# Patient Record
Sex: FEMALE | Race: BLACK OR AFRICAN AMERICAN | NOT HISPANIC OR LATINO | Employment: OTHER | URBAN - METROPOLITAN AREA
[De-identification: names, ages, dates, MRNs, and addresses within clinical notes are randomized per-mention and may not be internally consistent; named-entity substitution may affect disease eponyms.]

---

## 2017-02-03 ENCOUNTER — LAB VISIT (OUTPATIENT)
Dept: LAB | Facility: HOSPITAL | Age: 72
End: 2017-02-03
Attending: FAMILY MEDICINE
Payer: MEDICARE

## 2017-02-03 ENCOUNTER — OFFICE VISIT (OUTPATIENT)
Dept: FAMILY MEDICINE | Facility: CLINIC | Age: 72
End: 2017-02-03
Payer: MEDICARE

## 2017-02-03 VITALS
DIASTOLIC BLOOD PRESSURE: 80 MMHG | TEMPERATURE: 98 F | HEIGHT: 60 IN | BODY MASS INDEX: 28.31 KG/M2 | OXYGEN SATURATION: 99 % | SYSTOLIC BLOOD PRESSURE: 120 MMHG | WEIGHT: 144.19 LBS | HEART RATE: 62 BPM

## 2017-02-03 DIAGNOSIS — R07.89 ATYPICAL CHEST PAIN: ICD-10-CM

## 2017-02-03 DIAGNOSIS — I10 ESSENTIAL HYPERTENSION: Chronic | ICD-10-CM

## 2017-02-03 DIAGNOSIS — F41.9 ANXIETY: ICD-10-CM

## 2017-02-03 DIAGNOSIS — R10.9 ABDOMINAL CRAMPING: ICD-10-CM

## 2017-02-03 DIAGNOSIS — K21.9 GASTROESOPHAGEAL REFLUX DISEASE WITHOUT ESOPHAGITIS: Chronic | ICD-10-CM

## 2017-02-03 DIAGNOSIS — G25.81 RLS (RESTLESS LEGS SYNDROME): Chronic | ICD-10-CM

## 2017-02-03 DIAGNOSIS — R10.9 ABDOMINAL CRAMPING: Primary | ICD-10-CM

## 2017-02-03 DIAGNOSIS — F33.0 MILD RECURRENT MAJOR DEPRESSION: Chronic | ICD-10-CM

## 2017-02-03 LAB
BILIRUB SERPL-MCNC: NORMAL MG/DL
BLOOD URINE, POC: 50
COLOR, POC UA: YELLOW
GLUCOSE UR QL STRIP: NORMAL
KETONES UR QL STRIP: NORMAL
LEUKOCYTE ESTERASE URINE, POC: NORMAL
NITRITE, POC UA: NORMAL
PH, POC UA: 5
PROTEIN, POC: NORMAL
SPECIFIC GRAVITY, POC UA: 1.01
UROBILINOGEN, POC UA: NORMAL

## 2017-02-03 PROCEDURE — 1160F RVW MEDS BY RX/DR IN RCRD: CPT | Mod: S$GLB,,, | Performed by: FAMILY MEDICINE

## 2017-02-03 PROCEDURE — 83036 HEMOGLOBIN GLYCOSYLATED A1C: CPT

## 2017-02-03 PROCEDURE — 3079F DIAST BP 80-89 MM HG: CPT | Mod: S$GLB,,, | Performed by: FAMILY MEDICINE

## 2017-02-03 PROCEDURE — 99499 UNLISTED E&M SERVICE: CPT | Mod: S$GLB,,, | Performed by: FAMILY MEDICINE

## 2017-02-03 PROCEDURE — 84443 ASSAY THYROID STIM HORMONE: CPT

## 2017-02-03 PROCEDURE — 81002 URINALYSIS NONAUTO W/O SCOPE: CPT | Mod: S$GLB,,, | Performed by: FAMILY MEDICINE

## 2017-02-03 PROCEDURE — 1157F ADVNC CARE PLAN IN RCRD: CPT | Mod: S$GLB,,, | Performed by: FAMILY MEDICINE

## 2017-02-03 PROCEDURE — 36415 COLL VENOUS BLD VENIPUNCTURE: CPT | Mod: PO

## 2017-02-03 PROCEDURE — 99999 PR PBB SHADOW E&M-EST. PATIENT-LVL III: CPT | Mod: PBBFAC,,, | Performed by: FAMILY MEDICINE

## 2017-02-03 PROCEDURE — 1159F MED LIST DOCD IN RCRD: CPT | Mod: S$GLB,,, | Performed by: FAMILY MEDICINE

## 2017-02-03 PROCEDURE — 99214 OFFICE O/P EST MOD 30 MIN: CPT | Mod: 25,S$GLB,, | Performed by: FAMILY MEDICINE

## 2017-02-03 PROCEDURE — 3074F SYST BP LT 130 MM HG: CPT | Mod: S$GLB,,, | Performed by: FAMILY MEDICINE

## 2017-02-03 RX ORDER — PAROXETINE HYDROCHLORIDE 20 MG/1
20 TABLET, FILM COATED ORAL EVERY MORNING
Qty: 90 TABLET | Refills: 1 | Status: SHIPPED | OUTPATIENT
Start: 2017-02-03 | End: 2017-02-15 | Stop reason: SDUPTHER

## 2017-02-03 RX ORDER — AMLODIPINE BESYLATE 10 MG/1
10 TABLET ORAL DAILY
Qty: 90 TABLET | Refills: 1 | Status: SHIPPED | OUTPATIENT
Start: 2017-02-03 | End: 2017-02-15 | Stop reason: SDUPTHER

## 2017-02-03 RX ORDER — ROPINIROLE 0.5 MG/1
0.5 TABLET, FILM COATED ORAL NIGHTLY
Qty: 90 TABLET | Refills: 1 | Status: SHIPPED | OUTPATIENT
Start: 2017-02-03 | End: 2017-02-15 | Stop reason: SDUPTHER

## 2017-02-03 RX ORDER — METOPROLOL SUCCINATE 100 MG/1
100 TABLET, EXTENDED RELEASE ORAL DAILY
Qty: 90 TABLET | Refills: 1 | Status: SHIPPED | OUTPATIENT
Start: 2017-02-03 | End: 2017-02-15 | Stop reason: SDUPTHER

## 2017-02-03 RX ORDER — OMEPRAZOLE 20 MG/1
20 CAPSULE, DELAYED RELEASE ORAL DAILY
Qty: 90 CAPSULE | Refills: 1 | Status: SHIPPED | OUTPATIENT
Start: 2017-02-03 | End: 2017-02-15 | Stop reason: SDUPTHER

## 2017-02-03 NOTE — MR AVS SNAPSHOT
[facility]  [address] [facility] [place]  [place] [address]  Phone: [phone]  Fax: [phone]                  Diana Herring   2/3/2017 8:30 AM   Office Visit    Description:  Female : 1945   Provider:  Elvia Moran MD   Department:  [facility] - [facility]           Reason for Visit     Abdominal Pain           Diagnoses this Visit        Comments    Abdominal cramping    -  Primary     Essential hypertension         Gastroesophageal reflux disease without esophagitis         RLS (restless legs syndrome)         Anxiety         Mild recurrent major depression                To Do List           Goals (5 Years of Data)              Today    10/3/16    3/31/16    Blood Pressure < 140/90   120/80  120/66  128/72       These Medications        Disp Refills Start End    amlodipine (NORVASC) 10 MG tablet 90 tablet 1 2/3/2017     Take 1 tablet (10 mg total) by mouth once daily. - Oral    Pharmacy: [facility]/pharmacy #[id number] - [place] - [address] Ph #: [phone]       metoprolol succinate (TOPROL-XL) 100 MG 24 hr tablet 90 tablet 1 2/3/2017     Take 1 tablet (100 mg total) by mouth once daily. - Oral    Pharmacy: [facility]/pharmacy #[id number] - [place] - [address] Ph #: [phone]       omeprazole (PRILOSEC) 20 MG capsule 90 capsule 1 2/3/2017 2017    Take 1 capsule (20 mg total) by mouth once daily. - Oral    Pharmacy: [facility]/pharmacy #[id number] - [place] - [address] Ph #: [phone]       ropinirole (REQUIP) 0.5 MG tablet 90 tablet 1 2/3/2017 2017    Take 1 tablet (0.5 mg total) by mouth every evening. - Oral    Pharmacy: [facility]/pharmacy #[id number] - [place] - [address] Ph #: [phone]       paroxetine (PAXIL) 20 MG tablet 90 tablet 1 2/3/2017 2017    Take 1 tablet (20 mg total) by mouth every morning. - Oral    Pharmacy: [facility]/pharmacy #[id number] - [place] - [address] Ph #: [phone]         [facility]     [facility] -   Assistance  Registered nurses in the [facility] provide clinical advisement, health education, appointment booking, and other advisory services.  Call for this free service at [phone].             Medications           Message regarding Medications     Verify the changes and/or additions to your medication regime listed below are the same as discussed with your clinician today.  If any of these changes or additions are incorrect, please notify your healthcare provider.        CHANGE how you are taking these medications     Start Taking Instead of    amlodipine (NORVASC) 10 MG tablet amlodipine (NORVASC) 10 MG tablet    Dosage:  Take 1 tablet (10 mg total) by mouth once daily. Dosage:  TAKE 1 TABLET (10 MG TOTAL) BY MOUTH ONCE DAILY.    Reason for Change:  Reorder     metoprolol succinate (TOPROL-XL) 100 MG 24 hr tablet metoprolol succinate (TOPROL-XL) 100 MG 24 hr tablet    Dosage:  Take 1 tablet (100 mg total) by mouth once daily. Dosage:  TAKE 1 TABLET (100 MG TOTAL) BY MOUTH ONCE DAILY.    Reason for Change:  Reorder            Verify that the below list of medications is an accurate representation of the medications you are currently taking.  If none reported, the list may be blank. If incorrect, please contact your healthcare provider. Carry this list with you in case of emergency.           Current Medications     alendronate (FOSAMAX) 70 MG tablet Take 1 tablet (70 mg total) by mouth every 7 days.    amlodipine (NORVASC) 10 MG tablet Take 1 tablet (10 mg total) by mouth once daily.    calcium-vitamin D 500-125 mg-unit tablet Take 1 tablet by mouth once daily.      metoprolol succinate (TOPROL-XL) 100 MG 24 hr tablet Take 1 tablet (100 mg total) by mouth once daily.    multivitamin (MULTIVITAMIN) per tablet Take 1 tablet by mouth once daily.      simethicone (MYLICON) 80 MG chewable tablet Take 1 tablet (80 mg total) by mouth every 6 (six) hours as needed for Flatulence.    levocetirizine  (XYZAL) 5 MG tablet Take 1 tablet (5 mg total) by mouth every evening.    lorazepam (ATIVAN) 1 MG tablet TAKE 1 TABLET BY MOUTH DAILY AS NEEDED FOR ANXIETY    omeprazole (PRILOSEC) 20 MG capsule Take 1 capsule (20 mg total) by mouth once daily.    paroxetine (PAXIL) 20 MG tablet Take 1 tablet (20 mg total) by mouth every morning.    ropinirole (REQUIP) 0.5 MG tablet Take 1 tablet (0.5 mg total) by mouth every evening.           Clinical Reference Information           Your Vitals Were     BP Pulse Temp Height Weight SpO2    120/80 (BP Location: Left arm, Patient Position: Sitting, BP Method: Manual) 62 98.1 °F (36.7 °C) (Oral) 5' (1.524 m) 65.4 kg (144 lb 2.9 oz) 99%    BMI                28.16 kg/m2          Blood Pressure          Most Recent Value    BP  120/80      Allergies as of 2/3/2017     No Known Allergies      Immunizations Administered on Date of Encounter - 2/3/2017     None      Orders Placed During Today's Visit      Normal Orders This Visit    POCT URINE DIPSTICK WITHOUT MICROSCOPE     Future Labs/Procedures Expected by Expires    Hemoglobin A1c  2/3/2017 2/3/2018    TSH  2/3/2017 2/3/2018      [facility] Sign-Up     Activating your [facility] account is as easy as 1-2-3!     1) Visit [email or web address], select Sign Up Now, enter this activation code and your date of birth, then select Next.  [email or web address]  Expires: 3/20/2017  8:58 AM      2) Create a username and password to use when you visit [facility] in the future and select a security question in case you lose your password and select Next.    3) Enter your e-mail address and click Sign Up!    Additional Information  If you have questions, please e-mail [email or web address] or call [phone] to talk to our [facility] staff. Remember, [facility] is NOT to be used for urgent needs. For medical emergencies, dial 911.         Instructions      High-Fiber Diet  Fiber is in fruits, vegetables, cereals, and grains. Fiber passes through your body  undigested. A high-fiber diet helps food move through your intestinal tract. The added bulk is helpful in preventing constipation. In people with diverticulosis, fiber helps clean out the pouches along the colon wall. It also prevents new pouches from forming. A high-fiber diet reduces the risk of colon cancer. It also lowers blood cholesterol and prevents high blood sugar in people with diabetes.    The fiber-rich foods listed below should be part of your diet. If you are not used to high-fiber foods, start with 1 or 2 foods from this list. Every 3 to 4 days add a new one to your diet. Do this until you are eating 4 high-fiber foods per day. This should give you 20 to 35 grams of fiber a day. It is also important to drink a lot of water when you are on this diet. You should have 6 to 8 glasses of water a day. Water makes the fiber swell and increases the benefit.  Foods high in dietary fiber  The following foods are high in dietary fiber:  · Breads. Breads made with 100% whole-wheat flour; tony, wheat, or rye crackers; whole-grain tortillas, bran muffins.  · Cereals. Whole-grain and bran cereals with bran (shredded wheat, wheat flakes, raisin bran, corn bran); oatmeal, rolled oats, granola, and brown rice.  · Fruits. Fresh fruits and their edible skins (pears, prunes, raisins, berries, apples, and apricots); bananas, citrus fruit, mangoes, pineapple; and prune juice.  · Nuts. Any nuts and seeds.  · Vegetables. Best served raw or lightly cooked. All types, especially: green peas, celery, eggplant, potatoes, spinach, broccoli, [place] sprouts, winter squash, carrots, cauliflower, soybeans, lentils, and fresh and dried beans of all kinds.  · Other. Popcorn, any spices.  Date Last Reviewed: 8/1/2016  © 7761-1000 [employer]. [address] [place] [address], [place] [address]. All rights reserved. This information is not intended as a substitute for professional medical care. Always follow your healthcare  professional's instructions.             Language Assistance Services     ATTENTION: Language assistance services are available, free of charge. Please call [phone].      ATENCIÓN: Si habla catracho, tiene a kurtz disposición servicios gratuitos de asistencia lingüística. Llame al [phone].     CHÚ Ý: N?u b?n nói Ti?ng Vi?t, có các d?ch v? h? tr? ngôn ng? mi?n phí dành cho b?n. G?i s? [phone].         [facility] complies with applicable Federal civil rights laws and does not discriminate on the basis of race, color, national origin, age, disability, or sex.

## 2017-02-03 NOTE — PROGRESS NOTES
Routine Office Visit    Patient Name: Diana Herring    : 1945  MRN: [id number]    Subjective:  Diana is a 72 y.o. female who presents today for     1. Abdominal pain - chronic condition for patient. Pt c/o left lower quadrant abdominal pain. Pain is described as cramping / spasm pain and is associated with weakness. Pain is sometimes triggered by constipation or due to lack of food. She has to take a laxative in order to have a bowel movement. Pain is alleviated after bowel movement. She also has these episodes whenever she does not eat for a long period of time. When she does not eat, she feels faint. She admits to skipping meals. She does like to eat vegetables but does not drink enough water. No fever/ no blood in stool / colonoscopy  - showed polyp / no weight loss   2. Chest pain - intermittent episodes. Pt has had similar chest pain in the past and was evaluated by cardiology. She would like referral.     Review of Systems   Constitutional: Negative for chills and fever.   HENT: Negative for congestion.    Eyes: Negative for blurred vision.   Respiratory: Negative for cough.    Cardiovascular: Negative for chest pain.   Gastrointestinal: Positive for abdominal pain and constipation. Negative for diarrhea, heartburn, nausea and vomiting.   Genitourinary: Negative for dysuria.   Musculoskeletal: Negative for myalgias.   Skin: Negative for itching and rash.   Neurological: Negative for dizziness and headaches.   Psychiatric/Behavioral: Negative for depression.       Active Problem List  Patient Active Problem List   Diagnosis    GERD (gastroesophageal reflux disease)    Essential hypertension    Insomnia    Mild recurrent major depression    RLS (restless legs syndrome)    Osteoporosis    Muscle weakness    Bilateral leg pain    Left shoulder pain    Chest pain    Anxiety    Sedative hypnotic or anxiolytic dependence       Past Surgical History  Past Surgical History   Procedure  Laterality Date    Partial hysterectomy      Tubal ligation Bilateral        Family History  Family History   Problem Relation Age of Onset    Stroke Mother     Cancer Father      Lung    Stroke Brother     Heart disease Son        Social History  Social History     Social History    Marital status:      Spouse name: N/A    Number of children: N/A    Years of education: N/A     Occupational History    Not on file.     Social History Main Topics    Smoking status: Never Smoker    Smokeless tobacco: Not on file    Alcohol use No    Drug use: No    Sexual activity: Not on file     Other Topics Concern    Not on file     Social History Narrative    Patient is  w/ 5 children, one  from heart disease.The patient is retired.       Medications and Allergies  Reviewed and updated.       Physical Exam  Visit Vitals    /80 (BP Location: Left arm, Patient Position: Sitting, BP Method: Manual)    Pulse 62    Temp 98.1 °F (36.7 °C) (Oral)    Ht 5' (1.524 m)    Wt 65.4 kg (144 lb 2.9 oz)    SpO2 99%    BMI 28.16 kg/m2     Physical Exam   Constitutional: She is oriented to person, place, and time. She appears well-developed and well-nourished.   HENT:   Head: Normocephalic and atraumatic.   Eyes: Conjunctivae and EOM are normal. Pupils are equal, round, and reactive to light.   Neck: Normal range of motion. Neck supple.   Cardiovascular: Normal rate, regular rhythm and normal heart sounds.  Exam reveals no gallop and no friction rub.    No murmur heard.  Pulmonary/Chest: Breath sounds normal. No respiratory distress.   Abdominal: Soft. Bowel sounds are normal. She exhibits no distension. There is no tenderness.   Musculoskeletal: Normal range of motion.   Lymphadenopathy:     She has no cervical adenopathy.   Neurological: She is alert and oriented to person, place, and time.   Skin: Skin is warm.   Psychiatric: She has a normal mood and affect.     Results for orders placed or  performed in visit on 02/03/17   POCT URINE DIPSTICK WITHOUT MICROSCOPE   Result Value Ref Range    Color, UA yellow     Spec Grav, UA 1.015     pH, UA 5     WBC, UA +     Nitrite, UA neg     Protein, UA neg     Glucose, UA neg     Ketones, UA neg     Urobilinogen, UA neg     Bilirubin, UA +     Blood, UA 50          Assessment/Plan:  Diana Herring is a 72 y.o. female who presents today for :    Abdominal cramping  -     POCT URINE DIPSTICK WITHOUT MICROSCOPE  -     TSH; Future; Expected date: 2/3/17  -     Hemoglobin A1c; Future; Expected date: 2/3/17  Suspect abdominal cramping is due to constipation   Recommend increase fiber and water intake  Recommend otc miralax to help with regulating bowel movements     Essential hypertension / Atypical chest pain  -     amlodipine (NORVASC) 10 MG tablet; Take 1 tablet (10 mg total) by mouth once daily.  Dispense: 90 tablet; Refill: 1  -     metoprolol succinate (TOPROL-XL) 100 MG 24 hr tablet; Take 1 tablet (100 mg total) by mouth once daily.  Dispense: 90 tablet; Refill: 1  -     Ambulatory referral to Cardiology  Pt c/o intermittent episodes of chest pain - she was evaluated by cardiology in the past. Would like to see Dr. Muro again   Continue with current medication     Gastroesophageal reflux disease without esophagitis  -     omeprazole (PRILOSEC) 20 MG capsule; Take 1 capsule (20 mg total) by mouth once daily.  Dispense: 90 capsule; Refill: 1  The current medical regimen is effective;  continue present plan and medications.    RLS (restless legs syndrome)  -     ropinirole (REQUIP) 0.5 MG tablet; Take 1 tablet (0.5 mg total) by mouth every evening.  Dispense: 90 tablet; Refill: 1  The current medical regimen is effective;  continue present plan and medications.    Anxiety / Mild recurrent major depression  -     paroxetine (PAXIL) 20 MG tablet; Take 1 tablet (20 mg total) by mouth every morning.  Dispense: 90 tablet; Refill: 1  The current medical regimen is  effective;  continue present plan and medications.            Return if symptoms worsen or fail to improve.

## 2017-02-03 NOTE — PATIENT INSTRUCTIONS
High-Fiber Diet  Fiber is in fruits, vegetables, cereals, and grains. Fiber passes through your body undigested. A high-fiber diet helps food move through your intestinal tract. The added bulk is helpful in preventing constipation. In people with diverticulosis, fiber helps clean out the pouches along the colon wall. It also prevents new pouches from forming. A high-fiber diet reduces the risk of colon cancer. It also lowers blood cholesterol and prevents high blood sugar in people with diabetes.    The fiber-rich foods listed below should be part of your diet. If you are not used to high-fiber foods, start with 1 or 2 foods from this list. Every 3 to 4 days add a new one to your diet. Do this until you are eating 4 high-fiber foods per day. This should give you 20 to 35 grams of fiber a day. It is also important to drink a lot of water when you are on this diet. You should have 6 to 8 glasses of water a day. Water makes the fiber swell and increases the benefit.  Foods high in dietary fiber  The following foods are high in dietary fiber:  · Breads. Breads made with 100% whole-wheat flour; tony, wheat, or rye crackers; whole-grain tortillas, bran muffins.  · Cereals. Whole-grain and bran cereals with bran (shredded wheat, wheat flakes, raisin bran, corn bran); oatmeal, rolled oats, granola, and brown rice.  · Fruits. Fresh fruits and their edible skins (pears, prunes, raisins, berries, apples, and apricots); bananas, citrus fruit, mangoes, pineapple; and prune juice.  · Nuts. Any nuts and seeds.  · Vegetables. Best served raw or lightly cooked. All types, especially: green peas, celery, eggplant, potatoes, spinach, broccoli, [place] sprouts, winter squash, carrots, cauliflower, soybeans, lentils, and fresh and dried beans of all kinds.  · Other. Popcorn, any spices.  Date Last Reviewed: 8/1/2016  © 2742-8666 [facility]. [address] [place] [address], [place] [address]. All rights reserved. This  information is not intended as a substitute for professional medical care. Always follow your healthcare professional's instructions.

## 2017-02-04 LAB — TSH SERPL DL<=0.005 MIU/L-ACNC: 2.3 UIU/ML

## 2017-02-05 LAB
ESTIMATED AVG GLUCOSE: 97 MG/DL
HBA1C MFR BLD HPLC: 5 %

## 2017-02-07 ENCOUNTER — TELEPHONE (OUTPATIENT)
Dept: FAMILY MEDICINE | Facility: CLINIC | Age: 72
End: 2017-02-07

## 2017-02-07 NOTE — TELEPHONE ENCOUNTER
Patient has an appointment 2/13/17 3pm with Dr. Muro at [facility], patient was notified.

## 2017-02-10 ENCOUNTER — LAB VISIT (OUTPATIENT)
Dept: LAB | Facility: HOSPITAL | Age: 72
End: 2017-02-10
Attending: NURSE PRACTITIONER
Payer: MEDICARE

## 2017-02-10 ENCOUNTER — OFFICE VISIT (OUTPATIENT)
Dept: FAMILY MEDICINE | Facility: CLINIC | Age: 72
End: 2017-02-10
Payer: MEDICARE

## 2017-02-10 VITALS
OXYGEN SATURATION: 98 % | BODY MASS INDEX: 27.96 KG/M2 | TEMPERATURE: 98 F | HEIGHT: 60 IN | HEART RATE: 60 BPM | SYSTOLIC BLOOD PRESSURE: 118 MMHG | WEIGHT: 142.44 LBS | DIASTOLIC BLOOD PRESSURE: 68 MMHG | RESPIRATION RATE: 16 BRPM

## 2017-02-10 DIAGNOSIS — R51.9 UNILATERAL HEADACHE: ICD-10-CM

## 2017-02-10 DIAGNOSIS — G44.209 ACUTE NON INTRACTABLE TENSION-TYPE HEADACHE: Primary | ICD-10-CM

## 2017-02-10 DIAGNOSIS — G44.209 ACUTE NON INTRACTABLE TENSION-TYPE HEADACHE: ICD-10-CM

## 2017-02-10 LAB
ALBUMIN SERPL BCP-MCNC: 3.9 G/DL
ALP SERPL-CCNC: 107 U/L
ALT SERPL W/O P-5'-P-CCNC: 12 U/L
ANION GAP SERPL CALC-SCNC: 7 MMOL/L
AST SERPL-CCNC: 17 U/L
BASOPHILS # BLD AUTO: 0.1 K/UL
BASOPHILS NFR BLD: 1.5 %
BILIRUB SERPL-MCNC: 0.8 MG/DL
BUN SERPL-MCNC: 13 MG/DL
CALCIUM SERPL-MCNC: 9.6 MG/DL
CHLORIDE SERPL-SCNC: 106 MMOL/L
CO2 SERPL-SCNC: 28 MMOL/L
CREAT SERPL-MCNC: 0.9 MG/DL
DIFFERENTIAL METHOD: ABNORMAL
EOSINOPHIL # BLD AUTO: 0.2 K/UL
EOSINOPHIL NFR BLD: 3.5 %
ERYTHROCYTE [DISTWIDTH] IN BLOOD BY AUTOMATED COUNT: 12.8 %
EST. GFR  (AFRICAN AMERICAN): >60 ML/MIN/1.73 M^2
EST. GFR  (NON AFRICAN AMERICAN): >60 ML/MIN/1.73 M^2
GLUCOSE SERPL-MCNC: 94 MG/DL
HCT VFR BLD AUTO: 41.8 %
HGB BLD-MCNC: 13.4 G/DL
LYMPHOCYTES # BLD AUTO: 4 K/UL
LYMPHOCYTES NFR BLD: 58.9 %
MCH RBC QN AUTO: 29.8 PG
MCHC RBC AUTO-ENTMCNC: 32.1 %
MCV RBC AUTO: 93 FL
MONOCYTES # BLD AUTO: 0.5 K/UL
MONOCYTES NFR BLD: 7.1 %
NEUTROPHILS # BLD AUTO: 2 K/UL
NEUTROPHILS NFR BLD: 29 %
PLATELET # BLD AUTO: 231 K/UL
PMV BLD AUTO: 11.2 FL
POTASSIUM SERPL-SCNC: 4.4 MMOL/L
PROT SERPL-MCNC: 8.1 G/DL
RBC # BLD AUTO: 4.49 M/UL
SODIUM SERPL-SCNC: 141 MMOL/L
WBC # BLD AUTO: 6.86 K/UL

## 2017-02-10 PROCEDURE — 3074F SYST BP LT 130 MM HG: CPT | Mod: S$GLB,,, | Performed by: NURSE PRACTITIONER

## 2017-02-10 PROCEDURE — 1157F ADVNC CARE PLAN IN RCRD: CPT | Mod: S$GLB,,, | Performed by: NURSE PRACTITIONER

## 2017-02-10 PROCEDURE — 36415 COLL VENOUS BLD VENIPUNCTURE: CPT | Mod: PO

## 2017-02-10 PROCEDURE — 99214 OFFICE O/P EST MOD 30 MIN: CPT | Mod: 25,S$GLB,, | Performed by: NURSE PRACTITIONER

## 2017-02-10 PROCEDURE — 85025 COMPLETE CBC W/AUTO DIFF WBC: CPT

## 2017-02-10 PROCEDURE — 99999 PR PBB SHADOW E&M-EST. PATIENT-LVL IV: CPT | Mod: PBBFAC,,, | Performed by: NURSE PRACTITIONER

## 2017-02-10 PROCEDURE — 96372 THER/PROPH/DIAG INJ SC/IM: CPT | Mod: S$GLB,,, | Performed by: NURSE PRACTITIONER

## 2017-02-10 PROCEDURE — 1160F RVW MEDS BY RX/DR IN RCRD: CPT | Mod: S$GLB,,, | Performed by: NURSE PRACTITIONER

## 2017-02-10 PROCEDURE — 80053 COMPREHEN METABOLIC PANEL: CPT

## 2017-02-10 PROCEDURE — 3078F DIAST BP <80 MM HG: CPT | Mod: S$GLB,,, | Performed by: NURSE PRACTITIONER

## 2017-02-10 PROCEDURE — 1125F AMNT PAIN NOTED PAIN PRSNT: CPT | Mod: S$GLB,,, | Performed by: NURSE PRACTITIONER

## 2017-02-10 PROCEDURE — 1159F MED LIST DOCD IN RCRD: CPT | Mod: S$GLB,,, | Performed by: NURSE PRACTITIONER

## 2017-02-10 RX ORDER — KETOROLAC TROMETHAMINE 30 MG/ML
30 INJECTION, SOLUTION INTRAMUSCULAR; INTRAVENOUS
Status: COMPLETED | OUTPATIENT
Start: 2017-02-10 | End: 2017-02-10

## 2017-02-10 RX ADMIN — KETOROLAC TROMETHAMINE 30 MG: 30 INJECTION, SOLUTION INTRAMUSCULAR; INTRAVENOUS at 10:02

## 2017-02-10 NOTE — MR AVS SNAPSHOT
[facility]  [address]  [place] [address]  Phone: [phone]  Fax: [phone]                  Diana Herring   2/10/2017 9:45 AM   Office Visit    Description:  Female : 1945   Provider:  Shanita Salas NP   Department:  [facility]           Reason for Visit     Headache           Diagnoses this Visit        Comments    Acute non intractable tension-type headache    -  Primary     Unilateral headache                To Do List           Future Appointments        Provider Department Dept Phone    2017 3:00 PM Quoc Muro MD [facility] [phone]      Goals (5 Years of Data)              Today    2/3/17    10/3/16    Blood Pressure < 140/90   118/68  120/80  120/66      Follow-Up and Disposition     Return if symptoms worsen or fail to improve.      [facility]     [facility] On Call Nurse Care Line - 24/7 Assistance  Registered nurses in the [facility] On Call Center provide clinical advisement, health education, appointment booking, and other advisory services.  Call for this free service at [phone].             Medications           Message regarding Medications     Verify the changes and/or additions to your medication regime listed below are the same as discussed with your clinician today.  If any of these changes or additions are incorrect, please notify your healthcare provider.        These medications were administered today        Dose Freq    ketorolac injection 30 mg 30 mg Clinic/HOD 1 time    Sig: Inject 30 mg into the muscle one time.    Class: Normal    Route: Intramuscular           Verify that the below list of medications is an accurate representation of the medications you are currently taking.  If none reported, the list may be blank. If incorrect, please contact your healthcare provider. Carry this list with you in case of emergency.           Current Medications     alendronate (FOSAMAX) 70 MG tablet  Take 1 tablet (70 mg total) by mouth every 7 days.    amlodipine (NORVASC) 10 MG tablet Take 1 tablet (10 mg total) by mouth once daily.    calcium-vitamin D 500-125 mg-unit tablet Take 1 tablet by mouth once daily.      lorazepam (ATIVAN) 1 MG tablet TAKE 1 TABLET BY MOUTH DAILY AS NEEDED FOR ANXIETY    metoprolol succinate (TOPROL-XL) 100 MG 24 hr tablet Take 1 tablet (100 mg total) by mouth once daily.    multivitamin (MULTIVITAMIN) per tablet Take 1 tablet by mouth once daily.      omeprazole (PRILOSEC) 20 MG capsule Take 1 capsule (20 mg total) by mouth once daily.    paroxetine (PAXIL) 20 MG tablet Take 1 tablet (20 mg total) by mouth every morning.    ropinirole (REQUIP) 0.5 MG tablet Take 1 tablet (0.5 mg total) by mouth every evening.    simethicone (MYLICON) 80 MG chewable tablet Take 1 tablet (80 mg total) by mouth every 6 (six) hours as needed for Flatulence.    levocetirizine (XYZAL) 5 MG tablet Take 1 tablet (5 mg total) by mouth every evening.           Clinical Reference Information           Your Vitals Were     BP Pulse Temp Resp Height Weight    118/68 (BP Location: Right arm, Patient Position: Sitting, BP Method: Manual) 60 98 °F (36.7 °C) (Oral) 16 5' (1.524 m) 64.6 kg (142 lb 6.7 oz)    SpO2 BMI             98% 27.81 kg/m2         Blood Pressure          Most Recent Value    BP  118/68      Allergies as of 2/10/2017     No Known Allergies      Immunizations Administered on Date of Encounter - 2/10/2017     None      Orders Placed During Today's Visit     Future Labs/Procedures Expected by Expires    CBC auto differential  2/10/2017 4/11/2018    Comprehensive metabolic panel  2/10/2017 4/11/2018      Administrations This Visit     ketorolac injection 30 mg     Admin Date Action Dose Route Administered By             02/10/2017 Given 30 mg Intramuscular Tabitha Adams, LPN [facility] Sign-Up     Activating your [facility] account is as easy as 1-2-3!     1) Visit [email or web address],  select Sign Up Now, enter this activation code and your date of birth, then select Next.  [email or web address]  Expires: 3/20/2017  8:58 AM      2) Create a username and password to use when you visit [facility] in the future and select a security question in case you lose your password and select Next.    3) Enter your e-mail address and click Sign Up!    Additional Information  If you have questions, please e-mail [email or web address] or call [phone] to talk to our [facility] staff. Remember, [facility] is NOT to be used for urgent needs. For medical emergencies, dial 911.         Instructions      Tension Headache    A muscle tension headache is a very common cause of head pain. Its also called a stress headache. When some people are under stress, they tense the muscles of their shoulder, neck, and scalp without knowing it. If this tension lasts long enough, a headache can occur. A tension headache can be quite painful. It can last for hours or even days.  Home care  Follow these tips when caring for yourself at home:  · Dont drive yourself home if you were given pain medicine for your headache. Instead, have someone else drive you home. Try to sleep when you get home. You should feel much better when you wake up.  · Put heat on the back of your neck to help ease neck spasm.  · Drink only clear liquids or eat a light diet until your symptoms get better. This will help you avoid nausea or vomiting.  How to prevent headaches  · Figure out what is causing stress in your life. Learn new ways to handle your stress. Ideas include regular exercise, biofeedback, self-hypnosis, yoga, and meditation. Talk with your healthcare provider to find out more information about managing stress. Many books and digital media are also available on this subject.  · Take time out at the first sign of a tension headache, if possible. Take yourself out of the stressful situation. Find a quiet, comfortable place to sit or lie down and  let yourself relax. Heat and deep massage of the tight areas in the neck and shoulders may help ease muscle spasm. You may also get relief from a medicine like ibuprofen or a prescribed muscle relaxant.  Follow-up care  Follow up with your healthcare provider, or as advised. Talk with your provider if you have frequent headaches. He or she can figure out a treatment plan. Ask if you can have medicine to take at home the next time you get a bad headache. This may keep you from having to visit the emergency department in the future. You may need to see a headache specialist (neurologist) if you continue to have headaches.  When to seek medical advice  Call your healthcare provider right away if any of these occur:  · Your head pain gets worse during sexual intercourse or strenuous activity  · Your head pain doesnt get better within 24 hours  · You arent able to keep liquids down (repeated vomiting)  · Fever of 100.4ºF (38ºC) or higher, or as directed by your healthcare provider  · Stiff neck  · Extreme drowsiness, confusion, or fainting  · Dizziness or dizziness with spinning sensation (vertigo)  · Weakness in an arm or leg or one side of your face  · You have difficulty speaking  · Your vision changes  Date Last Reviewed: 8/1/2016  © 5478-6090 [employer]. [address] [place] [address], [place] [address]. All rights reserved. This information is not intended as a substitute for professional medical care. Always follow your healthcare professional's instructions.             Language Assistance Services     ATTENTION: Language assistance services are available, free of charge. Please call [phone].      ATENCIÓN: Si habla español, tiene a kurtz disposición servicios gratuitos de asistencia lingüística. Llame al [phone].     [facility] Ý: N?u b?n nói Ti?ng Vi?t, có các d?ch v? h? tr? ngôn ng? mi?n phí dành cho b?n. G?i s? [phone].         [facility] complies with applicable Federal  civil rights laws and does not discriminate on the basis of race, color, national origin, age, disability, or sex.

## 2017-02-10 NOTE — PROGRESS NOTES
Subjective:       Patient ID: Diana Herring is a 72 y.o. female.    Chief Complaint: Headache (x's 2 days)    Headache    This is a new problem. The current episode started in the past 7 days (3 days ago). The problem occurs constantly. The problem has been gradually worsening. The pain is located in the left unilateral and frontal region. The pain radiates to the face. The pain quality is not similar to prior headaches. The quality of the pain is described as throbbing and aching. The pain is at a severity of 6/10. The pain is moderate. Associated symptoms include blurred vision (intermittent ) and eye pain (left eye). Pertinent negatives include no abdominal pain, back pain, coughing, dizziness, drainage, ear pain, eye redness, eye watering, fever, hearing loss, insomnia, loss of balance, muscle aches, nausea, neck pain, numbness, phonophobia, photophobia, rhinorrhea, seizures, sinus pressure, swollen glands, tinnitus, visual change, vomiting, weakness or weight loss. Nothing aggravates the symptoms. She has tried oral narcotics (toradol) for the symptoms. The treatment provided mild relief. Her past medical history is significant for hypertension.     Review of Systems   Constitutional: Negative for chills, diaphoresis, fatigue, fever and weight loss.   HENT: Negative for congestion, ear pain, hearing loss, nosebleeds, postnasal drip, rhinorrhea, sinus pressure, sneezing and tinnitus.    Eyes: Positive for blurred vision (intermittent ) and pain (left eye). Negative for photophobia, discharge, redness, itching and visual disturbance.   Respiratory: Negative for cough, chest tightness, shortness of breath and wheezing.    Cardiovascular: Negative for chest pain, palpitations and leg swelling.   Gastrointestinal: Negative for abdominal pain, constipation, diarrhea, nausea and vomiting.   Genitourinary: Negative for dysuria, vaginal discharge and vaginal pain.   Musculoskeletal: Negative for arthralgias, back  pain, myalgias and neck pain.   Skin: Negative for color change and rash.   Neurological: Positive for headaches. Negative for dizziness, seizures, weakness, light-headedness, numbness and loss of balance.   Hematological: Negative for adenopathy. Does not bruise/bleed easily.   Psychiatric/Behavioral: The patient does not have insomnia.        Objective:      Physical Exam   Constitutional: She is oriented to person, place, and time. Vital signs are normal. She appears well-developed and well-nourished.   HENT:   Head: Normocephalic and atraumatic.   Right Ear: External ear normal.   Left Ear: External ear normal.   Nose: Nose normal.   Mouth/Throat: Oropharynx is clear and moist. No oropharyngeal exudate.   Eyes: Conjunctivae and EOM are normal. Pupils are equal, round, and reactive to light.   Cardiovascular: Normal rate, regular rhythm and normal heart sounds.    Pulmonary/Chest: Effort normal and breath sounds normal.   Neurological: She is alert and oriented to person, place, and time.   Skin: Skin is warm, dry and intact.   Psychiatric: She has a normal mood and affect.       Assessment:       1. Acute non intractable tension-type headache    2. Unilateral headache        Plan:       Diana was seen today for headache.    Diagnoses and all orders for this visit:    Acute non intractable tension-type headache  -     ketorolac injection 30 mg; Inject 30 mg into the muscle one time.  -     CBC auto differential; Future  -     Comprehensive metabolic panel; Future    Unilateral headache  -     ketorolac injection 30 mg; Inject 30 mg into the muscle one time.  -     CBC auto differential; Future  -     Comprehensive metabolic panel; Future  Home care  Follow these tips when caring for yourself at home:  · Dont drive yourself home if you were given pain medicine for your headache. Instead, have someone else drive you home. Try to sleep when you get home. You should feel much better when you wake up.  · Put heat on  the back of your neck to help ease neck spasm.  · Drink only clear liquids or eat a light diet until your symptoms get better. This will help you avoid nausea or vomiting.  How to prevent headaches  · Figure out what is causing stress in your life. Learn new ways to handle your stress. Ideas include regular exercise, biofeedback, self-hypnosis, yoga, and meditation. Talk with your healthcare provider to find out more information about managing stress. Many books and digital media are also available on this subject.  · Take time out at the first sign of a tension headache, if possible. Take yourself out of the stressful situation. Find a quiet, comfortable place to sit or lie down and let yourself relax. Heat and deep massage of the tight areas in the neck and shoulders may help ease muscle spasm. You may also get relief from a medicine like ibuprofen or a prescribed muscle relaxant.  Follow-up care  Follow up with your healthcare provider, or as advised. Talk with your provider if you have frequent headaches. He or she can figure out a treatment plan. Ask if you can have medicine to take at home the next time you get a bad headache. This may keep you from having to visit the emergency department in the future. You may need to see a headache specialist (neurologist) if you continue to have headaches.  When to seek medical advice  Call your healthcare provider right away if any of these occur:  · Your head pain gets worse during sexual intercourse or strenuous activity  · Your head pain doesnt get better within 24 hours  · You arent able to keep liquids down (repeated vomiting)  · Fever of 100.4ºF (38ºC) or higher, or as directed by your healthcare provider  · Stiff neck  · Extreme drowsiness, confusion, or fainting  · Dizziness or dizziness with spinning sensation (vertigo)  · Weakness in an arm or leg or one side of your face  · You have difficulty speaking  · Your vision changes  Date Last Reviewed: 8/1/2016  ©  7285-2748 [employer]. [address] [place] [address], [place] [address]. All rights reserved. This information is not intended as a substitute for professional medical care. Always follow your healthcare professional's instructions.

## 2017-02-10 NOTE — PATIENT INSTRUCTIONS
Tension Headache    A muscle tension headache is a very common cause of head pain. Its also called a stress headache. When some people are under stress, they tense the muscles of their shoulder, neck, and scalp without knowing it. If this tension lasts long enough, a headache can occur. A tension headache can be quite painful. It can last for hours or even days.  Home care  Follow these tips when caring for yourself at home:  · Dont drive yourself home if you were given pain medicine for your headache. Instead, have someone else drive you home. Try to sleep when you get home. You should feel much better when you wake up.  · Put heat on the back of your neck to help ease neck spasm.  · Drink only clear liquids or eat a light diet until your symptoms get better. This will help you avoid nausea or vomiting.  How to prevent headaches  · Figure out what is causing stress in your life. Learn new ways to handle your stress. Ideas include regular exercise, biofeedback, self-hypnosis, yoga, and meditation. Talk with your healthcare provider to find out more information about managing stress. Many books and digital media are also available on this subject.  · Take time out at the first sign of a tension headache, if possible. Take yourself out of the stressful situation. Find a quiet, comfortable place to sit or lie down and let yourself relax. Heat and deep massage of the tight areas in the neck and shoulders may help ease muscle spasm. You may also get relief from a medicine like ibuprofen or a prescribed muscle relaxant.  Follow-up care  Follow up with your healthcare provider, or as advised. Talk with your provider if you have frequent headaches. He or she can figure out a treatment plan. Ask if you can have medicine to take at home the next time you get a bad headache. This may keep you from having to visit the emergency department in the future. You may need to see a headache specialist (neurologist) if you continue  to have headaches.  When to seek medical advice  Call your healthcare provider right away if any of these occur:  · Your head pain gets worse during sexual intercourse or strenuous activity  · Your head pain doesnt get better within 24 hours  · You arent able to keep liquids down (repeated vomiting)  · Fever of 100.4ºF (38ºC) or higher, or as directed by your healthcare provider  · Stiff neck  · Extreme drowsiness, confusion, or fainting  · Dizziness or dizziness with spinning sensation (vertigo)  · Weakness in an arm or leg or one side of your face  · You have difficulty speaking  · Your vision changes  Date Last Reviewed: 8/1/2016  © 3233-0536 [employer]. [address] [place] [address], [place] [address]. All rights reserved. This information is not intended as a substitute for professional medical care. Always follow your healthcare professional's instructions.

## 2017-02-10 NOTE — PROGRESS NOTES
Patient tolerated injection well.  Instructed to wait 15 minutes after injection for monitoring. Verbalized understanding.

## 2017-02-13 ENCOUNTER — OFFICE VISIT (OUTPATIENT)
Dept: CARDIOLOGY | Facility: CLINIC | Age: 72
End: 2017-02-13
Payer: MEDICARE

## 2017-02-13 VITALS
HEART RATE: 64 BPM | HEIGHT: 60 IN | WEIGHT: 143 LBS | OXYGEN SATURATION: 97 % | SYSTOLIC BLOOD PRESSURE: 141 MMHG | DIASTOLIC BLOOD PRESSURE: 63 MMHG | BODY MASS INDEX: 28.07 KG/M2

## 2017-02-13 DIAGNOSIS — E78.5 DYSLIPIDEMIA: ICD-10-CM

## 2017-02-13 DIAGNOSIS — R07.9 CHEST PAIN, UNSPECIFIED TYPE: ICD-10-CM

## 2017-02-13 DIAGNOSIS — I25.10 CORONARY ARTERY DISEASE INVOLVING NATIVE CORONARY ARTERY OF NATIVE HEART WITHOUT ANGINA PECTORIS: Primary | ICD-10-CM

## 2017-02-13 DIAGNOSIS — F41.8 DEPRESSION WITH ANXIETY: ICD-10-CM

## 2017-02-13 DIAGNOSIS — I10 ESSENTIAL HYPERTENSION: ICD-10-CM

## 2017-02-13 PROCEDURE — 3078F DIAST BP <80 MM HG: CPT | Mod: S$GLB,,, | Performed by: INTERNAL MEDICINE

## 2017-02-13 PROCEDURE — 1125F AMNT PAIN NOTED PAIN PRSNT: CPT | Mod: S$GLB,,, | Performed by: INTERNAL MEDICINE

## 2017-02-13 PROCEDURE — 99499 UNLISTED E&M SERVICE: CPT | Mod: S$GLB,,, | Performed by: INTERNAL MEDICINE

## 2017-02-13 PROCEDURE — 3077F SYST BP >= 140 MM HG: CPT | Mod: S$GLB,,, | Performed by: INTERNAL MEDICINE

## 2017-02-13 PROCEDURE — 1160F RVW MEDS BY RX/DR IN RCRD: CPT | Mod: S$GLB,,, | Performed by: INTERNAL MEDICINE

## 2017-02-13 PROCEDURE — 1157F ADVNC CARE PLAN IN RCRD: CPT | Mod: S$GLB,,, | Performed by: INTERNAL MEDICINE

## 2017-02-13 PROCEDURE — 1159F MED LIST DOCD IN RCRD: CPT | Mod: S$GLB,,, | Performed by: INTERNAL MEDICINE

## 2017-02-13 PROCEDURE — 99999 PR PBB SHADOW E&M-EST. PATIENT-LVL III: CPT | Mod: PBBFAC,,, | Performed by: INTERNAL MEDICINE

## 2017-02-13 PROCEDURE — 99213 OFFICE O/P EST LOW 20 MIN: CPT | Mod: S$GLB,,, | Performed by: INTERNAL MEDICINE

## 2017-02-13 NOTE — PROGRESS NOTES
Subjective:    Patient ID:  Diana Herring is a 72 y.o. female who presents for follow-up of [facility]    Here for f/u after recent LHC for NST done in [place] showing lateral ischemia.  No significant cad on LHC.  Denied any worsening cardiopulmonary sx since the procedure.     Review of Systems   Constitution: Negative.   HENT: Negative.    Eyes: Negative.    Cardiovascular: Negative for chest pain, dyspnea on exertion, irregular heartbeat, leg swelling, near-syncope, orthopnea, palpitations, paroxysmal nocturnal dyspnea and syncope.   Respiratory: Negative for shortness of breath.    Skin: Negative.    Musculoskeletal: Negative.    Gastrointestinal: Negative for abdominal pain, constipation and diarrhea.   Genitourinary: Negative for dysuria.   Neurological: Negative.    Psychiatric/Behavioral: Negative.         Objective:    Physical Exam   Constitutional: She is oriented to person, place, and time. She appears well-developed and well-nourished.   HENT:   Head: Normocephalic and atraumatic.   Eyes: Conjunctivae and EOM are normal. Pupils are equal, round, and reactive to light.   Neck: Normal range of motion. Neck supple. No thyromegaly present.   Cardiovascular: Normal rate and regular rhythm.    No murmur heard.  Pulmonary/Chest: Effort normal and breath sounds normal. No respiratory distress.   Abdominal: Soft. Bowel sounds are normal.   Musculoskeletal: She exhibits no edema.   Neurological: She is alert and oriented to person, place, and time.   Skin: Skin is warm and dry.   Psychiatric: She has a normal mood and affect. Her behavior is normal.       LHC:D. SUMMARY/POST-OPERATIVE DIAGNOSIS:    1. Non-obstructive CAD.  2. Normal LVEF.    E. RECOMMENDATIONS:    1. Routine post-cath care.  2. Reassurance.  3. Risk factor reductions.  4. Weight loss.  5. Follow up with Dr. Quoc Muro.    Assessment:       1. Coronary artery disease involving native coronary artery of native heart without  angina pectoris    2. Chest pain, unspecified type    3. Essential hypertension    4. Dyslipidemia    5. Depression with anxiety         Plan:       -mainly provided reassurance  -cont Rf modification    RTC 3 mos

## 2017-02-13 NOTE — MR AVS SNAPSHOT
[facility]  [address] [place] [address]  Phone: [phone]                  Diana Herring   2017 3:00 PM   Office Visit    Description:  Female : 1945   Provider:  Quoc Muro MD   Department:  [facility] Cardiology           Reason for Visit     Establish Care                To Do List           Future Appointments        Provider Department Dept Phone    2/15/2017 2:15 PM Elvia Moran MD [facility] Medicine [phone]      Goals (5 Years of Data)              Today    2/10/17    2/3/17    Blood Pressure < 140/90   141/63  118/68  120/80      [facility]     [facility] On Call Nurse [facility] -  Assistance  Registered nurses in the [facility] On Call Center provide clinical advisement, health education, appointment booking, and other advisory services.  Call for this free service at [phone].             Medications           Message regarding Medications     Verify the changes and/or additions to your medication regime listed below are the same as discussed with your clinician today.  If any of these changes or additions are incorrect, please notify your healthcare provider.             Verify that the below list of medications is an accurate representation of the medications you are currently taking.  If none reported, the list may be blank. If incorrect, please contact your healthcare provider. Carry this list with you in case of emergency.           Current Medications     alendronate (FOSAMAX) 70 MG tablet Take 1 tablet (70 mg total) by mouth every 7 days.    amlodipine (NORVASC) 10 MG tablet Take 1 tablet (10 mg total) by mouth once daily.    calcium-vitamin D 500-125 mg-unit tablet Take 1 tablet by mouth once daily.      levocetirizine (XYZAL) 5 MG tablet Take 1 tablet (5 mg total) by mouth every evening.    lorazepam (ATIVAN) 1 MG tablet TAKE 1 TABLET BY MOUTH DAILY AS NEEDED FOR ANXIETY    metoprolol succinate (TOPROL-XL) 100 MG 24 hr  tablet Take 1 tablet (100 mg total) by mouth once daily.    multivitamin (MULTIVITAMIN) per tablet Take 1 tablet by mouth once daily.      omeprazole (PRILOSEC) 20 MG capsule Take 1 capsule (20 mg total) by mouth once daily.    paroxetine (PAXIL) 20 MG tablet Take 1 tablet (20 mg total) by mouth every morning.    ropinirole (REQUIP) 0.5 MG tablet Take 1 tablet (0.5 mg total) by mouth every evening.    simethicone (MYLICON) 80 MG chewable tablet Take 1 tablet (80 mg total) by mouth every 6 (six) hours as needed for Flatulence.           Clinical Reference Information           Your Vitals Were     BP Pulse Height Weight SpO2 BMI    141/63 (BP Location: Left arm, Patient Position: Sitting, BP Method: Automatic) 64 5' (1.524 m) 64.9 kg (143 lb) 97% 27.93 kg/m2      Blood Pressure          Most Recent Value    BP  (!)  141/63      Allergies as of 2/13/2017     No Known Allergies      Immunizations Administered on Date of Encounter - 2/13/2017     None      [facility] Sign-Up     Activating your [facility] account is as easy as 1-2-3!     1) Visit [email or web address], select Sign Up Now, enter this activation code and your date of birth, then select Next.  [email or web address]  Expires: 3/20/2017  8:58 AM      2) Create a username and password to use when you visit [facility] in the future and select a security question in case you lose your password and select Next.    3) Enter your e-mail address and click Sign Up!    Additional Information  If you have questions, please e-mail [email or web address] or call [phone] to talk to our [facility] staff. Remember, [facility] is NOT to be used for urgent needs. For medical emergencies, dial 911.         Language Assistance Services     ATTENTION: Language assistance services are available, free of charge. Please call [phone].      ATENCIÓN: Si habla español, tiene a kurtz disposición servicios gratuitos de asistencia lingüística. Llame al [phone].     [facility] Ý: N?u b?n nói  Ti?ng Vi?t, có các d?ch v? h? tr? ngôn ng? mi?n phí dành cho b?n. G?i s? [phone].         [facility] complies with applicable Federal civil rights laws and does not discriminate on the basis of race, color, national origin, age, disability, or sex.

## 2017-02-13 NOTE — LETTER
February 24, 2017      Elvia Moran MD  [address]  [place] [address]           [facility] - Cardiology  [address] [facility] [address] [place] [address]  Phone: [phone]          Patient: Diana Herring   MR Number: [id number]   YOB: 1945   Date of Visit: 2/13/2017       Dear Dr. Elvia Moran:    Thank you for referring Diana Herring to me for evaluation. Attached you will find relevant portions of my assessment and plan of care.    If you have questions, please do not hesitate to call me. I look forward to following Diana Herring along with you.    Sincerely,    Quoc Muro MD    Enclosure  CC:  No Recipients    If you would like to receive this communication electronically, please contact [email or web address] or [phone] to request more information on [facility] access.    For providers and/or their staff who would like to refer a patient to [facility], please contact us through our one-stop-shop provider referral line, [facility] , at [phone].    If you feel you have received this communication in error or would no longer like to receive these types of communications, please e-mail [email or web address]

## 2017-02-15 ENCOUNTER — OFFICE VISIT (OUTPATIENT)
Dept: FAMILY MEDICINE | Facility: CLINIC | Age: 72
End: 2017-02-15
Payer: MEDICARE

## 2017-02-15 VITALS
WEIGHT: 142.88 LBS | DIASTOLIC BLOOD PRESSURE: 80 MMHG | TEMPERATURE: 98 F | HEART RATE: 55 BPM | HEIGHT: 60 IN | BODY MASS INDEX: 28.05 KG/M2 | SYSTOLIC BLOOD PRESSURE: 120 MMHG | OXYGEN SATURATION: 97 %

## 2017-02-15 DIAGNOSIS — F41.9 ANXIETY: ICD-10-CM

## 2017-02-15 DIAGNOSIS — F33.0 MILD RECURRENT MAJOR DEPRESSION: Chronic | ICD-10-CM

## 2017-02-15 DIAGNOSIS — F13.20 SEDATIVE HYPNOTIC OR ANXIOLYTIC DEPENDENCE: Chronic | ICD-10-CM

## 2017-02-15 DIAGNOSIS — I10 ESSENTIAL HYPERTENSION: Chronic | ICD-10-CM

## 2017-02-15 DIAGNOSIS — G25.81 RLS (RESTLESS LEGS SYNDROME): Chronic | ICD-10-CM

## 2017-02-15 DIAGNOSIS — G43.109 MIGRAINE WITH AURA AND WITHOUT STATUS MIGRAINOSUS, NOT INTRACTABLE: Primary | ICD-10-CM

## 2017-02-15 DIAGNOSIS — K21.9 GASTROESOPHAGEAL REFLUX DISEASE WITHOUT ESOPHAGITIS: Chronic | ICD-10-CM

## 2017-02-15 PROCEDURE — 99999 PR PBB SHADOW E&M-EST. PATIENT-LVL III: CPT | Mod: PBBFAC,,, | Performed by: FAMILY MEDICINE

## 2017-02-15 PROCEDURE — 99214 OFFICE O/P EST MOD 30 MIN: CPT | Mod: 25,S$GLB,, | Performed by: FAMILY MEDICINE

## 2017-02-15 PROCEDURE — 3079F DIAST BP 80-89 MM HG: CPT | Mod: S$GLB,,, | Performed by: FAMILY MEDICINE

## 2017-02-15 PROCEDURE — 3074F SYST BP LT 130 MM HG: CPT | Mod: S$GLB,,, | Performed by: FAMILY MEDICINE

## 2017-02-15 PROCEDURE — 1157F ADVNC CARE PLAN IN RCRD: CPT | Mod: S$GLB,,, | Performed by: FAMILY MEDICINE

## 2017-02-15 PROCEDURE — 99499 UNLISTED E&M SERVICE: CPT | Mod: S$GLB,,, | Performed by: FAMILY MEDICINE

## 2017-02-15 PROCEDURE — 1160F RVW MEDS BY RX/DR IN RCRD: CPT | Mod: S$GLB,,, | Performed by: FAMILY MEDICINE

## 2017-02-15 PROCEDURE — 1125F AMNT PAIN NOTED PAIN PRSNT: CPT | Mod: S$GLB,,, | Performed by: FAMILY MEDICINE

## 2017-02-15 PROCEDURE — 1159F MED LIST DOCD IN RCRD: CPT | Mod: S$GLB,,, | Performed by: FAMILY MEDICINE

## 2017-02-15 PROCEDURE — 96372 THER/PROPH/DIAG INJ SC/IM: CPT | Mod: S$GLB,,, | Performed by: FAMILY MEDICINE

## 2017-02-15 RX ORDER — PAROXETINE HYDROCHLORIDE 20 MG/1
20 TABLET, FILM COATED ORAL EVERY MORNING
Qty: 90 TABLET | Refills: 1 | Status: SHIPPED | OUTPATIENT
Start: 2017-02-15 | End: 2018-03-16 | Stop reason: SDUPTHER

## 2017-02-15 RX ORDER — AMLODIPINE BESYLATE 10 MG/1
10 TABLET ORAL DAILY
Qty: 90 TABLET | Refills: 1 | Status: SHIPPED | OUTPATIENT
Start: 2017-02-15 | End: 2018-03-05 | Stop reason: SDUPTHER

## 2017-02-15 RX ORDER — TRIAMCINOLONE ACETONIDE 40 MG/ML
40 INJECTION, SUSPENSION INTRA-ARTICULAR; INTRAMUSCULAR
Status: COMPLETED | OUTPATIENT
Start: 2017-02-15 | End: 2017-02-15

## 2017-02-15 RX ORDER — ROPINIROLE 0.5 MG/1
0.5 TABLET, FILM COATED ORAL NIGHTLY
Qty: 90 TABLET | Refills: 1 | Status: SHIPPED | OUTPATIENT
Start: 2017-02-15 | End: 2017-06-12 | Stop reason: SDUPTHER

## 2017-02-15 RX ORDER — OMEPRAZOLE 20 MG/1
20 CAPSULE, DELAYED RELEASE ORAL DAILY
Qty: 90 CAPSULE | Refills: 1 | Status: SHIPPED | OUTPATIENT
Start: 2017-02-15 | End: 2018-03-16

## 2017-02-15 RX ORDER — METOPROLOL SUCCINATE 100 MG/1
100 TABLET, EXTENDED RELEASE ORAL DAILY
Qty: 90 TABLET | Refills: 1 | Status: SHIPPED | OUTPATIENT
Start: 2017-02-15 | End: 2018-04-09

## 2017-02-15 RX ORDER — LORAZEPAM 1 MG/1
TABLET ORAL
Qty: 30 TABLET | Refills: 1 | Status: SHIPPED | OUTPATIENT
Start: 2017-02-15 | End: 2017-07-12

## 2017-02-15 RX ORDER — BUTALBITAL, ACETAMINOPHEN AND CAFFEINE 50; 325; 40 MG/1; MG/1; MG/1
1 TABLET ORAL EVERY 4 HOURS PRN
Qty: 40 TABLET | Refills: 0 | Status: SHIPPED | OUTPATIENT
Start: 2017-02-15 | End: 2017-03-17

## 2017-02-15 RX ADMIN — TRIAMCINOLONE ACETONIDE 40 MG: 40 INJECTION, SUSPENSION INTRA-ARTICULAR; INTRAMUSCULAR at 05:02

## 2017-02-15 NOTE — PROGRESS NOTES
Routine Office Visit    Patient Name: Diana Herring    : 1945  MRN: [id number]    Subjective:  Diana is a 72 y.o. female who presents today for     1. Headache - started 1 week ago - pt states that headaches are left sided. Headaches are on left forehead, radiates to the back of the head. Headaches are intermittent, described as throbbing and sometimes causes a shooting pain. No vision changes. No eye pain. No eye drainage. No temporal pain.  Patient has been taking tramadol and tylenol with some relief of symptoms. Pt states it is sometimes associated visual auras. No associated symptoms. Pt states she has had migraines in the past and symptoms are similar to migraine headache. She was recently seen and evaluated by walk in clinic and given a toradol injection. She states this helped temporarily.   2. Medication refill - pt needs her prescription refilled. She is on ativan for anxiety; she does not take this daily.     Review of Systems   Constitutional: Negative for chills and fever.   HENT: Negative for congestion.    Eyes: Negative for blurred vision, double vision and photophobia.   Respiratory: Negative for cough.    Cardiovascular: Negative for chest pain.   Gastrointestinal: Negative for abdominal pain, constipation, diarrhea, heartburn, nausea and vomiting.   Genitourinary: Negative for dysuria.   Musculoskeletal: Negative for myalgias.   Skin: Negative for itching and rash.   Neurological: Positive for headaches. Negative for dizziness.   Psychiatric/Behavioral: Negative for depression.       Active Problem List  Patient Active Problem List   Diagnosis    GERD (gastroesophageal reflux disease)    Essential hypertension    Insomnia    Mild recurrent major depression    RLS (restless legs syndrome)    Osteoporosis    Muscle weakness    Bilateral leg pain    Left shoulder pain    Chest pain    Anxiety    Sedative hypnotic or anxiolytic dependence       Past Surgical History  Past Surgical  History   Procedure Laterality Date    Partial hysterectomy      Tubal ligation Bilateral        Family History  Family History   Problem Relation Age of Onset    Stroke Mother     Cancer Father      Lung    Stroke Brother     Heart disease Son        Social History  Social History     Social History    Marital status:      Spouse name: N/A    Number of children: N/A    Years of education: N/A     Occupational History    Not on file.     Social History Main Topics    Smoking status: Never Smoker    Smokeless tobacco: Not on file    Alcohol use No    Drug use: No    Sexual activity: Not on file     Other Topics Concern    Not on file     Social History Narrative    Patient is  w/ 5 children, one  from heart disease.The patient is retired.       Medications and Allergies  Reviewed and updated.       Physical Exam  Visit Vitals    /80 (BP Location: Right arm, Patient Position: Sitting, BP Method: Manual)    Pulse (!) 55    Temp 97.7 °F (36.5 °C) (Oral)    Ht 5' (1.524 m)    Wt 64.8 kg (142 lb 13.7 oz)    SpO2 97%    BMI 27.9 kg/m2     Physical Exam   Constitutional: She is oriented to person, place, and time. She appears well-developed and well-nourished.   HENT:   Head: Normocephalic and atraumatic.   Eyes: Conjunctivae and EOM are normal. Pupils are equal, round, and reactive to light.   Neck: Normal range of motion. Neck supple.   Cardiovascular: Normal rate, regular rhythm and normal heart sounds.  Exam reveals no gallop and no friction rub.    No murmur heard.  Pulmonary/Chest: Breath sounds normal. No respiratory distress.   Abdominal: Soft. Bowel sounds are normal. She exhibits no distension. There is no tenderness.   Musculoskeletal: Normal range of motion.   Lymphadenopathy:     She has no cervical adenopathy.   Neurological: She is alert and oriented to person, place, and time.   Skin: Skin is warm.   Psychiatric: She has a normal mood and affect.          Assessment/Plan:  Diana Herring is a 72 y.o. female who presents today for :    Migraine with aura and without status migrainosus, not intractable  -     CT Head Without Contrast; Future; Expected date: 2/15/17  -     butalbital-acetaminophen-caffeine -40 mg (FIORICET, ESGIC) -40 mg per tablet; Take 1 tablet by mouth every 4 (four) hours as needed for Pain.  Dispense: 40 tablet; Refill: 0  -     triamcinolone acetonide injection 40 mg; Inject 1 mL (40 mg total) into the muscle one time.    RLS (restless legs syndrome)  -     ropinirole (REQUIP) 0.5 MG tablet; Take 1 tablet (0.5 mg total) by mouth every evening.  Dispense: 90 tablet; Refill: 1  The current medical regimen is effective;  continue present plan and medications.    Anxiety / Mild recurrent major depression / Sedative hypnotic or anxiolytic dependence  -     paroxetine (PAXIL) 20 MG tablet; Take 1 tablet (20 mg total) by mouth every morning.  Dispense: 90 tablet; Refill: 1  -     lorazepam (ATIVAN) 1 MG tablet; TAKE 1 TABLET BY MOUTH DAILY AS NEEDED FOR ANXIETY  Dispense: 30 tablet; Refill: 1  The current medical regimen is effective;  continue present plan and medications.    Gastroesophageal reflux disease without esophagitis  -     omeprazole (PRILOSEC) 20 MG capsule; Take 1 capsule (20 mg total) by mouth once daily.  Dispense: 90 capsule; Refill: 1  The current medical regimen is effective;  continue present plan and medications.    Essential hypertension  -     metoprolol succinate (TOPROL-XL) 100 MG 24 hr tablet; Take 1 tablet (100 mg total) by mouth once daily.  Dispense: 90 tablet; Refill: 1  -     amlodipine (NORVASC) 10 MG tablet; Take 1 tablet (10 mg total) by mouth once daily.  Dispense: 90 tablet; Refill: 1  The current medical regimen is effective;  continue present plan and medications.          Return if symptoms worsen or fail to improve.

## 2017-02-15 NOTE — MR AVS SNAPSHOT
[facility]  [address]  [place] [address]  Phone: [phone]  Fax: [phone]                  Diana Herring   2/15/2017 2:15 PM   Office Visit    Description:  Female : 1945   Provider:  Elvia Moran MD   Department:  [place] - Family Medicine           Reason for Visit     Headache           Diagnoses this Visit        Comments    Migraine with aura and without status migrainosus, not intractable    -  Primary     RLS (restless legs syndrome)         Anxiety         Mild recurrent major depression         Gastroesophageal reflux disease without esophagitis         Essential hypertension                To Do List           Future Appointments        Provider Department Dept Phone    2017 7:30 AM [facility], [facility] [phone]    2017 9:00 AM [facility] [phone]    2017 10:30 AM [facility] [phone]    2017 1:30 PM NUCLEAR [facility], [facility] [phone]    3/13/2017 2:00 PM Quoc Muro MD [facility] - Cardiology [phone]      Goals (5 Years of Data)              Today    2/13/17    2/10/17    Blood Pressure < 140/90   120/80  141/63  118/68       These Medications        Disp Refills Start End    ropinirole (REQUIP) 0.5 MG tablet 90 tablet 1 2/15/2017 2017    Take 1 tablet (0.5 mg total) by mouth every evening. - Oral    Pharmacy: [facility]/pharmacy #[id number] - [place] - [address] Ph #: [phone]       paroxetine (PAXIL) 20 MG tablet 90 tablet 1 2/15/2017 2017    Take 1 tablet (20 mg total) by mouth every morning. - Oral    Pharmacy: [facility]/pharmacy #[id number] [place] - [address] Ph #: [phone]       omeprazole (PRILOSEC) 20 MG capsule 90 capsule 1 2/15/2017 2017    Take 1 capsule (20 mg total) by mouth once daily. - Oral    Pharmacy: [facility]/pharmacy #[id number] - [place] - [address] Ph #:  [phone]       metoprolol succinate (TOPROL-XL) 100 MG 24 hr tablet 90 tablet 1 2/15/2017     Take 1 tablet (100 mg total) by mouth once daily. - Oral    Pharmacy: [facility]/pharmacy #[id number] - [place] - [address] Ph #: [phone]       lorazepam (ATIVAN) 1 MG tablet 30 tablet 1 2/15/2017     TAKE 1 TABLET BY MOUTH DAILY AS NEEDED FOR ANXIETY    Pharmacy: [facility]/pharmacy #[id number] - [place] - [address] Ph #: [phone]       amlodipine (NORVASC) 10 MG tablet 90 tablet 1 2/15/2017     Take 1 tablet (10 mg total) by mouth once daily. - Oral    Pharmacy: [facility]/pharmacy #[id number] - [place] - [address] Ph #: [phone]       butalbital-acetaminophen-caffeine -40 mg (FIORICET, ESGIC) -40 mg per tablet 40 tablet 0 2/15/2017 3/17/2017    Take 1 tablet by mouth every 4 (four) hours as needed for Pain. - Oral    Pharmacy: [facility]/pharmacy #[id number] - [place] - [address] Ph #: [phone]         [facility] On Call     [facility] On Call Nurse Care Line - 24/7 Assistance  Registered nurses in the [facility] On Call Center provide clinical advisement, health education, appointment booking, and other advisory services.  Call for this free service at [phone].             Medications           Message regarding Medications     Verify the changes and/or additions to your medication regime listed below are the same as discussed with your clinician today.  If any of these changes or additions are incorrect, please notify your healthcare provider.        START taking these NEW medications        Refills    butalbital-acetaminophen-caffeine -40 mg (FIORICET, ESGIC) -40 mg per tablet 0    Sig: Take 1 tablet by mouth every 4 (four) hours as needed for Pain.    Class: Print    Route: Oral      These medications were administered today        Dose Freq    triamcinolone acetonide injection 40 mg 40 mg [facility] 1 time    Sig: Inject 1 mL (40 mg total) into the muscle one time.    Class: Normal    Route:  Intramuscular           Verify that the below list of medications is an accurate representation of the medications you are currently taking.  If none reported, the list may be blank. If incorrect, please contact your healthcare provider. Carry this list with you in case of emergency.           Current Medications     alendronate (FOSAMAX) 70 MG tablet Take 1 tablet (70 mg total) by mouth every 7 days.    amlodipine (NORVASC) 10 MG tablet Take 1 tablet (10 mg total) by mouth once daily.    calcium-vitamin D 500-125 mg-unit tablet Take 1 tablet by mouth once daily.      lorazepam (ATIVAN) 1 MG tablet TAKE 1 TABLET BY MOUTH DAILY AS NEEDED FOR ANXIETY    metoprolol succinate (TOPROL-XL) 100 MG 24 hr tablet Take 1 tablet (100 mg total) by mouth once daily.    multivitamin (MULTIVITAMIN) per tablet Take 1 tablet by mouth once daily.      omeprazole (PRILOSEC) 20 MG capsule Take 1 capsule (20 mg total) by mouth once daily.    paroxetine (PAXIL) 20 MG tablet Take 1 tablet (20 mg total) by mouth every morning.    ropinirole (REQUIP) 0.5 MG tablet Take 1 tablet (0.5 mg total) by mouth every evening.    butalbital-acetaminophen-caffeine -40 mg (FIORICET, ESGIC) -40 mg per tablet Take 1 tablet by mouth every 4 (four) hours as needed for Pain.    levocetirizine (XYZAL) 5 MG tablet Take 1 tablet (5 mg total) by mouth every evening.    simethicone (MYLICON) 80 MG chewable tablet Take 1 tablet (80 mg total) by mouth every 6 (six) hours as needed for Flatulence.           Clinical Reference Information           Your Vitals Were     BP Pulse Temp Height Weight SpO2    120/80 (BP Location: Right arm, Patient Position: Sitting, BP Method: Manual) 55 97.7 °F (36.5 °C) (Oral) 5' (1.524 m) 64.8 kg (142 lb 13.7 oz) 97%    BMI                27.9 kg/m2          Blood Pressure          Most Recent Value    BP  120/80      Allergies as of 2/15/2017     No Known Allergies      Immunizations Administered on Date of Encounter -  2/15/2017     None      Orders Placed During Today's Visit     Future Labs/Procedures Expected by Expires    CT Head Without Contrast  2/15/2017 2/15/2018      [facility] Sign-Up     Activating your [facility] account is as easy as 1-2-3!     1) Visit [email or web address], select Sign Up Now, enter this activation code and your date of birth, then select Next.  [email or web address]  Expires: 3/20/2017  8:58 AM      2) Create a username and password to use when you visit [facility] in the future and select a security question in case you lose your password and select Next.    3) Enter your e-mail address and click Sign Up!    Additional Information  If you have questions, please e-mail [email or web address] or call [phone] to talk to our [facility] staff. Remember, [facility] is NOT to be used for urgent needs. For medical emergencies, dial 911.         Language Assistance Services     ATTENTION: Language assistance services are available, free of charge. Please call [phone].      ATENCIÓN: Si habla español, tiene a kurtz disposición servicios gratuitos de asistencia lingüística. Llame al [phone].     CHÚ Ý: N?u b?n nói Ti?ng Vi?t, có các d?ch v? h? tr? ngôn ng? mi?n phí dành cho b?n. G?i s? [phone].         [facility] complies with applicable Federal civil rights laws and does not discriminate on the basis of race, color, national origin, age, disability, or sex.

## 2017-02-21 ENCOUNTER — TELEPHONE (OUTPATIENT)
Dept: FAMILY MEDICINE | Facility: CLINIC | Age: 72
End: 2017-02-21

## 2017-02-21 ENCOUNTER — HOSPITAL ENCOUNTER (OUTPATIENT)
Dept: RADIOLOGY | Facility: HOSPITAL | Age: 72
Discharge: HOME | End: 2017-02-21
Attending: FAMILY MEDICINE
Payer: MEDICARE

## 2017-02-21 DIAGNOSIS — G43.109 MIGRAINE WITH AURA AND WITHOUT STATUS MIGRAINOSUS, NOT INTRACTABLE: ICD-10-CM

## 2017-02-21 PROCEDURE — 70450 CT HEAD/BRAIN W/O DYE: CPT | Mod: 26,,, | Performed by: RADIOLOGY

## 2017-02-21 PROCEDURE — 70450 CT HEAD/BRAIN W/O DYE: CPT | Mod: TC

## 2017-03-02 ENCOUNTER — TELEPHONE (OUTPATIENT)
Dept: FAMILY MEDICINE | Facility: CLINIC | Age: 72
End: 2017-03-02

## 2017-03-02 NOTE — TELEPHONE ENCOUNTER
----- Message from Dayana Stephenson sent at 3/2/2017  2:08 PM CST -----  Contact: Juvencio Hinton calling to speak to nurse regarding the fax that was sent to office. She would like to speak to a nurse. Please call [phone] ext [phone]

## 2017-03-07 ENCOUNTER — TELEPHONE (OUTPATIENT)
Dept: FAMILY MEDICINE | Facility: CLINIC | Age: 72
End: 2017-03-07

## 2017-03-07 NOTE — TELEPHONE ENCOUNTER
----- Message from Destiny Aguilar sent at 3/6/2017  9:57 AM CST -----  Contact: Taylor [phone]  [employer] calling for supporting information for request they received. Please fax supporting docs to [phone]. Thank you!

## 2017-03-08 ENCOUNTER — TELEPHONE (OUTPATIENT)
Dept: FAMILY MEDICINE | Facility: CLINIC | Age: 72
End: 2017-03-08

## 2017-03-08 NOTE — TELEPHONE ENCOUNTER
Notified patient the lorazepam was denied . She can  Come by the clinic to get a Good RX card which my reduce the price on this medication.

## 2017-03-14 ENCOUNTER — TELEPHONE (OUTPATIENT)
Dept: FAMILY MEDICINE | Facility: CLINIC | Age: 72
End: 2017-03-14

## 2017-03-14 NOTE — TELEPHONE ENCOUNTER
Spoke to Basilia from [facility] in reference to Fioricet not on formulary tier.She stated this was a high risk medication for patients over 65 and was the provider aware of this. So, she informed was is covered is Buspirone, Buspar. Please advise or change.

## 2017-03-14 NOTE — TELEPHONE ENCOUNTER
----- Message from Tejinder Shaw sent at 3/14/2017  2:45 PM CDT -----  Contact: Basilia Macedo is calling to request additional information in regards to appeal for butalbital-acetaminophen-caffeine -40 mg (FIORICET, ESGIC) -40 mg per tablet.  Please call Basilia at .    Thanks

## 2017-03-15 RX ORDER — DICLOFENAC SODIUM 25 MG/1
25 TABLET, DELAYED RELEASE ORAL 3 TIMES DAILY PRN
Qty: 30 TABLET | Refills: 2 | Status: SHIPPED | OUTPATIENT
Start: 2017-03-15 | End: 2017-07-12

## 2017-03-15 NOTE — TELEPHONE ENCOUNTER
Buspirone is not for headaches.   I have sent in a prescription for diclofenac. It should be taken with food as it can sometimes cause upset stomach.

## 2017-05-16 ENCOUNTER — HOSPITAL ENCOUNTER (EMERGENCY)
Facility: HOSPITAL | Age: 72
Discharge: HOME | End: 2017-05-16
Attending: EMERGENCY MEDICINE
Payer: MEDICARE

## 2017-05-16 VITALS
RESPIRATION RATE: 18 BRPM | BODY MASS INDEX: 28.47 KG/M2 | WEIGHT: 145 LBS | HEIGHT: 60 IN | TEMPERATURE: 99 F | DIASTOLIC BLOOD PRESSURE: 67 MMHG | OXYGEN SATURATION: 98 % | SYSTOLIC BLOOD PRESSURE: 144 MMHG | HEART RATE: 78 BPM

## 2017-05-16 DIAGNOSIS — M54.2 NECK PAIN: ICD-10-CM
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
## 2017-05-16 DIAGNOSIS — M54.50 ACUTE BILATERAL LOW BACK PAIN WITHOUT SCIATICA: ICD-10-CM

## 2017-05-16 DIAGNOSIS — S09.90XA HEAD TRAUMA, INITIAL ENCOUNTER: ICD-10-CM

## 2017-05-16 PROCEDURE — 99284 EMERGENCY DEPT VISIT MOD MDM: CPT

## 2017-05-16 NOTE — ED PROVIDER NOTES
Encounter Date: 5/16/2017    SCRIBE #1 NOTE: I, Abdi [place], am scribing for, and in the presence of,  Ailin Garcia PA-C. I have scribed the following portions of the note - Other sections scribed: ROS, HPI.       History     Chief Complaint   Patient presents with    Fall     States she slipped 20 min ago and hurt her head and upper back     Review of patient's allergies indicates:  No Known Allergies  HPI Comments: CC: Fall    HPI; This 72 y.o. F, who has a past medical history of Anxiety; Arthritis; Depression; Full dentures; GERD (gastroesophageal reflux disease); Hyperlipidemia; Hypertension; Osteoporosis; and Restless leg syndrome, presents to the ED for evaluation secondary to a mechanical fall while mopping her kitchen floor 20 minutes pta. She slipped and fell backward, landing on her back, buttocks and hitting her occipital head. Per  she did not lose consciousness. She was not going to come in but the pain gradually became unbearable. On exam she is c/o mild blurry vision, lightheadedness, occipital head pain and diffuse back pain. She was feeling fine prior to fall. She denies nausea, vomiting, syncope, chest pain, shortness of breath, abdominal pain, neck pain and fever.    The history is provided by the patient, the spouse and a relative.     Past Medical History:   Diagnosis Date    Anxiety     Arthritis     Depression     Full dentures     GERD (gastroesophageal reflux disease)     Hyperlipidemia     Hypertension     Osteoporosis     Restless leg syndrome      Past Surgical History:   Procedure Laterality Date    PARTIAL HYSTERECTOMY      TUBAL LIGATION Bilateral      Family History   Problem Relation Age of Onset    Stroke Mother     Cancer Father      Lung    Stroke Brother     Heart disease Son      Social History   Substance Use Topics    Smoking status: Never Smoker    Smokeless tobacco: None    Alcohol use No     Review of Systems   Constitutional: Negative for  fever.   HENT: Negative for sore throat.    Eyes: Positive for visual disturbance (mild difficulty focusing).   Respiratory: Negative for shortness of breath.    Cardiovascular: Negative for chest pain.   Gastrointestinal: Negative for abdominal pain, nausea and vomiting.   Genitourinary: Negative for dysuria.   Musculoskeletal: Positive for back pain (diffuse). Negative for neck pain.   Skin: Negative for rash.   Neurological: Positive for light-headedness and headaches (occipital). Negative for syncope.       Physical Exam   Initial Vitals   BP Pulse Resp Temp SpO2   05/16/17 1358 05/16/17 1358 05/16/17 1358 05/16/17 1358 05/16/17 1358   160/81 80 18 98.3 °F (36.8 °C) 98 %     Physical Exam    Nursing note and vitals reviewed.  Constitutional: She appears well-developed and well-nourished. She is not diaphoretic. No distress.   HENT:   Head: Normocephalic and atraumatic.       Right Ear: External ear normal.   Left Ear: External ear normal.   Nose: Nose normal.   Mouth/Throat: Oropharynx is clear and moist. No oropharyngeal exudate.   Eyes: EOM are normal. Pupils are equal, round, and reactive to light.   Neck: Normal range of motion. Neck supple.   Cardiovascular: Normal rate and regular rhythm.   Pulmonary/Chest: Breath sounds normal. No respiratory distress. She has no wheezes. She has no rhonchi. She has no rales. She exhibits no tenderness.   No bruising to the chest wall.  There are equal breath sounds in all lung fields.   Abdominal: Soft. Bowel sounds are normal. She exhibits no distension. There is no tenderness. There is no rebound and no guarding.   Musculoskeletal: Normal range of motion. She exhibits no edema or tenderness.        Back:    There is FROM, strength and sensation in all extremities.   Neurological: She is alert and oriented to person, place, and time. She has normal strength. No cranial nerve deficit or sensory deficit.   Skin: Skin is warm. No rash noted. No erythema.         ED Course    Procedures  Labs Reviewed - No data to display          Medical Decision Making:   Differential Diagnosis:   This is an urgent evaluation of a 72-year-old female who presents to the emergency after a mechanical fall that occurred just prior to arrival.  She states that she hit her head and lower back after slipping on a wet floor.  She denies any chest pain or syncopal episodes.    Previous medical records were obtained and reviewed.  This patient has a history of depression, GERD, hyperlipidemia, arthritis, hypertension, osteoporosis and anxiety.    The patient is currently afebrile and nontoxic in appearance.  Her blood pressure is slightly elevated at 160/81.  Her remaining vital signs are stable.  On physical exam, there is tenderness to palpation the posterior head.  There are no lacerations or bony step-off noted to palpation of the head.  There is no bruising or trauma noted to the chest wall.  There are equal breath sounds noted in all lung fields.  There is no abdominal tenderness or ecchymosis noted.  There is some tenderness and muscle spasms noted to the lumbar region.  There is also some mild tenderness to palpation of the paraspinal muscles of the cervical spine.  The patient is a normal steady gait.  Her cranial nerve exam is without any focal neuro deficits.  The remaining physical exam is unremarkable.  A CT of the head and neck were performed which revealed no acute injury.  X-ray of the lumbar spine reveals no acute fracture, dislocation or bony destructive lesion.  I carefully considered but doubt acute intracranial injury, acute disc herniation with deficit, cauda equina.  l will treat this patient symptomatically with Tylenol.  She was instructed to follow-up with her primary care physician.  This patient was discussed with Dr. Anderson and he is in agreement with the assessment and treatment plan.            Scribe Attestation:   Scribe #1: I performed the above scribed service and the  documentation accurately describes the services I performed. I attest to the accuracy of the note.    Attending Attestation:     Physician Attestation Statement for NP/PA:   I discussed this assessment and plan of this patient with the NP/PA, but I did not personally examine the patient. The face to face encounter was performed by the NP/PA.    Other NP/PA Attestation Additions:      Medical Decision Makin-year-old female status post fall complaining of head And upper back pain.  Imaging unremarkable.  I agree with plan.       Physician Attestation for Scribe:  Physician Attestation Statement for Scribe #1: I, Ailin Garcia PA-C, reviewed documentation, as scribed by Abdi James in my presence, and it is both accurate and complete.                 ED Course     Clinical Impression:   The primary encounter diagnosis was Fall. Diagnoses of Head trauma, initial encounter, Neck pain, and Acute bilateral low back pain without sciatica were also pertinent to this visit.    Disposition:   Disposition: Discharged  Condition: Stable       Ailin Garcia PA-C  17 1723       Bradley Anderson MD  17 1738

## 2017-05-16 NOTE — DISCHARGE INSTRUCTIONS
Rest.  Apply ice and heat to the areas for pain relief.  Please follow up with your primary care doctor this week.  Return to the ED for any changing or concerning symptoms.

## 2017-05-16 NOTE — ED TRIAGE NOTES
Pt reports she was cleaning the floor, slipped, and fell onto her back. Pt reports hitting her head no the floor, denies vomiting, denies LOC, reports dizziness. Pt with c/o pain to back of head and back. Pt denies chest pain, dizziness prior to fall. Reports visual changes that began after the fall.

## 2017-05-16 NOTE — ED AVS SNAPSHOT
[facility]  [address] [address]               Diana Herring   2017  2:08 PM   ED    Description:  Female : 1945   Department:  [facility]           Your Care was Coordinated By:     Provider Role From To    Bradley Anderson MD Attending Provider 17 1408 --    Ailin Garcia PA-C Physician Assistant 17 [id number] --      Reason for Visit     Fall           Diagnoses this Visit        Comments    Fall    -  Primary     Head trauma, initial encounter         Neck pain         Acute bilateral low back pain without sciatica           ED Disposition     None           To Do List           Follow-up Information     Follow up with Elvia Moran MD.    Specialty:  Family Medicine    Contact information:    [address] [address] [phone]        [facility]     [facility] Care Line -  Assistance  Unless otherwise directed by your provider, please contact [facility], our nurse care line that is available for  assistance.     Registered nurses in the [facility] provide: appointment scheduling, clinical advisement, health education, and other advisory services.  Call: [phone] (toll free)               Medications           Message regarding Medications     Verify the changes and/or additions to your medication regime listed below are the same as discussed with your clinician today.  If any of these changes or additions are incorrect, please notify your healthcare provider.             Verify that the below list of medications is an accurate representation of the medications you are currently taking.  If none reported, the list may be blank. If incorrect, please contact your healthcare provider. Carry this list with you in case of emergency.           Current Medications     amlodipine (NORVASC) 10 MG tablet Take 1 tablet (10 mg total) by mouth once daily.     calcium-vitamin D 500-125 mg-unit tablet Take 1 tablet by mouth once daily.      diclofenac (VOLTAREN) 25 MG TbEC Take 1 tablet (25 mg total) by mouth 3 (three) times daily as needed.    lorazepam (ATIVAN) 1 MG tablet TAKE 1 TABLET BY MOUTH DAILY AS NEEDED FOR ANXIETY    metoprolol succinate (TOPROL-XL) 100 MG 24 hr tablet Take 1 tablet (100 mg total) by mouth once daily.    multivitamin (MULTIVITAMIN) per tablet Take 1 tablet by mouth once daily.      omeprazole (PRILOSEC) 20 MG capsule Take 1 capsule (20 mg total) by mouth once daily.    paroxetine (PAXIL) 20 MG tablet Take 1 tablet (20 mg total) by mouth every morning.    ropinirole (REQUIP) 0.5 MG tablet Take 1 tablet (0.5 mg total) by mouth every evening.    simethicone (MYLICON) 80 MG chewable tablet Take 1 tablet (80 mg total) by mouth every 6 (six) hours as needed for Flatulence.    alendronate (FOSAMAX) 70 MG tablet Take 1 tablet (70 mg total) by mouth every 7 days.    levocetirizine (XYZAL) 5 MG tablet Take 1 tablet (5 mg total) by mouth every evening.           Clinical Reference Information           Your Vitals Were     BP Pulse Temp Resp Height Weight    144/67 (BP Location: Left arm, Patient Position: Sitting, BP Method: Automatic) 78 99 °F (37.2 °C) (Oral) 18 5' (1.524 m) 65.8 kg (145 lb)    SpO2 BMI             98% 28.32 kg/m2         Allergies as of 5/16/2017     No Known Allergies      Immunizations Administered on Date of Encounter - 5/16/2017     None      ED Micro, Lab, POCT     None      ED Imaging Orders     Start Ordered       Status Ordering Provider    05/16/17 1451 05/16/17 1433  CT Cervical Spine Without Contrast  1 time imaging      Final result     05/16/17 1434 05/16/17 1433    1 time imaging,   Status:  Canceled      Canceled     05/16/17 1434 05/16/17 1433  X-Ray Lumbar Spine Ap And Lateral  1 time imaging      Final result     05/16/17 1433 05/16/17 1433  CT Head Without Contrast  1 time imaging      Final result         Discharge  Instructions       Rest.  Apply ice and heat to the areas for pain relief.  Please follow up with your primary care doctor this week.  Return to the ED for any changing or concerning symptoms.        Discharge References/Attachments     FALL, UNCERTAIN CAUSE (ENGLISH)    FALLS, PREVENTING, ARE YOU AT RISK OF FALLING? (ENGLISH)      [facility] Sign-Up     Activating your [facility] account is as easy as 1-2-3!     1) Visit [email or web address], select Sign Up Now, enter this activation code and your date of birth, then select Next.  [email or web address]  Expires: 6/30/2017  3:26 PM      2) Create a username and password to use when you visit [facility] in the future and select a security question in case you lose your password and select Next.    3) Enter your e-mail address and click Sign Up!    Additional Information  If you have questions, please e-mail [email or web address] or call [phone] to talk to our [facility] staff. Remember, [facility] is NOT to be used for urgent needs. For medical emergencies, dial 911.          [facility] complies with applicable Federal civil rights laws and does not discriminate on the basis of race, color, national origin, age, disability, or sex.        Language Assistance Services     ATTENTION: Language assistance services are available, free of charge. Please call [phone].      ATENCIÓN: Si habla español, tiene a kurtz disposición servicios gratuitos de asistencia lingüística. Llame al [phone].     CHÚ Ý: N?u b?n nói Ti?ng Vi?t, có các d?ch v? h? tr? ngôn ng? mi?n phí dành cho b?n. G?i s? [phone].

## 2017-05-17 ENCOUNTER — TELEPHONE (OUTPATIENT)
Dept: FAMILY MEDICINE | Facility: CLINIC | Age: 72
End: 2017-05-17

## 2017-05-17 NOTE — TELEPHONE ENCOUNTER
----- Message from Chata La sent at 5/17/2017 12:08 PM CDT -----  Contact: ED Outreach  Good afternoon,    Patient called into [facility] for appointment follow-up.  Said she could not talk but requested a callback.    Thanks.

## 2017-05-18 ENCOUNTER — OFFICE VISIT (OUTPATIENT)
Dept: FAMILY MEDICINE | Facility: CLINIC | Age: 72
End: 2017-05-18
Payer: MEDICARE

## 2017-05-18 VITALS
HEART RATE: 65 BPM | OXYGEN SATURATION: 97 % | WEIGHT: 144.38 LBS | HEIGHT: 60 IN | TEMPERATURE: 98 F | BODY MASS INDEX: 28.35 KG/M2 | DIASTOLIC BLOOD PRESSURE: 78 MMHG | SYSTOLIC BLOOD PRESSURE: 132 MMHG

## 2017-05-18 DIAGNOSIS — S39.92XA TAILBONE INJURY, INITIAL ENCOUNTER: ICD-10-CM

## 2017-05-18 DIAGNOSIS — S09.90XA HEAD TRAUMA, INITIAL ENCOUNTER: Primary | ICD-10-CM

## 2017-05-18 DIAGNOSIS — Z01.00 ENCOUNTER FOR EYE EXAM: ICD-10-CM

## 2017-05-18 PROCEDURE — 1159F MED LIST DOCD IN RCRD: CPT | Mod: S$GLB,,, | Performed by: FAMILY MEDICINE

## 2017-05-18 PROCEDURE — 3075F SYST BP GE 130 - 139MM HG: CPT | Mod: S$GLB,,, | Performed by: FAMILY MEDICINE

## 2017-05-18 PROCEDURE — 1125F AMNT PAIN NOTED PAIN PRSNT: CPT | Mod: S$GLB,,, | Performed by: FAMILY MEDICINE

## 2017-05-18 PROCEDURE — 99214 OFFICE O/P EST MOD 30 MIN: CPT | Mod: S$GLB,,, | Performed by: FAMILY MEDICINE

## 2017-05-18 PROCEDURE — 99999 PR PBB SHADOW E&M-EST. PATIENT-LVL IV: CPT | Mod: PBBFAC,,, | Performed by: FAMILY MEDICINE

## 2017-05-18 PROCEDURE — 3078F DIAST BP <80 MM HG: CPT | Mod: S$GLB,,, | Performed by: FAMILY MEDICINE

## 2017-05-18 PROCEDURE — 1160F RVW MEDS BY RX/DR IN RCRD: CPT | Mod: S$GLB,,, | Performed by: FAMILY MEDICINE

## 2017-05-18 RX ORDER — TRAMADOL HYDROCHLORIDE 50 MG/1
50 TABLET ORAL EVERY 6 HOURS PRN
Refills: 2 | COMMUNITY
Start: 2017-03-13 | End: 2017-05-18 | Stop reason: SDUPTHER

## 2017-05-18 RX ORDER — TRAMADOL HYDROCHLORIDE 50 MG/1
50 TABLET ORAL
Qty: 20 TABLET | Refills: 0 | Status: SHIPPED | OUTPATIENT
Start: 2017-05-18 | End: 2017-06-29 | Stop reason: SDUPTHER

## 2017-05-18 NOTE — PROGRESS NOTES
[facility]  Progress Note    SUBJECTIVE:     Chief Complaint   Patient presents with    Hospital Follow Up    Fall       HPI   iDana Herring  is a 72 y.o. female here for ER follow-up. She sustained a fall while cleaning the house. She slipped on the wet floor and hit the back of her head real hard. Denies blacking out or LOC but her head did hurt a lot. CT of head and neck are unremarkable. Today she says her headaches are better  And only mild. No weakness anywhere. Patient does admit having some tailbone pain, which is moderate, and non-radiating.    Review of patient's allergies indicates:  No Known Allergies    Past Medical History:   Diagnosis Date    Anxiety     Arthritis     Depression     Full dentures     GERD (gastroesophageal reflux disease)     Hyperlipidemia     Hypertension     Osteoporosis     Restless leg syndrome      Past Surgical History:   Procedure Laterality Date    PARTIAL HYSTERECTOMY      TUBAL LIGATION Bilateral      Family History   Problem Relation Age of Onset    Stroke Mother     Cancer Father      Lung    Stroke Brother     Heart disease Son      Social History   Substance Use Topics    Smoking status: Never Smoker    Smokeless tobacco: None    Alcohol use No        Review of Systems   Constitutional: Negative for chills, fever and malaise/fatigue.   Eyes: Negative for double vision and photophobia.   Respiratory: Negative.  Negative for cough and shortness of breath.    Cardiovascular: Negative.  Negative for chest pain.   Gastrointestinal: Negative.  Negative for abdominal pain, nausea and vomiting.   Genitourinary: Negative.    Musculoskeletal: Positive for falls.   Neurological: Positive for headaches (mild). Negative for sensory change, speech change, focal weakness, seizures, loss of consciousness and weakness.   All other systems reviewed and are negative.    OBJECTIVE:     Vitals:    05/18/17 1412   BP: 132/78   Pulse: 65   Temp: 97.6 °F (36.4  °C)     Body mass index is 28.2 kg/(m^2).    Physical Exam   Constitutional: She is oriented to person, place, and time and well-developed, well-nourished, and in no distress. No distress.   HENT:   Head: Normocephalic and atraumatic.   + cloudy lens   Eyes: Conjunctivae and EOM are normal.   Cardiovascular: Normal rate, regular rhythm and normal heart sounds.  Exam reveals no gallop and no friction rub.    No murmur heard.  Pulmonary/Chest: Effort normal and breath sounds normal. No respiratory distress. She has no wheezes. She has no rales.   Abdominal: Soft. Bowel sounds are normal. She exhibits no distension. There is no tenderness.   Musculoskeletal: She exhibits tenderness (to palpation of tailbone).   Neurological: She is alert and oriented to person, place, and time.   CN 2-12 grossly intact   Skin: Skin is warm. She is not diaphoretic.       Old records were reviewed. Labs and/or images were independently reviewed.    ASSESSMENT     1. Head trauma, initial encounter    2. Encounter for eye exam    3. Tailbone injury, initial encounter        PLAN:     Head trauma, initial encounter   - no neurological deficits nor any foggy symptoms. Advised no stimulating events, no bright lights/loud noises x 1 week.     Encounter for eye exam  -     Ambulatory referral to Optometry for eye exam, suspect cataracts.     Tailbone injury, initial encounter  -     tramadol (ULTRAM) 50 mg tablet; Take 1 tablet (50 mg total) by mouth every 24 hours as needed.  Dispense: 20 tablet; Refill: 0  -     Advised to use ICE on affected reno.       RTC PRGLEN Tineo MD  05/18/2017 2:35 PM

## 2017-06-05 ENCOUNTER — OFFICE VISIT (OUTPATIENT)
Dept: OPTOMETRY | Facility: CLINIC | Age: 72
End: 2017-06-05
Payer: MEDICARE

## 2017-06-05 DIAGNOSIS — H52.4 HYPEROPIA WITH PRESBYOPIA, BILATERAL: ICD-10-CM

## 2017-06-05 DIAGNOSIS — I10 ESSENTIAL HYPERTENSION: Chronic | ICD-10-CM

## 2017-06-05 DIAGNOSIS — H25.13 NUCLEAR SCLEROSIS, BILATERAL: Primary | ICD-10-CM

## 2017-06-05 DIAGNOSIS — H52.03 HYPEROPIA WITH PRESBYOPIA, BILATERAL: ICD-10-CM

## 2017-06-05 PROCEDURE — 92004 COMPRE OPH EXAM NEW PT 1/>: CPT | Mod: S$GLB,,, | Performed by: OPTOMETRIST

## 2017-06-05 PROCEDURE — 92015 DETERMINE REFRACTIVE STATE: CPT | Mod: S$GLB,,, | Performed by: OPTOMETRIST

## 2017-06-05 PROCEDURE — 99499 UNLISTED E&M SERVICE: CPT | Mod: S$GLB,,, | Performed by: OPTOMETRIST

## 2017-06-05 PROCEDURE — 99999 PR PBB SHADOW E&M-EST. PATIENT-LVL II: CPT | Mod: PBBFAC,,, | Performed by: OPTOMETRIST

## 2017-06-05 NOTE — LETTER
June 5, 2017      Jesus Tineo MD  [address]  [place] [address]           [facility]  [address]  [place] [address]  Phone: [phone]  Fax: [phone]          Patient: Diana Herring   MR Number: [id number]   YOB: 1945   Date of Visit: 6/5/2017       Dear Dr. Jesus Tineo:    Thank you for referring Diana Herring to me for evaluation. Attached you will find relevant portions of my assessment and plan of care.    If you have questions, please do not hesitate to call me. I look forward to following Diana Herring along with you.    Sincerely,    Chloé Pal, OD    Enclosure  CC:  No Recipients    If you would like to receive this communication electronically, please contact [email or web address] or [phone] to request more information on [facility] access.    For providers and/or their staff who would like to refer a patient to [facility], please contact us through our one-stop-shop provider referral line, [facility], at [phone].    If you feel you have received this communication in error or would no longer like to receive these types of communications, please e-mail [email or web address]

## 2017-06-05 NOTE — PROGRESS NOTES
"Subjective:       Patient ID: Diana Herring is a 72 y.o. female      Chief Complaint   Patient presents with    Concerns About Ocular Health     History of Present Illness  Dls: ? Yrs     Pt c/o blurry vision at distance and near ou. Pt wears pal's. Pt states no   tearing no itching no burning no pain no floaters some ha's.     Eye meds:   None       Assessment/Plan:     1. Nuclear sclerosis, bilateral  Educated patient on the presence of cataracts and effects on vision, including clouded, blurred or dim vision, increasing difficulty with vision at night, sensitivity to light and glare, need for brighter light for reading and other activities, and seeing "halos" around lights. No surgery at this time. Recheck in one year.    2. Essential hypertension  No hypertensive retinopathy. Continue BP control. RTC 1 year for DFE.    3. Hyperopia with presbyopia, bilateral  Educated patient on refractive error and discussed lens options. Dispensed updated spectacle Rx. Pt currently wearing small frames causing difficulty with near vision with PAL, recommend pt choose a larger frame to give more reading vision. Educated about adaptation period to new specs.    Eyeglass Final Rx     Eyeglass Final Rx       Sphere Cylinder Add    Right +1.25 Sphere +2.75    Left +1.50 Sphere +2.75    Type:  PAL    Expiration Date:  6/6/2018                  Return in about 1 year (around 6/5/2018) for Annual eye exam.       "

## 2017-06-12 DIAGNOSIS — G25.81 RLS (RESTLESS LEGS SYNDROME): Chronic | ICD-10-CM

## 2017-06-12 RX ORDER — ROPINIROLE 0.5 MG/1
0.5 TABLET, FILM COATED ORAL NIGHTLY
Qty: 90 TABLET | Refills: 1 | Status: SHIPPED | OUTPATIENT
Start: 2017-06-12 | End: 2018-03-16 | Stop reason: SDUPTHER

## 2017-06-29 DIAGNOSIS — S39.92XA TAILBONE INJURY, INITIAL ENCOUNTER: ICD-10-CM

## 2017-06-29 RX ORDER — TRAMADOL HYDROCHLORIDE 50 MG/1
50 TABLET ORAL EVERY 6 HOURS PRN
Qty: 30 TABLET | Refills: 0 | Status: SHIPPED | OUTPATIENT
Start: 2017-06-29 | End: 2017-07-12 | Stop reason: SDUPTHER

## 2017-06-30 ENCOUNTER — TELEPHONE (OUTPATIENT)
Dept: FAMILY MEDICINE | Facility: CLINIC | Age: 72
End: 2017-06-30

## 2017-07-12 ENCOUNTER — OFFICE VISIT (OUTPATIENT)
Dept: FAMILY MEDICINE | Facility: CLINIC | Age: 72
End: 2017-07-12
Payer: MEDICARE

## 2017-07-12 VITALS
SYSTOLIC BLOOD PRESSURE: 120 MMHG | HEART RATE: 80 BPM | TEMPERATURE: 98 F | BODY MASS INDEX: 28.05 KG/M2 | DIASTOLIC BLOOD PRESSURE: 72 MMHG | RESPIRATION RATE: 18 BRPM | WEIGHT: 142.88 LBS | OXYGEN SATURATION: 97 % | HEIGHT: 60 IN

## 2017-07-12 DIAGNOSIS — G47.00 INSOMNIA, UNSPECIFIED TYPE: ICD-10-CM

## 2017-07-12 DIAGNOSIS — Z12.31 SCREENING MAMMOGRAM, ENCOUNTER FOR: ICD-10-CM

## 2017-07-12 DIAGNOSIS — F32.0 MILD SINGLE CURRENT EPISODE OF MAJOR DEPRESSIVE DISORDER: ICD-10-CM

## 2017-07-12 DIAGNOSIS — F07.81 POST CONCUSSIVE SYNDROME: Primary | ICD-10-CM

## 2017-07-12 DIAGNOSIS — S39.92XA TAILBONE INJURY, INITIAL ENCOUNTER: ICD-10-CM

## 2017-07-12 PROCEDURE — 1125F AMNT PAIN NOTED PAIN PRSNT: CPT | Mod: S$GLB,,, | Performed by: FAMILY MEDICINE

## 2017-07-12 PROCEDURE — 1159F MED LIST DOCD IN RCRD: CPT | Mod: S$GLB,,, | Performed by: FAMILY MEDICINE

## 2017-07-12 PROCEDURE — 99999 PR PBB SHADOW E&M-EST. PATIENT-LVL IV: CPT | Mod: PBBFAC,,, | Performed by: FAMILY MEDICINE

## 2017-07-12 PROCEDURE — 99499 UNLISTED E&M SERVICE: CPT | Mod: S$GLB,,, | Performed by: FAMILY MEDICINE

## 2017-07-12 PROCEDURE — 99214 OFFICE O/P EST MOD 30 MIN: CPT | Mod: S$GLB,,, | Performed by: FAMILY MEDICINE

## 2017-07-12 RX ORDER — TRAMADOL HYDROCHLORIDE 50 MG/1
50 TABLET ORAL EVERY 6 HOURS PRN
Qty: 30 TABLET | Refills: 0 | Status: SHIPPED | OUTPATIENT
Start: 2017-07-12 | End: 2018-08-23

## 2017-07-12 RX ORDER — TRAZODONE HYDROCHLORIDE 50 MG/1
50 TABLET ORAL NIGHTLY
Qty: 90 TABLET | Refills: 0 | Status: SHIPPED | OUTPATIENT
Start: 2017-07-12 | End: 2017-08-16 | Stop reason: SDUPTHER

## 2017-07-12 NOTE — PROGRESS NOTES
Chief Complaint   Patient presents with    Headache    Insomnia    Tailbone Pain       HPI  Diana Herring is a 72 y.o. female with multiple medical diagnoses as listed in the medical history and problem list that presents for follow up of headache that has been present since she slipped on her kitchen floor and fell backwards and hit her head. She was seen in the ER and had a negative CT scan and c-spine scan. She has been feeling pain in the top of her head, no vision changes, no numbness or tingling. She still has some pain in her tailbone from the fall also. She has been taking tramadol as needed for the pain in her head and tailbone area.    She has also had trouble sleeping from the headache which is worsening her normal insomnia. She has depression and she worries about her son. She has tried paxil but took it for one day as it made her feel slowed down.     PAST MEDICAL HISTORY:  Past Medical History:   Diagnosis Date    Anxiety     Arthritis     Corneal abrasion s    ? eye     Depression     Full dentures     GERD (gastroesophageal reflux disease)     Hyperlipidemia     Hypertension     Nuclear sclerosis of both eyes 2017    Osteoporosis     Restless leg syndrome        PAST SURGICAL HISTORY:  Past Surgical History:   Procedure Laterality Date    PARTIAL HYSTERECTOMY      TUBAL LIGATION Bilateral        SOCIAL HISTORY:  Social History     Social History    Marital status:      Spouse name: N/A    Number of children: N/A    Years of education: N/A     Occupational History    Not on file.     Social History Main Topics    Smoking status: Never Smoker    Smokeless tobacco: Not on file    Alcohol use No    Drug use: No    Sexual activity: No     Other Topics Concern    Not on file     Social History Narrative    Patient is  w/ 5 children, one  from heart disease.The patient is retired.       FAMILY HISTORY:  Family History   Problem Relation Age of Onset     Stroke Mother     Glaucoma Mother     Cataracts Mother     Cancer Father      Lung    No Known Problems Brother     No Known Problems Son     Stroke Brother     Heart disease Son     No Known Problems Sister     No Known Problems Maternal Aunt     No Known Problems Maternal Uncle     No Known Problems Paternal Aunt     No Known Problems Paternal Uncle     No Known Problems Maternal Grandmother     No Known Problems Maternal Grandfather     No Known Problems Paternal Grandmother     No Known Problems Paternal Grandfather     Amblyopia Neg Hx     Blindness Neg Hx     Diabetes Neg Hx     Hypertension Neg Hx     Macular degeneration Neg Hx     Retinal detachment Neg Hx     Strabismus Neg Hx     Thyroid disease Neg Hx        ALLERGIES AND MEDICATIONS: updated and reviewed.  Review of patient's allergies indicates:  No Known Allergies  Current Outpatient Prescriptions   Medication Sig Dispense Refill    alendronate (FOSAMAX) 70 MG tablet Take 1 tablet (70 mg total) by mouth every 7 days. 12 tablet 3    amlodipine (NORVASC) 10 MG tablet Take 1 tablet (10 mg total) by mouth once daily. 90 tablet 1    calcium-vitamin D 500-125 mg-unit tablet Take 1 tablet by mouth once daily.        levocetirizine (XYZAL) 5 MG tablet Take 1 tablet (5 mg total) by mouth every evening. 30 tablet 11    metoprolol succinate (TOPROL-XL) 100 MG 24 hr tablet Take 1 tablet (100 mg total) by mouth once daily. 90 tablet 1    multivitamin (MULTIVITAMIN) per tablet Take 1 tablet by mouth once daily.        omeprazole (PRILOSEC) 20 MG capsule Take 1 capsule (20 mg total) by mouth once daily. 90 capsule 1    simethicone (MYLICON) 80 MG chewable tablet Take 1 tablet (80 mg total) by mouth every 6 (six) hours as needed for Flatulence. 60 tablet 2    paroxetine (PAXIL) 20 MG tablet Take 1 tablet (20 mg total) by mouth every morning. 90 tablet 1    ropinirole (REQUIP) 0.5 MG tablet Take 1 tablet (0.5 mg total) by mouth  every evening. 90 tablet 1    tramadol (ULTRAM) 50 mg tablet Take 1 tablet (50 mg total) by mouth every 6 (six) hours as needed. Needs appointment for future refills 30 tablet 0    trazodone (DESYREL) 50 MG tablet Take 1 tablet (50 mg total) by mouth every evening. 90 tablet 0     No current facility-administered medications for this visit.        ROS  Review of Systems   Constitutional: Negative for chills, diaphoresis, fatigue, fever and unexpected weight change.   HENT: Negative for rhinorrhea, sinus pressure, sore throat and tinnitus.    Eyes: Negative for photophobia and visual disturbance.   Respiratory: Negative for cough, shortness of breath and wheezing.    Cardiovascular: Negative for chest pain and palpitations.   Gastrointestinal: Negative for abdominal pain, blood in stool, constipation, diarrhea, nausea and vomiting.   Genitourinary: Negative for dysuria, flank pain, frequency and vaginal discharge.   Musculoskeletal: Positive for back pain and myalgias. Negative for arthralgias and joint swelling.   Skin: Negative for rash.   Neurological: Positive for headaches. Negative for speech difficulty, weakness and light-headedness.   Psychiatric/Behavioral: Negative for behavioral problems and dysphoric mood.       Physical Exam  Vitals:    07/12/17 1325   BP: 120/72   Pulse: 80   Resp: 18   Temp: 98.1 °F (36.7 °C)   TempSrc: Oral   SpO2: 97%   Weight: 64.8 kg (142 lb 13.7 oz)   Height: 5' (1.524 m)    Body mass index is 27.9 kg/m².  Weight: 64.8 kg (142 lb 13.7 oz)   Height: 5' (152.4 cm)     Physical Exam   Constitutional: She is oriented to person, place, and time. She appears well-developed and well-nourished. No distress.   HENT:   Head: Normocephalic and atraumatic.   Eyes: EOM are normal.   Neck: Neck supple.   Cardiovascular: Normal rate and regular rhythm.  Exam reveals no gallop and no friction rub.    No murmur heard.  Pulmonary/Chest: Effort normal and breath sounds normal. No respiratory  distress. She has no wheezes. She has no rales.   Lymphadenopathy:     She has no cervical adenopathy.   Neurological: She is alert and oriented to person, place, and time.   C-spine exam, no bony TTP, no limit ROM, spurling negative     Skin: Skin is warm and dry. No rash noted.   Psychiatric: She has a normal mood and affect. Her behavior is normal.   Nursing note and vitals reviewed.      Health Maintenance       Date Due Completion Date    TETANUS VACCINE 01/04/1964 ---    Mammogram 10/29/2016 10/29/2015    Influenza Vaccine 08/01/2017 10/3/2016    DEXA SCAN 10/09/2017 10/9/2014    Lipid Panel 09/30/2018 9/30/2013    Colonoscopy 12/03/2018 12/3/2013            ASSESSMENT     1. Post concussive syndrome    2. Tailbone injury, initial encounter    3. Insomnia, unspecified type    4. Screening mammogram, encounter for    5. Mild single current episode of major depressive disorder        PLAN:     Post concussive syndrome  -reviewed imaging studies, recommend neuro consult  Will refill tramadol at this time, but this should not be used for long term treatment of headache, I explained this to the patient  -     Ambulatory consult to Neurology    Tailbone injury, initial encounter  -     tramadol (ULTRAM) 50 mg tablet; Take 1 tablet (50 mg total) by mouth every 6 (six) hours as needed. Needs appointment for future refills  Dispense: 30 tablet; Refill: 0    Insomnia, unspecified type  -she is to take a half a tab for three days then increase to a whole. Take 30 min before going to bed, discussed sleep hygeine and medication side effects  -     trazodone (DESYREL) 50 MG tablet; Take 1 tablet (50 mg total) by mouth every evening.  Dispense: 90 tablet; Refill: 0    Screening mammogram, encounter for  -     Mammo Digital Screening Bilat with CAD; Future; Expected date: 07/12/2017    Mild single current episode of major depressive disorder  -begin trazodone, followup in one month, she will call if symptoms worsen or if  medication is ineffective          Alessandra Brown MD  07/12/2017 2:11 PM        Return in about 1 month (around 8/12/2017) for Follow up.

## 2017-07-17 ENCOUNTER — HOSPITAL ENCOUNTER (OUTPATIENT)
Dept: RADIOLOGY | Facility: HOSPITAL | Age: 72
Discharge: HOME | End: 2017-07-17
Attending: FAMILY MEDICINE
Payer: MEDICARE

## 2017-07-17 DIAGNOSIS — Z12.31 SCREENING MAMMOGRAM, ENCOUNTER FOR: ICD-10-CM

## 2017-07-17 PROCEDURE — 77067 SCR MAMMO BI INCL CAD: CPT | Mod: TC

## 2017-07-17 PROCEDURE — 77067 SCR MAMMO BI INCL CAD: CPT | Mod: 26,,, | Performed by: RADIOLOGY

## 2017-07-17 PROCEDURE — 77063 BREAST TOMOSYNTHESIS BI: CPT | Mod: 26,,, | Performed by: RADIOLOGY

## 2017-08-11 ENCOUNTER — OFFICE VISIT (OUTPATIENT)
Dept: FAMILY MEDICINE | Facility: CLINIC | Age: 72
End: 2017-08-11
Payer: MEDICARE

## 2017-08-11 VITALS
WEIGHT: 140.88 LBS | HEART RATE: 86 BPM | OXYGEN SATURATION: 97 % | BODY MASS INDEX: 27.66 KG/M2 | TEMPERATURE: 98 F | RESPIRATION RATE: 18 BRPM | DIASTOLIC BLOOD PRESSURE: 72 MMHG | SYSTOLIC BLOOD PRESSURE: 130 MMHG | HEIGHT: 60 IN

## 2017-08-11 DIAGNOSIS — M72.2 PLANTAR FASCIITIS: ICD-10-CM

## 2017-08-11 DIAGNOSIS — F07.81 POST CONCUSSIVE SYNDROME: Primary | ICD-10-CM

## 2017-08-11 DIAGNOSIS — F33.0 MILD RECURRENT MAJOR DEPRESSION: Chronic | ICD-10-CM

## 2017-08-11 DIAGNOSIS — G47.00 INSOMNIA, UNSPECIFIED TYPE: ICD-10-CM

## 2017-08-11 PROCEDURE — 1125F AMNT PAIN NOTED PAIN PRSNT: CPT | Mod: S$GLB,,, | Performed by: FAMILY MEDICINE

## 2017-08-11 PROCEDURE — 99999 PR PBB SHADOW E&M-EST. PATIENT-LVL III: CPT | Mod: PBBFAC,,, | Performed by: FAMILY MEDICINE

## 2017-08-11 PROCEDURE — 1159F MED LIST DOCD IN RCRD: CPT | Mod: S$GLB,,, | Performed by: FAMILY MEDICINE

## 2017-08-11 PROCEDURE — 3075F SYST BP GE 130 - 139MM HG: CPT | Mod: S$GLB,,, | Performed by: FAMILY MEDICINE

## 2017-08-11 PROCEDURE — 99214 OFFICE O/P EST MOD 30 MIN: CPT | Mod: S$GLB,,, | Performed by: FAMILY MEDICINE

## 2017-08-11 PROCEDURE — 3008F BODY MASS INDEX DOCD: CPT | Mod: S$GLB,,, | Performed by: FAMILY MEDICINE

## 2017-08-11 PROCEDURE — 99499 UNLISTED E&M SERVICE: CPT | Mod: S$GLB,,, | Performed by: FAMILY MEDICINE

## 2017-08-11 PROCEDURE — 3078F DIAST BP <80 MM HG: CPT | Mod: S$GLB,,, | Performed by: FAMILY MEDICINE

## 2017-08-13 NOTE — PROGRESS NOTES
Chief Complaint   Patient presents with    Headache    Insomnia    Follow-up    Medication Management       HPI  Diana Herring is a 72 y.o. female with multiple medical diagnoses as listed in the medical history and problem list that presents for follow-up for post concussive headaches and depression. She is still having the headaches almost daily. She says they have not improved, but she was taking the tramadol for headaches and has been taking it for pain in her foot. It would seem this has gotten better. She is getting sleep when she takes the trazodone. She does not feel her mood improve but she is still very worried about her son's activities.   She has been having pain in her foot above her heel for several weeks. No known injury    PAST MEDICAL HISTORY:  Past Medical History:   Diagnosis Date    Anxiety     Arthritis     Corneal abrasion s    ? eye     Depression     Full dentures     GERD (gastroesophageal reflux disease)     Hyperlipidemia     Hypertension     Nuclear sclerosis of both eyes 2017    Osteoporosis     Restless leg syndrome        PAST SURGICAL HISTORY:  Past Surgical History:   Procedure Laterality Date    HYSTERECTOMY      PARTIAL HYSTERECTOMY      TUBAL LIGATION Bilateral        SOCIAL HISTORY:  Social History     Social History    Marital status:      Spouse name: N/A    Number of children: N/A    Years of education: N/A     Occupational History    Not on file.     Social History Main Topics    Smoking status: Never Smoker    Smokeless tobacco: Not on file    Alcohol use No    Drug use: No    Sexual activity: No     Other Topics Concern    Not on file     Social History Narrative    Patient is  w/ 5 children, one  from heart disease.The patient is retired.       FAMILY HISTORY:  Family History   Problem Relation Age of Onset    Stroke Mother     Glaucoma Mother     Cataracts Mother     Cancer Father      Lung    No Known  Problems Brother     No Known Problems Son     Stroke Brother     Heart disease Son     No Known Problems Sister     No Known Problems Maternal Aunt     No Known Problems Maternal Uncle     No Known Problems Paternal Aunt     No Known Problems Paternal Uncle     No Known Problems Maternal Grandmother     No Known Problems Maternal Grandfather     No Known Problems Paternal Grandmother     No Known Problems Paternal Grandfather     Amblyopia Neg Hx     Blindness Neg Hx     Diabetes Neg Hx     Hypertension Neg Hx     Macular degeneration Neg Hx     Retinal detachment Neg Hx     Strabismus Neg Hx     Thyroid disease Neg Hx        ALLERGIES AND MEDICATIONS: updated and reviewed.  Review of patient's allergies indicates:  No Known Allergies  Current Outpatient Prescriptions   Medication Sig Dispense Refill    alendronate (FOSAMAX) 70 MG tablet Take 1 tablet (70 mg total) by mouth every 7 days. 12 tablet 3    amlodipine (NORVASC) 10 MG tablet Take 1 tablet (10 mg total) by mouth once daily. 90 tablet 1    calcium-vitamin D 500-125 mg-unit tablet Take 1 tablet by mouth once daily.        levocetirizine (XYZAL) 5 MG tablet Take 1 tablet (5 mg total) by mouth every evening. 30 tablet 11    metoprolol succinate (TOPROL-XL) 100 MG 24 hr tablet Take 1 tablet (100 mg total) by mouth once daily. 90 tablet 1    multivitamin (MULTIVITAMIN) per tablet Take 1 tablet by mouth once daily.        omeprazole (PRILOSEC) 20 MG capsule Take 1 capsule (20 mg total) by mouth once daily. 90 capsule 1    paroxetine (PAXIL) 20 MG tablet Take 1 tablet (20 mg total) by mouth every morning. 90 tablet 1    ropinirole (REQUIP) 0.5 MG tablet Take 1 tablet (0.5 mg total) by mouth every evening. 90 tablet 1    simethicone (MYLICON) 80 MG chewable tablet Take 1 tablet (80 mg total) by mouth every 6 (six) hours as needed for Flatulence. 60 tablet 2    tramadol (ULTRAM) 50 mg tablet Take 1 tablet (50 mg total) by mouth every  6 (six) hours as needed. Needs appointment for future refills 30 tablet 0    trazodone (DESYREL) 50 MG tablet Take 1 tablet (50 mg total) by mouth every evening. 90 tablet 0     No current facility-administered medications for this visit.        ROS  Review of Systems   Constitutional: Negative for chills, diaphoresis, fatigue, fever and unexpected weight change.   HENT: Negative for rhinorrhea, sinus pressure, sore throat and tinnitus.    Eyes: Negative for photophobia and visual disturbance.   Respiratory: Negative for cough, shortness of breath and wheezing.    Cardiovascular: Negative for chest pain and palpitations.   Gastrointestinal: Negative for abdominal pain, blood in stool, constipation, diarrhea, nausea and vomiting.   Genitourinary: Negative for dysuria, flank pain, frequency and vaginal discharge.   Musculoskeletal: Negative for arthralgias and joint swelling.   Skin: Negative for rash.   Neurological: Positive for headaches. Negative for speech difficulty, weakness and light-headedness.   Psychiatric/Behavioral: Positive for dysphoric mood. Negative for behavioral problems.       Physical Exam  Vitals:    08/11/17 1057   BP: 130/72   Pulse: 86   Resp: 18   Temp: 98.2 °F (36.8 °C)   TempSrc: Oral   SpO2: 97%   Weight: 63.9 kg (140 lb 14 oz)   Height: 5' (1.524 m)    Body mass index is 27.51 kg/m².  Weight: 63.9 kg (140 lb 14 oz)   Height: 5' (152.4 cm)     Physical Exam   Constitutional: She is oriented to person, place, and time. She appears well-developed and well-nourished.   HENT:   Head: Normocephalic and atraumatic.   Eyes: EOM are normal.   Musculoskeletal:   Left heel with pain over the achilles tendon and under the heel   Neurological: She is alert and oriented to person, place, and time.   Skin: Skin is warm and dry. No rash noted. No erythema.   Psychiatric: Her behavior is normal.   No SI/HI, she is tearful throughout the visit   Nursing note and vitals reviewed.      Health Maintenance        Date Due Completion Date    Influenza Vaccine 08/01/2017 10/3/2016    TETANUS VACCINE 08/31/2034 (Originally 1/4/1964) ---    DEXA SCAN 10/09/2017 10/9/2014    Mammogram 07/17/2018 7/17/2017    Lipid Panel 09/30/2018 9/30/2013    Colonoscopy 12/03/2018 12/3/2013            ASSESSMENT     1. Post concussive syndrome    2. Plantar fasciitis    3. Mild recurrent major depression    4. Insomnia, unspecified type        PLAN:     Post concussive syndrome  -follow up with Neurology, it seems her headaches have improved  -d/c tramadol    Plantar fasciitis  -recommend she apply ice to her ankle TID  -     Ambulatory consult to Podiatry    Mild recurrent major depression  -continue current medication, I recommend counseling for her and information has been given    Insomnia, unspecified type  -continue trazodone for sleep and depression, may need to adjust dose    Alessandra Brown MD  08/13/2017 4:32 PM        Return in about 6 weeks (around 9/22/2017) for Follow up.

## 2017-08-16 ENCOUNTER — OFFICE VISIT (OUTPATIENT)
Dept: NEUROLOGY | Facility: CLINIC | Age: 72
End: 2017-08-16
Payer: MEDICARE

## 2017-08-16 ENCOUNTER — TELEPHONE (OUTPATIENT)
Dept: FAMILY MEDICINE | Facility: CLINIC | Age: 72
End: 2017-08-16

## 2017-08-16 VITALS
WEIGHT: 143.31 LBS | HEART RATE: 67 BPM | BODY MASS INDEX: 28.13 KG/M2 | DIASTOLIC BLOOD PRESSURE: 73 MMHG | HEIGHT: 60 IN | SYSTOLIC BLOOD PRESSURE: 130 MMHG

## 2017-08-16 DIAGNOSIS — G47.00 INSOMNIA, UNSPECIFIED TYPE: ICD-10-CM

## 2017-08-16 DIAGNOSIS — G44.221 CHRONIC TENSION-TYPE HEADACHE, INTRACTABLE: Primary | ICD-10-CM

## 2017-08-16 PROCEDURE — 1126F AMNT PAIN NOTED NONE PRSNT: CPT | Mod: S$GLB,,, | Performed by: NEUROLOGICAL SURGERY

## 2017-08-16 PROCEDURE — 1159F MED LIST DOCD IN RCRD: CPT | Mod: S$GLB,,, | Performed by: NEUROLOGICAL SURGERY

## 2017-08-16 PROCEDURE — 3008F BODY MASS INDEX DOCD: CPT | Mod: S$GLB,,, | Performed by: NEUROLOGICAL SURGERY

## 2017-08-16 PROCEDURE — 99999 PR PBB SHADOW E&M-EST. PATIENT-LVL II: CPT | Mod: PBBFAC,,, | Performed by: NEUROLOGICAL SURGERY

## 2017-08-16 PROCEDURE — 3078F DIAST BP <80 MM HG: CPT | Mod: S$GLB,,, | Performed by: NEUROLOGICAL SURGERY

## 2017-08-16 PROCEDURE — 3075F SYST BP GE 130 - 139MM HG: CPT | Mod: S$GLB,,, | Performed by: NEUROLOGICAL SURGERY

## 2017-08-16 PROCEDURE — 99204 OFFICE O/P NEW MOD 45 MIN: CPT | Mod: S$GLB,,, | Performed by: NEUROLOGICAL SURGERY

## 2017-08-16 RX ORDER — TRAZODONE HYDROCHLORIDE 100 MG/1
100 TABLET ORAL NIGHTLY
Qty: 30 TABLET | Refills: 3 | Status: SHIPPED | OUTPATIENT
Start: 2017-08-16 | End: 2018-02-07 | Stop reason: SDUPTHER

## 2017-08-16 RX ORDER — TIZANIDINE 4 MG/1
4 TABLET ORAL EVERY 8 HOURS PRN
Qty: 60 TABLET | Refills: 3 | Status: SHIPPED | OUTPATIENT
Start: 2017-08-16 | End: 2017-09-05

## 2017-08-16 NOTE — LETTER
August 16, 2017      Alessandra Brown MD  [address] [facility]  [place] [address]           [facility]  [address] [facility]  [place] [address]  Phone: [phone]  Fax: [phone]          Patient: Diana Herring   MR Number: [id number]   YOB: 1945   Date of Visit: 8/16/2017       Dear Dr. Alessandra Brown:    Thank you for referring Diana Herring to me for evaluation. Attached you will find relevant portions of my assessment and plan of care.    If you have questions, please do not hesitate to call me. I look forward to following Diana Herring along with you.    Sincerely,    Juan Alberto Mason MD    Enclosure  CC:  No Recipients    If you would like to receive this communication electronically, please contact [email or web address] or [phone] to request more information on [facility] Link access.    For providers and/or their staff who would like to refer a patient to [facility], please contact us through our one-stop-shop provider referral line, [facility], at [phone].    If you feel you have received this communication in error or would no longer like to receive these types of communications, please e-mail [email or web address]

## 2017-08-16 NOTE — PROGRESS NOTES
"Chief Complaint   Patient presents with    Other     Post concussive syndrome         Diana Herring is a 72 y.o. female with a history of multiple medical diagnoses as listed below that presents for evaluation of headaches. She says that she has been having headaches for several months that were located in the front of her head. She has a hard time describing the pain only able to relate it as the head feeling "big" across the forehead. She says that the pains began after she began to worry about her son more frequently. She has also noticed that she would have the pain when she was not eating enough. She was home one day and fell backwards onto her ceramic floor, striking the back of her head. She has been having since that time pain in the back of her head that she can only describe as a "knot" in the rear of her head. She has not had pains like these in the past and feels that the pain in the back of the head are different than the pain in the front of her head. She has not found anything that has made the pains better and has been getting both pains almost daily. The pains have not had any associated symptoms; she denies nausea, photophobia, or phonophobia. Since the fall about two months ago the headaches are getting better. She has had difficulty sleeping and feels that her lack of sleep has also been triggering her headaches as well. She has been having tension in her neck and in the shoulders as well that she feels is making the pain worse.    PAST MEDICAL HISTORY:  Past Medical History:   Diagnosis Date    Anxiety     Arthritis     Corneal abrasion s    ? eye     Depression     Full dentures     GERD (gastroesophageal reflux disease)     Hyperlipidemia     Hypertension     Nuclear sclerosis of both eyes 6/5/2017    Osteoporosis     Restless leg syndrome        PAST SURGICAL HISTORY:  Past Surgical History:   Procedure Laterality Date    HYSTERECTOMY      PARTIAL HYSTERECTOMY      TUBAL " LIGATION Bilateral        SOCIAL HISTORY:  Social History     Social History    Marital status:      Spouse name: N/A    Number of children: N/A    Years of education: N/A     Occupational History    Not on file.     Social History Main Topics    Smoking status: Never Smoker    Smokeless tobacco: Not on file    Alcohol use No    Drug use: No    Sexual activity: No     Other Topics Concern    Not on file     Social History Narrative    Patient is  w/ 5 children, one  from heart disease.The patient is retired.       FAMILY HISTORY:  Family History   Problem Relation Age of Onset    Stroke Mother     Glaucoma Mother     Cataracts Mother     Cancer Father      Lung    No Known Problems Brother     No Known Problems Son     Stroke Brother     Heart disease Son     No Known Problems Sister     No Known Problems Maternal Aunt     No Known Problems Maternal Uncle     No Known Problems Paternal Aunt     No Known Problems Paternal Uncle     No Known Problems Maternal Grandmother     No Known Problems Maternal Grandfather     No Known Problems Paternal Grandmother     No Known Problems Paternal Grandfather     Amblyopia Neg Hx     Blindness Neg Hx     Diabetes Neg Hx     Hypertension Neg Hx     Macular degeneration Neg Hx     Retinal detachment Neg Hx     Strabismus Neg Hx     Thyroid disease Neg Hx        ALLERGIES AND MEDICATIONS: updated and reviewed.  Review of patient's allergies indicates:  No Known Allergies  Current Outpatient Prescriptions   Medication Sig Dispense Refill    alendronate (FOSAMAX) 70 MG tablet Take 1 tablet (70 mg total) by mouth every 7 days. 12 tablet 3    amlodipine (NORVASC) 10 MG tablet Take 1 tablet (10 mg total) by mouth once daily. 90 tablet 1    calcium-vitamin D 500-125 mg-unit tablet Take 1 tablet by mouth once daily.        levocetirizine (XYZAL) 5 MG tablet Take 1 tablet (5 mg total) by mouth every evening. 30 tablet 11     metoprolol succinate (TOPROL-XL) 100 MG 24 hr tablet Take 1 tablet (100 mg total) by mouth once daily. 90 tablet 1    multivitamin (MULTIVITAMIN) per tablet Take 1 tablet by mouth once daily.        omeprazole (PRILOSEC) 20 MG capsule Take 1 capsule (20 mg total) by mouth once daily. 90 capsule 1    paroxetine (PAXIL) 20 MG tablet Take 1 tablet (20 mg total) by mouth every morning. 90 tablet 1    ropinirole (REQUIP) 0.5 MG tablet Take 1 tablet (0.5 mg total) by mouth every evening. 90 tablet 1    simethicone (MYLICON) 80 MG chewable tablet Take 1 tablet (80 mg total) by mouth every 6 (six) hours as needed for Flatulence. 60 tablet 2    tizanidine (ZANAFLEX) 4 MG tablet Take 1 tablet (4 mg total) by mouth every 8 (eight) hours as needed. 60 tablet 3    tramadol (ULTRAM) 50 mg tablet Take 1 tablet (50 mg total) by mouth every 6 (six) hours as needed. Needs appointment for future refills 30 tablet 0    trazodone (DESYREL) 100 MG tablet Take 1 tablet (100 mg total) by mouth every evening. 30 tablet 3     No current facility-administered medications for this visit.        Review of Systems   Constitutional: Negative for activity change, appetite change, fever and unexpected weight change.   HENT: Negative for trouble swallowing and voice change.    Eyes: Negative for photophobia and visual disturbance.   Respiratory: Negative for apnea and shortness of breath.    Cardiovascular: Negative for chest pain and leg swelling.   Gastrointestinal: Negative for constipation and nausea.   Genitourinary: Negative for difficulty urinating.   Musculoskeletal: Positive for neck pain. Negative for back pain and gait problem.   Skin: Negative for color change and pallor.   Neurological: Positive for headaches. Negative for dizziness, seizures, syncope, weakness and numbness.   Hematological: Negative for adenopathy.   Psychiatric/Behavioral: Positive for decreased concentration. Negative for agitation and confusion. The patient  is nervous/anxious.        Neurologic Exam     Mental Status   Oriented to person, place, and time.   Registration: recalls 3 of 3 objects.   Attention: normal. Concentration: normal.   Speech: speech is normal   Level of consciousness: alert  Knowledge: good.     Cranial Nerves     CN II   Visual fields full to confrontation.   Right visual field deficit: none  Left visual field deficit: none     CN III, IV, VI   Pupils are equal, round, and reactive to light.  Extraocular motions are normal.   Right pupil: Size: 3 mm. Shape: regular. Accommodation: intact.   Left pupil: Size: 3 mm. Shape: regular. Accommodation: intact.   CN III: no CN III palsy  CN VI: no CN VI palsy  Nystagmus: none   Diplopia: none  Ophthalmoparesis: none  Upgaze: normal  Downgaze: normal  Conjugate gaze: present    CN V   Facial sensation intact.   Right facial sensation deficit: none  Left facial sensation deficit: none    CN VII   Facial expression full, symmetric.   Right facial weakness: none  Left facial weakness: none    CN VIII   CN VIII normal.     CN IX, X   CN IX normal.   CN X normal.   Palate: symmetric    CN XI   CN XI normal.   Right sternocleidomastoid strength: normal  Left sternocleidomastoid strength: normal  Right trapezius strength: normal  Left trapezius strength: normal    CN XII   CN XII normal.   Tongue deviation: none    Motor Exam   Muscle bulk: normal  Overall muscle tone: normal  Right arm tone: normal  Left arm tone: normal  Right leg tone: normal  Left leg tone: normal    Strength   Strength 5/5 throughout.     Sensory Exam   Right arm light touch: normal  Left arm light touch: normal  Right leg light touch: normal  Left leg light touch: normal  Right arm vibration: normal  Left arm vibration: normal  Right leg vibration: normal  Left leg vibration: normal  Right arm proprioception: normal  Left arm proprioception: normal  Right leg proprioception: normal  Left leg proprioception: normal  Right arm pinprick:  normal  Left arm pinprick: normal  Right leg pinprick: normal  Left leg pinprick: normal    Gait, Coordination, and Reflexes     Gait  Gait: normal    Coordination   Romberg: negative  Finger to nose coordination: normal  Heel to shin coordination: normal  Tandem walking coordination: normal    Tremor   Resting tremor: absent    Reflexes   Right brachioradialis: 2+  Left brachioradialis: 2+  Right biceps: 2+  Left biceps: 2+  Right triceps: 2+  Left triceps: 2+  Right patellar: 2+  Left patellar: 2+  Right achilles: 2+  Left achilles: 2+  Right plantar: normal  Left plantar: normal      Physical Exam   Constitutional: She is oriented to person, place, and time. She appears well-developed and well-nourished.   HENT:   Head: Normocephalic and atraumatic.   Eyes: EOM are normal. Pupils are equal, round, and reactive to light.   Neck: Normal range of motion.   Cardiovascular: Normal rate and intact distal pulses.    Pulmonary/Chest: Effort normal. No apnea. No respiratory distress.   Musculoskeletal: Normal range of motion.   Neurological: She is alert and oriented to person, place, and time. She has normal strength. She has a normal Finger-Nose-Finger Test, a normal Heel to Shin Test, a normal Romberg Test and a normal Tandem Gait Test. Gait normal.   Reflex Scores:       Tricep reflexes are 2+ on the right side and 2+ on the left side.       Bicep reflexes are 2+ on the right side and 2+ on the left side.       Brachioradialis reflexes are 2+ on the right side and 2+ on the left side.       Patellar reflexes are 2+ on the right side and 2+ on the left side.       Achilles reflexes are 2+ on the right side and 2+ on the left side.  Skin: Skin is warm and dry.   Psychiatric: She has a normal mood and affect. Her speech is normal and behavior is normal. Thought content normal.   Vitals reviewed.      Vitals:    08/16/17 0943   BP: 130/73   BP Location: Right arm   Patient Position: Sitting   BP Method: Large (Automatic)    Pulse: 67   Weight: 65 kg (143 lb 4.8 oz)   Height: 5' (1.524 m)       Assessment & Plan:    Problem List Items Addressed This Visit     Insomnia    Relevant Medications    trazodone (DESYREL) 100 MG tablet      Other Visit Diagnoses     Chronic tension-type headache, intractable    -  Primary    Relevant Medications    tizanidine (ZANAFLEX) 4 MG tablet          Follow-up: Return in about 2 months (around 10/16/2017).  More than 50% of this 45 minute encounter was spent in counseling and coordinating care.

## 2017-09-05 DIAGNOSIS — I10 ESSENTIAL HYPERTENSION: Chronic | ICD-10-CM

## 2017-09-05 RX ORDER — METOPROLOL SUCCINATE 100 MG/1
100 TABLET, EXTENDED RELEASE ORAL DAILY
Qty: 90 TABLET | Refills: 1 | Status: SHIPPED | OUTPATIENT
Start: 2017-09-05 | End: 2018-04-09 | Stop reason: SDUPTHER

## 2017-10-08 DIAGNOSIS — G47.00 INSOMNIA, UNSPECIFIED TYPE: ICD-10-CM

## 2017-10-09 RX ORDER — TRAZODONE HYDROCHLORIDE 50 MG/1
50 TABLET ORAL NIGHTLY
Qty: 90 TABLET | Refills: 0 | OUTPATIENT
Start: 2017-10-09 | End: 2018-10-09

## 2017-10-09 NOTE — TELEPHONE ENCOUNTER
Pt advised that she didn't request refill on Trazadone, must've been [facility].    Pt advised to call Dr Mason for refill.

## 2017-10-31 ENCOUNTER — OFFICE VISIT (OUTPATIENT)
Dept: FAMILY MEDICINE | Facility: CLINIC | Age: 72
End: 2017-10-31
Payer: MEDICARE

## 2017-10-31 VITALS
DIASTOLIC BLOOD PRESSURE: 72 MMHG | SYSTOLIC BLOOD PRESSURE: 130 MMHG | RESPIRATION RATE: 16 BRPM | TEMPERATURE: 98 F | OXYGEN SATURATION: 99 % | WEIGHT: 148.06 LBS | HEART RATE: 60 BPM | HEIGHT: 60 IN | BODY MASS INDEX: 29.07 KG/M2

## 2017-10-31 DIAGNOSIS — G47.00 INSOMNIA, UNSPECIFIED TYPE: ICD-10-CM

## 2017-10-31 DIAGNOSIS — J01.90 ACUTE BACTERIAL SINUSITIS: Primary | ICD-10-CM

## 2017-10-31 DIAGNOSIS — M81.0 OSTEOPOROSIS, UNSPECIFIED OSTEOPOROSIS TYPE, UNSPECIFIED PATHOLOGICAL FRACTURE PRESENCE: Chronic | ICD-10-CM

## 2017-10-31 DIAGNOSIS — B96.89 ACUTE BACTERIAL SINUSITIS: Primary | ICD-10-CM

## 2017-10-31 PROCEDURE — 99999 PR PBB SHADOW E&M-EST. PATIENT-LVL III: CPT | Mod: PBBFAC,,, | Performed by: FAMILY MEDICINE

## 2017-10-31 PROCEDURE — 99214 OFFICE O/P EST MOD 30 MIN: CPT | Mod: S$GLB,,, | Performed by: FAMILY MEDICINE

## 2017-10-31 PROCEDURE — 99499 UNLISTED E&M SERVICE: CPT | Mod: S$GLB,,, | Performed by: FAMILY MEDICINE

## 2017-10-31 RX ORDER — LEVOCETIRIZINE DIHYDROCHLORIDE 5 MG/1
5 TABLET, FILM COATED ORAL NIGHTLY
Qty: 30 TABLET | Refills: 5 | Status: SHIPPED | OUTPATIENT
Start: 2017-10-31 | End: 2020-12-01

## 2017-10-31 RX ORDER — CODEINE PHOSPHATE AND GUAIFENESIN 10; 100 MG/5ML; MG/5ML
5 SOLUTION ORAL 3 TIMES DAILY PRN
Qty: 180 ML | Refills: 0 | Status: SHIPPED | OUTPATIENT
Start: 2017-10-31 | End: 2017-11-10

## 2017-10-31 RX ORDER — AMOXICILLIN AND CLAVULANATE POTASSIUM 875; 125 MG/1; MG/1
1 TABLET, FILM COATED ORAL EVERY 12 HOURS
Qty: 20 TABLET | Refills: 0 | Status: SHIPPED | OUTPATIENT
Start: 2017-10-31 | End: 2017-11-10

## 2017-10-31 NOTE — PROGRESS NOTES
Chief Complaint   Patient presents with    URI       HPI  Diana ROSEMARIE Herring is a 72 y.o. female with multiple medical diagnoses as listed in the medical history and problem list that presents for evaluation for almost two weeks. She has been having congestion and sinus pressure. She has not been having fever or chills. She has been having sinus headache. No trouble with breathing. She has been having trouble sleeping and taking trazodone at 51 but this was increased to 100mg however she had some palpitations and stopped the medication as this worried her.     PAST MEDICAL HISTORY:  Past Medical History:   Diagnosis Date    Anxiety     Arthritis     Corneal abrasion s    ? eye     Depression     Full dentures     GERD (gastroesophageal reflux disease)     Hyperlipidemia     Hypertension     Nuclear sclerosis of both eyes 2017    Osteoporosis     Restless leg syndrome        PAST SURGICAL HISTORY:  Past Surgical History:   Procedure Laterality Date    HYSTERECTOMY      PARTIAL HYSTERECTOMY      TUBAL LIGATION Bilateral        SOCIAL HISTORY:  Social History     Social History    Marital status:      Spouse name: N/A    Number of children: N/A    Years of education: N/A     Occupational History    Not on file.     Social History Main Topics    Smoking status: Never Smoker    Smokeless tobacco: Never Used    Alcohol use No    Drug use: No    Sexual activity: No     Other Topics Concern    Not on file     Social History Narrative    Patient is  w/ 5 children, one  from heart disease.The patient is retired.       FAMILY HISTORY:  Family History   Problem Relation Age of Onset    Stroke Mother     Glaucoma Mother     Cataracts Mother     Cancer Father      Lung    No Known Problems Brother     No Known Problems Son     Stroke Brother     Heart disease Son     No Known Problems Sister     No Known Problems Maternal Aunt     No Known Problems Maternal Uncle      No Known Problems Paternal Aunt     No Known Problems Paternal Uncle     No Known Problems Maternal Grandmother     No Known Problems Maternal Grandfather     No Known Problems Paternal Grandmother     No Known Problems Paternal Grandfather     Amblyopia Neg Hx     Blindness Neg Hx     Diabetes Neg Hx     Hypertension Neg Hx     Macular degeneration Neg Hx     Retinal detachment Neg Hx     Strabismus Neg Hx     Thyroid disease Neg Hx        ALLERGIES AND MEDICATIONS: updated and reviewed.  Review of patient's allergies indicates:  No Known Allergies  Current Outpatient Prescriptions   Medication Sig Dispense Refill    alendronate (FOSAMAX) 70 MG tablet Take 1 tablet (70 mg total) by mouth every 7 days. 12 tablet 3    amlodipine (NORVASC) 10 MG tablet Take 1 tablet (10 mg total) by mouth once daily. 90 tablet 1    calcium-vitamin D 500-125 mg-unit tablet Take 1 tablet by mouth once daily.        levocetirizine (XYZAL) 5 MG tablet Take 1 tablet (5 mg total) by mouth every evening. 30 tablet 5    metoprolol succinate (TOPROL-XL) 100 MG 24 hr tablet Take 1 tablet (100 mg total) by mouth once daily. 90 tablet 1    metoprolol succinate (TOPROL-XL) 100 MG 24 hr tablet TAKE 1 TABLET (100 MG TOTAL) BY MOUTH ONCE DAILY. 90 tablet 1    multivitamin (MULTIVITAMIN) per tablet Take 1 tablet by mouth once daily.        omeprazole (PRILOSEC) 20 MG capsule Take 1 capsule (20 mg total) by mouth once daily. 90 capsule 1    paroxetine (PAXIL) 20 MG tablet Take 1 tablet (20 mg total) by mouth every morning. 90 tablet 1    ropinirole (REQUIP) 0.5 MG tablet Take 1 tablet (0.5 mg total) by mouth every evening. 90 tablet 1    simethicone (MYLICON) 80 MG chewable tablet Take 1 tablet (80 mg total) by mouth every 6 (six) hours as needed for Flatulence. 60 tablet 2    tramadol (ULTRAM) 50 mg tablet Take 1 tablet (50 mg total) by mouth every 6 (six) hours as needed. Needs appointment for future refills 30 tablet 0     trazodone (DESYREL) 100 MG tablet Take 1 tablet (100 mg total) by mouth every evening. 30 tablet 3    amoxicillin-clavulanate 875-125mg (AUGMENTIN) 875-125 mg per tablet Take 1 tablet by mouth every 12 (twelve) hours. 20 tablet 0    guaifenesin-codeine 100-10 mg/5 ml (TUSSI-ORGANIDIN NR)  mg/5 mL syrup Take 5 mLs by mouth 3 (three) times daily as needed. 180 mL 0     No current facility-administered medications for this visit.        ROS  Review of Systems   Constitutional: Negative for chills, diaphoresis, fatigue, fever and unexpected weight change.   HENT: Positive for congestion, sinus pain, sinus pressure and sore throat. Negative for rhinorrhea and tinnitus.    Eyes: Negative for photophobia and visual disturbance.   Respiratory: Positive for cough. Negative for shortness of breath and wheezing.    Cardiovascular: Negative for chest pain and palpitations.   Gastrointestinal: Negative for abdominal pain, blood in stool, constipation, diarrhea, nausea and vomiting.   Genitourinary: Negative for dysuria, flank pain, frequency and vaginal discharge.   Musculoskeletal: Negative for arthralgias and joint swelling.   Skin: Negative for rash.   Neurological: Negative for speech difficulty, weakness, light-headedness and headaches.   Psychiatric/Behavioral: Positive for sleep disturbance. Negative for behavioral problems and dysphoric mood.       Physical Exam  Vitals:    10/31/17 0906   BP: 130/72   Pulse: 60   Resp: 16   Temp: 97.6 °F (36.4 °C)   TempSrc: Oral   SpO2: 99%   Weight: 67.1 kg (148 lb 0.6 oz)   Height: 5' (1.524 m)    Body mass index is 28.91 kg/m².  Weight: 67.1 kg (148 lb 0.6 oz)   Height: 5' (152.4 cm)     Physical Exam   Constitutional: She is oriented to person, place, and time. She appears well-developed and well-nourished. No distress.   HENT:   Head: Normocephalic and atraumatic.   Nose: Mucosal edema present. Right sinus exhibits frontal sinus tenderness. Right sinus exhibits no  maxillary sinus tenderness. Left sinus exhibits frontal sinus tenderness. Left sinus exhibits no maxillary sinus tenderness.   Eyes: EOM are normal.   Neck: Neck supple.   Cardiovascular: Normal rate and regular rhythm.  Exam reveals no gallop and no friction rub.    No murmur heard.  Pulmonary/Chest: Effort normal and breath sounds normal. No respiratory distress. She has no wheezes. She has no rales.   Lymphadenopathy:     She has no cervical adenopathy.   Neurological: She is alert and oriented to person, place, and time.   Skin: Skin is warm and dry. No rash noted.   Psychiatric: She has a normal mood and affect. Her behavior is normal.   Nursing note and vitals reviewed.      Health Maintenance       Date Due Completion Date    Influenza Vaccine 08/01/2017 10/3/2016    DEXA SCAN 10/09/2017 10/9/2014    TETANUS VACCINE 08/31/2034 (Originally [birth year]4/1964) ---    Mammogram 07/17/2018 7/17/2017    Lipid Panel 09/30/2018 9/30/2013    Colonoscopy 12/03/2018 12/3/2013            ASSESSMENT     1. Acute bacterial sinusitis    2. Osteoporosis, unspecified osteoporosis type, unspecified pathological fracture presence    3. Insomnia, unspecified type        PLAN:     Problem List Items Addressed This Visit        Orthopedic    Osteoporosis (Chronic)  -she is on fosamax, but due for bone scan to reassess    Relevant Orders    DXA Bone Density Spine And Hip       Other    Insomnia  -increase trazodone to 100mg, if palpitations return, d/c medication, I am encouraging her to resume this as the pt feels she became overly anxious about the medication      Other Visit Diagnoses     Acute bacterial sinusitis    -  Primary  Notify me if symptoms do not improve    Relevant Medications    amoxicillin-clavulanate 875-125mg (AUGMENTIN) 875-125 mg per tablet    guaifenesin-codeine 100-10 mg/5 ml (TUSSI-ORGANIDIN NR)  mg/5 mL syrup    levocetirizine (XYZAL) 5 MG tablet            Alessandra Brown MD  10/31/2017 9:48 AM        Return  if symptoms worsen or fail to improve.

## 2017-11-02 ENCOUNTER — HOSPITAL ENCOUNTER (OUTPATIENT)
Dept: RADIOLOGY | Facility: CLINIC | Age: 72
Discharge: HOME | End: 2017-11-02
Attending: FAMILY MEDICINE
Payer: MEDICARE

## 2017-11-02 DIAGNOSIS — M81.0 OSTEOPOROSIS, UNSPECIFIED OSTEOPOROSIS TYPE, UNSPECIFIED PATHOLOGICAL FRACTURE PRESENCE: Chronic | ICD-10-CM

## 2017-11-02 PROCEDURE — 77080 DXA BONE DENSITY AXIAL: CPT | Mod: 26,,, | Performed by: INTERNAL MEDICINE

## 2017-11-02 PROCEDURE — 77080 DXA BONE DENSITY AXIAL: CPT | Mod: TC,PO

## 2017-11-17 ENCOUNTER — TELEPHONE (OUTPATIENT)
Dept: FAMILY MEDICINE | Facility: CLINIC | Age: 72
End: 2017-11-17

## 2017-11-17 NOTE — TELEPHONE ENCOUNTER
Please confirm with her that she is taking her medicine for osteoporosis, because her bone scan does not show improvement    Alessandra Brown MD

## 2018-02-01 ENCOUNTER — OFFICE VISIT (OUTPATIENT)
Dept: FAMILY MEDICINE | Facility: CLINIC | Age: 73
End: 2018-02-01
Payer: MEDICARE

## 2018-02-01 VITALS
BODY MASS INDEX: 28.99 KG/M2 | HEIGHT: 61 IN | OXYGEN SATURATION: 98 % | TEMPERATURE: 98 F | WEIGHT: 153.56 LBS | SYSTOLIC BLOOD PRESSURE: 132 MMHG | DIASTOLIC BLOOD PRESSURE: 74 MMHG | HEART RATE: 60 BPM

## 2018-02-01 DIAGNOSIS — M79.601 RIGHT ARM PAIN: Primary | ICD-10-CM

## 2018-02-01 DIAGNOSIS — F33.0 MILD RECURRENT MAJOR DEPRESSION: ICD-10-CM

## 2018-02-01 DIAGNOSIS — M81.0 OSTEOPOROSIS, UNSPECIFIED OSTEOPOROSIS TYPE, UNSPECIFIED PATHOLOGICAL FRACTURE PRESENCE: ICD-10-CM

## 2018-02-01 PROCEDURE — 3008F BODY MASS INDEX DOCD: CPT | Mod: S$GLB,,, | Performed by: FAMILY MEDICINE

## 2018-02-01 PROCEDURE — 1125F AMNT PAIN NOTED PAIN PRSNT: CPT | Mod: S$GLB,,, | Performed by: FAMILY MEDICINE

## 2018-02-01 PROCEDURE — 1159F MED LIST DOCD IN RCRD: CPT | Mod: S$GLB,,, | Performed by: FAMILY MEDICINE

## 2018-02-01 PROCEDURE — 99214 OFFICE O/P EST MOD 30 MIN: CPT | Mod: S$GLB,,, | Performed by: FAMILY MEDICINE

## 2018-02-01 PROCEDURE — 99999 PR PBB SHADOW E&M-EST. PATIENT-LVL III: CPT | Mod: PBBFAC,,, | Performed by: FAMILY MEDICINE

## 2018-02-01 RX ORDER — ALENDRONATE SODIUM 70 MG/1
70 TABLET ORAL
Qty: 12 TABLET | Refills: 3 | Status: SHIPPED | OUTPATIENT
Start: 2018-02-01 | End: 2019-04-04 | Stop reason: SDUPTHER

## 2018-02-01 NOTE — PROGRESS NOTES
Office Visit    Patient Name: Diana Herring    : 1945  MRN: [id number]      Assessment/Plan:  Diana Herring is a 73 y.o. female who presents today for :    Right arm pain    -activity modification d/w patient: avoid heavy lifting and provocative movements, advised icing and elevation  -arm ROM stretching and strengthening exercises demonstrated in clinic and handouts given to patient. Pt agrees that she needs more stretching and being more active. May consider PT if not improved.  -RTC in 4-6 weeks if not better, or sooner if pain worsens.    -patient advised to use OTC Tylenol (325mg tablets) once every 4-6 hours as needed for pain - avoid regular use of NSAIDs due to potential GI/BP side effects       Osteoporosis, unspecified osteoporosis type, unspecified pathological fracture presence  -     alendronate (FOSAMAX) 70 MG tablet; Take 1 tablet (70 mg total) by mouth every 7 days.  Dispense: 12 tablet; Refill: 3  -advised adequate Vit D and calcium intake     Mild recurrent major depression  The current medical regimen is effective;  continue present plan and medications.      Follow-up for worsening Sx. Urgent care/ED precautions provided, any evaluation as needed.     This note was created by combination of typed  and Dragon dictation.  Transcription errors may be present.  If there are any questions, please contact me.        ----------------------------------------------------------------------------------------------------------------------      HPI:  Diana is a 73 y.o. female who presents today for:    Shoulder Pain and Medication Refill        This patient has multiple medical diagnoses as noted below.      Patient is here today for  Shoulder Pain and Medication Refill    that started about 3-4 weeks ago, has a mild achy sensation that comes and goes, deep massage makes it more painful, but otherwise no particular movements makes it hurt more. Denies recent injury/trauma to area. No  slips/falls. At worse, the pain is 6/10, she's hasn't been taking OTC NSAIDs at home with some relief, also rarely takes Tylenol on empty stomach which she states helps with arm pain but bothers her stomach.   Pt does not want to be on any heavy pain medications.  Pt is still able to comfortably perform daily routine  No wt loss/fatigue/malaise/BMs changes or urinary changes/pain radiation/tingling/numbness/weakness.    Of note, her DEXA done on 10/31/17 showed that her BMD has decreased from prior study, and she has not been on Fosamax for over a year - and requests to refill medication. Denies any recent falls.   She is also doing well on her depression med Paxil. No recent changes in marital status, no new stressors    Additional ROS    No dysphagia/sore throat/rhinorrhea  No CP/SOB/palpitations/swelling  No cough/wheezing/SOB  No nausea/vomiting  No rash, no history of allergies to any specific substances    Patient Active Problem List   Diagnosis    GERD (gastroesophageal reflux disease)    Essential hypertension    Insomnia    Mild recurrent major depression    RLS (restless legs syndrome)    Osteoporosis    Muscle weakness    Bilateral leg pain    Left shoulder pain    Chest pain    Anxiety    Sedative hypnotic or anxiolytic dependence    Nuclear sclerosis of both eyes    Hyperopia with presbyopia of both eyes    Mild single current episode of major depressive disorder       Current Medications  Medications reviewed/updated.     Current Outpatient Prescriptions on File Prior to Visit   Medication Sig Dispense Refill    amlodipine (NORVASC) 10 MG tablet Take 1 tablet (10 mg total) by mouth once daily. 90 tablet 1    calcium-vitamin D 500-125 mg-unit tablet Take 1 tablet by mouth once daily.        levocetirizine (XYZAL) 5 MG tablet Take 1 tablet (5 mg total) by mouth every evening. 30 tablet 5    metoprolol succinate (TOPROL-XL) 100 MG 24 hr tablet Take 1 tablet (100 mg total) by mouth once  daily. 90 tablet 1    metoprolol succinate (TOPROL-XL) 100 MG 24 hr tablet TAKE 1 TABLET (100 MG TOTAL) BY MOUTH ONCE DAILY. 90 tablet 1    multivitamin (MULTIVITAMIN) per tablet Take 1 tablet by mouth once daily.        simethicone (MYLICON) 80 MG chewable tablet Take 1 tablet (80 mg total) by mouth every 6 (six) hours as needed for Flatulence. 60 tablet 2    tramadol (ULTRAM) 50 mg tablet Take 1 tablet (50 mg total) by mouth every 6 (six) hours as needed. Needs appointment for future refills 30 tablet 0    trazodone (DESYREL) 100 MG tablet Take 1 tablet (100 mg total) by mouth every evening. 30 tablet 3    omeprazole (PRILOSEC) 20 MG capsule Take 1 capsule (20 mg total) by mouth once daily. 90 capsule 1    paroxetine (PAXIL) 20 MG tablet Take 1 tablet (20 mg total) by mouth every morning. 90 tablet 1    ropinirole (REQUIP) 0.5 MG tablet Take 1 tablet (0.5 mg total) by mouth every evening. 90 tablet 1    [DISCONTINUED] alendronate (FOSAMAX) 70 MG tablet Take 1 tablet (70 mg total) by mouth every 7 days. 12 tablet 3     No current facility-administered medications on file prior to visit.        Past Surgical History:   Procedure Laterality Date    HYSTERECTOMY      PARTIAL HYSTERECTOMY      TUBAL LIGATION Bilateral        Family History   Problem Relation Age of Onset    Stroke Mother     Glaucoma Mother     Cataracts Mother     Cancer Father      Lung    No Known Problems Brother     No Known Problems Son     Stroke Brother     Heart disease Son     No Known Problems Sister     No Known Problems Maternal Aunt     No Known Problems Maternal Uncle     No Known Problems Paternal Aunt     No Known Problems Paternal Uncle     No Known Problems Maternal Grandmother     No Known Problems Maternal Grandfather     No Known Problems Paternal Grandmother     No Known Problems Paternal Grandfather     Amblyopia Neg Hx     Blindness Neg Hx     Diabetes Neg Hx     Hypertension Neg Hx     Macular  "degeneration Neg Hx     Retinal detachment Neg Hx     Strabismus Neg Hx     Thyroid disease Neg Hx        Social History     Social History    Marital status:      Spouse name: N/A    Number of children: N/A    Years of education: N/A     Occupational History    Not on file.     Social History Main Topics    Smoking status: Never Smoker    Smokeless tobacco: Never Used    Alcohol use No    Drug use: No    Sexual activity: No     Other Topics Concern    Not on file     Social History Narrative    Patient is  w/ 5 children, one  from heart disease.The patient is retired.           Allergies   Review of patient's allergies indicates:  No Known Allergies          Review of Systems  See HPI      Physical Exam  /74   Pulse 60   Temp 98.3 °F (36.8 °C) (Oral)   Ht 5' 1" (1.549 m)   Wt 69.7 kg (153 lb 8.8 oz)   SpO2 98%   BMI 29.01 kg/m²     GEN: NAD, well developed, pleasant, well nourished  HEENT: NCAT, PERRLA, EOMI, sclera clear, anicteric, O/P clear, MMM with no lesions  NECK: normal, supple with midline trachea, no LAD, no thyromegaly  LUNGS: CTAB, no w/r/r, no increased work of breathing   HEART: RRR, normal S1 and S2, no m/r/g, no edema  ABD: s/nt/nd, NABS  SKIN: normal turgor, no rashes  PSYCH: AOx3, appropriate mood and affect  MSK: warm/well perfused, normal ROM in all 4 extremities, no c/c/e.  Right arm with nor swelling/redness, but mild TTP over the distal triceps area.            "

## 2018-02-07 DIAGNOSIS — G47.00 INSOMNIA, UNSPECIFIED TYPE: ICD-10-CM

## 2018-02-08 RX ORDER — TRAZODONE HYDROCHLORIDE 100 MG/1
100 TABLET ORAL NIGHTLY
Qty: 30 TABLET | Refills: 3 | Status: SHIPPED | OUTPATIENT
Start: 2018-02-08 | End: 2018-03-16 | Stop reason: SDUPTHER

## 2018-03-02 ENCOUNTER — OFFICE VISIT (OUTPATIENT)
Dept: FAMILY MEDICINE | Facility: CLINIC | Age: 73
End: 2018-03-02
Payer: MEDICARE

## 2018-03-02 VITALS
BODY MASS INDEX: 28.89 KG/M2 | OXYGEN SATURATION: 97 % | WEIGHT: 153 LBS | DIASTOLIC BLOOD PRESSURE: 78 MMHG | HEIGHT: 61 IN | SYSTOLIC BLOOD PRESSURE: 144 MMHG | HEART RATE: 72 BPM | TEMPERATURE: 99 F

## 2018-03-02 DIAGNOSIS — J20.8 ACUTE BACTERIAL BRONCHITIS: Primary | ICD-10-CM

## 2018-03-02 DIAGNOSIS — B96.89 ACUTE BACTERIAL BRONCHITIS: Primary | ICD-10-CM

## 2018-03-02 PROCEDURE — 99214 OFFICE O/P EST MOD 30 MIN: CPT | Mod: 25,S$GLB,, | Performed by: NURSE PRACTITIONER

## 2018-03-02 PROCEDURE — 99999 PR PBB SHADOW E&M-EST. PATIENT-LVL IV: CPT | Mod: PBBFAC,,, | Performed by: NURSE PRACTITIONER

## 2018-03-02 PROCEDURE — 3078F DIAST BP <80 MM HG: CPT | Mod: S$GLB,,, | Performed by: NURSE PRACTITIONER

## 2018-03-02 PROCEDURE — 3077F SYST BP >= 140 MM HG: CPT | Mod: S$GLB,,, | Performed by: NURSE PRACTITIONER

## 2018-03-02 PROCEDURE — 96372 THER/PROPH/DIAG INJ SC/IM: CPT | Mod: S$GLB,,, | Performed by: FAMILY MEDICINE

## 2018-03-02 RX ORDER — PROMETHAZINE HYDROCHLORIDE AND DEXTROMETHORPHAN HYDROBROMIDE 6.25; 15 MG/5ML; MG/5ML
5 SYRUP ORAL
Qty: 180 ML | Refills: 0 | Status: SHIPPED | OUTPATIENT
Start: 2018-03-02 | End: 2018-03-12

## 2018-03-02 RX ORDER — FLUTICASONE PROPIONATE 50 MCG
2 SPRAY, SUSPENSION (ML) NASAL DAILY
Qty: 16 G | Refills: 0 | Status: SHIPPED | OUTPATIENT
Start: 2018-03-02 | End: 2019-10-31 | Stop reason: SDUPTHER

## 2018-03-02 RX ORDER — AZITHROMYCIN 250 MG/1
TABLET, FILM COATED ORAL
Qty: 6 TABLET | Refills: 0 | Status: SHIPPED | OUTPATIENT
Start: 2018-03-02 | End: 2018-03-07

## 2018-03-02 RX ORDER — DEXAMETHASONE SODIUM PHOSPHATE 4 MG/ML
8 INJECTION, SOLUTION INTRA-ARTICULAR; INTRALESIONAL; INTRAMUSCULAR; INTRAVENOUS; SOFT TISSUE
Status: COMPLETED | OUTPATIENT
Start: 2018-03-02 | End: 2018-03-02

## 2018-03-02 RX ADMIN — DEXAMETHASONE SODIUM PHOSPHATE 8 MG: 4 INJECTION, SOLUTION INTRA-ARTICULAR; INTRALESIONAL; INTRAMUSCULAR; INTRAVENOUS; SOFT TISSUE at 08:03

## 2018-03-02 NOTE — PATIENT INSTRUCTIONS
Viral Upper Respiratory Illness (Adult)  You have a viral upper respiratory illness (URI), which is another term for the common cold. This illness is contagious during the first few days. It is spread through the air by coughing and sneezing. It may also be spread by direct contact (touching the sick person and then touching your own eyes, nose, or mouth). Frequent handwashing will decrease risk of spread. Most viral illnesses go away within 7 to 10 days with rest and simple home remedies. Sometimes the illness may last for several weeks. Antibiotics will not kill a virus, and they are generally not prescribed for this condition.    Home care  · If symptoms are severe, rest at home for the first 2 to 3 days. When you resume activity, don't let yourself get too tired.  · Avoid being exposed to cigarette smoke (yours or others).  · You may use acetaminophen or ibuprofen to control pain and fever, unless another medicine was prescribed. (Note: If you have chronic liver or kidney disease, have ever had a stomach ulcer or gastrointestinal bleeding, or are taking blood-thinning medicines, talk with your healthcare provider before using these medicines.) Aspirin should never be given to anyone under 18 years of age who is ill with a viral infection or fever. It may cause severe liver or brain damage.  · Your appetite may be poor, so a light diet is fine. Avoid dehydration by drinking 6 to 8 glasses of fluids per day (water, soft drinks, juices, tea, or soup). Extra fluids will help loosen secretions in the nose and lungs.  · Over-the-counter cold medicines will not shorten the length of time youre sick, but they may be helpful for the following symptoms: cough, sore throat, and nasal and sinus congestion. (Note: Do not use decongestants if you have high blood pressure.)  Follow-up care  Follow up with your healthcare provider, or as advised.  When to seek medical advice  Call your healthcare provider right away if any  of these occur:  · Cough with lots of colored sputum (mucus)  · Severe headache; face, neck, or ear pain  · Difficulty swallowing due to throat pain  · Fever of 100.4°F (38°C)  Call 911, or get immediate medical care  Call emergency services right away if any of these occur:  · Chest pain, shortness of breath, wheezing, or difficulty breathing  · Coughing up blood  · Inability to swallow due to throat pain  Date Last Reviewed: 9/13/2015  © 1458-8245 [employer]. [address] [place] [id number]. All rights reserved. This information is not intended as a substitute for professional medical care. Always follow your healthcare professional's instructions.

## 2018-03-02 NOTE — PROGRESS NOTES
Subjective:       Patient ID: Diana Herring is a 73 y.o. female.    Chief Complaint: URI (X 2 weeks,congestion,cough,wheezing )    URI    This is a new problem. The current episode started 1 to 4 weeks ago (about 2 weeks). The problem has been gradually worsening. There has been no fever. Associated symptoms include congestion, ear pain and a sore throat. Pertinent negatives include no headaches, rhinorrhea, sinus pain or sneezing. Treatments tried: nyquil and cough syrup. The treatment provided mild relief.       Past Medical History:   Diagnosis Date    Anxiety     Arthritis     Corneal abrasion s    ? eye     Depression     Full dentures     GERD (gastroesophageal reflux disease)     Hyperlipidemia     Hypertension     Nuclear sclerosis of both eyes 2017    Osteoporosis     Restless leg syndrome        Social History     Social History    Marital status:      Spouse name: N/A    Number of children: N/A    Years of education: N/A     Occupational History    Not on file.     Social History Main Topics    Smoking status: Never Smoker    Smokeless tobacco: Never Used    Alcohol use No    Drug use: No    Sexual activity: No     Other Topics Concern    Not on file     Social History Narrative    Patient is  w/ 5 children, one  from heart disease.The patient is retired.       Past Surgical History:   Procedure Laterality Date    HYSTERECTOMY      PARTIAL HYSTERECTOMY      TUBAL LIGATION Bilateral        Review of Systems   Constitutional: Negative for chills and fever.   HENT: Positive for congestion, ear pain, postnasal drip and sore throat. Negative for rhinorrhea, sinus pain, sinus pressure, sneezing and trouble swallowing.    Neurological: Negative for headaches.   All other systems reviewed and are negative.      Objective:   BP (!) 144/78 (BP Location: Left arm, Patient Position: Sitting, BP Method: Large (Manual))   Pulse 72   Temp 98.5 °F (36.9 °C) (Oral)   " Ht 5' 1" (1.549 m)   Wt 69.4 kg (153 lb)   SpO2 97%   BMI 28.91 kg/m²      Physical Exam   Constitutional: She is oriented to person, place, and time. She appears well-developed and well-nourished. She is cooperative.   HENT:   Head: Normocephalic and atraumatic.   Right Ear: Hearing, external ear and ear canal normal. A middle ear effusion is present.   Left Ear: Hearing, external ear and ear canal normal. A middle ear effusion is present.   Nose: No mucosal edema or rhinorrhea. Right sinus exhibits no maxillary sinus tenderness. Left sinus exhibits no maxillary sinus tenderness.   Mouth/Throat: Posterior oropharyngeal erythema present. No oropharyngeal exudate or posterior oropharyngeal edema.   +PND   Cardiovascular: Normal rate and regular rhythm.    Pulmonary/Chest: Effort normal. No respiratory distress. She has decreased breath sounds in the right lower field and the left lower field. She has wheezes in the right lower field and the left lower field. She has no rhonchi. She has no rales.   Lymphadenopathy:     She has no cervical adenopathy.   Neurological: She is alert and oriented to person, place, and time.   Skin: Skin is warm, dry and intact. She is not diaphoretic. No pallor.   Psychiatric: She has a normal mood and affect. Her speech is normal and behavior is normal.   Vitals reviewed.      Assessment:       1. Acute bacterial bronchitis        Plan:       Diana was seen today for uri.    Diagnoses and all orders for this visit:    Acute bacterial bronchitis  -     fluticasone (FLONASE) 50 mcg/actuation nasal spray; 2 sprays (100 mcg total) by Each Nare route once daily.  -     promethazine-dextromethorphan (PROMETHAZINE-DM) 6.25-15 mg/5 mL Syrp; Take 5 mLs by mouth every 4 to 6 hours as needed.  -     dexamethasone injection 8 mg; Inject 2 mLs (8 mg total) into the muscle one time.  -     azithromycin (Z-DAMARI) 250 MG tablet; Take 2 tablets by mouth on day 1; Take 1 tablet by mouth on days 2-5  -  "    Increase your water intake to 64-80 oz daily to help thin mucus  -     Nasal Saline spray (Over the counter [place] spray or [place])  2 sprays each nostril 2-3 times a day for nasal congestion  -     Tylenol 500 mg 2 tablets or Ibuprofen 200 mg 2 tablets every 4-6 hours as needed for fever, headaches, sore throat, ear pain, bodyaches, and/or nasal/sinus inflammation  -     Warm salt water gargles with 1/2 cup water and 1 tablespoon salt every 4 hours  -     Warm tea with honey and lemon      Follow-up if symptoms worsen or fail to improve.

## 2018-03-05 DIAGNOSIS — I10 ESSENTIAL HYPERTENSION: Chronic | ICD-10-CM

## 2018-03-05 RX ORDER — AMLODIPINE BESYLATE 10 MG/1
10 TABLET ORAL DAILY
Qty: 90 TABLET | Refills: 1 | Status: SHIPPED | OUTPATIENT
Start: 2018-03-05 | End: 2018-09-08 | Stop reason: SDUPTHER

## 2018-03-16 ENCOUNTER — OFFICE VISIT (OUTPATIENT)
Dept: FAMILY MEDICINE | Facility: CLINIC | Age: 73
End: 2018-03-16
Payer: MEDICARE

## 2018-03-16 ENCOUNTER — LAB VISIT (OUTPATIENT)
Dept: LAB | Facility: HOSPITAL | Age: 73
End: 2018-03-16
Attending: FAMILY MEDICINE
Payer: MEDICARE

## 2018-03-16 VITALS
HEIGHT: 60 IN | DIASTOLIC BLOOD PRESSURE: 72 MMHG | SYSTOLIC BLOOD PRESSURE: 120 MMHG | WEIGHT: 153 LBS | HEART RATE: 65 BPM | RESPIRATION RATE: 16 BRPM | BODY MASS INDEX: 30.04 KG/M2 | TEMPERATURE: 97 F | OXYGEN SATURATION: 97 %

## 2018-03-16 DIAGNOSIS — M81.0 OSTEOPOROSIS, UNSPECIFIED OSTEOPOROSIS TYPE, UNSPECIFIED PATHOLOGICAL FRACTURE PRESENCE: Chronic | ICD-10-CM

## 2018-03-16 DIAGNOSIS — I10 ESSENTIAL HYPERTENSION: Primary | Chronic | ICD-10-CM

## 2018-03-16 DIAGNOSIS — G47.00 INSOMNIA, UNSPECIFIED TYPE: ICD-10-CM

## 2018-03-16 DIAGNOSIS — Z23 NEED FOR IMMUNIZATION AGAINST INFLUENZA: ICD-10-CM

## 2018-03-16 DIAGNOSIS — F41.9 ANXIETY: ICD-10-CM

## 2018-03-16 DIAGNOSIS — G25.81 RLS (RESTLESS LEGS SYNDROME): Chronic | ICD-10-CM

## 2018-03-16 DIAGNOSIS — F33.0 MILD RECURRENT MAJOR DEPRESSION: Chronic | ICD-10-CM

## 2018-03-16 DIAGNOSIS — I10 ESSENTIAL HYPERTENSION: Chronic | ICD-10-CM

## 2018-03-16 LAB
ALBUMIN SERPL BCP-MCNC: 3.8 G/DL
ALP SERPL-CCNC: 82 U/L
ALT SERPL W/O P-5'-P-CCNC: 18 U/L
ANION GAP SERPL CALC-SCNC: 7 MMOL/L
AST SERPL-CCNC: 18 U/L
BASOPHILS # BLD AUTO: 0.1 K/UL
BASOPHILS NFR BLD: 1.4 %
BILIRUB SERPL-MCNC: 0.8 MG/DL
BUN SERPL-MCNC: 11 MG/DL
CALCIUM SERPL-MCNC: 9.4 MG/DL
CHLORIDE SERPL-SCNC: 107 MMOL/L
CHOLEST SERPL-MCNC: 232 MG/DL
CHOLEST/HDLC SERPL: 5.3 {RATIO}
CO2 SERPL-SCNC: 27 MMOL/L
CREAT SERPL-MCNC: 0.9 MG/DL
DIFFERENTIAL METHOD: ABNORMAL
EOSINOPHIL # BLD AUTO: 0.1 K/UL
EOSINOPHIL NFR BLD: 1.7 %
ERYTHROCYTE [DISTWIDTH] IN BLOOD BY AUTOMATED COUNT: 12.5 %
EST. GFR  (AFRICAN AMERICAN): >60 ML/MIN/1.73 M^2
EST. GFR  (NON AFRICAN AMERICAN): >60 ML/MIN/1.73 M^2
GLUCOSE SERPL-MCNC: 109 MG/DL
HCT VFR BLD AUTO: 40.6 %
HDLC SERPL-MCNC: 44 MG/DL
HDLC SERPL: 19 %
HGB BLD-MCNC: 12.8 G/DL
IMM GRANULOCYTES # BLD AUTO: 0.01 K/UL
IMM GRANULOCYTES NFR BLD AUTO: 0.1 %
LDLC SERPL CALC-MCNC: 165.6 MG/DL
LYMPHOCYTES # BLD AUTO: 3.4 K/UL
LYMPHOCYTES NFR BLD: 49.2 %
MCH RBC QN AUTO: 29.9 PG
MCHC RBC AUTO-ENTMCNC: 31.5 G/DL
MCV RBC AUTO: 95 FL
MONOCYTES # BLD AUTO: 0.5 K/UL
MONOCYTES NFR BLD: 6.5 %
NEUTROPHILS # BLD AUTO: 2.8 K/UL
NEUTROPHILS NFR BLD: 41.1 %
NONHDLC SERPL-MCNC: 188 MG/DL
NRBC BLD-RTO: 0 /100 WBC
PLATELET # BLD AUTO: 242 K/UL
PMV BLD AUTO: 11.2 FL
POTASSIUM SERPL-SCNC: 4.5 MMOL/L
PROT SERPL-MCNC: 7.8 G/DL
RBC # BLD AUTO: 4.28 M/UL
SODIUM SERPL-SCNC: 141 MMOL/L
TRIGL SERPL-MCNC: 112 MG/DL
WBC # BLD AUTO: 6.91 K/UL

## 2018-03-16 PROCEDURE — 80053 COMPREHEN METABOLIC PANEL: CPT

## 2018-03-16 PROCEDURE — 99999 PR PBB SHADOW E&M-EST. PATIENT-LVL III: CPT | Mod: PBBFAC,,, | Performed by: FAMILY MEDICINE

## 2018-03-16 PROCEDURE — 99214 OFFICE O/P EST MOD 30 MIN: CPT | Mod: S$GLB,,, | Performed by: FAMILY MEDICINE

## 2018-03-16 PROCEDURE — 3074F SYST BP LT 130 MM HG: CPT | Mod: CPTII,S$GLB,, | Performed by: FAMILY MEDICINE

## 2018-03-16 PROCEDURE — 36415 COLL VENOUS BLD VENIPUNCTURE: CPT | Mod: PO

## 2018-03-16 PROCEDURE — 80061 LIPID PANEL: CPT

## 2018-03-16 PROCEDURE — 85025 COMPLETE CBC W/AUTO DIFF WBC: CPT

## 2018-03-16 PROCEDURE — 3078F DIAST BP <80 MM HG: CPT | Mod: CPTII,S$GLB,, | Performed by: FAMILY MEDICINE

## 2018-03-16 PROCEDURE — 99499 UNLISTED E&M SERVICE: CPT | Mod: S$GLB,,, | Performed by: FAMILY MEDICINE

## 2018-03-16 RX ORDER — ROPINIROLE 0.5 MG/1
0.5 TABLET, FILM COATED ORAL NIGHTLY
Qty: 90 TABLET | Refills: 1 | Status: SHIPPED | OUTPATIENT
Start: 2018-03-16 | End: 2019-12-02

## 2018-03-16 RX ORDER — PAROXETINE HYDROCHLORIDE 20 MG/1
20 TABLET, FILM COATED ORAL EVERY MORNING
Qty: 90 TABLET | Refills: 1 | Status: SHIPPED | OUTPATIENT
Start: 2018-03-16 | End: 2019-10-31 | Stop reason: SDUPTHER

## 2018-03-16 RX ORDER — TRAZODONE HYDROCHLORIDE 100 MG/1
100 TABLET ORAL NIGHTLY
Qty: 90 TABLET | Refills: 1 | Status: SHIPPED | OUTPATIENT
Start: 2018-03-16 | End: 2019-02-22 | Stop reason: SDUPTHER

## 2018-03-16 NOTE — PROGRESS NOTES
Chief Complaint   Patient presents with    Hypertension    Follow-up    Medication Refill       HPI  Diana Herring is a 73 y.o. female with multiple medical diagnoses as listed in the medical history and problem list that presents for follow-up for hypertension and for refills for her restless leg and insomnia medications. She was recently treated for bronchitis by another provider at this clinic and has been feeling better, still having a cough. Otherwise doing well.     PAST MEDICAL HISTORY:  Past Medical History:   Diagnosis Date    Anxiety     Arthritis     Corneal abrasion s    ? eye     Depression     Full dentures     GERD (gastroesophageal reflux disease)     Hyperlipidemia     Hypertension     Nuclear sclerosis of both eyes 2017    Osteoporosis     Restless leg syndrome        PAST SURGICAL HISTORY:  Past Surgical History:   Procedure Laterality Date    HYSTERECTOMY      PARTIAL HYSTERECTOMY      TUBAL LIGATION Bilateral        SOCIAL HISTORY:  Social History     Social History    Marital status:      Spouse name: N/A    Number of children: N/A    Years of education: N/A     Occupational History    Not on file.     Social History Main Topics    Smoking status: Never Smoker    Smokeless tobacco: Never Used    Alcohol use No    Drug use: No    Sexual activity: No     Other Topics Concern    Not on file     Social History Narrative    Patient is  w/ 5 children, one  from heart disease.The patient is retired.       FAMILY HISTORY:  Family History   Problem Relation Age of Onset    Stroke Mother     Glaucoma Mother     Cataracts Mother     Cancer Father      Lung    No Known Problems Brother     No Known Problems Son     Stroke Brother     Heart disease Son     No Known Problems Sister     No Known Problems Maternal Aunt     No Known Problems Maternal Uncle     No Known Problems Paternal Aunt     No Known Problems Paternal Uncle     No Known  Problems Maternal Grandmother     No Known Problems Maternal Grandfather     No Known Problems Paternal Grandmother     No Known Problems Paternal Grandfather     Amblyopia Neg Hx     Blindness Neg Hx     Diabetes Neg Hx     Hypertension Neg Hx     Macular degeneration Neg Hx     Retinal detachment Neg Hx     Strabismus Neg Hx     Thyroid disease Neg Hx        ALLERGIES AND MEDICATIONS: updated and reviewed.  Review of patient's allergies indicates:  No Known Allergies  Current Outpatient Prescriptions   Medication Sig Dispense Refill    alendronate (FOSAMAX) 70 MG tablet Take 1 tablet (70 mg total) by mouth every 7 days. 12 tablet 3    amLODIPine (NORVASC) 10 MG tablet TAKE 1 TABLET (10 MG TOTAL) BY MOUTH ONCE DAILY. 90 tablet 1    calcium-vitamin D 500-125 mg-unit tablet Take 1 tablet by mouth once daily.        fluticasone (FLONASE) 50 mcg/actuation nasal spray 2 sprays (100 mcg total) by Each Nare route once daily. 16 g 0    levocetirizine (XYZAL) 5 MG tablet Take 1 tablet (5 mg total) by mouth every evening. 30 tablet 5    metoprolol succinate (TOPROL-XL) 100 MG 24 hr tablet Take 1 tablet (100 mg total) by mouth once daily. 90 tablet 1    metoprolol succinate (TOPROL-XL) 100 MG 24 hr tablet TAKE 1 TABLET (100 MG TOTAL) BY MOUTH ONCE DAILY. 90 tablet 1    multivitamin (MULTIVITAMIN) per tablet Take 1 tablet by mouth once daily.        omeprazole (PRILOSEC) 20 MG capsule Take 1 capsule (20 mg total) by mouth once daily. 90 capsule 1    paroxetine (PAXIL) 20 MG tablet Take 1 tablet (20 mg total) by mouth every morning. 90 tablet 1    rOPINIRole (REQUIP) 0.5 MG tablet Take 1 tablet (0.5 mg total) by mouth every evening. 90 tablet 1    simethicone (MYLICON) 80 MG chewable tablet Take 1 tablet (80 mg total) by mouth every 6 (six) hours as needed for Flatulence. 60 tablet 2    tramadol (ULTRAM) 50 mg tablet Take 1 tablet (50 mg total) by mouth every 6 (six) hours as needed. Needs appointment  for future refills 30 tablet 0    traZODone (DESYREL) 100 MG tablet Take 1 tablet (100 mg total) by mouth every evening. 90 tablet 1     No current facility-administered medications for this visit.        ROS  Review of Systems   Constitutional: Negative for chills, diaphoresis, fatigue, fever and unexpected weight change.   HENT: Negative for rhinorrhea, sinus pressure, sore throat and tinnitus.    Eyes: Negative for photophobia and visual disturbance.   Respiratory: Negative for cough, shortness of breath and wheezing.    Cardiovascular: Negative for chest pain and palpitations.   Gastrointestinal: Negative for abdominal pain, blood in stool, constipation, diarrhea, nausea and vomiting.   Genitourinary: Negative for dysuria, flank pain, frequency and vaginal discharge.   Musculoskeletal: Negative for arthralgias and joint swelling.   Skin: Negative for rash.   Neurological: Negative for speech difficulty, weakness, light-headedness and headaches.   Psychiatric/Behavioral: Negative for behavioral problems and dysphoric mood.       Physical Exam  Vitals:    03/16/18 0829   BP: 120/72   Pulse: 65   Resp: 16   Temp: 97.4 °F (36.3 °C)   TempSrc: Oral   SpO2: 97%   Weight: 69.4 kg (153 lb)   Height: 5' (1.524 m)    Body mass index is 29.88 kg/m².  Weight: 69.4 kg (153 lb)   Height: 5' (152.4 cm)     Physical Exam   Constitutional: She is oriented to person, place, and time. She appears well-developed and well-nourished. No distress.   Eyes: EOM are normal.   Neck: Neck supple.   Cardiovascular: Normal rate and regular rhythm.  Exam reveals no gallop and no friction rub.    No murmur heard.  Pulmonary/Chest: Effort normal and breath sounds normal. No respiratory distress. She has no wheezes. She has no rales.   Lymphadenopathy:     She has no cervical adenopathy.   Neurological: She is alert and oriented to person, place, and time.   Skin: Skin is warm and dry. No rash noted.   Psychiatric: She has a normal mood and  affect. Her behavior is normal.   Nursing note and vitals reviewed.      Health Maintenance       Date Due Completion Date    High Dose Statin 01/04/1967 ---    Influenza Vaccine 08/01/2017 10/3/2016    TETANUS VACCINE 08/31/2034 (Originally 1/4/1964) ---    Mammogram 07/17/2018 7/17/2017    Lipid Panel 09/30/2018 9/30/2013    Colonoscopy 12/03/2018 12/3/2013    DEXA SCAN 11/02/2020 11/2/2017            ASSESSMENT     1. Essential hypertension    2. Anxiety    3. Insomnia, unspecified type    4. Mild recurrent major depression    5. RLS (restless legs syndrome)    6. Need for immunization against influenza    7. Osteoporosis, unspecified osteoporosis type, unspecified pathological fracture presence        PLAN:     Problem List Items Addressed This Visit        Neuro    RLS (restless legs syndrome) (Chronic)    Overview     Stable on requip         Relevant Medications    rOPINIRole (REQUIP) 0.5 MG tablet       Psychiatric    Mild recurrent major depression (Chronic)    Overview     Stable on paxil         Relevant Medications    paroxetine (PAXIL) 20 MG tablet    Anxiety    Overview     Stable on paxil         Relevant Medications    paroxetine (PAXIL) 20 MG tablet       Cardiac/Vascular    Essential hypertension - Primary (Chronic)    Overview     On metoprolol and amlodipine         Relevant Orders    CBC auto differential    Comprehensive metabolic panel    Lipid panel       Orthopedic    Osteoporosis (Chronic)    Overview     Taking weekly fosamax            Other    Insomnia    Overview     Stable on trazodone         Relevant Medications    traZODone (DESYREL) 100 MG tablet      Other Visit Diagnoses     Need for immunization against influenza      -as ordered               Alessandra Brown MD  03/16/2018 8:54 AM        Follow-up in about 6 months (around 9/16/2018) for Follow up.

## 2018-03-20 PROCEDURE — G0008 ADMIN INFLUENZA VIRUS VAC: HCPCS | Mod: S$GLB,,, | Performed by: FAMILY MEDICINE

## 2018-03-20 PROCEDURE — 90662 IIV NO PRSV INCREASED AG IM: CPT | Mod: S$GLB,,, | Performed by: FAMILY MEDICINE

## 2018-03-29 ENCOUNTER — TELEPHONE (OUTPATIENT)
Dept: FAMILY MEDICINE | Facility: CLINIC | Age: 73
End: 2018-03-29

## 2018-03-29 DIAGNOSIS — E78.5 HYPERLIPIDEMIA, UNSPECIFIED HYPERLIPIDEMIA TYPE: Primary | ICD-10-CM

## 2018-03-29 RX ORDER — ATORVASTATIN CALCIUM 40 MG/1
40 TABLET, FILM COATED ORAL DAILY
Qty: 90 TABLET | Refills: 1 | Status: SHIPPED | OUTPATIENT
Start: 2018-03-29 | End: 2018-09-20 | Stop reason: SDUPTHER

## 2018-03-29 NOTE — TELEPHONE ENCOUNTER
Her lab work is showing that she needs a cholesterol medication. I see where she used to take lipitor, I am sending this to her pharmacy.

## 2018-04-09 DIAGNOSIS — I10 ESSENTIAL HYPERTENSION: Chronic | ICD-10-CM

## 2018-04-09 RX ORDER — METOPROLOL SUCCINATE 100 MG/1
100 TABLET, EXTENDED RELEASE ORAL DAILY
Qty: 90 TABLET | Refills: 1 | Status: SHIPPED | OUTPATIENT
Start: 2018-04-09 | End: 2018-08-23 | Stop reason: SDUPTHER

## 2018-05-26 DIAGNOSIS — K21.9 GASTROESOPHAGEAL REFLUX DISEASE WITHOUT ESOPHAGITIS: Chronic | ICD-10-CM

## 2018-05-28 RX ORDER — OMEPRAZOLE 20 MG/1
20 CAPSULE, DELAYED RELEASE ORAL DAILY
Qty: 90 CAPSULE | Refills: 1 | Status: SHIPPED | OUTPATIENT
Start: 2018-05-28 | End: 2018-12-18 | Stop reason: SDUPTHER

## 2018-06-26 ENCOUNTER — LAB VISIT (OUTPATIENT)
Dept: LAB | Facility: HOSPITAL | Age: 73
End: 2018-06-26
Attending: FAMILY MEDICINE
Payer: MEDICARE

## 2018-06-26 ENCOUNTER — OFFICE VISIT (OUTPATIENT)
Dept: FAMILY MEDICINE | Facility: CLINIC | Age: 73
End: 2018-06-26
Payer: MEDICARE

## 2018-06-26 VITALS
OXYGEN SATURATION: 96 % | HEART RATE: 76 BPM | SYSTOLIC BLOOD PRESSURE: 140 MMHG | DIASTOLIC BLOOD PRESSURE: 62 MMHG | BODY MASS INDEX: 29.45 KG/M2 | HEIGHT: 60 IN | WEIGHT: 150 LBS | TEMPERATURE: 100 F

## 2018-06-26 DIAGNOSIS — N30.01 ACUTE CYSTITIS WITH HEMATURIA: ICD-10-CM

## 2018-06-26 DIAGNOSIS — M79.2 PAROXYSMAL NERVE PAIN: ICD-10-CM

## 2018-06-26 DIAGNOSIS — N30.01 ACUTE CYSTITIS WITH HEMATURIA: Primary | ICD-10-CM

## 2018-06-26 DIAGNOSIS — I10 ESSENTIAL HYPERTENSION: ICD-10-CM

## 2018-06-26 LAB
BILIRUB SERPL-MCNC: NORMAL MG/DL
BLOOD URINE, POC: 250
COLOR, POC UA: YELLOW
GLUCOSE UR QL STRIP: NORMAL
KETONES UR QL STRIP: NEGATIVE
LEUKOCYTE ESTERASE URINE, POC: NORMAL
MICROSCOPIC COMMENT: ABNORMAL
NITRITE, POC UA: POSITIVE
PH, POC UA: 5
PROTEIN, POC: NORMAL
RBC #/AREA URNS AUTO: >100 /HPF (ref 0–4)
SPECIFIC GRAVITY, POC UA: 1.02
UROBILINOGEN, POC UA: NORMAL
WBC #/AREA URNS AUTO: >100 /HPF (ref 0–5)
WBC CLUMPS UR QL AUTO: ABNORMAL
YEAST UR QL AUTO: ABNORMAL

## 2018-06-26 PROCEDURE — 99214 OFFICE O/P EST MOD 30 MIN: CPT | Mod: 25,S$GLB,, | Performed by: FAMILY MEDICINE

## 2018-06-26 PROCEDURE — 87186 SC STD MICRODIL/AGAR DIL: CPT

## 2018-06-26 PROCEDURE — 81001 URINALYSIS AUTO W/SCOPE: CPT | Mod: S$GLB,,, | Performed by: FAMILY MEDICINE

## 2018-06-26 PROCEDURE — 87077 CULTURE AEROBIC IDENTIFY: CPT

## 2018-06-26 PROCEDURE — 87086 URINE CULTURE/COLONY COUNT: CPT

## 2018-06-26 PROCEDURE — 87088 URINE BACTERIA CULTURE: CPT

## 2018-06-26 PROCEDURE — 99999 PR PBB SHADOW E&M-EST. PATIENT-LVL III: CPT | Mod: PBBFAC,,, | Performed by: FAMILY MEDICINE

## 2018-06-26 PROCEDURE — 99499 UNLISTED E&M SERVICE: CPT | Mod: S$GLB,,, | Performed by: FAMILY MEDICINE

## 2018-06-26 PROCEDURE — 3078F DIAST BP <80 MM HG: CPT | Mod: CPTII,S$GLB,, | Performed by: FAMILY MEDICINE

## 2018-06-26 PROCEDURE — 3077F SYST BP >= 140 MM HG: CPT | Mod: CPTII,S$GLB,, | Performed by: FAMILY MEDICINE

## 2018-06-26 PROCEDURE — 81001 URINALYSIS AUTO W/SCOPE: CPT

## 2018-06-26 RX ORDER — DOXYCYCLINE HYCLATE 100 MG
100 TABLET ORAL EVERY 12 HOURS
Qty: 14 TABLET | Refills: 0 | Status: SHIPPED | OUTPATIENT
Start: 2018-06-26 | End: 2018-07-02

## 2018-06-26 RX ORDER — GABAPENTIN 100 MG/1
100 CAPSULE ORAL 3 TIMES DAILY
Qty: 90 CAPSULE | Refills: 2 | Status: SHIPPED | OUTPATIENT
Start: 2018-06-26 | End: 2018-10-19 | Stop reason: SDUPTHER

## 2018-06-26 NOTE — PROGRESS NOTES
Office Visit    Patient Name: Diana Herring    : 1945  MRN: [id number]      Assessment/Plan:  Diana Herring is a 73 y.o. female who presents today for :    Acute cystitis with hematuria  -     Urinalysis Microscopic; Future; Expected date: 2018  -     Urine culture; Future; Expected date: 2018  -     POCT urinalysis, dipstick or tablet reag  -     doxycycline (VIBRA-TABS) 100 MG tablet; Take 1 tablet (100 mg total) by mouth every 12 (twelve) hours.  Dispense: 14 tablet; Refill: 0  -AF VSS  Dipstick with +LE/nitrites with blood, f/u UA/UCx  -advised adequate hydration and proper hygiene  -RTC if Sx worsens or call clinic back if pt has any concerns.    Paroxysmal nerve pain  -     gabapentin (NEURONTIN) 100 MG capsule; Take 1 capsule (100 mg total) by mouth 3 (three) times daily.  Dispense: 90 capsule; Refill: 2    -trial of Neurontin, advised pt that she may take Tylenol as needed every 4-6 hours for pain  -advise leg stretches and avoid triggers  -avoid NSAIDs due to potential side effects.    Essential hypertension  - continue current medications   - ?advised DASH diet, portion control, regular cardiovascular exercises      Follow-up for worsening Sx or as needed.     This note was created by combination of typed  and Dragon dictation.  Transcription errors may be present.  If there are any questions, please contact me.                  ----------------------------------------------------------------------------------------------------------------------      HPI:  Diana is a 73 y.o. female who presents today for:    Leg nerve Pain and Cystitis        This patient has multiple medical diagnoses as noted below.      This patient is known to me and to this clinic. The patient's last visit with me was on 2018.    Pt complains of burning/pain with frequent urination that started 4 days ago, this morning, noticed blood in her urine when she wiped. No F/C.   NO flank pain/bladder  pain/hematuria  NO recent Abx use.  She also c/o of bilateral leg pain that she has had on/off for the past month, which she usually experiences at night whenever she twists a certain way while laying in bed. Pain is described as nerve pain with sensation of tingling, sharp shooting from the hips to her ankles. She states the sensation is different from her restless leg syndrome, which is well controlled with Requip.  She denies any calf pain, no SOB. Sometimes get ankle swelling with standing for two long that goes away with elevation. Pt is compliant with daily BP medication at home - no side effects, BP log mostly in 120s/80s range. No CP/STEVEN      Additional ROS    No F/C/wt changes/fatigue  No CP/SOB/palpitations  No cough/wheezing  No nausea/vomiting/abd pain  No rashes  No weakness/HA/tingling/numbness      Patient Active Problem List   Diagnosis    GERD (gastroesophageal reflux disease)    Essential hypertension    Insomnia    Mild recurrent major depression    RLS (restless legs syndrome)    Osteoporosis    Muscle weakness    Bilateral leg pain    Left shoulder pain    Chest pain    Anxiety    Nuclear sclerosis of both eyes    Hyperopia with presbyopia of both eyes    Mild single current episode of major depressive disorder       Current Medications  Medications reviewed and updated.       Current Outpatient Prescriptions:     alendronate (FOSAMAX) 70 MG tablet, Take 1 tablet (70 mg total) by mouth every 7 days., Disp: 12 tablet, Rfl: 3    amLODIPine (NORVASC) 10 MG tablet, TAKE 1 TABLET (10 MG TOTAL) BY MOUTH ONCE DAILY., Disp: 90 tablet, Rfl: 1    atorvastatin (LIPITOR) 40 MG tablet, Take 1 tablet (40 mg total) by mouth once daily., Disp: 90 tablet, Rfl: 1    calcium-vitamin D 500-125 mg-unit tablet, Take 1 tablet by mouth once daily.  , Disp: , Rfl:     fluticasone (FLONASE) 50 mcg/actuation nasal spray, 2 sprays (100 mcg total) by Each Nare route once daily., Disp: 16 g, Rfl: 0     levocetirizine (XYZAL) 5 MG tablet, Take 1 tablet (5 mg total) by mouth every evening., Disp: 30 tablet, Rfl: 5    metoprolol succinate (TOPROL-XL) 100 MG 24 hr tablet, TAKE 1 TABLET (100 MG TOTAL) BY MOUTH ONCE DAILY., Disp: 90 tablet, Rfl: 1    multivitamin (MULTIVITAMIN) per tablet, Take 1 tablet by mouth once daily.  , Disp: , Rfl:     omeprazole (PRILOSEC) 20 MG capsule, TAKE 1 CAPSULE (20 MG TOTAL) BY MOUTH ONCE DAILY., Disp: 90 capsule, Rfl: 1    paroxetine (PAXIL) 20 MG tablet, Take 1 tablet (20 mg total) by mouth every morning., Disp: 90 tablet, Rfl: 1    rOPINIRole (REQUIP) 0.5 MG tablet, Take 1 tablet (0.5 mg total) by mouth every evening., Disp: 90 tablet, Rfl: 1    simethicone (MYLICON) 80 MG chewable tablet, Take 1 tablet (80 mg total) by mouth every 6 (six) hours as needed for Flatulence., Disp: 60 tablet, Rfl: 2    tramadol (ULTRAM) 50 mg tablet, Take 1 tablet (50 mg total) by mouth every 6 (six) hours as needed. Needs appointment for future refills, Disp: 30 tablet, Rfl: 0    traZODone (DESYREL) 100 MG tablet, Take 1 tablet (100 mg total) by mouth every evening., Disp: 90 tablet, Rfl: 1    doxycycline (VIBRA-TABS) 100 MG tablet, Take 1 tablet (100 mg total) by mouth every 12 (twelve) hours., Disp: 14 tablet, Rfl: 0    gabapentin (NEURONTIN) 100 MG capsule, Take 1 capsule (100 mg total) by mouth 3 (three) times daily., Disp: 90 capsule, Rfl: 2    Past Surgical History:   Procedure Laterality Date    HYSTERECTOMY      PARTIAL HYSTERECTOMY      TUBAL LIGATION Bilateral        Family History   Problem Relation Age of Onset    Stroke Mother     Glaucoma Mother     Cataracts Mother     Cancer Father         Lung    No Known Problems Brother     No Known Problems Son     Stroke Brother     Heart disease Son     No Known Problems Sister     No Known Problems Maternal Aunt     No Known Problems Maternal Uncle     No Known Problems Paternal Aunt     No Known Problems Paternal Uncle      No Known Problems Maternal Grandmother     No Known Problems Maternal Grandfather     No Known Problems Paternal Grandmother     No Known Problems Paternal Grandfather     Amblyopia Neg Hx     Blindness Neg Hx     Diabetes Neg Hx     Hypertension Neg Hx     Macular degeneration Neg Hx     Retinal detachment Neg Hx     Strabismus Neg Hx     Thyroid disease Neg Hx        Social History     Social History    Marital status:      Spouse name: N/A    Number of children: N/A    Years of education: N/A     Occupational History    Not on file.     Social History Main Topics    Smoking status: Never Smoker    Smokeless tobacco: Never Used    Alcohol use No    Drug use: No    Sexual activity: No     Other Topics Concern    Not on file     Social History Narrative    Patient is  w/ 5 children, one  from heart disease.The patient is retired.           Allergies   Review of patient's allergies indicates:  No Known Allergies          Review of Systems  See HPI      Physical Exam  BP (!) 140/62   Pulse 76   Temp 99.9 °F (37.7 °C) (Oral)   Ht 5' (1.524 m)   Wt 68.1 kg (150 lb 0.4 oz)   SpO2 96%   BMI 29.30 kg/m²     GEN: NAD, well developed, pleasant, well nourished  HEENT: NCAT, PERRLA, EOMI, sclera clear, anicteric  NECK: normal, supple with midline trachea, no LAD, no thyromegaly  LUNGS: CTAB, no w/r/r, normal effort  HEART: RRR, normal S1 and S2, no m/r/g, no palpitations, normal pulses  ABD: s/nt/nd, NABS, +NO suprapubic tenderness. NO CVA/flank TTP  : patient deferred  SKIN: warm and dry with normal turgor, no rashes, no other lesions  MSK: normal ROM of hip and knees, no TTP nor swelling noted.  Normal gait.  No Calf TTP, no palpable cords, no ankle swelling

## 2018-06-28 LAB — BACTERIA UR CULT: NORMAL

## 2018-07-02 ENCOUNTER — TELEPHONE (OUTPATIENT)
Dept: FAMILY MEDICINE | Facility: CLINIC | Age: 73
End: 2018-07-02

## 2018-07-02 DIAGNOSIS — N39.0 E. COLI UTI: Primary | ICD-10-CM

## 2018-07-02 DIAGNOSIS — B96.20 E. COLI UTI: Primary | ICD-10-CM

## 2018-07-02 RX ORDER — NITROFURANTOIN 25; 75 MG/1; MG/1
100 CAPSULE ORAL 2 TIMES DAILY
Qty: 10 CAPSULE | Refills: 0 | Status: SHIPPED | OUTPATIENT
Start: 2018-07-02 | End: 2018-07-07

## 2018-07-02 NOTE — TELEPHONE ENCOUNTER
Called to discuss with patient about need to change Abx given UCx and sensitivity. She was previously placed on Doxy, still having mild burning with urination - will switch to Macrobid. Pt also encouraged that she may take Gabapentin 100mg up to 3 tablets TID as needed for her leg nerve pain, and to f/u in 2-3 weeks if not improved.    E. coli UTI  -     nitrofurantoin, macrocrystal-monohydrate, (MACROBID) 100 MG capsule; Take 1 capsule (100 mg total) by mouth 2 (two) times daily. for 5 days  Dispense: 10 capsule; Refill: 0

## 2018-08-23 ENCOUNTER — OFFICE VISIT (OUTPATIENT)
Dept: FAMILY MEDICINE | Facility: CLINIC | Age: 73
End: 2018-08-23
Payer: MEDICARE

## 2018-08-23 VITALS
SYSTOLIC BLOOD PRESSURE: 156 MMHG | OXYGEN SATURATION: 96 % | TEMPERATURE: 98 F | HEART RATE: 88 BPM | BODY MASS INDEX: 29.44 KG/M2 | WEIGHT: 149.94 LBS | HEIGHT: 60 IN | DIASTOLIC BLOOD PRESSURE: 86 MMHG

## 2018-08-23 DIAGNOSIS — M54.42 ACUTE BILATERAL LOW BACK PAIN WITH BILATERAL SCIATICA: ICD-10-CM

## 2018-08-23 DIAGNOSIS — I10 ESSENTIAL HYPERTENSION: Chronic | ICD-10-CM

## 2018-08-23 DIAGNOSIS — M54.41 ACUTE BILATERAL LOW BACK PAIN WITH BILATERAL SCIATICA: ICD-10-CM

## 2018-08-23 DIAGNOSIS — M79.2 PAROXYSMAL NERVE PAIN: Primary | ICD-10-CM

## 2018-08-23 PROCEDURE — 99999 PR PBB SHADOW E&M-EST. PATIENT-LVL V: CPT | Mod: PBBFAC,,, | Performed by: NURSE PRACTITIONER

## 2018-08-23 PROCEDURE — 3079F DIAST BP 80-89 MM HG: CPT | Mod: CPTII,S$GLB,, | Performed by: NURSE PRACTITIONER

## 2018-08-23 PROCEDURE — 99214 OFFICE O/P EST MOD 30 MIN: CPT | Mod: S$GLB,,, | Performed by: NURSE PRACTITIONER

## 2018-08-23 PROCEDURE — 3077F SYST BP >= 140 MM HG: CPT | Mod: CPTII,S$GLB,, | Performed by: NURSE PRACTITIONER

## 2018-08-23 RX ORDER — METHYLPREDNISOLONE 4 MG/1
TABLET ORAL
Qty: 1 PACKAGE | Refills: 0 | Status: SHIPPED | OUTPATIENT
Start: 2018-08-23 | End: 2018-09-13

## 2018-08-23 RX ORDER — TRAMADOL HYDROCHLORIDE 50 MG/1
50 TABLET ORAL EVERY 6 HOURS PRN
Qty: 30 TABLET | Refills: 0 | Status: SHIPPED | OUTPATIENT
Start: 2018-08-23 | End: 2018-09-02

## 2018-08-23 RX ORDER — METOPROLOL SUCCINATE 100 MG/1
100 TABLET, EXTENDED RELEASE ORAL DAILY
Qty: 90 TABLET | Refills: 1 | Status: SHIPPED | OUTPATIENT
Start: 2018-08-23 | End: 2019-06-13 | Stop reason: SDUPTHER

## 2018-08-24 NOTE — PROGRESS NOTES
Subjective:       Patient ID: Diana Herring is a 73 y.o. female.    Chief Complaint: Leg Pain and Hip Pain    Patient is a 73  year old female [facility] who presented to clinic with complaints of bilateral leg pain that she has had on/off for the past month, which she usually experiences at night whenever she twists a certain way while laying in bed. Pain is described as nerve pain with sensation of tingling, sharp shooting from the hips to her ankles. She states the sensation is different from her restless leg syndrome, which is well controlled with Requip.  She denies any calf pain, no SOB. Sometimes get ankle swelling with standing for two long that goes away with elevation.       Leg Pain    The incident occurred more than 1 week ago. The incident occurred at home. There was no injury mechanism. The pain is present in the left hip, right hip, right ankle, left leg, left ankle and right leg. The quality of the pain is described as aching. The pain is at a severity of 10/10. The pain is moderate. The pain has been constant since onset. Associated symptoms include numbness and tingling. Pertinent negatives include no inability to bear weight, loss of motion, loss of sensation or muscle weakness. She reports no foreign bodies present. The symptoms are aggravated by movement, palpation and weight bearing. She has tried nothing for the symptoms.   Hip Pain    The incident occurred more than 1 week ago. The incident occurred at home. There was no injury mechanism. The pain is present in the left leg, left ankle, right leg, right ankle, left hip and right hip. The quality of the pain is described as aching. The pain is at a severity of 10/10. The pain is moderate. The pain has been worsening since onset. Associated symptoms include numbness and tingling. Pertinent negatives include no inability to bear weight, loss of motion, loss of sensation or muscle weakness. The symptoms are aggravated by movement, palpation and  weight bearing. She has tried nothing for the symptoms.     Review of Systems   Constitutional: Negative for chills, diaphoresis, fatigue and fever.   Respiratory: Negative for chest tightness and shortness of breath.    Cardiovascular: Negative for chest pain, palpitations and leg swelling.   Musculoskeletal: Positive for arthralgias, gait problem, joint swelling and myalgias. Negative for back pain, neck pain and neck stiffness.   Skin: Negative for color change and wound.   Neurological: Positive for tingling and numbness. Negative for weakness and headaches.   Hematological: Negative for adenopathy. Does not bruise/bleed easily.       Objective:      Physical Exam   Constitutional: She is oriented to person, place, and time. Vital signs are normal. She appears well-developed and well-nourished.   Cardiovascular: Normal rate, regular rhythm and normal heart sounds.   Pulmonary/Chest: Effort normal and breath sounds normal.   Musculoskeletal:        Right hip: She exhibits decreased range of motion, tenderness and bony tenderness. She exhibits normal strength, no swelling, no crepitus, no deformity and no laceration.        Left hip: She exhibits decreased range of motion and tenderness. She exhibits normal strength, no bony tenderness, no swelling, no crepitus, no deformity and no laceration.        Legs:  Neurological: She is alert and oriented to person, place, and time.   Skin: Skin is warm, dry and intact.   Psychiatric: She has a normal mood and affect.       Assessment:       1. Paroxysmal nerve pain    2. Acute bilateral low back pain with bilateral sciatica    3. Essential hypertension        Plan:       Diana was seen today for leg pain and hip pain.    Diagnoses and all orders for this visit:    Paroxysmal nerve pain  -     traMADol (ULTRAM) 50 mg tablet; Take 1 tablet (50 mg total) by mouth every 6 (six) hours as needed.  -     Ambulatory consult to Orthopedics  -     methylPREDNISolone (MEDROL  DOSEPACK) 4 mg tablet; use as directed    Acute bilateral low back pain with bilateral sciatica  -     traMADol (ULTRAM) 50 mg tablet; Take 1 tablet (50 mg total) by mouth every 6 (six) hours as needed.  -     Ambulatory consult to Orthopedics  -     methylPREDNISolone (MEDROL DOSEPACK) 4 mg tablet; use as directed  Home care  Follow these tips when caring for yourself at home:  · You may need to stay in bed the first few days. But as soon as possible, begin sitting up or walking. This will help you avoid problems that come from staying in bed for long periods.  · When in bed, try to find a position that is comfortable. A firm mattress is best. Try lying flat on your back with pillows under your knees. You can also try lying on your side with your knees bent up toward your chest and a pillow between your knees.  · Avoid sitting for long periods. This puts more stress on your lower back than standing or walking.  · Use heat from a hot shower, hot bath, or heating pad to help ease pain. Massage can also help. You can also try using an ice pack. You can make your own ice pack by putting ice cubes in a plastic bag. Wrap the bag in a thin towel. Try both heat and cold to see which works best. Use the method that feels best for 20 minutes several times a day.  · You may use acetaminophen or ibuprofen to ease pain, unless another pain medicine was prescribed. Note: If you have chronic liver or kidney disease, talk with your healthcare provider before taking these medicines. Also talk with your provider if youve had a stomach ulcer or gastrointestinal bleeding.  · Use safe lifting methods. Dont lift anything heavier than 15 pounds until all of the pain is gone.  Follow-up care  Follow up with your healthcare provider, or as advised. You may need physical therapy or additional tests.  If X-rays were taken, a radiologist will look at them. You will be told of any new findings that may affect your care.  When to seek medical  advice  Call your healthcare provider right away if any of these occur:  · Pain gets worse even after taking prescribed medicine  · Weakness or numbness in 1 or both legs or hips  · Numbness in your groin or genital area  · You cant control your bowel or bladder  · Fever  · Redness or swelling over your back or spine   Date Last Reviewed: 8/1/2016 © 2000-2017 [employer]. [address] [place] [address], [place] [address]. All rights reserved. This information is not intended as a substitute for professional medical care. Always follow your healthcare professional's instructions.        Essential hypertension  -     metoprolol succinate (TOPROL-XL) 100 MG 24 hr tablet; Take 1 tablet (100 mg total) by mouth once daily.

## 2018-09-08 DIAGNOSIS — I10 ESSENTIAL HYPERTENSION: Chronic | ICD-10-CM

## 2018-09-10 RX ORDER — AMLODIPINE BESYLATE 10 MG/1
10 TABLET ORAL DAILY
Qty: 90 TABLET | Refills: 0 | Status: SHIPPED | OUTPATIENT
Start: 2018-09-10 | End: 2018-12-19 | Stop reason: SDUPTHER

## 2018-09-20 DIAGNOSIS — E78.5 HYPERLIPIDEMIA, UNSPECIFIED HYPERLIPIDEMIA TYPE: ICD-10-CM

## 2018-09-20 RX ORDER — ATORVASTATIN CALCIUM 40 MG/1
40 TABLET, FILM COATED ORAL DAILY
Qty: 90 TABLET | Refills: 0 | Status: SHIPPED | OUTPATIENT
Start: 2018-09-20 | End: 2019-02-22 | Stop reason: SDUPTHER

## 2018-10-19 DIAGNOSIS — M79.2 PAROXYSMAL NERVE PAIN: ICD-10-CM

## 2018-10-19 RX ORDER — GABAPENTIN 100 MG/1
CAPSULE ORAL
Qty: 90 CAPSULE | Refills: 2 | Status: SHIPPED | OUTPATIENT
Start: 2018-10-19 | End: 2019-04-24 | Stop reason: SDUPTHER

## 2018-12-18 DIAGNOSIS — K21.9 GASTROESOPHAGEAL REFLUX DISEASE WITHOUT ESOPHAGITIS: Chronic | ICD-10-CM

## 2018-12-18 DIAGNOSIS — E78.5 HYPERLIPIDEMIA, UNSPECIFIED HYPERLIPIDEMIA TYPE: ICD-10-CM

## 2018-12-18 RX ORDER — OMEPRAZOLE 20 MG/1
CAPSULE, DELAYED RELEASE ORAL
Qty: 90 CAPSULE | Refills: 1 | Status: SHIPPED | OUTPATIENT
Start: 2018-12-18 | End: 2019-07-11 | Stop reason: SDUPTHER

## 2018-12-18 RX ORDER — ATORVASTATIN CALCIUM 40 MG/1
TABLET, FILM COATED ORAL
Qty: 90 TABLET | Refills: 0 | OUTPATIENT
Start: 2018-12-18

## 2018-12-19 DIAGNOSIS — I10 ESSENTIAL HYPERTENSION: Chronic | ICD-10-CM

## 2018-12-19 RX ORDER — AMLODIPINE BESYLATE 10 MG/1
10 TABLET ORAL DAILY
Qty: 90 TABLET | Refills: 1 | Status: SHIPPED | OUTPATIENT
Start: 2018-12-19 | End: 2019-06-13 | Stop reason: SDUPTHER

## 2019-02-08 ENCOUNTER — PATIENT OUTREACH (OUTPATIENT)
Dept: ADMINISTRATIVE | Facility: HOSPITAL | Age: 74
End: 2019-02-08

## 2019-02-22 ENCOUNTER — LAB VISIT (OUTPATIENT)
Dept: LAB | Facility: HOSPITAL | Age: 74
End: 2019-02-22
Attending: FAMILY MEDICINE
Payer: MEDICARE

## 2019-02-22 ENCOUNTER — OFFICE VISIT (OUTPATIENT)
Dept: FAMILY MEDICINE | Facility: CLINIC | Age: 74
End: 2019-02-22
Payer: MEDICARE

## 2019-02-22 VITALS
RESPIRATION RATE: 18 BRPM | BODY MASS INDEX: 28.59 KG/M2 | TEMPERATURE: 98 F | SYSTOLIC BLOOD PRESSURE: 134 MMHG | DIASTOLIC BLOOD PRESSURE: 70 MMHG | WEIGHT: 145.63 LBS | OXYGEN SATURATION: 99 % | HEART RATE: 66 BPM | HEIGHT: 60 IN

## 2019-02-22 DIAGNOSIS — M54.41 CHRONIC MIDLINE LOW BACK PAIN WITH BILATERAL SCIATICA: ICD-10-CM

## 2019-02-22 DIAGNOSIS — Z23 NEED FOR DIPHTHERIA, TETANUS, PERTUSSIS, AND HIB VACCINATION: ICD-10-CM

## 2019-02-22 DIAGNOSIS — G47.00 INSOMNIA, UNSPECIFIED TYPE: ICD-10-CM

## 2019-02-22 DIAGNOSIS — K21.9 GASTROESOPHAGEAL REFLUX DISEASE WITHOUT ESOPHAGITIS: Chronic | ICD-10-CM

## 2019-02-22 DIAGNOSIS — F33.0 MILD RECURRENT MAJOR DEPRESSION: ICD-10-CM

## 2019-02-22 DIAGNOSIS — E78.5 HYPERLIPIDEMIA, UNSPECIFIED HYPERLIPIDEMIA TYPE: ICD-10-CM

## 2019-02-22 DIAGNOSIS — G89.29 CHRONIC MIDLINE LOW BACK PAIN WITH BILATERAL SCIATICA: ICD-10-CM

## 2019-02-22 DIAGNOSIS — M79.605 BILATERAL LEG PAIN: Primary | ICD-10-CM

## 2019-02-22 DIAGNOSIS — Z12.11 SCREENING FOR COLON CANCER: ICD-10-CM

## 2019-02-22 DIAGNOSIS — I10 ESSENTIAL HYPERTENSION: Chronic | ICD-10-CM

## 2019-02-22 DIAGNOSIS — M79.604 BILATERAL LEG PAIN: Primary | ICD-10-CM

## 2019-02-22 DIAGNOSIS — Z12.11 COLON CANCER SCREENING: Primary | ICD-10-CM

## 2019-02-22 DIAGNOSIS — M54.42 CHRONIC MIDLINE LOW BACK PAIN WITH BILATERAL SCIATICA: ICD-10-CM

## 2019-02-22 LAB
ALBUMIN SERPL BCP-MCNC: 4.1 G/DL
ALP SERPL-CCNC: 112 U/L
ALT SERPL W/O P-5'-P-CCNC: 23 U/L
ANION GAP SERPL CALC-SCNC: 12 MMOL/L
AST SERPL-CCNC: 19 U/L
BASOPHILS # BLD AUTO: 0.09 K/UL
BASOPHILS NFR BLD: 1.1 %
BILIRUB SERPL-MCNC: 0.8 MG/DL
BUN SERPL-MCNC: 12 MG/DL
CALCIUM SERPL-MCNC: 9.9 MG/DL
CHLORIDE SERPL-SCNC: 104 MMOL/L
CHOLEST SERPL-MCNC: 171 MG/DL
CHOLEST/HDLC SERPL: 4 {RATIO}
CO2 SERPL-SCNC: 24 MMOL/L
CREAT SERPL-MCNC: 0.9 MG/DL
DIFFERENTIAL METHOD: ABNORMAL
EOSINOPHIL # BLD AUTO: 0.2 K/UL
EOSINOPHIL NFR BLD: 2 %
ERYTHROCYTE [DISTWIDTH] IN BLOOD BY AUTOMATED COUNT: 12.8 %
EST. GFR  (AFRICAN AMERICAN): >60 ML/MIN/1.73 M^2
EST. GFR  (NON AFRICAN AMERICAN): >60 ML/MIN/1.73 M^2
GLUCOSE SERPL-MCNC: 102 MG/DL
HCT VFR BLD AUTO: 43 %
HDLC SERPL-MCNC: 43 MG/DL
HDLC SERPL: 25.1 %
HGB BLD-MCNC: 13.1 G/DL
IMM GRANULOCYTES # BLD AUTO: 0.01 K/UL
IMM GRANULOCYTES NFR BLD AUTO: 0.1 %
LDLC SERPL CALC-MCNC: 104.6 MG/DL
LYMPHOCYTES # BLD AUTO: 4 K/UL
LYMPHOCYTES NFR BLD: 50.3 %
MCH RBC QN AUTO: 29.2 PG
MCHC RBC AUTO-ENTMCNC: 30.5 G/DL
MCV RBC AUTO: 96 FL
MONOCYTES # BLD AUTO: 0.4 K/UL
MONOCYTES NFR BLD: 5.2 %
NEUTROPHILS # BLD AUTO: 3.2 K/UL
NEUTROPHILS NFR BLD: 41.3 %
NONHDLC SERPL-MCNC: 128 MG/DL
NRBC BLD-RTO: 0 /100 WBC
PLATELET # BLD AUTO: 247 K/UL
PMV BLD AUTO: 11.4 FL
POTASSIUM SERPL-SCNC: 4 MMOL/L
PROT SERPL-MCNC: 8.3 G/DL
RBC # BLD AUTO: 4.48 M/UL
SODIUM SERPL-SCNC: 140 MMOL/L
TRIGL SERPL-MCNC: 117 MG/DL
WBC # BLD AUTO: 7.87 K/UL

## 2019-02-22 PROCEDURE — 80061 LIPID PANEL: CPT

## 2019-02-22 PROCEDURE — 80053 COMPREHEN METABOLIC PANEL: CPT

## 2019-02-22 PROCEDURE — 99999 PR PBB SHADOW E&M-EST. PATIENT-LVL V: ICD-10-PCS | Mod: PBBFAC,,, | Performed by: FAMILY MEDICINE

## 2019-02-22 PROCEDURE — 36415 COLL VENOUS BLD VENIPUNCTURE: CPT | Mod: PO

## 2019-02-22 PROCEDURE — 3075F PR MOST RECENT SYSTOLIC BLOOD PRESS GE 130-139MM HG: ICD-10-PCS | Mod: CPTII,S$GLB,, | Performed by: FAMILY MEDICINE

## 2019-02-22 PROCEDURE — 3078F DIAST BP <80 MM HG: CPT | Mod: CPTII,S$GLB,, | Performed by: FAMILY MEDICINE

## 2019-02-22 PROCEDURE — 3078F PR MOST RECENT DIASTOLIC BLOOD PRESSURE < 80 MM HG: ICD-10-PCS | Mod: CPTII,S$GLB,, | Performed by: FAMILY MEDICINE

## 2019-02-22 PROCEDURE — 99214 OFFICE O/P EST MOD 30 MIN: CPT | Mod: S$GLB,,, | Performed by: FAMILY MEDICINE

## 2019-02-22 PROCEDURE — 99499 UNLISTED E&M SERVICE: CPT | Mod: S$GLB,,, | Performed by: FAMILY MEDICINE

## 2019-02-22 PROCEDURE — 3075F SYST BP GE 130 - 139MM HG: CPT | Mod: CPTII,S$GLB,, | Performed by: FAMILY MEDICINE

## 2019-02-22 PROCEDURE — 1101F PT FALLS ASSESS-DOCD LE1/YR: CPT | Mod: CPTII,S$GLB,, | Performed by: FAMILY MEDICINE

## 2019-02-22 PROCEDURE — 99999 PR PBB SHADOW E&M-EST. PATIENT-LVL V: CPT | Mod: PBBFAC,,, | Performed by: FAMILY MEDICINE

## 2019-02-22 PROCEDURE — 85025 COMPLETE CBC W/AUTO DIFF WBC: CPT

## 2019-02-22 PROCEDURE — 99214 PR OFFICE/OUTPT VISIT, EST, LEVL IV, 30-39 MIN: ICD-10-PCS | Mod: S$GLB,,, | Performed by: FAMILY MEDICINE

## 2019-02-22 PROCEDURE — 99499 RISK ADDL DX/OHS AUDIT: ICD-10-PCS | Mod: S$GLB,,, | Performed by: FAMILY MEDICINE

## 2019-02-22 PROCEDURE — 1101F PR PT FALLS ASSESS DOC 0-1 FALLS W/OUT INJ PAST YR: ICD-10-PCS | Mod: CPTII,S$GLB,, | Performed by: FAMILY MEDICINE

## 2019-02-22 RX ORDER — ATORVASTATIN CALCIUM 40 MG/1
40 TABLET, FILM COATED ORAL DAILY
Qty: 90 TABLET | Refills: 2 | Status: SHIPPED | OUTPATIENT
Start: 2019-02-22 | End: 2019-10-31 | Stop reason: SDUPTHER

## 2019-02-22 RX ORDER — TRAZODONE HYDROCHLORIDE 100 MG/1
100 TABLET ORAL NIGHTLY
Qty: 90 TABLET | Refills: 1 | Status: SHIPPED | OUTPATIENT
Start: 2019-02-22 | End: 2019-08-21 | Stop reason: SDUPTHER

## 2019-02-22 NOTE — PROGRESS NOTES
Chief Complaint   Patient presents with    Leg Pain       HPI  Diana Herring is a 74 y.o. female with multiple medical diagnoses as listed in the medical history and problem list that presents for follow-up for low back pain that radiates down the back into the legs.     She has had this for months. She has taken tylenol arthritis for this but is taking one 500mg in the morning and at night. She has taken two at a time. She was treated for this previously with a medrol dose glo and referred to ortho but has not been seen.     PAST MEDICAL HISTORY:  Past Medical History:   Diagnosis Date    Anxiety     Arthritis     Corneal abrasion s    ? eye     Depression     Full dentures     GERD (gastroesophageal reflux disease)     Hyperlipidemia     Hypertension     Nuclear sclerosis of both eyes 6/5/2017    Osteoporosis     Restless leg syndrome        PAST SURGICAL HISTORY:  Past Surgical History:   Procedure Laterality Date    COLONOSCOPY N/A 12/3/2013    Performed by [facility]bour [facility] at [facility] ENDO    HEART CATH-LEFT Left 2/23/2015    Performed by Quoc Muro MD at [facility] CATH LAB    HYSTERECTOMY      PARTIAL HYSTERECTOMY      TUBAL LIGATION Bilateral        SOCIAL HISTORY:  Social History     Socioeconomic History    Marital status:      Spouse name: Not on file    Number of children: Not on file    Years of education: Not on file    Highest education level: Not on file   Social Needs    Financial resource strain: Not on file    Food insecurity - worry: Not on file    Food insecurity - inability: Not on file    Transportation needs - medical: Not on file    Transportation needs - non-medical: Not on file   Occupational History    Not on file   Tobacco Use    Smoking status: Never Smoker    Smokeless tobacco: Never Used   Substance and Sexual Activity    Alcohol use: No    Drug use: No    Sexual activity: No   Other Topics Concern    Not on file   Social History Narrative     Patient is  w/ 5 children, one  from heart disease.The patient is retired.       FAMILY HISTORY:  Family History   Problem Relation Age of Onset    Stroke Mother     Glaucoma Mother     Cataracts Mother     Cancer Father         Lung    No Known Problems Brother     No Known Problems Son     Stroke Brother     Heart disease Son     No Known Problems Sister     No Known Problems Maternal Aunt     No Known Problems Maternal Uncle     No Known Problems Paternal Aunt     No Known Problems Paternal Uncle     No Known Problems Maternal Grandmother     No Known Problems Maternal Grandfather     No Known Problems Paternal Grandmother     No Known Problems Paternal Grandfather     Amblyopia Neg Hx     Blindness Neg Hx     Diabetes Neg Hx     Hypertension Neg Hx     Macular degeneration Neg Hx     Retinal detachment Neg Hx     Strabismus Neg Hx     Thyroid disease Neg Hx        ALLERGIES AND MEDICATIONS: updated and reviewed.  Review of patient's allergies indicates:  No Known Allergies  Current Outpatient Medications   Medication Sig Dispense Refill    alendronate (FOSAMAX) 70 MG tablet Take 1 tablet (70 mg total) by mouth every 7 days. 12 tablet 3    amLODIPine (NORVASC) 10 MG tablet TAKE 1 TABLET (10 MG TOTAL) BY MOUTH ONCE DAILY. 90 tablet 1    atorvastatin (LIPITOR) 40 MG tablet Take 1 tablet (40 mg total) by mouth once daily. 90 tablet 2    calcium-vitamin D 500-125 mg-unit tablet Take 1 tablet by mouth once daily.        fluticasone (FLONASE) 50 mcg/actuation nasal spray 2 sprays (100 mcg total) by Each Nare route once daily. 16 g 0    gabapentin (NEURONTIN) 100 MG capsule TAKE 1 CAPSULE BY MOUTH THREE TIMES A DAY 90 capsule 2    levocetirizine (XYZAL) 5 MG tablet Take 1 tablet (5 mg total) by mouth every evening. 30 tablet 5    metoprolol succinate (TOPROL-XL) 100 MG 24 hr tablet Take 1 tablet (100 mg total) by mouth once daily. 90 tablet 1    multivitamin  (MULTIVITAMIN) per tablet Take 1 tablet by mouth once daily.        omeprazole (PRILOSEC) 20 MG capsule TAKE 1 CAPSULE BY MOUTH EVERY DAY 90 capsule 1    traZODone (DESYREL) 100 MG tablet Take 1 tablet (100 mg total) by mouth every evening. 90 tablet 1    DIPH,PERTUSS,ACEL,,TET VAC,PF,, ADULT, (ADACEL) 2 Lf-(2.5-5-3-5 mcg)-5Lf/0.5 mL injection Inject 0.5 mLs into the muscle once. for 1 dose 0.5 mL 0    paroxetine (PAXIL) 20 MG tablet Take 1 tablet (20 mg total) by mouth every morning. 90 tablet 1    rOPINIRole (REQUIP) 0.5 MG tablet Take 1 tablet (0.5 mg total) by mouth every evening. 90 tablet 1    simethicone (MYLICON) 80 MG chewable tablet Take 1 tablet (80 mg total) by mouth every 6 (six) hours as needed for Flatulence. 60 tablet 2     No current facility-administered medications for this visit.        ROS  Review of Systems   Constitutional: Negative for chills, diaphoresis, fatigue, fever and unexpected weight change.   HENT: Negative for rhinorrhea, sinus pressure, sore throat and tinnitus.    Eyes: Negative for photophobia and visual disturbance.   Respiratory: Negative for cough, shortness of breath and wheezing.    Cardiovascular: Negative for chest pain and palpitations.   Gastrointestinal: Negative for abdominal pain, blood in stool, constipation, diarrhea, nausea and vomiting.   Genitourinary: Negative for dysuria, flank pain, frequency and vaginal discharge.   Musculoskeletal: Positive for arthralgias. Negative for joint swelling.   Skin: Negative for rash.   Neurological: Negative for speech difficulty, weakness, light-headedness and headaches.   Psychiatric/Behavioral: Negative for behavioral problems and dysphoric mood.       Physical Exam  Vitals:    02/22/19 0922   BP: 134/70   Pulse: 66   Resp: 18   Temp: 98.1 °F (36.7 °C)   TempSrc: Oral   SpO2: 99%   Weight: 66 kg (145 lb 9.8 oz)   Height: 5' (1.524 m)    Body mass index is 28.44 kg/m².  Weight: 66 kg (145 lb 9.8 oz)   Height: 5' (152.4  cm)     Physical Exam   Constitutional: She is oriented to person, place, and time. She appears well-developed and well-nourished. No distress.   HENT:   Head: Normocephalic and atraumatic.   Eyes: EOM are normal.   Neck: Neck supple.   Cardiovascular: Normal rate and regular rhythm. Exam reveals no gallop and no friction rub.   No murmur heard.  Pulmonary/Chest: Effort normal and breath sounds normal. No respiratory distress. She has no wheezes. She has no rales.   Musculoskeletal:        Arms:  Negative straight leg raise bilaterally   Lymphadenopathy:     She has no cervical adenopathy.   Neurological: She is alert and oriented to person, place, and time.   Skin: Skin is warm and dry. No rash noted.   Psychiatric: She has a normal mood and affect. Her behavior is normal.   Nursing note and vitals reviewed.      Health Maintenance       Date Due Completion Date    Colonoscopy 12/03/2018 12/3/2013    TETANUS VACCINE 08/31/2034 (Originally 1/4/1963) ---    Mammogram 07/17/2019 7/17/2017    DEXA SCAN 11/02/2020 11/2/2017    Lipid Panel 03/16/2023 3/16/2018            ASSESSMENT     1. Bilateral leg pain    2. Hyperlipidemia, unspecified hyperlipidemia type    3. Insomnia, unspecified type    4. Mild recurrent major depression    5. Chronic midline low back pain with bilateral sciatica    6. Screening for colon cancer    7. Essential hypertension    8. Gastroesophageal reflux disease without esophagitis    9. Need for diphtheria, tetanus, pertussis, and Hib vaccination        PLAN:     Problem List Items Addressed This Visit        Psychiatric    Mild recurrent major depression (Chronic)    Overview     Stable on paxil            Cardiac/Vascular    Essential hypertension (Chronic)    Overview     On metoprolol and amlodipine         Relevant Orders    CBC auto differential    Comprehensive metabolic panel       GI    GERD (gastroesophageal reflux disease) (Chronic)  -stable on current regimen       Orthopedic     Bilateral leg pain - Primary  -recommend this is from her low back pain    Relevant Orders    Ambulatory consult to Physical Therapy       Other    Insomnia    Overview     Stable on trazodone         Relevant Medications    traZODone (DESYREL) 100 MG tablet      Other Visit Diagnoses     Hyperlipidemia, unspecified hyperlipidemia type      -continue statin    Relevant Medications    atorvastatin (LIPITOR) 40 MG tablet    Other Relevant Orders    Lipid panel    Chronic midline low back pain with bilateral sciatica      -will consult PT to eval and treat  -recommend she take tylenol two pills twice a day    Relevant Orders    Ambulatory consult to Physical Therapy    Screening for colon cancer      -as ordered       Relevant Orders    Case request GI: COLONOSCOPY (Completed)    Need for diphtheria, tetanus, pertussis, and Hib vaccination      -as ordered       Relevant Medications    DIPH,PERTUSS,ACEL,,TET VAC,PF,, ADULT, (ADACEL) 2 Lf-(2.5-5-3-5 mcg)-5Lf/0.5 mL injection            Alessandra Brown MD  02/22/2019 10:11 AM        Follow-up in about 6 weeks (around 4/5/2019).

## 2019-03-15 ENCOUNTER — CLINICAL SUPPORT (OUTPATIENT)
Dept: REHABILITATION | Facility: HOSPITAL | Age: 74
End: 2019-03-15
Attending: FAMILY MEDICINE
Payer: MEDICARE

## 2019-03-15 DIAGNOSIS — M54.42 CHRONIC MIDLINE LOW BACK PAIN WITH BILATERAL SCIATICA: ICD-10-CM

## 2019-03-15 DIAGNOSIS — M79.605 BILATERAL LEG PAIN: Primary | ICD-10-CM

## 2019-03-15 DIAGNOSIS — M79.604 BILATERAL LEG PAIN: Primary | ICD-10-CM

## 2019-03-15 DIAGNOSIS — M62.81 MUSCLE WEAKNESS: ICD-10-CM

## 2019-03-15 DIAGNOSIS — G89.29 CHRONIC MIDLINE LOW BACK PAIN WITH BILATERAL SCIATICA: ICD-10-CM

## 2019-03-15 DIAGNOSIS — M54.41 CHRONIC MIDLINE LOW BACK PAIN WITH BILATERAL SCIATICA: ICD-10-CM

## 2019-03-15 PROCEDURE — 97110 THERAPEUTIC EXERCISES: CPT | Mod: PN

## 2019-03-15 PROCEDURE — 97161 PT EVAL LOW COMPLEX 20 MIN: CPT | Mod: PN

## 2019-03-15 PROCEDURE — G8978 MOBILITY CURRENT STATUS: HCPCS | Mod: CK,PN

## 2019-03-15 PROCEDURE — G8979 MOBILITY GOAL STATUS: HCPCS | Mod: CJ,PN

## 2019-03-15 PROCEDURE — 97140 MANUAL THERAPY 1/> REGIONS: CPT | Mod: PN

## 2019-03-15 NOTE — PLAN OF CARE
[facility]  Physical Therapy Initial Evaluation    Name: Diana Herring  Clinic Number: [id number]    Therapy Diagnosis:   Encounter Diagnoses   Name Primary?    Bilateral leg pain Yes    Muscle weakness     Chronic midline low back pain with bilateral sciatica      Physician: Alessandra Brown MD    Physician Orders: PT Eval and Treat   Medical Diagnosis from Referral:  Chronic low back pain  Evaluation Date: 3/15/2019  Plan of Care Expiration:6/15/2019  Priority Start Date Expiration Date Referral Entered By   Routine 03/15/2019 05/15/2019 Alessandra Brown MD   Visits Requested Visits Authorized Visits Completed Visits Scheduled   1 12 1 0     Time In: 2:30  Time Out: 3:30    Total Billable Time: 60 minutes    Precautions: osteoporosis, standing    Subjective   Date of onset: 10 years    History of current condition:   Diana  is a 74 year old female presenting with c/o low back pain with bilateral lower extremity radiculopathy.  She reports an insidious onset 10 years that is worsening. She states she has exacerbations every 2-3 months. She reports the most recent exacerbation a month ago of unknown stating she woke up and couldn't get up.  She reports injections many years with moderate relief.  She reports a fall last year due to slipping. She denies the use of a cane or a walker.      Medical History:   Past Medical History:   Diagnosis Date    Anxiety     Arthritis     Corneal abrasion s    ? eye     Depression     Full dentures     GERD (gastroesophageal reflux disease)     Hyperlipidemia     Hypertension     Nuclear sclerosis of both eyes 6/5/2017    Osteoporosis     Restless leg syndrome        Surgical History:   Diana Herring  has a past surgical history that includes Partial hysterectomy; Tubal ligation (Bilateral); and Hysterectomy.    Medications:   Diana has a current medication list which includes the following prescription(s): alendronate, amlodipine,  atorvastatin, calcium-vitamin d, fluticasone, gabapentin, levocetirizine, metoprolol succinate, multivitamin, omeprazole, paroxetine, ropinirole, simethicone, and trazodone.    Allergies:   Review of patient's allergies indicates:  No Known Allergies     Imaging: recent x-ray last year: FINDINGS:     Mild diffuse osteopenia suggestive. Vertebral body heights are maintained.  No evidence of fracture.  Normal sagittal alignment is preserved.    Modest degenerative changes.  Intervertebral disk heights are well maintained.     The visualized bowel gas pattern is within normal limits.    Prior Therapy: 5 years ago, leg pain.   Social History:  lives with their spouse, 2 story home  Occupation: retires  Prior Level of Function: independent  Current Level of Function: independent    Pain:  Current 5/10, worst 9/10, best 3/10   Location:  Lumbar, bilateral legs    Aggravating Factors: transitional movements, bending, sweeping, household chores, prolonged standing, walking   Easing Factors: lying down    Pts goals: Her goal is to decrease pain.  She would also like to walk as needed and shop with her daughters and attended [employer] without difficulty.       Objective     Observation:  Pt ambulates with a guarded gait.  Decreased lumbopelvic mobility .stands with flexed posture, with right lateral shift, self corrected with prolonged standing   Palpation: mild to moderate tenderness on palpation of L2-S1 paraspinals, sp's, bilateral piriformis and QL's.       Range of Motion/Strength:      Lumbar AROM: Degrees   Flexion 50   Extension 5   Right side bending 12   Left side bending 12   Lumbar quadrant reveals: increase in discomfort in planes    AROM: Bilateral LE: Grossly WFL  MMT:  Right LE: 4   Left LE: 4-  Abdominal Strength: 3+    Mobility: L/S p/a grade 1  Flexibility: hip flexor length: fair      Hamstring Length:poor    Bed Mobility:Independent, modified(with difficulty  Transfers: Independent, modified (with  difficulty)    Special Tests:   -LLD:negative  -Slump: Negative  -SLR: negative  -Hip scour: negative  -Maricruz's: Negative  -SIJ gapping and compression: Negative    CMS Impairment/Limitation/Restriction for FOTO Lumbar Spine Survey  Status Limitation G-Code CMS Severity Modifier  Intake 60% 40% Current Status CK - At least 40 percent but less than 60 percent  Predicted 69% 31% Goal Status+ CJ - At least 20 percent but less than 40 percent    TREATMENT     Treatment Time In:  3:00  Treatment Time Out: 3:40    Total Treatment time separate from Evaluation: 30 minutes    Diana received therapeutic exercises to develop strength, endurance, ROM, flexibility, posture and core stabilization for 10 minutes including:   -LTR x 20  -piriformis stretch 20 sec x 4  -pelvic tilts x 20    Diana received the following manual therapy techniques:  were applied to the: lumbar spine for 10 minutes, including:  -lumbar distraction in hooklying with belt      Education provided:   - compliance with home program    Written Home Exercises Provided: yes.    Exercises were reviewed and Diana was able to demonstrate them prior to the end of the session.  Diana demonstrated good  understanding of the education provided.     See EMR under Patient Instructions for exercises provided 3/15/2019.    Assessment     Pt presents with signs and symptoms consistent with referring diagnosis. Evaluation has determined a decrease in functional status and subjective and objective deficits that can be addressed by physical therapy intervention. Pt demonstrates pain limiting functional activities. Decreased flexibility and strength limiting normal movement patterns. Decreased segmental motion. Decreased postural strength and awareness. Positive special testing. Decreased participation in functional and recreational activities. Subjective and objective measures are addressed by goals in the plan of care.  Patient/family are involved in the development of  these goals. Patient/family are educated about current injury and treatment.       Plan of care was dicussed with patient. Pt will benefit from skilled outpatient Physical Therapy to address the deficits stated above and in the chart below, provide pt/family education, and to maximize pt's level of independence. Pt's spiritual, cultural and educational needs considered and patient is agreeable to the plan of care and goals as stated below:     Pt prognosis is Good.  Anticipated Barriers for therapy: none    Medical Necessity is demonstrated by the following  History  Co-morbidities and personal factors that may impact the plan of care Co-morbidities:   Anxiety   Arthritis   Corneal abrasion   ? eye    Depression   Full dentures   GERD (gastroesophageal reflux disease)   Hyperlipidemia   Hypertension   Nuclear sclerosis of both eyes   Osteoporosis   Restless leg syndrome       Personal Factors:   age     low   Examination  Body Structures and Functions, activity limitations and participation restrictions that may impact the plan of care Body Regions:   back  lower extremities    Body Systems:    ROM  strength  gait  transitions    Participation Restrictions:   [employer], shopping, household chores    Activity limitations:   Learning and applying knowledge  no deficits    General Tasks and Commands  no deficits    Communication  no deficits    Mobility  lifting and carrying objects  walking    Self care  washing oneself (bathing, drying, washing hands)  dressing    Domestic Life  shopping  cooking  doing house work (cleaning house, washing dishes, laundry)    Interactions/Relationships  no deficits    Life Areas  no deficits    Community and Social Life  community life  recreation and leisure         low   Clinical Presentation stable and uncomplicated low   Decision Making/ Complexity Score: low     Goals:    Short Term Goals (4 Weeks):     1.Pt to increase strength by a 1/2 grade of muscles test to allow for  improvement in functional activities such as performing chores.  2.Pt to improve range of motion by 25% to allow for improved functional mobility to allow for improvement in IADLs.   3.Pt to report compliance with HEP and demonstrate proper exercise technique to PT to show competence with self management of condition.  4.Decrease pain by 25% during functional activities.    Long Term Goals (12 Weeks):     1. Increase ROM to allow improved joint biomechanics during functional activities.   2.Increase trunk and lower extremity strength to within normal limits during functional activities.   3. Independent with home exercise program.   4. Full return to functional activities with manageable complaints.  5. Patient to demonstrate improved posture and body mechanics.  6. Decrease pain by 75% during functional activities.    Plan     Plan of care Certification: 3/15/2019 to 6/15/19.    Recommended Treatment Plan: 2 times per week for 12 weeks with treatments to consist of:  Neuromuscular and postural re-education,  training, therapeutic exercise, therapeutic activities,balance training, gait training, manual therapy, soft tissue mobilization, ROM exercises, Cardiovascular,  Postural stabilization, manual traction, spinal mobilization, moist heat, cryotherapy, electrical stimulation, ultrasound, home exercise education and planning.    Tex Esteban, PT

## 2019-03-28 ENCOUNTER — DOCUMENTATION ONLY (OUTPATIENT)
Dept: REHABILITATION | Facility: HOSPITAL | Age: 74
End: 2019-03-28

## 2019-03-28 NOTE — PROGRESS NOTES
Physical Therapy    Patient did not show up for today's therapy session. This was first therapy session after initial evaluation. Next scheduled session 4/3/19    No Show: 1    Cancel: 0

## 2019-04-04 ENCOUNTER — OFFICE VISIT (OUTPATIENT)
Dept: FAMILY MEDICINE | Facility: CLINIC | Age: 74
End: 2019-04-04
Payer: MEDICARE

## 2019-04-04 VITALS
WEIGHT: 149.69 LBS | TEMPERATURE: 98 F | DIASTOLIC BLOOD PRESSURE: 84 MMHG | OXYGEN SATURATION: 96 % | BODY MASS INDEX: 29.39 KG/M2 | SYSTOLIC BLOOD PRESSURE: 134 MMHG | HEART RATE: 75 BPM | HEIGHT: 60 IN

## 2019-04-04 DIAGNOSIS — M81.0 OSTEOPOROSIS, UNSPECIFIED OSTEOPOROSIS TYPE, UNSPECIFIED PATHOLOGICAL FRACTURE PRESENCE: ICD-10-CM

## 2019-04-04 DIAGNOSIS — M25.552 BILATERAL HIP PAIN: ICD-10-CM

## 2019-04-04 DIAGNOSIS — G89.29 CHRONIC MIDLINE LOW BACK PAIN WITH BILATERAL SCIATICA: Primary | ICD-10-CM

## 2019-04-04 DIAGNOSIS — M81.0 AGE-RELATED OSTEOPOROSIS WITHOUT CURRENT PATHOLOGICAL FRACTURE: Chronic | ICD-10-CM

## 2019-04-04 DIAGNOSIS — M54.41 CHRONIC MIDLINE LOW BACK PAIN WITH BILATERAL SCIATICA: Primary | ICD-10-CM

## 2019-04-04 DIAGNOSIS — M79.604 BILATERAL LEG PAIN: ICD-10-CM

## 2019-04-04 DIAGNOSIS — M79.605 BILATERAL LEG PAIN: ICD-10-CM

## 2019-04-04 DIAGNOSIS — M25.551 BILATERAL HIP PAIN: ICD-10-CM

## 2019-04-04 DIAGNOSIS — M54.42 CHRONIC MIDLINE LOW BACK PAIN WITH BILATERAL SCIATICA: Primary | ICD-10-CM

## 2019-04-04 PROCEDURE — 3079F DIAST BP 80-89 MM HG: CPT | Mod: CPTII,S$GLB,, | Performed by: NURSE PRACTITIONER

## 2019-04-04 PROCEDURE — 99999 PR PBB SHADOW E&M-EST. PATIENT-LVL IV: CPT | Mod: PBBFAC,,, | Performed by: NURSE PRACTITIONER

## 2019-04-04 PROCEDURE — 3075F PR MOST RECENT SYSTOLIC BLOOD PRESS GE 130-139MM HG: ICD-10-PCS | Mod: CPTII,S$GLB,, | Performed by: NURSE PRACTITIONER

## 2019-04-04 PROCEDURE — 99999 PR PBB SHADOW E&M-EST. PATIENT-LVL IV: ICD-10-PCS | Mod: PBBFAC,,, | Performed by: NURSE PRACTITIONER

## 2019-04-04 PROCEDURE — 99214 OFFICE O/P EST MOD 30 MIN: CPT | Mod: S$GLB,,, | Performed by: NURSE PRACTITIONER

## 2019-04-04 PROCEDURE — 99214 PR OFFICE/OUTPT VISIT, EST, LEVL IV, 30-39 MIN: ICD-10-PCS | Mod: S$GLB,,, | Performed by: NURSE PRACTITIONER

## 2019-04-04 PROCEDURE — 3079F PR MOST RECENT DIASTOLIC BLOOD PRESSURE 80-89 MM HG: ICD-10-PCS | Mod: CPTII,S$GLB,, | Performed by: NURSE PRACTITIONER

## 2019-04-04 PROCEDURE — 1101F PR PT FALLS ASSESS DOC 0-1 FALLS W/OUT INJ PAST YR: ICD-10-PCS | Mod: CPTII,S$GLB,, | Performed by: NURSE PRACTITIONER

## 2019-04-04 PROCEDURE — 3075F SYST BP GE 130 - 139MM HG: CPT | Mod: CPTII,S$GLB,, | Performed by: NURSE PRACTITIONER

## 2019-04-04 PROCEDURE — 1101F PT FALLS ASSESS-DOCD LE1/YR: CPT | Mod: CPTII,S$GLB,, | Performed by: NURSE PRACTITIONER

## 2019-04-04 RX ORDER — POLYETHYLENE GLYCOL-3350 AND ELECTROLYTES WITH FLAVOR PACK 240; 5.84; 2.98; 6.72; 22.72 G/278.26G; G/278.26G; G/278.26G; G/278.26G; G/278.26G
POWDER, FOR SOLUTION ORAL
Refills: 0 | COMMUNITY
Start: 2019-02-22 | End: 2019-10-31 | Stop reason: SDUPTHER

## 2019-04-04 RX ORDER — ALENDRONATE SODIUM 70 MG/1
70 TABLET ORAL
Qty: 12 TABLET | Refills: 0 | Status: SHIPPED | OUTPATIENT
Start: 2019-04-04 | End: 2019-06-27 | Stop reason: SDUPTHER

## 2019-04-04 NOTE — ASSESSMENT & PLAN NOTE
Patient reports she has not taken medication in over a year. Did not know what medication was for.

## 2019-04-04 NOTE — PROGRESS NOTES
Subjective:       Patient ID: Diana Herring is a 74 y.o. female.    Chief Complaint: Leg Pain (pt states follow up for bilateral leg and hip pain.)    Leg Pain    Incident onset: reports that this has been going on for more than a year. She canceled physical therapy twice. Has only gone once.  There was no injury mechanism. The pain is present in the right leg, left leg, right hip and left hip (Lower back). The quality of the pain is described as aching. The pain is at a severity of 5/10. The pain has been constant since onset. Pertinent negatives include no inability to bear weight, numbness or tingling. The symptoms are aggravated by movement. Treatments tried: tylenol and gabapentin.       Past Medical History:   Diagnosis Date    Anxiety     Arthritis     Corneal abrasion s    ? eye     Depression     Full dentures     GERD (gastroesophageal reflux disease)     Hyperlipidemia     Hypertension     Nuclear sclerosis of both eyes 6/5/2017    Osteoporosis     Restless leg syndrome        Social History     Socioeconomic History    Marital status:      Spouse name: Not on file    Number of children: Not on file    Years of education: Not on file    Highest education level: Not on file   Occupational History    Not on file   Social Needs    Financial resource strain: Not on file    Food insecurity:     Worry: Not on file     Inability: Not on file    Transportation needs:     Medical: Not on file     Non-medical: Not on file   Tobacco Use    Smoking status: Never Smoker    Smokeless tobacco: Never Used   Substance and Sexual Activity    Alcohol use: No    Drug use: No    Sexual activity: Never   Lifestyle    Physical activity:     Days per week: Not on file     Minutes per session: Not on file    Stress: Not on file   Relationships    Social connections:     Talks on phone: Not on file     Gets together: Not on file     Attends [employer] service: Not on file     Active member of  club or organization: Not on file     Attends meetings of clubs or organizations: Not on file     Relationship status: Not on file   Other Topics Concern    Not on file   Social History Narrative    Patient is  w/ 5 children, one  from heart disease.The patient is retired.       Past Surgical History:   Procedure Laterality Date    COLONOSCOPY N/A 12/3/2013    [facility]formed [facility]O    HEART CATH-LEFT Left 2015    Performed by Quoc [facility]uro [facility] at [facility] CATH LAB    HYSTERECTOMY      PARTIAL HYSTERECTOMY      TUBAL LIGATION Bilateral        Review of Systems   Musculoskeletal: Positive for arthralgias and back pain. Negative for joint swelling.   Neurological: Negative for tingling and numbness.       Objective:   /84 (BP Location: Right arm, Patient Position: Sitting, BP Method: Medium (Manual))   Pulse 75   Temp 97.5 °F (36.4 °C) (Oral)   Ht 5' (1.524 m)   Wt 67.9 kg (149 lb 11.1 oz)   SpO2 96%   BMI 29.23 kg/m²      Physical Exam   Constitutional: She is oriented to person, place, and time. She appears well-developed and well-nourished.   HENT:   Head: Normocephalic and atraumatic.   Cardiovascular: Normal rate, regular rhythm and normal heart sounds.   Pulmonary/Chest: Effort normal and breath sounds normal.   Musculoskeletal:        Right hip: She exhibits decreased range of motion and bony tenderness.        Left hip: She exhibits decreased range of motion and bony tenderness.        Lumbar back: She exhibits decreased range of motion, bony tenderness and pain. She exhibits no swelling and no edema.   Neurological: She is alert and oriented to person, place, and time.   Skin: She is not diaphoretic. No pallor.   Psychiatric: She has a normal mood and affect. Her speech is normal and behavior is normal.       Assessment:       1. Chronic midline low back pain with bilateral sciatica    2. Age-related osteoporosis without current pathological fracture     3. Bilateral hip pain    4. Bilateral leg pain    5. Osteoporosis, unspecified osteoporosis type, unspecified pathological fracture presence        Plan:       Diana was seen today for leg pain.    Diagnoses and all orders for this visit:    Chronic midline low back pain with bilateral sciatica    Age-related osteoporosis without current pathological fracture  -     alendronate (FOSAMAX) 70 MG tablet; Take 1 tablet (70 mg total) by mouth every 7 days.    Bilateral hip pain    Bilateral leg pain    Will refill her fosamax as she has not taken it in over a year. Encouraged to take medication. Encouraged to reschedule appt wit PT as this will help with back pain. She is to continue taking Tylenol. Discussed Bone density with patient.     Follow up if symptoms worsen or fail to improve.

## 2019-04-04 NOTE — PATIENT INSTRUCTIONS
Self-Care for Low Back Pain    Most people have low back pain now and then. In many cases, it isnt serious and self-care can help. Sometimes low back pain can be a sign of a bigger problem. Call your healthcare provider if your pain returns often or gets worse over time. For the long-term care of your back, get regular exercise, lose any excess weight and learn good posture.  Take a short rest  Lying down during the day may be beneficial for short periods of time if severe pain increases with sitting or standing. Long-term bed rest could be detrimental.  Reduce pain and swelling  Cold reduces swelling. Both cold and heat can reduce pain. Protect your skin by placing a towel between your body and the ice or heat source.  · For the first few days, apply an ice pack for 15 to 20 minutes .  · After the first few days, try heat for 15 minutes at a time to ease pain. Never sleep on a heating pad.  · Over-the-counter medicine can help control pain and swelling. Try aspirin or ibuprofen.  Exercise  Exercise can help your back heal. It also helps your back get stronger and more flexible, preventing any reinjury. Ask your healthcare provider about specific exercises for your back.  Use good posture to avoid reinjury  · When moving, bend at the hips and knees. Dont bend at the waist or twist around.  · When lifting, keep the object close to your body. Dont try to lift more than you can handle.  · When sitting, keep your lower back supported. Use a rolled-up towel as needed.  Seek immediate medical care if:  · Youre unable to stand or walk.  · You have a temperature over 100.4°F (38.0°C)  · You have frequent, painful, or bloody urination.  · You have severe abdominal pain.  · You have a sharp, stabbing pain.  · Your pain is constant.  · You have pain or numbness in your leg.  · You feel pain in a new area of your back.  · You notice that the pain isnt decreasing after more than a week.   Date Last Reviewed: 9/29/2015  ©  4852-0092 [employer]. [address] [place] [address], [place] [address]. All rights reserved. This information is not intended as a substitute for professional medical care. Always follow your healthcare professional's instructions.        Back Pain (Acute or Chronic)    Back pain is one of the most common problems. The good news is that most people feel better in 1 to 2 weeks, and most of the rest in 1 to 2 months. Most people can remain active.  People experience and describe pain differently; not everyone is the same.  · The pain can be sharp, stabbing, shooting, aching, cramping or burning.  · Movement, standing, bending, lifting, sitting, or walking may worsen pain.  · It can be localized to one spot or area, or it can be more generalized.  · It can spread or radiate upwards, to the front, or go down your arms or legs (sciatica).  · It can cause muscle spasm.  Most of the time, mechanical problems with the muscles or spine cause the pain. Mechanical problems are usually caused by an injury to the muscles or ligaments. While illness can cause back pain, it is usually not caused by a serious illness. Mechanical problems include:   · Physical activity such as sports, exercise, work, or normal activity  · Overexertion, lifting, pushing, pulling incorrectly or too aggressively  · Sudden twisting, bending, or stretching from an accident, or accidental movement  · Poor posture  · Stretching or moving wrong, without noticing pain at the time  · Poor coordination, lack of regular exercise (check with your doctor about this)  · Spinal disc disease or arthritis  · Stress  Pain can also be related to pregnancy, or illness like appendicitis, bladder or kidney infections, pelvic infections, and many other things.  Acute back pain usually gets better in 1 to 2 weeks. Back pain related to disk disease, arthritis in the spinal joints or spinal stenosis (narrowing of the spinal canal) can become chronic and last for  months or years.  Unless you had a physical injury (for example, a car accident or fall) X-rays are usually not needed for the initial evaluation of back pain. If pain continues and does not respond to medical treatment, X-rays and other tests may be needed.  Home care  Try these home care recommendations:  · When in bed, try to find a position of comfort. A firm mattress is best. Try lying flat on your back with pillows under your knees. You can also try lying on your side with your knees bent up towards your chest and a pillow between your knees.  · At first, do not try to stretch out the sore spots. If there is a strain, it is not like the good soreness you get after exercising without an injury. In this case, stretching may make it worse.  · Avoid prolong sitting, long car rides, or travel. This puts more stress on the lower back than standing or walking.  · During the first 24 to 72 hours after an acute injury or flare up of chronic back pain, apply an ice pack to the painful area for 20 minutes and then remove it for 20 minutes. Do this over a period of 60 to 90 minutes or several times a day. This will reduce swelling and pain. Wrap the ice pack in a thin towel or plastic to protect your skin.  · You can start with ice, then switch to heat. Heat (hot shower, hot bath, or heating pad) reduces pain and works well for muscle spasms. Heat can be applied to the painful area for 20 minutes then remove it for 20 minutes. Do this over a period of 60 to 90 minutes or several times a day. Do not sleep on a heating pad. It can lead to skin burns or tissue damage.  · You can alternate ice and heat therapy. Talk with your doctor about the best treatment for your back pain.  · Therapeutic massage can help relax the back muscles without stretching them.  · Be aware of safe lifting methods and do not lift anything without stretching first.  Medicines  Talk to your doctor before using medicine, especially if you have other  medical problems or are taking other medicines.  · You may use over-the-counter medicine as directed on the bottle to control pain, unless another pain medicine was prescribed. If you have chronic conditions like diabetes, liver or kidney disease, stomach ulcers, or gastrointestinal bleeding, or are taking blood thinners, talk to your doctor before taking any medicine.  · Be careful if you are given a prescription medicines, narcotics, or medicine for muscle spasms. They can cause drowsiness, affect your coordination, reflexes, and judgement. Do not drive or operate heavy machinery.  Follow-up care  Follow up with your healthcare provider, or as advised.   A radiologist will review any X-rays that were taken. Your provide will notify you of any new findings that may affect your care.  Call 911  Call emergency services if any of the following occur:  · Trouble breathing  · Confusion  · Very drowsy or trouble awakening  · Fainting or loss of consciousness  · Rapid or very slow heart rate  · Loss of bowel or bladder control  When to seek medical advice  Call your healthcare provider right away if any of these occur:   · Pain becomes worse or spreads to your legs  · Weakness or numbness in one or both legs  · Numbness in the groin or genital area  Date Last Reviewed: 7/1/2016  © 2453-9128 [employer]. [address] [place] [address], [place] [id number]. All rights reserved. This information is not intended as a substitute for professional medical care. Always follow your healthcare professional's instructions.        What Is Osteoporosis?  Osteoporosis is a disease that weakens the bones. Weakened bones are more likely to fracture (break). Osteoporosis affects men and women, but postmenopausal women are most at risk. To help prevent osteoporosis, you need to exercise and nourish your bones throughout your life.    Childhood  The body builds the most bone during these years. That's why boys and girls need foods rich  in calcium. They also need plenty of exercise. A healthy diet and exercise helps bones grow strong.  Young adulthood to age 30  During young adulthood, bones become their strongest. This is called peak bone mass. The same good habits that kept bones healthy in childhood help keep bone healthy in adulthood.  Age 30 to menopause  Bone mass declines slightly during these years. Your body makes just enough new bone to maintain peak bone mass. To keep your bones at their peak mass, be sure to exercise and get plenty of calcium.  After menopause  Menopause is when a woman stops having monthly periods. After menopause, the body makes less estrogen (female hormone). This increases bone loss. At this point, treatment may be needed to reduce the risk for fracture. Exercise and calcium can also help keep your bones strong.  Later in life  In later years, both men and women need to take extra care of their bones. By this point, the body loses more bone than it makes. If too much bone is lost, you may be at risk for fractures. With age, the quality and quantity of bone declines. You can lessen bone loss by staying active and increasing your calcium intake. Calcium supplements and other osteoporosis treatments do have risks, so talk to your healthcare provider if you have concerns. If you have osteoporosis, you can also learn ways to increase everyday safety.  Date Last Reviewed: 10/17/2015  ©2007 [employer]. [address] [place] [address], [place] [address]. All Rights reserved. This information is not intended as a substitute for professional medical care. Always follow your healthcare providers instructions.        Preventing Osteoporosis: Avoiding Bone Loss  Certain factors can speed up bone loss or decrease bone growth. For example, alcohol, cigarettes, and certain medicines reduce bone mass. Some foods make it hard for your body to absorb calcium.    Things to avoid  Here are things to avoid to help prevent  osteoporosis:  · Alcohol is toxic to bones. It is a major cause of bone loss. Heavy drinking can cause osteoporosis even if you have no other risk factors.  · Smoking reduces bone mass. Smoking may also interfere with estrogen levels and cause early menopause.  · Inactivity makes your bones lose strength and become thinner. Over time, thin bones may break. Women who aren't active are at a high risk for osteoporosis.  · Certain medicines, such as cortisone, increase bone loss. They also decrease bone growth. Ask your healthcare provider about any side effects of your medicines, and how to prevent them.  · Protein-rich or salty foods eaten in large amounts may deplete calcium.  · Caffeine increases calcium loss. People who drink a lot of coffee, tea, or deanna lose more calcium than those who don't.  Date Last Reviewed: 10/17/2015  © 1502-8915 [employer]. [address] [place] [address]. All rights reserved. This information is not intended as a substitute for professional medical care. Always follow your healthcare professional's instructions.        Osteoporosis: Understanding Bone Loss     A balanced system keeps building and resorbing bone.   The body has a natural system for maintaining bone. Understanding this system can help you learn how to maintain your bones.  A balanced system supports the body  The body is always losing (resorbing) and making bone. This process is called remodeling. Bone-resorbing cells take bone apart. They do this so the minerals can be used to repair an injury or make new bone. Bone-making cells form new bone using calcium and other minerals. These minerals come from the food you eat. When this bone-making system is in balance, the same amount of bone is built and resorbed.        An unbalanced system cant give support     An unblalanced system builds too little bone and resorbs too much.   Changes in hormone levels, activity, medications, or diet can affect the  bone-making system. When the system gets out of balance, the amount of bone lost is greater than the amount of bone made. This can cause osteopenia (when bone starts to become less dense). Left untreated, bone loss gets worse, leading to osteoporosis. Weak bones cant support the body. In fact, they can fracture just from the weight of your body. This often happens in vertebrae (bones of the spine). When vertebrae fracture, parts of the spine compress. This causes the back to bend or hump over, and it can also cause back pain.  Date Last Reviewed: 10/11/2015  © 5818-3455 [employer]. [address] [place] [address], [place] [address]. All rights reserved. This information is not intended as a substitute for professional medical care. Always follow your healthcare professional's instructions.        Sciatica    Sciatica is a condition that causes pain in the lower back that spreads down into the buttock, hip, and leg. Sometimes the leg pain can happen without any back pain. Sciatica happens when a spinal nerve is irritated or has pressure put on it as comes out of the spinal canal in the lower back. This most often happens when a bulge or rupture of a nearby spinal disk presses on the nerve. Sciatica can also be caused by a narrowing of the spinal canal (spinal stenosis) or spasm of the muscle in the buttocks that the sciatic nerve passes through (pyriform muscle). Sciatica is also called lumbar radiculopathy.  Sciatica may begin after a sudden twisting or bending force, such as in a car accident. Or it can happen after a simple awkward movement. In either case, muscle spasm often also happens. Muscle spasm makes the pain worse.  A healthcare provider makes a diagnosis of sciatica from your symptoms and a physical exam. Unless you had an injury from a car accident or fall, you usually wont have X-rays taken at this time. This is because the nerves and disks in your back cant be seen on an X-ray. If the  provider sees signs of a compressed nerve, you will need to schedule an MRI scan as an outpatient. Signs of a compressed nerve include loss of strength in a leg.  Most sciatica gets better with medicine, exercise, and physical therapy. If your symptoms continue after at least 3 months of medical treatment, you may need surgery or injections to your lower back.  Home care  Follow these tips when caring for yourself at home:  · You may need to stay in bed the first few days. But as soon as possible, begin sitting up or walking. This will help you avoid problems that come from staying in bed for long periods.  · When in bed, try to find a position that is comfortable. A firm mattress is best. Try lying flat on your back with pillows under your knees. You can also try lying on your side with your knees bent up toward your chest and a pillow between your knees.  · Avoid sitting for long periods. This puts more stress on your lower back than standing or walking.  · Use heat from a hot shower, hot bath, or heating pad to help ease pain. Massage can also help. You can also try using an ice pack. You can make your own ice pack by putting ice cubes in a plastic bag. Wrap the bag in a thin towel. Try both heat and cold to see which works best. Use the method that feels best for 20 minutes several times a day.  · You may use acetaminophen or ibuprofen to ease pain, unless another pain medicine was prescribed. Note: If you have chronic liver or kidney disease, talk with your healthcare provider before taking these medicines. Also talk with your provider if youve had a stomach ulcer or gastrointestinal bleeding.  · Use safe lifting methods. Dont lift anything heavier than 15 pounds until all of the pain is gone.  Follow-up care  Follow up with your healthcare provider, or as advised. You may need physical therapy or additional tests.  If X-rays were taken, a radiologist will look at them. You will be told of any new findings  that may affect your care.  When to seek medical advice  Call your healthcare provider right away if any of these occur:  · Pain gets worse even after taking prescribed medicine  · Weakness or numbness in 1 or both legs or hips  · Numbness in your groin or genital area  · You cant control your bowel or bladder  · Fever  · Redness or swelling over your back or spine   Date Last Reviewed: 8/1/2016  © 0994-6205 [employer]. [address] [place] [address], [place] [address]. All rights reserved. This information is not intended as a substitute for professional medical care. Always follow your healthcare professional's instructions.        Understanding Lumbar Radiculopathy    Lumbar radiculopathy is irritation or inflammation of a nerve root in the low back. It causes symptoms that spread out from the back down one or both legs. To understand this condition, it helps to understand the parts of the spine:  · Vertebrae. These are bones that stack to form the spine. The lumbar spine contains the 5 bottom vertebrae.  · Disks. These are soft pads of tissue between the vertebrae. They act as shock absorbers for the spine.  · Spinal canal. This is a tunnel formed within the stacked vertebrae. In the lumbar spine, nerves run through this canal.  · Nerves. These branch off and leave the spinal canal, traveling out to parts of the body. As they leave the spinal canal, nerves pass through openings between the vertebrae. The nerve root is the part of the nerve that is closest to the spinal canal.  · Sciatic nerve. This is a large nerve formed from several nerve roots in the low back. This nerve extends down the back of the leg to the foot.  With lumbar radiculopathy, nerve roots in the low back become irritated. This leads to pain and symptoms. The sciatic nerve is commonly involved, so the condition is often called sciatica.  What causes lumbar radiculopathy?  Aging, injury, poor posture, extra body weight, and other issues  can lead to problems in the low back. These problems may then irritate nerve roots. They include:  · Damage to a disk in the lumbar spine. The damaged disk may then press on nearby nerve roots.  · Degeneration from wear and tear, and aging. This can lead to narrowing (stenosis) of the openings between the vertebrae. The narrowed openings press on nerve roots as they leave the spinal canal.  · Unstable spine. This is when a vertebra slips forward. It can then press on a nerve root.  Other, less common things can put pressure on nerves in the low back. These include diabetes, infection, or a tumor.  Symptoms of lumbar radiculopathy  These include:  · Pain in the low back  · Pain, numbness, tingling, or weakness that travels into the buttocks, hip, groin, or leg  · Muscle spasms  Treatment for lumbar radiculopathy  In most cases, your healthcare provider will first try treatments that help relieve symptoms. These may include:  · Prescription and over-the-counter pain medicines. These help relieve pain, swelling, and irritation.  · Limits on positions and activities that increase pain. But lying in bed or avoiding all movement is only recommended for a short period of time.  · Physical therapy, including exercises and stretches. This helps decrease pain and increase movement and function.  · Steroid shots into the lower back. This may help relieve symptoms for a time.  · Weight-loss program. If you are overweight, losing extra pounds may help relieve symptoms.  In some cases, you may need surgery to fix the underlying problem. This depends on the cause, the symptoms, and how long the pain has lasted.  Possible complications  Over time, an irritated and inflamed nerve may become damaged. This may lead to long-lasting (permanent) numbness or weakness in your legs and feet. If symptoms change suddenly or get worse, be sure to let your healthcare provider know.  When to call your healthcare provider  Call your healthcare  provider right away if you have any of these:  · New pain or pain that gets worse  · New or increasing weakness, tingling, or numbness in your leg or foot  · Problems controlling your bladder or bowel   Date Last Reviewed: 3/10/2016  © 6531-4025 [employer]. [address] [place] [id number]. All rights reserved. This information is not intended as a substitute for professional medical care. Always follow your healthcare professional's instructions.

## 2019-04-24 DIAGNOSIS — M79.2 PAROXYSMAL NERVE PAIN: ICD-10-CM

## 2019-04-24 RX ORDER — GABAPENTIN 100 MG/1
CAPSULE ORAL
Qty: 90 CAPSULE | Refills: 2 | Status: SHIPPED | OUTPATIENT
Start: 2019-04-24 | End: 2019-07-23 | Stop reason: ALTCHOICE

## 2019-05-13 ENCOUNTER — ANESTHESIA EVENT (OUTPATIENT)
Dept: ENDOSCOPY | Facility: HOSPITAL | Age: 74
End: 2019-05-13
Payer: MEDICARE

## 2019-05-14 ENCOUNTER — HOSPITAL ENCOUNTER (OUTPATIENT)
Facility: HOSPITAL | Age: 74
Discharge: HOME | End: 2019-05-14
Attending: INTERNAL MEDICINE | Admitting: INTERNAL MEDICINE
Payer: MEDICARE

## 2019-05-14 ENCOUNTER — ANESTHESIA (OUTPATIENT)
Dept: ENDOSCOPY | Facility: HOSPITAL | Age: 74
End: 2019-05-14
Payer: MEDICARE

## 2019-05-14 VITALS
TEMPERATURE: 98 F | HEIGHT: 60 IN | DIASTOLIC BLOOD PRESSURE: 74 MMHG | RESPIRATION RATE: 20 BRPM | OXYGEN SATURATION: 99 % | BODY MASS INDEX: 28.07 KG/M2 | SYSTOLIC BLOOD PRESSURE: 157 MMHG | HEART RATE: 78 BPM | WEIGHT: 143 LBS

## 2019-05-14 DIAGNOSIS — H25.13 NUCLEAR SCLEROSIS OF BOTH EYES: ICD-10-CM

## 2019-05-14 DIAGNOSIS — M79.604 BILATERAL LEG PAIN: ICD-10-CM

## 2019-05-14 DIAGNOSIS — M79.605 BILATERAL LEG PAIN: ICD-10-CM

## 2019-05-14 DIAGNOSIS — M62.81 MUSCLE WEAKNESS: ICD-10-CM

## 2019-05-14 DIAGNOSIS — K21.9 GASTROESOPHAGEAL REFLUX DISEASE WITHOUT ESOPHAGITIS: Primary | Chronic | ICD-10-CM

## 2019-05-14 DIAGNOSIS — R07.9 CHEST PAIN, UNSPECIFIED TYPE: ICD-10-CM

## 2019-05-14 DIAGNOSIS — M54.42 CHRONIC MIDLINE LOW BACK PAIN WITH BILATERAL SCIATICA: ICD-10-CM

## 2019-05-14 DIAGNOSIS — Z12.11 COLON CANCER SCREENING: ICD-10-CM

## 2019-05-14 DIAGNOSIS — G47.00 INSOMNIA, UNSPECIFIED TYPE: ICD-10-CM

## 2019-05-14 DIAGNOSIS — F33.0 MILD RECURRENT MAJOR DEPRESSION: Chronic | ICD-10-CM

## 2019-05-14 DIAGNOSIS — H52.4 HYPEROPIA WITH PRESBYOPIA OF BOTH EYES: ICD-10-CM

## 2019-05-14 DIAGNOSIS — G89.29 CHRONIC LEFT SHOULDER PAIN: ICD-10-CM

## 2019-05-14 DIAGNOSIS — G89.29 CHRONIC MIDLINE LOW BACK PAIN WITH BILATERAL SCIATICA: ICD-10-CM

## 2019-05-14 DIAGNOSIS — H52.03 HYPEROPIA WITH PRESBYOPIA OF BOTH EYES: ICD-10-CM

## 2019-05-14 DIAGNOSIS — M54.41 CHRONIC MIDLINE LOW BACK PAIN WITH BILATERAL SCIATICA: ICD-10-CM

## 2019-05-14 DIAGNOSIS — G25.81 RLS (RESTLESS LEGS SYNDROME): Chronic | ICD-10-CM

## 2019-05-14 DIAGNOSIS — M81.0 AGE-RELATED OSTEOPOROSIS WITHOUT CURRENT PATHOLOGICAL FRACTURE: Chronic | ICD-10-CM

## 2019-05-14 DIAGNOSIS — I10 ESSENTIAL HYPERTENSION: Chronic | ICD-10-CM

## 2019-05-14 DIAGNOSIS — M25.512 CHRONIC LEFT SHOULDER PAIN: ICD-10-CM

## 2019-05-14 DIAGNOSIS — F32.0 MILD SINGLE CURRENT EPISODE OF MAJOR DEPRESSIVE DISORDER: ICD-10-CM

## 2019-05-14 DIAGNOSIS — F41.9 ANXIETY: ICD-10-CM

## 2019-05-14 PROCEDURE — 45385 PR COLONOSCOPY,REMV LESN,SNARE: ICD-10-PCS | Mod: PT,,, | Performed by: INTERNAL MEDICINE

## 2019-05-14 PROCEDURE — D9220A PRA ANESTHESIA: ICD-10-PCS | Mod: PT,CRNA,, | Performed by: REGISTERED NURSE

## 2019-05-14 PROCEDURE — 88305 TISSUE EXAM BY PATHOLOGIST: CPT | Performed by: PATHOLOGY

## 2019-05-14 PROCEDURE — 45385 COLONOSCOPY W/LESION REMOVAL: CPT | Performed by: INTERNAL MEDICINE

## 2019-05-14 PROCEDURE — 63600175 PHARM REV CODE 636 W HCPCS: Performed by: REGISTERED NURSE

## 2019-05-14 PROCEDURE — 88305 TISSUE SPECIMEN TO PATHOLOGY - SURGERY: ICD-10-PCS | Mod: 26,,, | Performed by: PATHOLOGY

## 2019-05-14 PROCEDURE — 37000008 HC ANESTHESIA 1ST 15 MINUTES: Performed by: INTERNAL MEDICINE

## 2019-05-14 PROCEDURE — D9220A PRA ANESTHESIA: Mod: PT,ANES,, | Performed by: ANESTHESIOLOGY

## 2019-05-14 PROCEDURE — 45385 COLONOSCOPY W/LESION REMOVAL: CPT | Mod: PT,,, | Performed by: INTERNAL MEDICINE

## 2019-05-14 PROCEDURE — 37000009 HC ANESTHESIA EA ADD 15 MINS: Performed by: INTERNAL MEDICINE

## 2019-05-14 PROCEDURE — 88305 TISSUE EXAM BY PATHOLOGIST: CPT | Mod: 26,,, | Performed by: PATHOLOGY

## 2019-05-14 PROCEDURE — D9220A PRA ANESTHESIA: Mod: PT,CRNA,, | Performed by: REGISTERED NURSE

## 2019-05-14 PROCEDURE — 25000003 PHARM REV CODE 250: Performed by: ANESTHESIOLOGY

## 2019-05-14 PROCEDURE — 27201089 HC SNARE, DISP (ANY): Performed by: INTERNAL MEDICINE

## 2019-05-14 PROCEDURE — D9220A PRA ANESTHESIA: ICD-10-PCS | Mod: PT,ANES,, | Performed by: ANESTHESIOLOGY

## 2019-05-14 RX ORDER — LIDOCAINE HYDROCHLORIDE 20 MG/ML
INJECTION, SOLUTION EPIDURAL; INFILTRATION; INTRACAUDAL; PERINEURAL
Status: DISCONTINUED
Start: 2019-05-14 | End: 2019-05-14 | Stop reason: HOSPADM

## 2019-05-14 RX ORDER — LIDOCAINE HCL/PF 100 MG/5ML
SYRINGE (ML) INTRAVENOUS
Status: DISCONTINUED | OUTPATIENT
Start: 2019-05-14 | End: 2019-05-14

## 2019-05-14 RX ORDER — PROPOFOL 10 MG/ML
VIAL (ML) INTRAVENOUS
Status: COMPLETED
Start: 2019-05-14 | End: 2019-05-14

## 2019-05-14 RX ORDER — SODIUM CHLORIDE 9 MG/ML
INJECTION, SOLUTION INTRAVENOUS CONTINUOUS
Status: DISCONTINUED | OUTPATIENT
Start: 2019-05-14 | End: 2019-05-14 | Stop reason: HOSPADM

## 2019-05-14 RX ORDER — PROPOFOL 10 MG/ML
VIAL (ML) INTRAVENOUS
Status: DISCONTINUED | OUTPATIENT
Start: 2019-05-14 | End: 2019-05-14

## 2019-05-14 RX ADMIN — PROPOFOL 20 MG: 10 INJECTION, EMULSION INTRAVENOUS at 08:05

## 2019-05-14 RX ADMIN — LIDOCAINE HYDROCHLORIDE 100 MG: 20 INJECTION, SOLUTION INTRAVENOUS at 08:05

## 2019-05-14 RX ADMIN — SODIUM CHLORIDE: 0.9 INJECTION, SOLUTION INTRAVENOUS at 08:05

## 2019-05-14 RX ADMIN — PROPOFOL 50 MG: 10 INJECTION, EMULSION INTRAVENOUS at 08:05

## 2019-05-14 NOTE — ANESTHESIA PREPROCEDURE EVALUATION
05/14/2019    Pre-operative evaluation for Procedure(s) (LRB):  COLONOSCOPY (N/A)    Diana Herring is a 74 y.o. female     Patient Active Problem List   Diagnosis    GERD (gastroesophageal reflux disease)    Essential hypertension    Insomnia    Mild recurrent major depression    RLS (restless legs syndrome)    Osteoporosis    Muscle weakness    Bilateral leg pain    Left shoulder pain    Chest pain    Anxiety    Nuclear sclerosis of both eyes    Hyperopia with presbyopia of both eyes    Mild single current episode of major depressive disorder    Chronic midline low back pain with bilateral sciatica       Review of patient's allergies indicates:  No Known Allergies    No current facility-administered medications on file prior to encounter.      Current Outpatient Medications on File Prior to Encounter   Medication Sig Dispense Refill    amLODIPine (NORVASC) 10 MG tablet TAKE 1 TABLET (10 MG TOTAL) BY MOUTH ONCE DAILY. 90 tablet 1    atorvastatin (LIPITOR) 40 MG tablet Take 1 tablet (40 mg total) by mouth once daily. 90 tablet 2    calcium-vitamin D 500-125 mg-unit tablet Take 1 tablet by mouth once daily.        fluticasone (FLONASE) 50 mcg/actuation nasal spray 2 sprays (100 mcg total) by Each Nare route once daily. 16 g 0    levocetirizine (XYZAL) 5 MG tablet Take 1 tablet (5 mg total) by mouth every evening. 30 tablet 5    metoprolol succinate (TOPROL-XL) 100 MG 24 hr tablet Take 1 tablet (100 mg total) by mouth once daily. 90 tablet 1    multivitamin (MULTIVITAMIN) per tablet Take 1 tablet by mouth once daily.        omeprazole (PRILOSEC) 20 MG capsule TAKE 1 CAPSULE BY MOUTH EVERY DAY 90 capsule 1    paroxetine (PAXIL) 20 MG tablet Take 1 tablet (20 mg total) by mouth every morning. 90 tablet 1    rOPINIRole (REQUIP) 0.5 MG tablet Take 1 tablet (0.5 mg total) by mouth every evening. 90 tablet 1    simethicone (MYLICON) 80 MG chewable tablet Take 1 tablet (80 mg total) by mouth  every 6 (six) hours as needed for Flatulence. 60 tablet 2    traZODone (DESYREL) 100 MG tablet Take 1 tablet (100 mg total) by mouth every evening. 90 tablet 1     VITALS  There were no vitals filed for this visit.      Anesthesia Evaluation    I have reviewed the Patient Summary Reports.     I have reviewed the Medications.     Review of Systems  Anesthesia Hx:  No problems with previous Anesthesia  History of prior surgery of interest to airway management or planning: Previous anesthesia: General Denies Family Hx of Anesthesia complications.   Denies Personal Hx of Anesthesia complications.   Social:  Non-Smoker    Hematology/Oncology:  Hematology Normal   Oncology Normal     EENT/Dental:EENT/Dental Normal   Cardiovascular:   Exercise tolerance: good Hypertension Denies MI.  Denies CAD.    no hyperlipidemia    Pulmonary:  Pulmonary Normal    Renal/:  Renal/ Normal     Hepatic/GI:   Bowel Prep. GERD, well controlled    Musculoskeletal:   Arthritis   Spine Disorders: lumbar Disc disease    Neurological:  Neurology Normal    Endocrine:  Endocrine Normal    Dermatological:  Skin Normal    Psych:   Psychiatric History anxiety depression          Physical Exam  General:  Obesity    Airway/Jaw/Neck:  Airway Findings: Mouth Opening: Normal Tongue: Normal  General Airway Assessment: Adult  Mallampati: II  TM Distance: Normal, at least 6 cm  Jaw/Neck Findings:  Neck ROM: Normal ROM      Dental:  Dental Findings: In tact   Chest/Lungs:  Chest/Lungs Findings: Normal Respiratory Rate     Heart/Vascular:  Heart Findings: Rate: Normal  Rhythm: Regular Rhythm  Sounds: Normal        Mental Status:  Mental Status Findings:  Cooperative, Alert and Oriented         Anesthesia Plan  Type of Anesthesia, risks & benefits discussed:  Anesthesia Type:  general  Patient's Preference:   Intra-op Monitoring Plan: standard ASA monitors  Intra-op Monitoring Plan Comments:   Post Op Pain Control Plan: IV/PO Opioids PRN  Post Op Pain  Control Plan Comments:   Induction:   IV  Beta Blocker:  Patient is on a Beta-Blocker and has received one dose within the past 24 hours (No further documentation required).       Informed Consent: Patient understands risks and agrees with Anesthesia plan.  Questions answered. Anesthesia consent signed with patient.  ASA Score: 3     Day of Surgery Review of History & Physical: I have interviewed and examined the patient. I have reviewed the patient's H&P dated:  There are no significant changes.          Ready For Surgery From Anesthesia Perspective.

## 2019-05-14 NOTE — H&P
Pre-Procedure H&P:  Reason for Procedure: history of colon polyps    HPI:  Pt is a 74 y.o. female with a history of colon polyps    Past Medical History:   Diagnosis Date    Anxiety     Arthritis     Corneal abrasion s    ? eye     Depression     Full dentures     GERD (gastroesophageal reflux disease)     Hyperlipidemia     Hypertension     Nuclear sclerosis of both eyes 6/5/2017    Osteoporosis     Restless leg syndrome        Past Surgical History:   Procedure Laterality Date    COLONOSCOPY N/A 12/3/2013    [facility]    HEART CATH-LEFT Left 2/23/2015    Performed by Quoc Muro [facility] at [facility] CATH LAB    HYSTERECTOMY      PARTIAL HYSTERECTOMY      TUBAL LIGATION Bilateral        Family History   Problem Relation Age of Onset    Stroke Mother     Glaucoma Mother     Cataracts Mother     Cancer Father         Lung    No Known Problems Brother     No Known Problems Son     Stroke Brother     Heart disease Son     No Known Problems Sister     No Known Problems Maternal Aunt     No Known Problems Maternal Uncle     No Known Problems Paternal Aunt     No Known Problems Paternal Uncle     No Known Problems Maternal Grandmother     No Known Problems Maternal Grandfather     No Known Problems Paternal Grandmother     No Known Problems Paternal Grandfather     Amblyopia Neg Hx     Blindness Neg Hx     Diabetes Neg Hx     Hypertension Neg Hx     Macular degeneration Neg Hx     Retinal detachment Neg Hx     Strabismus Neg Hx     Thyroid disease Neg Hx        Social History     Socioeconomic History    Marital status:      Spouse name: Not on file    Number of children: Not on file    Years of education: Not on file    Highest education level: Not on file   Occupational History    Not on file   Social Needs    Financial resource strain: Not on file    Food insecurity:     Worry: Not on file     Inability: Not on file     Transportation needs:     Medical: Not on file     Non-medical: Not on file   Tobacco Use    Smoking status: Never Smoker    Smokeless tobacco: Never Used   Substance and Sexual Activity    Alcohol use: No    Drug use: No    Sexual activity: Never   Lifestyle    Physical activity:     Days per week: Not on file     Minutes per session: Not on file    Stress: Not on file   Relationships    Social connections:     Talks on phone: Not on file     Gets together: Not on file     Attends [employer] service: Not on file     Active member of club or organization: Not on file     Attends meetings of clubs or organizations: Not on file     Relationship status: Not on file   Other Topics Concern    Not on file   Social History Narrative    Patient is  w/ 5 children, one  from heart disease.The patient is retired.       Endoscopic History:      Review of patient's allergies indicates:  No Known Allergies    No current facility-administered medications on file prior to encounter.      Current Outpatient Medications on File Prior to Encounter   Medication Sig Dispense Refill    amLODIPine (NORVASC) 10 MG tablet TAKE 1 TABLET (10 MG TOTAL) BY MOUTH ONCE DAILY. 90 tablet 1    atorvastatin (LIPITOR) 40 MG tablet Take 1 tablet (40 mg total) by mouth once daily. 90 tablet 2    calcium-vitamin D 500-125 mg-unit tablet Take 1 tablet by mouth once daily.        fluticasone (FLONASE) 50 mcg/actuation nasal spray 2 sprays (100 mcg total) by Each Nare route once daily. 16 g 0    metoprolol succinate (TOPROL-XL) 100 MG 24 hr tablet Take 1 tablet (100 mg total) by mouth once daily. 90 tablet 1    multivitamin (MULTIVITAMIN) per tablet Take 1 tablet by mouth once daily.        omeprazole (PRILOSEC) 20 MG capsule TAKE 1 CAPSULE BY MOUTH EVERY DAY 90 capsule 1    simethicone (MYLICON) 80 MG chewable tablet Take 1 tablet (80 mg total) by mouth every 6 (six) hours as needed for Flatulence. 60 tablet 2     traZODone (DESYREL) 100 MG tablet Take 1 tablet (100 mg total) by mouth every evening. 90 tablet 1    levocetirizine (XYZAL) 5 MG tablet Take 1 tablet (5 mg total) by mouth every evening. 30 tablet 5    paroxetine (PAXIL) 20 MG tablet Take 1 tablet (20 mg total) by mouth every morning. 90 tablet 1    rOPINIRole (REQUIP) 0.5 MG tablet Take 1 tablet (0.5 mg total) by mouth every evening. 90 tablet 1       Current Facility-Administered Medications:     0.9%  NaCl infusion, , Intravenous, Continuous, Agustin Macias MD    ROS: Negative x 10    Patient Vitals for the past 24 hrs:   BP Temp Pulse Resp SpO2 Height Weight   05/14/19 0826 (!) 169/79 97.6 °F (36.4 °C) 72 20 99 % 5' (1.524 m) 64.9 kg (143 lb)       Gen: Well developed, well nourished, no apparent distress  HEENT: Anicteric, PERRLA  CV: S1, S2, no murmers/rubs, non-displaced PMI  Lungs: CTA-B, normal excursion  Abd: Soft, NT, ND, normal BS's, no HSM  Ext: No c/c/e, 1+ DP pulses to BLE's  Neuro: CN II-XII grossly intact, no asterixis.  Skin: No rashes/lesions.  Psych: AA&O x 4    Assessment:  Pt. Is a 74 y.o. female with history of colon polyps    Recommendations:  Colonoscopy

## 2019-05-14 NOTE — ANESTHESIA POSTPROCEDURE EVALUATION
Anesthesia Post Evaluation    Patient: Diana Herring    Procedure(s) Performed: Procedure(s) (LRB):  COLONOSCOPY (N/A)    Final Anesthesia Type: general  Patient location during evaluation: GI PACU  Patient participation: Yes- Able to Participate  Level of consciousness: awake and alert and oriented  Post-procedure vital signs: reviewed and stable  Pain management: adequate  Airway patency: patent  PONV status at discharge: No PONV  Anesthetic complications: no      Cardiovascular status: blood pressure returned to baseline and hemodynamically stable  Respiratory status: unassisted, spontaneous ventilation and room air  Hydration status: euvolemic  Follow-up not needed.          Vitals Value Taken Time   /74 5/14/2019  9:26 AM   Temp 36.4 °C (97.5 °F) 5/14/2019  8:56 AM   Pulse 78 5/14/2019  9:26 AM   Resp 20 5/14/2019  9:26 AM   SpO2 99 % 5/14/2019  9:26 AM         Event Time     Out of Recovery 09:28:06          Pain/Nesha Score: Nesha Score: 10 (5/14/2019  9:26 AM)  Modified Nesha Score: 20 (5/14/2019  9:26 AM)

## 2019-05-14 NOTE — [employer] PATIENT INSTRUCTIONS
Discharge Summary/Instructions after an Endoscopic Procedure  Patient Name: Diana Herring  Patient MRN: [id number]  Patient YOB: 1945  Tuesday, May 14, 2019  Nahid Cline MD  RESTRICTIONS:  During your procedure today, you received medications for sedation.  These   medications may affect your judgment, balance and coordination.  Therefore,   for 24 hours, you have the following restrictions:   - DO NOT drive a car, operate machinery, make legal/financial decisions,   sign important papers or drink alcohol.    ACTIVITY:  Today: no heavy lifting, straining or running due to procedural   sedation/anesthesia.  The following day: return to full activity including work.  DIET:  Eat and drink normally unless instructed otherwise.     TREATMENT FOR COMMON SIDE EFFECTS:  - Mild abdominal pain, nausea, belching, bloating or excessive gas:  rest,   eat lightly and use a heating pad.  - Sore Throat: treat with throat lozenges and/or gargle with warm salt   water.  - Because air was used during the procedure, expelling large amounts of air   from your rectum or belching is normal.  - If a bowel prep was taken, you may not have a bowel movement for 1-3 days.    This is normal.  SYMPTOMS TO WATCH FOR AND REPORT TO YOUR PHYSICIAN:  1. Abdominal pain or bloating, other than gas cramps.  2. Chest pain.  3. Back pain.  4. Signs of infection such as: chills or fever occurring within 24 hours   after the procedure.  5. Rectal bleeding, which would show as bright red, maroon, or black stools.   (A tablespoon of blood from the rectum is not serious, especially if   hemorrhoids are present.)  6. Vomiting.  7. Weakness or dizziness.  GO DIRECTLY TO THE NEAREST EMERGENCY ROOM IF YOU HAVE ANY OF THE FOLLOWING:      Difficulty breathing              Chills and/or fever over 101 F   Persistent vomiting and/or vomiting blood   Severe abdominal pain   Severe chest pain   Black, tarry stools   Bleeding- more than one  tablespoon   Any other symptom or condition that you feel may need urgent attention  Your doctor recommends these additional instructions:  If any biopsies were taken, your doctors clinic will contact you in 1 to 2   weeks with any results.  - Await pathology results.   - Repeat colonoscopy in 3 years for surveillance.   - Return to primary care physician as previously scheduled.   - Discharge patient to home (via wheelchair).  For questions, problems or results please call your physician - Nahid Cline MD at Work:  [phone].  [facility] can be reached at [phone]     IF A COMPLICATION OR EMERGENCY SITUATION ARISES AND YOU ARE UNABLE TO REACH   YOUR PHYSICIAN - GO DIRECTLY TO THE EMERGENCY ROOM.  Nahid Cline MD  5/14/2019 8:56:49 AM  This report has been verified and signed electronically.  [employer]

## 2019-05-14 NOTE — TRANSFER OF CARE
Anesthesia Transfer of Care Note    Patient: Diana Herring    Procedure(s) Performed: Procedure(s) (LRB):  COLONOSCOPY (N/A)    Patient location: GI    Anesthesia Type: general    Transport from OR: Transported from OR on room air with adequate spontaneous ventilation    Post pain: adequate analgesia    Post assessment: no apparent anesthetic complications and tolerated procedure well    Post vital signs: stable    Level of consciousness: awake and alert    Nausea/Vomiting: no nausea/vomiting    Transfer of care protocol was followed      Last vitals:   Visit Vitals  /63   Pulse 60   Temp 36.4 °C (97.5 °F) (Oral)   Resp 18   Ht 5' (1.524 m)   Wt 64.9 kg (143 lb)   SpO2 97%   BMI 27.93 kg/m²

## 2019-05-14 NOTE — DISCHARGE INSTRUCTIONS

## 2019-06-04 ENCOUNTER — PATIENT OUTREACH (OUTPATIENT)
Dept: ADMINISTRATIVE | Facility: HOSPITAL | Age: 74
End: 2019-06-04

## 2019-06-13 DIAGNOSIS — I10 ESSENTIAL HYPERTENSION: Chronic | ICD-10-CM

## 2019-06-13 RX ORDER — METOPROLOL SUCCINATE 100 MG/1
TABLET, EXTENDED RELEASE ORAL
Qty: 90 TABLET | Refills: 1 | Status: SHIPPED | OUTPATIENT
Start: 2019-06-13 | End: 2019-10-31 | Stop reason: SDUPTHER

## 2019-06-13 RX ORDER — AMLODIPINE BESYLATE 10 MG/1
10 TABLET ORAL DAILY
Qty: 90 TABLET | Refills: 1 | Status: SHIPPED | OUTPATIENT
Start: 2019-06-13 | End: 2019-10-31 | Stop reason: SDUPTHER

## 2019-06-18 ENCOUNTER — OFFICE VISIT (OUTPATIENT)
Dept: FAMILY MEDICINE | Facility: CLINIC | Age: 74
End: 2019-06-18
Payer: MEDICARE

## 2019-06-18 ENCOUNTER — HOSPITAL ENCOUNTER (OUTPATIENT)
Dept: RADIOLOGY | Facility: HOSPITAL | Age: 74
Discharge: HOME | End: 2019-06-18
Attending: FAMILY MEDICINE
Payer: MEDICARE

## 2019-06-18 VITALS
HEIGHT: 60 IN | OXYGEN SATURATION: 96 % | HEART RATE: 68 BPM | RESPIRATION RATE: 18 BRPM | TEMPERATURE: 98 F | DIASTOLIC BLOOD PRESSURE: 60 MMHG | SYSTOLIC BLOOD PRESSURE: 135 MMHG | WEIGHT: 143.31 LBS | BODY MASS INDEX: 28.13 KG/M2

## 2019-06-18 DIAGNOSIS — M25.552 PAIN OF BOTH HIP JOINTS: ICD-10-CM

## 2019-06-18 DIAGNOSIS — Z12.31 ENCOUNTER FOR SCREENING MAMMOGRAM FOR BREAST CANCER: ICD-10-CM

## 2019-06-18 DIAGNOSIS — G89.29 CHRONIC MIDLINE LOW BACK PAIN WITH BILATERAL SCIATICA: Primary | ICD-10-CM

## 2019-06-18 DIAGNOSIS — M25.551 PAIN OF BOTH HIP JOINTS: ICD-10-CM

## 2019-06-18 DIAGNOSIS — M54.41 CHRONIC MIDLINE LOW BACK PAIN WITH BILATERAL SCIATICA: Primary | ICD-10-CM

## 2019-06-18 DIAGNOSIS — M54.42 CHRONIC MIDLINE LOW BACK PAIN WITH BILATERAL SCIATICA: Primary | ICD-10-CM

## 2019-06-18 PROCEDURE — 99214 PR OFFICE/OUTPT VISIT, EST, LEVL IV, 30-39 MIN: ICD-10-PCS | Mod: S$GLB,,, | Performed by: FAMILY MEDICINE

## 2019-06-18 PROCEDURE — 1101F PR PT FALLS ASSESS DOC 0-1 FALLS W/OUT INJ PAST YR: ICD-10-PCS | Mod: CPTII,S$GLB,, | Performed by: FAMILY MEDICINE

## 2019-06-18 PROCEDURE — 77063 MAMMO DIGITAL SCREENING BILAT WITH TOMOSYNTHESIS_CAD: ICD-10-PCS | Mod: 26,,, | Performed by: RADIOLOGY

## 2019-06-18 PROCEDURE — 3078F PR MOST RECENT DIASTOLIC BLOOD PRESSURE < 80 MM HG: ICD-10-PCS | Mod: CPTII,S$GLB,, | Performed by: FAMILY MEDICINE

## 2019-06-18 PROCEDURE — 99999 PR PBB SHADOW E&M-EST. PATIENT-LVL V: ICD-10-PCS | Mod: PBBFAC,,, | Performed by: FAMILY MEDICINE

## 2019-06-18 PROCEDURE — 77067 MAMMO DIGITAL SCREENING BILAT WITH TOMOSYNTHESIS_CAD: ICD-10-PCS | Mod: 26,,, | Performed by: RADIOLOGY

## 2019-06-18 PROCEDURE — 3075F SYST BP GE 130 - 139MM HG: CPT | Mod: CPTII,S$GLB,, | Performed by: FAMILY MEDICINE

## 2019-06-18 PROCEDURE — 77067 SCR MAMMO BI INCL CAD: CPT | Mod: 26,,, | Performed by: RADIOLOGY

## 2019-06-18 PROCEDURE — 3075F PR MOST RECENT SYSTOLIC BLOOD PRESS GE 130-139MM HG: ICD-10-PCS | Mod: CPTII,S$GLB,, | Performed by: FAMILY MEDICINE

## 2019-06-18 PROCEDURE — 77067 SCR MAMMO BI INCL CAD: CPT | Mod: TC,PO

## 2019-06-18 PROCEDURE — 99999 PR PBB SHADOW E&M-EST. PATIENT-LVL V: CPT | Mod: PBBFAC,,, | Performed by: FAMILY MEDICINE

## 2019-06-18 PROCEDURE — 77063 BREAST TOMOSYNTHESIS BI: CPT | Mod: 26,,, | Performed by: RADIOLOGY

## 2019-06-18 PROCEDURE — 1101F PT FALLS ASSESS-DOCD LE1/YR: CPT | Mod: CPTII,S$GLB,, | Performed by: FAMILY MEDICINE

## 2019-06-18 PROCEDURE — 99214 OFFICE O/P EST MOD 30 MIN: CPT | Mod: S$GLB,,, | Performed by: FAMILY MEDICINE

## 2019-06-18 PROCEDURE — 3078F DIAST BP <80 MM HG: CPT | Mod: CPTII,S$GLB,, | Performed by: FAMILY MEDICINE

## 2019-06-18 NOTE — PROGRESS NOTES
Chief Complaint   Patient presents with    Hip Pain    Leg Pain       HPI  Diana Herring is a 74 y.o. female with multiple medical diagnoses as listed in the medical history and problem list that presents for follow-up for her leg pain and hip pain. She is using a cane at home.    She has been doing home exercises but she is having a lot of pain. [place]. She has been doing home exercises but they are for her hip. She has noted tightness in her back. She is taking tylenol for pain. Still having stiffness in her back w/ some numbness.    PAST MEDICAL HISTORY:  Past Medical History:   Diagnosis Date    Anxiety     Arthritis     Corneal abrasion s    ? eye     Depression     Full dentures     GERD (gastroesophageal reflux disease)     Hyperlipidemia     Hypertension     Nuclear sclerosis of both eyes 6/5/2017    Osteoporosis     Restless leg syndrome        PAST SURGICAL HISTORY:  Past Surgical History:   Procedure Laterality Date    COLONOSCOPY N/A 5/14/2019    [facility]    COLONOSCOPY N/A 12/3/2013    [facility] ENDO    HEART CATH-LEFT Left 2/23/2015    [facility] Quco Muro [facility] at [facility] CATH LAB    HYSTERECTOMY      PARTIAL HYSTERECTOMY      TUBAL LIGATION Bilateral        SOCIAL HISTORY:  Social History     Socioeconomic History    Marital status:      Spouse name: Not on file    Number of children: Not on file    Years of education: Not on file    Highest education level: Not on file   Occupational History    Not on file   Social Needs    Financial resource strain: Not on file    Food insecurity:     Worry: Not on file     Inability: Not on file    Transportation needs:     Medical: Not on file     Non-medical: Not on file   Tobacco Use    Smoking status: Never Smoker    Smokeless tobacco: Never Used   Substance and Sexual Activity    Alcohol use: No    Drug  use: No    Sexual activity: Never   Lifestyle    Physical activity:     Days per week: Not on file     Minutes per session: Not on file    Stress: Not on file   Relationships    Social connections:     Talks on phone: Not on file     Gets together: Not on file     Attends Christianity service: Not on file     Active member of club or organization: Not on file     Attends meetings of clubs or organizations: Not on file     Relationship status: Not on file   Other Topics Concern    Not on file   Social History Narrative    Patient is  w/ 5 children, one  from heart disease.The patient is retired.       FAMILY HISTORY:  Family History   Problem Relation Age of Onset    Stroke Mother     Glaucoma Mother     Cataracts Mother     Cancer Father         Lung    No Known Problems Brother     No Known Problems Son     Stroke Brother     Heart disease Son     No Known Problems Sister     No Known Problems Maternal Aunt     No Known Problems Maternal Uncle     No Known Problems Paternal Aunt     No Known Problems Paternal Uncle     No Known Problems Maternal Grandmother     No Known Problems Maternal Grandfather     No Known Problems Paternal Grandmother     No Known Problems Paternal Grandfather     Amblyopia Neg Hx     Blindness Neg Hx     Diabetes Neg Hx     Hypertension Neg Hx     Macular degeneration Neg Hx     Retinal detachment Neg Hx     Strabismus Neg Hx     Thyroid disease Neg Hx        ALLERGIES AND MEDICATIONS: updated and reviewed.  Review of patient's allergies indicates:  No Known Allergies  Current Outpatient Medications   Medication Sig Dispense Refill    alendronate (FOSAMAX) 70 MG tablet Take 1 tablet (70 mg total) by mouth every 7 days. 12 tablet 0    amLODIPine (NORVASC) 10 MG tablet TAKE 1 TABLET (10 MG TOTAL) BY MOUTH ONCE DAILY. 90 tablet 1    atorvastatin (LIPITOR) 40 MG tablet Take 1 tablet (40 mg total) by mouth once daily. 90 tablet 2    calcium-vitamin D  500-125 mg-unit tablet Take 1 tablet by mouth once daily.        fluticasone (FLONASE) 50 mcg/actuation nasal spray 2 sprays (100 mcg total) by Each Nare route once daily. 16 g 0    gabapentin (NEURONTIN) 100 MG capsule TAKE 1 CAPSULE BY MOUTH THREE TIMES A DAY 90 capsule 2    levocetirizine (XYZAL) 5 MG tablet Take 1 tablet (5 mg total) by mouth every evening. 30 tablet 5    metoprolol succinate (TOPROL-XL) 100 MG 24 hr tablet TAKE 1 TABLET BY MOUTH EVERY DAY 90 tablet 1    multivitamin (MULTIVITAMIN) per tablet Take 1 tablet by mouth once daily.        omeprazole (PRILOSEC) 20 MG capsule TAKE 1 CAPSULE BY MOUTH EVERY DAY 90 capsule 1    paroxetine (PAXIL) 20 MG tablet Take 1 tablet (20 mg total) by mouth every morning. 90 tablet 1    rOPINIRole (REQUIP) 0.5 MG tablet Take 1 tablet (0.5 mg total) by mouth every evening. 90 tablet 1    simethicone (MYLICON) 80 MG chewable tablet Take 1 tablet (80 mg total) by mouth every 6 (six) hours as needed for Flatulence. 60 tablet 2    traZODone (DESYREL) 100 MG tablet Take 1 tablet (100 mg total) by mouth every evening. 90 tablet 1    GAVILYTE-C 240-22.72-6.72 -5.84 gram SolR TAKE 4,000 MLS (4 L TOTAL) BY MOUTH ONCE. FOR 1 DOSE  0     No current facility-administered medications for this visit.        ROS  Review of Systems   Constitutional: Negative for chills, diaphoresis, fatigue, fever and unexpected weight change.   HENT: Negative for rhinorrhea, sinus pressure, sore throat and tinnitus.    Eyes: Negative for photophobia and visual disturbance.   Respiratory: Negative for cough, shortness of breath and wheezing.    Cardiovascular: Negative for chest pain and palpitations.   Gastrointestinal: Negative for abdominal pain, blood in stool, constipation, diarrhea, nausea and vomiting.   Genitourinary: Negative for dysuria, flank pain, frequency and vaginal discharge.   Musculoskeletal: Positive for arthralgias and back pain. Negative for joint swelling.   Skin:  Negative for rash.   Neurological: Positive for numbness. Negative for speech difficulty, weakness, light-headedness and headaches.   Psychiatric/Behavioral: Negative for behavioral problems and dysphoric mood.       Physical Exam  Vitals:    06/18/19 1510   BP: 135/60   Pulse: 68   Resp: 18   Temp: 98.2 °F (36.8 °C)   TempSrc: Oral   SpO2: 96%   Weight: 65 kg (143 lb 4.8 oz)   Height: 5' (1.524 m)    Body mass index is 27.99 kg/m².  Weight: 65 kg (143 lb 4.8 oz)   Height: 5' (152.4 cm)     Physical Exam   Constitutional: She is oriented to person, place, and time. She appears well-developed and well-nourished.   Eyes: EOM are normal.   Musculoskeletal:        Arms:  No TTP over either trochanter for the hips  Bilateral hips w/ good mobility w/ flexion, extension, int and ext rotation   Neurological: She is alert and oriented to person, place, and time.   Skin: Skin is warm and dry. No rash noted. No erythema.   Psychiatric: She has a normal mood and affect. Her behavior is normal.   Nursing note and vitals reviewed.      Health Maintenance       Date Due Completion Date    Shingles Vaccine (1 of 2) 01/04/1996 ---    Mammogram 07/17/2019 7/17/2017    TETANUS VACCINE 08/31/2034 (Originally 1/4/1963) ---    Influenza Vaccine 08/01/2019 8/1/2018 (Done)    Override on 8/1/2018: Done    DEXA SCAN 11/02/2020 11/2/2017    Lipid Panel 02/22/2024 2/22/2019    Colonoscopy 05/14/2024 5/14/2019          Health maintenance reviewed and addressed as ordered      ASSESSMENT     1. Chronic midline low back pain with bilateral sciatica    2. Pain of both hip joints    3. Encounter for screening mammogram for breast cancer        PLAN:     Problem List Items Addressed This Visit        Orthopedic    Chronic midline low back pain with bilateral sciatica - Primary  -will consult back and spine clinic for evaluation  -low back exercise handout given  -may take tylenol extra strength  -also recommend she find a PT location closer to home  and we can refer her there     Relevant Orders    Ambulatory Consult to Back & Spine Clinic      Other Visit Diagnoses     Pain of both hip joints      -continue hip exercises    Encounter for screening mammogram for breast cancer      -as ordered       Relevant Orders    Mammo Digital Screening Steven oliver/ Severiano Brown MD  06/18/2019 3:19 PM        Follow up in about 6 weeks (around 7/30/2019) for Follow up.

## 2019-06-27 DIAGNOSIS — M81.0 AGE-RELATED OSTEOPOROSIS WITHOUT CURRENT PATHOLOGICAL FRACTURE: Chronic | ICD-10-CM

## 2019-06-27 RX ORDER — ALENDRONATE SODIUM 70 MG/1
TABLET ORAL
Qty: 12 TABLET | Refills: 1 | Status: SHIPPED | OUTPATIENT
Start: 2019-06-27 | End: 2019-10-31 | Stop reason: SDUPTHER

## 2019-07-11 DIAGNOSIS — K21.9 GASTROESOPHAGEAL REFLUX DISEASE WITHOUT ESOPHAGITIS: Chronic | ICD-10-CM

## 2019-07-11 RX ORDER — OMEPRAZOLE 20 MG/1
CAPSULE, DELAYED RELEASE ORAL
Qty: 90 CAPSULE | Refills: 1 | Status: SHIPPED | OUTPATIENT
Start: 2019-07-11 | End: 2019-10-31 | Stop reason: SDUPTHER

## 2019-07-18 ENCOUNTER — TELEPHONE (OUTPATIENT)
Dept: SPINE | Facility: CLINIC | Age: 74
End: 2019-07-18

## 2019-07-18 DIAGNOSIS — M54.5 LOW BACK PAIN, UNSPECIFIED BACK PAIN LATERALITY, UNSPECIFIED CHRONICITY, WITH SCIATICA PRESENCE UNSPECIFIED: Primary | ICD-10-CM

## 2019-07-23 ENCOUNTER — OFFICE VISIT (OUTPATIENT)
Dept: SPINE | Facility: CLINIC | Age: 74
End: 2019-07-23
Payer: MEDICARE

## 2019-07-23 ENCOUNTER — HOSPITAL ENCOUNTER (OUTPATIENT)
Dept: RADIOLOGY | Facility: OTHER | Age: 74
Discharge: HOME | End: 2019-07-23
Attending: PHYSICIAN ASSISTANT
Payer: MEDICARE

## 2019-07-23 VITALS
DIASTOLIC BLOOD PRESSURE: 64 MMHG | BODY MASS INDEX: 28.07 KG/M2 | TEMPERATURE: 98 F | WEIGHT: 143 LBS | HEART RATE: 60 BPM | HEIGHT: 60 IN | SYSTOLIC BLOOD PRESSURE: 142 MMHG

## 2019-07-23 DIAGNOSIS — M51.36 DDD (DEGENERATIVE DISC DISEASE), LUMBAR: ICD-10-CM

## 2019-07-23 DIAGNOSIS — M54.5 LOW BACK PAIN, UNSPECIFIED BACK PAIN LATERALITY, UNSPECIFIED CHRONICITY, WITH SCIATICA PRESENCE UNSPECIFIED: ICD-10-CM

## 2019-07-23 DIAGNOSIS — G89.29 CHRONIC BILATERAL LOW BACK PAIN WITH BILATERAL SCIATICA: Primary | ICD-10-CM

## 2019-07-23 DIAGNOSIS — M54.42 CHRONIC BILATERAL LOW BACK PAIN WITH BILATERAL SCIATICA: Primary | ICD-10-CM

## 2019-07-23 DIAGNOSIS — M54.41 CHRONIC BILATERAL LOW BACK PAIN WITH BILATERAL SCIATICA: Primary | ICD-10-CM

## 2019-07-23 DIAGNOSIS — M54.16 LUMBAR RADICULOPATHY: ICD-10-CM

## 2019-07-23 DIAGNOSIS — M47.26 OTHER SPONDYLOSIS WITH RADICULOPATHY, LUMBAR REGION: ICD-10-CM

## 2019-07-23 PROCEDURE — 72120 X-RAY BEND ONLY L-S SPINE: CPT | Mod: 26,,, | Performed by: RADIOLOGY

## 2019-07-23 PROCEDURE — 1101F PR PT FALLS ASSESS DOC 0-1 FALLS W/OUT INJ PAST YR: ICD-10-PCS | Mod: CPTII,S$GLB,, | Performed by: PHYSICIAN ASSISTANT

## 2019-07-23 PROCEDURE — 1101F PT FALLS ASSESS-DOCD LE1/YR: CPT | Mod: CPTII,S$GLB,, | Performed by: PHYSICIAN ASSISTANT

## 2019-07-23 PROCEDURE — 99204 OFFICE O/P NEW MOD 45 MIN: CPT | Mod: S$GLB,,, | Performed by: PHYSICIAN ASSISTANT

## 2019-07-23 PROCEDURE — 99999 PR PBB SHADOW E&M-EST. PATIENT-LVL IV: CPT | Mod: PBBFAC,,, | Performed by: PHYSICIAN ASSISTANT

## 2019-07-23 PROCEDURE — 72100 X-RAY EXAM L-S SPINE 2/3 VWS: CPT | Mod: 26,,, | Performed by: RADIOLOGY

## 2019-07-23 PROCEDURE — 72120 XR LUMBAR SPINE AP AND LAT WITH FLEX/EXT: ICD-10-PCS | Mod: 26,,, | Performed by: RADIOLOGY

## 2019-07-23 PROCEDURE — 3077F SYST BP >= 140 MM HG: CPT | Mod: CPTII,S$GLB,, | Performed by: PHYSICIAN ASSISTANT

## 2019-07-23 PROCEDURE — 3078F DIAST BP <80 MM HG: CPT | Mod: CPTII,S$GLB,, | Performed by: PHYSICIAN ASSISTANT

## 2019-07-23 PROCEDURE — 3078F PR MOST RECENT DIASTOLIC BLOOD PRESSURE < 80 MM HG: ICD-10-PCS | Mod: CPTII,S$GLB,, | Performed by: PHYSICIAN ASSISTANT

## 2019-07-23 PROCEDURE — 99204 PR OFFICE/OUTPT VISIT, NEW, LEVL IV, 45-59 MIN: ICD-10-PCS | Mod: S$GLB,,, | Performed by: PHYSICIAN ASSISTANT

## 2019-07-23 PROCEDURE — 99999 PR PBB SHADOW E&M-EST. PATIENT-LVL IV: ICD-10-PCS | Mod: PBBFAC,,, | Performed by: PHYSICIAN ASSISTANT

## 2019-07-23 PROCEDURE — 72120 X-RAY BEND ONLY L-S SPINE: CPT | Mod: TC,FY

## 2019-07-23 PROCEDURE — 72100 XR LUMBAR SPINE AP AND LAT WITH FLEX/EXT: ICD-10-PCS | Mod: 26,,, | Performed by: RADIOLOGY

## 2019-07-23 PROCEDURE — 3077F PR MOST RECENT SYSTOLIC BLOOD PRESSURE >= 140 MM HG: ICD-10-PCS | Mod: CPTII,S$GLB,, | Performed by: PHYSICIAN ASSISTANT

## 2019-07-23 RX ORDER — GABAPENTIN 300 MG/1
300 CAPSULE ORAL 3 TIMES DAILY
Qty: 270 CAPSULE | Refills: 0 | Status: SHIPPED | OUTPATIENT
Start: 2019-07-23 | End: 2019-07-24

## 2019-07-23 RX ORDER — DICLOFENAC SODIUM 75 MG/1
75 TABLET, DELAYED RELEASE ORAL 2 TIMES DAILY PRN
Qty: 180 TABLET | Refills: 0 | Status: SHIPPED | OUTPATIENT
Start: 2019-07-23 | End: 2019-10-16 | Stop reason: ALTCHOICE

## 2019-07-23 NOTE — PROGRESS NOTES
Subjective:     Patient ID:  Diana Herring is a 74 y.o. female.    [facility]    Chief Complaint: Low back and bilateral leg pain    HPI    Diana Herring is a 74 y.o. female who presents with the above CC.  Patient had back pain about 15 years ago and had ESIs at that time.  The pain would come and go over the years.  About 4 months ago the pain has gotten progressively worse and more constant.  Pain across the low back rated 8/10 with radiation down the bilateral posterior legs to the bottom of the feet.  Pain worse with walking and better with laying down.      Back pain worse than leg pain.    Left leg pain worse than right leg pain.    Patient had PT that didn't help.  No recent ESIs.  No spine surgery.  Patient is currently taking tylenol and gabapentin 100mg TID.    Patient denies any recent accidents or trauma, no saddle anesthesias, and no bowel or bladder incontinence.      Review of Systems:    Review of Systems   Constitutional: Negative for chills, diaphoresis, fever, malaise/fatigue and weight loss.   HENT: Negative for congestion, ear discharge, ear pain, hearing loss, nosebleeds, sinus pain, sore throat and tinnitus.    Eyes: Negative for blurred vision, double vision, photophobia, pain, discharge and redness.   Respiratory: Negative for cough, hemoptysis, sputum production, shortness of breath, wheezing and stridor.    Cardiovascular: Negative for chest pain, palpitations, orthopnea, leg swelling and PND.   Gastrointestinal: Negative for abdominal pain, blood in stool, constipation, diarrhea, heartburn, melena, nausea and vomiting.   Genitourinary: Negative for dysuria, flank pain, frequency, hematuria and urgency.   Musculoskeletal: Positive for back pain and joint pain. Negative for falls, myalgias and neck pain.   Skin: Negative for itching and rash.   Neurological: Negative for dizziness, tingling, tremors, sensory change, speech change, seizures, loss of consciousness, weakness and  headaches.   Endo/Heme/Allergies: Negative for environmental allergies and polydipsia. Does not bruise/bleed easily.   Psychiatric/Behavioral: Negative for depression, hallucinations, memory loss and substance abuse. The patient is not nervous/anxious and does not have insomnia.          Past Medical History:   Diagnosis Date    Anxiety     Arthritis     Corneal abrasion s    ? eye     Depression     Full dentures     GERD (gastroesophageal reflux disease)     Hyperlipidemia     Hypertension     Nuclear sclerosis of both eyes 6/5/2017    Osteoporosis     Restless leg syndrome      Past Surgical History:   Procedure Laterality Date    COLONOSCOPY N/A 5/14/2019    [facility]    COLONOSCOPY N/A 12/3/2013    [facility]    HEART CATH-LEFT Left 2/23/2015    [facility] Quoc [facility]uro [facility] CATH LAB    HYSTERECTOMY      PARTIAL HYSTERECTOMY      TUBAL LIGATION Bilateral      Current Outpatient Medications on File Prior to Visit   Medication Sig Dispense Refill    alendronate (FOSAMAX) 70 MG tablet TAKE 1 TABLET BY MOUTH ONE TIME PER WEEK 12 tablet 1    amLODIPine (NORVASC) 10 MG tablet TAKE 1 TABLET (10 MG TOTAL) BY MOUTH ONCE DAILY. 90 tablet 1    atorvastatin (LIPITOR) 40 MG tablet Take 1 tablet (40 mg total) by mouth once daily. 90 tablet 2    calcium-vitamin D 500-125 mg-unit tablet Take 1 tablet by mouth once daily.        fluticasone (FLONASE) 50 mcg/actuation nasal spray 2 sprays (100 mcg total) by Each Nare route once daily. 16 g 0    levocetirizine (XYZAL) 5 MG tablet Take 1 tablet (5 mg total) by mouth every evening. 30 tablet 5    metoprolol succinate (TOPROL-XL) 100 MG 24 hr tablet TAKE 1 TABLET BY MOUTH EVERY DAY 90 tablet 1    multivitamin (MULTIVITAMIN) per tablet Take 1 tablet by mouth once daily.        omeprazole (PRILOSEC) 20 MG capsule TAKE 1 CAPSULE BY MOUTH EVERY DAY 90 capsule 1    paroxetine  (PAXIL) 20 MG tablet Take 1 tablet (20 mg total) by mouth every morning. 90 tablet 1    traZODone (DESYREL) 100 MG tablet Take 1 tablet (100 mg total) by mouth every evening. 90 tablet 1    [DISCONTINUED] gabapentin (NEURONTIN) 100 MG capsule TAKE 1 CAPSULE BY MOUTH THREE TIMES A DAY 90 capsule 2    GAVILYTE-C 240-22.72-6.72 -5.84 gram SolR TAKE 4,000 MLS (4 L TOTAL) BY MOUTH ONCE. FOR 1 DOSE  0    rOPINIRole (REQUIP) 0.5 MG tablet Take 1 tablet (0.5 mg total) by mouth every evening. 90 tablet 1    simethicone (MYLICON) 80 MG chewable tablet Take 1 tablet (80 mg total) by mouth every 6 (six) hours as needed for Flatulence. 60 tablet 2     No current facility-administered medications on file prior to visit.      Review of patient's allergies indicates:  No Known Allergies  Social History     Socioeconomic History    Marital status:      Spouse name: Not on file    Number of children: Not on file    Years of education: Not on file    Highest education level: Not on file   Occupational History    Not on file   Social Needs    Financial resource strain: Not on file    Food insecurity:     Worry: Not on file     Inability: Not on file    Transportation needs:     Medical: Not on file     Non-medical: Not on file   Tobacco Use    Smoking status: Never Smoker    Smokeless tobacco: Never Used   Substance and Sexual Activity    Alcohol use: No    Drug use: No    Sexual activity: Never   Lifestyle    Physical activity:     Days per week: Not on file     Minutes per session: Not on file    Stress: Not on file   Relationships    Social connections:     Talks on phone: Not on file     Gets together: Not on file     Attends Rastafarian service: Not on file     Active member of club or organization: Not on file     Attends meetings of clubs or organizations: Not on file     Relationship status: Not on file   Other Topics Concern    Not on file   Social History Narrative    Patient is  w/ 5  children, one  from heart disease.The patient is retired.     Family History   Problem Relation Age of Onset    Stroke Mother     Glaucoma Mother     Cataracts Mother     Cancer Father         Lung    No Known Problems Brother     No Known Problems Son     Stroke Brother     Heart disease Son     No Known Problems Sister     No Known Problems Maternal Aunt     No Known Problems Maternal Uncle     No Known Problems Paternal Aunt     No Known Problems Paternal Uncle     No Known Problems Maternal Grandmother     No Known Problems Maternal Grandfather     No Known Problems Paternal Grandmother     No Known Problems Paternal Grandfather     Amblyopia Neg Hx     Blindness Neg Hx     Diabetes Neg Hx     Hypertension Neg Hx     Macular degeneration Neg Hx     Retinal detachment Neg Hx     Strabismus Neg Hx     Thyroid disease Neg Hx        Objective:      Vitals:    19 1016   BP: (!) 142/64   Pulse: 60   Temp: 97.6 °F (36.4 °C)   Weight: 64.9 kg (143 lb)   Height: 5' (1.524 m)   PainSc:   8   PainLoc: Back         Physical Exam:    General:  Diana Herring is well-developed, well-nourished, appears stated age, in no acute distress, alert and oriented to person, place, and time.    Pulmonary/Chest:  Respiratory effort normal  Abdominal: Exhibits no distension  Psychiatric:  Normal mood and affect.  Behavior is normal.  Judgement and thought content normal    Musculoskeletal:    Patient arises from a sitting to standing position without difficulty.  Patient walks to the door without evidence of limp, pain, or abnormality of gait. Patient is able to walk on heels and toes without difficulty.    Lumbar ROM:   Pain in lumbar flexion, extension, right lateral bending, and left lateral bending.  Worse in extension.    Lumbar Spine Inspection:  Normal with no surgical scars and no visible rashes.    Lumbar Spine Palpation:   Mild tenderness to low back palpation.    SI Joint  Palpation:  Mild tenderness to SI Joint palpation.    Straight Leg Raise:  Positive right and left SLR.    Neurological: Alert and oriented to person, place, and time    Muscle strength against resistance:     Right Left   Hip flexion  5 / 5 5 / 5   Hip extension 5 / 5 5 / 5   Hip abduction 5 / 5 5 / 5   Hip adduction  5 / 5 5 / 5   Knee extension  5 / 5 5 / 5   Knee flexion 5 / 5 5 / 5   Dorsiflexion  5 / 5 5 / 5   EHL  5 / 5 5 / 5   Plantar flexion  5 / 5 5 / 5   Inversion of the feet 5 / 5 5 / 5   Eversion of the feet  5 / 5 5 / 5     Reflexes:     Right Left   Patellar 2+ 2+   Achilles 2+ 2+     Clonus:  Negative bilaterally    On gross examination of the bilateral upper extremities, patient has full painfree ROM with no signs of clubbing, cyanosis, edema, or weakness.     XRAY Interpretation:     Lumbar spine ap/lateral/flexion/extension xrays were personally reviewed today.  No fractures.  No movement on flexion and extension.  DDD at L1-2, L2-3, and L3-4.      Assessment:          1. Chronic bilateral low back pain with bilateral sciatica    2. DDD (degenerative disc disease), lumbar    3. Other spondylosis with radiculopathy, lumbar region    4. Lumbar radiculopathy            Plan:          Orders Placed This Encounter    MRI Lumbar Spine Without Contrast    gabapentin (NEURONTIN) 300 MG capsule    diclofenac (VOLTAREN) 75 MG EC tablet       L1-2, L2-3, and L3-4 DDD and spondylosis.  Patient describing spinal stenosis with neurogenic claudication    -Diclofenac PRN with food  -Increase Neurontin to 300mg TID  -MRI lumbar spine and fu after for further recommendations    Follow-Up:  Follow up in about 3 months (around 10/23/2019). If there are any questions prior to this, the patient was instructed to contact the office.       Clary Cuadra, [facility], PA-C  Neurosurgery  Back and Spine Center  [facility]

## 2019-07-23 NOTE — LETTER
July 23, 2019      Alessandra Brown MD  [address]  [place] [address]           [address]  [address], [address]  [facility] [address]  Phone: [phone]  Fax: [phone]          Patient: Diana Herring   MR Number: [id number]   YOB: 1945   Date of Visit: 7/23/2019       Dear Dr. Alessandra Brown:    Thank you for referring Diana Herring to me for evaluation. Attached you will find relevant portions of my assessment and plan of care.    If you have questions, please do not hesitate to call me. I look forward to following Diana Herring along with you.    Sincerely,    JAZMIN Hollandosure  CC:  No Recipients    If you would like to receive this communication electronically, please contact [email or web address] or [phone] to request more information on [employer] access.    For providers and/or their staff who would like to refer a patient to [facility], please contact us through our one-stop-shop provider referral line, [facility], at [phone].    If you feel you have received this communication in error or would no longer like to receive these types of communications, please e-mail [email or web address]

## 2019-07-24 DIAGNOSIS — M79.2 PAROXYSMAL NERVE PAIN: ICD-10-CM

## 2019-07-24 RX ORDER — GABAPENTIN 100 MG/1
CAPSULE ORAL
Qty: 90 CAPSULE | Refills: 2 | Status: SHIPPED | OUTPATIENT
Start: 2019-07-24 | End: 2019-10-16 | Stop reason: ALTCHOICE

## 2019-07-25 ENCOUNTER — TELEPHONE (OUTPATIENT)
Dept: RADIOLOGY | Facility: HOSPITAL | Age: 74
End: 2019-07-25

## 2019-07-26 ENCOUNTER — HOSPITAL ENCOUNTER (OUTPATIENT)
Dept: RADIOLOGY | Facility: HOSPITAL | Age: 74
Discharge: HOME | End: 2019-07-26
Attending: PHYSICIAN ASSISTANT
Payer: MEDICARE

## 2019-07-26 DIAGNOSIS — G89.29 CHRONIC BILATERAL LOW BACK PAIN WITH BILATERAL SCIATICA: ICD-10-CM

## 2019-07-26 DIAGNOSIS — M54.16 LUMBAR RADICULOPATHY: ICD-10-CM

## 2019-07-26 DIAGNOSIS — M47.26 OTHER SPONDYLOSIS WITH RADICULOPATHY, LUMBAR REGION: ICD-10-CM

## 2019-07-26 DIAGNOSIS — M54.41 CHRONIC BILATERAL LOW BACK PAIN WITH BILATERAL SCIATICA: ICD-10-CM

## 2019-07-26 DIAGNOSIS — M51.36 DDD (DEGENERATIVE DISC DISEASE), LUMBAR: ICD-10-CM

## 2019-07-26 DIAGNOSIS — M54.42 CHRONIC BILATERAL LOW BACK PAIN WITH BILATERAL SCIATICA: ICD-10-CM

## 2019-07-26 PROCEDURE — 72148 MRI LUMBAR SPINE WITHOUT CONTRAST: ICD-10-PCS | Mod: 26,,, | Performed by: SPECIALIST

## 2019-07-26 PROCEDURE — 72148 MRI LUMBAR SPINE W/O DYE: CPT | Mod: TC

## 2019-07-26 PROCEDURE — 72148 MRI LUMBAR SPINE W/O DYE: CPT | Mod: 26,,, | Performed by: SPECIALIST

## 2019-07-28 ENCOUNTER — TELEPHONE (OUTPATIENT)
Dept: SPINE | Facility: CLINIC | Age: 74
End: 2019-07-28

## 2019-07-28 NOTE — TELEPHONE ENCOUNTER
Please call patient and tell her the MRI lumbar spine showed possible kidney cysts and she should contact her PCP about that.    Thanks,  Clary Cuadra, LISETTE, PA-C  Neurosurgery  Back and Spine Center  [facility]

## 2019-07-29 NOTE — TELEPHONE ENCOUNTER
Called and spoke with patient. Informed her the MRI lumbar spine showed possible kidney cysts and she should contact her PCP about that.  Patient verbalized understanding and stated that she has an appointment with her primary tomorrow.

## 2019-07-30 ENCOUNTER — OFFICE VISIT (OUTPATIENT)
Dept: FAMILY MEDICINE | Facility: CLINIC | Age: 74
End: 2019-07-30
Payer: MEDICARE

## 2019-07-30 ENCOUNTER — TELEPHONE (OUTPATIENT)
Dept: SPINE | Facility: CLINIC | Age: 74
End: 2019-07-30

## 2019-07-30 VITALS
HEART RATE: 58 BPM | SYSTOLIC BLOOD PRESSURE: 124 MMHG | WEIGHT: 147.63 LBS | RESPIRATION RATE: 18 BRPM | BODY MASS INDEX: 28.98 KG/M2 | HEIGHT: 60 IN | TEMPERATURE: 98 F | DIASTOLIC BLOOD PRESSURE: 76 MMHG | OXYGEN SATURATION: 99 %

## 2019-07-30 DIAGNOSIS — M54.42 CHRONIC MIDLINE LOW BACK PAIN WITH BILATERAL SCIATICA: Primary | ICD-10-CM

## 2019-07-30 DIAGNOSIS — Z23 NEED FOR SHINGLES VACCINE: ICD-10-CM

## 2019-07-30 DIAGNOSIS — M54.41 CHRONIC MIDLINE LOW BACK PAIN WITH BILATERAL SCIATICA: Primary | ICD-10-CM

## 2019-07-30 DIAGNOSIS — I10 ESSENTIAL HYPERTENSION: Chronic | ICD-10-CM

## 2019-07-30 DIAGNOSIS — G89.29 CHRONIC MIDLINE LOW BACK PAIN WITH BILATERAL SCIATICA: Primary | ICD-10-CM

## 2019-07-30 DIAGNOSIS — N28.1 RENAL CYST: ICD-10-CM

## 2019-07-30 PROCEDURE — 99999 PR PBB SHADOW E&M-EST. PATIENT-LVL V: CPT | Mod: PBBFAC,,, | Performed by: FAMILY MEDICINE

## 2019-07-30 PROCEDURE — 99214 OFFICE O/P EST MOD 30 MIN: CPT | Mod: 25,S$GLB,, | Performed by: FAMILY MEDICINE

## 2019-07-30 PROCEDURE — 90471 ZOSTER RECOMBINANT VACCINE: ICD-10-PCS | Mod: S$GLB,,, | Performed by: FAMILY MEDICINE

## 2019-07-30 PROCEDURE — 3074F PR MOST RECENT SYSTOLIC BLOOD PRESSURE < 130 MM HG: ICD-10-PCS | Mod: CPTII,S$GLB,, | Performed by: FAMILY MEDICINE

## 2019-07-30 PROCEDURE — 3078F PR MOST RECENT DIASTOLIC BLOOD PRESSURE < 80 MM HG: ICD-10-PCS | Mod: CPTII,S$GLB,, | Performed by: FAMILY MEDICINE

## 2019-07-30 PROCEDURE — 99999 PR PBB SHADOW E&M-EST. PATIENT-LVL V: ICD-10-PCS | Mod: PBBFAC,,, | Performed by: FAMILY MEDICINE

## 2019-07-30 PROCEDURE — 90750 HZV VACC RECOMBINANT IM: CPT | Mod: S$GLB,,, | Performed by: FAMILY MEDICINE

## 2019-07-30 PROCEDURE — 1101F PR PT FALLS ASSESS DOC 0-1 FALLS W/OUT INJ PAST YR: ICD-10-PCS | Mod: CPTII,S$GLB,, | Performed by: FAMILY MEDICINE

## 2019-07-30 PROCEDURE — 3078F DIAST BP <80 MM HG: CPT | Mod: CPTII,S$GLB,, | Performed by: FAMILY MEDICINE

## 2019-07-30 PROCEDURE — 3074F SYST BP LT 130 MM HG: CPT | Mod: CPTII,S$GLB,, | Performed by: FAMILY MEDICINE

## 2019-07-30 PROCEDURE — 90750 ZOSTER RECOMBINANT VACCINE: ICD-10-PCS | Mod: S$GLB,,, | Performed by: FAMILY MEDICINE

## 2019-07-30 PROCEDURE — 99214 PR OFFICE/OUTPT VISIT, EST, LEVL IV, 30-39 MIN: ICD-10-PCS | Mod: 25,S$GLB,, | Performed by: FAMILY MEDICINE

## 2019-07-30 PROCEDURE — 1101F PT FALLS ASSESS-DOCD LE1/YR: CPT | Mod: CPTII,S$GLB,, | Performed by: FAMILY MEDICINE

## 2019-07-30 PROCEDURE — 90471 IMMUNIZATION ADMIN: CPT | Mod: S$GLB,,, | Performed by: FAMILY MEDICINE

## 2019-07-30 NOTE — PROGRESS NOTES
Chief Complaint   Patient presents with    Back Pain    Follow-up       HPI  Dianagenet Herring is a 74 y.o. female with multiple medical diagnoses as listed in the medical history and problem list that presents for follow-up for chronic back pain    She has been seeing back and spine and had a recent MRI that showed spinal stenosis along with renal cysts. She has concerns about this and if this is causing her pain.     MRI Lumbar Spine Result 7/26/2019:  Multilevel chronic change of the lumbar spine with variable spinal canal and foraminal narrowing/stenosis as detailed above.  Correlation for any specific level of symptomatology is needed.  The greatest level of spinal canal stenosis is seen at L3-4.    PAST MEDICAL HISTORY:  Past Medical History:   Diagnosis Date    Anxiety     Arthritis     Corneal abrasion s    ? eye     Depression     Full dentures     GERD (gastroesophageal reflux disease)     Hyperlipidemia     Hypertension     Nuclear sclerosis of both eyes 6/5/2017    Osteoporosis     Restless leg syndrome        PAST SURGICAL HISTORY:  Past Surgical History:   Procedure Laterality Date    COLONOSCOPY N/A 5/14/2019    [facility]    COLONOSCOPY N/A 12/3/2013    [facility]    HEART CATH-LEFT Left 2/23/2015    [facility] CATH LAB    HYSTERECTOMY      PARTIAL HYSTERECTOMY      TUBAL LIGATION Bilateral        SOCIAL HISTORY:  Social History     Socioeconomic History    Marital status:      Spouse name: Not on file    Number of children: Not on file    Years of education: Not on file    Highest education level: Not on file   Occupational History    Not on file   Social Needs    Financial resource strain: Not on file    Food insecurity:     Worry: Not on file     Inability: Not on file    Transportation needs:     Medical: Not on file     Non-medical: Not on file   Tobacco Use     Smoking status: Never Smoker    Smokeless tobacco: Never Used   Substance and Sexual Activity    Alcohol use: No    Drug use: No    Sexual activity: Never   Lifestyle    Physical activity:     Days per week: Not on file     Minutes per session: Not on file    Stress: Not on file   Relationships    Social connections:     Talks on phone: Not on file     Gets together: Not on file     Attends Congregational service: Not on file     Active member of club or organization: Not on file     Attends meetings of clubs or organizations: Not on file     Relationship status: Not on file   Other Topics Concern    Not on file   Social History Narrative    Patient is  w/ 5 children, one  from heart disease.The patient is retired.       FAMILY HISTORY:  Family History   Problem Relation Age of Onset    Stroke Mother     Glaucoma Mother     Cataracts Mother     Cancer Father         Lung    No Known Problems Brother     No Known Problems Son     Stroke Brother     Heart disease Son     No Known Problems Sister     No Known Problems Maternal Aunt     No Known Problems Maternal Uncle     No Known Problems Paternal Aunt     No Known Problems Paternal Uncle     No Known Problems Maternal Grandmother     No Known Problems Maternal Grandfather     No Known Problems Paternal Grandmother     No Known Problems Paternal Grandfather     Amblyopia Neg Hx     Blindness Neg Hx     Diabetes Neg Hx     Hypertension Neg Hx     Macular degeneration Neg Hx     Retinal detachment Neg Hx     Strabismus Neg Hx     Thyroid disease Neg Hx        ALLERGIES AND MEDICATIONS: updated and reviewed.  Review of patient's allergies indicates:  No Known Allergies  Current Outpatient Medications   Medication Sig Dispense Refill    alendronate (FOSAMAX) 70 MG tablet TAKE 1 TABLET BY MOUTH ONE TIME PER WEEK 12 tablet 1    amLODIPine (NORVASC) 10 MG tablet TAKE 1 TABLET (10 MG TOTAL) BY MOUTH ONCE DAILY. 90 tablet 1     atorvastatin (LIPITOR) 40 MG tablet Take 1 tablet (40 mg total) by mouth once daily. 90 tablet 2    calcium-vitamin D 500-125 mg-unit tablet Take 1 tablet by mouth once daily.        fluticasone (FLONASE) 50 mcg/actuation nasal spray 2 sprays (100 mcg total) by Each Nare route once daily. 16 g 0    gabapentin (NEURONTIN) 100 MG capsule TAKE 1 CAPSULE BY MOUTH THREE TIMES A DAY 90 capsule 2    GAVILYTE-C 240-22.72-6.72 -5.84 gram SolR TAKE 4,000 MLS (4 L TOTAL) BY MOUTH ONCE. FOR 1 DOSE  0    levocetirizine (XYZAL) 5 MG tablet Take 1 tablet (5 mg total) by mouth every evening. 30 tablet 5    metoprolol succinate (TOPROL-XL) 100 MG 24 hr tablet TAKE 1 TABLET BY MOUTH EVERY DAY 90 tablet 1    multivitamin (MULTIVITAMIN) per tablet Take 1 tablet by mouth once daily.        omeprazole (PRILOSEC) 20 MG capsule TAKE 1 CAPSULE BY MOUTH EVERY DAY 90 capsule 1    paroxetine (PAXIL) 20 MG tablet Take 1 tablet (20 mg total) by mouth every morning. 90 tablet 1    rOPINIRole (REQUIP) 0.5 MG tablet Take 1 tablet (0.5 mg total) by mouth every evening. 90 tablet 1    simethicone (MYLICON) 80 MG chewable tablet Take 1 tablet (80 mg total) by mouth every 6 (six) hours as needed for Flatulence. 60 tablet 2    traZODone (DESYREL) 100 MG tablet Take 1 tablet (100 mg total) by mouth every evening. 90 tablet 1    diclofenac (VOLTAREN) 75 MG EC tablet Take 1 tablet (75 mg total) by mouth 2 (two) times daily as needed. 180 tablet 0     No current facility-administered medications for this visit.        ROS  Review of Systems   Constitutional: Negative for chills, diaphoresis, fatigue, fever and unexpected weight change.   HENT: Negative for rhinorrhea, sinus pressure, sore throat and tinnitus.    Eyes: Negative for photophobia and visual disturbance.   Respiratory: Negative for cough, shortness of breath and wheezing.    Cardiovascular: Negative for chest pain and palpitations.   Gastrointestinal: Negative for abdominal pain,  blood in stool, constipation, diarrhea, nausea and vomiting.   Genitourinary: Negative for dysuria, flank pain, frequency and vaginal discharge.   Musculoskeletal: Positive for back pain. Negative for arthralgias and joint swelling.   Skin: Negative for rash.   Neurological: Negative for speech difficulty, weakness, light-headedness and headaches.   Psychiatric/Behavioral: Negative for behavioral problems and dysphoric mood.       Physical Exam  Vitals:    07/30/19 0942   BP: 124/76   Pulse: (!) 58   Resp: 18   Temp: 97.9 °F (36.6 °C)   TempSrc: Oral   SpO2: 99%   Weight: 67 kg (147 lb 9.6 oz)   Height: 5' (1.524 m)    Body mass index is 28.83 kg/m².  Weight: 67 kg (147 lb 9.6 oz)   Height: 5' (152.4 cm)     Physical Exam    Health Maintenance       Date Due Completion Date    Shingles Vaccine (1 of 2) 01/04/1996 ---    TETANUS VACCINE 08/31/2034 (Originally 1/4/1964) ---    Influenza Vaccine 08/01/2019 8/1/2018 (Done)    Override on 8/1/2018: Done    DEXA SCAN 11/02/2020 11/2/2017    Mammogram 06/18/2021 6/18/2019    Lipid Panel 02/22/2024 2/22/2019    Colonoscopy 05/14/2024 5/14/2019          Health maintenance reviewed and addressed as ordered      ASSESSMENT     1. Chronic midline low back pain with bilateral sciatica    2. Essential hypertension    3. Renal cyst    4. Need for shingles vaccine        PLAN:     Problem List Items Addressed This Visit        Cardiac/Vascular    Essential hypertension (Chronic)  -continue current regimen    Overview     On metoprolol and amlodipine            Orthopedic    Chronic midline low back pain with bilateral sciatica - Primary  -sent message via epic to back and spine to determine if she should see surgery or pain management      Other Visit Diagnoses     Renal cyst      -will image with more reliable test    Relevant Orders    US Retroperitoneal Complete    Need for shingles vaccine      -as ordered       Relevant Orders    (In Office Administered) Zoster Recombinant  Vaccine            Alessandra Brown MD  07/30/2019 10:21 AM        Follow up in about 3 months (around 10/30/2019) for Follow up.

## 2019-07-30 NOTE — TELEPHONE ENCOUNTER
----- Message from Wilda Scruggs PA-C sent at 7/30/2019 12:45 PM CDT -----  Regarding: FW: MRI  Hi Naz Ramsey is out and I am covering her messages. We can get patient in this week (Thursday or Friday) to review her MRI with Naz and discuss further recommendations. Our staff will contact her.     Thank you,     Wilda Scruggs PA-C  [facility]    ----- Message -----  From: Alessandra Brown MD  Sent: 7/30/2019  10:10 AM  To: Clary Cuadra PA-C  Subject: MRI                                              Hi Calry,    I'm seeing Diana in clinic today, and she was wondering what the next step was regarding her spine. I see her MRI does show some stenosis. In your opinion is this surgical or should we consider pain management?    Thanks,    Alessandra Brown MD

## 2019-08-15 ENCOUNTER — OFFICE VISIT (OUTPATIENT)
Dept: SPINE | Facility: CLINIC | Age: 74
End: 2019-08-15
Payer: MEDICARE

## 2019-08-15 ENCOUNTER — TELEPHONE (OUTPATIENT)
Dept: PAIN MEDICINE | Facility: CLINIC | Age: 74
End: 2019-08-15

## 2019-08-15 VITALS
HEART RATE: 69 BPM | HEIGHT: 60 IN | SYSTOLIC BLOOD PRESSURE: 137 MMHG | WEIGHT: 147 LBS | DIASTOLIC BLOOD PRESSURE: 62 MMHG | TEMPERATURE: 98 F | BODY MASS INDEX: 28.86 KG/M2

## 2019-08-15 DIAGNOSIS — M47.26 OTHER SPONDYLOSIS WITH RADICULOPATHY, LUMBAR REGION: ICD-10-CM

## 2019-08-15 DIAGNOSIS — M54.16 LUMBAR RADICULOPATHY: ICD-10-CM

## 2019-08-15 DIAGNOSIS — M54.42 CHRONIC BILATERAL LOW BACK PAIN WITH BILATERAL SCIATICA: Primary | ICD-10-CM

## 2019-08-15 DIAGNOSIS — M51.36 DDD (DEGENERATIVE DISC DISEASE), LUMBAR: ICD-10-CM

## 2019-08-15 DIAGNOSIS — M54.41 CHRONIC BILATERAL LOW BACK PAIN WITH BILATERAL SCIATICA: Primary | ICD-10-CM

## 2019-08-15 DIAGNOSIS — G89.29 CHRONIC BILATERAL LOW BACK PAIN WITH BILATERAL SCIATICA: Primary | ICD-10-CM

## 2019-08-15 DIAGNOSIS — M48.062 SPINAL STENOSIS, LUMBAR REGION WITH NEUROGENIC CLAUDICATION: ICD-10-CM

## 2019-08-15 PROCEDURE — 3075F SYST BP GE 130 - 139MM HG: CPT | Mod: CPTII,S$GLB,, | Performed by: PHYSICIAN ASSISTANT

## 2019-08-15 PROCEDURE — 99499 RISK ADDL DX/OHS AUDIT: ICD-10-PCS | Mod: S$GLB,,, | Performed by: PHYSICIAN ASSISTANT

## 2019-08-15 PROCEDURE — 3078F DIAST BP <80 MM HG: CPT | Mod: CPTII,S$GLB,, | Performed by: PHYSICIAN ASSISTANT

## 2019-08-15 PROCEDURE — 99214 PR OFFICE/OUTPT VISIT, EST, LEVL IV, 30-39 MIN: ICD-10-PCS | Mod: S$GLB,,, | Performed by: PHYSICIAN ASSISTANT

## 2019-08-15 PROCEDURE — 99214 OFFICE O/P EST MOD 30 MIN: CPT | Mod: S$GLB,,, | Performed by: PHYSICIAN ASSISTANT

## 2019-08-15 PROCEDURE — 1101F PT FALLS ASSESS-DOCD LE1/YR: CPT | Mod: CPTII,S$GLB,, | Performed by: PHYSICIAN ASSISTANT

## 2019-08-15 PROCEDURE — 3078F PR MOST RECENT DIASTOLIC BLOOD PRESSURE < 80 MM HG: ICD-10-PCS | Mod: CPTII,S$GLB,, | Performed by: PHYSICIAN ASSISTANT

## 2019-08-15 PROCEDURE — 99999 PR PBB SHADOW E&M-EST. PATIENT-LVL V: CPT | Mod: PBBFAC,,, | Performed by: PHYSICIAN ASSISTANT

## 2019-08-15 PROCEDURE — 3075F PR MOST RECENT SYSTOLIC BLOOD PRESS GE 130-139MM HG: ICD-10-PCS | Mod: CPTII,S$GLB,, | Performed by: PHYSICIAN ASSISTANT

## 2019-08-15 PROCEDURE — 99999 PR PBB SHADOW E&M-EST. PATIENT-LVL V: ICD-10-PCS | Mod: PBBFAC,,, | Performed by: PHYSICIAN ASSISTANT

## 2019-08-15 PROCEDURE — 99499 UNLISTED E&M SERVICE: CPT | Mod: S$GLB,,, | Performed by: PHYSICIAN ASSISTANT

## 2019-08-15 PROCEDURE — 1101F PR PT FALLS ASSESS DOC 0-1 FALLS W/OUT INJ PAST YR: ICD-10-PCS | Mod: CPTII,S$GLB,, | Performed by: PHYSICIAN ASSISTANT

## 2019-08-15 NOTE — H&P (VIEW-ONLY)
Subjective:     Patient ID:  Diana Herring is a 74 y.o. female.    [facility]    Chief Complaint: Low back pain and bilateral leg pain    HPI    Diana Herring is a 74 y.o. female who presents for MRI follow up.    Patient had back pain about 15 years ago and had ESIs at that time.  The pain would come and go over the years.  About 4 months ago the pain has gotten progressively worse and more constant.  Pain across the low back rated 8/10 with radiation down the bilateral posterior legs to the bottom of the feet.  Pain worse with walking and better with laying down.       Back pain worse than leg pain.     Left leg pain worse than right leg pain.     Patient had PT that didn't help.  No recent ESIs.  No spine surgery.  Patient is currently taking diclofenac BID and gabapentin 300mg daily.     Patient denies any recent accidents or trauma, no saddle anesthesias, and no bowel or bladder incontinence.       Review of Systems:  Constitution: Negative for chills, fever, night sweats and weight loss.   Musculoskeletal: Negative for falls.   Gastrointestinal: Negative for bowel incontinence, nausea and vomiting.   Genitourinary: Negative for bladder incontinence.   Neurological: Negative for disturbances in coordination and loss of balance.      Objective:      Vitals:    08/15/19 1200   BP: 137/62   Pulse: 69   Temp: 98.2 °F (36.8 °C)   Weight: 66.7 kg (147 lb)   Height: 5' (1.524 m)   PainSc:   7   PainLoc: Back         Physical Exam:    General:  Diana Herring is well-developed, well-nourished, appears stated age, in no acute distress, alert and oriented to person, place, and time.    Patient sits comfortably in the exam room and answers questions appropriately. Grossly patient is able to move bilateral lower extremities without difficulty.     XRAY Interpretation:      Lumbar spine ap/lateral/flexion/extension xrays were personally reviewed today.  No fractures.  No movement on flexion and extension.  DDD at  L1-2, L2-3, and L3-4.    MRI Interpretation:     Lumbar spine MRI was personally reviewed today.  Multilevel DDD and spondylosis.  L3-4 central and bilateral NFS.  Mild central stenosis at L4-5.    Assessment:          1. Chronic bilateral low back pain with bilateral sciatica    2. DDD (degenerative disc disease), lumbar    3. Other spondylosis with radiculopathy, lumbar region    4. Lumbar radiculopathy    5. Spinal stenosis, lumbar region with neurogenic claudication            Plan:          Orders Placed This Encounter    Procedure Order to [facility]       Lumbar spondylosis.  L3-4, L4-5 central stenosis worse at L3-4.    -Increased gabapentin to 300mg TID  -Continue diclofenac 75mg BID  -Bilateral L3 TESI with Dr. Latham  -FU in two months    Follow-Up:  Follow up in about 2 months (around 10/15/2019). If there are any questions prior to this, the patient was instructed to contact the office.       Clray Cuadra [facility], PA-C  Neurosurgery  Back and Spine Center  [facility]

## 2019-08-15 NOTE — PROGRESS NOTES
Subjective:     Patient ID:  Diana Herring is a 74 y.o. female.    [facility]    Chief Complaint: Low back pain and bilateral leg pain    HPI    Diana Herring is a 74 y.o. female who presents for MRI follow up.    Patient had back pain about 15 years ago and had ESIs at that time.  The pain would come and go over the years.  About 4 months ago the pain has gotten progressively worse and more constant.  Pain across the low back rated 8/10 with radiation down the bilateral posterior legs to the bottom of the feet.  Pain worse with walking and better with laying down.       Back pain worse than leg pain.     Left leg pain worse than right leg pain.     Patient had PT that didn't help.  No recent ESIs.  No spine surgery.  Patient is currently taking diclofenac BID and gabapentin 300mg daily.     Patient denies any recent accidents or trauma, no saddle anesthesias, and no bowel or bladder incontinence.       Review of Systems:  Constitution: Negative for chills, fever, night sweats and weight loss.   Musculoskeletal: Negative for falls.   Gastrointestinal: Negative for bowel incontinence, nausea and vomiting.   Genitourinary: Negative for bladder incontinence.   Neurological: Negative for disturbances in coordination and loss of balance.      Objective:      Vitals:    08/15/19 1200   BP: 137/62   Pulse: 69   Temp: 98.2 °F (36.8 °C)   Weight: 66.7 kg (147 lb)   Height: 5' (1.524 m)   PainSc:   7   PainLoc: Back         Physical Exam:    General:  Diana Herring is well-developed, well-nourished, appears stated age, in no acute distress, alert and oriented to person, place, and time.    Patient sits comfortably in the exam room and answers questions appropriately. Grossly patient is able to move bilateral lower extremities without difficulty.     XRAY Interpretation:      Lumbar spine ap/lateral/flexion/extension xrays were personally reviewed today.  No fractures.  No movement on flexion and extension.  DDD at  L1-2, L2-3, and L3-4.    MRI Interpretation:     Lumbar spine MRI was personally reviewed today.  Multilevel DDD and spondylosis.  L3-4 central and bilateral NFS.  Mild central stenosis at L4-5.    Assessment:          1. Chronic bilateral low back pain with bilateral sciatica    2. DDD (degenerative disc disease), lumbar    3. Other spondylosis with radiculopathy, lumbar region    4. Lumbar radiculopathy    5. Spinal stenosis, lumbar region with neurogenic claudication            Plan:          Orders Placed This Encounter    Procedure Order to [facility]       Lumbar spondylosis.  L3-4, L4-5 central stenosis worse at L3-4.    -Increased gabapentin to 300mg TID  -Continue diclofenac 75mg BID  -Bilateral L3 TESI with Dr. Latham  -FU in two months    Follow-Up:  Follow up in about 2 months (around 10/15/2019). If there are any questions prior to this, the patient was instructed to contact the office.       Clary [facility], PA-C  Neurosurgery  Back [facility] Spine Center  [facility]

## 2019-08-19 ENCOUNTER — TELEPHONE (OUTPATIENT)
Dept: PAIN MEDICINE | Facility: CLINIC | Age: 74
End: 2019-08-19

## 2019-08-19 DIAGNOSIS — M51.36 DDD (DEGENERATIVE DISC DISEASE), LUMBAR: Primary | ICD-10-CM

## 2019-08-21 DIAGNOSIS — G47.00 INSOMNIA, UNSPECIFIED TYPE: ICD-10-CM

## 2019-08-21 RX ORDER — TRAZODONE HYDROCHLORIDE 100 MG/1
100 TABLET ORAL NIGHTLY
Qty: 90 TABLET | Refills: 1 | Status: SHIPPED | OUTPATIENT
Start: 2019-08-21 | End: 2019-10-31 | Stop reason: SDUPTHER

## 2019-08-29 ENCOUNTER — HOSPITAL ENCOUNTER (OUTPATIENT)
Dept: RADIOLOGY | Facility: HOSPITAL | Age: 74
Discharge: HOME | End: 2019-08-29
Attending: FAMILY MEDICINE
Payer: MEDICARE

## 2019-08-29 DIAGNOSIS — N28.1 RENAL CYST: ICD-10-CM

## 2019-08-29 PROCEDURE — 76770 US RETROPERITONEAL COMPLETE: ICD-10-PCS | Mod: 26,,, | Performed by: RADIOLOGY

## 2019-08-29 PROCEDURE — 76770 US EXAM ABDO BACK WALL COMP: CPT | Mod: TC

## 2019-08-29 PROCEDURE — 76770 US EXAM ABDO BACK WALL COMP: CPT | Mod: 26,,, | Performed by: RADIOLOGY

## 2019-09-03 ENCOUNTER — TELEPHONE (OUTPATIENT)
Dept: FAMILY MEDICINE | Facility: CLINIC | Age: 74
End: 2019-09-03

## 2019-09-03 DIAGNOSIS — N28.1 RENAL CYST: Primary | ICD-10-CM

## 2019-09-03 NOTE — PROGRESS NOTES
Please let her know she has one cyst in each kidney, however they are simple and we can monitor them with routine ultrasounds for the next one to two years.   Another option is to refer her to a kidney specialist. Let me know if she would like a referral    Alessandra Brown MD

## 2019-09-03 NOTE — TELEPHONE ENCOUNTER
----- Message from Alessandra Brown MD sent at 9/3/2019 10:56 AM CDT -----  Please let her know she has one cyst in each kidney, however they are simple and we can monitor them with routine ultrasounds for the next one to two years.   Another option is to refer her to a kidney specialist. Let me know if she would like a referral    Alessandra Brown MD

## 2019-09-04 ENCOUNTER — HOSPITAL ENCOUNTER (OUTPATIENT)
Facility: OTHER | Age: 74
Discharge: HOME | End: 2019-09-04
Attending: ANESTHESIOLOGY | Admitting: ANESTHESIOLOGY
Payer: MEDICARE

## 2019-09-04 VITALS
DIASTOLIC BLOOD PRESSURE: 51 MMHG | RESPIRATION RATE: 16 BRPM | BODY MASS INDEX: 28.27 KG/M2 | SYSTOLIC BLOOD PRESSURE: 104 MMHG | WEIGHT: 144 LBS | TEMPERATURE: 98 F | HEIGHT: 60 IN | OXYGEN SATURATION: 97 % | HEART RATE: 59 BPM

## 2019-09-04 DIAGNOSIS — M54.16 LUMBAR RADICULOPATHY: Primary | ICD-10-CM

## 2019-09-04 DIAGNOSIS — M51.36 DDD (DEGENERATIVE DISC DISEASE), LUMBAR: ICD-10-CM

## 2019-09-04 DIAGNOSIS — G89.29 CHRONIC PAIN: ICD-10-CM

## 2019-09-04 PROCEDURE — 99152 PR MOD CONSCIOUS SEDATION, SAME PHYS, 5+ YRS, FIRST 15 MIN: ICD-10-PCS | Mod: ,,, | Performed by: ANESTHESIOLOGY

## 2019-09-04 PROCEDURE — 63600175 PHARM REV CODE 636 W HCPCS: Performed by: ANESTHESIOLOGY

## 2019-09-04 PROCEDURE — 64483 PR EPIDURAL INJ, ANES/STEROID, TRANSFORAMINAL, LUMB/SACR, SNGL LEVL: ICD-10-PCS | Mod: 50,,, | Performed by: ANESTHESIOLOGY

## 2019-09-04 PROCEDURE — 99152 MOD SED SAME PHYS/QHP 5/>YRS: CPT | Mod: ,,, | Performed by: ANESTHESIOLOGY

## 2019-09-04 PROCEDURE — 64483 NJX AA&/STRD TFRM EPI L/S 1: CPT | Mod: 50,,, | Performed by: ANESTHESIOLOGY

## 2019-09-04 PROCEDURE — 63600175 PHARM REV CODE 636 W HCPCS: Performed by: STUDENT IN AN ORGANIZED HEALTH CARE EDUCATION/TRAINING PROGRAM

## 2019-09-04 PROCEDURE — 25000003 PHARM REV CODE 250: Performed by: ANESTHESIOLOGY

## 2019-09-04 PROCEDURE — 25500020 PHARM REV CODE 255: Performed by: ANESTHESIOLOGY

## 2019-09-04 PROCEDURE — 64483 NJX AA&/STRD TFRM EPI L/S 1: CPT | Mod: 50 | Performed by: ANESTHESIOLOGY

## 2019-09-04 RX ORDER — LIDOCAINE HYDROCHLORIDE 10 MG/ML
INJECTION INFILTRATION; PERINEURAL
Status: DISCONTINUED | OUTPATIENT
Start: 2019-09-04 | End: 2019-09-04 | Stop reason: HOSPADM

## 2019-09-04 RX ORDER — LIDOCAINE HYDROCHLORIDE 5 MG/ML
INJECTION, SOLUTION INFILTRATION; INTRAVENOUS
Status: DISCONTINUED | OUTPATIENT
Start: 2019-09-04 | End: 2019-09-04 | Stop reason: HOSPADM

## 2019-09-04 RX ORDER — FENTANYL CITRATE 50 UG/ML
INJECTION, SOLUTION INTRAMUSCULAR; INTRAVENOUS
Status: DISCONTINUED | OUTPATIENT
Start: 2019-09-04 | End: 2019-09-04 | Stop reason: HOSPADM

## 2019-09-04 RX ORDER — DEXAMETHASONE SODIUM PHOSPHATE 10 MG/ML
INJECTION INTRAMUSCULAR; INTRAVENOUS
Status: DISCONTINUED | OUTPATIENT
Start: 2019-09-04 | End: 2019-09-04 | Stop reason: HOSPADM

## 2019-09-04 RX ORDER — SODIUM CHLORIDE 9 MG/ML
500 INJECTION, SOLUTION INTRAVENOUS CONTINUOUS
Status: DISCONTINUED | OUTPATIENT
Start: 2019-09-04 | End: 2019-09-04 | Stop reason: HOSPADM

## 2019-09-04 RX ORDER — MIDAZOLAM HYDROCHLORIDE 1 MG/ML
INJECTION INTRAMUSCULAR; INTRAVENOUS
Status: DISCONTINUED | OUTPATIENT
Start: 2019-09-04 | End: 2019-09-04 | Stop reason: HOSPADM

## 2019-09-04 RX ADMIN — SODIUM CHLORIDE 500 ML: 0.9 INJECTION, SOLUTION INTRAVENOUS at 08:09

## 2019-09-04 NOTE — DISCHARGE SUMMARY
Discharge Note  Short Stay      SUMMARY     Admit Date: 9/4/2019    Attending Physician: Miguel Latham      Discharge Physician: Miguel Latham      Discharge Date: 9/4/2019 9:41 AM    Procedure(s) (LRB):  Injection,steroid,epidural,transforaminal approach LUMBAR TRANSFORAMINAL BILATERAL L3 TF SANKET (Bilateral)    Final Diagnosis: DDD (degenerative disc disease), lumbar [M51.36]    Disposition: Home or self care    Patient Instructions:   Current Discharge Medication List      CONTINUE these medications which have NOT CHANGED    Details   alendronate (FOSAMAX) 70 MG tablet TAKE 1 TABLET BY MOUTH ONE TIME PER WEEK  Qty: 12 tablet, Refills: 1    Associated Diagnoses: Age-related osteoporosis without current pathological fracture      amLODIPine (NORVASC) 10 MG tablet TAKE 1 TABLET (10 MG TOTAL) BY MOUTH ONCE DAILY.  Qty: 90 tablet, Refills: 1    Associated Diagnoses: Essential hypertension      atorvastatin (LIPITOR) 40 MG tablet Take 1 tablet (40 mg total) by mouth once daily.  Qty: 90 tablet, Refills: 2    Associated Diagnoses: Hyperlipidemia, unspecified hyperlipidemia type      calcium-vitamin D 500-125 mg-unit tablet Take 1 tablet by mouth once daily.        diclofenac (VOLTAREN) 75 MG EC tablet Take 1 tablet (75 mg total) by mouth 2 (two) times daily as needed.  Qty: 180 tablet, Refills: 0      fluticasone (FLONASE) 50 mcg/actuation nasal spray 2 sprays (100 mcg total) by Each Nare route once daily.  Qty: 16 g, Refills: 0      gabapentin (NEURONTIN) 100 MG capsule TAKE 1 CAPSULE BY MOUTH THREE TIMES A DAY  Qty: 90 capsule, Refills: 2    Associated Diagnoses: Paroxysmal nerve pain      GAVILYTE-C 240-22.72-6.72 -5.84 gram SolR TAKE 4,000 MLS (4 L TOTAL) BY MOUTH ONCE. FOR 1 DOSE  Refills: 0      levocetirizine (XYZAL) 5 MG tablet Take 1 tablet (5 mg total) by mouth every evening.  Qty: 30 tablet, Refills: 5    Associated Diagnoses: Acute bacterial sinusitis      metoprolol succinate (TOPROL-XL) 100 MG 24 hr  tablet TAKE 1 TABLET BY MOUTH EVERY DAY  Qty: 90 tablet, Refills: 1    Associated Diagnoses: Essential hypertension      multivitamin (MULTIVITAMIN) per tablet Take 1 tablet by mouth once daily.        omeprazole (PRILOSEC) 20 MG capsule TAKE 1 CAPSULE BY MOUTH EVERY DAY  Qty: 90 capsule, Refills: 1    Associated Diagnoses: Gastroesophageal reflux disease without esophagitis      paroxetine (PAXIL) 20 MG tablet Take 1 tablet (20 mg total) by mouth every morning.  Qty: 90 tablet, Refills: 1    Associated Diagnoses: Anxiety; Mild recurrent major depression      rOPINIRole (REQUIP) 0.5 MG tablet Take 1 tablet (0.5 mg total) by mouth every evening.  Qty: 90 tablet, Refills: 1    Associated Diagnoses: RLS (restless legs syndrome)      simethicone (MYLICON) 80 MG chewable tablet Take 1 tablet (80 mg total) by mouth every 6 (six) hours as needed for Flatulence.  Qty: 60 tablet, Refills: 2    Associated Diagnoses: Excessive gas      traZODone (DESYREL) 100 MG tablet TAKE 1 TABLET (100 MG TOTAL) BY MOUTH EVERY EVENING.  Qty: 90 tablet, Refills: 1    Associated Diagnoses: Insomnia, unspecified type                 Discharge Diagnosis: DDD (degenerative disc disease), lumbar [M51.36]  Condition on Discharge: Stable with no complications to procedure   Diet on Discharge: Same as before.  Activity: as per instruction sheet.  Discharge to: Home with a responsible adult.  Follow up: 2-4 weeks    Please call my office or pager at [phone] if experienced any weakness or loss of sensation, fever > 101.5, pain uncontrolled with oral medications, persistent nausea/vomiting/or diarrhea, redness or drainage from the incisions, or any other worrisome concerns. If physician on call was not reached or could not communicate with our office for any reason please go to the nearest emergency department

## 2019-09-04 NOTE — OP NOTE
Patient Name: Diana Herring  MRN: [id number]    INFORMED CONSENT: The procedure, risks, benefits and options were discussed with patient. There are no contraindications to the procedure. The patient expressed understanding and agreed to proceed. The personnel performing the procedure was discussed. I verify that I personally obtained Diana's consent prior to the start of the procedure and the signed consent can be found on the patient's chart.    Procedure Date: 09/04/2019    Anesthesia: Topical    Pre Procedure diagnosis: DDD (degenerative disc disease), lumbar [M51.36]  1. Lumbar radiculopathy    2. DDD (degenerative disc disease), lumbar    3. Chronic pain      Post-Procedure diagnosis: SAME      Sedation: Yes - Fentanyl 50 mcg and Midazolam 2 mg    PROCEDURE:Bilateral L3-4   TRANSFORAMINAL EPIDURAL STEROID INJECTION        DESCRIPTION OF PROCEDURE: The patient was brought to the procedure room. After performing time out IV access was obtained prior to the procedure. The patient was positioned prone on the fluoroscopy table. Continuous hemodynamic monitoring was initiated including blood pressure, EKG, and pulse oximetry. . The skin was prepped with chlorhexidine three times and draped in a sterile fashion. Skin anesthesia was achieved using 3 mL of lidocaine 1% over the respective injection site.     An oblique fluoroscopic view was obtained, with the superior articular process of the inferior vertebral body aligned with the pedicle. The tip of a 22-gauge 3.5-inch Quincke-type spinal needle was advanced toward the 6 oclock position of the pedicle under intermittent fluoroscopic guidance. Confirmation of proper needle position was made with AP, oblique, and lateral fluoroscopic views. Negative aspiration for blood or CSF was confirmed. 2 mL of Omnipaque 300 was injected. Live fluoroscopic imaging revealed a clear outline of the spinal nerve with proximal spread of agent through the neural foramen into the  anterior epidural space. A total combination of 3 mL of Lidocaine 0.5% and 10 mg decadron was injected at each level. Contrast spread was noted from l2 to l4 level. There was no pain on injection. The needle was removed and bleeding was nil.  A sterile dressing was applied. Diana was taken back to the recovery room for further observation.     Blood Loss: Nill  Specimen: None    Miguel Latham MD

## 2019-09-04 NOTE — DISCHARGE INSTRUCTIONS
Thank you for allowing us to care for you today. You may receive a survey about the care we provided. Your feedback is valuable and helps us provide excellent care throughout the community.     Home Care Instructions for Pain Management:    1. DIET:   You may resume your normal diet today.   2. BATHING:   You may shower with luke warm water. No tub baths or anything that will soak injection sites under water for the next 24 hours.  3. DRESSING:   You may remove your bandage today.   4. ACTIVITY LEVEL:   You may resume your normal activities 24 hrs after your procedure. Nothing strenuous today.  5. MEDICATIONS:   You may resume your normal medications today. To restart blood thinners, ask your doctor.  6. DRIVING    If you have received any sedatives by mouth today, you may not drive for 12 hours.    If you have received any sedation through your IV, you may not drive for 24 hrs.   7. SPECIAL INSTRUCTIONS:   No heat to the injection site for 24 hrs including, hot bath or shower, heating pad, moist heat, or hot tubs.    Use ice pack to injection site for any pain or discomfort.  Apply ice packs for 20 minute intervals as needed.    IF you have diabetes, be sure to monitor your blood sugar more closely. IF your injection contained steroids your blood sugar levels may become higher than normal.    If you are still having pain upon discharge:  Your pain may improve over the next 48 hours. The anesthetic (numbing medication) works immediately to 48 hours. IF your injection contained a steroid (anti-inflammatory medication), it takes approximately 3 days to start feeling relief and 7-10 days to see your greatest results from the medication. It is possible you may need subsequent injections. This would be discussed at your follow up appointment with pain management or your referring doctor.    Please call the PAIN MANAGEMENT office at [phone] or ON CALL pager at [phone] if you experienced any:   -Weakness or  loss of sensation  -Fever > 101.5  -Pain uncontrolled with oral medications   -Persistent nausea, vomiting, or diarrhea  -Redness or drainage from the injection sites, or any other worrisome concerns.   If physician on call was not reached or could not communicate with our office for any reason please go to the nearest emergency department.  Adult Procedural Sedation Instructions    Recovery After Procedural Sedation (Adult)  You have been given medicine by vein to make you sleep during your surgery. This may have included both a pain medicine and sleeping medicine. Most of the effects have worn off. But you may still have some drowsiness for the next 6 to 8 hours.  Home care  Follow these guidelines when you get home:  · For the next 8 hours, you should be watched by a responsible adult. This person should make sure your condition is not getting worse.  · Don't drink any alcohol for the next 24 hours.  · Don't drive, operate dangerous machinery, or make important business or personal decisions during the next 24 hours.  Note: Your healthcare provider may tell you not to take any medicine by mouth for pain or sleep in the next 4 hours. These medicines may react with the medicines you were given in the hospital. This could cause a much stronger response than usual.  Follow-up care  Follow up with your healthcare provider if you are not alert and back to your usual level of activity within 12 hours.  When to seek medical advice  Call your healthcare provider right away if any of these occur:  · Drowsiness gets worse  · Weakness or dizziness gets worse  · Repeated vomiting  · You can't be awakened   Date Last Reviewed: 10/18/2016  © 8112-8399 [employer]. [address] [place] [address], [place] [address]. All rights reserved. This information is not intended as a substitute for professional medical care. Always follow your healthcare professional's instructions.

## 2019-09-04 NOTE — INTERVAL H&P NOTE
The patient has been examined and the H&P has been reviewed:    I concur with the findings and no changes have occurred since H&P was written.    Anesthesia/Surgery risks, benefits and alternative options discussed and understood by patient/family.          Active Hospital Problems    Diagnosis  POA    Chronic pain [G89.29]  Yes      Resolved Hospital Problems   No resolved problems to display.     HPI  Patient presenting for Procedure(s) (LRB):  Injection,steroid,epidural,transforaminal approach LUMBAR TRANSFORAMINAL BILATERAL L3 TF SANKET (Bilateral)     Patient on Anti-coagulation No    No health changes since previous encounter    Past Medical History:   Diagnosis Date    Anxiety     Arthritis     Corneal abrasion s    ? eye     Depression     Full dentures     GERD (gastroesophageal reflux disease)     Hyperlipidemia     Hypertension     Nuclear sclerosis of both eyes 6/5/2017    Osteoporosis     Restless leg syndrome      Past Surgical History:   Procedure Laterality Date    COLONOSCOPY N/A 5/14/2019    [facility]    COLONOSCOPY N/A 12/3/2013    Performed by Tian Barbour [facility] at [facility]O    HEART CATH-LEFT Left 2/23/2015    Per[facility] Quoc Muro [facility] at [facility] CATH LAB    HYSTERECTOMY      PARTIAL HYSTERECTOMY      TUBAL LIGATION Bilateral      Review of patient's allergies indicates:  No Known Allergies   Current Facility-Administered Medications   Medication    0.9%  NaCl infusion       PMHx, PSHx, Allergies, Medications reviewed in epic    ROS negative except pain complaints in HPI    OBJECTIVE:    /60   Pulse (!) 55   Temp 97.9 °F (36.6 °C) (Oral)   Resp 18   Ht 5' (1.524 m)   Wt 65.3 kg (144 lb)   SpO2 97%   Breastfeeding? No   BMI 28.12 kg/m²     PHYSICAL EXAMINATION:    GENERAL: Well appearing, in no acute distress, alert and oriented x3.  PSYCH:  Mood and affect appropriate.  SKIN: Skin color, texture, turgor normal, no rashes or  lesions which will impact the procedure.  CV: RRR with palpation of the radial artery.  PULM: No evidence of respiratory difficulty, symmetric chest rise. Clear to auscultation.  NEURO: Cranial nerves grossly intact.    Plan:    Proceed with procedure as planned Procedure(s) (LRB):  Injection,steroid,epidural,transforaminal approach LUMBAR TRANSFORAMINAL BILATERAL L3 TF SANKET (Bilateral)    Rashel Vilchis MD  09/04/2019

## 2019-10-14 ENCOUNTER — PATIENT OUTREACH (OUTPATIENT)
Dept: ADMINISTRATIVE | Facility: OTHER | Age: 74
End: 2019-10-14

## 2019-10-16 ENCOUNTER — TELEPHONE (OUTPATIENT)
Dept: PAIN MEDICINE | Facility: CLINIC | Age: 74
End: 2019-10-16

## 2019-10-16 ENCOUNTER — OFFICE VISIT (OUTPATIENT)
Dept: SPINE | Facility: CLINIC | Age: 74
End: 2019-10-16
Payer: MEDICARE

## 2019-10-16 VITALS — DIASTOLIC BLOOD PRESSURE: 68 MMHG | SYSTOLIC BLOOD PRESSURE: 130 MMHG | HEART RATE: 59 BPM

## 2019-10-16 DIAGNOSIS — M51.36 DDD (DEGENERATIVE DISC DISEASE), LUMBAR: ICD-10-CM

## 2019-10-16 DIAGNOSIS — M54.16 LUMBAR RADICULOPATHY: ICD-10-CM

## 2019-10-16 DIAGNOSIS — G89.29 CHRONIC BILATERAL LOW BACK PAIN WITH BILATERAL SCIATICA: Primary | ICD-10-CM

## 2019-10-16 DIAGNOSIS — M48.062 SPINAL STENOSIS, LUMBAR REGION WITH NEUROGENIC CLAUDICATION: ICD-10-CM

## 2019-10-16 DIAGNOSIS — M47.26 OTHER SPONDYLOSIS WITH RADICULOPATHY, LUMBAR REGION: ICD-10-CM

## 2019-10-16 DIAGNOSIS — M54.41 CHRONIC BILATERAL LOW BACK PAIN WITH BILATERAL SCIATICA: Primary | ICD-10-CM

## 2019-10-16 DIAGNOSIS — M54.42 CHRONIC BILATERAL LOW BACK PAIN WITH BILATERAL SCIATICA: Primary | ICD-10-CM

## 2019-10-16 PROCEDURE — 1101F PT FALLS ASSESS-DOCD LE1/YR: CPT | Mod: CPTII,S$GLB,, | Performed by: PHYSICIAN ASSISTANT

## 2019-10-16 PROCEDURE — 3075F SYST BP GE 130 - 139MM HG: CPT | Mod: CPTII,S$GLB,, | Performed by: PHYSICIAN ASSISTANT

## 2019-10-16 PROCEDURE — 1101F PR PT FALLS ASSESS DOC 0-1 FALLS W/OUT INJ PAST YR: ICD-10-PCS | Mod: CPTII,S$GLB,, | Performed by: PHYSICIAN ASSISTANT

## 2019-10-16 PROCEDURE — 3078F DIAST BP <80 MM HG: CPT | Mod: CPTII,S$GLB,, | Performed by: PHYSICIAN ASSISTANT

## 2019-10-16 PROCEDURE — 3078F PR MOST RECENT DIASTOLIC BLOOD PRESSURE < 80 MM HG: ICD-10-PCS | Mod: CPTII,S$GLB,, | Performed by: PHYSICIAN ASSISTANT

## 2019-10-16 PROCEDURE — 99214 OFFICE O/P EST MOD 30 MIN: CPT | Mod: S$GLB,,, | Performed by: PHYSICIAN ASSISTANT

## 2019-10-16 PROCEDURE — 3075F PR MOST RECENT SYSTOLIC BLOOD PRESS GE 130-139MM HG: ICD-10-PCS | Mod: CPTII,S$GLB,, | Performed by: PHYSICIAN ASSISTANT

## 2019-10-16 PROCEDURE — 99999 PR PBB SHADOW E&M-EST. PATIENT-LVL III: CPT | Mod: PBBFAC,,, | Performed by: PHYSICIAN ASSISTANT

## 2019-10-16 PROCEDURE — 99214 PR OFFICE/OUTPT VISIT, EST, LEVL IV, 30-39 MIN: ICD-10-PCS | Mod: S$GLB,,, | Performed by: PHYSICIAN ASSISTANT

## 2019-10-16 PROCEDURE — 99999 PR PBB SHADOW E&M-EST. PATIENT-LVL III: ICD-10-PCS | Mod: PBBFAC,,, | Performed by: PHYSICIAN ASSISTANT

## 2019-10-16 RX ORDER — DICLOFENAC SODIUM 75 MG/1
75 TABLET, DELAYED RELEASE ORAL 2 TIMES DAILY PRN
Qty: 180 TABLET | Refills: 0 | Status: SHIPPED | OUTPATIENT
Start: 2019-10-16 | End: 2019-10-31 | Stop reason: SDUPTHER

## 2019-10-16 RX ORDER — GABAPENTIN 300 MG/1
300 CAPSULE ORAL 3 TIMES DAILY
Qty: 270 CAPSULE | Refills: 0 | Status: SHIPPED | OUTPATIENT
Start: 2019-10-16 | End: 2019-10-31

## 2019-10-16 NOTE — PROGRESS NOTES
Subjective:     Patient ID:  Diana Herring is a 74 y.o. female.    [facility]    Chief Complaint: Back and bilateral leg pain    HPI    Diana Herring is a 74 y.o. female who presents for follow up.  The L3 BL TESI helped the pain about 70% for a couple days then the pain returned.  Pain in the back with radiation down the buttock to the bilateral posterior legs to the knees that is worse with standing and walking and better with sitting and laying down.      Back and leg pain equal.    Right and left leg pain equal.    She is taking Neurontin 300mg BID and diclofenac BID.    Patient denies any recent accidents or trauma, no saddle anesthesias, and no bowel or bladder incontinence.      Review of Systems:  Constitution: Negative for chills, fever, night sweats and weight loss.   Musculoskeletal: Negative for falls.   Gastrointestinal: Negative for bowel incontinence, nausea and vomiting.   Genitourinary: Negative for bladder incontinence.   Neurological: Negative for disturbances in coordination and loss of balance.      Objective:      Vitals:    10/16/19 1224   BP: 130/68   Pulse: (!) 59   PainSc:   7   PainLoc: Back         Physical Exam:    General:  Diana Herring is well-developed, well-nourished, appears stated age, in no acute distress, alert and oriented to person, place, and time.    Patient sits comfortably in the exam room and answers questions appropriately. Grossly patient is able to move bilateral lower extremities without difficulty.     XRAY Interpretation:      Lumbar spine ap/lateral/flexion/extension xrays were personally reviewed today.  No fractures.  No movement on flexion and extension.  DDD at L1-2, L2-3, and L3-4.     MRI Interpretation:      Lumbar spine MRI was personally reviewed today.  Multilevel DDD and spondylosis.  L3-4 central and bilateral NFS.  Mild central stenosis at L4-5.    Assessment:          1. Chronic bilateral low back pain with bilateral sciatica    2. DDD  (degenerative disc disease), lumbar    3. Other spondylosis with radiculopathy, lumbar region    4. Lumbar radiculopathy    5. Spinal stenosis, lumbar region with neurogenic claudication            Plan:          Orders Placed This Encounter    Procedure Order to [facility]    diclofenac (VOLTAREN) 75 MG EC tablet    gabapentin (NEURONTIN) 300 MG capsule       Lumbar spondylosis.  L3-4, L4-5 central stenosis worse at L3-4.     -Increased gabapentin to 300mg TID  -Continue diclofenac 75mg BID  -Repeat bilateral L3 TESI with Dr. Latham  -AL in two months    Follow-Up:  Follow up in about 2 months (around 12/16/2019). If there are any questions prior to this, the patient was instructed to contact the office.       LISETTE Holland, PA-C  Neurosurgery  Back and Spine Center  [facility]

## 2019-10-16 NOTE — H&P (VIEW-ONLY)
Subjective:     Patient ID:  Diana Herring is a 74 y.o. female.    [facility]    Chief Complaint: Back and bilateral leg pain    HPI    Diana Herring is a 74 y.o. female who presents for follow up.  The L3 BL TESI helped the pain about 70% for a couple days then the pain returned.  Pain in the back with radiation down the buttock to the bilateral posterior legs to the knees that is worse with standing and walking and better with sitting and laying down.      Back and leg pain equal.    Right and left leg pain equal.    She is taking Neurontin 300mg BID and diclofenac BID.    Patient denies any recent accidents or trauma, no saddle anesthesias, and no bowel or bladder incontinence.      Review of Systems:  Constitution: Negative for chills, fever, night sweats and weight loss.   Musculoskeletal: Negative for falls.   Gastrointestinal: Negative for bowel incontinence, nausea and vomiting.   Genitourinary: Negative for bladder incontinence.   Neurological: Negative for disturbances in coordination and loss of balance.      Objective:      Vitals:    10/16/19 1224   BP: 130/68   Pulse: (!) 59   PainSc:   7   PainLoc: Back         Physical Exam:    General:  Diana Herring is well-developed, well-nourished, appears stated age, in no acute distress, alert and oriented to person, place, and time.    Patient sits comfortably in the exam room and answers questions appropriately. Grossly patient is able to move bilateral lower extremities without difficulty.     XRAY Interpretation:      Lumbar spine ap/lateral/flexion/extension xrays were personally reviewed today.  No fractures.  No movement on flexion and extension.  DDD at L1-2, L2-3, and L3-4.     MRI Interpretation:      Lumbar spine MRI was personally reviewed today.  Multilevel DDD and spondylosis.  L3-4 central and bilateral NFS.  Mild central stenosis at L4-5.    Assessment:          1. Chronic bilateral low back pain with bilateral sciatica    2. DDD  (degenerative disc disease), lumbar    3. Other spondylosis with radiculopathy, lumbar region    4. Lumbar radiculopathy    5. Spinal stenosis, lumbar region with neurogenic claudication            Plan:          Orders Placed This Encounter    Procedure Order to [facility]    diclofenac (VOLTAREN) 75 MG EC tablet    gabapentin (NEURONTIN) 300 MG capsule       Lumbar spondylosis.  L3-4, L4-5 central stenosis worse at L3-4.     -Increased gabapentin to 300mg TID  -Continue diclofenac 75mg BID  -Repeat bilateral L3 TESI with Dr. Latham  -AL in two months    Follow-Up:  Follow up in about 2 months (around 12/16/2019). If there are any questions prior to this, the patient was instructed to contact the office.       LISETTE Holland, PA-C  Neurosurgery  Back and Spine Center  [facility]

## 2019-10-17 ENCOUNTER — TELEPHONE (OUTPATIENT)
Dept: PAIN MEDICINE | Facility: CLINIC | Age: 74
End: 2019-10-17

## 2019-10-17 DIAGNOSIS — M51.36 DDD (DEGENERATIVE DISC DISEASE), LUMBAR: Primary | ICD-10-CM

## 2019-10-30 ENCOUNTER — HOSPITAL ENCOUNTER (OUTPATIENT)
Facility: OTHER | Age: 74
Discharge: HOME | End: 2019-10-30
Attending: ANESTHESIOLOGY | Admitting: ANESTHESIOLOGY
Payer: MEDICARE

## 2019-10-30 VITALS
BODY MASS INDEX: 27.48 KG/M2 | SYSTOLIC BLOOD PRESSURE: 114 MMHG | HEART RATE: 51 BPM | DIASTOLIC BLOOD PRESSURE: 64 MMHG | HEIGHT: 60 IN | RESPIRATION RATE: 16 BRPM | TEMPERATURE: 98 F | WEIGHT: 140 LBS | OXYGEN SATURATION: 98 %

## 2019-10-30 DIAGNOSIS — M54.16 LUMBAR RADICULOPATHY: ICD-10-CM

## 2019-10-30 DIAGNOSIS — M51.36 DDD (DEGENERATIVE DISC DISEASE), LUMBAR: Primary | ICD-10-CM

## 2019-10-30 PROBLEM — M51.369 DDD (DEGENERATIVE DISC DISEASE), LUMBAR: Status: ACTIVE | Noted: 2019-10-30

## 2019-10-30 PROCEDURE — 64483 NJX AA&/STRD TFRM EPI L/S 1: CPT | Mod: 50,,, | Performed by: ANESTHESIOLOGY

## 2019-10-30 PROCEDURE — 25000003 PHARM REV CODE 250: Performed by: ANESTHESIOLOGY

## 2019-10-30 PROCEDURE — 63600175 PHARM REV CODE 636 W HCPCS: Performed by: STUDENT IN AN ORGANIZED HEALTH CARE EDUCATION/TRAINING PROGRAM

## 2019-10-30 PROCEDURE — 25500020 PHARM REV CODE 255: Performed by: ANESTHESIOLOGY

## 2019-10-30 PROCEDURE — 99152 MOD SED SAME PHYS/QHP 5/>YRS: CPT | Mod: ,,, | Performed by: ANESTHESIOLOGY

## 2019-10-30 PROCEDURE — 64483 PR EPIDURAL INJ, ANES/STEROID, TRANSFORAMINAL, LUMB/SACR, SNGL LEVL: ICD-10-PCS | Mod: 50,,, | Performed by: ANESTHESIOLOGY

## 2019-10-30 PROCEDURE — 99152 PR MOD CONSCIOUS SEDATION, SAME PHYS, 5+ YRS, FIRST 15 MIN: ICD-10-PCS | Mod: ,,, | Performed by: ANESTHESIOLOGY

## 2019-10-30 PROCEDURE — 63600175 PHARM REV CODE 636 W HCPCS: Performed by: ANESTHESIOLOGY

## 2019-10-30 PROCEDURE — 64483 NJX AA&/STRD TFRM EPI L/S 1: CPT | Mod: 50 | Performed by: ANESTHESIOLOGY

## 2019-10-30 RX ORDER — LIDOCAINE HYDROCHLORIDE 10 MG/ML
INJECTION INFILTRATION; PERINEURAL
Status: DISCONTINUED | OUTPATIENT
Start: 2019-10-30 | End: 2019-10-30 | Stop reason: HOSPADM

## 2019-10-30 RX ORDER — SODIUM CHLORIDE 9 MG/ML
500 INJECTION, SOLUTION INTRAVENOUS CONTINUOUS
Status: DISCONTINUED | OUTPATIENT
Start: 2019-10-30 | End: 2019-10-30 | Stop reason: HOSPADM

## 2019-10-30 RX ORDER — DEXAMETHASONE SODIUM PHOSPHATE 10 MG/ML
INJECTION INTRAMUSCULAR; INTRAVENOUS
Status: DISCONTINUED | OUTPATIENT
Start: 2019-10-30 | End: 2019-10-30 | Stop reason: HOSPADM

## 2019-10-30 RX ORDER — FENTANYL CITRATE 50 UG/ML
INJECTION, SOLUTION INTRAMUSCULAR; INTRAVENOUS
Status: DISCONTINUED | OUTPATIENT
Start: 2019-10-30 | End: 2019-10-30 | Stop reason: HOSPADM

## 2019-10-30 RX ORDER — LIDOCAINE HYDROCHLORIDE 5 MG/ML
INJECTION, SOLUTION INFILTRATION; INTRAVENOUS
Status: DISCONTINUED | OUTPATIENT
Start: 2019-10-30 | End: 2019-10-30 | Stop reason: HOSPADM

## 2019-10-30 RX ORDER — MIDAZOLAM HYDROCHLORIDE 1 MG/ML
INJECTION INTRAMUSCULAR; INTRAVENOUS
Status: DISCONTINUED | OUTPATIENT
Start: 2019-10-30 | End: 2019-10-30 | Stop reason: HOSPADM

## 2019-10-30 RX ADMIN — SODIUM CHLORIDE 500 ML: 0.9 INJECTION, SOLUTION INTRAVENOUS at 12:10

## 2019-10-30 NOTE — DISCHARGE INSTRUCTIONS

## 2019-10-30 NOTE — DISCHARGE SUMMARY
Discharge Note  Short Stay      SUMMARY     Admit Date: 10/30/2019    Attending Physician: Miguel Latham      Discharge Physician: Miguel Latham      Discharge Date: 10/30/2019 1:46 PM    Procedure(s) (LRB):  TRANSFORAMINAL SANKET BILATERAL L3-L4 (Bilateral)    Final Diagnosis: DDD (degenerative disc disease), lumbar [M51.36]    Disposition: Home or self care    Patient Instructions:   Current Discharge Medication List      CONTINUE these medications which have NOT CHANGED    Details   alendronate (FOSAMAX) 70 MG tablet TAKE 1 TABLET BY MOUTH ONE TIME PER WEEK  Qty: 12 tablet, Refills: 1    Associated Diagnoses: Age-related osteoporosis without current pathological fracture      amLODIPine (NORVASC) 10 MG tablet TAKE 1 TABLET (10 MG TOTAL) BY MOUTH ONCE DAILY.  Qty: 90 tablet, Refills: 1    Associated Diagnoses: Essential hypertension      atorvastatin (LIPITOR) 40 MG tablet Take 1 tablet (40 mg total) by mouth once daily.  Qty: 90 tablet, Refills: 2    Associated Diagnoses: Hyperlipidemia, unspecified hyperlipidemia type      calcium-vitamin D 500-125 mg-unit tablet Take 1 tablet by mouth once daily.        diclofenac (VOLTAREN) 75 MG EC tablet Take 1 tablet (75 mg total) by mouth 2 (two) times daily as needed.  Qty: 180 tablet, Refills: 0      fluticasone (FLONASE) 50 mcg/actuation nasal spray 2 sprays (100 mcg total) by Each Nare route once daily.  Qty: 16 g, Refills: 0      gabapentin (NEURONTIN) 300 MG capsule Take 1 capsule (300 mg total) by mouth 3 (three) times daily.  Qty: 270 capsule, Refills: 0      GAVILYTE-C 240-22.72-6.72 -5.84 gram SolR TAKE 4,000 MLS (4 L TOTAL) BY MOUTH ONCE. FOR 1 DOSE  Refills: 0      levocetirizine (XYZAL) 5 MG tablet Take 1 tablet (5 mg total) by mouth every evening.  Qty: 30 tablet, Refills: 5    Associated Diagnoses: Acute bacterial sinusitis      metoprolol succinate (TOPROL-XL) 100 MG 24 hr tablet TAKE 1 TABLET BY MOUTH EVERY DAY  Qty: 90 tablet, Refills: 1    Associated  Diagnoses: Essential hypertension      multivitamin (MULTIVITAMIN) per tablet Take 1 tablet by mouth once daily.        omeprazole (PRILOSEC) 20 MG capsule TAKE 1 CAPSULE BY MOUTH EVERY DAY  Qty: 90 capsule, Refills: 1    Associated Diagnoses: Gastroesophageal reflux disease without esophagitis      paroxetine (PAXIL) 20 MG tablet Take 1 tablet (20 mg total) by mouth every morning.  Qty: 90 tablet, Refills: 1    Associated Diagnoses: Anxiety; Mild recurrent major depression      rOPINIRole (REQUIP) 0.5 MG tablet Take 1 tablet (0.5 mg total) by mouth every evening.  Qty: 90 tablet, Refills: 1    Associated Diagnoses: RLS (restless legs syndrome)      simethicone (MYLICON) 80 MG chewable tablet Take 1 tablet (80 mg total) by mouth every 6 (six) hours as needed for Flatulence.  Qty: 60 tablet, Refills: 2    Associated Diagnoses: Excessive gas      traZODone (DESYREL) 100 MG tablet TAKE 1 TABLET (100 MG TOTAL) BY MOUTH EVERY EVENING.  Qty: 90 tablet, Refills: 1    Associated Diagnoses: Insomnia, unspecified type                 Discharge Diagnosis: DDD (degenerative disc disease), lumbar [M51.36]  Condition on Discharge: Stable with no complications to procedure   Diet on Discharge: Same as before.  Activity: as per instruction sheet.  Discharge to: Home with a responsible adult.  Follow up: 2-4 weeks    Please call my office or pager at [phone] if experienced any weakness or loss of sensation, fever > 101.5, pain uncontrolled with oral medications, persistent nausea/vomiting/or diarrhea, redness or drainage from the incisions, or any other worrisome concerns. If physician on call was not reached or could not communicate with our office for any reason please go to the nearest emergency department

## 2019-10-30 NOTE — OP NOTE
Patient Name: Diana Herring  MRN: [id number]    INFORMED CONSENT: The procedure, risks, benefits and options were discussed with patient. There are no contraindications to the procedure. The patient expressed understanding and agreed to proceed. The personnel performing the procedure was discussed. I verify that I personally obtained Diana's consent prior to the start of the procedure and the signed consent can be found on the patient's chart.    Procedure Date: 10/30/2019    Anesthesia: Topical    Pre Procedure diagnosis: DDD (degenerative disc disease), lumbar [M51.36]  1. DDD (degenerative disc disease), lumbar    2. Lumbar radiculopathy      Post-Procedure diagnosis: SAME      Sedation: Yes - Fentanyl 100 mcg and Midazolam 2 mg    PROCEDURE: Bilateral L3-4  TRANSFORAMINAL EPIDURAL STEROID INJECTION        DESCRIPTION OF PROCEDURE: The patient was brought to the procedure room. After performing time out IV access was obtained prior to the procedure. The patient was positioned prone on the fluoroscopy table. Continuous hemodynamic monitoring was initiated including blood pressure, EKG, and pulse oximetry. . The skin was prepped with chlorhexidine three times and draped in a sterile fashion. Skin anesthesia was achieved using 3 mL of lidocaine 1% over the respective injection site.     An oblique fluoroscopic view was obtained, with the superior articular process of the inferior vertebral body aligned with the pedicle. The tip of a 22-gauge 3.5-inch Quincke-type spinal needle was advanced toward the 6 oclock position of the pedicle under intermittent fluoroscopic guidance. Confirmation of proper needle position was made with AP, oblique, and lateral fluoroscopic views. Negative aspiration for blood or CSF was confirmed. 2 mL of Omnipaque 300 was injected. Live fluoroscopic imaging revealed a clear outline of the spinal nerve with proximal spread of agent through the neural foramen into the anterior epidural  space. A total combination of 3 mL of Lidocaine 0.5% and 10 mg decadron was injected at each level. Contrast spread was noted from L3 to L5 level. There was no pain on injection. The needle was removed and bleeding was nil.  A sterile dressing was applied. Diana was taken back to the recovery room for further observation.     Blood Loss: Nill  Specimen: None    Miguel Latham MD

## 2019-10-31 ENCOUNTER — OFFICE VISIT (OUTPATIENT)
Dept: FAMILY MEDICINE | Facility: CLINIC | Age: 74
End: 2019-10-31
Payer: MEDICARE

## 2019-10-31 VITALS
HEIGHT: 60 IN | WEIGHT: 146.81 LBS | OXYGEN SATURATION: 97 % | HEART RATE: 79 BPM | BODY MASS INDEX: 28.82 KG/M2 | SYSTOLIC BLOOD PRESSURE: 130 MMHG | TEMPERATURE: 99 F | DIASTOLIC BLOOD PRESSURE: 80 MMHG

## 2019-10-31 DIAGNOSIS — K21.9 GASTROESOPHAGEAL REFLUX DISEASE WITHOUT ESOPHAGITIS: Chronic | ICD-10-CM

## 2019-10-31 DIAGNOSIS — M79.605 BILATERAL LEG PAIN: ICD-10-CM

## 2019-10-31 DIAGNOSIS — M81.0 AGE-RELATED OSTEOPOROSIS WITHOUT CURRENT PATHOLOGICAL FRACTURE: Chronic | ICD-10-CM

## 2019-10-31 DIAGNOSIS — F33.0 MILD RECURRENT MAJOR DEPRESSION: Chronic | ICD-10-CM

## 2019-10-31 DIAGNOSIS — Z23 NEED FOR SHINGLES VACCINE: ICD-10-CM

## 2019-10-31 DIAGNOSIS — F41.9 ANXIETY: ICD-10-CM

## 2019-10-31 DIAGNOSIS — I10 ESSENTIAL HYPERTENSION: Primary | Chronic | ICD-10-CM

## 2019-10-31 DIAGNOSIS — E78.5 HYPERLIPIDEMIA, UNSPECIFIED HYPERLIPIDEMIA TYPE: ICD-10-CM

## 2019-10-31 DIAGNOSIS — M51.36 DDD (DEGENERATIVE DISC DISEASE), LUMBAR: ICD-10-CM

## 2019-10-31 DIAGNOSIS — M79.604 BILATERAL LEG PAIN: ICD-10-CM

## 2019-10-31 DIAGNOSIS — G47.00 INSOMNIA, UNSPECIFIED TYPE: ICD-10-CM

## 2019-10-31 PROCEDURE — 99999 PR PBB SHADOW E&M-EST. PATIENT-LVL V: CPT | Mod: PBBFAC,,, | Performed by: NURSE PRACTITIONER

## 2019-10-31 PROCEDURE — 3079F DIAST BP 80-89 MM HG: CPT | Mod: CPTII,S$GLB,, | Performed by: NURSE PRACTITIONER

## 2019-10-31 PROCEDURE — 3075F SYST BP GE 130 - 139MM HG: CPT | Mod: CPTII,S$GLB,, | Performed by: NURSE PRACTITIONER

## 2019-10-31 PROCEDURE — 99214 PR OFFICE/OUTPT VISIT, EST, LEVL IV, 30-39 MIN: ICD-10-PCS | Mod: 25,S$GLB,, | Performed by: NURSE PRACTITIONER

## 2019-10-31 PROCEDURE — 99999 PR PBB SHADOW E&M-EST. PATIENT-LVL V: ICD-10-PCS | Mod: PBBFAC,,, | Performed by: NURSE PRACTITIONER

## 2019-10-31 PROCEDURE — 90750 ZOSTER RECOMBINANT VACCINE: ICD-10-PCS | Mod: S$GLB,,, | Performed by: NURSE PRACTITIONER

## 2019-10-31 PROCEDURE — 3079F PR MOST RECENT DIASTOLIC BLOOD PRESSURE 80-89 MM HG: ICD-10-PCS | Mod: CPTII,S$GLB,, | Performed by: NURSE PRACTITIONER

## 2019-10-31 PROCEDURE — 90750 HZV VACC RECOMBINANT IM: CPT | Mod: S$GLB,,, | Performed by: NURSE PRACTITIONER

## 2019-10-31 PROCEDURE — 90471 IMMUNIZATION ADMIN: CPT | Mod: S$GLB,,, | Performed by: NURSE PRACTITIONER

## 2019-10-31 PROCEDURE — 99499 UNLISTED E&M SERVICE: CPT | Mod: S$GLB,,, | Performed by: NURSE PRACTITIONER

## 2019-10-31 PROCEDURE — 1101F PR PT FALLS ASSESS DOC 0-1 FALLS W/OUT INJ PAST YR: ICD-10-PCS | Mod: CPTII,S$GLB,, | Performed by: NURSE PRACTITIONER

## 2019-10-31 PROCEDURE — 99499 RISK ADDL DX/OHS AUDIT: ICD-10-PCS | Mod: S$GLB,,, | Performed by: NURSE PRACTITIONER

## 2019-10-31 PROCEDURE — 99214 OFFICE O/P EST MOD 30 MIN: CPT | Mod: 25,S$GLB,, | Performed by: NURSE PRACTITIONER

## 2019-10-31 PROCEDURE — 3075F PR MOST RECENT SYSTOLIC BLOOD PRESS GE 130-139MM HG: ICD-10-PCS | Mod: CPTII,S$GLB,, | Performed by: NURSE PRACTITIONER

## 2019-10-31 PROCEDURE — 90471 ZOSTER RECOMBINANT VACCINE: ICD-10-PCS | Mod: S$GLB,,, | Performed by: NURSE PRACTITIONER

## 2019-10-31 PROCEDURE — 1101F PT FALLS ASSESS-DOCD LE1/YR: CPT | Mod: CPTII,S$GLB,, | Performed by: NURSE PRACTITIONER

## 2019-10-31 RX ORDER — OMEPRAZOLE 20 MG/1
20 CAPSULE, DELAYED RELEASE ORAL DAILY
Qty: 90 CAPSULE | Refills: 1 | Status: SHIPPED | OUTPATIENT
Start: 2019-10-31 | End: 2020-07-22 | Stop reason: SDUPTHER

## 2019-10-31 RX ORDER — PAROXETINE HYDROCHLORIDE 20 MG/1
20 TABLET, FILM COATED ORAL EVERY MORNING
Qty: 90 TABLET | Refills: 1 | Status: SHIPPED | OUTPATIENT
Start: 2019-10-31 | End: 2021-03-04 | Stop reason: SDUPTHER

## 2019-10-31 RX ORDER — DICLOFENAC SODIUM 75 MG/1
75 TABLET, DELAYED RELEASE ORAL 2 TIMES DAILY PRN
Qty: 180 TABLET | Refills: 0 | Status: SHIPPED | OUTPATIENT
Start: 2019-10-31 | End: 2020-06-24 | Stop reason: SDUPTHER

## 2019-10-31 RX ORDER — ATORVASTATIN CALCIUM 40 MG/1
40 TABLET, FILM COATED ORAL DAILY
Qty: 90 TABLET | Refills: 2 | Status: SHIPPED | OUTPATIENT
Start: 2019-10-31 | End: 2020-09-22 | Stop reason: SDUPTHER

## 2019-10-31 RX ORDER — POLYETHYLENE GLYCOL-3350 AND ELECTROLYTES WITH FLAVOR PACK 240; 5.84; 2.98; 6.72; 22.72 G/278.26G; G/278.26G; G/278.26G; G/278.26G; G/278.26G
POWDER, FOR SOLUTION ORAL
Qty: 4000 ML | Refills: 0 | Status: SHIPPED | OUTPATIENT
Start: 2019-10-31 | End: 2020-03-02

## 2019-10-31 RX ORDER — GABAPENTIN 300 MG/1
300 CAPSULE ORAL 3 TIMES DAILY
Qty: 270 CAPSULE | Refills: 0 | Status: SHIPPED | OUTPATIENT
Start: 2019-10-31 | End: 2020-09-25 | Stop reason: SDUPTHER

## 2019-10-31 RX ORDER — TRAZODONE HYDROCHLORIDE 100 MG/1
100 TABLET ORAL NIGHTLY
Qty: 90 TABLET | Refills: 1 | Status: SHIPPED | OUTPATIENT
Start: 2019-10-31 | End: 2020-07-22 | Stop reason: SDUPTHER

## 2019-10-31 RX ORDER — AMLODIPINE BESYLATE 10 MG/1
10 TABLET ORAL DAILY
Qty: 90 TABLET | Refills: 1 | Status: SHIPPED | OUTPATIENT
Start: 2019-10-31 | End: 2020-06-29 | Stop reason: SDUPTHER

## 2019-10-31 RX ORDER — ALENDRONATE SODIUM 70 MG/1
TABLET ORAL
Qty: 12 TABLET | Refills: 1 | Status: SHIPPED | OUTPATIENT
Start: 2019-10-31 | End: 2020-09-25 | Stop reason: SDUPTHER

## 2019-10-31 RX ORDER — METOPROLOL SUCCINATE 100 MG/1
100 TABLET, EXTENDED RELEASE ORAL DAILY
Qty: 90 TABLET | Refills: 1 | Status: SHIPPED | OUTPATIENT
Start: 2019-10-31 | End: 2020-07-22 | Stop reason: SDUPTHER

## 2019-10-31 RX ORDER — MULTIVITAMIN
1 TABLET ORAL DAILY
Status: CANCELLED | COMMUNITY
Start: 2019-10-31

## 2019-10-31 RX ORDER — FLUTICASONE PROPIONATE 50 MCG
2 SPRAY, SUSPENSION (ML) NASAL DAILY
Qty: 16 G | Refills: 0 | Status: SHIPPED | OUTPATIENT
Start: 2019-10-31 | End: 2020-03-02 | Stop reason: SDUPTHER

## 2019-10-31 NOTE — PROGRESS NOTES
Subjective:       Patient ID: Diana Herring is a 74 y.o. female.    Chief Complaint: Hypertension    Subjective:   Diana Herring is a 74 y.o. female with hypertension. Patient's blood pressure was elevated on last visit she is here for a blood pressure check and her 2nd shingrix. Patient also needs medication refills.    No other complaints     Current Outpatient Medications:  alendronate (FOSAMAX) 70 MG tablet, TAKE 1 TABLET BY MOUTH ONE TIME PER WEEK, Disp: 12 tablet, Rfl: 1  amLODIPine (NORVASC) 10 MG tablet, Take 1 tablet (10 mg total) by mouth once daily., Disp: 90 tablet, Rfl: 1  atorvastatin (LIPITOR) 40 MG tablet, Take 1 tablet (40 mg total) by mouth once daily., Disp: 90 tablet, Rfl: 2  calcium-vitamin D 500-125 mg-unit tablet, Take 1 tablet by mouth once daily.  , Disp: , Rfl:   diclofenac (VOLTAREN) 75 MG EC tablet, Take 1 tablet (75 mg total) by mouth 2 (two) times daily as needed., Disp: 180 tablet, Rfl: 0  fluticasone propionate (FLONASE) 50 mcg/actuation nasal spray, 2 sprays (100 mcg total) by Each Nostril route once daily., Disp: 16 g, Rfl: 0  gabapentin (NEURONTIN) 300 MG capsule, Take 1 capsule (300 mg total) by mouth 3 (three) times daily., Disp: 270 capsule, Rfl: 0  levocetirizine (XYZAL) 5 MG tablet, Take 1 tablet (5 mg total) by mouth every evening., Disp: 30 tablet, Rfl: 5  metoprolol succinate (TOPROL-XL) 100 MG 24 hr tablet, Take 1 tablet (100 mg total) by mouth once daily., Disp: 90 tablet, Rfl: 1  multivitamin (MULTIVITAMIN) per tablet, Take 1 tablet by mouth once daily.  , Disp: , Rfl:   omeprazole (PRILOSEC) 20 MG capsule, Take 1 capsule (20 mg total) by mouth once daily., Disp: 90 capsule, Rfl: 1  paroxetine (PAXIL) 20 MG tablet, Take 1 tablet (20 mg total) by mouth every morning., Disp: 90 tablet, Rfl: 1  simethicone (MYLICON) 80 MG chewable tablet, Take 1 tablet (80 mg total) by mouth every 6 (six) hours as needed for Flatulence., Disp: 60 tablet, Rfl: 2  traZODone (DESYREL)  100 MG tablet, Take 1 tablet (100 mg total) by mouth every evening., Disp: 90 tablet, Rfl: 1  GAVILYTE-C 240-22.72-6.72 -5.84 gram SolR, TAKE 4,000 MLS (4 L TOTAL) BY MOUTH ONCE. FOR 1 DOSE, Disp: 4000 mL, Rfl: 0  rOPINIRole (REQUIP) 0.5 MG tablet, Take 1 tablet (0.5 mg total) by mouth every evening. (Patient not taking: Reported on 10/31/2019), Disp: 90 tablet, Rfl: 1    No current facility-administered medications for this visit.      Hypertension ROS: taking medications as instructed, no medication side effects noted, no TIA's, no chest pain on exertion, no dyspnea on exertion and no swelling of ankles.           Hypertension   Pertinent negatives include no chest pain, headaches, palpitations or shortness of breath.     Review of Systems   Constitutional: Negative for chills, diaphoresis, fatigue and fever.   HENT: Negative for congestion, nosebleeds, postnasal drip, rhinorrhea, sinus pressure and sneezing.    Respiratory: Negative for cough, chest tightness, shortness of breath and wheezing.    Cardiovascular: Negative for chest pain, palpitations and leg swelling.   Gastrointestinal: Negative for abdominal pain, constipation, diarrhea, nausea and vomiting.   Genitourinary: Negative for dysuria, vaginal discharge and vaginal pain.   Musculoskeletal: Negative for arthralgias, back pain and myalgias.   Skin: Negative for color change and rash.   Neurological: Negative for dizziness, weakness, light-headedness and headaches.       Objective:      Physical Exam   Constitutional: She is oriented to person, place, and time. Vital signs are normal. She appears well-developed and well-nourished.   HENT:   Head: Normocephalic and atraumatic.   Right Ear: External ear normal.   Left Ear: External ear normal.   Nose: Nose normal.   Mouth/Throat: Oropharynx is clear and moist. No oropharyngeal exudate.   Eyes: Pupils are equal, round, and reactive to light. Conjunctivae and EOM are normal.   Cardiovascular: Normal rate,  regular rhythm and normal heart sounds.   Pulmonary/Chest: Effort normal and breath sounds normal.   Neurological: She is alert and oriented to person, place, and time.   Skin: Skin is warm, dry and intact.   Psychiatric: She has a normal mood and affect.       Assessment:       1. Essential hypertension    2. Age-related osteoporosis without current pathological fracture    3. Hyperlipidemia, unspecified hyperlipidemia type    4. Insomnia, unspecified type    5. Anxiety    6. Mild recurrent major depression    7. Gastroesophageal reflux disease without esophagitis    8. DDD (degenerative disc disease), lumbar    9. Bilateral leg pain    10. Need for shingles vaccine        Plan:       Diana was seen today for hypertension.    Diagnoses and all orders for this visit:    Essential hypertension  -     amLODIPine (NORVASC) 10 MG tablet; Take 1 tablet (10 mg total) by mouth once daily.  -     metoprolol succinate (TOPROL-XL) 100 MG 24 hr tablet; Take 1 tablet (100 mg total) by mouth once daily.    Age-related osteoporosis without current pathological fracture  -     alendronate (FOSAMAX) 70 MG tablet; TAKE 1 TABLET BY MOUTH ONE TIME PER WEEK    Hyperlipidemia, unspecified hyperlipidemia type  -     atorvastatin (LIPITOR) 40 MG tablet; Take 1 tablet (40 mg total) by mouth once daily.    Insomnia, unspecified type  -     traZODone (DESYREL) 100 MG tablet; Take 1 tablet (100 mg total) by mouth every evening.    Anxiety  -     paroxetine (PAXIL) 20 MG tablet; Take 1 tablet (20 mg total) by mouth every morning.    Mild recurrent major depression  -     paroxetine (PAXIL) 20 MG tablet; Take 1 tablet (20 mg total) by mouth every morning.    Gastroesophageal reflux disease without esophagitis  -     omeprazole (PRILOSEC) 20 MG capsule; Take 1 capsule (20 mg total) by mouth once daily.    DDD (degenerative disc disease), lumbar  -     diclofenac (VOLTAREN) 75 MG EC tablet; Take 1 tablet (75 mg total) by mouth 2 (two) times  daily as needed.  -     gabapentin (NEURONTIN) 300 MG capsule; Take 1 capsule (300 mg total) by mouth 3 (three) times daily.    Bilateral leg pain  -     diclofenac (VOLTAREN) 75 MG EC tablet; Take 1 tablet (75 mg total) by mouth 2 (two) times daily as needed.  -     gabapentin (NEURONTIN) 300 MG capsule; Take 1 capsule (300 mg total) by mouth 3 (three) times daily.    Need for shingles vaccine  -     Zoster Recombinant Vaccine    Other orders  -     fluticasone propionate (FLONASE) 50 mcg/actuation nasal spray; 2 sprays (100 mcg total) by Each Nostril route once daily.  -     GAVILYTE-C 240-22.72-6.72 -5.84 gram SolR; TAKE 4,000 MLS (4 L TOTAL) BY MOUTH ONCE. FOR 1 DOSE  -     Cancel: multivitamin (ONE DAILY MULTIVITAMIN) per tablet; Take 1 tablet by mouth once daily.

## 2019-10-31 NOTE — PATIENT INSTRUCTIONS
Established High Blood Pressure    High blood pressure (hypertension) is a chronic disease. Often, healthcare providers dont know what causes it. But it can be caused by certain health conditions and medicines.  If you have high blood pressure, you may not have any symptoms. If you do have symptoms, they may include headache, dizziness, changes in your vision, chest pain, and shortness of breath. But even without symptoms, high blood pressure thats not treated raises your risk for heart attack and stroke. High blood pressure is a serious health risk and shouldnt be ignored.  A blood pressure reading is made up of two numbers: a higher number over a lower number. The top number is the systolic pressure. The bottom number is the diastolic pressure. A normal blood pressure is a systolic pressure of  less than 120 over a diastolic pressure of less than 80. You will see your blood pressure readings written together. For example, a person with a systolic pressure of 188 and a diastolic pressure of 78 will have 118/78 written in the medical record.  High blood pressure is when either the top number is 140 or higher, or the bottom number is 90 or higher. This must be the result when taking your blood pressure a number of times. The blood pressures between normal and high are called prehypertension.  Home care  If you have high blood pressure, you should do what is listed below to lower your blood pressure. If you are taking medicines for high blood pressure, these methods may reduce or end your need for medicines in the future.  · Begin a weight-loss program if you are overweight.  · Cut back on how much salt you get in your diet. Heres how to do this:  ¨ Dont eat foods that have a lot of salt. These include olives, pickles, smoked meats, and salted potato chips.  ¨ Dont add salt to your food at the table.  ¨ Use only small amounts of salt when cooking.  · Start an exercise program. Talk with your healthcare  provider about the type of exercise program that would be best for you. It doesn't have to be hard. Even brisk walking for 20 minutes 3 times a week is a good form of exercise.  · Dont take medicines that stimulate the heart. This includes many over-the-counter cold and sinus decongestant pills and sprays, as well as diet pills. Check the warnings about hypertension on the label. Before buying any over-the-counter medicines or supplements, always ask the pharmacist about the product's potential interaction with your high blood pressure and your high blood pressure medicines.  · Stimulants such as amphetamine or cocaine could be deadly for someone with high blood pressure. Never take these.  · Limit how much caffeine you get in your diet. Switch to caffeine-free products.  · Stop smoking. If you are a long-time smoker, this can be hard. Talk to your healthcare provider about medicines and nicotine replacement options to help you. Also, enroll in a stop-smoking program to make it more likely that you will quit for good.  · Learn how to handle stress. This is an important part of any program to lower blood pressure. Learn about relaxation methods like meditation, yoga, or biofeedback.  · If your provider prescribed medicines, take them exactly as directed. Missing doses may cause your blood pressure get out of control.  · If you miss a dose or doses, check with your healthcare provider or pharmacist about what to do.  · Consider buying an automatic blood pressure machine. Ask your provider for a recommendation. You can get one of these at most pharmacies.     The American [employer] Association recommends the following guidelines for home blood pressure monitoring:  · Don't smoke or drink coffee for 30 minutes before taking your blood pressure.  · Go to the bathroom before the test.  · Relax for 5 minutes before taking the measurement.  · Sit with your back supported (don't sit on a couch or soft chair); keep your feet on  the floor uncrossed. Place your arm on a solid flat surface (like a table) with the upper part of the arm at heart level. Place the middle of the cuff directly above the eye of the elbow. Check the monitor's instruction manual for an illustration.  · Take multiple readings. When you measure, take 2 to 3 readings one minute apart and record all of the results.  · Take your blood pressure at the same time every day, or as your healthcare provider recommends.  · Record the date, time, and blood pressure reading.  · Take the record with you to your next medical appointment. If your blood pressure monitor has a built-in memory, simply take the monitor with you to your next appointment.  · Call your provider if you have several high readings. Don't be frightened by a single high blood pressure reading, but if you get several high readings, check in with your healthcare provider.  · Note: When blood pressure reaches a systolic (top number) of 180 or higher OR diastolic (bottom number) of 110 or higher, seek emergency medical treatment.  Follow-up care  You will need to see your healthcare provider regularly. This is to check your blood pressure and to make changes to your medicines. Make a follow-up appointment as directed. Bring the record of your home blood pressure readings to the appointment.  When to seek medical advice  Call your healthcare provider right away if any of these occur:  · Blood pressure reaches a systolic (upper number) of 180 or higher OR a diastolic (bottom number) of 110 or higher  · Chest pain or shortness of breath  · Severe headache  · Throbbing or rushing sound in the ears  · Nosebleed  · Sudden severe pain in your belly (abdomen)  · Extreme drowsiness, confusion, or fainting  · Dizziness or spinning sensation (vertigo)  · Weakness of an arm or leg or one side of the face  · You have problems speaking or seeing   Date Last Reviewed: 12/1/2016  © 1989-1368 [employer]. [address] [place] [address]  [place] [address]. All rights reserved. This information is not intended as a substitute for professional medical care. Always follow your healthcare professional's instructions.

## 2019-11-18 ENCOUNTER — PATIENT OUTREACH (OUTPATIENT)
Dept: ADMINISTRATIVE | Facility: HOSPITAL | Age: 74
End: 2019-11-18

## 2019-12-02 ENCOUNTER — OFFICE VISIT (OUTPATIENT)
Dept: FAMILY MEDICINE | Facility: CLINIC | Age: 74
End: 2019-12-02
Payer: MEDICARE

## 2019-12-02 VITALS
HEIGHT: 60 IN | WEIGHT: 148.5 LBS | BODY MASS INDEX: 29.16 KG/M2 | TEMPERATURE: 97 F | OXYGEN SATURATION: 98 % | SYSTOLIC BLOOD PRESSURE: 134 MMHG | HEART RATE: 68 BPM | DIASTOLIC BLOOD PRESSURE: 72 MMHG

## 2019-12-02 DIAGNOSIS — I10 ESSENTIAL HYPERTENSION: Primary | Chronic | ICD-10-CM

## 2019-12-02 DIAGNOSIS — G25.81 RLS (RESTLESS LEGS SYNDROME): Chronic | ICD-10-CM

## 2019-12-02 DIAGNOSIS — F51.04 PSYCHOPHYSIOLOGICAL INSOMNIA: Chronic | ICD-10-CM

## 2019-12-02 DIAGNOSIS — G89.29 CHRONIC MIDLINE LOW BACK PAIN WITH BILATERAL SCIATICA: ICD-10-CM

## 2019-12-02 DIAGNOSIS — M54.42 CHRONIC MIDLINE LOW BACK PAIN WITH BILATERAL SCIATICA: ICD-10-CM

## 2019-12-02 DIAGNOSIS — F33.0 MILD RECURRENT MAJOR DEPRESSION: Chronic | ICD-10-CM

## 2019-12-02 DIAGNOSIS — K21.9 GASTROESOPHAGEAL REFLUX DISEASE WITHOUT ESOPHAGITIS: Chronic | ICD-10-CM

## 2019-12-02 DIAGNOSIS — M51.36 DDD (DEGENERATIVE DISC DISEASE), LUMBAR: Chronic | ICD-10-CM

## 2019-12-02 DIAGNOSIS — M81.0 AGE-RELATED OSTEOPOROSIS WITHOUT CURRENT PATHOLOGICAL FRACTURE: Chronic | ICD-10-CM

## 2019-12-02 DIAGNOSIS — M54.41 CHRONIC MIDLINE LOW BACK PAIN WITH BILATERAL SCIATICA: ICD-10-CM

## 2019-12-02 PROBLEM — M51.369 DDD (DEGENERATIVE DISC DISEASE), LUMBAR: Chronic | Status: ACTIVE | Noted: 2019-10-30

## 2019-12-02 PROBLEM — Z12.11 COLON CANCER SCREENING: Status: RESOLVED | Noted: 2019-05-14 | Resolved: 2019-12-02

## 2019-12-02 PROCEDURE — 1125F PR PAIN SEVERITY QUANTIFIED, PAIN PRESENT: ICD-10-PCS | Mod: S$GLB,,, | Performed by: INTERNAL MEDICINE

## 2019-12-02 PROCEDURE — 99999 PR PBB SHADOW E&M-EST. PATIENT-LVL III: CPT | Mod: PBBFAC,,, | Performed by: INTERNAL MEDICINE

## 2019-12-02 PROCEDURE — 1101F PR PT FALLS ASSESS DOC 0-1 FALLS W/OUT INJ PAST YR: ICD-10-PCS | Mod: CPTII,S$GLB,, | Performed by: INTERNAL MEDICINE

## 2019-12-02 PROCEDURE — 3075F SYST BP GE 130 - 139MM HG: CPT | Mod: CPTII,S$GLB,, | Performed by: INTERNAL MEDICINE

## 2019-12-02 PROCEDURE — 1159F PR MEDICATION LIST DOCUMENTED IN MEDICAL RECORD: ICD-10-PCS | Mod: S$GLB,,, | Performed by: INTERNAL MEDICINE

## 2019-12-02 PROCEDURE — 99999 PR PBB SHADOW E&M-EST. PATIENT-LVL III: ICD-10-PCS | Mod: PBBFAC,,, | Performed by: INTERNAL MEDICINE

## 2019-12-02 PROCEDURE — 3078F DIAST BP <80 MM HG: CPT | Mod: CPTII,S$GLB,, | Performed by: INTERNAL MEDICINE

## 2019-12-02 PROCEDURE — 1125F AMNT PAIN NOTED PAIN PRSNT: CPT | Mod: S$GLB,,, | Performed by: INTERNAL MEDICINE

## 2019-12-02 PROCEDURE — 3075F PR MOST RECENT SYSTOLIC BLOOD PRESS GE 130-139MM HG: ICD-10-PCS | Mod: CPTII,S$GLB,, | Performed by: INTERNAL MEDICINE

## 2019-12-02 PROCEDURE — 1159F MED LIST DOCD IN RCRD: CPT | Mod: S$GLB,,, | Performed by: INTERNAL MEDICINE

## 2019-12-02 PROCEDURE — 1101F PT FALLS ASSESS-DOCD LE1/YR: CPT | Mod: CPTII,S$GLB,, | Performed by: INTERNAL MEDICINE

## 2019-12-02 PROCEDURE — 99214 OFFICE O/P EST MOD 30 MIN: CPT | Mod: S$GLB,,, | Performed by: INTERNAL MEDICINE

## 2019-12-02 PROCEDURE — 99214 PR OFFICE/OUTPT VISIT, EST, LEVL IV, 30-39 MIN: ICD-10-PCS | Mod: S$GLB,,, | Performed by: INTERNAL MEDICINE

## 2019-12-02 PROCEDURE — 3078F PR MOST RECENT DIASTOLIC BLOOD PRESSURE < 80 MM HG: ICD-10-PCS | Mod: CPTII,S$GLB,, | Performed by: INTERNAL MEDICINE

## 2019-12-02 NOTE — ASSESSMENT & PLAN NOTE
Followed by pain management.  S/p SANKET.  Improving.  Continue tylenol for baseline control.  Using gabapentin & diclofenac PRN.

## 2019-12-02 NOTE — ASSESSMENT & PLAN NOTE
Counseled on sleep hygiene and writing exercises to help with thought organization.  Continue trazodone for now but may be able to stop this in the future.

## 2019-12-02 NOTE — ASSESSMENT & PLAN NOTE
The current medical regimen is effective;  continue present plan and medications. Recheck Vit D at follow up

## 2019-12-02 NOTE — PROGRESS NOTES
Assessment & Plan (all problems are new to me)  Problem List Items Addressed This Visit        Neuro    RLS (restless legs syndrome) (Chronic)    Current Assessment & Plan     Improved after SANKET.  No longer requiring requip.  Monitor         DDD (degenerative disc disease), lumbar (Chronic)    Current Assessment & Plan     Followed by pain management.  S/p SANKET.  Improving.  Continue tylenol for baseline control.  Using gabapentin & diclofenac PRN.              Psychiatric    Mild recurrent major depression (Chronic)    Overview     Stable on paxil         Current Assessment & Plan     The current medical regimen is effective;  continue present plan and medications.             Cardiac/Vascular    Essential hypertension - Primary (Chronic)    Overview     On metoprolol and amlodipine         Current Assessment & Plan     The current medical regimen is effective;  continue present plan and medications.          Relevant Orders    CBC auto differential    Comprehensive metabolic panel    Lipid panel       GI    GERD (gastroesophageal reflux disease) (Chronic)    Current Assessment & Plan     The current medical regimen is effective;  continue present plan and medications.             Orthopedic    Osteoporosis (Chronic)    Overview     Taking weekly fosamax         Current Assessment & Plan     The current medical regimen is effective;  continue present plan and medications. Recheck Vit D at follow up         Relevant Orders    Vitamin D    Chronic midline low back pain with bilateral sciatica    Current Assessment & Plan     As above.  Will be starting with PT again in the future.             Other    Psychophysiological insomnia (Chronic)    Overview     Stable on trazodone         Current Assessment & Plan     Counseled on sleep hygiene and writing exercises to help with thought organization.  Continue trazodone for now but may be able to stop this in the future.                   Health Maintenance reviewed, up to  date.    Follow-up: Follow up in about 3 months (around 3/2/2020).  ______________________________________________________________________    Chief Complaint  Chief Complaint   Patient presents with    Establish Care     new to pcp       POLY  Diana Herring is a 74 y.o. female with multiple medical diagnoses as listed in the medical history and problem list that presents for establish care.  Pt is new to me but is known to this clinic with her last appointment being 10/31/2019.  Her  is known to me and she is requesting to change PCP.  The patient was previously followed by one of my partners.  I have reviewed their most recent previous OV with their previous PCP, most recent labs and most recent imaging studies and interpreted them myself.     She reports that her back pain has been slowly improving.  Due to restart PT soon.  Taking and tolerating medications as prescribed without perceived side effects.  No CP, SOB, palpitations.  Mood is doing ok.  Still struggling with sleep.  Lots of thought organization.  Poor sleep hygiene (TV, tablet).        PAST MEDICAL HISTORY:  Past Medical History:   Diagnosis Date    Anxiety     Arthritis     Corneal abrasion s    ? eye     Depression     Full dentures     GERD (gastroesophageal reflux disease)     Hyperlipidemia     Hypertension     Nuclear sclerosis of both eyes 6/5/2017    Osteoporosis     Restless leg syndrome        PAST SURGICAL HISTORY:  Past Surgical History:   Procedure Laterality Date    COLONOSCOPY N/A 5/14/2019    Procedure: COLONOSCOPY;  Surgeon: Nahid Cline MD;  Location: [facility] ENDO;  Service: Endoscopy;  Laterality: N/A;    HYSTERECTOMY      PARTIAL HYSTERECTOMY      TRANSFORAMINAL EPIDURAL INJECTION OF STEROID Bilateral 9/4/2019    Procedure: Injection,steroid,epidural,transforaminal approach LUMBAR TRANSFORAMINAL BILATERAL L3 TF SANKET;  Surgeon: Miguel Latham MD;  Location: [facility] PAIN MGT;  Service: Pain Management;   Laterality: Bilateral;  NEEDS CONSENT    TRANSFORAMINAL EPIDURAL INJECTION OF STEROID Bilateral 10/30/2019    Procedure: TRANSFORAMINAL SANKET BILATERAL L3-L4;  Surgeon: Miguel Latham MD;  Location: [facility];  Service: Pain Management;  Laterality: Bilateral;  NEEDS CONSENT    TUBAL LIGATION Bilateral        SOCIAL HISTORY:  Social History     Socioeconomic History    Marital status:      Spouse name: Not on file    Number of children: Not on file    Years of education: Not on file    Highest education level: Not on file   Occupational History    Not on file   Social Needs    Financial resource strain: Not on file    Food insecurity:     Worry: Not on file     Inability: Not on file    Transportation needs:     Medical: Not on file     Non-medical: Not on file   Tobacco Use    Smoking status: Never Smoker    Smokeless tobacco: Never Used   Substance and Sexual Activity    Alcohol use: No    Drug use: No    Sexual activity: Never   Lifestyle    Physical activity:     Days per week: Not on file     Minutes per session: Not on file    Stress: Not on file   Relationships    Social connections:     Talks on phone: Not on file     Gets together: Not on file     Attends Jainism service: Not on file     Active member of club or organization: Not on file     Attends meetings of clubs or organizations: Not on file     Relationship status: Not on file   Other Topics Concern    Not on file   Social History Narrative    Patient is  w/ 5 children, one  from heart disease.The patient is retired.       FAMILY HISTORY:  Family History   Problem Relation Age of Onset    Stroke Mother     Glaucoma Mother     Cataracts Mother     Cancer Father         Lung    No Known Problems Brother     No Known Problems Son     Stroke Brother     Heart disease Son     No Known Problems Sister     No Known Problems Maternal Aunt     No Known Problems Maternal Uncle     No Known Problems  Paternal Aunt     No Known Problems Paternal Uncle     No Known Problems Maternal Grandmother     No Known Problems Maternal Grandfather     No Known Problems Paternal Grandmother     No Known Problems Paternal Grandfather     Amblyopia Neg Hx     Blindness Neg Hx     Diabetes Neg Hx     Hypertension Neg Hx     Macular degeneration Neg Hx     Retinal detachment Neg Hx     Strabismus Neg Hx     Thyroid disease Neg Hx        ALLERGIES AND MEDICATIONS: updated and reviewed.  Review of patient's allergies indicates:  No Known Allergies  Current Outpatient Medications   Medication Sig Dispense Refill    alendronate (FOSAMAX) 70 MG tablet TAKE 1 TABLET BY MOUTH ONE TIME PER WEEK 12 tablet 1    amLODIPine (NORVASC) 10 MG tablet Take 1 tablet (10 mg total) by mouth once daily. 90 tablet 1    atorvastatin (LIPITOR) 40 MG tablet Take 1 tablet (40 mg total) by mouth once daily. 90 tablet 2    calcium-vitamin D 500-125 mg-unit tablet Take 1 tablet by mouth once daily.        diclofenac (VOLTAREN) 75 MG EC tablet Take 1 tablet (75 mg total) by mouth 2 (two) times daily as needed. 180 tablet 0    fluticasone propionate (FLONASE) 50 mcg/actuation nasal spray 2 sprays (100 mcg total) by Each Nostril route once daily. 16 g 0    gabapentin (NEURONTIN) 300 MG capsule Take 1 capsule (300 mg total) by mouth 3 (three) times daily. 270 capsule 0    metoprolol succinate (TOPROL-XL) 100 MG 24 hr tablet Take 1 tablet (100 mg total) by mouth once daily. 90 tablet 1    multivitamin (MULTIVITAMIN) per tablet Take 1 tablet by mouth once daily.        omeprazole (PRILOSEC) 20 MG capsule Take 1 capsule (20 mg total) by mouth once daily. 90 capsule 1    paroxetine (PAXIL) 20 MG tablet Take 1 tablet (20 mg total) by mouth every morning. 90 tablet 1    simethicone (MYLICON) 80 MG chewable tablet Take 1 tablet (80 mg total) by mouth every 6 (six) hours as needed for Flatulence. 60 tablet 2    traZODone (DESYREL) 100 MG tablet  Take 1 tablet (100 mg total) by mouth every evening. 90 tablet 1    GAVILYTE-C 240-22.72-6.72 -5.84 gram SolR TAKE 4,000 MLS (4 L TOTAL) BY MOUTH ONCE. FOR 1 DOSE (Patient not taking: Reported on 12/2/2019) 4000 mL 0    levocetirizine (XYZAL) 5 MG tablet Take 1 tablet (5 mg total) by mouth every evening. 30 tablet 5     No current facility-administered medications for this visit.          ROS  Review of Systems   Constitutional: Negative for chills, fever and unexpected weight change.   HENT: Negative for congestion, ear pain, hearing loss, rhinorrhea, sore throat and trouble swallowing.    Eyes: Negative for discharge, redness and visual disturbance.   Respiratory: Negative for cough, chest tightness, shortness of breath and wheezing.    Cardiovascular: Negative for chest pain, palpitations and leg swelling.   Gastrointestinal: Negative for abdominal pain, constipation, diarrhea, nausea and vomiting.   Endocrine: Negative for polydipsia, polyphagia and polyuria.   Genitourinary: Negative for decreased urine volume, dysuria and hematuria.   Musculoskeletal: Positive for back pain. Negative for arthralgias, joint swelling and myalgias.   Skin: Negative for color change and rash.   Neurological: Negative for dizziness, weakness, light-headedness and headaches.   Psychiatric/Behavioral: Positive for sleep disturbance. Negative for decreased concentration, dysphoric mood and suicidal ideas.         Physical Exam  Vitals:    12/02/19 0903 12/02/19 0953   BP: (!) 140/68 134/72   Pulse: 68    Temp: 97.1 °F (36.2 °C)    SpO2: 98%    Weight: 67.4 kg (148 lb 7.7 oz)    Height: 5' (1.524 m)     Body mass index is 29 kg/m².  Weight: 67.4 kg (148 lb 7.7 oz)   Height: 5' (152.4 cm)   Physical Exam   Constitutional: She is oriented to person, place, and time. She appears well-developed and well-nourished. No distress.   HENT:   Head: Normocephalic and atraumatic.   Eyes: Pupils are equal, round, and reactive to light.  Conjunctivae, EOM and lids are normal. No scleral icterus.   Neck: Full passive range of motion without pain. Neck supple. No JVD present. Carotid bruit is not present. No thyromegaly present.   Cardiovascular: Normal rate, regular rhythm, normal heart sounds, intact distal pulses and normal pulses. Exam reveals no S3, no S4 and no friction rub.   No murmur heard.  Pulmonary/Chest: Effort normal and breath sounds normal. She has no wheezes. She has no rhonchi. She has no rales.   Abdominal: Soft. Bowel sounds are normal. There is no tenderness.   Musculoskeletal: She exhibits no edema or tenderness.   Lymphadenopathy:        Head (right side): No submental and no submandibular adenopathy present.        Head (left side): No submental and no submandibular adenopathy present.     She has no cervical adenopathy.   Neurological: She is alert and oriented to person, place, and time.   Motor grossly intact.  Sensory grossly intact.  Symmetric facial movements palate elevated symmetrically tongue midline   Skin: Skin is warm and dry. No rash noted.   Psychiatric: She has a normal mood and affect. Her speech is normal and behavior is normal. Thought content normal.           Health Maintenance       Date Due Completion Date    TETANUS VACCINE 08/31/2034 (Originally 1/4/1963) ---    DEXA SCAN 11/02/2020 11/2/2017    Mammogram 06/18/2021 6/18/2019    Lipid Panel 02/22/2024 2/22/2019    Colonoscopy 05/14/2024 5/14/2019

## 2019-12-13 ENCOUNTER — OFFICE VISIT (OUTPATIENT)
Dept: SPINE | Facility: CLINIC | Age: 74
End: 2019-12-13
Payer: MEDICARE

## 2019-12-13 VITALS
HEART RATE: 56 BPM | DIASTOLIC BLOOD PRESSURE: 61 MMHG | SYSTOLIC BLOOD PRESSURE: 131 MMHG | WEIGHT: 148.56 LBS | BODY MASS INDEX: 29.17 KG/M2 | HEIGHT: 60 IN

## 2019-12-13 DIAGNOSIS — M54.41 CHRONIC BILATERAL LOW BACK PAIN WITH BILATERAL SCIATICA: Primary | ICD-10-CM

## 2019-12-13 DIAGNOSIS — M54.16 LUMBAR RADICULOPATHY: ICD-10-CM

## 2019-12-13 DIAGNOSIS — M48.062 SPINAL STENOSIS, LUMBAR REGION WITH NEUROGENIC CLAUDICATION: ICD-10-CM

## 2019-12-13 DIAGNOSIS — M47.26 OTHER SPONDYLOSIS WITH RADICULOPATHY, LUMBAR REGION: ICD-10-CM

## 2019-12-13 DIAGNOSIS — M54.42 CHRONIC BILATERAL LOW BACK PAIN WITH BILATERAL SCIATICA: Primary | ICD-10-CM

## 2019-12-13 DIAGNOSIS — G89.29 CHRONIC BILATERAL LOW BACK PAIN WITH BILATERAL SCIATICA: Primary | ICD-10-CM

## 2019-12-13 DIAGNOSIS — M51.36 DDD (DEGENERATIVE DISC DISEASE), LUMBAR: ICD-10-CM

## 2019-12-13 PROCEDURE — 3075F SYST BP GE 130 - 139MM HG: CPT | Mod: CPTII,S$GLB,, | Performed by: PHYSICIAN ASSISTANT

## 2019-12-13 PROCEDURE — 1125F AMNT PAIN NOTED PAIN PRSNT: CPT | Mod: S$GLB,,, | Performed by: PHYSICIAN ASSISTANT

## 2019-12-13 PROCEDURE — 99214 OFFICE O/P EST MOD 30 MIN: CPT | Mod: S$GLB,,, | Performed by: PHYSICIAN ASSISTANT

## 2019-12-13 PROCEDURE — 1125F PR PAIN SEVERITY QUANTIFIED, PAIN PRESENT: ICD-10-PCS | Mod: S$GLB,,, | Performed by: PHYSICIAN ASSISTANT

## 2019-12-13 PROCEDURE — 3075F PR MOST RECENT SYSTOLIC BLOOD PRESS GE 130-139MM HG: ICD-10-PCS | Mod: CPTII,S$GLB,, | Performed by: PHYSICIAN ASSISTANT

## 2019-12-13 PROCEDURE — 1101F PT FALLS ASSESS-DOCD LE1/YR: CPT | Mod: CPTII,S$GLB,, | Performed by: PHYSICIAN ASSISTANT

## 2019-12-13 PROCEDURE — 99214 PR OFFICE/OUTPT VISIT, EST, LEVL IV, 30-39 MIN: ICD-10-PCS | Mod: S$GLB,,, | Performed by: PHYSICIAN ASSISTANT

## 2019-12-13 PROCEDURE — 99999 PR PBB SHADOW E&M-EST. PATIENT-LVL IV: CPT | Mod: PBBFAC,,, | Performed by: PHYSICIAN ASSISTANT

## 2019-12-13 PROCEDURE — 1159F PR MEDICATION LIST DOCUMENTED IN MEDICAL RECORD: ICD-10-PCS | Mod: S$GLB,,, | Performed by: PHYSICIAN ASSISTANT

## 2019-12-13 PROCEDURE — 3078F DIAST BP <80 MM HG: CPT | Mod: CPTII,S$GLB,, | Performed by: PHYSICIAN ASSISTANT

## 2019-12-13 PROCEDURE — 3078F PR MOST RECENT DIASTOLIC BLOOD PRESSURE < 80 MM HG: ICD-10-PCS | Mod: CPTII,S$GLB,, | Performed by: PHYSICIAN ASSISTANT

## 2019-12-13 PROCEDURE — 1101F PR PT FALLS ASSESS DOC 0-1 FALLS W/OUT INJ PAST YR: ICD-10-PCS | Mod: CPTII,S$GLB,, | Performed by: PHYSICIAN ASSISTANT

## 2019-12-13 PROCEDURE — 1159F MED LIST DOCD IN RCRD: CPT | Mod: S$GLB,,, | Performed by: PHYSICIAN ASSISTANT

## 2019-12-13 PROCEDURE — 99999 PR PBB SHADOW E&M-EST. PATIENT-LVL IV: ICD-10-PCS | Mod: PBBFAC,,, | Performed by: PHYSICIAN ASSISTANT

## 2019-12-13 NOTE — PROGRESS NOTES
Subjective:     Patient ID:  Diana Herring is a 74 y.o. female.    [facility]    Chief Complaint: Back and bilateral leg pain    HPI    Diana Herring is a 74 y.o. female who presents for follow up.  She had the repeat BL L3 TESI and she got about 75% relief of her back and leg pain.  She take gabapentin and the diclofenac PRN but does mainly take the gabapentin at night.    She continues to have back pain with radiation down the back of the legs to the knees.  Pain worse with sitting too long and with walking.  The back pain is worse than the leg pain and the right and left leg pain is equal.    Patient denies any recent accidents or trauma, no saddle anesthesias, and no bowel or bladder incontinence.      Review of Systems:  Constitution: Negative for chills, fever, night sweats and weight loss.   Musculoskeletal: Negative for falls.   Gastrointestinal: Negative for bowel incontinence, nausea and vomiting.   Genitourinary: Negative for bladder incontinence.   Neurological: Negative for disturbances in coordination and loss of balance.      Objective:      Vitals:    12/13/19 0851   BP: 131/61   Pulse: (!) 56   Weight: 67.4 kg (148 lb 9.4 oz)   Height: 5' (1.524 m)   PainSc:   7   PainLoc: Back         Physical Exam:    General:  Diana Herring is well-developed, well-nourished, appears stated age, in no acute distress, alert and oriented to person, place, and time.    Patient sits comfortably in the exam room and answers questions appropriately. Grossly patient is able to move bilateral lower extremities without difficulty.     XRAY Interpretation:      Lumbar spine ap/lateral/flexion/extension xrays were personally reviewed today.  No fractures.  No movement on flexion and extension.  DDD at L1-2, L2-3, and L3-4.     MRI Interpretation:      Lumbar spine MRI was personally reviewed today.  Multilevel DDD and spondylosis.  L3-4 central and bilateral NFS.  Mild central stenosis at L4-5.    Assessment:           1. Chronic bilateral low back pain with bilateral sciatica    2. DDD (degenerative disc disease), lumbar    3. Other spondylosis with radiculopathy, lumbar region    4. Lumbar radiculopathy    5. Spinal stenosis, lumbar region with neurogenic claudication            Plan:          Orders Placed This Encounter    Ambulatory Consult to [facility]     Lumbar spondylosis.  L3-4, L4-5 central stenosis worse at L3-4.     -Increased gabapentin to 300mg in am and 600mg in PM and take regularly  -Continue diclofenac 75mg BID PRN  -Consider bilateral L3-4 facet injections next time  -[facility]  -Fu in three months      Follow-Up:  Follow up in about 3 months (around 3/13/2020). If there are any questions prior to this, the patient was instructed to contact the office.       LISETTE Holland, PA-C  Neurosurgery  [facility]  [facility]

## 2020-02-21 ENCOUNTER — LAB VISIT (OUTPATIENT)
Dept: LAB | Facility: HOSPITAL | Age: 75
End: 2020-02-21
Attending: INTERNAL MEDICINE
Payer: MEDICARE

## 2020-02-21 DIAGNOSIS — I10 ESSENTIAL HYPERTENSION: Chronic | ICD-10-CM

## 2020-02-21 DIAGNOSIS — M81.0 AGE-RELATED OSTEOPOROSIS WITHOUT CURRENT PATHOLOGICAL FRACTURE: Chronic | ICD-10-CM

## 2020-02-21 LAB
25(OH)D3+25(OH)D2 SERPL-MCNC: 35 NG/ML (ref 30–96)
ALBUMIN SERPL BCP-MCNC: 3.8 G/DL (ref 3.5–5.2)
ALP SERPL-CCNC: 72 U/L (ref 55–135)
ALT SERPL W/O P-5'-P-CCNC: 16 U/L (ref 10–44)
ANION GAP SERPL CALC-SCNC: 7 MMOL/L (ref 8–16)
AST SERPL-CCNC: 14 U/L (ref 10–40)
BASOPHILS # BLD AUTO: 0.08 K/UL (ref 0–0.2)
BASOPHILS NFR BLD: 1.1 % (ref 0–1.9)
BILIRUB SERPL-MCNC: 0.8 MG/DL (ref 0.1–1)
BUN SERPL-MCNC: 13 MG/DL (ref 8–23)
CALCIUM SERPL-MCNC: 9.3 MG/DL (ref 8.7–10.5)
CHLORIDE SERPL-SCNC: 107 MMOL/L (ref 95–110)
CHOLEST SERPL-MCNC: 146 MG/DL (ref 120–199)
CHOLEST/HDLC SERPL: 3.3 {RATIO} (ref 2–5)
CO2 SERPL-SCNC: 27 MMOL/L (ref 23–29)
CREAT SERPL-MCNC: 0.8 MG/DL (ref 0.5–1.4)
DIFFERENTIAL METHOD: ABNORMAL
EOSINOPHIL # BLD AUTO: 0.2 K/UL (ref 0–0.5)
EOSINOPHIL NFR BLD: 2.9 % (ref 0–8)
ERYTHROCYTE [DISTWIDTH] IN BLOOD BY AUTOMATED COUNT: 12.6 % (ref 11.5–14.5)
EST. GFR  (AFRICAN AMERICAN): >60 ML/MIN/1.73 M^2
EST. GFR  (NON AFRICAN AMERICAN): >60 ML/MIN/1.73 M^2
GLUCOSE SERPL-MCNC: 103 MG/DL (ref 70–110)
HCT VFR BLD AUTO: 40.2 % (ref 37–48.5)
HDLC SERPL-MCNC: 44 MG/DL (ref 40–75)
HDLC SERPL: 30.1 % (ref 20–50)
HGB BLD-MCNC: 12.2 G/DL (ref 12–16)
IMM GRANULOCYTES # BLD AUTO: 0.01 K/UL (ref 0–0.04)
IMM GRANULOCYTES NFR BLD AUTO: 0.1 % (ref 0–0.5)
LDLC SERPL CALC-MCNC: 84.8 MG/DL (ref 63–159)
LYMPHOCYTES # BLD AUTO: 3.8 K/UL (ref 1–4.8)
LYMPHOCYTES NFR BLD: 52.3 % (ref 18–48)
MCH RBC QN AUTO: 29.7 PG (ref 27–31)
MCHC RBC AUTO-ENTMCNC: 30.3 G/DL (ref 32–36)
MCV RBC AUTO: 98 FL (ref 82–98)
MONOCYTES # BLD AUTO: 0.4 K/UL (ref 0.3–1)
MONOCYTES NFR BLD: 5.3 % (ref 4–15)
NEUTROPHILS # BLD AUTO: 2.8 K/UL (ref 1.8–7.7)
NEUTROPHILS NFR BLD: 38.3 % (ref 38–73)
NONHDLC SERPL-MCNC: 102 MG/DL
NRBC BLD-RTO: 0 /100 WBC
PLATELET # BLD AUTO: 175 K/UL (ref 150–350)
PMV BLD AUTO: 11.2 FL (ref 9.2–12.9)
POTASSIUM SERPL-SCNC: 3.9 MMOL/L (ref 3.5–5.1)
PROT SERPL-MCNC: 7.5 G/DL (ref 6–8.4)
RBC # BLD AUTO: 4.11 M/UL (ref 4–5.4)
SODIUM SERPL-SCNC: 141 MMOL/L (ref 136–145)
TRIGL SERPL-MCNC: 86 MG/DL (ref 30–150)
WBC # BLD AUTO: 7.34 K/UL (ref 3.9–12.7)

## 2020-02-21 PROCEDURE — 36415 COLL VENOUS BLD VENIPUNCTURE: CPT | Mod: PO

## 2020-02-21 PROCEDURE — 82306 VITAMIN D 25 HYDROXY: CPT

## 2020-02-21 PROCEDURE — 80053 COMPREHEN METABOLIC PANEL: CPT

## 2020-02-21 PROCEDURE — 85025 COMPLETE CBC W/AUTO DIFF WBC: CPT

## 2020-02-21 PROCEDURE — 80061 LIPID PANEL: CPT

## 2020-03-02 ENCOUNTER — OFFICE VISIT (OUTPATIENT)
Dept: FAMILY MEDICINE | Facility: CLINIC | Age: 75
End: 2020-03-02
Payer: MEDICARE

## 2020-03-02 VITALS
SYSTOLIC BLOOD PRESSURE: 140 MMHG | TEMPERATURE: 98 F | WEIGHT: 149.81 LBS | DIASTOLIC BLOOD PRESSURE: 70 MMHG | HEIGHT: 60 IN | OXYGEN SATURATION: 98 % | HEART RATE: 84 BPM | BODY MASS INDEX: 29.41 KG/M2

## 2020-03-02 DIAGNOSIS — Z00.00 ROUTINE MEDICAL EXAM: Primary | ICD-10-CM

## 2020-03-02 DIAGNOSIS — F51.04 PSYCHOPHYSIOLOGICAL INSOMNIA: Chronic | ICD-10-CM

## 2020-03-02 DIAGNOSIS — J30.9 ALLERGIC RHINITIS, UNSPECIFIED SEASONALITY, UNSPECIFIED TRIGGER: ICD-10-CM

## 2020-03-02 DIAGNOSIS — F33.0 MILD RECURRENT MAJOR DEPRESSION: ICD-10-CM

## 2020-03-02 DIAGNOSIS — Z12.31 ENCOUNTER FOR SCREENING MAMMOGRAM FOR BREAST CANCER: ICD-10-CM

## 2020-03-02 DIAGNOSIS — M81.0 AGE-RELATED OSTEOPOROSIS WITHOUT CURRENT PATHOLOGICAL FRACTURE: Chronic | ICD-10-CM

## 2020-03-02 DIAGNOSIS — I10 ESSENTIAL HYPERTENSION: Chronic | ICD-10-CM

## 2020-03-02 PROCEDURE — 99999 PR PBB SHADOW E&M-EST. PATIENT-LVL III: CPT | Mod: PBBFAC,,, | Performed by: INTERNAL MEDICINE

## 2020-03-02 PROCEDURE — 99499 RISK ADDL DX/OHS AUDIT: ICD-10-PCS | Mod: S$GLB,,, | Performed by: INTERNAL MEDICINE

## 2020-03-02 PROCEDURE — 99499 UNLISTED E&M SERVICE: CPT | Mod: S$GLB,,, | Performed by: INTERNAL MEDICINE

## 2020-03-02 PROCEDURE — 3077F SYST BP >= 140 MM HG: CPT | Mod: CPTII,S$GLB,, | Performed by: INTERNAL MEDICINE

## 2020-03-02 PROCEDURE — 3078F PR MOST RECENT DIASTOLIC BLOOD PRESSURE < 80 MM HG: ICD-10-PCS | Mod: CPTII,S$GLB,, | Performed by: INTERNAL MEDICINE

## 2020-03-02 PROCEDURE — 99214 OFFICE O/P EST MOD 30 MIN: CPT | Mod: S$GLB,,, | Performed by: INTERNAL MEDICINE

## 2020-03-02 PROCEDURE — 3077F PR MOST RECENT SYSTOLIC BLOOD PRESSURE >= 140 MM HG: ICD-10-PCS | Mod: CPTII,S$GLB,, | Performed by: INTERNAL MEDICINE

## 2020-03-02 PROCEDURE — 99999 PR PBB SHADOW E&M-EST. PATIENT-LVL III: ICD-10-PCS | Mod: PBBFAC,,, | Performed by: INTERNAL MEDICINE

## 2020-03-02 PROCEDURE — 99214 PR OFFICE/OUTPT VISIT, EST, LEVL IV, 30-39 MIN: ICD-10-PCS | Mod: S$GLB,,, | Performed by: INTERNAL MEDICINE

## 2020-03-02 PROCEDURE — 3078F DIAST BP <80 MM HG: CPT | Mod: CPTII,S$GLB,, | Performed by: INTERNAL MEDICINE

## 2020-03-02 RX ORDER — FLUTICASONE PROPIONATE 50 MCG
2 SPRAY, SUSPENSION (ML) NASAL DAILY
Qty: 48 G | Refills: 3 | Status: SHIPPED | OUTPATIENT
Start: 2020-03-02 | End: 2021-12-17

## 2020-03-02 NOTE — PROGRESS NOTES
Assessment & Plan  Problem List Items Addressed This Visit        Psychiatric    Mild recurrent major depression (Chronic)    Overview     Stable on paxil         Current Assessment & Plan     Monitor             Cardiac/Vascular    Essential hypertension (Chronic)    Overview     On metoprolol and amlodipine         Relevant Orders    CBC auto differential    Comprehensive metabolic panel    Lipid panel       Orthopedic    Osteoporosis (Chronic)    Overview     Taking weekly fosamax         Current Assessment & Plan     The current medical regimen is effective;  continue present plan and medications.          Relevant Orders    Vitamin D       Other    Psychophysiological insomnia (Chronic)    Overview     Stable on trazodone         Current Assessment & Plan     Counseled on writing exercises again today.  Continue trazodone.            Other Visit Diagnoses     Routine medical exam    -  Primary  -    Discussed healthy diet, regular exercise, necessary labs, age appropriate cancer screening, and routine vaccinations.       Encounter for screening mammogram for breast cancer    -  Wants to change to yearly screening    Relevant Orders    Mammo Digital Screening Bilat    Allergic rhinitis, unspecified seasonality, unspecified trigger    -  The current medical regimen is effective;  continue present plan and medications.     Relevant Medications    fluticasone propionate (FLONASE) 50 mcg/actuation nasal spray            Health Maintenance reviewed, up to date.    Follow-up: Follow up in about 1 year (around 3/2/2021) for Routine Physical.  ______________________________________________________________________    Chief Complaint  Chief Complaint   Patient presents with    Annual Exam       HPI  Diana Herring is a 75 y.o. female with medical diagnoses as listed in the medical history and problem list that presents for routine physical.  Pt is known to me with her last appointment 12/2/2019.  She had labs prior to  this OV that showed reassuring CBC, CMP, lipids, Vit D.      No acute complaints.  Still having back pain but due to see back and spine this month.  May be a good candidate for healthy back program.  Previously with great results to SANKET.      Home BPs running 130s systolic.  Taking and tolerating medications as prescribed without perceived side effects.  No CP, SOB, palpitations, hypoglycemic symptoms.        PAST MEDICAL HISTORY:  Past Medical History:   Diagnosis Date    Anxiety     Arthritis     Corneal abrasion s    ? eye     Depression     Full dentures     GERD (gastroesophageal reflux disease)     Hyperlipidemia     Hypertension     Nuclear sclerosis of both eyes 6/5/2017    Osteoporosis     Restless leg syndrome        PAST SURGICAL HISTORY:  Past Surgical History:   Procedure Laterality Date    COLONOSCOPY N/A 5/14/2019    Procedure: COLONOSCOPY;  Surgeon: Nahid Cline MD;  Location: [facility];  Service: Endoscopy;  Laterality: N/A;    HYSTERECTOMY      PARTIAL HYSTERECTOMY      TRANSFORAMINAL EPIDURAL INJECTION OF STEROID Bilateral 9/4/2019    Procedure: Injection,steroid,epidural,transforaminal approach LUMBAR TRANSFORAMINAL BILATERAL L3 TF SANKET;  Surgeon: Miguel Latham MD;  [facility] [place] [facility] [place];  Service: Pain Management;  Laterality: Bilateral;  NEEDS CONSENT    TRANSFORAMINAL EPIDURAL INJECTION OF STEROID Bilateral 10/30/2019    Procedure: TRANSFORAMINAL SANKET BILATERAL L3-L4;  Surgeon: Miguel Latham MD;  Location: [facility];  Service: Pain Management;  Laterality: Bilateral;  NEEDS CONSENT    TUBAL LIGATION Bilateral        SOCIAL HISTORY:  Social History     Socioeconomic History    Marital status:      Spouse name: Not on file    Number of children: Not on file    Years of education: Not on file    Highest education level: Not on file   Occupational History    Not on file   Social Needs    Financial resource strain: Not on file    Food insecurity:      Worry: Not on file     Inability: Not on file    Transportation needs:     Medical: Not on file     Non-medical: Not on file   Tobacco Use    Smoking status: Never Smoker    Smokeless tobacco: Never Used   Substance and Sexual Activity    Alcohol use: No    Drug use: No    Sexual activity: Never   Lifestyle    Physical activity:     Days per week: Not on file     Minutes per session: Not on file    Stress: Not on file   Relationships    Social connections:     Talks on phone: Not on file     Gets together: Not on file     Attends Jehovah's witness service: Not on file     Active member of club or organization: Not on file     Attends meetings of clubs or organizations: Not on file     Relationship status: Not on file   Other Topics Concern    Not on file   Social History Narrative    Patient is  w/ 5 children, one  from heart disease.The patient is retired.       FAMILY HISTORY:  Family History   Problem Relation Age of Onset    Stroke Mother     Glaucoma Mother     Cataracts Mother     Cancer Father         Lung    No Known Problems Brother     No Known Problems Son     Stroke Brother     Heart disease Son     No Known Problems Sister     No Known Problems Maternal Aunt     No Known Problems Maternal Uncle     No Known Problems Paternal Aunt     No Known Problems Paternal Uncle     No Known Problems Maternal Grandmother     No Known Problems Maternal Grandfather     No Known Problems Paternal Grandmother     No Known Problems Paternal Grandfather     Amblyopia Neg Hx     Blindness Neg Hx     Diabetes Neg Hx     Hypertension Neg Hx     Macular degeneration Neg Hx     Retinal detachment Neg Hx     Strabismus Neg Hx     Thyroid disease Neg Hx        ALLERGIES AND MEDICATIONS: updated and reviewed.  Review of patient's allergies indicates:  No Known Allergies  Current Outpatient Medications   Medication Sig Dispense Refill    alendronate (FOSAMAX) 70 MG tablet TAKE 1 TABLET BY  MOUTH ONE TIME PER WEEK 12 tablet 1    amLODIPine (NORVASC) 10 MG tablet Take 1 tablet (10 mg total) by mouth once daily. 90 tablet 1    atorvastatin (LIPITOR) 40 MG tablet Take 1 tablet (40 mg total) by mouth once daily. 90 tablet 2    calcium-vitamin D 500-125 mg-unit tablet Take 1 tablet by mouth once daily.        diclofenac (VOLTAREN) 75 MG EC tablet Take 1 tablet (75 mg total) by mouth 2 (two) times daily as needed. 180 tablet 0    gabapentin (NEURONTIN) 300 MG capsule Take 1 capsule (300 mg total) by mouth 3 (three) times daily. 270 capsule 0    metoprolol succinate (TOPROL-XL) 100 MG 24 hr tablet Take 1 tablet (100 mg total) by mouth once daily. 90 tablet 1    multivitamin (MULTIVITAMIN) per tablet Take 1 tablet by mouth once daily.        omeprazole (PRILOSEC) 20 MG capsule Take 1 capsule (20 mg total) by mouth once daily. 90 capsule 1    paroxetine (PAXIL) 20 MG tablet Take 1 tablet (20 mg total) by mouth every morning. 90 tablet 1    simethicone (MYLICON) 80 MG chewable tablet Take 1 tablet (80 mg total) by mouth every 6 (six) hours as needed for Flatulence. 60 tablet 2    traZODone (DESYREL) 100 MG tablet Take 1 tablet (100 mg total) by mouth every evening. 90 tablet 1    fluticasone propionate (FLONASE) 50 mcg/actuation nasal spray 2 sprays (100 mcg total) by Each Nostril route once daily. 48 g 3    levocetirizine (XYZAL) 5 MG tablet Take 1 tablet (5 mg total) by mouth every evening. 30 tablet 5     No current facility-administered medications for this visit.          ROS  Review of Systems   Constitutional: Negative for chills, fever and unexpected weight change.   HENT: Negative for congestion, ear pain, hearing loss, rhinorrhea, sore throat and trouble swallowing.    Eyes: Negative for discharge, redness and visual disturbance.   Respiratory: Negative for cough, chest tightness, shortness of breath and wheezing.    Cardiovascular: Negative for chest pain, palpitations and leg swelling.    Gastrointestinal: Negative for abdominal pain, constipation, diarrhea, nausea and vomiting.   Endocrine: Negative for polydipsia, polyphagia and polyuria.   Genitourinary: Negative for decreased urine volume, dysuria and hematuria.   Musculoskeletal: Positive for back pain. Negative for arthralgias, joint swelling and myalgias.   Skin: Negative for color change and rash.   Neurological: Negative for dizziness, weakness, light-headedness and headaches.   Psychiatric/Behavioral: Negative for decreased concentration, dysphoric mood, sleep disturbance and suicidal ideas.           Physical Exam  Vitals:    03/02/20 0824   BP: (!) 140/70   Pulse: 84   Temp: 98.3 °F (36.8 °C)   SpO2: 98%   Weight: 67.9 kg (149 lb 12.8 oz)   Height: 5' (1.524 m)    Body mass index is 29.26 kg/m².  Weight: 67.9 kg (149 lb 12.8 oz)   Height: 5' (152.4 cm)   Physical Exam   Constitutional: She is oriented to person, place, and time. She appears well-developed and well-nourished. No distress.   HENT:   Head: Normocephalic and atraumatic.   Right Ear: Tympanic membrane, external ear and ear canal normal.   Left Ear: Tympanic membrane, external ear and ear canal normal.   Nose: Nose normal. Right sinus exhibits no maxillary sinus tenderness and no frontal sinus tenderness. Left sinus exhibits no maxillary sinus tenderness and no frontal sinus tenderness.   Mouth/Throat: Uvula is midline, oropharynx is clear and moist and mucous membranes are normal. No tonsillar exudate.   Eyes: Pupils are equal, round, and reactive to light. Conjunctivae, EOM and lids are normal. No scleral icterus.   Neck: Full passive range of motion without pain. Neck supple. No JVD present. No spinous process tenderness and no muscular tenderness present. Carotid bruit is not present. No thyromegaly present.   Cardiovascular: Normal rate, regular rhythm, S1 normal, S2 normal and intact distal pulses. Exam reveals no S3, no S4 and no friction rub.   No murmur  heard.  Pulmonary/Chest: Effort normal and breath sounds normal. She has no wheezes. She has no rhonchi. She has no rales.   Abdominal: Soft. Bowel sounds are normal. She exhibits no distension. There is no hepatosplenomegaly. There is no tenderness. There is no rebound and no CVA tenderness.   Musculoskeletal: Normal range of motion. She exhibits no edema or tenderness.   Lymphadenopathy:        Head (right side): No submental and no submandibular adenopathy present.        Head (left side): No submental and no submandibular adenopathy present.     She has no cervical adenopathy.   Neurological: She is alert and oriented to person, place, and time. Coordination normal.   Motor grossly intact.  Sensory grossly intact.  Symmetric facial movements palate elevated symmetrically tongue midline     Skin: Skin is warm and dry. No rash noted. No cyanosis. Nails show no clubbing.   Psychiatric: She has a normal mood and affect. Her speech is normal and behavior is normal. Thought content normal. Cognition and memory are normal.           Health Maintenance       Date Due Completion Date    TETANUS VACCINE 08/31/2034 (Originally 1/4/1963) ---    DEXA SCAN 11/02/2020 11/2/2017    Mammogram 06/18/2021 6/18/2019    Colonoscopy 05/14/2024 5/14/2019    Lipid Panel 02/21/2025 2/21/2020

## 2020-03-13 ENCOUNTER — OFFICE VISIT (OUTPATIENT)
Dept: SPINE | Facility: CLINIC | Age: 75
End: 2020-03-13
Payer: MEDICARE

## 2020-03-13 VITALS
DIASTOLIC BLOOD PRESSURE: 60 MMHG | SYSTOLIC BLOOD PRESSURE: 128 MMHG | WEIGHT: 149.69 LBS | HEIGHT: 60 IN | BODY MASS INDEX: 29.39 KG/M2 | HEART RATE: 61 BPM

## 2020-03-13 DIAGNOSIS — M47.26 OTHER SPONDYLOSIS WITH RADICULOPATHY, LUMBAR REGION: ICD-10-CM

## 2020-03-13 DIAGNOSIS — M48.062 SPINAL STENOSIS, LUMBAR REGION WITH NEUROGENIC CLAUDICATION: ICD-10-CM

## 2020-03-13 DIAGNOSIS — G89.29 CHRONIC BILATERAL LOW BACK PAIN WITH BILATERAL SCIATICA: Primary | ICD-10-CM

## 2020-03-13 DIAGNOSIS — M54.42 CHRONIC BILATERAL LOW BACK PAIN WITH BILATERAL SCIATICA: Primary | ICD-10-CM

## 2020-03-13 DIAGNOSIS — M51.36 DDD (DEGENERATIVE DISC DISEASE), LUMBAR: ICD-10-CM

## 2020-03-13 DIAGNOSIS — M54.16 LUMBAR RADICULOPATHY: ICD-10-CM

## 2020-03-13 DIAGNOSIS — M54.41 CHRONIC BILATERAL LOW BACK PAIN WITH BILATERAL SCIATICA: Primary | ICD-10-CM

## 2020-03-13 PROCEDURE — 1125F PR PAIN SEVERITY QUANTIFIED, PAIN PRESENT: ICD-10-PCS | Mod: S$GLB,,, | Performed by: PHYSICIAN ASSISTANT

## 2020-03-13 PROCEDURE — 99999 PR PBB SHADOW E&M-EST. PATIENT-LVL IV: ICD-10-PCS | Mod: PBBFAC,,, | Performed by: PHYSICIAN ASSISTANT

## 2020-03-13 PROCEDURE — 1159F PR MEDICATION LIST DOCUMENTED IN MEDICAL RECORD: ICD-10-PCS | Mod: S$GLB,,, | Performed by: PHYSICIAN ASSISTANT

## 2020-03-13 PROCEDURE — 99214 PR OFFICE/OUTPT VISIT, EST, LEVL IV, 30-39 MIN: ICD-10-PCS | Mod: S$GLB,,, | Performed by: PHYSICIAN ASSISTANT

## 2020-03-13 PROCEDURE — 3074F PR MOST RECENT SYSTOLIC BLOOD PRESSURE < 130 MM HG: ICD-10-PCS | Mod: CPTII,S$GLB,, | Performed by: PHYSICIAN ASSISTANT

## 2020-03-13 PROCEDURE — 3078F PR MOST RECENT DIASTOLIC BLOOD PRESSURE < 80 MM HG: ICD-10-PCS | Mod: CPTII,S$GLB,, | Performed by: PHYSICIAN ASSISTANT

## 2020-03-13 PROCEDURE — 1101F PT FALLS ASSESS-DOCD LE1/YR: CPT | Mod: CPTII,S$GLB,, | Performed by: PHYSICIAN ASSISTANT

## 2020-03-13 PROCEDURE — 99999 PR PBB SHADOW E&M-EST. PATIENT-LVL IV: CPT | Mod: PBBFAC,,, | Performed by: PHYSICIAN ASSISTANT

## 2020-03-13 PROCEDURE — 1125F AMNT PAIN NOTED PAIN PRSNT: CPT | Mod: S$GLB,,, | Performed by: PHYSICIAN ASSISTANT

## 2020-03-13 PROCEDURE — 1159F MED LIST DOCD IN RCRD: CPT | Mod: S$GLB,,, | Performed by: PHYSICIAN ASSISTANT

## 2020-03-13 PROCEDURE — 99214 OFFICE O/P EST MOD 30 MIN: CPT | Mod: S$GLB,,, | Performed by: PHYSICIAN ASSISTANT

## 2020-03-13 PROCEDURE — 3078F DIAST BP <80 MM HG: CPT | Mod: CPTII,S$GLB,, | Performed by: PHYSICIAN ASSISTANT

## 2020-03-13 PROCEDURE — 3074F SYST BP LT 130 MM HG: CPT | Mod: CPTII,S$GLB,, | Performed by: PHYSICIAN ASSISTANT

## 2020-03-13 PROCEDURE — 1101F PR PT FALLS ASSESS DOC 0-1 FALLS W/OUT INJ PAST YR: ICD-10-PCS | Mod: CPTII,S$GLB,, | Performed by: PHYSICIAN ASSISTANT

## 2020-03-13 NOTE — PROGRESS NOTES
Subjective:     Patient ID:  Diana Herring is a 75 y.o. female.    [facility]    Chief Complaint: Back and bilateral leg pain    HPI    Diana Herring is a 75 y.o. female who presents for follow up.  She continues to have back and bilateral posterior and lateral leg pain.  She did not go to the Healthy Back program and states she has been doing PT and aquatic PT at [place] which is helping some.  She is taking diclofenac 2 pills BID and gabapentin 600mg at night only.  This is helping some.    Patient denies any recent accidents or trauma, no saddle anesthesias, and no bowel or bladder incontinence.      Review of Systems:  Constitution: Negative for chills, fever, night sweats and weight loss.   Musculoskeletal: Negative for falls.   Gastrointestinal: Negative for bowel incontinence, nausea and vomiting.   Genitourinary: Negative for bladder incontinence.   Neurological: Negative for disturbances in coordination and loss of balance.      Objective:      Vitals:    03/13/20 0904   BP: 128/60   Pulse: 61   Weight: 67.9 kg (149 lb 11.1 oz)   Height: 5' (1.524 m)   PainSc:   7   PainLoc: Back         Physical Exam:    General:  Diana Herring is well-developed, well-nourished, appears stated age, in no acute distress, alert and oriented to person, place, and time.    Patient sits comfortably in the exam room and answers questions appropriately. Grossly patient is able to move bilateral lower extremities without difficulty.     XRAY Interpretation:      Lumbar spine ap/lateral/flexion/extension xrays were personally reviewed today.  No fractures.  No movement on flexion and extension.  DDD at L1-2, L2-3, and L3-4.     MRI Interpretation:      Lumbar spine MRI was personally reviewed today.  Multilevel DDD and spondylosis.  L3-4 central and bilateral NFS.  Mild central stenosis at L4-5.    Assessment:          1. Chronic bilateral low back pain with bilateral sciatica    2. DDD (degenerative disc disease),  lumbar    3. Other spondylosis with radiculopathy, lumbar region    4. Lumbar radiculopathy    5. Spinal stenosis, lumbar region with neurogenic claudication            Plan:          Orders Placed This Encounter    Procedure Order to [facility]    Ambulatory referral/consult to Pain Clinic     Lumbar spondylosis.  L3-4, L4-5 central stenosis worse at L3-4.     -Increased gabapentin to 300mg in am and 600mg in PM and take regularly  -Take diclofenac 75mg BID PRN.  Do not take two pills twice a day  -Continue PT  -L5-S1 IL SANKET  -Refer to pain management in two months to discuss other interventional pain procedure options       Follow-Up:  Follow up if symptoms worsen or fail to improve. If there are any questions prior to this, the patient was instructed to contact the office.       LISETTE Holland, PA-C  Neurosurgery  [facility]  [facility]

## 2020-04-28 ENCOUNTER — TELEPHONE (OUTPATIENT)
Dept: PAIN MEDICINE | Facility: CLINIC | Age: 75
End: 2020-04-28

## 2020-04-28 DIAGNOSIS — M54.16 LUMBAR RADICULOPATHY: Primary | ICD-10-CM

## 2020-04-28 NOTE — TELEPHONE ENCOUNTER
Pt feels pain is sever, 7/10 and limiting ADL including walking, and due to this Scheduling injection is time sensitive. Will forward to schedulers

## 2020-05-06 DIAGNOSIS — M51.36 DDD (DEGENERATIVE DISC DISEASE), LUMBAR: Primary | ICD-10-CM

## 2020-05-12 ENCOUNTER — LAB VISIT (OUTPATIENT)
Dept: INTERNAL MEDICINE | Facility: CLINIC | Age: 75
End: 2020-05-12
Payer: MEDICARE

## 2020-05-12 DIAGNOSIS — M54.16 LUMBAR RADICULOPATHY: ICD-10-CM

## 2020-05-12 LAB — SARS-COV-2 RNA RESP QL NAA+PROBE: NOT DETECTED

## 2020-05-12 PROCEDURE — U0002 COVID-19 LAB TEST NON-CDC: HCPCS

## 2020-05-13 ENCOUNTER — HOSPITAL ENCOUNTER (OUTPATIENT)
Facility: OTHER | Age: 75
Discharge: HOME | End: 2020-05-13
Attending: ANESTHESIOLOGY | Admitting: ANESTHESIOLOGY
Payer: MEDICARE

## 2020-05-13 VITALS
TEMPERATURE: 98 F | BODY MASS INDEX: 28.47 KG/M2 | HEART RATE: 63 BPM | RESPIRATION RATE: 16 BRPM | DIASTOLIC BLOOD PRESSURE: 60 MMHG | HEIGHT: 60 IN | OXYGEN SATURATION: 98 % | SYSTOLIC BLOOD PRESSURE: 127 MMHG | WEIGHT: 145 LBS

## 2020-05-13 DIAGNOSIS — M54.42 CHRONIC MIDLINE LOW BACK PAIN WITH BILATERAL SCIATICA: Primary | ICD-10-CM

## 2020-05-13 DIAGNOSIS — G89.29 CHRONIC MIDLINE LOW BACK PAIN WITH BILATERAL SCIATICA: Primary | ICD-10-CM

## 2020-05-13 DIAGNOSIS — G89.29 CHRONIC PAIN: ICD-10-CM

## 2020-05-13 DIAGNOSIS — M51.36 DDD (DEGENERATIVE DISC DISEASE), LUMBAR: ICD-10-CM

## 2020-05-13 DIAGNOSIS — G89.4 CHRONIC PAIN SYNDROME: ICD-10-CM

## 2020-05-13 DIAGNOSIS — M54.41 CHRONIC MIDLINE LOW BACK PAIN WITH BILATERAL SCIATICA: Primary | ICD-10-CM

## 2020-05-13 PROCEDURE — 63600175 PHARM REV CODE 636 W HCPCS: Performed by: ANESTHESIOLOGY

## 2020-05-13 PROCEDURE — 25000003 PHARM REV CODE 250: Performed by: ANESTHESIOLOGY

## 2020-05-13 PROCEDURE — 62323 NJX INTERLAMINAR LMBR/SAC: CPT

## 2020-05-13 PROCEDURE — 25500020 PHARM REV CODE 255: Performed by: ANESTHESIOLOGY

## 2020-05-13 PROCEDURE — 25000003 PHARM REV CODE 250: Performed by: PHYSICAL MEDICINE & REHABILITATION

## 2020-05-13 PROCEDURE — 62323 NJX INTERLAMINAR LMBR/SAC: CPT | Mod: ,,, | Performed by: ANESTHESIOLOGY

## 2020-05-13 PROCEDURE — 62323 PR INJ LUMBAR/SACRAL, W/IMAGING GUIDANCE: ICD-10-PCS | Mod: ,,, | Performed by: ANESTHESIOLOGY

## 2020-05-13 RX ORDER — DEXAMETHASONE SODIUM PHOSPHATE 4 MG/ML
INJECTION, SOLUTION INTRA-ARTICULAR; INTRALESIONAL; INTRAMUSCULAR; INTRAVENOUS; SOFT TISSUE
Status: DISCONTINUED | OUTPATIENT
Start: 2020-05-13 | End: 2020-05-13 | Stop reason: HOSPADM

## 2020-05-13 RX ORDER — LIDOCAINE HYDROCHLORIDE 10 MG/ML
INJECTION INFILTRATION; PERINEURAL
Status: DISCONTINUED | OUTPATIENT
Start: 2020-05-13 | End: 2020-05-13 | Stop reason: HOSPADM

## 2020-05-13 RX ORDER — MIDAZOLAM HYDROCHLORIDE 1 MG/ML
INJECTION INTRAMUSCULAR; INTRAVENOUS
Status: DISCONTINUED | OUTPATIENT
Start: 2020-05-13 | End: 2020-05-13 | Stop reason: HOSPADM

## 2020-05-13 RX ORDER — LIDOCAINE HYDROCHLORIDE 5 MG/ML
INJECTION, SOLUTION INFILTRATION; INTRAVENOUS
Status: DISCONTINUED | OUTPATIENT
Start: 2020-05-13 | End: 2020-05-13 | Stop reason: HOSPADM

## 2020-05-13 RX ORDER — SODIUM CHLORIDE 9 MG/ML
500 INJECTION, SOLUTION INTRAVENOUS CONTINUOUS
Status: DISCONTINUED | OUTPATIENT
Start: 2020-05-13 | End: 2020-05-13 | Stop reason: HOSPADM

## 2020-05-13 RX ADMIN — SODIUM CHLORIDE 500 ML: 0.9 INJECTION, SOLUTION INTRAVENOUS at 08:05

## 2020-05-13 NOTE — DISCHARGE SUMMARY
Discharge Note  Short Stay      SUMMARY     Admit Date: 5/13/2020    Attending Physician: Liu Thompson      Discharge Physician: Liu Thompson      Discharge Date: 5/13/2020 9:29 AM    Procedure(s) (LRB):  LUMBAR/CAUDAL L5/S1 IL SANKET  (N/A)    Final Diagnosis: DDD (degenerative disc disease), lumbar [M51.36]    Disposition: Home or self care    Patient Instructions:   Current Discharge Medication List      CONTINUE these medications which have NOT CHANGED    Details   alendronate (FOSAMAX) 70 MG tablet TAKE 1 TABLET BY MOUTH ONE TIME PER WEEK  Qty: 12 tablet, Refills: 1    Associated Diagnoses: Age-related osteoporosis without current pathological fracture      amLODIPine (NORVASC) 10 MG tablet Take 1 tablet (10 mg total) by mouth once daily.  Qty: 90 tablet, Refills: 1    Associated Diagnoses: Essential hypertension      atorvastatin (LIPITOR) 40 MG tablet Take 1 tablet (40 mg total) by mouth once daily.  Qty: 90 tablet, Refills: 2    Associated Diagnoses: Hyperlipidemia, unspecified hyperlipidemia type      calcium-vitamin D 500-125 mg-unit tablet Take 1 tablet by mouth once daily.        diclofenac (VOLTAREN) 75 MG EC tablet Take 1 tablet (75 mg total) by mouth 2 (two) times daily as needed.  Qty: 180 tablet, Refills: 0    Associated Diagnoses: DDD (degenerative disc disease), lumbar; Bilateral leg pain      fluticasone propionate (FLONASE) 50 mcg/actuation nasal spray 2 sprays (100 mcg total) by Each Nostril route once daily.  Qty: 48 g, Refills: 3    Associated Diagnoses: Allergic rhinitis, unspecified seasonality, unspecified trigger      gabapentin (NEURONTIN) 300 MG capsule Take 1 capsule (300 mg total) by mouth 3 (three) times daily.  Qty: 270 capsule, Refills: 0    Associated Diagnoses: DDD (degenerative disc disease), lumbar; Bilateral leg pain      levocetirizine (XYZAL) 5 MG tablet Take 1 tablet (5 mg total) by mouth every evening.  Qty: 30 tablet, Refills: 5    Associated Diagnoses: Acute bacterial sinusitis       metoprolol succinate (TOPROL-XL) 100 MG 24 hr tablet Take 1 tablet (100 mg total) by mouth once daily.  Qty: 90 tablet, Refills: 1    Associated Diagnoses: Essential hypertension      multivitamin (MULTIVITAMIN) per tablet Take 1 tablet by mouth once daily.        omeprazole (PRILOSEC) 20 MG capsule Take 1 capsule (20 mg total) by mouth once daily.  Qty: 90 capsule, Refills: 1    Associated Diagnoses: Gastroesophageal reflux disease without esophagitis      paroxetine (PAXIL) 20 MG tablet Take 1 tablet (20 mg total) by mouth every morning.  Qty: 90 tablet, Refills: 1    Associated Diagnoses: Anxiety; Mild recurrent major depression      simethicone (MYLICON) 80 MG chewable tablet Take 1 tablet (80 mg total) by mouth every 6 (six) hours as needed for Flatulence.  Qty: 60 tablet, Refills: 2    Associated Diagnoses: Excessive gas      traZODone (DESYREL) 100 MG tablet Take 1 tablet (100 mg total) by mouth every evening.  Qty: 90 tablet, Refills: 1    Associated Diagnoses: Insomnia, unspecified type                 Discharge Diagnosis: DDD (degenerative disc disease), lumbar [M51.36]  Condition on Discharge: Stable with no complications to procedure   Diet on Discharge: Same as before.  Activity: as per instruction sheet.  Discharge to: Home with a responsible adult.  Follow up: 2-4 weeks       Please call my office or pager at [phone] if experienced any weakness or loss of sensation, fever > 101.5, pain uncontrolled with oral medications, persistent nausea/vomiting/or diarrhea, redness or drainage from the incisions, or any other worrisome concerns. If physician on call was not reached or could not communicate with our office for any reason please go to the nearest emergency department

## 2020-05-13 NOTE — DISCHARGE INSTRUCTIONS
Thank you for allowing us to care for you today. You may receive a survey about the care we provided. Your feedback is valuable and helps us provide excellent care throughout the community.     Home Care Instructions for Pain Management:    1. DIET:   You may resume your normal diet today.   2. BATHING:   You may shower with luke warm water. No tub baths or anything that will soak injection sites under water for the next 24 hours.  3. DRESSING:   You may remove your bandage today.   4. ACTIVITY LEVEL:   You may resume your normal activities 24 hrs after your procedure. Nothing strenuous today.  5. MEDICATIONS:   You may resume your normal medications today. To restart blood thinners, ask your doctor.  6. DRIVING    If you have received any sedatives by mouth today, you may not drive for 12 hours.    If you have received any sedation through your IV, you may not drive for 24 hrs.   7. SPECIAL INSTRUCTIONS:   No heat to the injection site for 24 hrs including, hot bath or shower, heating pad, moist heat, or hot tubs.    Use ice pack to injection site for any pain or discomfort.  Apply ice packs for 20 minute intervals as needed.    IF you have diabetes, be sure to monitor your blood sugar more closely. IF your injection contained steroids your blood sugar levels may become higher than normal.    If you are still having pain upon discharge:  Your pain may improve over the next 48 hours. The anesthetic (numbing medication) works immediately to 48 hours. IF your injection contained a steroid (anti-inflammatory medication), it takes approximately 3 days to start feeling relief and 7-10 days to see your greatest results from the medication. It is possible you may need subsequent injections. This would be discussed at your follow up appointment with pain management or your referring doctor.    Please call the PAIN MANAGEMENT office at [phone] or ON CALL pager at [phone] if you experienced any:   -Weakness or  loss of sensation  -Fever > 101.5  -Pain uncontrolled with oral medications   -Persistent nausea, vomiting, or diarrhea  -Redness or drainage from the injection sites, or any other worrisome concerns.   If physician on call was not reached or could not communicate with our office for any reason please go to the nearest emergency department.  Adult Procedural Sedation Instructions    Recovery After Procedural Sedation (Adult)  You have been given medicine by vein to make you sleep during your surgery. This may have included both a pain medicine and sleeping medicine. Most of the effects have worn off. But you may still have some drowsiness for the next 6 to 8 hours.  Home care  Follow these guidelines when you get home:  · For the next 8 hours, you should be watched by a responsible adult. This person should make sure your condition is not getting worse.  · Don't drink any alcohol for the next 24 hours.  · Don't drive, operate dangerous machinery, or make important business or personal decisions during the next 24 hours.  Note: Your healthcare provider may tell you not to take any medicine by mouth for pain or sleep in the next 4 hours. These medicines may react with the medicines you were given in the hospital. This could cause a much stronger response than usual.  Follow-up care  Follow up with your healthcare provider if you are not alert and back to your usual level of activity within 12 hours.  When to seek medical advice  Call your healthcare provider right away if any of these occur:  · Drowsiness gets worse  · Weakness or dizziness gets worse  · Repeated vomiting  · You can't be awakened   Date Last Reviewed: 10/18/2016  © 4171-8032 The "[employer]. [address] [place] [address], [place] [address]. All rights reserved. This information is not intended as a substitute for professional medical care. Always follow your healthcare professional's instructions.

## 2020-05-13 NOTE — OP NOTE
"Patient Name: Diana Herring  MRN: [id number]    INFORMED CONSENT: The procedure, risks, benefits and options were discussed with patient. There are no contraindications to the procedure. The patient expressed understanding and agreed to proceed. The personnel performing the procedure was discussed. I verify that I personally obtained Diana's consent prior to the start of the procedure and the signed consent can be found on the patient's chart.    Procedure Date: 05/13/2020    Anesthesia: Topical    Pre Procedure diagnosis: DDD (degenerative disc disease), lumbar [M51.36]  1. Chronic midline low back pain with bilateral sciatica    2. Chronic pain syndrome    3. DDD (degenerative disc disease), lumbar    4. Chronic pain      Post-Procedure diagnosis: SAME      Moderate Sedation: none    PROCEDURE: . L5-S1 INTERLAMINAR EPIDURAL STEROID INJECTION - LUMBAR      DESCRIPTION OF PROCEDURE: The patient was brought to the procedure room.  After performing time out IV access was obtained prior to the procedure. The patient was positioned prone on the fluoroscopy table. Continuous hemodynamic monitoring was initiated including blood pressure, EKG, and pulse oximetry.   The area of the spine was prepped with chlorhexidine three times and draped in a sterile fashion.  Skin anesthesia was achieved using 3 mL of lidocaine 1% over the respective injection site. The L5-S1 interspace was visualized under fluoroscopic imaging. An 18 gauge 3 1/2" Tuohy needle was slowly inserted and advanced using loss of resistance to saline with AP and lateral fluoroscopic imaging for needle guidance. Negative aspiration for blood or CSF was confirmed. Epidural contrast spread was confirmed using 2mL of Omnipaque 300 contrast spreading in the lumbar epidural space.6 mL of Lidocaine 0.5% and 10 mg decadron was injected. The needle was removed and bleeding was nil.  A sterile dressing was applied. Diana was taken back to the recovery room for " further observation.     Blood Loss: Nill  Specimen: None      Liu Thompson MD

## 2020-05-13 NOTE — PROGRESS NOTES
Dangled at bedside, attempted to ambulate, unsteady gait, weakness in left leg and left buttocks. Dr. Thompson called to bedside, patient assessed by Dr. Thompson. Instructed to keep patient additional 30 minutes

## 2020-05-13 NOTE — H&P
HPI  Patient with severe lumbar radicular pain, 7/10. Needs procedure to prevent patient from pursuing ER for pain relief. Will proceed with SANKET. This is time sensitive.         Past Medical History:   Diagnosis Date    Anxiety     Arthritis     Corneal abrasion s    ? eye     Depression     Full dentures     GERD (gastroesophageal reflux disease)     Hyperlipidemia     Hypertension     Nuclear sclerosis of both eyes 6/5/2017    Osteoporosis     Restless leg syndrome      Past Surgical History:   Procedure Laterality Date    COLONOSCOPY N/A 5/14/2019    Procedure: COLONOSCOPY;  Surgeon: Nahid Cline MD;  Location: [facility] ENDO;  Service: Endoscopy;  Laterality: N/A;    HYSTERECTOMY      PARTIAL HYSTERECTOMY      TRANSFORAMINAL EPIDURAL INJECTION OF STEROID Bilateral 9/4/2019    Procedure: Injection,steroid,epidural,transforaminal approach LUMBAR TRANSFORAMINAL BILATERAL L3 TF SANKET;  Surgeon: Miguel Latham MD;  Location: [facility] PAIN MGT;  Service: Pain Management;  Laterality: Bilateral;  NEEDS CONSENT    TRANSFORAMINAL EPIDURAL INJECTION OF STEROID Bilateral 10/30/2019    Procedure: TRANSFORAMINAL SANKET BILATERAL L3-L4;  Surgeon: Miguel Latham MD;  Location: [facility] PAIN MGT;  Service: Pain Management;  Laterality: Bilateral;  NEEDS CONSENT    TUBAL LIGATION Bilateral      Review of patient's allergies indicates:  No Known Allergies     PMHx, PSHx, Allergies, Medications reviewed in epic      ROS negative except pain complaints in HPI    OBJECTIVE:    /66   Temp 98.2 °F (36.8 °C) (Oral)   Resp 18   Ht 5' (1.524 m)   Wt 65.8 kg (145 lb)   SpO2 100%   Breastfeeding? No   BMI 28.32 kg/m²     PHYSICAL EXAMINATION:    GENERAL: Well appearing, in no acute distress, alert and oriented x3.  PSYCH:  Mood and affect appropriate.  SKIN: Skin color, texture, turgor normal, no rashes or lesions.  CV: RRR with palpation of the radial artery.  PULM: No evidence of respiratory difficulty, symmetric  chest rise. Clear to auscultation.  NEURO: Cranial nerves grossly intact.    Plan:    Proceed with procedure as planned    Liu Thompson  05/13/2020

## 2020-05-26 ENCOUNTER — NURSE TRIAGE (OUTPATIENT)
Dept: ADMINISTRATIVE | Facility: CLINIC | Age: 75
End: 2020-05-26

## 2020-05-26 NOTE — TELEPHONE ENCOUNTER
Called patient on behalf of [facility]. Pt denied developing any fever, cough or shortness of breath since the procedure.    Reason for Disposition   Follow-up call to recent contact and information only call, no triage required    Additional Information   Negative: Caller is not with the child and is reporting urgent symptoms   Negative: Refusing to take medications, questions about   Negative: Medication or pharmacy questions   Negative: Caller requesting lab results and child stable   Negative: Caller has questions about durable medical equipment ordered and triager unable to answer   Negative: Requesting regular office appointment and child is well   Negative: Health or general information question, no triage required and triager able to answer question   Negative: Behavior or development information question, no triage required and triager able to answer question   Negative: Question about upcoming scheduled test, no triage required and triager able to answer question   Negative: Caller is not with the child and probable non-urgent symptoms and unable to complete triage (Note: parent to call back with triage info)    Protocols used: INFORMATION ONLY CALL - NO TRIAGE-P-OH

## 2020-05-28 ENCOUNTER — OFFICE VISIT (OUTPATIENT)
Dept: PAIN MEDICINE | Facility: CLINIC | Age: 75
End: 2020-05-28
Attending: ANESTHESIOLOGY
Payer: MEDICARE

## 2020-05-28 VITALS
WEIGHT: 145 LBS | HEIGHT: 60 IN | TEMPERATURE: 98 F | HEART RATE: 69 BPM | DIASTOLIC BLOOD PRESSURE: 69 MMHG | RESPIRATION RATE: 18 BRPM | BODY MASS INDEX: 28.47 KG/M2 | SYSTOLIC BLOOD PRESSURE: 125 MMHG

## 2020-05-28 DIAGNOSIS — M54.15 RADICULOPATHY OF THORACOLUMBAR REGION: ICD-10-CM

## 2020-05-28 DIAGNOSIS — M47.9 OSTEOARTHRITIS OF SPINE, UNSPECIFIED SPINAL OSTEOARTHRITIS COMPLICATION STATUS, UNSPECIFIED SPINAL REGION: ICD-10-CM

## 2020-05-28 DIAGNOSIS — M47.819 SPONDYLOSIS WITHOUT MYELOPATHY: ICD-10-CM

## 2020-05-28 DIAGNOSIS — M51.36 DDD (DEGENERATIVE DISC DISEASE), LUMBAR: Primary | ICD-10-CM

## 2020-05-28 DIAGNOSIS — M48.062 SPINAL STENOSIS, LUMBAR REGION WITH NEUROGENIC CLAUDICATION: ICD-10-CM

## 2020-05-28 PROCEDURE — 1159F MED LIST DOCD IN RCRD: CPT | Mod: S$GLB,,, | Performed by: ANESTHESIOLOGY

## 2020-05-28 PROCEDURE — 1101F PR PT FALLS ASSESS DOC 0-1 FALLS W/OUT INJ PAST YR: ICD-10-PCS | Mod: CPTII,S$GLB,, | Performed by: ANESTHESIOLOGY

## 2020-05-28 PROCEDURE — 99999 PR PBB SHADOW E&M-EST. PATIENT-LVL IV: CPT | Mod: PBBFAC,,, | Performed by: ANESTHESIOLOGY

## 2020-05-28 PROCEDURE — 3078F DIAST BP <80 MM HG: CPT | Mod: CPTII,S$GLB,, | Performed by: ANESTHESIOLOGY

## 2020-05-28 PROCEDURE — 3074F SYST BP LT 130 MM HG: CPT | Mod: CPTII,S$GLB,, | Performed by: ANESTHESIOLOGY

## 2020-05-28 PROCEDURE — 1159F PR MEDICATION LIST DOCUMENTED IN MEDICAL RECORD: ICD-10-PCS | Mod: S$GLB,,, | Performed by: ANESTHESIOLOGY

## 2020-05-28 PROCEDURE — 99999 PR PBB SHADOW E&M-EST. PATIENT-LVL IV: ICD-10-PCS | Mod: PBBFAC,,, | Performed by: ANESTHESIOLOGY

## 2020-05-28 PROCEDURE — 1101F PT FALLS ASSESS-DOCD LE1/YR: CPT | Mod: CPTII,S$GLB,, | Performed by: ANESTHESIOLOGY

## 2020-05-28 PROCEDURE — 99214 OFFICE O/P EST MOD 30 MIN: CPT | Mod: GC,S$GLB,, | Performed by: ANESTHESIOLOGY

## 2020-05-28 PROCEDURE — 99214 PR OFFICE/OUTPT VISIT, EST, LEVL IV, 30-39 MIN: ICD-10-PCS | Mod: GC,S$GLB,, | Performed by: ANESTHESIOLOGY

## 2020-05-28 PROCEDURE — 3074F PR MOST RECENT SYSTOLIC BLOOD PRESSURE < 130 MM HG: ICD-10-PCS | Mod: CPTII,S$GLB,, | Performed by: ANESTHESIOLOGY

## 2020-05-28 PROCEDURE — 1125F AMNT PAIN NOTED PAIN PRSNT: CPT | Mod: S$GLB,,, | Performed by: ANESTHESIOLOGY

## 2020-05-28 PROCEDURE — 1125F PR PAIN SEVERITY QUANTIFIED, PAIN PRESENT: ICD-10-PCS | Mod: S$GLB,,, | Performed by: ANESTHESIOLOGY

## 2020-05-28 PROCEDURE — 3078F PR MOST RECENT DIASTOLIC BLOOD PRESSURE < 80 MM HG: ICD-10-PCS | Mod: CPTII,S$GLB,, | Performed by: ANESTHESIOLOGY

## 2020-05-28 NOTE — LETTER
June 2, 2020      Clary Cuadra PA-C  [address]  [place] [address]           [facility]  [address]  [place] [address]  Phone: [phone]  Fax: [phone]          Patient: Diana Herring   MR Number: [id number]   YOB: 1945   Date of Visit: 5/28/2020       Dear Clary Cuadra:    Thank you for referring Diana Herring to me for evaluation. Attached you will find relevant portions of my assessment and plan of care.    If you have questions, please do not hesitate to call me. I look forward to following Diana Herring along with you.    Sincerely,    Miguel Latham MD    Enclosure  CC:  No Recipients    If you would like to receive this communication electronically, please contact [email or web address] or [phone] to request more information on [facility] [employer] access.    For providers and/or their staff who would like to refer a patient to [facility], please contact us through our one-stop-shop provider referral line, [facility], at [phone].    If you feel you have received this communication in error or would no longer like to receive these types of communications, please e-mail [email or web address]

## 2020-05-28 NOTE — PROGRESS NOTES
Chronic Pain - New Consult    Referring Physician: Clary Cuadra, *    Chief Complaint: low back pain     SUBJECTIVE:    Diana Herring presents to the clinic for the evaluation of low back pain. The pain started 2 years ago following loosing bone after a bone density and symptoms have been worsening.The pain is located in the low back area and radiates to the bilateral hip and leg.  The pain is described as aching, numbing, sharp and tight band and is rated as 8/10. The pain is rated with a score of  7/10 on the BEST day and a score of 8/10 on the WORST day.  Symptoms interfere with daily activity and sleeping. The pain is exacerbated by Sitting, Standing, Bending, Coughing/Sneezing, Walking, Morning, Lifting and Getting out of bed/chair.  The pain is mitigated by heat, laying down and rest. She reports spending 0 hours per day reclining. The patient reports 6 hours of uninterrupted sleep per night.    Patient had L5/S1 ILESI 2 weeks ago and only gave her 1 day of relief.     Patient denies night fever/night sweats, urinary incontinence, bowel incontinence, significant weight loss and significant motor weakness.    Physical Therapy/Home Exercise: yes, was not beneficial      Pain Disability Index Review:  Last 3 PDI Scores 2020   Pain Disability Index (PDI) 15       Pain Medications:    Diclofenac tablets  Gabapentin       report:  Reviewed and consistent with medication use as prescribed.    Pain Procedures:   20 L5/S1 ILESI  10/30/19 TFESI Bilateral L3-L4  19 TFESI Bilateral L3     Imagin19 MRI Lumbar Spine  Narrative     EXAMINATION:  MRI LUMBAR SPINE WITHOUT CONTRAST    CLINICAL HISTORY:  back and bilateral leg pain; Lumbago with sciatica, left side    TECHNIQUE:  Multiplanar, multisequence MR images were acquired from the thoracolumbar junction to the sacrum without the administration of contrast.    COMPARISON:  None.    FINDINGS:  MRI examination of the lumbar spine  was performed.  Intravenous contrast was not utilized.  There are areas of hyperintense T2 hypointense T1 lesions of the kidneys likely cysts not optimally evaluated on this exam.    There is multilevel degenerative loss of disc signal and there is loss of disc space height most notable at L3-4.  There are chronic appearing endplate changes noted with some mild STIR hyperintensity that likely relates to edema associated with degenerative endplate change.  There is no evidence for high-grade spondylolisthesis, there is no evidence for high-grade or acute compression fracture deformity.  There is no finding to specifically suggest aggressive bone marrow replacement process.    The sagittal imaging includes the majority of T10 through the S3 segment, axial imaging includes the inferior aspect of T12 through the majority of S1.  On the sagittal imaging there is appearance suggesting mild degenerative disc disease and disc bulge at T10-11 and T11-12 with some mild posterior ligamentous thickening suggested at T10-11 however without evidence for high-grade spinal canal stenosis.    The T12-L1 level demonstrates no evidence for high-grade spinal canal or foraminal stenosis.    The L1-2 level demonstrates degenerative disc bulge with anterior impression upon the dural sac.  There is posterior facet arthropathy and ligamentous thickening there is mild spinal canal stenosis.  There is encroachment into the neural foramen at the level of the disc plane without obvious exiting nerve root impingement.    The L2-3 level demonstrates mild degenerative loss of disc space height.  There is mild degenerative disc bulge with anterior impression upon the dural sac.  There is posterior facet arthropathy with somewhat prominent ligamentous thickening with associated posterolateral encroachment.  There is mild-to-moderate spinal canal stenosis.  There is bilateral foraminal narrowing.    The L3-4 level demonstrates the aforementioned  appearance of loss of disc space height and degenerative loss of disc signal.  There is diffuse degenerative disc bulge with anterior impression upon the dural sac there is posterior facet arthropathy and ligamentous thickening there is high-grade spinal canal stenosis.  There is bilateral foraminal stenosis.    The L4-5 level demonstrates degenerative disc bulge with anterior impression upon the dural sac there is posterior facet arthropathy and ligamentous thickening with mild to moderate spinal canal stenosis.  There is bilateral foraminal narrowing/stenosis without obvious exiting nerve root impingement.    The L5-S1 level demonstrates no evidence for high-grade spinal canal stenosis.  Facet arthropathy is noted there is no evidence for high-grade foraminal stenosis or exiting nerve root impingement.    The visualized spinal cord appears to taper appropriately and appears of appropriate signal and caliber.  Mild signal intensity within the spinal canal on T1 axial imaging may relate to a minimal lipoma along the filum terminalis.  There is no additional evidence for intraspinal mass or abnormal fluid collection      Impression       Multilevel chronic change of the lumbar spine with variable spinal canal and foraminal narrowing/stenosis as detailed above.  Correlation for any specific level of symptomatology is needed.  The greatest level of spinal canal stenosis is seen at L3-4.         7/23/19 Xray Lumbar Spine  Narrative     EXAMINATION:  XR LUMBAR SPINE AP AND LAT WITH FLEX/EXT    CLINICAL HISTORY:  Low back pain    TECHNIQUE:  AP and lateral views as well as lateral flexion and extension images are performed through the lumbar spine.    COMPARISON:  None    FINDINGS:  There is no evidence of acute fracture or subluxation.  There is multilevel degenerative change with anterior osteophyte formation and mild facet hypertrophy.  Soft tissues are unremarkable.      Impression       Multilevel degenerative  change               Past Medical History:   Diagnosis Date    Anxiety     Arthritis     Corneal abrasion s    ? eye     Depression     Full dentures     GERD (gastroesophageal reflux disease)     Hyperlipidemia     Hypertension     Nuclear sclerosis of both eyes 6/5/2017    Osteoporosis     Restless leg syndrome      Past Surgical History:   Procedure Laterality Date    COLONOSCOPY N/A 5/14/2019    Procedure: COLONOSCOPY;  Surgeon: Nahid Cline MD;  Location: [facility] ENDO;  Service: Endoscopy;  Laterality: N/A;    EPIDURAL STEROID INJECTION N/A 5/13/2020    Procedure: LUMBAR/CAUDAL L5/S1 IL SANKET ;  Surgeon: Miguel Latham MD;  Location: [facility] PAIN MGT;  Service: Pain Management;  Laterality: N/A;  NEEDS CONSENT    HYSTERECTOMY      PARTIAL HYSTERECTOMY      TRANSFORAMINAL EPIDURAL INJECTION OF STEROID Bilateral 9/4/2019    Procedure: Injection,steroid,epidural,transforaminal approach LUMBAR TRANSFORAMINAL BILATERAL L3 TF SANKET;  Surgeon: iMguel Latham MD;  Location: [facility] PAIN MGT;  Service: Pain Management;  Laterality: Bilateral;  NEEDS CONSENT    TRANSFORAMINAL EPIDURAL INJECTION OF STEROID Bilateral 10/30/2019    Procedure: TRANSFORAMINAL SANKET BILATERAL L3-L4;  Surgeon: Miguel Latham MD;  Location: [facility] PAIN MGT;  Service: Pain Management;  Laterality: Bilateral;  NEEDS CONSENT    TUBAL LIGATION Bilateral      Social History     Socioeconomic History    Marital status:      Spouse name: Not on file    Number of children: Not on file    Years of education: Not on file    Highest education level: Not on file   Occupational History    Not on file   Social Needs    Financial resource strain: Not on file    Food insecurity:     Worry: Not on file     Inability: Not on file    Transportation needs:     Medical: Not on file     Non-medical: Not on file   Tobacco Use    Smoking status: Never Smoker    Smokeless tobacco: Never Used   Substance and Sexual Activity    Alcohol  use: No    Drug use: No    Sexual activity: Never   Lifestyle    Physical activity:     Days per week: Not on file     Minutes per session: Not on file    Stress: Not on file   Relationships    Social connections:     Talks on phone: Not on file     Gets together: Not on file     Attends [employer] service: Not on file     Active member of club or organization: Not on file     Attends meetings of clubs or organizations: Not on file     Relationship status: Not on file   Other Topics Concern    Not on file   Social History Narrative    Patient is  w/ 5 children, one  from heart disease.The patient is retired.     Family History   Problem Relation Age of Onset    Stroke Mother     Glaucoma Mother     Cataracts Mother     Cancer Father         Lung    No Known Problems Brother     No Known Problems Son     Stroke Brother     Heart disease Son     No Known Problems Sister     No Known Problems Maternal Aunt     No Known Problems Maternal Uncle     No Known Problems Paternal Aunt     No Known Problems Paternal Uncle     No Known Problems Maternal Grandmother     No Known Problems Maternal Grandfather     No Known Problems Paternal Grandmother     No Known Problems Paternal Grandfather     Amblyopia Neg Hx     Blindness Neg Hx     Diabetes Neg Hx     Hypertension Neg Hx     Macular degeneration Neg Hx     Retinal detachment Neg Hx     Strabismus Neg Hx     Thyroid disease Neg Hx        Review of patient's allergies indicates:  No Known Allergies    Current Outpatient Medications   Medication Sig    alendronate (FOSAMAX) 70 MG tablet TAKE 1 TABLET BY MOUTH ONE TIME PER WEEK    amLODIPine (NORVASC) 10 MG tablet Take 1 tablet (10 mg total) by mouth once daily.    atorvastatin (LIPITOR) 40 MG tablet Take 1 tablet (40 mg total) by mouth once daily.    calcium-vitamin D 500-125 mg-unit tablet Take 1 tablet by mouth once daily.      diclofenac (VOLTAREN) 75 MG EC tablet Take 1  tablet (75 mg total) by mouth 2 (two) times daily as needed.    fluticasone propionate (FLONASE) 50 mcg/actuation nasal spray 2 sprays (100 mcg total) by Each Nostril route once daily.    gabapentin (NEURONTIN) 300 MG capsule Take 1 capsule (300 mg total) by mouth 3 (three) times daily.    levocetirizine (XYZAL) 5 MG tablet Take 1 tablet (5 mg total) by mouth every evening.    metoprolol succinate (TOPROL-XL) 100 MG 24 hr tablet Take 1 tablet (100 mg total) by mouth once daily.    multivitamin (MULTIVITAMIN) per tablet Take 1 tablet by mouth once daily.      omeprazole (PRILOSEC) 20 MG capsule Take 1 capsule (20 mg total) by mouth once daily.    paroxetine (PAXIL) 20 MG tablet Take 1 tablet (20 mg total) by mouth every morning.    simethicone (MYLICON) 80 MG chewable tablet Take 1 tablet (80 mg total) by mouth every 6 (six) hours as needed for Flatulence.    traZODone (DESYREL) 100 MG tablet Take 1 tablet (100 mg total) by mouth every evening.     No current facility-administered medications for this visit.        REVIEW OF SYSTEMS:    GENERAL:  No weight loss, malaise or fevers.  HEENT:  Negative for frequent or significant headaches.  NECK:  Negative for lumps, goiter, pain and significant neck swelling.  RESPIRATORY:  Negative for cough, wheezing or shortness of breath.  CARDIOVASCULAR:  Negative for chest pain, leg swelling or palpitations.  GI:  Negative for abdominal discomfort, blood in stools or black stools or change in bowel habits.  MUSCULOSKELETAL:  See HPI.  SKIN:  Negative for lesions, rash, and itching.  PSYCH:  Negative for sleep disturbance, mood disorder and recent psychosocial stressors.  HEMATOLOGY/LYMPHOLOGY:  Negative for prolonged bleeding, bruising easily or swollen nodes.  NEURO:  + numbness. No history of headaches, syncope, paralysis, seizures or tremors.  All other reviewed and negative other than HPI.    OBJECTIVE:    There were no vitals taken for this visit.    PHYSICAL  EXAMINATION:    General appearance: Well appearing, in no acute distress, alert and oriented x3.  Psych:  Mood and affect appropriate.  Skin: Skin color, texture, turgor normal, no rashes or lesions, in both upper and lower body.  Head/face:  Normocephalic, atraumatic. No palpable lymph nodes.  Neck: No pain to palpation over the cervical paraspinous muscles. Spurling Negative. No pain with neck flexion, extension, or lateral flexion.   Cor: RRR  Pulm: non-labored respirations  GI:  Soft and non-tender.  Back: Straight leg raising in the sitting and supine positions is +ve to radicular pain. Mild  pain to palpation over the spine or costovertebral angles. Normal range of motion without pain reproduction. Positive axial loading test bilateral.  Positive FABERE,Ganselin and Yeoman's test on the both side.negative FADIR  Extremities: Peripheral joint ROM is full and pain free without obvious instability or laxity in all four extremities. No deformities, edema, or skin discoloration. Good capillary refill.  Musculoskeletal: Bilateral lower extremity strength is normal and symmetric.  No atrophy or tone abnormalities are noted.  Neuro: Bilateral upper and lower extremity coordination and muscle stretch reflexes are physiologic and symmetric. Reduced sensation to light touch lower legs bilaterally  Gait: antalgic    ASSESSMENT: 75 y.o. year old female with low back  pain, consistent with      1. DDD (degenerative disc disease), lumbar  Ambulatory referral/consult to Pain Clinic    X-Ray Lumbar Complete With Flex And Ext    X-Ray Hips Bilateral 2 View Incl AP Pelvis   2. Radiculopathy of thoracolumbar region     3. Spinal stenosis, lumbar region with neurogenic claudication  Ambulatory referral/consult to Pain Clinic    X-Ray Lumbar Complete With Flex And Ext    X-Ray Hips Bilateral 2 View Incl AP Pelvis   4. Spondylosis without myelopathy     5. Osteoarthritis of spine, unspecified spinal osteoarthritis complication  status, unspecified spinal region           PLAN:     - I have stressed the importance of physical activity and a home exercise plan to help with pain and improve health.  - Patient can continue with medications for now since they are providing benefits, using them appropriately, and without side effects.  - Increase Gabapentin to 600mg TID. This medication was sent to the pharmacy today.   - Schedule for a bilateral L3/4 TFESI as this provided excellent benefit previously.  - RTC 2 weeks after procedure  - Counseled patient regarding the importance of physical activity.    The above plan and management options were discussed at length with patient. Patient is in agreement with the above and verbalized understanding. It will be communicated with the referring physician via electronic record, fax, or mail.    Liu Thompson MD  Pain Fellow   I have personally reviewed the history and exam of this patient and agree with the resident/fellow/NPs note as stated above.    Miguel Latham MD  5/28/20

## 2020-05-29 ENCOUNTER — HOSPITAL ENCOUNTER (OUTPATIENT)
Dept: RADIOLOGY | Facility: HOSPITAL | Age: 75
Discharge: HOME | End: 2020-05-29
Attending: ANESTHESIOLOGY
Payer: MEDICARE

## 2020-05-29 DIAGNOSIS — M48.062 SPINAL STENOSIS, LUMBAR REGION WITH NEUROGENIC CLAUDICATION: ICD-10-CM

## 2020-05-29 DIAGNOSIS — M54.16 LUMBAR RADICULOPATHY: ICD-10-CM

## 2020-05-29 DIAGNOSIS — M54.41 CHRONIC BILATERAL LOW BACK PAIN WITH BILATERAL SCIATICA: ICD-10-CM

## 2020-05-29 DIAGNOSIS — M51.36 DDD (DEGENERATIVE DISC DISEASE), LUMBAR: ICD-10-CM

## 2020-05-29 DIAGNOSIS — G89.29 CHRONIC BILATERAL LOW BACK PAIN WITH BILATERAL SCIATICA: ICD-10-CM

## 2020-05-29 DIAGNOSIS — M47.26 OTHER SPONDYLOSIS WITH RADICULOPATHY, LUMBAR REGION: ICD-10-CM

## 2020-05-29 DIAGNOSIS — M54.42 CHRONIC BILATERAL LOW BACK PAIN WITH BILATERAL SCIATICA: ICD-10-CM

## 2020-05-29 PROCEDURE — 72110 X-RAY EXAM L-2 SPINE 4/>VWS: CPT | Mod: TC,FY,PO

## 2020-05-29 PROCEDURE — 72110 X-RAY EXAM L-2 SPINE 4/>VWS: CPT | Mod: 26,,, | Performed by: RADIOLOGY

## 2020-05-29 PROCEDURE — 72110 XR LUMBAR SPINE 5 VIEW WITH FLEX AND EXT: ICD-10-PCS | Mod: 26,,, | Performed by: RADIOLOGY

## 2020-05-29 PROCEDURE — 73521 XR HIPS BILATERAL 2 VIEW INCL AP PELVIS: ICD-10-PCS | Mod: 26,,, | Performed by: RADIOLOGY

## 2020-05-29 PROCEDURE — 73521 X-RAY EXAM HIPS BI 2 VIEWS: CPT | Mod: 26,,, | Performed by: RADIOLOGY

## 2020-05-29 PROCEDURE — 73521 X-RAY EXAM HIPS BI 2 VIEWS: CPT | Mod: TC,FY,PO

## 2020-06-02 PROBLEM — G89.29 CHRONIC PAIN: Status: RESOLVED | Noted: 2019-09-04 | Resolved: 2020-06-02

## 2020-06-02 PROBLEM — M48.062 SPINAL STENOSIS, LUMBAR REGION WITH NEUROGENIC CLAUDICATION: Status: ACTIVE | Noted: 2020-06-02

## 2020-06-02 PROBLEM — G89.29 CHRONIC MIDLINE LOW BACK PAIN WITH BILATERAL SCIATICA: Status: RESOLVED | Noted: 2019-03-15 | Resolved: 2020-06-02

## 2020-06-02 PROBLEM — M54.41 CHRONIC MIDLINE LOW BACK PAIN WITH BILATERAL SCIATICA: Status: RESOLVED | Noted: 2019-03-15 | Resolved: 2020-06-02

## 2020-06-02 PROBLEM — M54.42 CHRONIC MIDLINE LOW BACK PAIN WITH BILATERAL SCIATICA: Status: RESOLVED | Noted: 2019-03-15 | Resolved: 2020-06-02

## 2020-06-02 PROBLEM — M54.15 RADICULOPATHY OF THORACOLUMBAR REGION: Status: ACTIVE | Noted: 2020-06-02

## 2020-06-02 PROBLEM — M47.819 SPONDYLOSIS WITHOUT MYELOPATHY: Status: ACTIVE | Noted: 2020-06-02

## 2020-06-02 PROBLEM — M47.9 OSTEOARTHRITIS OF SPINE: Status: ACTIVE | Noted: 2020-06-02

## 2020-06-19 ENCOUNTER — HOSPITAL ENCOUNTER (OUTPATIENT)
Dept: RADIOLOGY | Facility: HOSPITAL | Age: 75
Discharge: HOME | End: 2020-06-19
Attending: INTERNAL MEDICINE
Payer: MEDICARE

## 2020-06-19 DIAGNOSIS — Z12.31 ENCOUNTER FOR SCREENING MAMMOGRAM FOR BREAST CANCER: ICD-10-CM

## 2020-06-19 PROCEDURE — 77063 MAMMO DIGITAL SCREENING BILAT WITH TOMOSYNTHESIS_CAD: ICD-10-PCS | Mod: 26,,, | Performed by: RADIOLOGY

## 2020-06-19 PROCEDURE — 77067 SCR MAMMO BI INCL CAD: CPT | Mod: TC,PO

## 2020-06-19 PROCEDURE — 77067 MAMMO DIGITAL SCREENING BILAT WITH TOMOSYNTHESIS_CAD: ICD-10-PCS | Mod: 26,,, | Performed by: RADIOLOGY

## 2020-06-19 PROCEDURE — 77067 SCR MAMMO BI INCL CAD: CPT | Mod: 26,,, | Performed by: RADIOLOGY

## 2020-06-19 PROCEDURE — 77063 BREAST TOMOSYNTHESIS BI: CPT | Mod: 26,,, | Performed by: RADIOLOGY

## 2020-06-24 DIAGNOSIS — M51.36 DDD (DEGENERATIVE DISC DISEASE), LUMBAR: ICD-10-CM

## 2020-06-24 DIAGNOSIS — M79.605 BILATERAL LEG PAIN: ICD-10-CM

## 2020-06-24 DIAGNOSIS — M79.604 BILATERAL LEG PAIN: ICD-10-CM

## 2020-06-25 RX ORDER — DICLOFENAC SODIUM 75 MG/1
75 TABLET, DELAYED RELEASE ORAL 2 TIMES DAILY PRN
Qty: 180 TABLET | Refills: 0 | Status: SHIPPED | OUTPATIENT
Start: 2020-06-25 | End: 2020-09-21

## 2020-06-29 DIAGNOSIS — I10 ESSENTIAL HYPERTENSION: Chronic | ICD-10-CM

## 2020-06-29 RX ORDER — AMLODIPINE BESYLATE 10 MG/1
10 TABLET ORAL DAILY
Qty: 90 TABLET | Refills: 3 | Status: SHIPPED | OUTPATIENT
Start: 2020-06-29 | End: 2020-09-25 | Stop reason: SDUPTHER

## 2020-06-29 NOTE — TELEPHONE ENCOUNTER
----- Message from Luisito Marin MD sent at 6/29/2020 10:37 AM CDT -----  Regarding: FW: medication  Please key up refills  ----- Message -----  From: Ivana Marcus, Patient Care Assistant  Sent: 6/29/2020  10:11 AM CDT  To: Luisito Marin MD  Subject: medication                                       Can the clinic reply in [facility]: No    Please refill the medication(s) listed below. The patient can be reached at this phone number once it is called into the pharmacy.[phone]    Medication #1amLODIPine (NORVASC) 10 MG tablet    Medication #2    Preferred Pharmacy:   [facility]/PHARMACY #[id number] - [place] [address]

## 2020-07-01 ENCOUNTER — TELEPHONE (OUTPATIENT)
Dept: FAMILY MEDICINE | Facility: CLINIC | Age: 75
End: 2020-07-01

## 2020-07-01 NOTE — TELEPHONE ENCOUNTER
Spoke with the pt and told her the amlodipine was approved on yesterday, [facility].  Patient verbalized understandings.

## 2020-07-01 NOTE — TELEPHONE ENCOUNTER
----- Message from Corie Tineo sent at 7/1/2020  9:36 AM CDT -----  Regarding: refill  Type: RX Refill Request    Who Called: pt    Have you contacted your pharmacy: pharmacy didn't get refill request    Refill or New Rx:amLODIPine (NORVASC) 10 MG tablet    RX Name and Strength:    How is the patient currently taking it? (ex. 1XDay):    Is this a 30 day or 90 day RX:    Preferred Pharmacy with phone number:  [facility]/pharmacy #[id number] - [place] - [address]  [address]  [place] [address]  Phone: [phone] Fax: [phone]        Local or Mail Order:    Ordering Provider:    Would the patient rather a call back or a response via [facility]? call    Best Call Back Number:[phone] (home) [phone]      Additional Information:

## 2020-07-22 DIAGNOSIS — G47.00 INSOMNIA, UNSPECIFIED TYPE: ICD-10-CM

## 2020-07-22 DIAGNOSIS — K21.9 GASTROESOPHAGEAL REFLUX DISEASE WITHOUT ESOPHAGITIS: Chronic | ICD-10-CM

## 2020-07-22 DIAGNOSIS — I10 ESSENTIAL HYPERTENSION: Chronic | ICD-10-CM

## 2020-07-22 RX ORDER — OMEPRAZOLE 20 MG/1
20 CAPSULE, DELAYED RELEASE ORAL DAILY
Qty: 90 CAPSULE | Refills: 1 | Status: SHIPPED | OUTPATIENT
Start: 2020-07-22 | End: 2020-10-11

## 2020-07-22 RX ORDER — TRAZODONE HYDROCHLORIDE 100 MG/1
100 TABLET ORAL NIGHTLY
Qty: 90 TABLET | Refills: 1 | Status: SHIPPED | OUTPATIENT
Start: 2020-07-22 | End: 2020-09-25 | Stop reason: SDUPTHER

## 2020-07-22 RX ORDER — METOPROLOL SUCCINATE 100 MG/1
100 TABLET, EXTENDED RELEASE ORAL DAILY
Qty: 90 TABLET | Refills: 1 | Status: SHIPPED | OUTPATIENT
Start: 2020-07-22 | End: 2021-01-14

## 2020-07-22 NOTE — TELEPHONE ENCOUNTER
"----- Message from Vandana Kee sent at 7/22/2020 11:30 AM CDT -----  Type: RX Refill Request    Who Called: pt    Have you contacted your pharmacy: yes    Refill or New Rx: refill     RX Name and Strength: "all" I asked pt to list them. She states she is not sure which ones. -- only knows --metoprolol succinate (TOPROL-XL) 100 MG 24 hr tablet    How is the patient currently taking it? (ex. 1XDay):    Is this a 30 day or 90 day RX:    Preferred Pharmacy with phone number: ..  [facility]/pharmacy #[id number] - [place], [place] - [address]  [address]  [place] [address]  Phone: [phone] Fax: [phone]    Local or Mail Order: local    Ordering Provider:    Would the patient rather a call back or a response via [facility]? Call back     Best Call Back Number: [phone]    Additional Information:         "

## 2020-09-22 DIAGNOSIS — E78.5 HYPERLIPIDEMIA, UNSPECIFIED HYPERLIPIDEMIA TYPE: ICD-10-CM

## 2020-09-22 RX ORDER — ATORVASTATIN CALCIUM 40 MG/1
40 TABLET, FILM COATED ORAL DAILY
Qty: 90 TABLET | Refills: 1 | Status: SHIPPED | OUTPATIENT
Start: 2020-09-22 | End: 2021-03-31

## 2020-09-25 ENCOUNTER — OFFICE VISIT (OUTPATIENT)
Dept: FAMILY MEDICINE | Facility: CLINIC | Age: 75
End: 2020-09-25
Payer: MEDICARE

## 2020-09-25 VITALS
DIASTOLIC BLOOD PRESSURE: 62 MMHG | SYSTOLIC BLOOD PRESSURE: 128 MMHG | TEMPERATURE: 98 F | HEART RATE: 62 BPM | BODY MASS INDEX: 28.96 KG/M2 | OXYGEN SATURATION: 98 % | WEIGHT: 147.5 LBS | HEIGHT: 60 IN

## 2020-09-25 DIAGNOSIS — G47.00 INSOMNIA, UNSPECIFIED TYPE: ICD-10-CM

## 2020-09-25 DIAGNOSIS — M79.604 BILATERAL LEG PAIN: ICD-10-CM

## 2020-09-25 DIAGNOSIS — M51.36 DDD (DEGENERATIVE DISC DISEASE), LUMBAR: Chronic | ICD-10-CM

## 2020-09-25 DIAGNOSIS — M81.0 AGE-RELATED OSTEOPOROSIS WITHOUT CURRENT PATHOLOGICAL FRACTURE: Chronic | ICD-10-CM

## 2020-09-25 DIAGNOSIS — M54.15 RADICULOPATHY OF THORACOLUMBAR REGION: ICD-10-CM

## 2020-09-25 DIAGNOSIS — M79.605 BILATERAL LEG PAIN: ICD-10-CM

## 2020-09-25 DIAGNOSIS — M48.062 SPINAL STENOSIS, LUMBAR REGION WITH NEUROGENIC CLAUDICATION: ICD-10-CM

## 2020-09-25 DIAGNOSIS — I10 ESSENTIAL HYPERTENSION: Primary | Chronic | ICD-10-CM

## 2020-09-25 DIAGNOSIS — F33.0 MILD RECURRENT MAJOR DEPRESSION: Chronic | ICD-10-CM

## 2020-09-25 PROCEDURE — 1101F PR PT FALLS ASSESS DOC 0-1 FALLS W/OUT INJ PAST YR: ICD-10-PCS | Mod: CPTII,S$GLB,, | Performed by: NURSE PRACTITIONER

## 2020-09-25 PROCEDURE — 99999 PR PBB SHADOW E&M-EST. PATIENT-LVL IV: ICD-10-PCS | Mod: PBBFAC,,, | Performed by: NURSE PRACTITIONER

## 2020-09-25 PROCEDURE — 3074F PR MOST RECENT SYSTOLIC BLOOD PRESSURE < 130 MM HG: ICD-10-PCS | Mod: CPTII,S$GLB,, | Performed by: NURSE PRACTITIONER

## 2020-09-25 PROCEDURE — 1159F MED LIST DOCD IN RCRD: CPT | Mod: S$GLB,,, | Performed by: NURSE PRACTITIONER

## 2020-09-25 PROCEDURE — 99499 UNLISTED E&M SERVICE: CPT | Mod: S$GLB,,, | Performed by: NURSE PRACTITIONER

## 2020-09-25 PROCEDURE — 1101F PT FALLS ASSESS-DOCD LE1/YR: CPT | Mod: CPTII,S$GLB,, | Performed by: NURSE PRACTITIONER

## 2020-09-25 PROCEDURE — 1125F PR PAIN SEVERITY QUANTIFIED, PAIN PRESENT: ICD-10-PCS | Mod: S$GLB,,, | Performed by: NURSE PRACTITIONER

## 2020-09-25 PROCEDURE — 99214 PR OFFICE/OUTPT VISIT, EST, LEVL IV, 30-39 MIN: ICD-10-PCS | Mod: S$GLB,,, | Performed by: NURSE PRACTITIONER

## 2020-09-25 PROCEDURE — 99499 RISK ADDL DX/OHS AUDIT: ICD-10-PCS | Mod: S$GLB,,, | Performed by: NURSE PRACTITIONER

## 2020-09-25 PROCEDURE — 99214 OFFICE O/P EST MOD 30 MIN: CPT | Mod: S$GLB,,, | Performed by: NURSE PRACTITIONER

## 2020-09-25 PROCEDURE — 3078F PR MOST RECENT DIASTOLIC BLOOD PRESSURE < 80 MM HG: ICD-10-PCS | Mod: CPTII,S$GLB,, | Performed by: NURSE PRACTITIONER

## 2020-09-25 PROCEDURE — 3008F BODY MASS INDEX DOCD: CPT | Mod: CPTII,S$GLB,, | Performed by: NURSE PRACTITIONER

## 2020-09-25 PROCEDURE — 1159F PR MEDICATION LIST DOCUMENTED IN MEDICAL RECORD: ICD-10-PCS | Mod: S$GLB,,, | Performed by: NURSE PRACTITIONER

## 2020-09-25 PROCEDURE — 1125F AMNT PAIN NOTED PAIN PRSNT: CPT | Mod: S$GLB,,, | Performed by: NURSE PRACTITIONER

## 2020-09-25 PROCEDURE — 3008F PR BODY MASS INDEX (BMI) DOCUMENTED: ICD-10-PCS | Mod: CPTII,S$GLB,, | Performed by: NURSE PRACTITIONER

## 2020-09-25 PROCEDURE — 3078F DIAST BP <80 MM HG: CPT | Mod: CPTII,S$GLB,, | Performed by: NURSE PRACTITIONER

## 2020-09-25 PROCEDURE — 3074F SYST BP LT 130 MM HG: CPT | Mod: CPTII,S$GLB,, | Performed by: NURSE PRACTITIONER

## 2020-09-25 PROCEDURE — 99999 PR PBB SHADOW E&M-EST. PATIENT-LVL IV: CPT | Mod: PBBFAC,,, | Performed by: NURSE PRACTITIONER

## 2020-09-25 RX ORDER — ALENDRONATE SODIUM 70 MG/1
TABLET ORAL
Qty: 12 TABLET | Refills: 1 | Status: SHIPPED | OUTPATIENT
Start: 2020-09-25 | End: 2021-03-09

## 2020-09-25 RX ORDER — AMLODIPINE BESYLATE 10 MG/1
10 TABLET ORAL DAILY
Qty: 90 TABLET | Refills: 3 | Status: SHIPPED | OUTPATIENT
Start: 2020-09-25 | End: 2021-09-21

## 2020-09-25 RX ORDER — TRAZODONE HYDROCHLORIDE 150 MG/1
150 TABLET ORAL NIGHTLY
Qty: 90 TABLET | Refills: 1 | Status: SHIPPED | OUTPATIENT
Start: 2020-09-25 | End: 2021-03-23

## 2020-09-25 RX ORDER — GABAPENTIN 300 MG/1
300 CAPSULE ORAL 3 TIMES DAILY
Qty: 270 CAPSULE | Refills: 1 | Status: SHIPPED | OUTPATIENT
Start: 2020-09-25 | End: 2020-12-01 | Stop reason: SDUPTHER

## 2020-09-25 RX ORDER — DICLOFENAC SODIUM 75 MG/1
75 TABLET, DELAYED RELEASE ORAL 2 TIMES DAILY PRN
Qty: 180 TABLET | Refills: 1 | Status: SHIPPED | OUTPATIENT
Start: 2020-09-25 | End: 2021-03-02 | Stop reason: SDUPTHER

## 2020-09-25 NOTE — PROGRESS NOTES
"History of Present Illness   Diana Herring is a 75 y.o. woman with medical history as listed below who presents today for follow-up, chronic conditions: HTN, insomnia, and chronic back pain. She is taking medications as prescribed without perceived SE. Her blood pressure is at goal today. She is requesting an increase in her Trazodone dose. She reports difficulty falling asleep and only sleeping for about 4-5 hours before waking up around 0400 in the morning. She is watching television in her bedroom while trying to fall asleep. She did feel the Trazodone was working for a while but is now "wearing off." She reports her back pain has been poorly controlled recently. She was scheduled for SANKET that was not done because of COVID-19. She plans to get back in with her pain management specialist to reschedule her SANKET. She is otherwise healthy and has no additional complaints on today's visit. We will address her HM today.    Past Medical History:   Diagnosis Date    Anxiety     Arthritis     Corneal abrasion s    ? eye     Depression     Full dentures     GERD (gastroesophageal reflux disease)     Hyperlipidemia     Hypertension     Nuclear sclerosis of both eyes 6/5/2017    Osteoporosis     Restless leg syndrome        Past Surgical History:   Procedure Laterality Date    COLONOSCOPY N/A 5/14/2019    Procedure: COLONOSCOPY;  Surgeon: Nahid Cline MD;  Location: [facility];  Service: Endoscopy;  Laterality: N/A;    EPIDURAL STEROID INJECTION N/A 5/13/2020    Procedure: LUMBAR/CAUDAL L5/S1 IL SANKET ;  Surgeon: Miguel Latham MD;  Location: [facility];  Service: Pain Management;  Laterality: N/A;  NEEDS CONSENT    HYSTERECTOMY      PARTIAL HYSTERECTOMY      TRANSFORAMINAL EPIDURAL INJECTION OF STEROID Bilateral 9/4/2019    Procedure: Injection,steroid,epidural,transforaminal approach LUMBAR TRANSFORAMINAL BILATERAL L3 TF SANKET;  Surgeon: Miguel Latham MD;  [facility];  Service: " Pain Management;  Laterality: Bilateral;  NEEDS CONSENT    TRANSFORAMINAL EPIDURAL INJECTION OF STEROID Bilateral 10/30/2019    Procedure: TRANSFORAMINAL SANKET BILATERAL L3-L4;  Surgeon: Miguel Latham MD;  Location: [facility];  Service: Pain Management;  Laterality: Bilateral;  NEEDS CONSENT    TUBAL LIGATION Bilateral        Social History     Socioeconomic History    Marital status:      Spouse name: Not on file    Number of children: Not on file    Years of education: Not on file    Highest education level: Not on file   Occupational History    Not on file   Social Needs    Financial resource strain: Not on file    Food insecurity     Worry: Not on file     Inability: Not on file    Transportation needs     Medical: Not on file     Non-medical: Not on file   Tobacco Use    Smoking status: Never Smoker    Smokeless tobacco: Never Used   Substance and Sexual Activity    Alcohol use: No    Drug use: No    Sexual activity: Never   Lifestyle    Physical activity     Days per week: Not on file     Minutes per session: Not on file    Stress: Not on file   Relationships    Social connections     Talks on phone: Not on file     Gets together: Not on file     Attends Rastafarian service: Not on file     Active member of club or organization: Not on file     Attends meetings of clubs or organizations: Not on file     Relationship status: Not on file   Other Topics Concern    Not on file   Social History Narrative    Patient is  w/ 5 children, one  from heart disease.The patient is retired.       Family History   Problem Relation Age of Onset    Stroke Mother     Glaucoma Mother     Cataracts Mother     Cancer Father         Lung    No Known Problems Brother     No Known Problems Son     Stroke Brother     Heart disease Son     No Known Problems Sister     No Known Problems Maternal Aunt     No Known Problems Maternal Uncle     No Known Problems Paternal Aunt     No  Known Problems Paternal Uncle     No Known Problems Maternal Grandmother     No Known Problems Maternal Grandfather     No Known Problems Paternal Grandmother     No Known Problems Paternal Grandfather     Amblyopia Neg Hx     Blindness Neg Hx     Diabetes Neg Hx     Hypertension Neg Hx     Macular degeneration Neg Hx     Retinal detachment Neg Hx     Strabismus Neg Hx     Thyroid disease Neg Hx        Review of Systems  Review of Systems   Constitutional: Negative for malaise/fatigue and weight loss.   Eyes: Negative for blurred vision and double vision.   Respiratory: Negative for shortness of breath and wheezing.    Cardiovascular: Negative for chest pain, palpitations, orthopnea, claudication, leg swelling and PND.   Gastrointestinal: Negative for blood in stool and melena.   Genitourinary: Negative for flank pain and hematuria.   Musculoskeletal: Positive for back pain and neck pain. Negative for falls and joint pain.   Skin: Negative for rash.   Neurological: Positive for tingling (RLE), sensory change (RLE) and loss of consciousness. Negative for dizziness and headaches.   Endo/Heme/Allergies: Negative for polydipsia.   Psychiatric/Behavioral: Negative for depression. The patient has insomnia. The patient is not nervous/anxious.      A complete review of systems was otherwise negative.    Physical Exam  /62   Pulse 62   Temp 97.8 °F (36.6 °C) (Temporal)   Ht 5' (1.524 m)   Wt 66.9 kg (147 lb 7.8 oz)   SpO2 98%   BMI 28.80 kg/m²   General appearance: alert, appears stated age, cooperative and no distress  Neck: no carotid bruit, no JVD, supple, symmetrical, trachea midline, thyroid not enlarged, symmetric, no tenderness/mass/nodules and FROM with no midline TTP  Back: symmetric, no curvature. ROM normal. No CVA tenderness., midline lumbar and lumbar paraspinal TTP with positive straight leg raise right sided, there is no saddle anesthesia or loss of bowel or bladder function  Lungs:  clear to auscultation bilaterally  Heart: regular rate and rhythm, S1, S2 normal, no murmur, click, rub or gallop  Extremities: extremities normal, atraumatic, no cyanosis or edema  Pulses: 2+ and symmetric  Skin: Skin color, texture, turgor normal. No rashes or lesions  Lymph nodes: Cervical, supraclavicular, and axillary nodes normal.  Neurologic: Grossly normal    Assessment/Plan  Essential hypertension  The current medical regimen is effective;  continue present plan and medications.  BP at goal today.  -     amLODIPine (NORVASC) 10 MG tablet; Take 1 tablet (10 mg total) by mouth once daily.  Dispense: 90 tablet; Refill: 3    Insomnia, unspecified type  Increase to 150 mg nightly.  Discussed not watching television in the bedroom or using blue light cancelling glasses.  -     traZODone (DESYREL) 150 MG tablet; Take 1 tablet (150 mg total) by mouth every evening.  Dispense: 90 tablet; Refill: 1    Mild recurrent major depression  The current medical regimen is effective;  continue present plan and medications.  Stable.    Age-related osteoporosis without current pathological fracture  The current medical regimen is effective;  continue present plan and medications.  Due for bone density in about 1.5 months- scheduled today.  -     DXA Bone Density Spine And Hip; Future; Expected date: 11/02/2020  -     alendronate (FOSAMAX) 70 MG tablet; TAKE 1 TABLET BY MOUTH ONE TIME PER WEEK  Dispense: 12 tablet; Refill: 1    DDD (degenerative disc disease), lumbar  The current medical regimen is effective;  continue present plan and medications.  We have scheduled her follow-up with pain management to reschedule her missed SANKET.  -     gabapentin (NEURONTIN) 300 MG capsule; Take 1 capsule (300 mg total) by mouth 3 (three) times daily.  Dispense: 270 capsule; Refill: 1  -     diclofenac (VOLTAREN) 75 MG EC tablet; Take 1 tablet (75 mg total) by mouth 2 (two) times daily as needed.  Dispense: 180 tablet; Refill:  1    Radiculopathy of thoracolumbar region  The current medical regimen is effective;  continue present plan and medications.  We have scheduled her follow-up with pain management to reschedule her missed SANKET.  -     gabapentin (NEURONTIN) 300 MG capsule; Take 1 capsule (300 mg total) by mouth 3 (three) times daily.  Dispense: 270 capsule; Refill: 1  -     diclofenac (VOLTAREN) 75 MG EC tablet; Take 1 tablet (75 mg total) by mouth 2 (two) times daily as needed.  Dispense: 180 tablet; Refill: 1    Spinal stenosis, lumbar region with neurogenic claudication  The current medical regimen is effective;  continue present plan and medications.  We have scheduled her follow-up with pain management to reschedule her missed SANKET.  -     gabapentin (NEURONTIN) 300 MG capsule; Take 1 capsule (300 mg total) by mouth 3 (three) times daily.  Dispense: 270 capsule; Refill: 1  -     diclofenac (VOLTAREN) 75 MG EC tablet; Take 1 tablet (75 mg total) by mouth 2 (two) times daily as needed.  Dispense: 180 tablet; Refill: 1    Bilateral leg pain  The current medical regimen is effective;  continue present plan and medications.  We have scheduled her follow-up with pain management to reschedule her missed SANKET.  -     gabapentin (NEURONTIN) 300 MG capsule; Take 1 capsule (300 mg total) by mouth 3 (three) times daily.  Dispense: 270 capsule; Refill: 1  -     diclofenac (VOLTAREN) 75 MG EC tablet; Take 1 tablet (75 mg total) by mouth 2 (two) times daily as needed.  Dispense: 180 tablet; Refill: 1    Patient has verbalized understanding and is in agreement with plan of care.    Nurse visit scheduled for flu shot.    Follow up in about 6 months (around 3/25/2021) for routine follow-up with PCP.

## 2020-10-06 ENCOUNTER — HOSPITAL ENCOUNTER (OUTPATIENT)
Dept: RADIOLOGY | Facility: CLINIC | Age: 75
Discharge: HOME | End: 2020-10-06
Attending: NURSE PRACTITIONER
Payer: MEDICARE

## 2020-10-06 DIAGNOSIS — M81.0 AGE-RELATED OSTEOPOROSIS WITHOUT CURRENT PATHOLOGICAL FRACTURE: Chronic | ICD-10-CM

## 2020-10-06 PROCEDURE — 77080 DXA BONE DENSITY AXIAL: CPT | Mod: TC,PO

## 2020-10-06 PROCEDURE — 77080 DXA BONE DENSITY AXIAL: CPT | Mod: 26,,, | Performed by: INTERNAL MEDICINE

## 2020-10-06 PROCEDURE — 77080 DEXA BONE DENSITY SPINE HIP: ICD-10-PCS | Mod: 26,,, | Performed by: INTERNAL MEDICINE

## 2020-10-08 ENCOUNTER — TELEPHONE (OUTPATIENT)
Dept: FAMILY MEDICINE | Facility: CLINIC | Age: 75
End: 2020-10-08

## 2020-10-08 NOTE — TELEPHONE ENCOUNTER
Please let the patient know that her bone density shows osteoporosis- continue the Fosamax with calcium and vitamin D as currently prescribed. Continue routine screening. Also, would she like to schedule a nurse visit for her flu shot?    Thanks!  ELVI Chavez

## 2020-10-13 ENCOUNTER — PATIENT OUTREACH (OUTPATIENT)
Dept: ADMINISTRATIVE | Facility: OTHER | Age: 75
End: 2020-10-13

## 2020-10-14 ENCOUNTER — TELEPHONE (OUTPATIENT)
Dept: PAIN MEDICINE | Facility: CLINIC | Age: 75
End: 2020-10-14

## 2020-10-14 NOTE — PROGRESS NOTES
Health Maintenance Due   Topic Date Due    Influenza Vaccine (1) 08/01/2020     Updates were requested from care everywhere.  Chart was reviewed for overdue Proactive [facility] Encounters (KAT) topics (CRS, Breast Cancer Screening, Eye exam)  Health Maintenance has been updated.  LINKS immunization registry triggered.  Immunizations were reconciled.

## 2020-10-15 ENCOUNTER — OFFICE VISIT (OUTPATIENT)
Dept: PAIN MEDICINE | Facility: CLINIC | Age: 75
End: 2020-10-15
Attending: ANESTHESIOLOGY
Payer: MEDICARE

## 2020-10-15 VITALS
DIASTOLIC BLOOD PRESSURE: 61 MMHG | SYSTOLIC BLOOD PRESSURE: 137 MMHG | RESPIRATION RATE: 18 BRPM | BODY MASS INDEX: 28.57 KG/M2 | HEART RATE: 60 BPM | TEMPERATURE: 97 F | WEIGHT: 145.5 LBS | HEIGHT: 60 IN

## 2020-10-15 DIAGNOSIS — M47.9 OSTEOARTHRITIS OF SPINE, UNSPECIFIED SPINAL OSTEOARTHRITIS COMPLICATION STATUS, UNSPECIFIED SPINAL REGION: ICD-10-CM

## 2020-10-15 DIAGNOSIS — M48.062 SPINAL STENOSIS, LUMBAR REGION WITH NEUROGENIC CLAUDICATION: ICD-10-CM

## 2020-10-15 DIAGNOSIS — M54.16 LUMBAR RADICULOPATHY: Primary | ICD-10-CM

## 2020-10-15 DIAGNOSIS — M51.36 DDD (DEGENERATIVE DISC DISEASE), LUMBAR: ICD-10-CM

## 2020-10-15 DIAGNOSIS — G89.4 CHRONIC PAIN SYNDROME: ICD-10-CM

## 2020-10-15 PROCEDURE — 99214 OFFICE O/P EST MOD 30 MIN: CPT | Mod: GC,S$GLB,, | Performed by: ANESTHESIOLOGY

## 2020-10-15 PROCEDURE — 1125F PR PAIN SEVERITY QUANTIFIED, PAIN PRESENT: ICD-10-PCS | Mod: S$GLB,,, | Performed by: ANESTHESIOLOGY

## 2020-10-15 PROCEDURE — 3078F DIAST BP <80 MM HG: CPT | Mod: CPTII,S$GLB,, | Performed by: ANESTHESIOLOGY

## 2020-10-15 PROCEDURE — 3075F SYST BP GE 130 - 139MM HG: CPT | Mod: CPTII,S$GLB,, | Performed by: ANESTHESIOLOGY

## 2020-10-15 PROCEDURE — 3078F PR MOST RECENT DIASTOLIC BLOOD PRESSURE < 80 MM HG: ICD-10-PCS | Mod: CPTII,S$GLB,, | Performed by: ANESTHESIOLOGY

## 2020-10-15 PROCEDURE — 99999 PR PBB SHADOW E&M-EST. PATIENT-LVL IV: CPT | Mod: PBBFAC,,, | Performed by: ANESTHESIOLOGY

## 2020-10-15 PROCEDURE — 3008F PR BODY MASS INDEX (BMI) DOCUMENTED: ICD-10-PCS | Mod: CPTII,S$GLB,, | Performed by: ANESTHESIOLOGY

## 2020-10-15 PROCEDURE — 1159F PR MEDICATION LIST DOCUMENTED IN MEDICAL RECORD: ICD-10-PCS | Mod: S$GLB,,, | Performed by: ANESTHESIOLOGY

## 2020-10-15 PROCEDURE — 3075F PR MOST RECENT SYSTOLIC BLOOD PRESS GE 130-139MM HG: ICD-10-PCS | Mod: CPTII,S$GLB,, | Performed by: ANESTHESIOLOGY

## 2020-10-15 PROCEDURE — 1101F PR PT FALLS ASSESS DOC 0-1 FALLS W/OUT INJ PAST YR: ICD-10-PCS | Mod: CPTII,S$GLB,, | Performed by: ANESTHESIOLOGY

## 2020-10-15 PROCEDURE — 1159F MED LIST DOCD IN RCRD: CPT | Mod: S$GLB,,, | Performed by: ANESTHESIOLOGY

## 2020-10-15 PROCEDURE — 1125F AMNT PAIN NOTED PAIN PRSNT: CPT | Mod: S$GLB,,, | Performed by: ANESTHESIOLOGY

## 2020-10-15 PROCEDURE — 3008F BODY MASS INDEX DOCD: CPT | Mod: CPTII,S$GLB,, | Performed by: ANESTHESIOLOGY

## 2020-10-15 PROCEDURE — 99999 PR PBB SHADOW E&M-EST. PATIENT-LVL IV: ICD-10-PCS | Mod: PBBFAC,,, | Performed by: ANESTHESIOLOGY

## 2020-10-15 PROCEDURE — 1101F PT FALLS ASSESS-DOCD LE1/YR: CPT | Mod: CPTII,S$GLB,, | Performed by: ANESTHESIOLOGY

## 2020-10-15 PROCEDURE — 99214 PR OFFICE/OUTPT VISIT, EST, LEVL IV, 30-39 MIN: ICD-10-PCS | Mod: GC,S$GLB,, | Performed by: ANESTHESIOLOGY

## 2020-10-15 NOTE — H&P (VIEW-ONLY)
Chronic patient Established Note (Follow up visit)      SUBJECTIVE:  Interval history 10/15/2020:  Diana Herring presents to the clinic for a follow-up appointment for back pain. Since the last visit, Diana Herring states the pain has been worsening. Current pain intensity is 7/10. She reports that she continues to have low back pain that radiates to the bilateral hip and leg. She does report that she has noticed intermittent numbness in the right lateral and anterior thigh, that is sometimes associated with weakness in her right leg. She says that her leg gives out when this happens. She has not had any falls or other trauma. She did not get the bilateral L3/4 TFESI planned last time, and she is still taking only 300 mg bid of gabapentin.  Patient denies urinary incontinence, bowel incontinence, significant weight loss.    Initial visit:  Diana Herring presents to the clinic for the evaluation of low back pain. The pain started 2 years ago following loosing bone after a bone density and symptoms have been worsening.The pain is located in the low back area and radiates to the bilateral hip and leg.  The pain is described as aching, numbing, sharp and tight band and is rated as 8/10. The pain is rated with a score of  7/10 on the BEST day and a score of 8/10 on the WORST day.  Symptoms interfere with daily activity and sleeping. The pain is exacerbated by Sitting, Standing, Bending, Coughing/Sneezing, Walking, Morning, Lifting and Getting out of bed/chair.  The pain is mitigated by heat, laying down and rest. She reports spending 0 hours per day reclining. The patient reports 6 hours of uninterrupted sleep per night.     Patient had L5/S1 ILESI 2 weeks ago and only gave her 1 day of relief.      Patient denies night fever/night sweats, urinary incontinence, bowel incontinence, significant weight loss and significant motor weakness.     Physical Therapy/Home Exercise: yes, was not beneficial      Pain  Disability Index Review:  Last 3 PDI Scores 5/28/2020   Pain Disability Index (PDI) 15       Pain Medications:  Diclofenac tablets 75 mg bid  Gabapentin 300 mg bid    Opioid Contract: no     report:  Reviewed and consistent with medication use as prescribed.    Pain Procedures:  5/13/20 L5/S1 ILESI  10/30/19 TFESI Bilateral L3-L4  09/04/19 TFESI Bilateral L3     Physical Therapy/Home Exercise: yes    Imaging:  Narrative & Impression     EXAMINATION:  XR LUMBAR SPINE 5 VIEW WITH FLEX AND EXT     CLINICAL HISTORY:  Low back pain, >6wks conservative tx, persistent-progressive sx, surgical candidate;  Lumbago with sciatica, left side     TECHNIQUE:  Five views of the lumbar spine plus flexion extension views were performed.     COMPARISON:  07/23/2019     FINDINGS:  The alignment of the lumbar spine is normal.     The vertebral body height are well maintained.  Mild disc space narrowing L3-L4.     Flexion and extension views demonstrate no evidence of translational abnormalities.     Minimal osteophyte formation L2-L3 L4.     No fracture or osseous lesions.     The sacroiliac joints appears symmetrical on the AP view.     The remainder of the visualized soft tissue and osseous structures appear normal.     There is atherosclerotic plaque of the abdominal aorta.     Impression:     Mild spondylosis of the lumbar spine, not significantly changed from the prior study.        Electronically signed by: Ava Brennan MD  Date:                                            05/29/2020  Time:                                           08:28        Narrative & Impression     EXAMINATION:  XR HIPS BILATERAL 2 VIEW INCL AP PELVIS     CLINICAL HISTORY:  Lumbago with sciatica, left side     TECHNIQUE:  AP view of the pelvis and frogleg lateral views of both hips were performed.     COMPARISON:  None.     FINDINGS:  Normal mineralization.  Pelvis demonstrates no acute fractures.  No dislocations are noted.  Hip joints appear to  be symmetric and within normal limits with mild DJD of the hip joints.  There is no osteoblastic or lytic lesions.  Soft tissues are unremarkable.     SI joints are symmetric and within normal limits.  There is mild spondylotic changes in the lower lumbar spine.     There is moderate amount of retained stool in the right colon.     Impression:     Mild DJD of the hip joints.     1. No acute fractures.        Electronically signed by: David Vasquez MD  Date:                                            05/29/2020  Time:                                           08:28     Narrative & Impression     EXAMINATION:  MRI LUMBAR SPINE WITHOUT CONTRAST     CLINICAL HISTORY:  back and bilateral leg pain; Lumbago with sciatica, left side     TECHNIQUE:  Multiplanar, multisequence MR images were acquired from the thoracolumbar junction to the sacrum without the administration of contrast.     COMPARISON:  None.     FINDINGS:  MRI examination of the lumbar spine was performed.  Intravenous contrast was not utilized.  There are areas of hyperintense T2 hypointense T1 lesions of the kidneys likely cysts not optimally evaluated on this exam.     There is multilevel degenerative loss of disc signal and there is loss of disc space height most notable at L3-4.  There are chronic appearing endplate changes noted with some mild STIR hyperintensity that likely relates to edema associated with degenerative endplate change.  There is no evidence for high-grade spondylolisthesis, there is no evidence for high-grade or acute compression fracture deformity.  There is no finding to specifically suggest aggressive bone marrow replacement process.     The sagittal imaging includes the majority of T10 through the S3 segment, axial imaging includes the inferior aspect of T12 through the majority of S1.  On the sagittal imaging there is appearance suggesting mild degenerative disc disease and disc bulge at T10-11 and T11-12 with some mild posterior  ligamentous thickening suggested at T10-11 however without evidence for high-grade spinal canal stenosis.     The T12-L1 level demonstrates no evidence for high-grade spinal canal or foraminal stenosis.     The L1-2 level demonstrates degenerative disc bulge with anterior impression upon the dural sac.  There is posterior facet arthropathy and ligamentous thickening there is mild spinal canal stenosis.  There is encroachment into the neural foramen at the level of the disc plane without obvious exiting nerve root impingement.     The L2-3 level demonstrates mild degenerative loss of disc space height.  There is mild degenerative disc bulge with anterior impression upon the dural sac.  There is posterior facet arthropathy with somewhat prominent ligamentous thickening with associated posterolateral encroachment.  There is mild-to-moderate spinal canal stenosis.  There is bilateral foraminal narrowing.     The L3-4 level demonstrates the aforementioned appearance of loss of disc space height and degenerative loss of disc signal.  There is diffuse degenerative disc bulge with anterior impression upon the dural sac there is posterior facet arthropathy and ligamentous thickening there is high-grade spinal canal stenosis.  There is bilateral foraminal stenosis.     The L4-5 level demonstrates degenerative disc bulge with anterior impression upon the dural sac there is posterior facet arthropathy and ligamentous thickening with mild to moderate spinal canal stenosis.  There is bilateral foraminal narrowing/stenosis without obvious exiting nerve root impingement.     The L5-S1 level demonstrates no evidence for high-grade spinal canal stenosis.  Facet arthropathy is noted there is no evidence for high-grade foraminal stenosis or exiting nerve root impingement.     The visualized spinal cord appears to taper appropriately and appears of appropriate signal and caliber.  Mild signal intensity within the spinal canal on T1  axial imaging may relate to a minimal lipoma along the filum terminalis.  There is no additional evidence for intraspinal mass or abnormal fluid collection     Impression:     Multilevel chronic change of the lumbar spine with variable spinal canal and foraminal narrowing/stenosis as detailed above.  Correlation for any specific level of symptomatology is needed.  The greatest level of spinal canal stenosis is seen at L3-4.        Electronically signed by: Obie Aggarwal  Date:                                            07/27/2019  Time:                                           03:45         Allergies: Review of patient's allergies indicates:  No Known Allergies    Current Medications:   Current Outpatient Medications   Medication Sig Dispense Refill    alendronate (FOSAMAX) 70 MG tablet TAKE 1 TABLET BY MOUTH ONE TIME PER WEEK 12 tablet 1    amLODIPine (NORVASC) 10 MG tablet Take 1 tablet (10 mg total) by mouth once daily. 90 tablet 3    atorvastatin (LIPITOR) 40 MG tablet Take 1 tablet (40 mg total) by mouth once daily. 90 tablet 1    calcium-vitamin D 500-125 mg-unit tablet Take 1 tablet by mouth once daily.        diclofenac (VOLTAREN) 75 MG EC tablet Take 1 tablet (75 mg total) by mouth 2 (two) times daily as needed. 180 tablet 1    fluticasone propionate (FLONASE) 50 mcg/actuation nasal spray 2 sprays (100 mcg total) by Each Nostril route once daily. 48 g 3    gabapentin (NEURONTIN) 300 MG capsule Take 1 capsule (300 mg total) by mouth 3 (three) times daily. 270 capsule 1    levocetirizine (XYZAL) 5 MG tablet Take 1 tablet (5 mg total) by mouth every evening. 30 tablet 5    metoprolol succinate (TOPROL-XL) 100 MG 24 hr tablet Take 1 tablet (100 mg total) by mouth once daily. 90 tablet 1    multivitamin (MULTIVITAMIN) per tablet Take 1 tablet by mouth once daily.        pantoprazole (PROTONIX) 40 MG tablet Take 1 tablet (40 mg total) by mouth once daily. 90 tablet 1    paroxetine (PAXIL) 20 MG  tablet Take 1 tablet (20 mg total) by mouth every morning. 90 tablet 1    simethicone (MYLICON) 80 MG chewable tablet Take 1 tablet (80 mg total) by mouth every 6 (six) hours as needed for Flatulence. 60 tablet 2    traZODone (DESYREL) 150 MG tablet Take 1 tablet (150 mg total) by mouth every evening. 90 tablet 1     No current facility-administered medications for this visit.        REVIEW OF SYSTEMS:    GENERAL:  No weight loss, malaise or fevers.  HEENT:  Negative for frequent or significant headaches.  NECK:  Negative for lumps, goiter, pain and significant neck swelling.  RESPIRATORY:  Negative for cough, wheezing or shortness of breath.  CARDIOVASCULAR:  Negative for chest pain, leg swelling or palpitations.  GI:  Negative for abdominal discomfort, blood in stools or black stools or change in bowel habits.  MUSCULOSKELETAL:  See HPI.  SKIN:  Negative for lesions, rash, and itching.  PSYCH:  +ve for sleep disturbance, mood disorder and recent psychosocial stressors.  HEMATOLOGY/LYMPHOLOGY:  Negative for prolonged bleeding, bruising easily or swollen nodes.  NEURO:  + numbness, weakness. No history of headaches, syncope, paralysis, seizures or tremors.  All other reviewed and negative other than HPI.     Past Medical History:  Past Medical History:   Diagnosis Date    Anxiety     Arthritis     Corneal abrasion s    ? eye     Depression     Full dentures     GERD (gastroesophageal reflux disease)     Hyperlipidemia     Hypertension     Nuclear sclerosis of both eyes 6/5/2017    Osteoporosis     Restless leg syndrome        Past Surgical History:  Past Surgical History:   Procedure Laterality Date    COLONOSCOPY N/A 5/14/2019    Procedure: COLONOSCOPY;  Surgeon: Nahid Cline MD;  Location: [facility] ENDO;  Service: Endoscopy;  Laterality: N/A;    EPIDURAL STEROID INJECTION N/A 5/13/2020    Procedure: LUMBAR/CAUDAL L5/S1 IL SANKET ;  Surgeon: Miguel Latham MD;  Location: [facility] PAIN MGT;  Service: Pain  Management;  Laterality: N/A;  NEEDS CONSENT    HYSTERECTOMY      PARTIAL HYSTERECTOMY      TRANSFORAMINAL EPIDURAL INJECTION OF STEROID Bilateral 9/4/2019    Procedure: Injection,steroid,epidural,transforaminal approach LUMBAR TRANSFORAMINAL BILATERAL L3 TF SANKET;  Surgeon: Miguel Latham MD;  Location: [facility] PAIN T;  Service: Pain Management;  Laterality: Bilateral;  NEEDS CONSENT    TRANSFORAMINAL EPIDURAL INJECTION OF STEROID Bilateral 10/30/2019    Procedure: TRANSFORAMINAL SANKET BILATERAL L3-L4;  Surgeon: Miguel Latham MD;  Location: [facility] PAIN T;  Service: Pain Management;  Laterality: Bilateral;  NEEDS CONSENT    TUBAL LIGATION Bilateral        Family History:  Family History   Problem Relation Age of Onset    Stroke Mother     Glaucoma Mother     Cataracts Mother     Cancer Father         Lung    No Known Problems Brother     No Known Problems Son     Stroke Brother     Heart disease Son     No Known Problems Sister     No Known Problems Maternal Aunt     No Known Problems Maternal Uncle     No Known Problems Paternal Aunt     No Known Problems Paternal Uncle     No Known Problems Maternal Grandmother     No Known Problems Maternal Grandfather     No Known Problems Paternal Grandmother     No Known Problems Paternal Grandfather     Amblyopia Neg Hx     Blindness Neg Hx     Diabetes Neg Hx     Hypertension Neg Hx     Macular degeneration Neg Hx     Retinal detachment Neg Hx     Strabismus Neg Hx     Thyroid disease Neg Hx        Social History:  Social History     Socioeconomic History    Marital status:      Spouse name: Not on file    Number of children: Not on file    Years of education: Not on file    Highest education level: Not on file   Occupational History    Not on file   Social Needs    Financial resource strain: Not on file    Food insecurity     Worry: Not on file     Inability: Not on file    Transportation needs     Medical: Not on file      Non-medical: Not on file   Tobacco Use    Smoking status: Never Smoker    Smokeless tobacco: Never Used   Substance and Sexual Activity    Alcohol use: No    Drug use: No    Sexual activity: Never   Lifestyle    Physical activity     Days per week: Not on file     Minutes per session: Not on file    Stress: Not on file   Relationships    Social connections     Talks on phone: Not on file     Gets together: Not on file     Attends Taoist service: Not on file     Active member of club or organization: Not on file     Attends meetings of clubs or organizations: Not on file     Relationship status: Not on file   Other Topics Concern    Not on file   Social History Narrative    Patient is  w/ 5 children, one  from heart disease.The patient is retired.       OBJECTIVE:    /61   Pulse 60   Temp 97 °F (36.1 °C)   Resp 18   Ht 5' (1.524 m)   Wt 66 kg (145 lb 8.1 oz)   BMI 28.42 kg/m²     PHYSICAL EXAMINATION:    General appearance: Well appearing, in no acute distress, alert and oriented x3.  Psych:  Mood and affect appropriate.  Skin: Skin color, texture, turgor normal, no rashes or lesions, in both upper and lower body.  Head/face:  Normocephalic, atraumatic. No palpable lymph nodes.  Neck: No pain to palpation over the cervical paraspinous muscles. Spurling Negative. No pain with neck flexion, extension, or lateral flexion.   Cor: RRR  Pulm: non-labored respirations  GI:  Soft and non-tender.  Back: Straight leg raising in the sitting and supine positions is +ve to radicular pain. Mild  pain to palpation over the spine or costovertebral angles. Normal range of motion without pain reproduction. Positive axial loading test bilateral.  Positive FABERE,Ganselin and Yeoman's test on the both side.negative FADIR  Extremities: Peripheral joint ROM is full and pain free without obvious instability or laxity in all four extremities. No deformities, edema, or skin discoloration. Good capillary  refill.  Musculoskeletal: Muscle strength bilateral hip flexion 4/5, right knee extension 4/5,  No atrophy or tone abnormalities are noted.  Neuro: Bilateral upper and lower extremity coordination and muscle stretch reflexes are physiologic and symmetric. Normal sensation  Gait: antalgic    ASSESSMENT: 75 y.o. year old female with low back pain, consistent with     1. Lumbar radiculopathy     2. DDD (degenerative disc disease), lumbar     3. Spinal stenosis, lumbar region with neurogenic claudication     4. Osteoarthritis of spine, unspecified spinal osteoarthritis complication status, unspecified spinal region     5. Chronic pain syndrome           PLAN:     - I have stressed the importance of physical activity and a home exercise plan to help with pain and improve health.  - Patient can continue with medications for now since they are providing benefits, using them appropriately, and without side effects.  - Increased Gabapentin to 600 mg bid  - Schedule for a Bilateral Transforaminal epidural steroid injection at L3/4 to help with her pain and progress with a home exercise plan.  - RTC 4 weeks after procedure  - If patient has more episodes of leg weakness she will call our clinic, and we will order MRI lumbar spine and EMG/NCS of bilateral lower extremities  - Counseled patient regarding the importance of activity modification, constant sleeping habits and physical therapy.    The above plan and management options were discussed at length with patient. Patient is in agreement with the above and verbalized understanding.    Pietro Thornton    I have personally reviewed the history and exam of this patient and agree with the resident/fellow/NPs note as stated above.    Miguel Latham MD    10/15/2020

## 2020-10-15 NOTE — PROGRESS NOTES
Chronic patient Established Note (Follow up visit)      SUBJECTIVE:  Interval history 10/15/2020:  Diana Herring presents to the clinic for a follow-up appointment for back pain. Since the last visit, Diana Herring states the pain has been worsening. Current pain intensity is 7/10. She reports that she continues to have low back pain that radiates to the bilateral hip and leg. She does report that she has noticed intermittent numbness in the right lateral and anterior thigh, that is sometimes associated with weakness in her right leg. She says that her leg gives out when this happens. She has not had any falls or other trauma. She did not get the bilateral L3/4 TFESI planned last time, and she is still taking only 300 mg bid of gabapentin.  Patient denies urinary incontinence, bowel incontinence, significant weight loss.    Initial visit:  Diana Herring presents to the clinic for the evaluation of low back pain. The pain started 2 years ago following loosing bone after a bone density and symptoms have been worsening.The pain is located in the low back area and radiates to the bilateral hip and leg.  The pain is described as aching, numbing, sharp and tight band and is rated as 8/10. The pain is rated with a score of  7/10 on the BEST day and a score of 8/10 on the WORST day.  Symptoms interfere with daily activity and sleeping. The pain is exacerbated by Sitting, Standing, Bending, Coughing/Sneezing, Walking, Morning, Lifting and Getting out of bed/chair.  The pain is mitigated by heat, laying down and rest. She reports spending 0 hours per day reclining. The patient reports 6 hours of uninterrupted sleep per night.     Patient had L5/S1 ILESI 2 weeks ago and only gave her 1 day of relief.      Patient denies night fever/night sweats, urinary incontinence, bowel incontinence, significant weight loss and significant motor weakness.     Physical Therapy/Home Exercise: yes, was not beneficial      Pain  Disability Index Review:  Last 3 PDI Scores 5/28/2020   Pain Disability Index (PDI) 15       Pain Medications:  Diclofenac tablets 75 mg bid  Gabapentin 300 mg bid    Opioid Contract: no     report:  Reviewed and consistent with medication use as prescribed.    Pain Procedures:  5/13/20 L5/S1 ILESI  10/30/19 TFESI Bilateral L3-L4  09/04/19 TFESI Bilateral L3     Physical Therapy/Home Exercise: yes    Imaging:  Narrative & Impression     EXAMINATION:  XR LUMBAR SPINE 5 VIEW WITH FLEX AND EXT     CLINICAL HISTORY:  Low back pain, >6wks conservative tx, persistent-progressive sx, surgical candidate;  Lumbago with sciatica, left side     TECHNIQUE:  Five views of the lumbar spine plus flexion extension views were performed.     COMPARISON:  07/23/2019     FINDINGS:  The alignment of the lumbar spine is normal.     The vertebral body height are well maintained.  Mild disc space narrowing L3-L4.     Flexion and extension views demonstrate no evidence of translational abnormalities.     Minimal osteophyte formation L2-L3 L4.     No fracture or osseous lesions.     The sacroiliac joints appears symmetrical on the AP view.     The remainder of the visualized soft tissue and osseous structures appear normal.     There is atherosclerotic plaque of the abdominal aorta.     Impression:     Mild spondylosis of the lumbar spine, not significantly changed from the prior study.        Electronically signed by: Ava Brennan MD  Date:                                            05/29/2020  Time:                                           08:28        Narrative & Impression     EXAMINATION:  XR HIPS BILATERAL 2 VIEW INCL AP PELVIS     CLINICAL HISTORY:  Lumbago with sciatica, left side     TECHNIQUE:  AP view of the pelvis and frogleg lateral views of both hips were performed.     COMPARISON:  None.     FINDINGS:  Normal mineralization.  Pelvis demonstrates no acute fractures.  No dislocations are noted.  Hip joints appear to  be symmetric and within normal limits with mild DJD of the hip joints.  There is no osteoblastic or lytic lesions.  Soft tissues are unremarkable.     SI joints are symmetric and within normal limits.  There is mild spondylotic changes in the lower lumbar spine.     There is moderate amount of retained stool in the right colon.     Impression:     Mild DJD of the hip joints.     1. No acute fractures.        Electronically signed by: David Vasquez MD  Date:                                            05/29/2020  Time:                                           08:28     Narrative & Impression     EXAMINATION:  MRI LUMBAR SPINE WITHOUT CONTRAST     CLINICAL HISTORY:  back and bilateral leg pain; Lumbago with sciatica, left side     TECHNIQUE:  Multiplanar, multisequence MR images were acquired from the thoracolumbar junction to the sacrum without the administration of contrast.     COMPARISON:  None.     FINDINGS:  MRI examination of the lumbar spine was performed.  Intravenous contrast was not utilized.  There are areas of hyperintense T2 hypointense T1 lesions of the kidneys likely cysts not optimally evaluated on this exam.     There is multilevel degenerative loss of disc signal and there is loss of disc space height most notable at L3-4.  There are chronic appearing endplate changes noted with some mild STIR hyperintensity that likely relates to edema associated with degenerative endplate change.  There is no evidence for high-grade spondylolisthesis, there is no evidence for high-grade or acute compression fracture deformity.  There is no finding to specifically suggest aggressive bone marrow replacement process.     The sagittal imaging includes the majority of T10 through the S3 segment, axial imaging includes the inferior aspect of T12 through the majority of S1.  On the sagittal imaging there is appearance suggesting mild degenerative disc disease and disc bulge at T10-11 and T11-12 with some mild posterior  ligamentous thickening suggested at T10-11 however without evidence for high-grade spinal canal stenosis.     The T12-L1 level demonstrates no evidence for high-grade spinal canal or foraminal stenosis.     The L1-2 level demonstrates degenerative disc bulge with anterior impression upon the dural sac.  There is posterior facet arthropathy and ligamentous thickening there is mild spinal canal stenosis.  There is encroachment into the neural foramen at the level of the disc plane without obvious exiting nerve root impingement.     The L2-3 level demonstrates mild degenerative loss of disc space height.  There is mild degenerative disc bulge with anterior impression upon the dural sac.  There is posterior facet arthropathy with somewhat prominent ligamentous thickening with associated posterolateral encroachment.  There is mild-to-moderate spinal canal stenosis.  There is bilateral foraminal narrowing.     The L3-4 level demonstrates the aforementioned appearance of loss of disc space height and degenerative loss of disc signal.  There is diffuse degenerative disc bulge with anterior impression upon the dural sac there is posterior facet arthropathy and ligamentous thickening there is high-grade spinal canal stenosis.  There is bilateral foraminal stenosis.     The L4-5 level demonstrates degenerative disc bulge with anterior impression upon the dural sac there is posterior facet arthropathy and ligamentous thickening with mild to moderate spinal canal stenosis.  There is bilateral foraminal narrowing/stenosis without obvious exiting nerve root impingement.     The L5-S1 level demonstrates no evidence for high-grade spinal canal stenosis.  Facet arthropathy is noted there is no evidence for high-grade foraminal stenosis or exiting nerve root impingement.     The visualized spinal cord appears to taper appropriately and appears of appropriate signal and caliber.  Mild signal intensity within the spinal canal on T1  axial imaging may relate to a minimal lipoma along the filum terminalis.  There is no additional evidence for intraspinal mass or abnormal fluid collection     Impression:     Multilevel chronic change of the lumbar spine with variable spinal canal and foraminal narrowing/stenosis as detailed above.  Correlation for any specific level of symptomatology is needed.  The greatest level of spinal canal stenosis is seen at L3-4.        Electronically signed by: Obie Aggarwal  Date:                                            07/27/2019  Time:                                           03:45         Allergies: Review of patient's allergies indicates:  No Known Allergies    Current Medications:   Current Outpatient Medications   Medication Sig Dispense Refill    alendronate (FOSAMAX) 70 MG tablet TAKE 1 TABLET BY MOUTH ONE TIME PER WEEK 12 tablet 1    amLODIPine (NORVASC) 10 MG tablet Take 1 tablet (10 mg total) by mouth once daily. 90 tablet 3    atorvastatin (LIPITOR) 40 MG tablet Take 1 tablet (40 mg total) by mouth once daily. 90 tablet 1    calcium-vitamin D 500-125 mg-unit tablet Take 1 tablet by mouth once daily.        diclofenac (VOLTAREN) 75 MG EC tablet Take 1 tablet (75 mg total) by mouth 2 (two) times daily as needed. 180 tablet 1    fluticasone propionate (FLONASE) 50 mcg/actuation nasal spray 2 sprays (100 mcg total) by Each Nostril route once daily. 48 g 3    gabapentin (NEURONTIN) 300 MG capsule Take 1 capsule (300 mg total) by mouth 3 (three) times daily. 270 capsule 1    levocetirizine (XYZAL) 5 MG tablet Take 1 tablet (5 mg total) by mouth every evening. 30 tablet 5    metoprolol succinate (TOPROL-XL) 100 MG 24 hr tablet Take 1 tablet (100 mg total) by mouth once daily. 90 tablet 1    multivitamin (MULTIVITAMIN) per tablet Take 1 tablet by mouth once daily.        pantoprazole (PROTONIX) 40 MG tablet Take 1 tablet (40 mg total) by mouth once daily. 90 tablet 1    paroxetine (PAXIL) 20 MG  tablet Take 1 tablet (20 mg total) by mouth every morning. 90 tablet 1    simethicone (MYLICON) 80 MG chewable tablet Take 1 tablet (80 mg total) by mouth every 6 (six) hours as needed for Flatulence. 60 tablet 2    traZODone (DESYREL) 150 MG tablet Take 1 tablet (150 mg total) by mouth every evening. 90 tablet 1     No current facility-administered medications for this visit.        REVIEW OF SYSTEMS:    GENERAL:  No weight loss, malaise or fevers.  HEENT:  Negative for frequent or significant headaches.  NECK:  Negative for lumps, goiter, pain and significant neck swelling.  RESPIRATORY:  Negative for cough, wheezing or shortness of breath.  CARDIOVASCULAR:  Negative for chest pain, leg swelling or palpitations.  GI:  Negative for abdominal discomfort, blood in stools or black stools or change in bowel habits.  MUSCULOSKELETAL:  See HPI.  SKIN:  Negative for lesions, rash, and itching.  PSYCH:  +ve for sleep disturbance, mood disorder and recent psychosocial stressors.  HEMATOLOGY/LYMPHOLOGY:  Negative for prolonged bleeding, bruising easily or swollen nodes.  NEURO:  + numbness, weakness. No history of headaches, syncope, paralysis, seizures or tremors.  All other reviewed and negative other than HPI.     Past Medical History:  Past Medical History:   Diagnosis Date    Anxiety     Arthritis     Corneal abrasion s    ? eye     Depression     Full dentures     GERD (gastroesophageal reflux disease)     Hyperlipidemia     Hypertension     Nuclear sclerosis of both eyes 6/5/2017    Osteoporosis     Restless leg syndrome        Past Surgical History:  Past Surgical History:   Procedure Laterality Date    COLONOSCOPY N/A 5/14/2019    Procedure: COLONOSCOPY;  Surgeon: Nahid Cline MD;  Location: [facility] ENDO;  Service: Endoscopy;  Laterality: N/A;    EPIDURAL STEROID INJECTION N/A 5/13/2020    Procedure: LUMBAR/CAUDAL L5/S1 IL SANKET ;  Surgeon: Miguel Latham MD;  Location: [facility] PAIN MGT;  Service: Pain  Management;  Laterality: N/A;  NEEDS CONSENT    HYSTERECTOMY      PARTIAL HYSTERECTOMY      TRANSFORAMINAL EPIDURAL INJECTION OF STEROID Bilateral 9/4/2019    Procedure: Injection,steroid,epidural,transforaminal approach LUMBAR TRANSFORAMINAL BILATERAL L3 TF SANKET;  Surgeon: Miguel Latham MD;  Location: [facility] PAIN T;  Service: Pain Management;  Laterality: Bilateral;  NEEDS CONSENT    TRANSFORAMINAL EPIDURAL INJECTION OF STEROID Bilateral 10/30/2019    Procedure: TRANSFORAMINAL SANKET BILATERAL L3-L4;  Surgeon: Miguel Latham MD;  Location: [facility] PAIN T;  Service: Pain Management;  Laterality: Bilateral;  NEEDS CONSENT    TUBAL LIGATION Bilateral        Family History:  Family History   Problem Relation Age of Onset    Stroke Mother     Glaucoma Mother     Cataracts Mother     Cancer Father         Lung    No Known Problems Brother     No Known Problems Son     Stroke Brother     Heart disease Son     No Known Problems Sister     No Known Problems Maternal Aunt     No Known Problems Maternal Uncle     No Known Problems Paternal Aunt     No Known Problems Paternal Uncle     No Known Problems Maternal Grandmother     No Known Problems Maternal Grandfather     No Known Problems Paternal Grandmother     No Known Problems Paternal Grandfather     Amblyopia Neg Hx     Blindness Neg Hx     Diabetes Neg Hx     Hypertension Neg Hx     Macular degeneration Neg Hx     Retinal detachment Neg Hx     Strabismus Neg Hx     Thyroid disease Neg Hx        Social History:  Social History     Socioeconomic History    Marital status:      Spouse name: Not on file    Number of children: Not on file    Years of education: Not on file    Highest education level: Not on file   Occupational History    Not on file   Social Needs    Financial resource strain: Not on file    Food insecurity     Worry: Not on file     Inability: Not on file    Transportation needs     Medical: Not on file      Non-medical: Not on file   Tobacco Use    Smoking status: Never Smoker    Smokeless tobacco: Never Used   Substance and Sexual Activity    Alcohol use: No    Drug use: No    Sexual activity: Never   Lifestyle    Physical activity     Days per week: Not on file     Minutes per session: Not on file    Stress: Not on file   Relationships    Social connections     Talks on phone: Not on file     Gets together: Not on file     Attends Christianity service: Not on file     Active member of club or organization: Not on file     Attends meetings of clubs or organizations: Not on file     Relationship status: Not on file   Other Topics Concern    Not on file   Social History Narrative    Patient is  w/ 5 children, one  from heart disease.The patient is retired.       OBJECTIVE:    /61   Pulse 60   Temp 97 °F (36.1 °C)   Resp 18   Ht 5' (1.524 m)   Wt 66 kg (145 lb 8.1 oz)   BMI 28.42 kg/m²     PHYSICAL EXAMINATION:    General appearance: Well appearing, in no acute distress, alert and oriented x3.  Psych:  Mood and affect appropriate.  Skin: Skin color, texture, turgor normal, no rashes or lesions, in both upper and lower body.  Head/face:  Normocephalic, atraumatic. No palpable lymph nodes.  Neck: No pain to palpation over the cervical paraspinous muscles. Spurling Negative. No pain with neck flexion, extension, or lateral flexion.   Cor: RRR  Pulm: non-labored respirations  GI:  Soft and non-tender.  Back: Straight leg raising in the sitting and supine positions is +ve to radicular pain. Mild  pain to palpation over the spine or costovertebral angles. Normal range of motion without pain reproduction. Positive axial loading test bilateral.  Positive FABERE,Ganselin and Yeoman's test on the both side.negative FADIR  Extremities: Peripheral joint ROM is full and pain free without obvious instability or laxity in all four extremities. No deformities, edema, or skin discoloration. Good capillary  refill.  Musculoskeletal: Muscle strength bilateral hip flexion 4/5, right knee extension 4/5,  No atrophy or tone abnormalities are noted.  Neuro: Bilateral upper and lower extremity coordination and muscle stretch reflexes are physiologic and symmetric. Normal sensation  Gait: antalgic    ASSESSMENT: 75 y.o. year old female with low back pain, consistent with     1. Lumbar radiculopathy     2. DDD (degenerative disc disease), lumbar     3. Spinal stenosis, lumbar region with neurogenic claudication     4. Osteoarthritis of spine, unspecified spinal osteoarthritis complication status, unspecified spinal region     5. Chronic pain syndrome           PLAN:     - I have stressed the importance of physical activity and a home exercise plan to help with pain and improve health.  - Patient can continue with medications for now since they are providing benefits, using them appropriately, and without side effects.  - Increased Gabapentin to 600 mg bid  - Schedule for a Bilateral Transforaminal epidural steroid injection at L3/4 to help with her pain and progress with a home exercise plan.  - RTC 4 weeks after procedure  - If patient has more episodes of leg weakness she will call our clinic, and we will order MRI lumbar spine and EMG/NCS of bilateral lower extremities  - Counseled patient regarding the importance of activity modification, constant sleeping habits and physical therapy.    The above plan and management options were discussed at length with patient. Patient is in agreement with the above and verbalized understanding.    Pietro Thornton    I have personally reviewed the history and exam of this patient and agree with the resident/fellow/NPs note as stated above.    Miguel Latham MD    10/15/2020

## 2020-11-04 ENCOUNTER — TELEPHONE (OUTPATIENT)
Dept: PAIN MEDICINE | Facility: CLINIC | Age: 75
End: 2020-11-04

## 2020-11-04 NOTE — TELEPHONE ENCOUNTER
Staff spoke with patient in regards to rescheduling appointment 11/27/20due to Toñito coppola being out of clinic. Appointment has been scheduled with mare alves on 11/27/20 at 9:40 am. Patient verbalized understanding.

## 2020-11-11 ENCOUNTER — HOSPITAL ENCOUNTER (OUTPATIENT)
Facility: OTHER | Age: 75
Discharge: HOME | End: 2020-11-11
Attending: ANESTHESIOLOGY | Admitting: ANESTHESIOLOGY
Payer: MEDICARE

## 2020-11-11 VITALS
DIASTOLIC BLOOD PRESSURE: 58 MMHG | HEIGHT: 60 IN | OXYGEN SATURATION: 96 % | SYSTOLIC BLOOD PRESSURE: 122 MMHG | BODY MASS INDEX: 29.45 KG/M2 | WEIGHT: 150 LBS | TEMPERATURE: 98 F | HEART RATE: 55 BPM | RESPIRATION RATE: 18 BRPM

## 2020-11-11 DIAGNOSIS — G89.29 CHRONIC PAIN: ICD-10-CM

## 2020-11-11 DIAGNOSIS — M54.17 LUMBOSACRAL RADICULOPATHY: ICD-10-CM

## 2020-11-11 DIAGNOSIS — M51.36 DDD (DEGENERATIVE DISC DISEASE), LUMBAR: Primary | ICD-10-CM

## 2020-11-11 PROCEDURE — 64483 PR EPIDURAL INJ, ANES/STEROID, TRANSFORAMINAL, LUMB/SACR, SNGL LEVL: ICD-10-PCS | Mod: 50,,, | Performed by: ANESTHESIOLOGY

## 2020-11-11 PROCEDURE — 63600175 PHARM REV CODE 636 W HCPCS: Performed by: ANESTHESIOLOGY

## 2020-11-11 PROCEDURE — 25500020 PHARM REV CODE 255: Performed by: ANESTHESIOLOGY

## 2020-11-11 PROCEDURE — 64483 NJX AA&/STRD TFRM EPI L/S 1: CPT | Mod: 50,,, | Performed by: ANESTHESIOLOGY

## 2020-11-11 PROCEDURE — 64483 NJX AA&/STRD TFRM EPI L/S 1: CPT | Mod: 50 | Performed by: ANESTHESIOLOGY

## 2020-11-11 PROCEDURE — 25000003 PHARM REV CODE 250: Performed by: ANESTHESIOLOGY

## 2020-11-11 RX ORDER — FENTANYL CITRATE 50 UG/ML
INJECTION, SOLUTION INTRAMUSCULAR; INTRAVENOUS
Status: DISCONTINUED | OUTPATIENT
Start: 2020-11-11 | End: 2020-11-11 | Stop reason: HOSPADM

## 2020-11-11 RX ORDER — SODIUM CHLORIDE 9 MG/ML
500 INJECTION, SOLUTION INTRAVENOUS CONTINUOUS
Status: DISCONTINUED | OUTPATIENT
Start: 2020-11-11 | End: 2020-11-11 | Stop reason: HOSPADM

## 2020-11-11 RX ORDER — DEXAMETHASONE SODIUM PHOSPHATE 10 MG/ML
INJECTION INTRAMUSCULAR; INTRAVENOUS
Status: DISCONTINUED | OUTPATIENT
Start: 2020-11-11 | End: 2020-11-11 | Stop reason: HOSPADM

## 2020-11-11 RX ORDER — LIDOCAINE HYDROCHLORIDE 10 MG/ML
INJECTION INFILTRATION; PERINEURAL
Status: DISCONTINUED | OUTPATIENT
Start: 2020-11-11 | End: 2020-11-11 | Stop reason: HOSPADM

## 2020-11-11 RX ORDER — LIDOCAINE HYDROCHLORIDE 5 MG/ML
INJECTION, SOLUTION INFILTRATION; INTRAVENOUS
Status: DISCONTINUED | OUTPATIENT
Start: 2020-11-11 | End: 2020-11-11 | Stop reason: HOSPADM

## 2020-11-11 RX ORDER — MIDAZOLAM HYDROCHLORIDE 1 MG/ML
INJECTION INTRAMUSCULAR; INTRAVENOUS
Status: DISCONTINUED | OUTPATIENT
Start: 2020-11-11 | End: 2020-11-11 | Stop reason: HOSPADM

## 2020-11-11 RX ADMIN — SODIUM CHLORIDE 500 ML: 0.9 INJECTION, SOLUTION INTRAVENOUS at 07:11

## 2020-11-11 NOTE — DISCHARGE INSTRUCTIONS
Thank you for allowing us to care for you today. You may receive a survey about the care we provided. Your feedback is valuable and helps us provide excellent care throughout the community.     Home Care Instructions for Pain Management:    1. DIET:   You may resume your normal diet today.   2. BATHING:   You may shower with luke warm water. No tub baths or anything that will soak injection sites under water for the next 24 hours.  3. DRESSING:   You may remove your bandage today.   4. ACTIVITY LEVEL:   You may resume your normal activities 24 hrs after your procedure. Nothing strenuous today.  5. MEDICATIONS:   You may resume your normal medications today. To restart blood thinners, ask your doctor.  6. DRIVING    If you have received any sedatives by mouth today, you may not drive for 12 hours.    If you have received any sedation through your IV, you may not drive for 24 hrs.   7. SPECIAL INSTRUCTIONS:   No heat to the injection site for 24 hrs including, hot bath or shower, heating pad, moist heat, or hot tubs.    Use ice pack to injection site for any pain or discomfort.  Apply ice packs for 20 minute intervals as needed.    IF you have diabetes, be sure to monitor your blood sugar more closely. IF your injection contained steroids your blood sugar levels may become higher than normal.    If you are still having pain upon discharge:  Your pain may improve over the next 48 hours. The anesthetic (numbing medication) works immediately to 48 hours. IF your injection contained a steroid (anti-inflammatory medication), it takes approximately 3 days to start feeling relief and 7-10 days to see your greatest results from the medication. It is possible you may need subsequent injections. This would be discussed at your follow up appointment with pain management or your referring doctor.    Please call the PAIN MANAGEMENT office at [phone] or ON CALL pager at [phone] if you experienced any:   -Weakness or  loss of sensation  -Fever > 101.5  -Pain uncontrolled with oral medications   -Persistent nausea, vomiting, or diarrhea  -Redness or drainage from the injection sites, or any other worrisome concerns.   If physician on call was not reached or could not communicate with our office for any reason please go to the nearest emergency department.  Adult Procedural Sedation Instructions    Recovery After Procedural Sedation (Adult)  You have been given medicine by vein to make you sleep during your surgery. This may have included both a pain medicine and sleeping medicine. Most of the effects have worn off. But you may still have some drowsiness for the next 6 to 8 hours.  Home care  Follow these guidelines when you get home:  · For the next 8 hours, you should be watched by a responsible adult. This person should make sure your condition is not getting worse.  · Don't drink any alcohol for the next 24 hours.  · Don't drive, operate dangerous machinery, or make important business or personal decisions during the next 24 hours.  Note: Your healthcare provider may tell you not to take any medicine by mouth for pain or sleep in the next 4 hours. These medicines may react with the medicines you were given in the hospital. This could cause a much stronger response than usual.  Follow-up care  Follow up with your healthcare provider if you are not alert and back to your usual level of activity within 12 hours.  When to seek medical advice  Call your healthcare provider right away if any of these occur:  · Drowsiness gets worse  · Weakness or dizziness gets worse  · Repeated vomiting  · You can't be awakened   Date Last Reviewed: 10/18/2016  © 4636-6474 [employer]. [address] [place] [address], [place] [address]. All rights reserved. This information is not intended as a substitute for professional medical care. Always follow your healthcare professional's instructions.

## 2020-11-11 NOTE — OP NOTE
Patient Name: Diana Herring  MRN: [id number]    INFORMED CONSENT: The procedure, risks, benefits and options were discussed with patient. There are no contraindications to the procedure. The patient expressed understanding and agreed to proceed. The personnel performing the procedure was discussed. I verify that I personally obtained Diana's consent prior to the start of the procedure and the signed consent can be found on the patient's chart.    Procedure Date: 11/11/2020    Anesthesia: Topical    Pre Procedure diagnosis: Lumbar radiculopathy [M54.16]  1. DDD (degenerative disc disease), lumbar    2. Lumbosacral radiculopathy    3. Chronic pain      Post-Procedure diagnosis: SAME      Sedation: Yes - Fentanyl 100 mcg and Midazolam 2 mg    PROCEDURE:Bilateral L3/4   TRANSFORAMINAL EPIDURAL STEROID INJECTION        DESCRIPTION OF PROCEDURE: The patient was brought to the procedure room. After performing time out IV access was obtained prior to the procedure. The patient was positioned prone on the fluoroscopy table. Continuous hemodynamic monitoring was initiated including blood pressure, EKG, and pulse oximetry. . The skin was prepped with chlorhexidine three times and draped in a sterile fashion. Skin anesthesia was achieved using 3 mL of lidocaine 1% over the respective injection site.     An oblique fluoroscopic view was obtained, with the superior articular process of the inferior vertebral body aligned with the pedicle. The tip of a 22-gauge 3.5-inch Quincke-type spinal needle was advanced toward the 6 oclock position of the pedicle under intermittent fluoroscopic guidance. Confirmation of proper needle position was made with AP, oblique, and lateral fluoroscopic views. Negative aspiration for blood or CSF was confirmed. 2 mL of Omnipaque 300 was injected. Live fluoroscopic imaging revealed a clear outline of the spinal nerve with proximal spread of agent through the neural foramen into the anterior epidural  space. A total combination of 3 mL of Lidocaine 0.5% and 10 mg decadron was injected at each level. Contrast spread was noted from L3 to L5 level. There was no pain on injection. The needle was removed and bleeding was nil.  A sterile dressing was applied. Diana was taken back to the recovery room for further observation.     Blood Loss: Nill  Specimen: None    Miguel Latham MD

## 2020-11-11 NOTE — DISCHARGE SUMMARY
Discharge Note  Short Stay      SUMMARY     Admit Date: 11/11/2020    Attending Physician: Miguel Latham      Discharge Physician: Miguel Latham      Discharge Date: 11/11/2020 9:25 AM    Procedure(s) (LRB):  INJECTION, STEROID, EPIDURAL, TRANSFORAMINAL APPROACH, L3-L4 (Bilateral)    Final Diagnosis: Lumbar radiculopathy [M54.16]    Disposition: Home or self care    Patient Instructions:   Discharge Medication List as of 11/11/2020  9:03 AM      CONTINUE these medications which have NOT CHANGED    Details   alendronate (FOSAMAX) 70 MG tablet TAKE 1 TABLET BY MOUTH ONE TIME PER WEEK, Normal      amLODIPine (NORVASC) 10 MG tablet Take 1 tablet (10 mg total) by mouth once daily., Starting Fri 9/25/2020, Normal      atorvastatin (LIPITOR) 40 MG tablet Take 1 tablet (40 mg total) by mouth once daily., Starting Tue 9/22/2020, Until Wed 9/22/2021, Normal      calcium-vitamin D 500-125 mg-unit tablet Take 1 tablet by mouth once daily.  , Until Discontinued, Historical Med      diclofenac (VOLTAREN) 75 MG EC tablet Take 1 tablet (75 mg total) by mouth 2 (two) times daily as needed., Starting Fri 9/25/2020, Normal      fluticasone propionate (FLONASE) 50 mcg/actuation nasal spray 2 sprays (100 mcg total) by Each Nostril route once daily., Starting Mon 3/2/2020, Normal      gabapentin (NEURONTIN) 300 MG capsule Take 1 capsule (300 mg total) by mouth 3 (three) times daily., Starting Fri 9/25/2020, Normal      levocetirizine (XYZAL) 5 MG tablet Take 1 tablet (5 mg total) by mouth every evening., Starting Tue 10/31/2017, Until Thu 5/28/2020, Normal      metoprolol succinate (TOPROL-XL) 100 MG 24 hr tablet Take 1 tablet (100 mg total) by mouth once daily., Starting Wed 7/22/2020, Normal      multivitamin (MULTIVITAMIN) per tablet Take 1 tablet by mouth once daily.  , Until Discontinued, Historical Med      pantoprazole (PROTONIX) 40 MG tablet Take 1 tablet (40 mg total) by mouth once daily., Starting Sun 10/11/2020, Normal       paroxetine (PAXIL) 20 MG tablet Take 1 tablet (20 mg total) by mouth every morning., Starting Thu 10/31/2019, Until Thu 5/28/2020, Normal      simethicone (MYLICON) 80 MG chewable tablet Take 1 tablet (80 mg total) by mouth every 6 (six) hours as needed for Flatulence., Starting 3/31/2016, Until Discontinued, Normal      traZODone (DESYREL) 150 MG tablet Take 1 tablet (150 mg total) by mouth every evening., Starting Fri 9/25/2020, Until Sat 9/25/2021, Normal                 Discharge Diagnosis: Lumbar radiculopathy [M54.16]  Condition on Discharge: Stable with no complications to procedure   Diet on Discharge: Same as before.  Activity: as per instruction sheet.  Discharge to: Home with a responsible adult.  Follow up: 2-4 weeks       Please call my office or pager at [phone] if experienced any weakness or loss of sensation, fever > 101.5, pain uncontrolled with oral medications, persistent nausea/vomiting/or diarrhea, redness or drainage from the incisions, or any other worrisome concerns. If physician on call was not reached or could not communicate with our office for any reason please go to the nearest emergency department

## 2020-11-24 ENCOUNTER — TELEPHONE (OUTPATIENT)
Dept: PAIN MEDICINE | Facility: CLINIC | Age: 75
End: 2020-11-24

## 2020-11-24 NOTE — TELEPHONE ENCOUNTER
"Staff called to confirm 11/27/20 9:40 am in office visit with Christin Ventura NP. Patient informed of instructions.    Patient states, " I would like to reschedule that appointment as I will not be home for that appointment due to the holiday. "    Staff offered 12/1/20 8:40 am in office with Christin Bloom Np.     Patient accepted the appointment and expressed thanks.   "

## 2020-11-25 ENCOUNTER — TELEPHONE (OUTPATIENT)
Dept: PAIN MEDICINE | Facility: CLINIC | Age: 75
End: 2020-11-25

## 2020-11-25 NOTE — TELEPHONE ENCOUNTER
Staff called to confirm 12/1/20 8:40 am in office visit with Christin Dominguez Np. Patient informed of instructions.    Patient did not answer therefore staff left a detailed vocie message informing the patient of the above information.

## 2020-11-29 ENCOUNTER — PATIENT OUTREACH (OUTPATIENT)
Dept: ADMINISTRATIVE | Facility: OTHER | Age: 75
End: 2020-11-29

## 2020-11-29 NOTE — PROGRESS NOTES
Health Maintenance Due   Topic Date Due    Influenza Vaccine (1) 08/01/2020     Updates were requested from care everywhere.  Chart was reviewed for overdue Proactive [facility] Encounters (KAT) topics (CRS, Breast Cancer Screening, Eye exam)  Health Maintenance has been updated.  LINKS immunization registry triggered.  Immunizations were reconciled.  \

## 2020-12-01 ENCOUNTER — OFFICE VISIT (OUTPATIENT)
Dept: PAIN MEDICINE | Facility: CLINIC | Age: 75
End: 2020-12-01
Payer: MEDICARE

## 2020-12-01 VITALS
RESPIRATION RATE: 18 BRPM | WEIGHT: 149.94 LBS | OXYGEN SATURATION: 100 % | DIASTOLIC BLOOD PRESSURE: 76 MMHG | HEIGHT: 60 IN | BODY MASS INDEX: 29.44 KG/M2 | SYSTOLIC BLOOD PRESSURE: 139 MMHG | HEART RATE: 61 BPM

## 2020-12-01 DIAGNOSIS — M51.36 DDD (DEGENERATIVE DISC DISEASE), LUMBAR: Chronic | ICD-10-CM

## 2020-12-01 DIAGNOSIS — M79.605 BILATERAL LEG PAIN: ICD-10-CM

## 2020-12-01 DIAGNOSIS — M79.604 BILATERAL LEG PAIN: ICD-10-CM

## 2020-12-01 DIAGNOSIS — M48.062 SPINAL STENOSIS, LUMBAR REGION WITH NEUROGENIC CLAUDICATION: ICD-10-CM

## 2020-12-01 DIAGNOSIS — M54.15 RADICULOPATHY OF THORACOLUMBAR REGION: ICD-10-CM

## 2020-12-01 PROCEDURE — 3078F DIAST BP <80 MM HG: CPT | Mod: CPTII,S$GLB,, | Performed by: NURSE PRACTITIONER

## 2020-12-01 PROCEDURE — 3008F BODY MASS INDEX DOCD: CPT | Mod: CPTII,S$GLB,, | Performed by: NURSE PRACTITIONER

## 2020-12-01 PROCEDURE — 99999 PR PBB SHADOW E&M-EST. PATIENT-LVL IV: ICD-10-PCS | Mod: PBBFAC,,, | Performed by: NURSE PRACTITIONER

## 2020-12-01 PROCEDURE — 1101F PT FALLS ASSESS-DOCD LE1/YR: CPT | Mod: CPTII,S$GLB,, | Performed by: NURSE PRACTITIONER

## 2020-12-01 PROCEDURE — 3008F PR BODY MASS INDEX (BMI) DOCUMENTED: ICD-10-PCS | Mod: CPTII,S$GLB,, | Performed by: NURSE PRACTITIONER

## 2020-12-01 PROCEDURE — 99999 PR PBB SHADOW E&M-EST. PATIENT-LVL IV: CPT | Mod: PBBFAC,,, | Performed by: NURSE PRACTITIONER

## 2020-12-01 PROCEDURE — 3075F SYST BP GE 130 - 139MM HG: CPT | Mod: CPTII,S$GLB,, | Performed by: NURSE PRACTITIONER

## 2020-12-01 PROCEDURE — 1101F PR PT FALLS ASSESS DOC 0-1 FALLS W/OUT INJ PAST YR: ICD-10-PCS | Mod: CPTII,S$GLB,, | Performed by: NURSE PRACTITIONER

## 2020-12-01 PROCEDURE — 1159F PR MEDICATION LIST DOCUMENTED IN MEDICAL RECORD: ICD-10-PCS | Mod: S$GLB,,, | Performed by: NURSE PRACTITIONER

## 2020-12-01 PROCEDURE — 3288F FALL RISK ASSESSMENT DOCD: CPT | Mod: CPTII,S$GLB,, | Performed by: NURSE PRACTITIONER

## 2020-12-01 PROCEDURE — 1125F PR PAIN SEVERITY QUANTIFIED, PAIN PRESENT: ICD-10-PCS | Mod: S$GLB,,, | Performed by: NURSE PRACTITIONER

## 2020-12-01 PROCEDURE — 99213 PR OFFICE/OUTPT VISIT, EST, LEVL III, 20-29 MIN: ICD-10-PCS | Mod: S$GLB,,, | Performed by: NURSE PRACTITIONER

## 2020-12-01 PROCEDURE — 3078F PR MOST RECENT DIASTOLIC BLOOD PRESSURE < 80 MM HG: ICD-10-PCS | Mod: CPTII,S$GLB,, | Performed by: NURSE PRACTITIONER

## 2020-12-01 PROCEDURE — 3288F PR FALLS RISK ASSESSMENT DOCUMENTED: ICD-10-PCS | Mod: CPTII,S$GLB,, | Performed by: NURSE PRACTITIONER

## 2020-12-01 PROCEDURE — 99213 OFFICE O/P EST LOW 20 MIN: CPT | Mod: S$GLB,,, | Performed by: NURSE PRACTITIONER

## 2020-12-01 PROCEDURE — 1125F AMNT PAIN NOTED PAIN PRSNT: CPT | Mod: S$GLB,,, | Performed by: NURSE PRACTITIONER

## 2020-12-01 PROCEDURE — 1159F MED LIST DOCD IN RCRD: CPT | Mod: S$GLB,,, | Performed by: NURSE PRACTITIONER

## 2020-12-01 PROCEDURE — 3075F PR MOST RECENT SYSTOLIC BLOOD PRESS GE 130-139MM HG: ICD-10-PCS | Mod: CPTII,S$GLB,, | Performed by: NURSE PRACTITIONER

## 2020-12-01 RX ORDER — GABAPENTIN 300 MG/1
600 CAPSULE ORAL 2 TIMES DAILY
Qty: 360 CAPSULE | Refills: 0 | Status: SHIPPED | OUTPATIENT
Start: 2020-12-01 | End: 2021-03-02 | Stop reason: SDUPTHER

## 2020-12-01 NOTE — PROGRESS NOTES
Chronic patient Established Note (Follow up visit)      SUBJECTIVE:    Interval History 12/1/2020:  The patient iss here for follow-up of back and leg pain.  She is now status post bilateral L3/4 transforaminal epidural steroid injection on 11/11/2020. She is reporting 100% relief for about 2 weeks and then her pain started to return.  She is still having some benefit.  Her pain is located across the lower back with radiation into front and sides of the legs to the anterior feet with associated numbness.  She feels like when she walks sometimes her legs become weak.  This has improved slightly.  She denies any recent falls.  At her last OV, her Gabapentin was increased to 600 mg BID.  She says that she finds this helpful.  She has mild drowsiness.  She admits that she does not always take the medication and thought it was more of an as needed medication.  The patient denies any bowel or bladder incontinence or signs of saddle paresthesia.  She feels as though her pain today is worse than usual.  She rates her pain today as 10/10.    Interval history 10/15/2020:  Diana Herring presents to the clinic for a follow-up appointment for back pain. Since the last visit, Diana Herring states the pain has been worsening. Current pain intensity is 7/10. She reports that she continues to have low back pain that radiates to the bilateral hip and leg. She does report that she has noticed intermittent numbness in the right lateral and anterior thigh, that is sometimes associated with weakness in her right leg. She says that her leg gives out when this happens. She has not had any falls or other trauma. She did not get the bilateral L3/4 TFESI planned last time, and she is still taking only 300 mg bid of gabapentin.  Patient denies urinary incontinence, bowel incontinence, significant weight loss.    Initial visit:  Diana Herring presents to the clinic for the evaluation of low back pain. The pain started 2 years ago  following loosing bone after a bone density and symptoms have been worsening.The pain is located in the low back area and radiates to the bilateral hip and leg.  The pain is described as aching, numbing, sharp and tight band and is rated as 8/10. The pain is rated with a score of  7/10 on the BEST day and a score of 8/10 on the WORST day.  Symptoms interfere with daily activity and sleeping. The pain is exacerbated by Sitting, Standing, Bending, Coughing/Sneezing, Walking, Morning, Lifting and Getting out of bed/chair.  The pain is mitigated by heat, laying down and rest. She reports spending 0 hours per day reclining. The patient reports 6 hours of uninterrupted sleep per night.     Patient had L5/S1 ILESI 2 weeks ago and only gave her 1 day of relief.      Patient denies night fever/night sweats, urinary incontinence, bowel incontinence, significant weight loss and significant motor weakness.     Physical Therapy/Home Exercise: yes, was not beneficial      Pain Disability Index Review:  Last 3 PDI Scores 12/1/2020 5/28/2020   Pain Disability Index (PDI) 30 15       Pain Medications:  Diclofenac tablets 75 mg bid- PCP  Gabapentin 600 mg bid    Opioid Contract: no     report:  Reviewed and consistent with medication use as prescribed.    Pain Procedures:  11/11/20 Bilateral L3/4 TF SANKET- 100% relief for 2 weeks  5/13/20 L5/S1 ILESI  10/30/19 TFESI Bilateral L3-L4  09/04/19 TFESI Bilateral L3     Physical Therapy/Home Exercise: yes    Imaging:  Narrative & Impression     EXAMINATION:  XR LUMBAR SPINE 5 VIEW WITH FLEX AND EXT     CLINICAL HISTORY:  Low back pain, >6wks conservative tx, persistent-progressive sx, surgical candidate;  Lumbago with sciatica, left side     TECHNIQUE:  Five views of the lumbar spine plus flexion extension views were performed.     COMPARISON:  07/23/2019     FINDINGS:  The alignment of the lumbar spine is normal.     The vertebral body height are well maintained.  Mild disc space  narrowing L3-L4.     Flexion and extension views demonstrate no evidence of translational abnormalities.     Minimal osteophyte formation L2-L3 L4.     No fracture or osseous lesions.     The sacroiliac joints appears symmetrical on the AP view.     The remainder of the visualized soft tissue and osseous structures appear normal.     There is atherosclerotic plaque of the abdominal aorta.     Impression:     Mild spondylosis of the lumbar spine, not significantly changed from the prior study.        Electronically signed by: Ava Brennan MD  Date:                                            05/29/2020  Time:                                           08:28        Narrative & Impression     EXAMINATION:  XR HIPS BILATERAL 2 VIEW INCL AP PELVIS     CLINICAL HISTORY:  Lumbago with sciatica, left side     TECHNIQUE:  AP view of the pelvis and frogleg lateral views of both hips were performed.     COMPARISON:  None.     FINDINGS:  Normal mineralization.  Pelvis demonstrates no acute fractures.  No dislocations are noted.  Hip joints appear to be symmetric and within normal limits with mild DJD of the hip joints.  There is no osteoblastic or lytic lesions.  Soft tissues are unremarkable.     SI joints are symmetric and within normal limits.  There is mild spondylotic changes in the lower lumbar spine.     There is moderate amount of retained stool in the right colon.     Impression:     Mild DJD of the hip joints.     1. No acute fractures.        Electronically signed by: David Vasquez MD  Date:                                            05/29/2020  Time:                                           08:28     Narrative & Impression     EXAMINATION:  MRI LUMBAR SPINE WITHOUT CONTRAST     CLINICAL HISTORY:  back and bilateral leg pain; Lumbago with sciatica, left side     TECHNIQUE:  Multiplanar, multisequence MR images were acquired from the thoracolumbar junction to the sacrum without the administration of  contrast.     COMPARISON:  None.     FINDINGS:  MRI examination of the lumbar spine was performed.  Intravenous contrast was not utilized.  There are areas of hyperintense T2 hypointense T1 lesions of the kidneys likely cysts not optimally evaluated on this exam.     There is multilevel degenerative loss of disc signal and there is loss of disc space height most notable at L3-4.  There are chronic appearing endplate changes noted with some mild STIR hyperintensity that likely relates to edema associated with degenerative endplate change.  There is no evidence for high-grade spondylolisthesis, there is no evidence for high-grade or acute compression fracture deformity.  There is no finding to specifically suggest aggressive bone marrow replacement process.     The sagittal imaging includes the majority of T10 through the S3 segment, axial imaging includes the inferior aspect of T12 through the majority of S1.  On the sagittal imaging there is appearance suggesting mild degenerative disc disease and disc bulge at T10-11 and T11-12 with some mild posterior ligamentous thickening suggested at T10-11 however without evidence for high-grade spinal canal stenosis.     The T12-L1 level demonstrates no evidence for high-grade spinal canal or foraminal stenosis.     The L1-2 level demonstrates degenerative disc bulge with anterior impression upon the dural sac.  There is posterior facet arthropathy and ligamentous thickening there is mild spinal canal stenosis.  There is encroachment into the neural foramen at the level of the disc plane without obvious exiting nerve root impingement.     The L2-3 level demonstrates mild degenerative loss of disc space height.  There is mild degenerative disc bulge with anterior impression upon the dural sac.  There is posterior facet arthropathy with somewhat prominent ligamentous thickening with associated posterolateral encroachment.  There is mild-to-moderate spinal canal stenosis.  There  is bilateral foraminal narrowing.     The L3-4 level demonstrates the aforementioned appearance of loss of disc space height and degenerative loss of disc signal.  There is diffuse degenerative disc bulge with anterior impression upon the dural sac there is posterior facet arthropathy and ligamentous thickening there is high-grade spinal canal stenosis.  There is bilateral foraminal stenosis.     The L4-5 level demonstrates degenerative disc bulge with anterior impression upon the dural sac there is posterior facet arthropathy and ligamentous thickening with mild to moderate spinal canal stenosis.  There is bilateral foraminal narrowing/stenosis without obvious exiting nerve root impingement.     The L5-S1 level demonstrates no evidence for high-grade spinal canal stenosis.  Facet arthropathy is noted there is no evidence for high-grade foraminal stenosis or exiting nerve root impingement.     The visualized spinal cord appears to taper appropriately and appears of appropriate signal and caliber.  Mild signal intensity within the spinal canal on T1 axial imaging may relate to a minimal lipoma along the filum terminalis.  There is no additional evidence for intraspinal mass or abnormal fluid collection     Impression:     Multilevel chronic change of the lumbar spine with variable spinal canal and foraminal narrowing/stenosis as detailed above.  Correlation for any specific level of symptomatology is needed.  The greatest level of spinal canal stenosis is seen at L3-4.        Electronically signed by: Obie Aggarwal  Date:                                            07/27/2019  Time:                                           03:45     CMP  Sodium   Date Value Ref Range Status   02/21/2020 141 136 - 145 mmol/L Final     Potassium   Date Value Ref Range Status   02/21/2020 3.9 3.5 - 5.1 mmol/L Final     Chloride   Date Value Ref Range Status   02/21/2020 107 95 - 110 mmol/L Final     CO2   Date Value Ref Range Status    02/21/2020 27 23 - 29 mmol/L Final     Glucose   Date Value Ref Range Status   02/21/2020 103 70 - 110 mg/dL Final     BUN   Date Value Ref Range Status   02/21/2020 13 8 - 23 mg/dL Final     Creatinine   Date Value Ref Range Status   02/21/2020 0.8 0.5 - 1.4 mg/dL Final     Calcium   Date Value Ref Range Status   02/21/2020 9.3 8.7 - 10.5 mg/dL Final     Total Protein   Date Value Ref Range Status   02/21/2020 7.5 6.0 - 8.4 g/dL Final     Albumin   Date Value Ref Range Status   02/21/2020 3.8 3.5 - 5.2 g/dL Final     Total Bilirubin   Date Value Ref Range Status   02/21/2020 0.8 0.1 - 1.0 mg/dL Final     Comment:     For infants and newborns, interpretation of results should be based  on gestational age, weight and in agreement with clinical  observations.  Premature Infant recommended reference ranges:  Up to 24 hours.............<8.0 mg/dL  Up to 48 hours............<12.0 mg/dL  3-5 days..................<15.0 mg/dL  6-29 days.................<15.0 mg/dL       Alkaline Phosphatase   Date Value Ref Range Status   02/21/2020 72 55 - 135 U/L Final     AST   Date Value Ref Range Status   02/21/2020 14 10 - 40 U/L Final     ALT   Date Value Ref Range Status   02/21/2020 16 10 - 44 U/L Final     Anion Gap   Date Value Ref Range Status   02/21/2020 7 (L) 8 - 16 mmol/L Final     eGFR if    Date Value Ref Range Status   02/21/2020 >60.0 >60 mL/min/1.73 m^2 Final     eGFR if non    Date Value Ref Range Status   02/21/2020 >60.0 >60 mL/min/1.73 m^2 Final     Comment:     Calculation used to obtain the estimated glomerular filtration  rate (eGFR) is the CKD-EPI equation.            Allergies: Review of patient's allergies indicates:  No Known Allergies    Current Medications:   Current Outpatient Medications   Medication Sig Dispense Refill    alendronate (FOSAMAX) 70 MG tablet TAKE 1 TABLET BY MOUTH ONE TIME PER WEEK 12 tablet 1    amLODIPine (NORVASC) 10 MG tablet Take 1 tablet (10 mg  total) by mouth once daily. 90 tablet 3    atorvastatin (LIPITOR) 40 MG tablet Take 1 tablet (40 mg total) by mouth once daily. 90 tablet 1    calcium-vitamin D 500-125 mg-unit tablet Take 1 tablet by mouth once daily.        diclofenac (VOLTAREN) 75 MG EC tablet Take 1 tablet (75 mg total) by mouth 2 (two) times daily as needed. 180 tablet 1    fluticasone propionate (FLONASE) 50 mcg/actuation nasal spray 2 sprays (100 mcg total) by Each Nostril route once daily. 48 g 3    gabapentin (NEURONTIN) 300 MG capsule Take 1 capsule (300 mg total) by mouth 3 (three) times daily. 270 capsule 1    metoprolol succinate (TOPROL-XL) 100 MG 24 hr tablet Take 1 tablet (100 mg total) by mouth once daily. 90 tablet 1    multivitamin (MULTIVITAMIN) per tablet Take 1 tablet by mouth once daily.        pantoprazole (PROTONIX) 40 MG tablet Take 1 tablet (40 mg total) by mouth once daily. 90 tablet 1    simethicone (MYLICON) 80 MG chewable tablet Take 1 tablet (80 mg total) by mouth every 6 (six) hours as needed for Flatulence. 60 tablet 2    traZODone (DESYREL) 150 MG tablet Take 1 tablet (150 mg total) by mouth every evening. 90 tablet 1    levocetirizine (XYZAL) 5 MG tablet Take 1 tablet (5 mg total) by mouth every evening. 30 tablet 5    paroxetine (PAXIL) 20 MG tablet Take 1 tablet (20 mg total) by mouth every morning. 90 tablet 1     No current facility-administered medications for this visit.        REVIEW OF SYSTEMS:    GENERAL:  No weight loss, malaise or fevers.  HEENT:  Negative for frequent or significant headaches.  NECK:  Negative for lumps, goiter, pain and significant neck swelling.  RESPIRATORY:  Negative for cough, wheezing or shortness of breath.  CARDIOVASCULAR:  Negative for chest pain, leg swelling or palpitations.  GI:  Negative for abdominal discomfort, blood in stools or black stools or change in bowel habits. GERD.  MUSCULOSKELETAL:  See HPI.  SKIN:  Negative for lesions, rash, and itching.  PSYCH:   + for sleep disturbance, h/o depression  HEMATOLOGY/LYMPHOLOGY:  Negative for prolonged bleeding, bruising easily or swollen nodes.  NEURO:  + numbness, weakness. No history of headaches, syncope, paralysis, seizures or tremors.  All other reviewed and negative other than HPI.     Past Medical History:  Past Medical History:   Diagnosis Date    Anxiety     Arthritis     Corneal abrasion s    ? eye     Depression     Full dentures     GERD (gastroesophageal reflux disease)     Hyperlipidemia     Hypertension     Nuclear sclerosis of both eyes 6/5/2017    Osteoporosis     Restless leg syndrome        Past Surgical History:  Past Surgical History:   Procedure Laterality Date    COLONOSCOPY N/A 5/14/2019    Procedure: COLONOSCOPY;  Surgeon: Nahid Cline MD;  Location: [facility] ENDO;  Service: Endoscopy;  Laterality: N/A;    EPIDURAL STEROID INJECTION N/A 5/13/2020    Procedure: LUMBAR/CAUDAL L5/S1 IL SANKET ;  Surgeon: Miguel Latham MD;  Location: [facility] PAIN MGT;  Service: Pain Management;  Laterality: N/A;  NEEDS CONSENT    HYSTERECTOMY      PARTIAL HYSTERECTOMY      TRANSFORAMINAL EPIDURAL INJECTION OF STEROID Bilateral 9/4/2019    Procedure: Injection,steroid,epidural,transforaminal approach LUMBAR TRANSFORAMINAL BILATERAL L3 TF SANKET;  Surgeon: Miguel Latham MD;  Location: [facility] PAIN MGT;  Service: Pain Management;  Laterality: Bilateral;  NEEDS CONSENT    TRANSFORAMINAL EPIDURAL INJECTION OF STEROID Bilateral 10/30/2019    Procedure: TRANSFORAMINAL SANKET BILATERAL L3-L4;  Surgeon: Miguel Latham MD;  Location: [facility] PAIN MGT;  Service: Pain Management;  Laterality: Bilateral;  NEEDS CONSENT    TRANSFORAMINAL EPIDURAL INJECTION OF STEROID Bilateral 11/11/2020    Procedure: INJECTION, STEROID, EPIDURAL, TRANSFORAMINAL APPROACH, L3-L4;  Surgeon: Miguel Latham MD;  Location: [facility] PAIN MGT;  Service: Pain Management;  Laterality: Bilateral;    TUBAL LIGATION Bilateral        Family  History:  Family History   Problem Relation Age of Onset    Stroke Mother     Glaucoma Mother     Cataracts Mother     Cancer Father         Lung    No Known Problems Brother     No Known Problems Son     Stroke Brother     Heart disease Son     No Known Problems Sister     No Known Problems Maternal Aunt     No Known Problems Maternal Uncle     No Known Problems Paternal Aunt     No Known Problems Paternal Uncle     No Known Problems Maternal Grandmother     No Known Problems Maternal Grandfather     No Known Problems Paternal Grandmother     No Known Problems Paternal Grandfather     Amblyopia Neg Hx     Blindness Neg Hx     Diabetes Neg Hx     Hypertension Neg Hx     Macular degeneration Neg Hx     Retinal detachment Neg Hx     Strabismus Neg Hx     Thyroid disease Neg Hx        Social History:  Social History     Socioeconomic History    Marital status:      Spouse name: Not on file    Number of children: Not on file    Years of education: Not on file    Highest education level: Not on file   Occupational History    Not on file   Social Needs    Financial resource strain: Not on file    Food insecurity     Worry: Not on file     Inability: Not on file    Transportation needs     Medical: Not on file     Non-medical: Not on file   Tobacco Use    Smoking status: Never Smoker    Smokeless tobacco: Never Used   Substance and Sexual Activity    Alcohol use: No    Drug use: No    Sexual activity: Never   Lifestyle    Physical activity     Days per week: Not on file     Minutes per session: Not on file    Stress: Not on file   Relationships    Social connections     Talks on phone: Not on file     Gets together: Not on file     Attends Jehovah's witness service: Not on file     Active member of club or organization: Not on file     Attends meetings of clubs or organizations: Not on file     Relationship status: Not on file   Other Topics Concern    Not on file   Social History  Narrative    Patient is  w/ 5 children, one  from heart disease.The patient is retired.       OBJECTIVE:    /76   Pulse 61   Resp 18   Ht 5' (1.524 m)   Wt 68 kg (149 lb 14.6 oz)   SpO2 100%   BMI 29.28 kg/m²     PHYSICAL EXAMINATION:    General appearance: Well appearing, in no acute distress, alert and oriented x3.  Psych:  Mood and affect appropriate.  Skin: Skin color, texture, turgor normal, no rashes or lesions, in both upper and lower body.  Head/face:  Normocephalic, atraumatic. No palpable lymph nodes.  Back: Straight leg raising in the sitting and supine positions is negative. No pain to palpation over the spine or costovertebral angles. Normal range of motion with pain on flexion and extension reproduction. Positive axial loading test bilaterally.    Extremities: Peripheral joint ROM is full and pain free without obvious instability or laxity in all four extremities. No deformities, edema, or skin discoloration. Good capillary refill.  Musculoskeletal: No atrophy or tone abnormalities are noted. TTP over right SI joint.  PASCALE is negative bilaterally. 5/5 strength in right ankle with plantar and 4/5 on dorsiflexion. 5/5 strength in left ankle with plantar and 4/5 on dorsiflexion. 4/5 strength with right knee flexion and extension. 5/5 strength with left knee flexion and extension. 5/5 strength in right EHL, 5/5 strength in left EHL. Right hip flexion is 4/5, left is 5/5.  Neuro: Bilateral upper and lower extremity coordination and muscle stretch reflexes are physiologic and symmetric. Decreased sensation at right L3 and L4 distribution.  Gait: Antalgic.    ASSESSMENT: 75 y.o. year old female with low back pain, consistent with     1. DDD (degenerative disc disease), lumbar  gabapentin (NEURONTIN) 300 MG capsule   2. Bilateral leg pain  gabapentin (NEURONTIN) 300 MG capsule   3. Radiculopathy of thoracolumbar region  gabapentin (NEURONTIN) 300 MG capsule   4. Spinal stenosis,  lumbar region with neurogenic claudication  gabapentin (NEURONTIN) 300 MG capsule         PLAN:     - I have stressed the importance of physical activity and a home exercise plan to help with pain and improve health.  - Patient can continue with medications for now since they are providing benefits, using them appropriately, and without side effects.  - We discuss that she should take the Gabapentin 600 mg twice daily regularly, not as needed.    - Can consider TF SANKET possibly at L4 as well as MBB/RFA for axial pain in the future.  - RTC in 3 months or sooner if needed.  - If patient has more episodes of leg weakness she will call our clinic, and we will order MRI lumbar spine and EMG/NCS of bilateral lower extremities  - Counseled patient regarding the importance of activity modification, constant sleeping habits and physical therapy.    The above plan and management options were discussed at length with patient. Patient is in agreement with the above and verbalized understanding.    BRENDA Flowers  12/01/2020

## 2021-01-14 DIAGNOSIS — I10 ESSENTIAL HYPERTENSION: Chronic | ICD-10-CM

## 2021-01-14 RX ORDER — METOPROLOL SUCCINATE 100 MG/1
TABLET, EXTENDED RELEASE ORAL
Qty: 90 TABLET | Refills: 1 | Status: SHIPPED | OUTPATIENT
Start: 2021-01-14 | End: 2021-07-22

## 2021-02-18 ENCOUNTER — PATIENT OUTREACH (OUTPATIENT)
Dept: ADMINISTRATIVE | Facility: HOSPITAL | Age: 76
End: 2021-02-18

## 2021-03-01 ENCOUNTER — TELEPHONE (OUTPATIENT)
Dept: PAIN MEDICINE | Facility: CLINIC | Age: 76
End: 2021-03-01

## 2021-03-01 ENCOUNTER — LAB VISIT (OUTPATIENT)
Dept: LAB | Facility: HOSPITAL | Age: 76
End: 2021-03-01
Attending: INTERNAL MEDICINE
Payer: MEDICARE

## 2021-03-01 DIAGNOSIS — I10 ESSENTIAL HYPERTENSION: Chronic | ICD-10-CM

## 2021-03-01 DIAGNOSIS — M81.0 AGE-RELATED OSTEOPOROSIS WITHOUT CURRENT PATHOLOGICAL FRACTURE: Chronic | ICD-10-CM

## 2021-03-01 LAB
25(OH)D3+25(OH)D2 SERPL-MCNC: 22 NG/ML (ref 30–96)
ALBUMIN SERPL BCP-MCNC: 3.9 G/DL (ref 3.5–5.2)
ALP SERPL-CCNC: 69 U/L (ref 55–135)
ALT SERPL W/O P-5'-P-CCNC: 25 U/L (ref 10–44)
ANION GAP SERPL CALC-SCNC: 10 MMOL/L (ref 8–16)
AST SERPL-CCNC: 18 U/L (ref 10–40)
BASOPHILS # BLD AUTO: 0.06 K/UL (ref 0–0.2)
BASOPHILS NFR BLD: 0.8 % (ref 0–1.9)
BILIRUB SERPL-MCNC: 0.7 MG/DL (ref 0.1–1)
BUN SERPL-MCNC: 16 MG/DL (ref 8–23)
CALCIUM SERPL-MCNC: 8.6 MG/DL (ref 8.7–10.5)
CHLORIDE SERPL-SCNC: 109 MMOL/L (ref 95–110)
CHOLEST SERPL-MCNC: 153 MG/DL (ref 120–199)
CHOLEST/HDLC SERPL: 3.8 {RATIO} (ref 2–5)
CO2 SERPL-SCNC: 25 MMOL/L (ref 23–29)
CREAT SERPL-MCNC: 0.8 MG/DL (ref 0.5–1.4)
DIFFERENTIAL METHOD: ABNORMAL
EOSINOPHIL # BLD AUTO: 0.2 K/UL (ref 0–0.5)
EOSINOPHIL NFR BLD: 2.2 % (ref 0–8)
ERYTHROCYTE [DISTWIDTH] IN BLOOD BY AUTOMATED COUNT: 12.7 % (ref 11.5–14.5)
EST. GFR  (AFRICAN AMERICAN): >60 ML/MIN/1.73 M^2
EST. GFR  (NON AFRICAN AMERICAN): >60 ML/MIN/1.73 M^2
GLUCOSE SERPL-MCNC: 113 MG/DL (ref 70–110)
HCT VFR BLD AUTO: 39.7 % (ref 37–48.5)
HDLC SERPL-MCNC: 40 MG/DL (ref 40–75)
HDLC SERPL: 26.1 % (ref 20–50)
HGB BLD-MCNC: 12.5 G/DL (ref 12–16)
IMM GRANULOCYTES # BLD AUTO: 0.01 K/UL (ref 0–0.04)
IMM GRANULOCYTES NFR BLD AUTO: 0.1 % (ref 0–0.5)
LDLC SERPL CALC-MCNC: 91 MG/DL (ref 63–159)
LYMPHOCYTES # BLD AUTO: 4.5 K/UL (ref 1–4.8)
LYMPHOCYTES NFR BLD: 60.3 % (ref 18–48)
MCH RBC QN AUTO: 30.1 PG (ref 27–31)
MCHC RBC AUTO-ENTMCNC: 31.5 G/DL (ref 32–36)
MCV RBC AUTO: 96 FL (ref 82–98)
MONOCYTES # BLD AUTO: 0.4 K/UL (ref 0.3–1)
MONOCYTES NFR BLD: 5.2 % (ref 4–15)
NEUTROPHILS # BLD AUTO: 2.3 K/UL (ref 1.8–7.7)
NEUTROPHILS NFR BLD: 31.4 % (ref 38–73)
NONHDLC SERPL-MCNC: 113 MG/DL
NRBC BLD-RTO: 0 /100 WBC
PLATELET # BLD AUTO: 205 K/UL (ref 150–350)
PMV BLD AUTO: 11.7 FL (ref 9.2–12.9)
POTASSIUM SERPL-SCNC: 4 MMOL/L (ref 3.5–5.1)
PROT SERPL-MCNC: 7.5 G/DL (ref 6–8.4)
RBC # BLD AUTO: 4.15 M/UL (ref 4–5.4)
SODIUM SERPL-SCNC: 144 MMOL/L (ref 136–145)
TRIGL SERPL-MCNC: 110 MG/DL (ref 30–150)
WBC # BLD AUTO: 7.44 K/UL (ref 3.9–12.7)

## 2021-03-01 PROCEDURE — 80053 COMPREHEN METABOLIC PANEL: CPT

## 2021-03-01 PROCEDURE — 85025 COMPLETE CBC W/AUTO DIFF WBC: CPT

## 2021-03-01 PROCEDURE — 82306 VITAMIN D 25 HYDROXY: CPT

## 2021-03-01 PROCEDURE — 36415 COLL VENOUS BLD VENIPUNCTURE: CPT | Mod: PO

## 2021-03-01 PROCEDURE — 80061 LIPID PANEL: CPT

## 2021-03-02 ENCOUNTER — OFFICE VISIT (OUTPATIENT)
Dept: PAIN MEDICINE | Facility: CLINIC | Age: 76
End: 2021-03-02
Payer: MEDICARE

## 2021-03-02 VITALS
HEIGHT: 60 IN | WEIGHT: 147 LBS | RESPIRATION RATE: 18 BRPM | DIASTOLIC BLOOD PRESSURE: 73 MMHG | TEMPERATURE: 97 F | HEART RATE: 61 BPM | SYSTOLIC BLOOD PRESSURE: 139 MMHG | BODY MASS INDEX: 28.86 KG/M2

## 2021-03-02 DIAGNOSIS — M54.17 LUMBOSACRAL RADICULOPATHY: ICD-10-CM

## 2021-03-02 DIAGNOSIS — M48.062 SPINAL STENOSIS, LUMBAR REGION WITH NEUROGENIC CLAUDICATION: Primary | ICD-10-CM

## 2021-03-02 DIAGNOSIS — M54.15 RADICULOPATHY OF THORACOLUMBAR REGION: ICD-10-CM

## 2021-03-02 DIAGNOSIS — M51.36 DDD (DEGENERATIVE DISC DISEASE), LUMBAR: ICD-10-CM

## 2021-03-02 DIAGNOSIS — M79.605 BILATERAL LEG PAIN: ICD-10-CM

## 2021-03-02 DIAGNOSIS — M79.604 BILATERAL LEG PAIN: ICD-10-CM

## 2021-03-02 PROCEDURE — 1101F PR PT FALLS ASSESS DOC 0-1 FALLS W/OUT INJ PAST YR: ICD-10-PCS | Mod: CPTII,S$GLB,, | Performed by: NURSE PRACTITIONER

## 2021-03-02 PROCEDURE — 99213 OFFICE O/P EST LOW 20 MIN: CPT | Mod: S$GLB,,, | Performed by: NURSE PRACTITIONER

## 2021-03-02 PROCEDURE — 1125F AMNT PAIN NOTED PAIN PRSNT: CPT | Mod: S$GLB,,, | Performed by: NURSE PRACTITIONER

## 2021-03-02 PROCEDURE — 1125F PR PAIN SEVERITY QUANTIFIED, PAIN PRESENT: ICD-10-PCS | Mod: S$GLB,,, | Performed by: NURSE PRACTITIONER

## 2021-03-02 PROCEDURE — 3078F PR MOST RECENT DIASTOLIC BLOOD PRESSURE < 80 MM HG: ICD-10-PCS | Mod: CPTII,S$GLB,, | Performed by: NURSE PRACTITIONER

## 2021-03-02 PROCEDURE — 99213 PR OFFICE/OUTPT VISIT, EST, LEVL III, 20-29 MIN: ICD-10-PCS | Mod: S$GLB,,, | Performed by: NURSE PRACTITIONER

## 2021-03-02 PROCEDURE — 3288F PR FALLS RISK ASSESSMENT DOCUMENTED: ICD-10-PCS | Mod: CPTII,S$GLB,, | Performed by: NURSE PRACTITIONER

## 2021-03-02 PROCEDURE — 1159F PR MEDICATION LIST DOCUMENTED IN MEDICAL RECORD: ICD-10-PCS | Mod: S$GLB,,, | Performed by: NURSE PRACTITIONER

## 2021-03-02 PROCEDURE — 99999 PR PBB SHADOW E&M-EST. PATIENT-LVL IV: CPT | Mod: PBBFAC,,, | Performed by: NURSE PRACTITIONER

## 2021-03-02 PROCEDURE — 3288F FALL RISK ASSESSMENT DOCD: CPT | Mod: CPTII,S$GLB,, | Performed by: NURSE PRACTITIONER

## 2021-03-02 PROCEDURE — 1101F PT FALLS ASSESS-DOCD LE1/YR: CPT | Mod: CPTII,S$GLB,, | Performed by: NURSE PRACTITIONER

## 2021-03-02 PROCEDURE — 99999 PR PBB SHADOW E&M-EST. PATIENT-LVL IV: ICD-10-PCS | Mod: PBBFAC,,, | Performed by: NURSE PRACTITIONER

## 2021-03-02 PROCEDURE — 3078F DIAST BP <80 MM HG: CPT | Mod: CPTII,S$GLB,, | Performed by: NURSE PRACTITIONER

## 2021-03-02 PROCEDURE — 3075F SYST BP GE 130 - 139MM HG: CPT | Mod: CPTII,S$GLB,, | Performed by: NURSE PRACTITIONER

## 2021-03-02 PROCEDURE — 3075F PR MOST RECENT SYSTOLIC BLOOD PRESS GE 130-139MM HG: ICD-10-PCS | Mod: CPTII,S$GLB,, | Performed by: NURSE PRACTITIONER

## 2021-03-02 PROCEDURE — 1159F MED LIST DOCD IN RCRD: CPT | Mod: S$GLB,,, | Performed by: NURSE PRACTITIONER

## 2021-03-02 RX ORDER — GABAPENTIN 300 MG/1
600 CAPSULE ORAL 2 TIMES DAILY
Qty: 360 CAPSULE | Refills: 1 | Status: SHIPPED | OUTPATIENT
Start: 2021-03-02 | End: 2021-11-11 | Stop reason: SDUPTHER

## 2021-03-02 RX ORDER — DICLOFENAC SODIUM 75 MG/1
75 TABLET, DELAYED RELEASE ORAL 2 TIMES DAILY PRN
Qty: 180 TABLET | Refills: 1 | Status: SHIPPED | OUTPATIENT
Start: 2021-03-02 | End: 2021-09-16

## 2021-03-04 ENCOUNTER — OFFICE VISIT (OUTPATIENT)
Dept: FAMILY MEDICINE | Facility: CLINIC | Age: 76
End: 2021-03-04
Payer: MEDICARE

## 2021-03-04 VITALS
TEMPERATURE: 98 F | DIASTOLIC BLOOD PRESSURE: 70 MMHG | HEIGHT: 60 IN | BODY MASS INDEX: 28.82 KG/M2 | OXYGEN SATURATION: 98 % | WEIGHT: 146.81 LBS | SYSTOLIC BLOOD PRESSURE: 130 MMHG | HEART RATE: 68 BPM

## 2021-03-04 DIAGNOSIS — E55.9 VITAMIN D DEFICIENCY: ICD-10-CM

## 2021-03-04 DIAGNOSIS — F33.0 MILD RECURRENT MAJOR DEPRESSION: Chronic | ICD-10-CM

## 2021-03-04 DIAGNOSIS — F41.9 ANXIETY: ICD-10-CM

## 2021-03-04 DIAGNOSIS — M81.0 AGE-RELATED OSTEOPOROSIS WITHOUT CURRENT PATHOLOGICAL FRACTURE: Chronic | ICD-10-CM

## 2021-03-04 DIAGNOSIS — I10 ESSENTIAL HYPERTENSION: ICD-10-CM

## 2021-03-04 DIAGNOSIS — Z00.00 ROUTINE MEDICAL EXAM: Primary | ICD-10-CM

## 2021-03-04 DIAGNOSIS — F51.04 PSYCHOPHYSIOLOGICAL INSOMNIA: Chronic | ICD-10-CM

## 2021-03-04 DIAGNOSIS — R73.09 OTHER ABNORMAL GLUCOSE: ICD-10-CM

## 2021-03-04 PROCEDURE — 99499 UNLISTED E&M SERVICE: CPT | Mod: S$GLB,,, | Performed by: INTERNAL MEDICINE

## 2021-03-04 PROCEDURE — 3288F PR FALLS RISK ASSESSMENT DOCUMENTED: ICD-10-PCS | Mod: CPTII,S$GLB,, | Performed by: INTERNAL MEDICINE

## 2021-03-04 PROCEDURE — 99499 RISK ADDL DX/OHS AUDIT: ICD-10-PCS | Mod: S$GLB,,, | Performed by: INTERNAL MEDICINE

## 2021-03-04 PROCEDURE — 3078F DIAST BP <80 MM HG: CPT | Mod: CPTII,S$GLB,, | Performed by: INTERNAL MEDICINE

## 2021-03-04 PROCEDURE — 99214 OFFICE O/P EST MOD 30 MIN: CPT | Mod: S$GLB,,, | Performed by: INTERNAL MEDICINE

## 2021-03-04 PROCEDURE — 1125F PR PAIN SEVERITY QUANTIFIED, PAIN PRESENT: ICD-10-PCS | Mod: S$GLB,,, | Performed by: INTERNAL MEDICINE

## 2021-03-04 PROCEDURE — 3075F SYST BP GE 130 - 139MM HG: CPT | Mod: CPTII,S$GLB,, | Performed by: INTERNAL MEDICINE

## 2021-03-04 PROCEDURE — 3288F FALL RISK ASSESSMENT DOCD: CPT | Mod: CPTII,S$GLB,, | Performed by: INTERNAL MEDICINE

## 2021-03-04 PROCEDURE — 3078F PR MOST RECENT DIASTOLIC BLOOD PRESSURE < 80 MM HG: ICD-10-PCS | Mod: CPTII,S$GLB,, | Performed by: INTERNAL MEDICINE

## 2021-03-04 PROCEDURE — 99999 PR PBB SHADOW E&M-EST. PATIENT-LVL IV: ICD-10-PCS | Mod: PBBFAC,,, | Performed by: INTERNAL MEDICINE

## 2021-03-04 PROCEDURE — 1101F PR PT FALLS ASSESS DOC 0-1 FALLS W/OUT INJ PAST YR: ICD-10-PCS | Mod: CPTII,S$GLB,, | Performed by: INTERNAL MEDICINE

## 2021-03-04 PROCEDURE — 3075F PR MOST RECENT SYSTOLIC BLOOD PRESS GE 130-139MM HG: ICD-10-PCS | Mod: CPTII,S$GLB,, | Performed by: INTERNAL MEDICINE

## 2021-03-04 PROCEDURE — 1125F AMNT PAIN NOTED PAIN PRSNT: CPT | Mod: S$GLB,,, | Performed by: INTERNAL MEDICINE

## 2021-03-04 PROCEDURE — 99999 PR PBB SHADOW E&M-EST. PATIENT-LVL IV: CPT | Mod: PBBFAC,,, | Performed by: INTERNAL MEDICINE

## 2021-03-04 PROCEDURE — 99214 PR OFFICE/OUTPT VISIT, EST, LEVL IV, 30-39 MIN: ICD-10-PCS | Mod: S$GLB,,, | Performed by: INTERNAL MEDICINE

## 2021-03-04 PROCEDURE — 1101F PT FALLS ASSESS-DOCD LE1/YR: CPT | Mod: CPTII,S$GLB,, | Performed by: INTERNAL MEDICINE

## 2021-03-04 RX ORDER — ERGOCALCIFEROL 1.25 MG/1
50000 CAPSULE ORAL
Qty: 12 CAPSULE | Refills: 0 | Status: SHIPPED | OUTPATIENT
Start: 2021-03-07 | End: 2021-06-10

## 2021-03-04 RX ORDER — PAROXETINE HYDROCHLORIDE 20 MG/1
20 TABLET, FILM COATED ORAL EVERY MORNING
Qty: 90 TABLET | Refills: 1 | Status: SHIPPED | OUTPATIENT
Start: 2021-03-04 | End: 2021-08-31

## 2021-03-31 DIAGNOSIS — E78.5 HYPERLIPIDEMIA, UNSPECIFIED HYPERLIPIDEMIA TYPE: ICD-10-CM

## 2021-03-31 RX ORDER — ATORVASTATIN CALCIUM 40 MG/1
TABLET, FILM COATED ORAL
Qty: 90 TABLET | Refills: 3 | Status: SHIPPED | OUTPATIENT
Start: 2021-03-31 | End: 2022-05-09

## 2021-04-13 DIAGNOSIS — K21.9 GASTROESOPHAGEAL REFLUX DISEASE WITHOUT ESOPHAGITIS: Chronic | ICD-10-CM

## 2021-04-13 RX ORDER — PANTOPRAZOLE SODIUM 40 MG/1
TABLET, DELAYED RELEASE ORAL
Qty: 90 TABLET | Refills: 1 | Status: SHIPPED | OUTPATIENT
Start: 2021-04-13 | End: 2022-01-06 | Stop reason: SDUPTHER

## 2021-05-04 ENCOUNTER — OFFICE VISIT (OUTPATIENT)
Dept: PAIN MEDICINE | Facility: CLINIC | Age: 76
End: 2021-05-04
Payer: MEDICARE

## 2021-05-04 VITALS
TEMPERATURE: 98 F | WEIGHT: 145.5 LBS | SYSTOLIC BLOOD PRESSURE: 129 MMHG | DIASTOLIC BLOOD PRESSURE: 71 MMHG | HEIGHT: 60 IN | HEART RATE: 57 BPM | RESPIRATION RATE: 18 BRPM | BODY MASS INDEX: 28.57 KG/M2

## 2021-05-04 DIAGNOSIS — M48.062 SPINAL STENOSIS, LUMBAR REGION WITH NEUROGENIC CLAUDICATION: Primary | ICD-10-CM

## 2021-05-04 DIAGNOSIS — M54.17 LUMBOSACRAL RADICULOPATHY: ICD-10-CM

## 2021-05-04 DIAGNOSIS — M54.16 LUMBAR RADICULOPATHY: ICD-10-CM

## 2021-05-04 DIAGNOSIS — G89.29 OTHER CHRONIC PAIN: ICD-10-CM

## 2021-05-04 PROCEDURE — 1101F PR PT FALLS ASSESS DOC 0-1 FALLS W/OUT INJ PAST YR: ICD-10-PCS | Mod: CPTII,S$GLB,, | Performed by: NURSE PRACTITIONER

## 2021-05-04 PROCEDURE — 1101F PT FALLS ASSESS-DOCD LE1/YR: CPT | Mod: CPTII,S$GLB,, | Performed by: NURSE PRACTITIONER

## 2021-05-04 PROCEDURE — 99999 PR PBB SHADOW E&M-EST. PATIENT-LVL IV: CPT | Mod: PBBFAC,,, | Performed by: NURSE PRACTITIONER

## 2021-05-04 PROCEDURE — 1125F AMNT PAIN NOTED PAIN PRSNT: CPT | Mod: S$GLB,,, | Performed by: NURSE PRACTITIONER

## 2021-05-04 PROCEDURE — 3288F FALL RISK ASSESSMENT DOCD: CPT | Mod: CPTII,S$GLB,, | Performed by: NURSE PRACTITIONER

## 2021-05-04 PROCEDURE — 99499 UNLISTED E&M SERVICE: CPT | Mod: S$GLB,,, | Performed by: NURSE PRACTITIONER

## 2021-05-04 PROCEDURE — 1125F PR PAIN SEVERITY QUANTIFIED, PAIN PRESENT: ICD-10-PCS | Mod: S$GLB,,, | Performed by: NURSE PRACTITIONER

## 2021-05-04 PROCEDURE — 99213 PR OFFICE/OUTPT VISIT, EST, LEVL III, 20-29 MIN: ICD-10-PCS | Mod: S$GLB,,, | Performed by: NURSE PRACTITIONER

## 2021-05-04 PROCEDURE — 99499 RISK ADDL DX/OHS AUDIT: ICD-10-PCS | Mod: S$GLB,,, | Performed by: NURSE PRACTITIONER

## 2021-05-04 PROCEDURE — 99999 PR PBB SHADOW E&M-EST. PATIENT-LVL IV: ICD-10-PCS | Mod: PBBFAC,,, | Performed by: NURSE PRACTITIONER

## 2021-05-04 PROCEDURE — 99213 OFFICE O/P EST LOW 20 MIN: CPT | Mod: S$GLB,,, | Performed by: NURSE PRACTITIONER

## 2021-05-04 PROCEDURE — 1159F PR MEDICATION LIST DOCUMENTED IN MEDICAL RECORD: ICD-10-PCS | Mod: S$GLB,,, | Performed by: NURSE PRACTITIONER

## 2021-05-04 PROCEDURE — 1159F MED LIST DOCD IN RCRD: CPT | Mod: S$GLB,,, | Performed by: NURSE PRACTITIONER

## 2021-05-04 PROCEDURE — 3288F PR FALLS RISK ASSESSMENT DOCUMENTED: ICD-10-PCS | Mod: CPTII,S$GLB,, | Performed by: NURSE PRACTITIONER

## 2021-05-18 ENCOUNTER — PATIENT MESSAGE (OUTPATIENT)
Dept: PAIN MEDICINE | Facility: OTHER | Age: 76
End: 2021-05-18

## 2021-05-19 ENCOUNTER — HOSPITAL ENCOUNTER (OUTPATIENT)
Facility: OTHER | Age: 76
Discharge: HOME | End: 2021-05-19
Attending: ANESTHESIOLOGY | Admitting: ANESTHESIOLOGY
Payer: MEDICARE

## 2021-05-19 VITALS
RESPIRATION RATE: 16 BRPM | SYSTOLIC BLOOD PRESSURE: 132 MMHG | TEMPERATURE: 98 F | OXYGEN SATURATION: 98 % | DIASTOLIC BLOOD PRESSURE: 64 MMHG | WEIGHT: 145 LBS | BODY MASS INDEX: 28.47 KG/M2 | HEART RATE: 65 BPM | HEIGHT: 60 IN

## 2021-05-19 DIAGNOSIS — M54.16 LUMBAR RADICULOPATHY: Primary | ICD-10-CM

## 2021-05-19 DIAGNOSIS — M51.36 DDD (DEGENERATIVE DISC DISEASE), LUMBAR: ICD-10-CM

## 2021-05-19 PROCEDURE — 25000003 PHARM REV CODE 250: Performed by: STUDENT IN AN ORGANIZED HEALTH CARE EDUCATION/TRAINING PROGRAM

## 2021-05-19 PROCEDURE — 64483 NJX AA&/STRD TFRM EPI L/S 1: CPT | Mod: 50 | Performed by: ANESTHESIOLOGY

## 2021-05-19 PROCEDURE — 64483 NJX AA&/STRD TFRM EPI L/S 1: CPT | Mod: 50,,, | Performed by: ANESTHESIOLOGY

## 2021-05-19 PROCEDURE — 25500020 PHARM REV CODE 255: Performed by: ANESTHESIOLOGY

## 2021-05-19 PROCEDURE — 64483 PR EPIDURAL INJ, ANES/STEROID, TRANSFORAMINAL, LUMB/SACR, SNGL LEVL: ICD-10-PCS | Mod: 50,,, | Performed by: ANESTHESIOLOGY

## 2021-05-19 PROCEDURE — 63600175 PHARM REV CODE 636 W HCPCS: Performed by: ANESTHESIOLOGY

## 2021-05-19 PROCEDURE — 25000003 PHARM REV CODE 250: Performed by: ANESTHESIOLOGY

## 2021-05-19 RX ORDER — FENTANYL CITRATE 50 UG/ML
INJECTION, SOLUTION INTRAMUSCULAR; INTRAVENOUS
Status: DISCONTINUED | OUTPATIENT
Start: 2021-05-19 | End: 2021-05-19 | Stop reason: HOSPADM

## 2021-05-19 RX ORDER — LIDOCAINE HYDROCHLORIDE 5 MG/ML
INJECTION, SOLUTION INFILTRATION; INTRAVENOUS
Status: DISCONTINUED | OUTPATIENT
Start: 2021-05-19 | End: 2021-05-19 | Stop reason: HOSPADM

## 2021-05-19 RX ORDER — SODIUM CHLORIDE 9 MG/ML
INJECTION, SOLUTION INTRAVENOUS CONTINUOUS
Status: DISCONTINUED | OUTPATIENT
Start: 2021-05-19 | End: 2021-05-19 | Stop reason: HOSPADM

## 2021-05-19 RX ORDER — MIDAZOLAM HYDROCHLORIDE 1 MG/ML
INJECTION INTRAMUSCULAR; INTRAVENOUS
Status: DISCONTINUED | OUTPATIENT
Start: 2021-05-19 | End: 2021-05-19 | Stop reason: HOSPADM

## 2021-05-19 RX ORDER — DEXAMETHASONE SODIUM PHOSPHATE 4 MG/ML
INJECTION, SOLUTION INTRA-ARTICULAR; INTRALESIONAL; INTRAMUSCULAR; INTRAVENOUS; SOFT TISSUE
Status: DISCONTINUED | OUTPATIENT
Start: 2021-05-19 | End: 2021-05-19 | Stop reason: HOSPADM

## 2021-05-19 RX ORDER — LIDOCAINE HYDROCHLORIDE 10 MG/ML
INJECTION INFILTRATION; PERINEURAL
Status: DISCONTINUED | OUTPATIENT
Start: 2021-05-19 | End: 2021-05-19 | Stop reason: HOSPADM

## 2021-05-19 RX ADMIN — SODIUM CHLORIDE: 0.9 INJECTION, SOLUTION INTRAVENOUS at 09:05

## 2021-06-09 DIAGNOSIS — E55.9 VITAMIN D DEFICIENCY: ICD-10-CM

## 2021-06-10 RX ORDER — ERGOCALCIFEROL 1.25 MG/1
CAPSULE ORAL
Qty: 12 CAPSULE | Refills: 1 | Status: SHIPPED | OUTPATIENT
Start: 2021-06-10 | End: 2022-05-09

## 2021-06-17 ENCOUNTER — TELEPHONE (OUTPATIENT)
Dept: PAIN MEDICINE | Facility: CLINIC | Age: 76
End: 2021-06-17

## 2021-06-18 ENCOUNTER — OFFICE VISIT (OUTPATIENT)
Dept: PAIN MEDICINE | Facility: CLINIC | Age: 76
End: 2021-06-18
Payer: MEDICARE

## 2021-06-18 VITALS
DIASTOLIC BLOOD PRESSURE: 71 MMHG | BODY MASS INDEX: 28.17 KG/M2 | SYSTOLIC BLOOD PRESSURE: 140 MMHG | WEIGHT: 143.5 LBS | HEART RATE: 62 BPM | RESPIRATION RATE: 19 BRPM | TEMPERATURE: 98 F | HEIGHT: 60 IN

## 2021-06-18 DIAGNOSIS — M47.819 SPONDYLOSIS WITHOUT MYELOPATHY: Primary | ICD-10-CM

## 2021-06-18 DIAGNOSIS — G89.29 OTHER CHRONIC PAIN: ICD-10-CM

## 2021-06-18 DIAGNOSIS — M54.16 LUMBAR RADICULOPATHY: ICD-10-CM

## 2021-06-18 DIAGNOSIS — M51.36 DDD (DEGENERATIVE DISC DISEASE), LUMBAR: ICD-10-CM

## 2021-06-18 PROCEDURE — 99999 PR PBB SHADOW E&M-EST. PATIENT-LVL IV: CPT | Mod: PBBFAC,,, | Performed by: NURSE PRACTITIONER

## 2021-06-18 PROCEDURE — 1125F PR PAIN SEVERITY QUANTIFIED, PAIN PRESENT: ICD-10-PCS | Mod: S$GLB,,, | Performed by: NURSE PRACTITIONER

## 2021-06-18 PROCEDURE — 1125F AMNT PAIN NOTED PAIN PRSNT: CPT | Mod: S$GLB,,, | Performed by: NURSE PRACTITIONER

## 2021-06-18 PROCEDURE — 1159F PR MEDICATION LIST DOCUMENTED IN MEDICAL RECORD: ICD-10-PCS | Mod: S$GLB,,, | Performed by: NURSE PRACTITIONER

## 2021-06-18 PROCEDURE — 1101F PR PT FALLS ASSESS DOC 0-1 FALLS W/OUT INJ PAST YR: ICD-10-PCS | Mod: CPTII,S$GLB,, | Performed by: NURSE PRACTITIONER

## 2021-06-18 PROCEDURE — 1159F MED LIST DOCD IN RCRD: CPT | Mod: S$GLB,,, | Performed by: NURSE PRACTITIONER

## 2021-06-18 PROCEDURE — 1101F PT FALLS ASSESS-DOCD LE1/YR: CPT | Mod: CPTII,S$GLB,, | Performed by: NURSE PRACTITIONER

## 2021-06-18 PROCEDURE — 99999 PR PBB SHADOW E&M-EST. PATIENT-LVL IV: ICD-10-PCS | Mod: PBBFAC,,, | Performed by: NURSE PRACTITIONER

## 2021-06-18 PROCEDURE — 3288F FALL RISK ASSESSMENT DOCD: CPT | Mod: CPTII,S$GLB,, | Performed by: NURSE PRACTITIONER

## 2021-06-18 PROCEDURE — 3288F PR FALLS RISK ASSESSMENT DOCUMENTED: ICD-10-PCS | Mod: CPTII,S$GLB,, | Performed by: NURSE PRACTITIONER

## 2021-06-18 PROCEDURE — 99213 OFFICE O/P EST LOW 20 MIN: CPT | Mod: S$GLB,,, | Performed by: NURSE PRACTITIONER

## 2021-06-18 PROCEDURE — 99213 PR OFFICE/OUTPT VISIT, EST, LEVL III, 20-29 MIN: ICD-10-PCS | Mod: S$GLB,,, | Performed by: NURSE PRACTITIONER

## 2021-06-22 ENCOUNTER — CLINICAL SUPPORT (OUTPATIENT)
Dept: REHABILITATION | Facility: HOSPITAL | Age: 76
End: 2021-06-22
Payer: MEDICARE

## 2021-06-22 DIAGNOSIS — M54.17 LUMBOSACRAL RADICULOPATHY: ICD-10-CM

## 2021-06-22 DIAGNOSIS — M48.062 SPINAL STENOSIS, LUMBAR REGION WITH NEUROGENIC CLAUDICATION: ICD-10-CM

## 2021-06-22 PROCEDURE — 97110 THERAPEUTIC EXERCISES: CPT

## 2021-06-22 PROCEDURE — 97162 PT EVAL MOD COMPLEX 30 MIN: CPT

## 2021-07-07 ENCOUNTER — CLINICAL SUPPORT (OUTPATIENT)
Dept: REHABILITATION | Facility: HOSPITAL | Age: 76
End: 2021-07-07
Payer: MEDICARE

## 2021-07-07 DIAGNOSIS — M48.062 SPINAL STENOSIS, LUMBAR REGION WITH NEUROGENIC CLAUDICATION: ICD-10-CM

## 2021-07-07 DIAGNOSIS — M51.36 DDD (DEGENERATIVE DISC DISEASE), LUMBAR: Primary | Chronic | ICD-10-CM

## 2021-07-07 PROCEDURE — 97110 THERAPEUTIC EXERCISES: CPT

## 2021-07-14 ENCOUNTER — CLINICAL SUPPORT (OUTPATIENT)
Dept: REHABILITATION | Facility: HOSPITAL | Age: 76
End: 2021-07-14
Payer: MEDICARE

## 2021-07-14 DIAGNOSIS — M51.36 DDD (DEGENERATIVE DISC DISEASE), LUMBAR: Primary | Chronic | ICD-10-CM

## 2021-07-14 PROCEDURE — 97110 THERAPEUTIC EXERCISES: CPT | Mod: CQ

## 2021-07-16 ENCOUNTER — CLINICAL SUPPORT (OUTPATIENT)
Dept: REHABILITATION | Facility: HOSPITAL | Age: 76
End: 2021-07-16
Payer: MEDICARE

## 2021-07-16 DIAGNOSIS — M79.604 BILATERAL LEG PAIN: ICD-10-CM

## 2021-07-16 DIAGNOSIS — M79.605 BILATERAL LEG PAIN: ICD-10-CM

## 2021-07-16 DIAGNOSIS — M51.36 DDD (DEGENERATIVE DISC DISEASE), LUMBAR: Primary | Chronic | ICD-10-CM

## 2021-07-16 DIAGNOSIS — M62.81 MUSCLE WEAKNESS: ICD-10-CM

## 2021-07-16 PROCEDURE — 97110 THERAPEUTIC EXERCISES: CPT

## 2021-07-22 DIAGNOSIS — I10 ESSENTIAL HYPERTENSION: Chronic | ICD-10-CM

## 2021-07-22 RX ORDER — METOPROLOL SUCCINATE 100 MG/1
TABLET, EXTENDED RELEASE ORAL
Qty: 90 TABLET | Refills: 3 | Status: SHIPPED | OUTPATIENT
Start: 2021-07-22 | End: 2022-08-01

## 2021-07-28 ENCOUNTER — CLINICAL SUPPORT (OUTPATIENT)
Dept: REHABILITATION | Facility: HOSPITAL | Age: 76
End: 2021-07-28
Payer: MEDICARE

## 2021-07-28 DIAGNOSIS — M51.36 DDD (DEGENERATIVE DISC DISEASE), LUMBAR: Primary | Chronic | ICD-10-CM

## 2021-07-28 PROCEDURE — 97110 THERAPEUTIC EXERCISES: CPT | Mod: CQ

## 2021-07-30 ENCOUNTER — CLINICAL SUPPORT (OUTPATIENT)
Dept: REHABILITATION | Facility: HOSPITAL | Age: 76
End: 2021-07-30
Payer: MEDICARE

## 2021-07-30 DIAGNOSIS — M79.605 BILATERAL LEG PAIN: Primary | ICD-10-CM

## 2021-07-30 DIAGNOSIS — M79.604 BILATERAL LEG PAIN: Primary | ICD-10-CM

## 2021-07-30 DIAGNOSIS — M62.81 MUSCLE WEAKNESS: ICD-10-CM

## 2021-07-30 PROCEDURE — 97110 THERAPEUTIC EXERCISES: CPT

## 2021-08-02 ENCOUNTER — DOCUMENTATION ONLY (OUTPATIENT)
Dept: REHABILITATION | Facility: HOSPITAL | Age: 76
End: 2021-08-02

## 2021-08-04 ENCOUNTER — CLINICAL SUPPORT (OUTPATIENT)
Dept: REHABILITATION | Facility: HOSPITAL | Age: 76
End: 2021-08-04
Payer: MEDICARE

## 2021-08-04 DIAGNOSIS — M51.36 DDD (DEGENERATIVE DISC DISEASE), LUMBAR: Primary | Chronic | ICD-10-CM

## 2021-08-04 PROCEDURE — 97110 THERAPEUTIC EXERCISES: CPT

## 2021-08-11 ENCOUNTER — CLINICAL SUPPORT (OUTPATIENT)
Dept: REHABILITATION | Facility: HOSPITAL | Age: 76
End: 2021-08-11
Payer: MEDICARE

## 2021-08-11 PROCEDURE — 97110 THERAPEUTIC EXERCISES: CPT

## 2021-08-13 ENCOUNTER — CLINICAL SUPPORT (OUTPATIENT)
Dept: REHABILITATION | Facility: HOSPITAL | Age: 76
End: 2021-08-13
Payer: MEDICARE

## 2021-08-13 DIAGNOSIS — G89.29 OTHER CHRONIC PAIN: Primary | ICD-10-CM

## 2021-08-13 PROCEDURE — 97140 MANUAL THERAPY 1/> REGIONS: CPT | Mod: CQ

## 2021-08-13 PROCEDURE — 97110 THERAPEUTIC EXERCISES: CPT | Mod: CQ

## 2021-08-19 ENCOUNTER — OFFICE VISIT (OUTPATIENT)
Dept: PAIN MEDICINE | Facility: CLINIC | Age: 76
End: 2021-08-19
Payer: MEDICARE

## 2021-08-19 VITALS
OXYGEN SATURATION: 100 % | RESPIRATION RATE: 18 BRPM | WEIGHT: 147.69 LBS | DIASTOLIC BLOOD PRESSURE: 66 MMHG | HEIGHT: 60 IN | TEMPERATURE: 98 F | HEART RATE: 60 BPM | SYSTOLIC BLOOD PRESSURE: 135 MMHG | BODY MASS INDEX: 28.99 KG/M2

## 2021-08-19 DIAGNOSIS — M79.604 BILATERAL LEG PAIN: ICD-10-CM

## 2021-08-19 DIAGNOSIS — M51.36 DDD (DEGENERATIVE DISC DISEASE), LUMBAR: ICD-10-CM

## 2021-08-19 DIAGNOSIS — M54.15 RADICULOPATHY OF THORACOLUMBAR REGION: ICD-10-CM

## 2021-08-19 DIAGNOSIS — M79.605 BILATERAL LEG PAIN: ICD-10-CM

## 2021-08-19 DIAGNOSIS — M48.062 SPINAL STENOSIS, LUMBAR REGION WITH NEUROGENIC CLAUDICATION: ICD-10-CM

## 2021-08-19 PROCEDURE — 99999 PR PBB SHADOW E&M-EST. PATIENT-LVL V: ICD-10-PCS | Mod: PBBFAC,,, | Performed by: NURSE PRACTITIONER

## 2021-08-19 PROCEDURE — 1159F MED LIST DOCD IN RCRD: CPT | Mod: CPTII,S$GLB,, | Performed by: NURSE PRACTITIONER

## 2021-08-19 PROCEDURE — 1101F PR PT FALLS ASSESS DOC 0-1 FALLS W/OUT INJ PAST YR: ICD-10-PCS | Mod: CPTII,S$GLB,, | Performed by: NURSE PRACTITIONER

## 2021-08-19 PROCEDURE — 99499 UNLISTED E&M SERVICE: CPT | Mod: S$GLB,,, | Performed by: NURSE PRACTITIONER

## 2021-08-19 PROCEDURE — 99499 RISK ADDL DX/OHS AUDIT: ICD-10-PCS | Mod: S$GLB,,, | Performed by: NURSE PRACTITIONER

## 2021-08-19 PROCEDURE — 99999 PR PBB SHADOW E&M-EST. PATIENT-LVL V: CPT | Mod: PBBFAC,,, | Performed by: NURSE PRACTITIONER

## 2021-08-19 PROCEDURE — 3075F SYST BP GE 130 - 139MM HG: CPT | Mod: CPTII,S$GLB,, | Performed by: NURSE PRACTITIONER

## 2021-08-19 PROCEDURE — 1159F PR MEDICATION LIST DOCUMENTED IN MEDICAL RECORD: ICD-10-PCS | Mod: CPTII,S$GLB,, | Performed by: NURSE PRACTITIONER

## 2021-08-19 PROCEDURE — 99213 OFFICE O/P EST LOW 20 MIN: CPT | Mod: S$GLB,,, | Performed by: NURSE PRACTITIONER

## 2021-08-19 PROCEDURE — 3078F DIAST BP <80 MM HG: CPT | Mod: CPTII,S$GLB,, | Performed by: NURSE PRACTITIONER

## 2021-08-19 PROCEDURE — 3075F PR MOST RECENT SYSTOLIC BLOOD PRESS GE 130-139MM HG: ICD-10-PCS | Mod: CPTII,S$GLB,, | Performed by: NURSE PRACTITIONER

## 2021-08-19 PROCEDURE — 1125F PR PAIN SEVERITY QUANTIFIED, PAIN PRESENT: ICD-10-PCS | Mod: CPTII,S$GLB,, | Performed by: NURSE PRACTITIONER

## 2021-08-19 PROCEDURE — 99213 PR OFFICE/OUTPT VISIT, EST, LEVL III, 20-29 MIN: ICD-10-PCS | Mod: S$GLB,,, | Performed by: NURSE PRACTITIONER

## 2021-08-19 PROCEDURE — 1101F PT FALLS ASSESS-DOCD LE1/YR: CPT | Mod: CPTII,S$GLB,, | Performed by: NURSE PRACTITIONER

## 2021-08-19 PROCEDURE — 3288F PR FALLS RISK ASSESSMENT DOCUMENTED: ICD-10-PCS | Mod: CPTII,S$GLB,, | Performed by: NURSE PRACTITIONER

## 2021-08-19 PROCEDURE — 3288F FALL RISK ASSESSMENT DOCD: CPT | Mod: CPTII,S$GLB,, | Performed by: NURSE PRACTITIONER

## 2021-08-19 PROCEDURE — 3078F PR MOST RECENT DIASTOLIC BLOOD PRESSURE < 80 MM HG: ICD-10-PCS | Mod: CPTII,S$GLB,, | Performed by: NURSE PRACTITIONER

## 2021-08-19 PROCEDURE — 1160F PR REVIEW ALL MEDS BY PRESCRIBER/CLIN PHARMACIST DOCUMENTED: ICD-10-PCS | Mod: CPTII,S$GLB,, | Performed by: NURSE PRACTITIONER

## 2021-08-19 PROCEDURE — 1125F AMNT PAIN NOTED PAIN PRSNT: CPT | Mod: CPTII,S$GLB,, | Performed by: NURSE PRACTITIONER

## 2021-08-19 PROCEDURE — 1160F RVW MEDS BY RX/DR IN RCRD: CPT | Mod: CPTII,S$GLB,, | Performed by: NURSE PRACTITIONER

## 2021-09-16 ENCOUNTER — DOCUMENTATION ONLY (OUTPATIENT)
Dept: REHABILITATION | Facility: HOSPITAL | Age: 76
End: 2021-09-16

## 2021-11-10 ENCOUNTER — TELEPHONE (OUTPATIENT)
Dept: PAIN MEDICINE | Facility: CLINIC | Age: 76
End: 2021-11-10
Payer: MEDICARE

## 2021-11-10 ENCOUNTER — PATIENT OUTREACH (OUTPATIENT)
Dept: ADMINISTRATIVE | Facility: OTHER | Age: 76
End: 2021-11-10
Payer: MEDICARE

## 2021-11-10 DIAGNOSIS — Z12.31 ENCOUNTER FOR SCREENING MAMMOGRAM FOR MALIGNANT NEOPLASM OF BREAST: Primary | ICD-10-CM

## 2021-11-11 ENCOUNTER — OFFICE VISIT (OUTPATIENT)
Dept: PAIN MEDICINE | Facility: CLINIC | Age: 76
End: 2021-11-11
Payer: MEDICARE

## 2021-11-11 VITALS
SYSTOLIC BLOOD PRESSURE: 141 MMHG | BODY MASS INDEX: 28.26 KG/M2 | DIASTOLIC BLOOD PRESSURE: 67 MMHG | HEIGHT: 60 IN | HEART RATE: 59 BPM | WEIGHT: 143.94 LBS | TEMPERATURE: 98 F

## 2021-11-11 DIAGNOSIS — M54.15 RADICULOPATHY OF THORACOLUMBAR REGION: ICD-10-CM

## 2021-11-11 DIAGNOSIS — M79.605 BILATERAL LEG PAIN: ICD-10-CM

## 2021-11-11 DIAGNOSIS — M48.062 SPINAL STENOSIS, LUMBAR REGION WITH NEUROGENIC CLAUDICATION: Primary | ICD-10-CM

## 2021-11-11 DIAGNOSIS — M79.604 BILATERAL LEG PAIN: ICD-10-CM

## 2021-11-11 DIAGNOSIS — M51.36 DDD (DEGENERATIVE DISC DISEASE), LUMBAR: ICD-10-CM

## 2021-11-11 PROCEDURE — 3077F PR MOST RECENT SYSTOLIC BLOOD PRESSURE >= 140 MM HG: ICD-10-PCS | Mod: CPTII,S$GLB,, | Performed by: NURSE PRACTITIONER

## 2021-11-11 PROCEDURE — 3288F PR FALLS RISK ASSESSMENT DOCUMENTED: ICD-10-PCS | Mod: CPTII,S$GLB,, | Performed by: NURSE PRACTITIONER

## 2021-11-11 PROCEDURE — 99999 PR PBB SHADOW E&M-EST. PATIENT-LVL III: ICD-10-PCS | Mod: PBBFAC,,, | Performed by: NURSE PRACTITIONER

## 2021-11-11 PROCEDURE — 1159F MED LIST DOCD IN RCRD: CPT | Mod: CPTII,S$GLB,, | Performed by: NURSE PRACTITIONER

## 2021-11-11 PROCEDURE — 1125F PR PAIN SEVERITY QUANTIFIED, PAIN PRESENT: ICD-10-PCS | Mod: CPTII,S$GLB,, | Performed by: NURSE PRACTITIONER

## 2021-11-11 PROCEDURE — 3078F DIAST BP <80 MM HG: CPT | Mod: CPTII,S$GLB,, | Performed by: NURSE PRACTITIONER

## 2021-11-11 PROCEDURE — 1101F PR PT FALLS ASSESS DOC 0-1 FALLS W/OUT INJ PAST YR: ICD-10-PCS | Mod: CPTII,S$GLB,, | Performed by: NURSE PRACTITIONER

## 2021-11-11 PROCEDURE — 1101F PT FALLS ASSESS-DOCD LE1/YR: CPT | Mod: CPTII,S$GLB,, | Performed by: NURSE PRACTITIONER

## 2021-11-11 PROCEDURE — 1125F AMNT PAIN NOTED PAIN PRSNT: CPT | Mod: CPTII,S$GLB,, | Performed by: NURSE PRACTITIONER

## 2021-11-11 PROCEDURE — 1160F RVW MEDS BY RX/DR IN RCRD: CPT | Mod: CPTII,S$GLB,, | Performed by: NURSE PRACTITIONER

## 2021-11-11 PROCEDURE — 1160F PR REVIEW ALL MEDS BY PRESCRIBER/CLIN PHARMACIST DOCUMENTED: ICD-10-PCS | Mod: CPTII,S$GLB,, | Performed by: NURSE PRACTITIONER

## 2021-11-11 PROCEDURE — 99213 PR OFFICE/OUTPT VISIT, EST, LEVL III, 20-29 MIN: ICD-10-PCS | Mod: S$GLB,,, | Performed by: NURSE PRACTITIONER

## 2021-11-11 PROCEDURE — 99999 PR PBB SHADOW E&M-EST. PATIENT-LVL III: CPT | Mod: PBBFAC,,, | Performed by: NURSE PRACTITIONER

## 2021-11-11 PROCEDURE — 3077F SYST BP >= 140 MM HG: CPT | Mod: CPTII,S$GLB,, | Performed by: NURSE PRACTITIONER

## 2021-11-11 PROCEDURE — 3288F FALL RISK ASSESSMENT DOCD: CPT | Mod: CPTII,S$GLB,, | Performed by: NURSE PRACTITIONER

## 2021-11-11 PROCEDURE — 3078F PR MOST RECENT DIASTOLIC BLOOD PRESSURE < 80 MM HG: ICD-10-PCS | Mod: CPTII,S$GLB,, | Performed by: NURSE PRACTITIONER

## 2021-11-11 PROCEDURE — 1159F PR MEDICATION LIST DOCUMENTED IN MEDICAL RECORD: ICD-10-PCS | Mod: CPTII,S$GLB,, | Performed by: NURSE PRACTITIONER

## 2021-11-11 PROCEDURE — 99213 OFFICE O/P EST LOW 20 MIN: CPT | Mod: S$GLB,,, | Performed by: NURSE PRACTITIONER

## 2021-11-11 RX ORDER — GABAPENTIN 300 MG/1
600 CAPSULE ORAL 2 TIMES DAILY
Qty: 360 CAPSULE | Refills: 1 | Status: SHIPPED | OUTPATIENT
Start: 2021-11-11 | End: 2022-05-09 | Stop reason: SDUPTHER

## 2021-11-11 RX ORDER — MELOXICAM 7.5 MG/1
7.5 TABLET ORAL 2 TIMES DAILY PRN
Qty: 60 TABLET | Refills: 2 | Status: SHIPPED | OUTPATIENT
Start: 2021-11-11 | End: 2022-01-11 | Stop reason: SDUPTHER

## 2021-11-29 ENCOUNTER — PES CALL (OUTPATIENT)
Dept: ADMINISTRATIVE | Facility: CLINIC | Age: 76
End: 2021-11-29
Payer: MEDICARE

## 2021-12-15 DIAGNOSIS — J30.9 ALLERGIC RHINITIS, UNSPECIFIED SEASONALITY, UNSPECIFIED TRIGGER: ICD-10-CM

## 2021-12-17 RX ORDER — FLUTICASONE PROPIONATE 50 MCG
2 SPRAY, SUSPENSION (ML) NASAL DAILY
Qty: 48 ML | Refills: 3 | Status: SHIPPED | OUTPATIENT
Start: 2021-12-17

## 2022-01-05 ENCOUNTER — PES CALL (OUTPATIENT)
Dept: ADMINISTRATIVE | Facility: CLINIC | Age: 77
End: 2022-01-05
Payer: MEDICARE

## 2022-01-06 DIAGNOSIS — K21.9 GASTROESOPHAGEAL REFLUX DISEASE WITHOUT ESOPHAGITIS: Chronic | ICD-10-CM

## 2022-01-06 RX ORDER — PANTOPRAZOLE SODIUM 40 MG/1
TABLET, DELAYED RELEASE ORAL
Qty: 90 TABLET | Refills: 1 | Status: SHIPPED | OUTPATIENT
Start: 2022-01-06 | End: 2022-08-01

## 2022-01-06 NOTE — TELEPHONE ENCOUNTER
No new care gaps identified.  Powered by [employer]. Reference number: [id number].   1/06/2022 12:15:17 AM CST

## 2022-01-10 ENCOUNTER — PATIENT OUTREACH (OUTPATIENT)
Dept: ADMINISTRATIVE | Facility: OTHER | Age: 77
End: 2022-01-10
Payer: MEDICARE

## 2022-01-10 ENCOUNTER — TELEPHONE (OUTPATIENT)
Dept: PAIN MEDICINE | Facility: CLINIC | Age: 77
End: 2022-01-10
Payer: MEDICARE

## 2022-01-10 NOTE — TELEPHONE ENCOUNTER
This message is for patient in regards to his/her appointment 01/10/22 at 08:40a       [facility] Policy: For the safety of all patients and staff members.     Patient Visitor policy: Due to social distancing and limited seating staff are requesting patient to arrive to their schedule appointments on time. During this visit we're asking all patients to only have one visitor over the age of 18yrs old to accompany to be seen by Christin Ventura NP. If patient do not required assistance with their visit, we're asking all visitors to remain outside the waiting area.    Upon arriving to your schedule appointment; patients are required to wear a face mask, if patient do not have a face mask one will be provided entering the facility. We ask patients to contact clinical staff at this number [phone] to notify staff that they have arrived or they may do so by utilizing the Mobile checked in [facility](if patient have patient portal; clinical staff will send a message through there letting them know it's okay to proceed to their visit). If you have any questions or concerns please contact [phone]    Patient verbalized understanding

## 2022-01-11 ENCOUNTER — TELEPHONE (OUTPATIENT)
Dept: PAIN MEDICINE | Facility: OTHER | Age: 77
End: 2022-01-11
Payer: MEDICARE

## 2022-01-11 ENCOUNTER — OFFICE VISIT (OUTPATIENT)
Dept: PAIN MEDICINE | Facility: CLINIC | Age: 77
End: 2022-01-11
Payer: MEDICARE

## 2022-01-11 VITALS
HEIGHT: 60 IN | WEIGHT: 145.31 LBS | SYSTOLIC BLOOD PRESSURE: 136 MMHG | OXYGEN SATURATION: 99 % | BODY MASS INDEX: 28.53 KG/M2 | DIASTOLIC BLOOD PRESSURE: 73 MMHG | HEART RATE: 64 BPM

## 2022-01-11 DIAGNOSIS — M54.17 LUMBOSACRAL RADICULOPATHY: Primary | ICD-10-CM

## 2022-01-11 DIAGNOSIS — M51.36 DDD (DEGENERATIVE DISC DISEASE), LUMBAR: ICD-10-CM

## 2022-01-11 DIAGNOSIS — M47.819 SPONDYLOSIS WITHOUT MYELOPATHY: ICD-10-CM

## 2022-01-11 PROCEDURE — 1125F AMNT PAIN NOTED PAIN PRSNT: CPT | Mod: CPTII,S$GLB,, | Performed by: NURSE PRACTITIONER

## 2022-01-11 PROCEDURE — 99213 OFFICE O/P EST LOW 20 MIN: CPT | Mod: S$GLB,,, | Performed by: NURSE PRACTITIONER

## 2022-01-11 PROCEDURE — 1125F PR PAIN SEVERITY QUANTIFIED, PAIN PRESENT: ICD-10-PCS | Mod: CPTII,S$GLB,, | Performed by: NURSE PRACTITIONER

## 2022-01-11 PROCEDURE — 3288F FALL RISK ASSESSMENT DOCD: CPT | Mod: CPTII,S$GLB,, | Performed by: NURSE PRACTITIONER

## 2022-01-11 PROCEDURE — 99999 PR PBB SHADOW E&M-EST. PATIENT-LVL IV: CPT | Mod: PBBFAC,,, | Performed by: NURSE PRACTITIONER

## 2022-01-11 PROCEDURE — 1159F PR MEDICATION LIST DOCUMENTED IN MEDICAL RECORD: ICD-10-PCS | Mod: CPTII,S$GLB,, | Performed by: NURSE PRACTITIONER

## 2022-01-11 PROCEDURE — 3075F SYST BP GE 130 - 139MM HG: CPT | Mod: CPTII,S$GLB,, | Performed by: NURSE PRACTITIONER

## 2022-01-11 PROCEDURE — 99999 PR PBB SHADOW E&M-EST. PATIENT-LVL IV: ICD-10-PCS | Mod: PBBFAC,,, | Performed by: NURSE PRACTITIONER

## 2022-01-11 PROCEDURE — 1160F RVW MEDS BY RX/DR IN RCRD: CPT | Mod: CPTII,S$GLB,, | Performed by: NURSE PRACTITIONER

## 2022-01-11 PROCEDURE — 3288F PR FALLS RISK ASSESSMENT DOCUMENTED: ICD-10-PCS | Mod: CPTII,S$GLB,, | Performed by: NURSE PRACTITIONER

## 2022-01-11 PROCEDURE — 3078F PR MOST RECENT DIASTOLIC BLOOD PRESSURE < 80 MM HG: ICD-10-PCS | Mod: CPTII,S$GLB,, | Performed by: NURSE PRACTITIONER

## 2022-01-11 PROCEDURE — 1101F PT FALLS ASSESS-DOCD LE1/YR: CPT | Mod: CPTII,S$GLB,, | Performed by: NURSE PRACTITIONER

## 2022-01-11 PROCEDURE — 3078F DIAST BP <80 MM HG: CPT | Mod: CPTII,S$GLB,, | Performed by: NURSE PRACTITIONER

## 2022-01-11 PROCEDURE — 3075F PR MOST RECENT SYSTOLIC BLOOD PRESS GE 130-139MM HG: ICD-10-PCS | Mod: CPTII,S$GLB,, | Performed by: NURSE PRACTITIONER

## 2022-01-11 PROCEDURE — 1160F PR REVIEW ALL MEDS BY PRESCRIBER/CLIN PHARMACIST DOCUMENTED: ICD-10-PCS | Mod: CPTII,S$GLB,, | Performed by: NURSE PRACTITIONER

## 2022-01-11 PROCEDURE — 1159F MED LIST DOCD IN RCRD: CPT | Mod: CPTII,S$GLB,, | Performed by: NURSE PRACTITIONER

## 2022-01-11 PROCEDURE — 1101F PR PT FALLS ASSESS DOC 0-1 FALLS W/OUT INJ PAST YR: ICD-10-PCS | Mod: CPTII,S$GLB,, | Performed by: NURSE PRACTITIONER

## 2022-01-11 PROCEDURE — 99213 PR OFFICE/OUTPT VISIT, EST, LEVL III, 20-29 MIN: ICD-10-PCS | Mod: S$GLB,,, | Performed by: NURSE PRACTITIONER

## 2022-01-11 RX ORDER — MELOXICAM 7.5 MG/1
7.5 TABLET ORAL 2 TIMES DAILY PRN
Qty: 60 TABLET | Refills: 2 | Status: SHIPPED | OUTPATIENT
Start: 2022-01-11 | End: 2022-04-07

## 2022-01-11 NOTE — PROGRESS NOTES
Chronic patient Established Note (Follow up visit)      SUBJECTIVE:    Interval History 1/11/2022:  The patient returns to discuss continued lower back pain. She has not restarted aquatic PT due to increase in Covid-19 cases. She continues with sharp lower back pain that radiates into anterior thighs, bilaterally. She has associated numbness. The pain is worse with increased activity. Her pain today is 7/10.    Interval History 11/11/2021:  The patient presents today for 2 month follow up of lower back pain. Since previous encounter, she has not started aquatic PT because she is on the waiting list. She continues to report lower back pain with radiation down the left leg. We previously discussed lumbar MBB/RFA which she does not wish to pursue at this time. She states that diclofenac is not helping very much with her aching pain at this time. She denies any new weakness or symptoms at this time. Her pain today is 8/10.    Interval History 8/19/2021:  Diana returns today for follow up of chronic lower back pain. She reports that he has continued with aching pain. She has been in PT but is not finding it very helpful. She has not tried aquatic PT in the past. She is still having intermittent radiation down her legs. She has been unable to take 1200 mg daily of Gabapentin and has decreased to 600 mg daily due to sedation and dizziness with the medication. Otherwise, no new complaints today. Her pain today is 7/10.    Interval History 6/18/2021:  The patient is here for follow up of lower back pain.  She has radiation of her pain into her anterior thighs but not below the knees.  She denies numbness or tingling at this time.  Her back pain is currently greater than her leg pain.  She has a lot of stiffness when she wakes the morning states it improves when she moves.  She had limited benefit with recent repeat bilateral L3-4 transforaminal steroid injection.  Her pain today is 7/10    Interval History 5/4/2021:  The  patient is here for follow up of lower back and leg pain.  Previous encounter, physical therapy was ordered.  She states that she did not start physical therapy and would like me to replace the order for her.  She does report that she is noticing more muscle fatigue and weakness particularly with activity.  She is also having more back pain with radiation into the anterior lateral thighs with the past month.  She previously had significant been hip with bilateral L3/4 transforaminal epidural steroid injection and wishes to repeat this.  She found this more helpful than previous procedures. Her pain today is 7/10.    Interval History 3/2/2021:  The patient is here for follow up of chronic pain. She is having more back pain today which she attributes to the cold front coming in. She is having radiation into the buttocks and right anterior thigh. Her back pain is her primary complaint today. She describes it as throbbing. She recently completed both Covid-19 vaccines which she tolerated well. She is now taking Gabapentin 600 mg twice daily regularly. She notices improvement in right leg numbness.  Her pain today is 7/10.    Interval History 12/1/2020:  The patient iss here for follow-up of back and leg pain.  She is now status post bilateral L3/4 transforaminal epidural steroid injection on 11/11/2020. She is reporting 100% relief for about 2 weeks and then her pain started to return.  She is still having some benefit.  Her pain is located across the lower back with radiation into front and sides of the legs to the anterior feet with associated numbness.  She feels like when she walks sometimes her legs become weak.  This has improved slightly.  She denies any recent falls.  At her last OV, her Gabapentin was increased to 600 mg BID.  She says that she finds this helpful.  She has mild drowsiness.  She admits that she does not always take the medication and thought it was more of an as needed medication.  The patient  denies any bowel or bladder incontinence or signs of saddle paresthesia.  She feels as though her pain today is worse than usual.  She rates her pain today as 10/10.    Interval history 10/15/2020:  Diana Herring presents to the clinic for a follow-up appointment for back pain. Since the last visit, Diana Herring states the pain has been worsening. Current pain intensity is 7/10. She reports that she continues to have low back pain that radiates to the bilateral hip and leg. She does report that she has noticed intermittent numbness in the right lateral and anterior thigh, that is sometimes associated with weakness in her right leg. She says that her leg gives out when this happens. She has not had any falls or other trauma. She did not get the bilateral L3/4 TFESI planned last time, and she is still taking only 300 mg bid of gabapentin.  Patient denies urinary incontinence, bowel incontinence, significant weight loss.    Initial visit:  Diana Herring presents to the clinic for the evaluation of low back pain. The pain started 2 years ago following loosing bone after a bone density and symptoms have been worsening.The pain is located in the low back area and radiates to the bilateral hip and leg.  The pain is described as aching, numbing, sharp and tight band and is rated as 8/10. The pain is rated with a score of  7/10 on the BEST day and a score of 8/10 on the WORST day.  Symptoms interfere with daily activity and sleeping. The pain is exacerbated by Sitting, Standing, Bending, Coughing/Sneezing, Walking, Morning, Lifting and Getting out of bed/chair.  The pain is mitigated by heat, laying down and rest. She reports spending 0 hours per day reclining. The patient reports 6 hours of uninterrupted sleep per night.     Patient had L5/S1 ILESI 2 weeks ago and only gave her 1 day of relief.      Patient denies night fever/night sweats, urinary incontinence, bowel incontinence, significant weight loss and  significant motor weakness.     Physical Therapy/Home Exercise: yes, was not beneficial      Pain Disability Index Review:  Last 3 PDI Scores 1/11/2022 11/11/2021 8/19/2021   Pain Disability Index (PDI) 39 [phone] 17       Pain Medications:  Gabapentin 600 mg bid    Opioid Contract: no     report:  Reviewed and consistent with medication use as prescribed.    Pain Procedures:  5/19/21 bilateral L3/4 TF SANKET  11/11/20 Bilateral L3/4 TF SANKET- 100% relief for 2 weeks  5/13/20 L5/S1 ILESI  10/30/19 TFESI Bilateral L3-L4  09/04/19 TFESI Bilateral L3     Physical Therapy/Home Exercise: yes    Imaging:  Narrative & Impression     EXAMINATION:  XR LUMBAR SPINE 5 VIEW WITH FLEX AND EXT     CLINICAL HISTORY:  Low back pain, >6wks conservative tx, persistent-progressive sx, surgical candidate;  Lumbago with sciatica, left side     TECHNIQUE:  Five views of the lumbar spine plus flexion extension views were performed.     COMPARISON:  07/23/2019     FINDINGS:  The alignment of the lumbar spine is normal.     The vertebral body height are well maintained.  Mild disc space narrowing L3-L4.     Flexion and extension views demonstrate no evidence of translational abnormalities.     Minimal osteophyte formation L2-L3 L4.     No fracture or osseous lesions.     The sacroiliac joints appears symmetrical on the AP view.     The remainder of the visualized soft tissue and osseous structures appear normal.     There is atherosclerotic plaque of the abdominal aorta.     Impression:     Mild spondylosis of the lumbar spine, not significantly changed from the prior study.        Electronically signed by: Ava Brennan MD  Date:                                            05/29/2020  Time:                                           08:28        Narrative & Impression     EXAMINATION:  XR HIPS BILATERAL 2 VIEW INCL AP PELVIS     CLINICAL HISTORY:  Lumbago with sciatica, left side     TECHNIQUE:  AP view of the pelvis and frogleg lateral  views of both hips were performed.     COMPARISON:  None.     FINDINGS:  Normal mineralization.  Pelvis demonstrates no acute fractures.  No dislocations are noted.  Hip joints appear to be symmetric and within normal limits with mild DJD of the hip joints.  There is no osteoblastic or lytic lesions.  Soft tissues are unremarkable.     SI joints are symmetric and within normal limits.  There is mild spondylotic changes in the lower lumbar spine.     There is moderate amount of retained stool in the right colon.     Impression:     Mild DJD of the hip joints.     1. No acute fractures.        Electronically signed by: David Vasquez MD  Date:                                            05/29/2020  Time:                                           08:28     Narrative & Impression     EXAMINATION:  MRI LUMBAR SPINE WITHOUT CONTRAST     CLINICAL HISTORY:  back and bilateral leg pain; Lumbago with sciatica, left side     TECHNIQUE:  Multiplanar, multisequence MR images were acquired from the thoracolumbar junction to the sacrum without the administration of contrast.     COMPARISON:  None.     FINDINGS:  MRI examination of the lumbar spine was performed.  Intravenous contrast was not utilized.  There are areas of hyperintense T2 hypointense T1 lesions of the kidneys likely cysts not optimally evaluated on this exam.     There is multilevel degenerative loss of disc signal and there is loss of disc space height most notable at L3-4.  There are chronic appearing endplate changes noted with some mild STIR hyperintensity that likely relates to edema associated with degenerative endplate change.  There is no evidence for high-grade spondylolisthesis, there is no evidence for high-grade or acute compression fracture deformity.  There is no finding to specifically suggest aggressive bone marrow replacement process.     The sagittal imaging includes the majority of T10 through the S3 segment, axial imaging includes the inferior aspect  of T12 through the majority of S1.  On the sagittal imaging there is appearance suggesting mild degenerative disc disease and disc bulge at T10-11 and T11-12 with some mild posterior ligamentous thickening suggested at T10-11 however without evidence for high-grade spinal canal stenosis.     The T12-L1 level demonstrates no evidence for high-grade spinal canal or foraminal stenosis.     The L1-2 level demonstrates degenerative disc bulge with anterior impression upon the dural sac.  There is posterior facet arthropathy and ligamentous thickening there is mild spinal canal stenosis.  There is encroachment into the neural foramen at the level of the disc plane without obvious exiting nerve root impingement.     The L2-3 level demonstrates mild degenerative loss of disc space height.  There is mild degenerative disc bulge with anterior impression upon the dural sac.  There is posterior facet arthropathy with somewhat prominent ligamentous thickening with associated posterolateral encroachment.  There is mild-to-moderate spinal canal stenosis.  There is bilateral foraminal narrowing.     The L3-4 level demonstrates the aforementioned appearance of loss of disc space height and degenerative loss of disc signal.  There is diffuse degenerative disc bulge with anterior impression upon the dural sac there is posterior facet arthropathy and ligamentous thickening there is high-grade spinal canal stenosis.  There is bilateral foraminal stenosis.     The L4-5 level demonstrates degenerative disc bulge with anterior impression upon the dural sac there is posterior facet arthropathy and ligamentous thickening with mild to moderate spinal canal stenosis.  There is bilateral foraminal narrowing/stenosis without obvious exiting nerve root impingement.     The L5-S1 level demonstrates no evidence for high-grade spinal canal stenosis.  Facet arthropathy is noted there is no evidence for high-grade foraminal stenosis or exiting nerve  root impingement.     The visualized spinal cord appears to taper appropriately and appears of appropriate signal and caliber.  Mild signal intensity within the spinal canal on T1 axial imaging may relate to a minimal lipoma along the filum terminalis.  There is no additional evidence for intraspinal mass or abnormal fluid collection     Impression:     Multilevel chronic change of the lumbar spine with variable spinal canal and foraminal narrowing/stenosis as detailed above.  Correlation for any specific level of symptomatology is needed.  The greatest level of spinal canal stenosis is seen at L3-4.        Electronically signed by: Obie Aggarwal  Date:                                            07/27/2019  Time:                                           03:45     CMP  Sodium   Date Value Ref Range Status   03/01/2021 144 136 - 145 mmol/L Final     Potassium   Date Value Ref Range Status   03/01/2021 4.0 3.5 - 5.1 mmol/L Final     Chloride   Date Value Ref Range Status   03/01/2021 109 95 - 110 mmol/L Final     CO2   Date Value Ref Range Status   03/01/2021 25 23 - 29 mmol/L Final     Glucose   Date Value Ref Range Status   03/01/2021 113 (H) 70 - 110 mg/dL Final     BUN   Date Value Ref Range Status   03/01/2021 16 8 - 23 mg/dL Final     Creatinine   Date Value Ref Range Status   03/01/2021 0.8 0.5 - 1.4 mg/dL Final     Calcium   Date Value Ref Range Status   03/01/2021 8.6 (L) 8.7 - 10.5 mg/dL Final     Total Protein   Date Value Ref Range Status   03/01/2021 7.5 6.0 - 8.4 g/dL Final     Albumin   Date Value Ref Range Status   03/01/2021 3.9 3.5 - 5.2 g/dL Final     Total Bilirubin   Date Value Ref Range Status   03/01/2021 0.7 0.1 - 1.0 mg/dL Final     Comment:     For infants and newborns, interpretation of results should be based  on gestational age, weight and in agreement with clinical  observations.    Premature Infant recommended reference ranges:  Up to 24 hours.............<8.0 mg/dL  Up to 48  hours............<12.0 mg/dL  3-5 days..................<15.0 mg/dL  6-29 days.................<15.0 mg/dL       Alkaline Phosphatase   Date Value Ref Range Status   03/01/2021 69 55 - 135 U/L Final     AST   Date Value Ref Range Status   03/01/2021 18 10 - 40 U/L Final     ALT   Date Value Ref Range Status   03/01/2021 25 10 - 44 U/L Final     Anion Gap   Date Value Ref Range Status   03/01/2021 10 8 - 16 mmol/L Final     eGFR if    Date Value Ref Range Status   03/01/2021 >60 >60 mL/min/1.73 m^2 Final     eGFR if non    Date Value Ref Range Status   03/01/2021 >60 >60 mL/min/1.73 m^2 Final     Comment:     Calculation used to obtain the estimated glomerular filtration  rate (eGFR) is the CKD-EPI equation.            Allergies: Review of patient's allergies indicates:  No Known Allergies    Current Medications:   Current Outpatient Medications   Medication Sig Dispense Refill    alendronate (FOSAMAX) 70 MG tablet TAKE 1 TABLET BY MOUTH ONE TIME PER WEEK 12 tablet 1    amLODIPine (NORVASC) 10 MG tablet TAKE 1 TABLET BY MOUTH EVERY DAY 90 tablet 3    atorvastatin (LIPITOR) 40 MG tablet TAKE 1 TABLET BY MOUTH EVERY DAY 90 tablet 3    calcium-vitamin D 500-125 mg-unit tablet Take 1 tablet by mouth once daily.      ergocalciferol (ERGOCALCIFEROL) 50,000 unit Cap TAKE 1 CAPSULE (50,000 UNITS TOTAL) BY MOUTH EVERY SUNDAY 12 capsule 1    fluticasone propionate (FLONASE) 50 mcg/actuation nasal spray 2 SPRAYS (100 MCG TOTAL) BY EACH NOSTRIL ROUTE ONCE DAILY. 48 mL 3    gabapentin (NEURONTIN) 300 MG capsule Take 2 capsules (600 mg total) by mouth 2 (two) times daily. 360 capsule 1    meloxicam (MOBIC) 7.5 MG tablet Take 1 tablet (7.5 mg total) by mouth 2 (two) times daily as needed for Pain (with food). 60 tablet 2    metoprolol succinate (TOPROL-XL) 100 MG 24 hr tablet TAKE 1 TABLET BY MOUTH EVERY DAY 90 tablet 3    multivitamin (THERAGRAN) per tablet Take 1 tablet by mouth once  daily.      pantoprazole (PROTONIX) 40 MG tablet TAKE 1 TABLET BY MOUTH EVERY DAY 90 tablet 1    paroxetine (PAXIL) 20 MG tablet Take 1 tablet (20 mg total) by mouth every morning. 90 tablet 1    traZODone (DESYREL) 150 MG tablet TAKE 1 TABLET (150 MG TOTAL) BY MOUTH EVERY EVENING. 90 tablet 1     No current facility-administered medications for this visit.       REVIEW OF SYSTEMS:    GENERAL:  No weight loss, malaise or fevers.  HEENT:  Negative for frequent or significant headaches.  NECK:  Negative for lumps, goiter, pain and significant neck swelling.  RESPIRATORY:  Negative for cough, wheezing or shortness of breath.  CARDIOVASCULAR:  Negative for chest pain, leg swelling or palpitations.  GI:  Negative for abdominal discomfort, blood in stools or black stools or change in bowel habits. GERD.  MUSCULOSKELETAL:  See HPI.  SKIN:  Negative for lesions, rash, and itching.  PSYCH:  + for sleep disturbance, h/o depression  HEMATOLOGY/LYMPHOLOGY:  Negative for prolonged bleeding, bruising easily or swollen nodes.  NEURO:  + numbness, weakness. No history of headaches, syncope, paralysis, seizures or tremors.  All other reviewed and negative other than HPI.     Past Medical History:  Past Medical History:   Diagnosis Date    Anxiety     Arthritis     Corneal abrasion s    ? eye     Depression     Full dentures     GERD (gastroesophageal reflux disease)     Hyperlipidemia     Hypertension     Nuclear sclerosis of both eyes 6/5/2017    Osteoporosis     Restless leg syndrome        Past Surgical History:  Past Surgical History:   Procedure Laterality Date    COLONOSCOPY N/A 5/14/2019    Procedure: COLONOSCOPY;  Surgeon: Nahid Cline MD;  Location: [facility] ENDO;  Service: Endoscopy;  Laterality: N/A;    EPIDURAL STEROID INJECTION N/A 5/13/2020    Procedure: LUMBAR/CAUDAL L5/S1 IL SANKET ;  Surgeon: Miguel Latham MD;  Location: [facility] PAIN MGT;  Service: Pain Management;  Laterality: N/A;  NEEDS CONSENT     HYSTERECTOMY      PARTIAL HYSTERECTOMY      TRANSFORAMINAL EPIDURAL INJECTION OF STEROID Bilateral 9/4/2019    Procedure: Injection,steroid,epidural,transforaminal approach LUMBAR TRANSFORAMINAL BILATERAL L3 TF SANKET;  Surgeon: Miguel Latham MD;  Location: [facility] PAIN MGT;  Service: Pain Management;  Laterality: Bilateral;  NEEDS CONSENT    TRANSFORAMINAL EPIDURAL INJECTION OF STEROID Bilateral 10/30/2019    Procedure: TRANSFORAMINAL SANKET BILATERAL L3-L4;  Surgeon: Miguel Latham MD;  Location: [facility] PAIN MGT;  Service: Pain Management;  Laterality: Bilateral;  NEEDS CONSENT    TRANSFORAMINAL EPIDURAL INJECTION OF STEROID Bilateral 11/11/2020    Procedure: INJECTION, STEROID, EPIDURAL, TRANSFORAMINAL APPROACH, L3-L4;  Surgeon: Miguel Latham MD;  Location: [facility] PAIN MGT;  Service: Pain Management;  Laterality: Bilateral;    TRANSFORAMINAL EPIDURAL INJECTION OF STEROID Bilateral 5/19/2021    Procedure: INJECTION, STEROID, EPIDURAL, TRANSFORAMINAL APPROACH, L3-L4;  Surgeon: Miguel Latham MD;  Location: [facility] PAIN MGT;  Service: Pain Management;  Laterality: Bilateral;    TUBAL LIGATION Bilateral        Family History:  Family History   Problem Relation Age of Onset    Stroke Mother     Glaucoma Mother     Cataracts Mother     Cancer Father         Lung    No Known Problems Brother     No Known Problems Son     Stroke Brother     Heart disease Son     No Known Problems Sister     No Known Problems Maternal Aunt     No Known Problems Maternal Uncle     No Known Problems Paternal Aunt     No Known Problems Paternal Uncle     No Known Problems Maternal Grandmother     No Known Problems Maternal Grandfather     No Known Problems Paternal Grandmother     No Known Problems Paternal Grandfather     Amblyopia Neg Hx     Blindness Neg Hx     Diabetes Neg Hx     Hypertension Neg Hx     Macular degeneration Neg Hx     Retinal detachment Neg Hx     Strabismus Neg Hx     Thyroid disease Neg  Hx        Social History:  Social History     Socioeconomic History    Marital status:    Tobacco Use    Smoking status: Never Smoker    Smokeless tobacco: Never Used   Substance and Sexual Activity    Alcohol use: No    Drug use: No    Sexual activity: Never   Social History Narrative    Patient is  w/ 5 children, one  from heart disease.The patient is retired.       OBJECTIVE:    /73 (BP Location: Left arm, Patient Position: Sitting, BP Method: Medium (Automatic))   Pulse 64   Ht 5' (1.524 m)   Wt 65.9 kg (145 lb 4.5 oz)   SpO2 99%   BMI 28.37 kg/m²     PHYSICAL EXAMINATION:    General appearance: Well appearing, in no acute distress, alert and oriented x3.  Psych:  Mood and affect appropriate.  Skin: Skin color, texture, turgor normal, no rashes or lesions, in both upper and lower body.  Head/face:  Normocephalic, atraumatic. No palpable lymph nodes.  Back: Straight leg raising in the sitting and supine positions is negative. TTP over lumbar facet joints bilaterally. Normal range of motion with pain on flexion and extension. Positive facet loading bilaterally.  Extremities: Peripheral joint ROM is full and pain free without obvious instability or laxity in all four extremities. No deformities, edema, or skin discoloration. Good capillary refill.  Musculoskeletal: No atrophy or tone abnormalities are noted. TTP over bilateral SI joints.  PASCALE is negative bilaterally. 5/5 strength in right ankle with plantar and 4/5 on dorsiflexion. 5/5 strength in left ankle with plantar and 4/5 on dorsiflexion. 4/5 strength with right knee flexion and extension. 5/5 strength with left knee flexion and extension. 5/5 strength in right EHL, 5/5 strength in left EHL. Right hip flexion is 4/5, left is 4/5.  Neuro: Decreased sensation to in a bilateral L3 distribution.  Gait: Antalgic- ambulates without assistance.    ASSESSMENT: 77 y.o. year old female with low back pain, consistent with     1.  Lumbosacral radiculopathy  Procedure Order to Pain Management   2. DDD (degenerative disc disease), lumbar     3. Spondylosis without myelopathy           PLAN:     - I have stressed the importance of physical activity and a home exercise plan to help with pain and improve health.  - Patient can continue with medications for now since they are providing benefits, using them appropriately, and without side effects.  - Continue Gabapentin 600 mg BID.  - Start Mobic 7.5 mg BID PRN pain. She did not receive previously sent prescription.  - Schedule for bilateral L3/4 TF SANKET for radicular symptoms.  - We discussed bilateral L3, 4, 5 medial branch blocks we followed by radiofrequency ablation if positive for axial component of pain.  She declined at this time.  - RTC in 2 month sor sooner if needed.  - Counseled patient regarding the importance of activity modification, constant sleeping habits and physical therapy.    The above plan and management options were discussed at length with patient. Patient is in agreement with the above and verbalized understanding.    Christin Ventura, BRENDA  01/11/2022

## 2022-01-11 NOTE — H&P (VIEW-ONLY)
Chronic patient Established Note (Follow up visit)      SUBJECTIVE:    Interval History 1/11/2022:  The patient returns to discuss continued lower back pain. She has not restarted aquatic PT due to increase in Covid-19 cases. She continues with sharp lower back pain that radiates into anterior thighs, bilaterally. She has associated numbness. The pain is worse with increased activity. Her pain today is 7/10.    Interval History 11/11/2021:  The patient presents today for 2 month follow up of lower back pain. Since previous encounter, she has not started aquatic PT because she is on the waiting list. She continues to report lower back pain with radiation down the left leg. We previously discussed lumbar MBB/RFA which she does not wish to pursue at this time. She states that diclofenac is not helping very much with her aching pain at this time. She denies any new weakness or symptoms at this time. Her pain today is 8/10.    Interval History 8/19/2021:  Diana returns today for follow up of chronic lower back pain. She reports that he has continued with aching pain. She has been in PT but is not finding it very helpful. She has not tried aquatic PT in the past. She is still having intermittent radiation down her legs. She has been unable to take 1200 mg daily of Gabapentin and has decreased to 600 mg daily due to sedation and dizziness with the medication. Otherwise, no new complaints today. Her pain today is 7/10.    Interval History 6/18/2021:  The patient is here for follow up of lower back pain.  She has radiation of her pain into her anterior thighs but not below the knees.  She denies numbness or tingling at this time.  Her back pain is currently greater than her leg pain.  She has a lot of stiffness when she wakes the morning states it improves when she moves.  She had limited benefit with recent repeat bilateral L3-4 transforaminal steroid injection.  Her pain today is 7/10    Interval History 5/4/2021:  The  patient is here for follow up of lower back and leg pain.  Previous encounter, physical therapy was ordered.  She states that she did not start physical therapy and would like me to replace the order for her.  She does report that she is noticing more muscle fatigue and weakness particularly with activity.  She is also having more back pain with radiation into the anterior lateral thighs with the past month.  She previously had significant been hip with bilateral L3/4 transforaminal epidural steroid injection and wishes to repeat this.  She found this more helpful than previous procedures. Her pain today is 7/10.    Interval History 3/2/2021:  The patient is here for follow up of chronic pain. She is having more back pain today which she attributes to the cold front coming in. She is having radiation into the buttocks and right anterior thigh. Her back pain is her primary complaint today. She describes it as throbbing. She recently completed both Covid-19 vaccines which she tolerated well. She is now taking Gabapentin 600 mg twice daily regularly. She notices improvement in right leg numbness.  Her pain today is 7/10.    Interval History 12/1/2020:  The patient iss here for follow-up of back and leg pain.  She is now status post bilateral L3/4 transforaminal epidural steroid injection on 11/11/2020. She is reporting 100% relief for about 2 weeks and then her pain started to return.  She is still having some benefit.  Her pain is located across the lower back with radiation into front and sides of the legs to the anterior feet with associated numbness.  She feels like when she walks sometimes her legs become weak.  This has improved slightly.  She denies any recent falls.  At her last OV, her Gabapentin was increased to 600 mg BID.  She says that she finds this helpful.  She has mild drowsiness.  She admits that she does not always take the medication and thought it was more of an as needed medication.  The patient  denies any bowel or bladder incontinence or signs of saddle paresthesia.  She feels as though her pain today is worse than usual.  She rates her pain today as 10/10.    Interval history 10/15/2020:  Diana Herring presents to the clinic for a follow-up appointment for back pain. Since the last visit, Diana Herring states the pain has been worsening. Current pain intensity is 7/10. She reports that she continues to have low back pain that radiates to the bilateral hip and leg. She does report that she has noticed intermittent numbness in the right lateral and anterior thigh, that is sometimes associated with weakness in her right leg. She says that her leg gives out when this happens. She has not had any falls or other trauma. She did not get the bilateral L3/4 TFESI planned last time, and she is still taking only 300 mg bid of gabapentin.  Patient denies urinary incontinence, bowel incontinence, significant weight loss.    Initial visit:  Diana Herring presents to the clinic for the evaluation of low back pain. The pain started 2 years ago following loosing bone after a bone density and symptoms have been worsening.The pain is located in the low back area and radiates to the bilateral hip and leg.  The pain is described as aching, numbing, sharp and tight band and is rated as 8/10. The pain is rated with a score of  7/10 on the BEST day and a score of 8/10 on the WORST day.  Symptoms interfere with daily activity and sleeping. The pain is exacerbated by Sitting, Standing, Bending, Coughing/Sneezing, Walking, Morning, Lifting and Getting out of bed/chair.  The pain is mitigated by heat, laying down and rest. She reports spending 0 hours per day reclining. The patient reports 6 hours of uninterrupted sleep per night.     Patient had L5/S1 ILESI 2 weeks ago and only gave her 1 day of relief.      Patient denies night fever/night sweats, urinary incontinence, bowel incontinence, significant weight loss and  significant motor weakness.     Physical Therapy/Home Exercise: yes, was not beneficial      Pain Disability Index Review:  Last 3 PDI Scores 1/11/2022 11/11/2021 8/19/2021   Pain Disability Index (PDI) 39 [phone] 17       Pain Medications:  Gabapentin 600 mg bid    Opioid Contract: no     report:  Reviewed and consistent with medication use as prescribed.    Pain Procedures:  5/19/21 bilateral L3/4 TF SANKET  11/11/20 Bilateral L3/4 TF SANKET- 100% relief for 2 weeks  5/13/20 L5/S1 ILESI  10/30/19 TFESI Bilateral L3-L4  09/04/19 TFESI Bilateral L3     Physical Therapy/Home Exercise: yes    Imaging:  Narrative & Impression     EXAMINATION:  XR LUMBAR SPINE 5 VIEW WITH FLEX AND EXT     CLINICAL HISTORY:  Low back pain, >6wks conservative tx, persistent-progressive sx, surgical candidate;  Lumbago with sciatica, left side     TECHNIQUE:  Five views of the lumbar spine plus flexion extension views were performed.     COMPARISON:  07/23/2019     FINDINGS:  The alignment of the lumbar spine is normal.     The vertebral body height are well maintained.  Mild disc space narrowing L3-L4.     Flexion and extension views demonstrate no evidence of translational abnormalities.     Minimal osteophyte formation L2-L3 L4.     No fracture or osseous lesions.     The sacroiliac joints appears symmetrical on the AP view.     The remainder of the visualized soft tissue and osseous structures appear normal.     There is atherosclerotic plaque of the abdominal aorta.     Impression:     Mild spondylosis of the lumbar spine, not significantly changed from the prior study.        Electronically signed by: Ava Brennan MD  Date:                                            05/29/2020  Time:                                           08:28        Narrative & Impression     EXAMINATION:  XR HIPS BILATERAL 2 VIEW INCL AP PELVIS     CLINICAL HISTORY:  Lumbago with sciatica, left side     TECHNIQUE:  AP view of the pelvis and frogleg lateral  views of both hips were performed.     COMPARISON:  None.     FINDINGS:  Normal mineralization.  Pelvis demonstrates no acute fractures.  No dislocations are noted.  Hip joints appear to be symmetric and within normal limits with mild DJD of the hip joints.  There is no osteoblastic or lytic lesions.  Soft tissues are unremarkable.     SI joints are symmetric and within normal limits.  There is mild spondylotic changes in the lower lumbar spine.     There is moderate amount of retained stool in the right colon.     Impression:     Mild DJD of the hip joints.     1. No acute fractures.        Electronically signed by: David Vasquez MD  Date:                                            05/29/2020  Time:                                           08:28     Narrative & Impression     EXAMINATION:  MRI LUMBAR SPINE WITHOUT CONTRAST     CLINICAL HISTORY:  back and bilateral leg pain; Lumbago with sciatica, left side     TECHNIQUE:  Multiplanar, multisequence MR images were acquired from the thoracolumbar junction to the sacrum without the administration of contrast.     COMPARISON:  None.     FINDINGS:  MRI examination of the lumbar spine was performed.  Intravenous contrast was not utilized.  There are areas of hyperintense T2 hypointense T1 lesions of the kidneys likely cysts not optimally evaluated on this exam.     There is multilevel degenerative loss of disc signal and there is loss of disc space height most notable at L3-4.  There are chronic appearing endplate changes noted with some mild STIR hyperintensity that likely relates to edema associated with degenerative endplate change.  There is no evidence for high-grade spondylolisthesis, there is no evidence for high-grade or acute compression fracture deformity.  There is no finding to specifically suggest aggressive bone marrow replacement process.     The sagittal imaging includes the majority of T10 through the S3 segment, axial imaging includes the inferior aspect  of T12 through the majority of S1.  On the sagittal imaging there is appearance suggesting mild degenerative disc disease and disc bulge at T10-11 and T11-12 with some mild posterior ligamentous thickening suggested at T10-11 however without evidence for high-grade spinal canal stenosis.     The T12-L1 level demonstrates no evidence for high-grade spinal canal or foraminal stenosis.     The L1-2 level demonstrates degenerative disc bulge with anterior impression upon the dural sac.  There is posterior facet arthropathy and ligamentous thickening there is mild spinal canal stenosis.  There is encroachment into the neural foramen at the level of the disc plane without obvious exiting nerve root impingement.     The L2-3 level demonstrates mild degenerative loss of disc space height.  There is mild degenerative disc bulge with anterior impression upon the dural sac.  There is posterior facet arthropathy with somewhat prominent ligamentous thickening with associated posterolateral encroachment.  There is mild-to-moderate spinal canal stenosis.  There is bilateral foraminal narrowing.     The L3-4 level demonstrates the aforementioned appearance of loss of disc space height and degenerative loss of disc signal.  There is diffuse degenerative disc bulge with anterior impression upon the dural sac there is posterior facet arthropathy and ligamentous thickening there is high-grade spinal canal stenosis.  There is bilateral foraminal stenosis.     The L4-5 level demonstrates degenerative disc bulge with anterior impression upon the dural sac there is posterior facet arthropathy and ligamentous thickening with mild to moderate spinal canal stenosis.  There is bilateral foraminal narrowing/stenosis without obvious exiting nerve root impingement.     The L5-S1 level demonstrates no evidence for high-grade spinal canal stenosis.  Facet arthropathy is noted there is no evidence for high-grade foraminal stenosis or exiting nerve  root impingement.     The visualized spinal cord appears to taper appropriately and appears of appropriate signal and caliber.  Mild signal intensity within the spinal canal on T1 axial imaging may relate to a minimal lipoma along the filum terminalis.  There is no additional evidence for intraspinal mass or abnormal fluid collection     Impression:     Multilevel chronic change of the lumbar spine with variable spinal canal and foraminal narrowing/stenosis as detailed above.  Correlation for any specific level of symptomatology is needed.  The greatest level of spinal canal stenosis is seen at L3-4.        Electronically signed by: Obie Aggarwal  Date:                                            07/27/2019  Time:                                           03:45     CMP  Sodium   Date Value Ref Range Status   03/01/2021 144 136 - 145 mmol/L Final     Potassium   Date Value Ref Range Status   03/01/2021 4.0 3.5 - 5.1 mmol/L Final     Chloride   Date Value Ref Range Status   03/01/2021 109 95 - 110 mmol/L Final     CO2   Date Value Ref Range Status   03/01/2021 25 23 - 29 mmol/L Final     Glucose   Date Value Ref Range Status   03/01/2021 113 (H) 70 - 110 mg/dL Final     BUN   Date Value Ref Range Status   03/01/2021 16 8 - 23 mg/dL Final     Creatinine   Date Value Ref Range Status   03/01/2021 0.8 0.5 - 1.4 mg/dL Final     Calcium   Date Value Ref Range Status   03/01/2021 8.6 (L) 8.7 - 10.5 mg/dL Final     Total Protein   Date Value Ref Range Status   03/01/2021 7.5 6.0 - 8.4 g/dL Final     Albumin   Date Value Ref Range Status   03/01/2021 3.9 3.5 - 5.2 g/dL Final     Total Bilirubin   Date Value Ref Range Status   03/01/2021 0.7 0.1 - 1.0 mg/dL Final     Comment:     For infants and newborns, interpretation of results should be based  on gestational age, weight and in agreement with clinical  observations.    Premature Infant recommended reference ranges:  Up to 24 hours.............<8.0 mg/dL  Up to 48  hours............<12.0 mg/dL  3-5 days..................<15.0 mg/dL  6-29 days.................<15.0 mg/dL       Alkaline Phosphatase   Date Value Ref Range Status   03/01/2021 69 55 - 135 U/L Final     AST   Date Value Ref Range Status   03/01/2021 18 10 - 40 U/L Final     ALT   Date Value Ref Range Status   03/01/2021 25 10 - 44 U/L Final     Anion Gap   Date Value Ref Range Status   03/01/2021 10 8 - 16 mmol/L Final     eGFR if    Date Value Ref Range Status   03/01/2021 >60 >60 mL/min/1.73 m^2 Final     eGFR if non    Date Value Ref Range Status   03/01/2021 >60 >60 mL/min/1.73 m^2 Final     Comment:     Calculation used to obtain the estimated glomerular filtration  rate (eGFR) is the CKD-EPI equation.            Allergies: Review of patient's allergies indicates:  No Known Allergies    Current Medications:   Current Outpatient Medications   Medication Sig Dispense Refill    alendronate (FOSAMAX) 70 MG tablet TAKE 1 TABLET BY MOUTH ONE TIME PER WEEK 12 tablet 1    amLODIPine (NORVASC) 10 MG tablet TAKE 1 TABLET BY MOUTH EVERY DAY 90 tablet 3    atorvastatin (LIPITOR) 40 MG tablet TAKE 1 TABLET BY MOUTH EVERY DAY 90 tablet 3    calcium-vitamin D 500-125 mg-unit tablet Take 1 tablet by mouth once daily.      ergocalciferol (ERGOCALCIFEROL) 50,000 unit Cap TAKE 1 CAPSULE (50,000 UNITS TOTAL) BY MOUTH EVERY SUNDAY 12 capsule 1    fluticasone propionate (FLONASE) 50 mcg/actuation nasal spray 2 SPRAYS (100 MCG TOTAL) BY EACH NOSTRIL ROUTE ONCE DAILY. 48 mL 3    gabapentin (NEURONTIN) 300 MG capsule Take 2 capsules (600 mg total) by mouth 2 (two) times daily. 360 capsule 1    meloxicam (MOBIC) 7.5 MG tablet Take 1 tablet (7.5 mg total) by mouth 2 (two) times daily as needed for Pain (with food). 60 tablet 2    metoprolol succinate (TOPROL-XL) 100 MG 24 hr tablet TAKE 1 TABLET BY MOUTH EVERY DAY 90 tablet 3    multivitamin (THERAGRAN) per tablet Take 1 tablet by mouth once  daily.      pantoprazole (PROTONIX) 40 MG tablet TAKE 1 TABLET BY MOUTH EVERY DAY 90 tablet 1    paroxetine (PAXIL) 20 MG tablet Take 1 tablet (20 mg total) by mouth every morning. 90 tablet 1    traZODone (DESYREL) 150 MG tablet TAKE 1 TABLET (150 MG TOTAL) BY MOUTH EVERY EVENING. 90 tablet 1     No current facility-administered medications for this visit.       REVIEW OF SYSTEMS:    GENERAL:  No weight loss, malaise or fevers.  HEENT:  Negative for frequent or significant headaches.  NECK:  Negative for lumps, goiter, pain and significant neck swelling.  RESPIRATORY:  Negative for cough, wheezing or shortness of breath.  CARDIOVASCULAR:  Negative for chest pain, leg swelling or palpitations.  GI:  Negative for abdominal discomfort, blood in stools or black stools or change in bowel habits. GERD.  MUSCULOSKELETAL:  See HPI.  SKIN:  Negative for lesions, rash, and itching.  PSYCH:  + for sleep disturbance, h/o depression  HEMATOLOGY/LYMPHOLOGY:  Negative for prolonged bleeding, bruising easily or swollen nodes.  NEURO:  + numbness, weakness. No history of headaches, syncope, paralysis, seizures or tremors.  All other reviewed and negative other than HPI.     Past Medical History:  Past Medical History:   Diagnosis Date    Anxiety     Arthritis     Corneal abrasion s    ? eye     Depression     Full dentures     GERD (gastroesophageal reflux disease)     Hyperlipidemia     Hypertension     Nuclear sclerosis of both eyes 6/5/2017    Osteoporosis     Restless leg syndrome        Past Surgical History:  Past Surgical History:   Procedure Laterality Date    COLONOSCOPY N/A 5/14/2019    Procedure: COLONOSCOPY;  Surgeon: Nahid Cline MD;  Location: [facility] ENDO;  Service: Endoscopy;  Laterality: N/A;    EPIDURAL STEROID INJECTION N/A 5/13/2020    Procedure: LUMBAR/CAUDAL L5/S1 IL SANKET ;  Surgeon: Miguel Latham MD;  Location: [facility] PAIN MGT;  Service: Pain Management;  Laterality: N/A;  NEEDS CONSENT     HYSTERECTOMY      PARTIAL HYSTERECTOMY      TRANSFORAMINAL EPIDURAL INJECTION OF STEROID Bilateral 9/4/2019    Procedure: Injection,steroid,epidural,transforaminal approach LUMBAR TRANSFORAMINAL BILATERAL L3 TF SANKET;  Surgeon: Miguel Latham MD;  Location: [facility] PAIN MGT;  Service: Pain Management;  Laterality: Bilateral;  NEEDS CONSENT    TRANSFORAMINAL EPIDURAL INJECTION OF STEROID Bilateral 10/30/2019    Procedure: TRANSFORAMINAL SANKET BILATERAL L3-L4;  Surgeon: Miguel Latham MD;  Location: [facility] PAIN MGT;  Service: Pain Management;  Laterality: Bilateral;  NEEDS CONSENT    TRANSFORAMINAL EPIDURAL INJECTION OF STEROID Bilateral 11/11/2020    Procedure: INJECTION, STEROID, EPIDURAL, TRANSFORAMINAL APPROACH, L3-L4;  Surgeon: Miguel Latham MD;  Location: [facility] PAIN MGT;  Service: Pain Management;  Laterality: Bilateral;    TRANSFORAMINAL EPIDURAL INJECTION OF STEROID Bilateral 5/19/2021    Procedure: INJECTION, STEROID, EPIDURAL, TRANSFORAMINAL APPROACH, L3-L4;  Surgeon: Miguel Latham MD;  Location: [facility] PAIN MGT;  Service: Pain Management;  Laterality: Bilateral;    TUBAL LIGATION Bilateral        Family History:  Family History   Problem Relation Age of Onset    Stroke Mother     Glaucoma Mother     Cataracts Mother     Cancer Father         Lung    No Known Problems Brother     No Known Problems Son     Stroke Brother     Heart disease Son     No Known Problems Sister     No Known Problems Maternal Aunt     No Known Problems Maternal Uncle     No Known Problems Paternal Aunt     No Known Problems Paternal Uncle     No Known Problems Maternal Grandmother     No Known Problems Maternal Grandfather     No Known Problems Paternal Grandmother     No Known Problems Paternal Grandfather     Amblyopia Neg Hx     Blindness Neg Hx     Diabetes Neg Hx     Hypertension Neg Hx     Macular degeneration Neg Hx     Retinal detachment Neg Hx     Strabismus Neg Hx     Thyroid disease Neg  Hx        Social History:  Social History     Socioeconomic History    Marital status:    Tobacco Use    Smoking status: Never Smoker    Smokeless tobacco: Never Used   Substance and Sexual Activity    Alcohol use: No    Drug use: No    Sexual activity: Never   Social History Narrative    Patient is  w/ 5 children, one  from heart disease.The patient is retired.       OBJECTIVE:    /73 (BP Location: Left arm, Patient Position: Sitting, BP Method: Medium (Automatic))   Pulse 64   Ht 5' (1.524 m)   Wt 65.9 kg (145 lb 4.5 oz)   SpO2 99%   BMI 28.37 kg/m²     PHYSICAL EXAMINATION:    General appearance: Well appearing, in no acute distress, alert and oriented x3.  Psych:  Mood and affect appropriate.  Skin: Skin color, texture, turgor normal, no rashes or lesions, in both upper and lower body.  Head/face:  Normocephalic, atraumatic. No palpable lymph nodes.  Back: Straight leg raising in the sitting and supine positions is negative. TTP over lumbar facet joints bilaterally. Normal range of motion with pain on flexion and extension. Positive facet loading bilaterally.  Extremities: Peripheral joint ROM is full and pain free without obvious instability or laxity in all four extremities. No deformities, edema, or skin discoloration. Good capillary refill.  Musculoskeletal: No atrophy or tone abnormalities are noted. TTP over bilateral SI joints.  PASCALE is negative bilaterally. 5/5 strength in right ankle with plantar and 4/5 on dorsiflexion. 5/5 strength in left ankle with plantar and 4/5 on dorsiflexion. 4/5 strength with right knee flexion and extension. 5/5 strength with left knee flexion and extension. 5/5 strength in right EHL, 5/5 strength in left EHL. Right hip flexion is 4/5, left is 4/5.  Neuro: Decreased sensation to in a bilateral L3 distribution.  Gait: Antalgic- ambulates without assistance.    ASSESSMENT: 77 y.o. year old female with low back pain, consistent with     1.  Lumbosacral radiculopathy  Procedure Order to Pain Management   2. DDD (degenerative disc disease), lumbar     3. Spondylosis without myelopathy           PLAN:     - I have stressed the importance of physical activity and a home exercise plan to help with pain and improve health.  - Patient can continue with medications for now since they are providing benefits, using them appropriately, and without side effects.  - Continue Gabapentin 600 mg BID.  - Start Mobic 7.5 mg BID PRN pain. She did not receive previously sent prescription.  - Schedule for bilateral L3/4 TF SANKET for radicular symptoms.  - We discussed bilateral L3, 4, 5 medial branch blocks we followed by radiofrequency ablation if positive for axial component of pain.  She declined at this time.  - RTC in 2 month sor sooner if needed.  - Counseled patient regarding the importance of activity modification, constant sleeping habits and physical therapy.    The above plan and management options were discussed at length with patient. Patient is in agreement with the above and verbalized understanding.    Christin Ventura, BRENDA  01/11/2022

## 2022-01-11 NOTE — PATIENT INSTRUCTIONS
Patient Education       Back Exercises   About this topic   The muscles in the back are some of the most important ones in the body. They support the backbone to help keep an upright posture. They help us do most all of our daily motions.  General   Before starting with a program, ask your doctor if you are healthy enough to do these exercises. Your doctor may have you work with a , chiropractor or physical therapist to make a safe exercise program to meet your needs.  Stretching Exercises   Stretching exercises keep your muscles flexible. They also stop them from getting tight. Start by doing each of these stretches 2 to 3 times. In order for your body to make changes, you will need to hold these stretches for 20 to 30 seconds. Try to do the stretches 2 to 3 times each day. Do all exercises slowly. Do not bounce when doing stretches.  · Single knee to chest stretches ? Lie on your back, bend your knees and have your feet flat on the floor. Pull one knee towards your chest until you feel a stretch in your lower back and buttock area. Repeat with the other knee. If you have knee problems, pull your knee up by grabbing the back of your thigh instead of the front of your knee. You can also do this exercise by grabbing both knees at the same time.  · Lower trunk rotations ? While lying on your back, bend your knees and have your feet flat on the floor. Keep your legs together and then drop them to one side. Be sure to keep both of your shoulders touching the floor until you feel a stretch in the muscles at the side of the back. Repeat on the other side.  · Lower back stretches seated ? Sit in a chair with your feet spread about shoulder width apart. Then, lean forward until you feel a stretch in your lower back.  Strengthening Exercises   Strengthening exercises keep your muscles firm and strong. Start by repeating each exercise 2 to 3 times. Work up to doing each exercise 10 times. Hold each exercise for 3 to 5  seconds. Try to do the exercises 2 to 3 times each day. Do all exercises slowly.  · Shoulder blade squeezes ? Pinch your shoulder blades together on your upper back and hold 3 to 5 seconds. Be sure you are sitting with good posture and make sure your shoulders do not raise up when you do this exercise. Relax.  · Pelvic tilts ? Lie on your back with your knees bent and feet flat on the floor. Tighten your stomach muscles and press your lower back down to the floor. Relax.  · Hip lifts ? Lie on your back with your knees bent and feet flat on the floor. Tighten your stomach muscles and lift your buttocks off the floor. Relax.               What will the results be?   Keeping your back muscles flexible and strong can help stop muscle injuries. This often happen when muscles are tight or weak.  Helpful tips   · Stay active and work out to keep your muscles strong and flexible.  · Keep a healthy weight to avoid putting too much stress on your spine. Eat a healthy diet to keep your muscles healthy.  · Be sure you do not hold your breath when exercising. This can raise your blood pressure. If you tend to hold your breath, try counting out loud when exercising. If any exercise bothers you, stop right away.  · Always warm up before stretching. Heated muscles stretch much easier than cool muscles. Stretching cool muscles can lead to injury.  · Try walking or cycling at an easy pace for a few minutes to warm up your muscles. Do this again after exercising.  · Never bounce when doing stretches.  · Doing exercises before a meal may be a good way to get into a routine.  · Exercise may be slightly uncomfortable, but you should not have sharp pains. If you do get sharp pains, stop what you are doing. If the sharp pains continue, call your doctor.  Where can I learn more?   [employer]  https://orthoinfo.org/en/recovery/spine-conditioning-program/spine-conditioning-program-pdf   Last Reviewed Date    2021-03-18  Consumer Information Use and Disclaimer   This information is not specific medical advice and does not replace information you receive from your health care provider. This is only a brief summary of general information. It does NOT include all information about conditions, illnesses, injuries, tests, procedures, treatments, therapies, discharge instructions or life-style choices that may apply to you. You must talk with your health care provider for complete information about your health and treatment options. This information should not be used to decide whether or not to accept your health care providers advice, instructions or recommendations. Only your health care provider has the knowledge and training to provide advice that is right for you.  Copyright   Copyright © 2021 [employer] and its affiliates and/or licensors. All rights reserved.  Patient Education       Hamstring Stretches   About this topic   The hamstrings are a group of 3 muscles in the back of your thigh. These muscles can get tight and are often injured while playing sports. There are many ways to stretch the hamstrings.  General   Before starting with a program, ask your doctor if you are healthy enough to do these exercises. Your doctor may have you work with a  or physical therapist to make a safe exercise program to meet your needs.  Stretching Exercises   Stretching exercises keep your muscles flexible. They also stop them from getting tight. Start by doing each of these stretches 2 to 3 times. In order for your body to make changes, you will need to hold these stretches for 20 to 30 seconds. Repeat each exercise 2 to 3 times each day. Do all exercises slowly.  · Hamstring stretches seated ? Sit up straight on the edge of a chair. Make sure you keep your back straight. Straighten your knee on your left leg. Keep your heel on the floor. Bend forward at the waist towards your foot while keeping your upper back  straight. Bend forward until you feel a stretch in the back of your thigh. Repeat on the other leg.  · Hamstring stretches on back ? Lie on your back with both knees bent and feet flat on the floor. Grab the back of your left thigh. Straighten your knee until you feel a stretch at the back of your thigh. Now, pull your toes down towards your head. Repeat on the other leg.  · Hamstring stretches lying down with belt or towel ? Lie on your back with both legs straight and toes pointed upward. Loop a belt or towel around the ball of one foot. Lift up your leg, keeping the knee straight until you feel a stretch in the back of your thigh. Pull down on the belt or towel to bend your foot more for a better stretch. Repeat on the other side.  · Hamstring stretches in doorway ? Lie on your back near a doorway. Make sure others will not walk by while you are stretching. One leg should be on the floor through the doorway with your knee straight. Put the other leg up on the wall with your knee straight. Scoot forward until you feel a stretch in the back of the thigh. Bend your foot down towards your head for a better stretch. Repeat on the other side.  · Hamstring stretches sitting on floor ? Sit on the floor. Put one leg straight out towards the side as if you were getting into a straddle position. Bend the other leg at the knee so that the foot is touching your other thigh. Keep your back straight and slowly lean towards the foot of the straight leg until you feel a stretch in the back of your thigh. Bend your foot up towards your head for a better stretch. Repeat on the other side.  · Hamstring stretches standing ? Stand with your feet shoulder width apart. Straighten one leg about a foot in front of the other. Keeping your front leg straight, bend the knee on your back leg. At the same time, keep your back straight and lean forward at the waist. You should feel a stretch on the back of your thigh. Pull the toes up on your  front foot. Repeat on the other side.               What will the results be?   · Less pain and stiffness  · Better flexibility and range of motion  · Less cramping  · Preventing lower back pain  · Improves posture  · Easier to do daily activities  · Less chance of injury during sports  Helpful tips   · Stay active and work out to keep your muscles strong and flexible.  · Keep a healthy weight so there is not extra stress on your joints. Eat a healthy diet to keep your muscles healthy.  · Be sure you do not hold your breath when exercising. This can raise your blood pressure. If you tend to hold your breath, try counting out loud when exercising. If any exercise bothers you, stop right away.  · Always warm up before stretching. Heated muscles stretch much easier than cool muscles. Stretching cool muscles can lead to injury.  · Try walking or cycling at an easy pace for a few minutes to warm up your muscles. Do this again after exercising.  · Never bounce when doing stretches.  · Doing exercises before a meal may be a good way to get into a routine.  · Exercise may be slightly uncomfortable, but you should not have sharp pains. If you do get sharp pains, stop what you are doing. If the sharp pains continue, call your doctor.  Where can I learn more?   [facility] Choices  https://www.nhs.uk/live-well/exercise/how-to-stretch-after-exercising/   Last Reviewed Date   2021-07-26  Consumer Information Use and Disclaimer   This information is not specific medical advice and does not replace information you receive from your health care provider. This is only a brief summary of general information. It does NOT include all information about conditions, illnesses, injuries, tests, procedures, treatments, therapies, discharge instructions or life-style choices that may apply to you. You must talk with your health care provider for complete information about your health and treatment options. This information should not be used to  decide whether or not to accept your health care providers advice, instructions or recommendations. Only your health care provider has the knowledge and training to provide advice that is right for you.  Copyright   Copyright © 2021 [employer] and its affiliates and/or licensors. All rights reserved.

## 2022-01-26 ENCOUNTER — HOSPITAL ENCOUNTER (OUTPATIENT)
Facility: OTHER | Age: 77
Discharge: HOME | End: 2022-01-26
Attending: ANESTHESIOLOGY | Admitting: ANESTHESIOLOGY
Payer: MEDICARE

## 2022-01-26 VITALS
HEART RATE: 57 BPM | WEIGHT: 144 LBS | SYSTOLIC BLOOD PRESSURE: 105 MMHG | BODY MASS INDEX: 28.27 KG/M2 | OXYGEN SATURATION: 98 % | DIASTOLIC BLOOD PRESSURE: 52 MMHG | RESPIRATION RATE: 12 BRPM | TEMPERATURE: 98 F | HEIGHT: 60 IN

## 2022-01-26 DIAGNOSIS — G89.29 CHRONIC PAIN: ICD-10-CM

## 2022-01-26 DIAGNOSIS — M54.15 RADICULOPATHY OF THORACOLUMBAR REGION: Primary | ICD-10-CM

## 2022-01-26 PROCEDURE — 25000003 PHARM REV CODE 250: Performed by: ANESTHESIOLOGY

## 2022-01-26 PROCEDURE — 25500020 PHARM REV CODE 255: Performed by: ANESTHESIOLOGY

## 2022-01-26 PROCEDURE — 99152 PR MOD CONSCIOUS SEDATION, SAME PHYS, 5+ YRS, FIRST 15 MIN: ICD-10-PCS | Mod: ,,, | Performed by: ANESTHESIOLOGY

## 2022-01-26 PROCEDURE — 64483 NJX AA&/STRD TFRM EPI L/S 1: CPT | Mod: 50,,, | Performed by: ANESTHESIOLOGY

## 2022-01-26 PROCEDURE — 64483 NJX AA&/STRD TFRM EPI L/S 1: CPT | Mod: 50 | Performed by: ANESTHESIOLOGY

## 2022-01-26 PROCEDURE — 64483 PR EPIDURAL INJ, ANES/STEROID, TRANSFORAMINAL, LUMB/SACR, SNGL LEVL: ICD-10-PCS | Mod: 50,,, | Performed by: ANESTHESIOLOGY

## 2022-01-26 PROCEDURE — 99152 MOD SED SAME PHYS/QHP 5/>YRS: CPT | Mod: ,,, | Performed by: ANESTHESIOLOGY

## 2022-01-26 PROCEDURE — 63600175 PHARM REV CODE 636 W HCPCS: Performed by: ANESTHESIOLOGY

## 2022-01-26 RX ORDER — LIDOCAINE HYDROCHLORIDE 20 MG/ML
INJECTION, SOLUTION INFILTRATION; PERINEURAL
Status: DISCONTINUED | OUTPATIENT
Start: 2022-01-26 | End: 2022-01-26 | Stop reason: HOSPADM

## 2022-01-26 RX ORDER — FENTANYL CITRATE 50 UG/ML
INJECTION, SOLUTION INTRAMUSCULAR; INTRAVENOUS
Status: DISCONTINUED | OUTPATIENT
Start: 2022-01-26 | End: 2022-01-26 | Stop reason: HOSPADM

## 2022-01-26 RX ORDER — MIDAZOLAM HYDROCHLORIDE 1 MG/ML
INJECTION INTRAMUSCULAR; INTRAVENOUS
Status: DISCONTINUED | OUTPATIENT
Start: 2022-01-26 | End: 2022-01-26 | Stop reason: HOSPADM

## 2022-01-26 RX ORDER — LIDOCAINE HYDROCHLORIDE 10 MG/ML
INJECTION, SOLUTION EPIDURAL; INFILTRATION; INTRACAUDAL; PERINEURAL
Status: DISCONTINUED | OUTPATIENT
Start: 2022-01-26 | End: 2022-01-26 | Stop reason: HOSPADM

## 2022-01-26 RX ORDER — DEXAMETHASONE SODIUM PHOSPHATE 10 MG/ML
INJECTION INTRAMUSCULAR; INTRAVENOUS
Status: DISCONTINUED | OUTPATIENT
Start: 2022-01-26 | End: 2022-01-26 | Stop reason: HOSPADM

## 2022-01-26 RX ORDER — SODIUM CHLORIDE 9 MG/ML
INJECTION, SOLUTION INTRAVENOUS CONTINUOUS
Status: DISCONTINUED | OUTPATIENT
Start: 2022-01-26 | End: 2022-01-26 | Stop reason: HOSPADM

## 2022-01-26 NOTE — DISCHARGE SUMMARY
Discharge Note  Short Stay      SUMMARY     Admit Date: 1/26/2022    Attending Physician: Marcos Murrell      Discharge Physician: Marcos Murrell      Discharge Date: 1/26/2022 8:36 AM    Procedure(s) (LRB):  Injection,steroid,epidural,transforaminal approach BILATERAL L3/4 (Bilateral)    Final Diagnosis: Lumbosacral radiculopathy [M54.17]    Disposition: Home or self care    Patient Instructions:   Current Discharge Medication List      CONTINUE these medications which have NOT CHANGED    Details   alendronate (FOSAMAX) 70 MG tablet TAKE 1 TABLET BY MOUTH ONE TIME PER WEEK  Qty: 12 tablet, Refills: 1    Associated Diagnoses: Age-related osteoporosis without current pathological fracture      amLODIPine (NORVASC) 10 MG tablet TAKE 1 TABLET BY MOUTH EVERY DAY  Qty: 90 tablet, Refills: 3    Associated Diagnoses: Essential hypertension      atorvastatin (LIPITOR) 40 MG tablet TAKE 1 TABLET BY MOUTH EVERY DAY  Qty: 90 tablet, Refills: 3    Associated Diagnoses: Hyperlipidemia, unspecified hyperlipidemia type      calcium-vitamin D 500-125 mg-unit tablet Take 1 tablet by mouth once daily.      ergocalciferol (ERGOCALCIFEROL) 50,000 unit Cap TAKE 1 CAPSULE (50,000 UNITS TOTAL) BY MOUTH EVERY SUNDAY  Qty: 12 capsule, Refills: 1    Associated Diagnoses: Vitamin D deficiency      fluticasone propionate (FLONASE) 50 mcg/actuation nasal spray 2 SPRAYS (100 MCG TOTAL) BY EACH NOSTRIL ROUTE ONCE DAILY.  Qty: 48 mL, Refills: 3    Comments: NEED NEW SCRIPT  Associated Diagnoses: Allergic rhinitis, unspecified seasonality, unspecified trigger      gabapentin (NEURONTIN) 300 MG capsule Take 2 capsules (600 mg total) by mouth 2 (two) times daily.  Qty: 360 capsule, Refills: 1    Associated Diagnoses: Spinal stenosis, lumbar region with neurogenic claudication; Bilateral leg pain; DDD (degenerative disc disease), lumbar; Radiculopathy of thoracolumbar region      meloxicam (MOBIC) 7.5 MG tablet Take 1 tablet (7.5 mg total) by mouth 2  (two) times daily as needed for Pain (with food).  Qty: 60 tablet, Refills: 2      metoprolol succinate (TOPROL-XL) 100 MG 24 hr tablet TAKE 1 TABLET BY MOUTH EVERY DAY  Qty: 90 tablet, Refills: 3    Associated Diagnoses: Essential hypertension      multivitamin (THERAGRAN) per tablet Take 1 tablet by mouth once daily.      pantoprazole (PROTONIX) 40 MG tablet TAKE 1 TABLET BY MOUTH EVERY DAY  Qty: 90 tablet, Refills: 1    Associated Diagnoses: Gastroesophageal reflux disease without esophagitis      paroxetine (PAXIL) 20 MG tablet Take 1 tablet (20 mg total) by mouth every morning.  Qty: 90 tablet, Refills: 1    Associated Diagnoses: Mild recurrent major depression; Anxiety      traZODone (DESYREL) 150 MG tablet TAKE 1 TABLET (150 MG TOTAL) BY MOUTH EVERY EVENING.  Qty: 90 tablet, Refills: 1    Associated Diagnoses: Insomnia, unspecified type                 Discharge Diagnosis: Lumbosacral radiculopathy [M54.17]  Condition on Discharge: Stable with no complications to procedure   Diet on Discharge: Same as before.  Activity: as per instruction sheet.  Discharge to: Home with a responsible adult.  Follow up: 2-4 weeks       Please call my office or pager at [phone] if experienced any weakness or loss of sensation, fever > 101.5, pain uncontrolled with oral medications, persistent nausea/vomiting/or diarrhea, redness or drainage from the incisions, or any other worrisome concerns. If physician on call was not reached or could not communicate with our office for any reason please go to the nearest emergency department

## 2022-01-26 NOTE — DISCHARGE INSTRUCTIONS
Thank you for allowing us to care for you today. You may receive a survey about the care we provided. Your feedback is valuable and helps us provide excellent care throughout the community.     Home Care Instructions for Pain Management:    1. DIET:   You may resume your normal diet today.   2. BATHING:   You may shower with luke warm water. No tub baths or anything that will soak injection sites under water for the next 24 hours.  3. DRESSING:   You may remove your bandage today.   4. ACTIVITY LEVEL:   You may resume your normal activities 24 hrs after your procedure. Nothing strenuous today.  5. MEDICATIONS:   You may resume your normal medications today. To restart blood thinners, ask your doctor.  6. DRIVING    If you have received any sedatives by mouth today, you may not drive for 12 hours.    If you have received any sedation through your IV, you may not drive for 24 hrs.   7. SPECIAL INSTRUCTIONS:   No heat to the injection site for 24 hrs including, hot bath or shower, heating pad, moist heat, or hot tubs.    Use ice pack to injection site for any pain or discomfort.  Apply ice packs for 20 minute intervals as needed.    IF you have diabetes, be sure to monitor your blood sugar more closely. IF your injection contained steroids your blood sugar levels may become higher than normal.    If you are still having pain upon discharge:  Your pain may improve over the next 48 hours. The anesthetic (numbing medication) works immediately to 48 hours. IF your injection contained a steroid (anti-inflammatory medication), it takes approximately 3 days to start feeling relief and 7-10 days to see your greatest results from the medication. It is possible you may need subsequent injections. This would be discussed at your follow up appointment with pain management or your referring doctor.    Please call the PAIN MANAGEMENT office at [phone] or ON CALL pager at [phone] if you experienced any:   -Weakness or  loss of sensation  -Fever > 101.5  -Pain uncontrolled with oral medications   -Persistent nausea, vomiting, or diarrhea  -Redness or drainage from the injection sites, or any other worrisome concerns.   If physician on call was not reached or could not communicate with our office for any reason please go to the nearest emergency department.  Patient Education       Moderate and Deep Sedation in Adults   About this topic   Sedation changes a persons level of consciousness or being awake. Doctors give moderate or deep sedation to help you become more comfortable when they need to do a procedure. The staff watch you closely when you are given sedation. With sedation, you may not remember the procedure when it is over. The level of your sedation may be moderate or deep.  With moderate or conscious sedation, you:  · Will be relaxed and calm.  · May seem to sleep.  · May feel only slight pain or no pain at all.  · May talk and answer questions.  · Breathe on your own.  With deep sedation, you:  · Will be relaxed and calm.  · May seem to sleep.  · May feel only slight pain or no pain at all.  · Are not able talk or answer questions.  · May need help keeping your airway open or breathing.  Sedation is given by someone with special training. This is someone like a:  · Certified registered nurse anesthetist (CRNA)  · Anesthesiologist  · Doctor  · Dentist  · Oral surgeon  Why is this procedure done?   Sedation is most often given for procedures such as:  · Minor surgeries or procedures, like taking a tissue sample or fixing a broken bone  · Colonoscopy  · Vasectomy  · Dental surgery, like an implant or taking out an impacted tooth  · Endoscopy  · Bronchoscopy  · Plastic cosmetic surgery  · Endotracheal procedure  What happens before the procedure?   · Your doctor will take your history and do an exam. Tell the doctor if you have any drug allergy. Talk to the doctor about:  ? All the drugs you are taking. Be sure to include  all prescription, over-the-counter, vitamins, and herbal supplements. Bring a list of drugs you take with you.  ? Any bleeding problems. Be sure to tell your doctor if you are taking any drugs that may cause bleeding. Some of these are warfarin, rivaroxaban, apixaban, ticagrelor, clopidogrel, ibuprofen, naproxen, or aspirin. Certain vitamins and herbs, such as garlic and fish oil, may also add to the risk for bleeding. You may need to stop these drugs as well. Talk to your doctor about them.  ? Any previous problems with sedation or anesthesia.  · Talk with the doctor about when you last ate or drank anything.  · You will not be allowed to drive after the procedure. Plan another way to get home.  What happens after the procedure?   You may feel these side effects:  · Headache  · Upset stomach or throwing up  · Hangover feeling  · Short period of no memory or cloudy memory  · Bad memories  · Confusion or hallucinations  What care is needed at home?   · Do not make major decisions or sign important papers for at least 24 hours. You may not be thinking clearly.  · Do not drive or operate machinery for 24 hours or until OK'd by your doctor.  What problems could happen?   · Low blood pressure  · Breathing problems  · Heart problems  · Allergic reactions  Last Reviewed Date   2021-05-06  Consumer Information Use and Disclaimer   This information is not specific medical advice and does not replace information you receive from your health care provider. This is only a brief summary of general information. It does NOT include all information about conditions, illnesses, injuries, tests, procedures, treatments, therapies, discharge instructions or life-style choices that may apply to you. You must talk with your health care provider for complete information about your health and treatment options. This information should not be used to decide whether or not to accept your health care providers advice, instructions or  recommendations. Only your health care provider has the knowledge and training to provide advice that is right for you.  Copyright   Copyright © 2021 [employer] and its affiliates and/or licensors. All rights reserved.

## 2022-01-26 NOTE — OP NOTE
Lumbar Transforaminal Epidural Steroid Injection under Fluoroscopic Guidance    The procedure, risks, benefits, and options were discussed with the patient. There are no contraindications to the procedure. The patent expressed understanding and agreed to the procedure. Informed written consent was obtained prior to the start of the procedure and can be found in the patient's chart.    PATIENT NAME: Mrs. Diana Herring   MRN: [id number]     DATE OF PROCEDURE: 01/26/2022    PROCEDURE:  Bilateral  L3/4 Lumbar Transforaminal Epidural Steroid Injection under Fluoroscopic Guidance    PRE-OP DIAGNOSIS: Lumbosacral radiculopathy [M54.17]    POST-OP DIAGNOSIS: Same    PHYSICIAN: Miguel Latham MD    ASSISTANTS: Muddassir Sana, M.D. [facility] Fellow     MEDICATIONS INJECTED: Preservative-free Decadron 10mg with 5cc of Lidocaine 1% MPF     LOCAL ANESTHETIC INJECTED: Xylocaine 2%     SEDATION:   Versed 2mg and Fentanyl 100mcg                                                                                                                                                                                     Conscious sedation ordered by M.D. Patient re-evaluation prior to administration of conscious sedation. No changes noted in patient's status from initial evaluation. The patient's vital signs were monitored by RN and patient remained hemodynamically stable throughout the procedure.    Event Time In   Sedation Start 0815   Sedation End 0831       ESTIMATED BLOOD LOSS: None    COMPLICATIONS: None    TECHNIQUE: Time-out was performed to identify the patient and procedure to be performed. With the patient laying in a prone position, the surgical area was prepped and draped in the usual sterile fashion using ChloraPrep and a fenestrated drape.The levels were determined under fluoroscopy guidance. Skin anesthesia was achieved by injecting Lidocaine 2% over the injection sites. The transforaminal spaces were then approached with a  22 gauge, 5 inch spinal quinke needle that was introduced under fluoroscopic guidance in the AP and Lateral views. Once the needle tip was in the area of the transforaminal space, and there was no blood, CSF or paraesthesias, contrast dye Omnipaque (300mg/ml) was injected to confirm placement and there was no vascular runoff. Fluoroscopic imaging in the AP and lateral views revealed a clear outline of the spinal nerve with proximal spread of agent through the neural foramen into the epidural space. 3 mL of the medication mixture listed above was injected slowly at each site. Displacement of the radio opaque contrast after injection of the medication confirmed that the medication went into the area of the transforaminal spaces. The needles were removed and bleeding was nil. A sterile dressing was applied. No specimens collected. The patient tolerated the procedure well.       The patient was monitored after the procedure in the recovery area. They were given post-procedure and discharge instructions to follow at home. The patient was discharged in a stable condition.    Marcos Murrell MD

## 2022-02-22 ENCOUNTER — OFFICE VISIT (OUTPATIENT)
Dept: FAMILY MEDICINE | Facility: CLINIC | Age: 77
End: 2022-02-22
Payer: MEDICARE

## 2022-02-22 VITALS
WEIGHT: 146.5 LBS | HEART RATE: 57 BPM | DIASTOLIC BLOOD PRESSURE: 82 MMHG | BODY MASS INDEX: 28.76 KG/M2 | HEIGHT: 60 IN | TEMPERATURE: 98 F | SYSTOLIC BLOOD PRESSURE: 138 MMHG | OXYGEN SATURATION: 98 %

## 2022-02-22 DIAGNOSIS — Z23 NEEDS FLU SHOT: ICD-10-CM

## 2022-02-22 DIAGNOSIS — I10 ESSENTIAL HYPERTENSION: Chronic | ICD-10-CM

## 2022-02-22 DIAGNOSIS — R10.32 LEFT LOWER QUADRANT ABDOMINAL PAIN: Primary | ICD-10-CM

## 2022-02-22 DIAGNOSIS — K21.9 GASTROESOPHAGEAL REFLUX DISEASE WITHOUT ESOPHAGITIS: Chronic | ICD-10-CM

## 2022-02-22 PROCEDURE — 3079F DIAST BP 80-89 MM HG: CPT | Mod: CPTII,S$GLB,, | Performed by: NURSE PRACTITIONER

## 2022-02-22 PROCEDURE — 3288F FALL RISK ASSESSMENT DOCD: CPT | Mod: CPTII,S$GLB,, | Performed by: NURSE PRACTITIONER

## 2022-02-22 PROCEDURE — 1101F PT FALLS ASSESS-DOCD LE1/YR: CPT | Mod: CPTII,S$GLB,, | Performed by: NURSE PRACTITIONER

## 2022-02-22 PROCEDURE — 3288F PR FALLS RISK ASSESSMENT DOCUMENTED: ICD-10-PCS | Mod: CPTII,S$GLB,, | Performed by: NURSE PRACTITIONER

## 2022-02-22 PROCEDURE — 3075F PR MOST RECENT SYSTOLIC BLOOD PRESS GE 130-139MM HG: ICD-10-PCS | Mod: CPTII,S$GLB,, | Performed by: NURSE PRACTITIONER

## 2022-02-22 PROCEDURE — 1125F PR PAIN SEVERITY QUANTIFIED, PAIN PRESENT: ICD-10-PCS | Mod: CPTII,S$GLB,, | Performed by: NURSE PRACTITIONER

## 2022-02-22 PROCEDURE — 3075F SYST BP GE 130 - 139MM HG: CPT | Mod: CPTII,S$GLB,, | Performed by: NURSE PRACTITIONER

## 2022-02-22 PROCEDURE — 1101F PR PT FALLS ASSESS DOC 0-1 FALLS W/OUT INJ PAST YR: ICD-10-PCS | Mod: CPTII,S$GLB,, | Performed by: NURSE PRACTITIONER

## 2022-02-22 PROCEDURE — 99214 PR OFFICE/OUTPT VISIT, EST, LEVL IV, 30-39 MIN: ICD-10-PCS | Mod: S$GLB,,, | Performed by: NURSE PRACTITIONER

## 2022-02-22 PROCEDURE — 90694 VACC AIIV4 NO PRSRV 0.5ML IM: CPT | Mod: S$GLB,,, | Performed by: NURSE PRACTITIONER

## 2022-02-22 PROCEDURE — G0008 FLU VACCINE - QUADRIVALENT - ADJUVANTED: ICD-10-PCS | Mod: S$GLB,,, | Performed by: NURSE PRACTITIONER

## 2022-02-22 PROCEDURE — 1160F PR REVIEW ALL MEDS BY PRESCRIBER/CLIN PHARMACIST DOCUMENTED: ICD-10-PCS | Mod: CPTII,S$GLB,, | Performed by: NURSE PRACTITIONER

## 2022-02-22 PROCEDURE — G0008 ADMIN INFLUENZA VIRUS VAC: HCPCS | Mod: S$GLB,,, | Performed by: NURSE PRACTITIONER

## 2022-02-22 PROCEDURE — 1160F RVW MEDS BY RX/DR IN RCRD: CPT | Mod: CPTII,S$GLB,, | Performed by: NURSE PRACTITIONER

## 2022-02-22 PROCEDURE — 1159F PR MEDICATION LIST DOCUMENTED IN MEDICAL RECORD: ICD-10-PCS | Mod: CPTII,S$GLB,, | Performed by: NURSE PRACTITIONER

## 2022-02-22 PROCEDURE — 1125F AMNT PAIN NOTED PAIN PRSNT: CPT | Mod: CPTII,S$GLB,, | Performed by: NURSE PRACTITIONER

## 2022-02-22 PROCEDURE — 3079F PR MOST RECENT DIASTOLIC BLOOD PRESSURE 80-89 MM HG: ICD-10-PCS | Mod: CPTII,S$GLB,, | Performed by: NURSE PRACTITIONER

## 2022-02-22 PROCEDURE — 99999 PR PBB SHADOW E&M-EST. PATIENT-LVL V: ICD-10-PCS | Mod: PBBFAC,,, | Performed by: NURSE PRACTITIONER

## 2022-02-22 PROCEDURE — 90694 FLU VACCINE - QUADRIVALENT - ADJUVANTED: ICD-10-PCS | Mod: S$GLB,,, | Performed by: NURSE PRACTITIONER

## 2022-02-22 PROCEDURE — 1159F MED LIST DOCD IN RCRD: CPT | Mod: CPTII,S$GLB,, | Performed by: NURSE PRACTITIONER

## 2022-02-22 PROCEDURE — 99999 PR PBB SHADOW E&M-EST. PATIENT-LVL V: CPT | Mod: PBBFAC,,, | Performed by: NURSE PRACTITIONER

## 2022-02-22 PROCEDURE — 99214 OFFICE O/P EST MOD 30 MIN: CPT | Mod: S$GLB,,, | Performed by: NURSE PRACTITIONER

## 2022-02-22 RX ORDER — DICLOFENAC SODIUM 75 MG/1
75 TABLET, DELAYED RELEASE ORAL 2 TIMES DAILY
COMMUNITY
Start: 2021-12-22 | End: 2022-04-07 | Stop reason: SDUPTHER

## 2022-02-22 NOTE — PROGRESS NOTES
Chief Complaint  Chief Complaint   Patient presents with    Abdominal Pain     Lower  left side       HPI  Diana Herring is a 77 y.o. female with medical diagnoses as listed in the medical history and problem list that presents for Left Lower Abdominal Pain.  This patient is new to me.       1. Left Lower Abdominal Pain x 2 months: Reports pain waxes and wanes. Pain is sharp or dull when it comes. When she wipes her bottom she can see bright red blood on the paper. She can distinguish whether or not the blood is from hemorrhoids or colon. She's had surgery to remove polyps in the past seen on her last colonoscopy. Denies slimy, fatty stools. Denies Hx of Diverticulosis/litis. Stools are formed and hard. She uses OTC laxatives to aid in passing stools. Denies hematuria or deviations urination/voiding.       2. Left lower abdominal discomfort on deep palpation during physical exam. No rebound tenderness noted       PAST MEDICAL HISTORY:  Past Medical History:   Diagnosis Date    Anxiety     Arthritis     Corneal abrasion s    ? eye     Depression     Full dentures     GERD (gastroesophageal reflux disease)     Hyperlipidemia     Hypertension     Nuclear sclerosis of both eyes 6/5/2017    Osteoporosis     Restless leg syndrome        PAST SURGICAL HISTORY:  Past Surgical History:   Procedure Laterality Date    COLONOSCOPY N/A 5/14/2019    Procedure: COLONOSCOPY;  Surgeon: Nahid Cline MD;  Location: [facility] ENDO;  Service: Endoscopy;  Laterality: N/A;    EPIDURAL STEROID INJECTION N/A 5/13/2020    Procedure: LUMBAR/CAUDAL L5/S1 IL SANKET ;  Surgeon: Miguel Latham MD;  Location: [facility];  Service: Pain Management;  Laterality: N/A;  NEEDS CONSENT    HYSTERECTOMY      PARTIAL HYSTERECTOMY      TRANSFORAMINAL EPIDURAL INJECTION OF STEROID Bilateral 9/4/2019    Procedure: Injection,steroid,epidural,transforaminal approach LUMBAR TRANSFORAMINAL BILATERAL L3 TF SANKET;  Surgeon: Miguel NGUYEN  MD Noa;  Location: [facility] PAIN MGT;  Service: Pain Management;  Laterality: Bilateral;  NEEDS CONSENT    TRANSFORAMINAL EPIDURAL INJECTION OF STEROID Bilateral 10/30/2019    Procedure: TRANSFORAMINAL SANKET BILATERAL L3-L4;  Surgeon: Miguel Latham MD;  Location: [facility] PAIN MGT;  Service: Pain Management;  Laterality: Bilateral;  NEEDS CONSENT    TRANSFORAMINAL EPIDURAL INJECTION OF STEROID Bilateral 2020    Procedure: INJECTION, STEROID, EPIDURAL, TRANSFORAMINAL APPROACH, L3-L4;  Surgeon: Miguel Latham MD;  Location: [facility] PAIN MGT;  Service: Pain Management;  Laterality: Bilateral;    TRANSFORAMINAL EPIDURAL INJECTION OF STEROID Bilateral 2021    Procedure: INJECTION, STEROID, EPIDURAL, TRANSFORAMINAL APPROACH, L3-L4;  Surgeon: Miguel Latham MD;  Location: [facility] PAIN MGT;  Service: Pain Management;  Laterality: Bilateral;    TRANSFORAMINAL EPIDURAL INJECTION OF STEROID Bilateral 2022    Procedure: Injection,steroid,epidural,transforaminal approach BILATERAL L3/4;  Surgeon: Miguel Latham MD;  Location: [facility] PAIN MGT;  Service: Pain Management;  Laterality: Bilateral;    TUBAL LIGATION Bilateral        SOCIAL HISTORY:  Social History     Socioeconomic History    Marital status:    Tobacco Use    Smoking status: Never Smoker    Smokeless tobacco: Never Used   Substance and Sexual Activity    Alcohol use: No    Drug use: No    Sexual activity: Never   Social History Narrative    Patient is  w/ 5 children, one  from heart disease.The patient is retired.       FAMILY HISTORY:  Family History   Problem Relation Age of Onset    Stroke Mother     Glaucoma Mother     Cataracts Mother     Cancer Father         Lung    No Known Problems Brother     No Known Problems Son     Stroke Brother     Heart disease Son     No Known Problems Sister     No Known Problems Maternal Aunt     No Known Problems Maternal Uncle     No Known Problems Paternal Aunt      No Known Problems Paternal Uncle     No Known Problems Maternal Grandmother     No Known Problems Maternal Grandfather     No Known Problems Paternal Grandmother     No Known Problems Paternal Grandfather     Amblyopia Neg Hx     Blindness Neg Hx     Diabetes Neg Hx     Hypertension Neg Hx     Macular degeneration Neg Hx     Retinal detachment Neg Hx     Strabismus Neg Hx     Thyroid disease Neg Hx        ALLERGIES AND MEDICATIONS: updated and reviewed.  Review of patient's allergies indicates:  No Known Allergies  Current Outpatient Medications   Medication Sig Dispense Refill    alendronate (FOSAMAX) 70 MG tablet TAKE 1 TABLET BY MOUTH ONE TIME PER WEEK 12 tablet 1    amLODIPine (NORVASC) 10 MG tablet TAKE 1 TABLET BY MOUTH EVERY DAY 90 tablet 3    atorvastatin (LIPITOR) 40 MG tablet TAKE 1 TABLET BY MOUTH EVERY DAY 90 tablet 3    calcium-vitamin D 500-125 mg-unit tablet Take 1 tablet by mouth once daily.      diclofenac (VOLTAREN) 75 MG EC tablet Take 75 mg by mouth 2 (two) times daily.      ergocalciferol (ERGOCALCIFEROL) 50,000 unit Cap TAKE 1 CAPSULE (50,000 UNITS TOTAL) BY MOUTH EVERY SUNDAY 12 capsule 1    fluticasone propionate (FLONASE) 50 mcg/actuation nasal spray 2 SPRAYS (100 MCG TOTAL) BY EACH NOSTRIL ROUTE ONCE DAILY. 48 mL 3    gabapentin (NEURONTIN) 300 MG capsule Take 2 capsules (600 mg total) by mouth 2 (two) times daily. 360 capsule 1    meloxicam (MOBIC) 7.5 MG tablet Take 1 tablet (7.5 mg total) by mouth 2 (two) times daily as needed for Pain (with food). 60 tablet 2    metoprolol succinate (TOPROL-XL) 100 MG 24 hr tablet TAKE 1 TABLET BY MOUTH EVERY DAY 90 tablet 3    multivitamin (THERAGRAN) per tablet Take 1 tablet by mouth once daily.      pantoprazole (PROTONIX) 40 MG tablet TAKE 1 TABLET BY MOUTH EVERY DAY 90 tablet 1    paroxetine (PAXIL) 20 MG tablet Take 1 tablet (20 mg total) by mouth every morning. 90 tablet 1    traZODone (DESYREL) 150 MG tablet TAKE 1  TABLET (150 MG TOTAL) BY MOUTH EVERY EVENING. 90 tablet 1     No current facility-administered medications for this visit.         ROS  Review of Systems   Constitutional: Negative for appetite change, chills, fatigue and fever.   HENT: Negative for congestion, ear pain, postnasal drip, rhinorrhea, sinus pressure, sneezing and sore throat.    Respiratory: Negative for shortness of breath.    Cardiovascular: Negative for chest pain and palpitations.   Gastrointestinal: Positive for abdominal pain and blood in stool. Negative for constipation, diarrhea, nausea and vomiting.   Genitourinary: Negative for dysuria.   Musculoskeletal: Negative for arthralgias.   Neurological: Negative for headaches.           Physical Exam  Vitals:    02/22/22 0943   BP: (!) 140/82   Pulse: (!) 57   Temp: 98.2 °F (36.8 °C)   TempSrc: Oral   SpO2: 98%   Weight: 66.4 kg (146 lb 7.9 oz)   Height: 5' (1.524 m)    Body mass index is 28.61 kg/m².  Weight: 66.4 kg (146 lb 7.9 oz)   Height: 5' (152.4 cm)   Physical Exam  Constitutional:       General: She is not in acute distress.  HENT:      Head: Normocephalic and atraumatic.      Right Ear: External ear normal.      Left Ear: External ear normal.      Nose: Nose normal.   Eyes:      Pupils: Pupils are equal, round, and reactive to light.   Cardiovascular:      Rate and Rhythm: Normal rate and regular rhythm.      Pulses: Normal pulses.   Pulmonary:      Effort: Pulmonary effort is normal. No respiratory distress.      Breath sounds: Normal breath sounds. No wheezing.   Abdominal:      General: Bowel sounds are normal.      Palpations: Abdomen is soft.      Tenderness: There is abdominal tenderness in the left lower quadrant. There is no rebound.   Musculoskeletal:      Cervical back: Neck supple.   Lymphadenopathy:      Cervical: No cervical adenopathy.   Skin:     General: Skin is warm and dry.   Neurological:      General: No focal deficit present.      Mental Status: She is alert and  oriented to person, place, and time. Mental status is at baseline.   Psychiatric:         Mood and Affect: Mood normal.             Health Maintenance       Date Due Completion Date    Mammogram 06/19/2021 6/19/2020    Influenza Vaccine (1) 09/01/2021 12/1/2020 (Declined)    Override on 12/1/2020: Declined    Colonoscopy 05/14/2022 5/14/2019    TETANUS VACCINE 08/31/2034 (Originally 1/4/1963) ---    DEXA Scan 10/06/2023 10/6/2020    Lipid Panel 03/01/2026 3/1/2021            Assessment & Plan  Problem List Items Addressed This Visit        Cardiac/Vascular    Essential hypertension (Chronic)  -The current medical regimen is effective;  continue present plan and medications.     Overview     On metoprolol and amlodipine              GI    GERD (gastroesophageal reflux disease) (Chronic)  -Advised not to eat or drink at least 2-3 hours before bed. Decrease spicy and fatty meals. Increase watre intake. Use NSAIDS sparingly. Sleep with head elevated on pillows (Do not sleep flat)      Other Visit Diagnoses     Left lower quadrant abdominal pain    -  Primary  -Colonoscopy as ordered to screen for new and possible bleeding Polyps or Colonic pouches suggesting Diverticulitis   - Gastroenterology consult pending Colonoscopy results   -CBC to screen blood count for changes   - We discussed a more bland diet without as much roughage  - We discussed Probiotic usage.   -We discussed ER precautions   -Patient verbalized understanding of everything discussed in clinic.    Relevant Orders    Case Request Endoscopy: COLONOSCOPY (Completed)    Ambulatory referral/consult to Gastroenterology    CBC Auto Differential    Needs flu shot      -As ordered     Relevant Orders    Influenza - Quadrivalent (Adjuvanted) (Completed)            Health Maintenance reviewed: -As ordered above    Follow-up: 2 weeks for annual visit

## 2022-02-22 NOTE — PROGRESS NOTES
Patient tolerated injection well, instructed to remain in clinic for 15 mins.to monitor for allergic reaction. Verbalized understanding.

## 2022-02-23 ENCOUNTER — TELEPHONE (OUTPATIENT)
Dept: FAMILY MEDICINE | Facility: CLINIC | Age: 77
End: 2022-02-23
Payer: MEDICARE

## 2022-02-23 ENCOUNTER — TELEPHONE (OUTPATIENT)
Dept: ENDOSCOPY | Facility: HOSPITAL | Age: 77
End: 2022-02-23
Payer: MEDICARE

## 2022-02-23 ENCOUNTER — LAB VISIT (OUTPATIENT)
Dept: LAB | Facility: HOSPITAL | Age: 77
End: 2022-02-23
Attending: NURSE PRACTITIONER
Payer: MEDICARE

## 2022-02-23 DIAGNOSIS — R10.32 LEFT LOWER QUADRANT ABDOMINAL PAIN: ICD-10-CM

## 2022-02-23 LAB
BASOPHILS # BLD AUTO: 0.07 K/UL (ref 0–0.2)
BASOPHILS NFR BLD: 0.9 % (ref 0–1.9)
DIFFERENTIAL METHOD: ABNORMAL
EOSINOPHIL # BLD AUTO: 0.2 K/UL (ref 0–0.5)
EOSINOPHIL NFR BLD: 2.5 % (ref 0–8)
ERYTHROCYTE [DISTWIDTH] IN BLOOD BY AUTOMATED COUNT: 12.2 % (ref 11.5–14.5)
HCT VFR BLD AUTO: 41.3 % (ref 37–48.5)
HGB BLD-MCNC: 12.5 G/DL (ref 12–16)
IMM GRANULOCYTES # BLD AUTO: 0.01 K/UL (ref 0–0.04)
IMM GRANULOCYTES NFR BLD AUTO: 0.1 % (ref 0–0.5)
LYMPHOCYTES # BLD AUTO: 3.6 K/UL (ref 1–4.8)
LYMPHOCYTES NFR BLD: 47.5 % (ref 18–48)
MCH RBC QN AUTO: 30 PG (ref 27–31)
MCHC RBC AUTO-ENTMCNC: 30.3 G/DL (ref 32–36)
MCV RBC AUTO: 99 FL (ref 82–98)
MONOCYTES # BLD AUTO: 0.4 K/UL (ref 0.3–1)
MONOCYTES NFR BLD: 5.3 % (ref 4–15)
NEUTROPHILS # BLD AUTO: 3.3 K/UL (ref 1.8–7.7)
NEUTROPHILS NFR BLD: 43.7 % (ref 38–73)
NRBC BLD-RTO: 0 /100 WBC
PLATELET # BLD AUTO: 214 K/UL (ref 150–450)
PMV BLD AUTO: 11.2 FL (ref 9.2–12.9)
RBC # BLD AUTO: 4.16 M/UL (ref 4–5.4)
WBC # BLD AUTO: 7.58 K/UL (ref 3.9–12.7)

## 2022-02-23 PROCEDURE — 85025 COMPLETE CBC W/AUTO DIFF WBC: CPT | Performed by: NURSE PRACTITIONER

## 2022-02-23 PROCEDURE — 36415 COLL VENOUS BLD VENIPUNCTURE: CPT | Mod: PO | Performed by: NURSE PRACTITIONER

## 2022-02-23 NOTE — TELEPHONE ENCOUNTER
----- Message from Kathryn Holley MA sent at 2/23/2022 11:47 AM CST -----  Regarding: referral  Patient has a referral in for gastroenterology can you please assist patient with scheduling.                       Thanks in advance

## 2022-03-04 ENCOUNTER — LAB VISIT (OUTPATIENT)
Dept: LAB | Facility: HOSPITAL | Age: 77
End: 2022-03-04
Attending: INTERNAL MEDICINE
Payer: MEDICARE

## 2022-03-04 DIAGNOSIS — R73.09 OTHER ABNORMAL GLUCOSE: ICD-10-CM

## 2022-03-04 DIAGNOSIS — E55.9 VITAMIN D DEFICIENCY: ICD-10-CM

## 2022-03-04 DIAGNOSIS — I10 ESSENTIAL HYPERTENSION: ICD-10-CM

## 2022-03-04 LAB
25(OH)D3+25(OH)D2 SERPL-MCNC: 41 NG/ML (ref 30–96)
ALBUMIN SERPL BCP-MCNC: 4.1 G/DL (ref 3.5–5.2)
ALP SERPL-CCNC: 70 U/L (ref 55–135)
ALT SERPL W/O P-5'-P-CCNC: 20 U/L (ref 10–44)
ANION GAP SERPL CALC-SCNC: 9 MMOL/L (ref 8–16)
AST SERPL-CCNC: 17 U/L (ref 10–40)
BASOPHILS # BLD AUTO: 0.08 K/UL (ref 0–0.2)
BASOPHILS NFR BLD: 1.2 % (ref 0–1.9)
BILIRUB SERPL-MCNC: 0.8 MG/DL (ref 0.1–1)
BUN SERPL-MCNC: 14 MG/DL (ref 8–23)
CALCIUM SERPL-MCNC: 9.4 MG/DL (ref 8.7–10.5)
CHLORIDE SERPL-SCNC: 103 MMOL/L (ref 95–110)
CHOLEST SERPL-MCNC: 159 MG/DL (ref 120–199)
CHOLEST/HDLC SERPL: 3.6 {RATIO} (ref 2–5)
CO2 SERPL-SCNC: 28 MMOL/L (ref 23–29)
CREAT SERPL-MCNC: 0.8 MG/DL (ref 0.5–1.4)
DIFFERENTIAL METHOD: ABNORMAL
EOSINOPHIL # BLD AUTO: 0.2 K/UL (ref 0–0.5)
EOSINOPHIL NFR BLD: 2.3 % (ref 0–8)
ERYTHROCYTE [DISTWIDTH] IN BLOOD BY AUTOMATED COUNT: 11.9 % (ref 11.5–14.5)
EST. GFR  (AFRICAN AMERICAN): >60 ML/MIN/1.73 M^2
EST. GFR  (NON AFRICAN AMERICAN): >60 ML/MIN/1.73 M^2
ESTIMATED AVG GLUCOSE: 94 MG/DL (ref 68–131)
GLUCOSE SERPL-MCNC: 101 MG/DL (ref 70–110)
HBA1C MFR BLD: 4.9 % (ref 4–5.6)
HCT VFR BLD AUTO: 41 % (ref 37–48.5)
HDLC SERPL-MCNC: 44 MG/DL (ref 40–75)
HDLC SERPL: 27.7 % (ref 20–50)
HGB BLD-MCNC: 12.8 G/DL (ref 12–16)
IMM GRANULOCYTES # BLD AUTO: 0.01 K/UL (ref 0–0.04)
IMM GRANULOCYTES NFR BLD AUTO: 0.2 % (ref 0–0.5)
LDLC SERPL CALC-MCNC: 91.8 MG/DL (ref 63–159)
LYMPHOCYTES # BLD AUTO: 3.3 K/UL (ref 1–4.8)
LYMPHOCYTES NFR BLD: 51.8 % (ref 18–48)
MCH RBC QN AUTO: 29.6 PG (ref 27–31)
MCHC RBC AUTO-ENTMCNC: 31.2 G/DL (ref 32–36)
MCV RBC AUTO: 95 FL (ref 82–98)
MONOCYTES # BLD AUTO: 0.3 K/UL (ref 0.3–1)
MONOCYTES NFR BLD: 4.8 % (ref 4–15)
NEUTROPHILS # BLD AUTO: 2.5 K/UL (ref 1.8–7.7)
NEUTROPHILS NFR BLD: 39.7 % (ref 38–73)
NONHDLC SERPL-MCNC: 115 MG/DL
NRBC BLD-RTO: 0 /100 WBC
PLATELET # BLD AUTO: 218 K/UL (ref 150–450)
PMV BLD AUTO: 11.1 FL (ref 9.2–12.9)
POTASSIUM SERPL-SCNC: 3.6 MMOL/L (ref 3.5–5.1)
PROT SERPL-MCNC: 7.9 G/DL (ref 6–8.4)
RBC # BLD AUTO: 4.32 M/UL (ref 4–5.4)
SODIUM SERPL-SCNC: 140 MMOL/L (ref 136–145)
TRIGL SERPL-MCNC: 116 MG/DL (ref 30–150)
WBC # BLD AUTO: 6.41 K/UL (ref 3.9–12.7)

## 2022-03-04 PROCEDURE — 83036 HEMOGLOBIN GLYCOSYLATED A1C: CPT | Performed by: INTERNAL MEDICINE

## 2022-03-04 PROCEDURE — 82306 VITAMIN D 25 HYDROXY: CPT | Performed by: INTERNAL MEDICINE

## 2022-03-04 PROCEDURE — 85025 COMPLETE CBC W/AUTO DIFF WBC: CPT | Performed by: INTERNAL MEDICINE

## 2022-03-04 PROCEDURE — 80053 COMPREHEN METABOLIC PANEL: CPT | Performed by: INTERNAL MEDICINE

## 2022-03-04 PROCEDURE — 80061 LIPID PANEL: CPT | Performed by: INTERNAL MEDICINE

## 2022-03-04 PROCEDURE — 36415 COLL VENOUS BLD VENIPUNCTURE: CPT | Mod: PO | Performed by: INTERNAL MEDICINE

## 2022-03-07 ENCOUNTER — OFFICE VISIT (OUTPATIENT)
Dept: FAMILY MEDICINE | Facility: CLINIC | Age: 77
End: 2022-03-07
Payer: MEDICARE

## 2022-03-07 VITALS
TEMPERATURE: 98 F | WEIGHT: 145.19 LBS | HEART RATE: 67 BPM | SYSTOLIC BLOOD PRESSURE: 138 MMHG | BODY MASS INDEX: 28.51 KG/M2 | OXYGEN SATURATION: 97 % | DIASTOLIC BLOOD PRESSURE: 64 MMHG | HEIGHT: 60 IN

## 2022-03-07 DIAGNOSIS — I70.0 ATHEROSCLEROSIS OF AORTA: Chronic | ICD-10-CM

## 2022-03-07 DIAGNOSIS — F41.9 ANXIETY: ICD-10-CM

## 2022-03-07 DIAGNOSIS — I10 ESSENTIAL HYPERTENSION: Chronic | ICD-10-CM

## 2022-03-07 DIAGNOSIS — F33.0 MILD RECURRENT MAJOR DEPRESSION: Chronic | ICD-10-CM

## 2022-03-07 DIAGNOSIS — M81.0 AGE-RELATED OSTEOPOROSIS WITHOUT CURRENT PATHOLOGICAL FRACTURE: Chronic | ICD-10-CM

## 2022-03-07 DIAGNOSIS — F51.04 PSYCHOPHYSIOLOGICAL INSOMNIA: Chronic | ICD-10-CM

## 2022-03-07 DIAGNOSIS — Z00.00 ROUTINE MEDICAL EXAM: Primary | ICD-10-CM

## 2022-03-07 DIAGNOSIS — M51.36 DDD (DEGENERATIVE DISC DISEASE), LUMBAR: Chronic | ICD-10-CM

## 2022-03-07 PROCEDURE — 1125F AMNT PAIN NOTED PAIN PRSNT: CPT | Mod: CPTII,S$GLB,, | Performed by: INTERNAL MEDICINE

## 2022-03-07 PROCEDURE — 1159F PR MEDICATION LIST DOCUMENTED IN MEDICAL RECORD: ICD-10-PCS | Mod: CPTII,S$GLB,, | Performed by: INTERNAL MEDICINE

## 2022-03-07 PROCEDURE — 3078F DIAST BP <80 MM HG: CPT | Mod: CPTII,S$GLB,, | Performed by: INTERNAL MEDICINE

## 2022-03-07 PROCEDURE — 3288F PR FALLS RISK ASSESSMENT DOCUMENTED: ICD-10-PCS | Mod: CPTII,S$GLB,, | Performed by: INTERNAL MEDICINE

## 2022-03-07 PROCEDURE — 1101F PT FALLS ASSESS-DOCD LE1/YR: CPT | Mod: CPTII,S$GLB,, | Performed by: INTERNAL MEDICINE

## 2022-03-07 PROCEDURE — 99499 UNLISTED E&M SERVICE: CPT | Mod: S$GLB,,, | Performed by: INTERNAL MEDICINE

## 2022-03-07 PROCEDURE — 99999 PR PBB SHADOW E&M-EST. PATIENT-LVL V: CPT | Mod: PBBFAC,,, | Performed by: INTERNAL MEDICINE

## 2022-03-07 PROCEDURE — 99214 PR OFFICE/OUTPT VISIT, EST, LEVL IV, 30-39 MIN: ICD-10-PCS | Mod: S$GLB,,, | Performed by: INTERNAL MEDICINE

## 2022-03-07 PROCEDURE — 1160F RVW MEDS BY RX/DR IN RCRD: CPT | Mod: CPTII,S$GLB,, | Performed by: INTERNAL MEDICINE

## 2022-03-07 PROCEDURE — 1159F MED LIST DOCD IN RCRD: CPT | Mod: CPTII,S$GLB,, | Performed by: INTERNAL MEDICINE

## 2022-03-07 PROCEDURE — 99999 PR PBB SHADOW E&M-EST. PATIENT-LVL V: ICD-10-PCS | Mod: PBBFAC,,, | Performed by: INTERNAL MEDICINE

## 2022-03-07 PROCEDURE — 3075F PR MOST RECENT SYSTOLIC BLOOD PRESS GE 130-139MM HG: ICD-10-PCS | Mod: CPTII,S$GLB,, | Performed by: INTERNAL MEDICINE

## 2022-03-07 PROCEDURE — 3288F FALL RISK ASSESSMENT DOCD: CPT | Mod: CPTII,S$GLB,, | Performed by: INTERNAL MEDICINE

## 2022-03-07 PROCEDURE — 1101F PR PT FALLS ASSESS DOC 0-1 FALLS W/OUT INJ PAST YR: ICD-10-PCS | Mod: CPTII,S$GLB,, | Performed by: INTERNAL MEDICINE

## 2022-03-07 PROCEDURE — 99499 RISK ADDL DX/OHS AUDIT: ICD-10-PCS | Mod: S$GLB,,, | Performed by: INTERNAL MEDICINE

## 2022-03-07 PROCEDURE — 1125F PR PAIN SEVERITY QUANTIFIED, PAIN PRESENT: ICD-10-PCS | Mod: CPTII,S$GLB,, | Performed by: INTERNAL MEDICINE

## 2022-03-07 PROCEDURE — 1160F PR REVIEW ALL MEDS BY PRESCRIBER/CLIN PHARMACIST DOCUMENTED: ICD-10-PCS | Mod: CPTII,S$GLB,, | Performed by: INTERNAL MEDICINE

## 2022-03-07 PROCEDURE — 3075F SYST BP GE 130 - 139MM HG: CPT | Mod: CPTII,S$GLB,, | Performed by: INTERNAL MEDICINE

## 2022-03-07 PROCEDURE — 99214 OFFICE O/P EST MOD 30 MIN: CPT | Mod: S$GLB,,, | Performed by: INTERNAL MEDICINE

## 2022-03-07 PROCEDURE — 3078F PR MOST RECENT DIASTOLIC BLOOD PRESSURE < 80 MM HG: ICD-10-PCS | Mod: CPTII,S$GLB,, | Performed by: INTERNAL MEDICINE

## 2022-03-07 NOTE — PATIENT INSTRUCTIONS
Below are some numbers for [facility] that you can call to schedule things for the most convenient time for you.      Colonoscopy [phone]

## 2022-03-07 NOTE — PROGRESS NOTES
Assessment & Plan  Problem List Items Addressed This Visit        Neuro    DDD (degenerative disc disease), lumbar (Chronic)    Current Assessment & Plan     Followed by pain management.  Monitor               Psychiatric    Anxiety    Overview     Stable on paxil           Mild recurrent major depression (Chronic)    Overview     Stable on paxil              Cardiac/Vascular    Essential hypertension (Chronic)    Overview     On metoprolol and amlodipine           Current Assessment & Plan     The current medical regimen is effective;  continue present plan and medications. Counseled on DASH diet           Relevant Orders    CBC Auto Differential    Comprehensive Metabolic Panel    Lipid Panel    Atherosclerosis of aorta (Chronic)    Overview     Stable, asymptomatic chronic condition.  Will continue to maximize risk factor reduction and adjust medication as needed.               Orthopedic    Osteoporosis (Chronic)    Overview     Taking weekly fosamax           Current Assessment & Plan     Stop high dose Vit D and change to Caltrate-D OTC              Other    Psychophysiological insomnia (Chronic)    Overview     Stable on trazodone             Other Visit Diagnoses     Routine medical exam    -  Primary  -    Discussed healthy diet, regular exercise, necessary labs, age appropriate cancer screening, and routine vaccinations.               Health Maintenance reviewed, as above.  Has C-scope orders.    Follow-up: Follow up in about 1 year (around 3/7/2023) for Routine Physical.  ______________________________________________________________________    Chief Complaint  Chief Complaint   Patient presents with    Annual Exam       HPI  Dianaemanuel Herring is a 77 y.o. female with medical diagnoses as listed in the medical history and problem list that presents for routine physical.  Pt is known to me with their last appointment 2/22/2022.  She had labs prior to this OV that showed reassuring CBC, CMP, lipids, Vit D  and A1c.      She has an upcoming appt with GI about  Her LLQ pain.  + straining with BM.      Back an ongoing process but currently ok.        PAST MEDICAL HISTORY:  Past Medical History:   Diagnosis Date    Anxiety     Arthritis     Corneal abrasion s    ? eye     Depression     Full dentures     GERD (gastroesophageal reflux disease)     Hyperlipidemia     Hypertension     Nuclear sclerosis of both eyes 6/5/2017    Osteoporosis     Restless leg syndrome        PAST SURGICAL HISTORY:  Past Surgical History:   Procedure Laterality Date    COLONOSCOPY N/A 5/14/2019    Procedure: COLONOSCOPY;  Surgeon: Nahid Cline MD;  Location: [facility] ENDO;  Service: Endoscopy;  Laterality: N/A;    EPIDURAL STEROID INJECTION N/A 5/13/2020    Procedure: LUMBAR/CAUDAL L5/S1 IL SANKET ;  Surgeon: Miguel Latham MD;  Location: [facility] PAIN MGT;  Service: Pain Management;  Laterality: N/A;  NEEDS CONSENT    HYSTERECTOMY      PARTIAL HYSTERECTOMY      TRANSFORAMINAL EPIDURAL INJECTION OF STEROID Bilateral 9/4/2019    Procedure: Injection,steroid,epidural,transforaminal approach LUMBAR TRANSFORAMINAL BILATERAL L3 TF SANKET;  Surgeon: Miguel Latham MD;  Location: [facility] PAIN MGT;  Service: Pain Management;  Laterality: Bilateral;  NEEDS CONSENT    TRANSFORAMINAL EPIDURAL INJECTION OF STEROID Bilateral 10/30/2019    Procedure: TRANSFORAMINAL SANKET BILATERAL L3-L4;  Surgeon: Miguel Latham MD;  Location: [facility] PAIN MGT;  Service: Pain Management;  Laterality: Bilateral;  NEEDS CONSENT    TRANSFORAMINAL EPIDURAL INJECTION OF STEROID Bilateral 11/11/2020    Procedure: INJECTION, STEROID, EPIDURAL, TRANSFORAMINAL APPROACH, L3-L4;  Surgeon: Miguel Latham MD;  Location: [facility] PAIN MGT;  Service: Pain Management;  Laterality: Bilateral;    TRANSFORAMINAL EPIDURAL INJECTION OF STEROID Bilateral 5/19/2021    Procedure: INJECTION, STEROID, EPIDURAL, TRANSFORAMINAL APPROACH, L3-L4;  Surgeon: Miguel Latham MD;  Location:  [facility] PAIN MGT;  Service: Pain Management;  Laterality: Bilateral;    TRANSFORAMINAL EPIDURAL INJECTION OF STEROID Bilateral 2022    Procedure: Injection,steroid,epidural,transforaminal approach BILATERAL L3/4;  Surgeon: Miguel Latham MD;  Location: [facility] PAIN MGT;  Service: Pain Management;  Laterality: Bilateral;    TUBAL LIGATION Bilateral        SOCIAL HISTORY:  Social History     Socioeconomic History    Marital status:    Tobacco Use    Smoking status: Never Smoker    Smokeless tobacco: Never Used   Substance and Sexual Activity    Alcohol use: No    Drug use: No    Sexual activity: Never   Social History Narrative    Patient is  w/ 5 children, one  from heart disease.The patient is retired.       FAMILY HISTORY:  Family History   Problem Relation Age of Onset    Stroke Mother     Glaucoma Mother     Cataracts Mother     Cancer Father         Lung    No Known Problems Brother     No Known Problems Son     Stroke Brother     Heart disease Son     No Known Problems Sister     No Known Problems Maternal Aunt     No Known Problems Maternal Uncle     No Known Problems Paternal Aunt     No Known Problems Paternal Uncle     No Known Problems Maternal Grandmother     No Known Problems Maternal Grandfather     No Known Problems Paternal Grandmother     No Known Problems Paternal Grandfather     Amblyopia Neg Hx     Blindness Neg Hx     Diabetes Neg Hx     Hypertension Neg Hx     Macular degeneration Neg Hx     Retinal detachment Neg Hx     Strabismus Neg Hx     Thyroid disease Neg Hx        ALLERGIES AND MEDICATIONS: updated and reviewed.  Review of patient's allergies indicates:  No Known Allergies  Current Outpatient Medications   Medication Sig Dispense Refill    alendronate (FOSAMAX) 70 MG tablet TAKE 1 TABLET BY MOUTH ONE TIME PER WEEK 12 tablet 1    amLODIPine (NORVASC) 10 MG tablet TAKE 1 TABLET BY MOUTH EVERY DAY 90 tablet 3    atorvastatin  (LIPITOR) 40 MG tablet TAKE 1 TABLET BY MOUTH EVERY DAY 90 tablet 3    calcium-vitamin D 500-125 mg-unit tablet Take 1 tablet by mouth once daily.      diclofenac (VOLTAREN) 75 MG EC tablet Take 75 mg by mouth 2 (two) times daily.      ergocalciferol (ERGOCALCIFEROL) 50,000 unit Cap TAKE 1 CAPSULE (50,000 UNITS TOTAL) BY MOUTH EVERY SUNDAY 12 capsule 1    fluticasone propionate (FLONASE) 50 mcg/actuation nasal spray 2 SPRAYS (100 MCG TOTAL) BY EACH NOSTRIL ROUTE ONCE DAILY. 48 mL 3    gabapentin (NEURONTIN) 300 MG capsule Take 2 capsules (600 mg total) by mouth 2 (two) times daily. 360 capsule 1    meloxicam (MOBIC) 7.5 MG tablet Take 1 tablet (7.5 mg total) by mouth 2 (two) times daily as needed for Pain (with food). 60 tablet 2    metoprolol succinate (TOPROL-XL) 100 MG 24 hr tablet TAKE 1 TABLET BY MOUTH EVERY DAY 90 tablet 3    multivitamin (THERAGRAN) per tablet Take 1 tablet by mouth once daily.      pantoprazole (PROTONIX) 40 MG tablet TAKE 1 TABLET BY MOUTH EVERY DAY 90 tablet 1    traZODone (DESYREL) 150 MG tablet TAKE 1 TABLET (150 MG TOTAL) BY MOUTH EVERY EVENING. 90 tablet 1    paroxetine (PAXIL) 20 MG tablet Take 1 tablet (20 mg total) by mouth every morning. 90 tablet 1     No current facility-administered medications for this visit.         ROS  Review of Systems   Constitutional: Negative for chills, fever and unexpected weight change.   HENT: Negative for congestion, ear pain, hearing loss, rhinorrhea, sore throat and trouble swallowing.    Eyes: Negative for discharge, redness and visual disturbance.   Respiratory: Negative for cough, chest tightness, shortness of breath and wheezing.    Cardiovascular: Negative for chest pain, palpitations and leg swelling.   Gastrointestinal: Positive for constipation. Negative for abdominal pain, diarrhea, nausea and vomiting.   Endocrine: Negative for polydipsia, polyphagia and polyuria.   Genitourinary: Negative for decreased urine volume, dysuria  and hematuria.   Musculoskeletal: Positive for back pain. Negative for arthralgias, joint swelling and myalgias.   Skin: Negative for color change and rash.   Neurological: Negative for dizziness, weakness, light-headedness and headaches.   Psychiatric/Behavioral: Positive for sleep disturbance. Negative for decreased concentration, dysphoric mood and suicidal ideas.           Physical Exam  Vitals:    03/07/22 0848 03/07/22 0912   BP: (!) 150/80 138/64   Pulse: 67    Temp: 98 °F (36.7 °C)    SpO2: 97%    Weight: 65.9 kg (145 lb 2.8 oz)    Height: 5' (1.524 m)     Body mass index is 28.35 kg/m².  Weight: 65.9 kg (145 lb 2.8 oz)   Height: 5' (152.4 cm)   Physical Exam  Constitutional:       General: She is not in acute distress.     Appearance: She is well-developed.   HENT:      Head: Normocephalic and atraumatic.   Eyes:      General: Lids are normal. No scleral icterus.     Conjunctiva/sclera: Conjunctivae normal.      Pupils: Pupils are equal, round, and reactive to light.   Neck:      Thyroid: No thyromegaly.      Vascular: No carotid bruit or JVD.   Cardiovascular:      Rate and Rhythm: Normal rate and regular rhythm.      Pulses: Normal pulses.      Heart sounds: Normal heart sounds. No murmur heard.    No friction rub. No S3 or S4 sounds.   Pulmonary:      Effort: Pulmonary effort is normal.      Breath sounds: Normal breath sounds. No wheezing, rhonchi or rales.   Abdominal:      General: Bowel sounds are normal.      Palpations: Abdomen is soft.      Tenderness: There is no abdominal tenderness.   Musculoskeletal:         General: No tenderness.      Cervical back: Full passive range of motion without pain and neck supple.      Right lower leg: No edema.      Left lower leg: No edema.   Skin:     General: Skin is warm and dry.      Findings: No rash.   Neurological:      Mental Status: She is alert and oriented to person, place, and time.   Psychiatric:         Speech: Speech normal.         Behavior:  Behavior normal.         Thought Content: Thought content normal.             Health Maintenance       Date Due Completion Date    Mammogram 06/19/2021 6/19/2020    Colonoscopy 05/14/2022 5/14/2019    TETANUS VACCINE 08/31/2034 (Originally 1/4/1963) ---    DEXA Scan 10/06/2023 10/6/2020    Lipid Panel 03/04/2027 3/4/2022

## 2022-03-29 ENCOUNTER — HOSPITAL ENCOUNTER (OUTPATIENT)
Dept: RADIOLOGY | Facility: HOSPITAL | Age: 77
Discharge: HOME | End: 2022-03-29
Attending: INTERNAL MEDICINE
Payer: MEDICARE

## 2022-03-29 DIAGNOSIS — Z12.31 ENCOUNTER FOR SCREENING MAMMOGRAM FOR MALIGNANT NEOPLASM OF BREAST: ICD-10-CM

## 2022-03-29 PROCEDURE — 77063 BREAST TOMOSYNTHESIS BI: CPT | Mod: 26,,, | Performed by: RADIOLOGY

## 2022-03-29 PROCEDURE — 77063 MAMMO DIGITAL SCREENING BILAT WITH TOMO: ICD-10-PCS | Mod: 26,,, | Performed by: RADIOLOGY

## 2022-03-29 PROCEDURE — 77063 BREAST TOMOSYNTHESIS BI: CPT | Mod: TC,PO

## 2022-03-29 PROCEDURE — 77067 SCR MAMMO BI INCL CAD: CPT | Mod: TC,PO

## 2022-03-29 PROCEDURE — 77067 MAMMO DIGITAL SCREENING BILAT WITH TOMO: ICD-10-PCS | Mod: 26,,, | Performed by: RADIOLOGY

## 2022-03-29 PROCEDURE — 77067 SCR MAMMO BI INCL CAD: CPT | Mod: 26,,, | Performed by: RADIOLOGY

## 2022-04-06 ENCOUNTER — TELEPHONE (OUTPATIENT)
Dept: PAIN MEDICINE | Facility: CLINIC | Age: 77
End: 2022-04-06
Payer: MEDICARE

## 2022-04-06 NOTE — TELEPHONE ENCOUNTER
This message is for patient in regards to his/her appointment 04/07/22 at 8:40m       [facility] Policy: For the safety of all patients and staff members.     Patient Visitor policy: During this visit we're asking all patients to only have one visitor over the age of 18yrs old to accompany to be seen by BRENDA Hackett. If patient do not required assistance with their visit, we're asking all visitors to remain outside the waiting area.    Upon arriving to your schedule appointment; patients are required to wear a face mask, if patient do not have a face mask one will be provided entering the facility. If you have any questions or concerns please contact [phone]

## 2022-04-07 ENCOUNTER — OFFICE VISIT (OUTPATIENT)
Dept: PAIN MEDICINE | Facility: CLINIC | Age: 77
End: 2022-04-07
Payer: MEDICARE

## 2022-04-07 VITALS
RESPIRATION RATE: 18 BRPM | BODY MASS INDEX: 28.86 KG/M2 | HEIGHT: 60 IN | SYSTOLIC BLOOD PRESSURE: 164 MMHG | DIASTOLIC BLOOD PRESSURE: 87 MMHG | HEART RATE: 80 BPM | TEMPERATURE: 97 F | WEIGHT: 147 LBS

## 2022-04-07 DIAGNOSIS — M54.16 LUMBAR RADICULOPATHY: ICD-10-CM

## 2022-04-07 DIAGNOSIS — M48.062 SPINAL STENOSIS, LUMBAR REGION WITH NEUROGENIC CLAUDICATION: ICD-10-CM

## 2022-04-07 DIAGNOSIS — M54.15 RADICULOPATHY OF THORACOLUMBAR REGION: ICD-10-CM

## 2022-04-07 DIAGNOSIS — M54.17 LUMBOSACRAL RADICULOPATHY: Primary | ICD-10-CM

## 2022-04-07 DIAGNOSIS — G89.29 OTHER CHRONIC PAIN: ICD-10-CM

## 2022-04-07 DIAGNOSIS — M47.819 SPONDYLOSIS WITHOUT MYELOPATHY: ICD-10-CM

## 2022-04-07 PROCEDURE — 99999 PR PBB SHADOW E&M-EST. PATIENT-LVL IV: CPT | Mod: PBBFAC,,, | Performed by: NURSE PRACTITIONER

## 2022-04-07 PROCEDURE — 3288F FALL RISK ASSESSMENT DOCD: CPT | Mod: CPTII,S$GLB,, | Performed by: NURSE PRACTITIONER

## 2022-04-07 PROCEDURE — 3077F PR MOST RECENT SYSTOLIC BLOOD PRESSURE >= 140 MM HG: ICD-10-PCS | Mod: CPTII,S$GLB,, | Performed by: NURSE PRACTITIONER

## 2022-04-07 PROCEDURE — 99213 OFFICE O/P EST LOW 20 MIN: CPT | Mod: S$GLB,,, | Performed by: NURSE PRACTITIONER

## 2022-04-07 PROCEDURE — 99213 PR OFFICE/OUTPT VISIT, EST, LEVL III, 20-29 MIN: ICD-10-PCS | Mod: S$GLB,,, | Performed by: NURSE PRACTITIONER

## 2022-04-07 PROCEDURE — 3079F PR MOST RECENT DIASTOLIC BLOOD PRESSURE 80-89 MM HG: ICD-10-PCS | Mod: CPTII,S$GLB,, | Performed by: NURSE PRACTITIONER

## 2022-04-07 PROCEDURE — 3288F PR FALLS RISK ASSESSMENT DOCUMENTED: ICD-10-PCS | Mod: CPTII,S$GLB,, | Performed by: NURSE PRACTITIONER

## 2022-04-07 PROCEDURE — 99999 PR PBB SHADOW E&M-EST. PATIENT-LVL IV: ICD-10-PCS | Mod: PBBFAC,,, | Performed by: NURSE PRACTITIONER

## 2022-04-07 PROCEDURE — 3077F SYST BP >= 140 MM HG: CPT | Mod: CPTII,S$GLB,, | Performed by: NURSE PRACTITIONER

## 2022-04-07 PROCEDURE — 1160F PR REVIEW ALL MEDS BY PRESCRIBER/CLIN PHARMACIST DOCUMENTED: ICD-10-PCS | Mod: CPTII,S$GLB,, | Performed by: NURSE PRACTITIONER

## 2022-04-07 PROCEDURE — 1160F RVW MEDS BY RX/DR IN RCRD: CPT | Mod: CPTII,S$GLB,, | Performed by: NURSE PRACTITIONER

## 2022-04-07 PROCEDURE — 1125F PR PAIN SEVERITY QUANTIFIED, PAIN PRESENT: ICD-10-PCS | Mod: CPTII,S$GLB,, | Performed by: NURSE PRACTITIONER

## 2022-04-07 PROCEDURE — 1101F PT FALLS ASSESS-DOCD LE1/YR: CPT | Mod: CPTII,S$GLB,, | Performed by: NURSE PRACTITIONER

## 2022-04-07 PROCEDURE — 3079F DIAST BP 80-89 MM HG: CPT | Mod: CPTII,S$GLB,, | Performed by: NURSE PRACTITIONER

## 2022-04-07 PROCEDURE — 1159F PR MEDICATION LIST DOCUMENTED IN MEDICAL RECORD: ICD-10-PCS | Mod: CPTII,S$GLB,, | Performed by: NURSE PRACTITIONER

## 2022-04-07 PROCEDURE — 1101F PR PT FALLS ASSESS DOC 0-1 FALLS W/OUT INJ PAST YR: ICD-10-PCS | Mod: CPTII,S$GLB,, | Performed by: NURSE PRACTITIONER

## 2022-04-07 PROCEDURE — 1125F AMNT PAIN NOTED PAIN PRSNT: CPT | Mod: CPTII,S$GLB,, | Performed by: NURSE PRACTITIONER

## 2022-04-07 PROCEDURE — 1159F MED LIST DOCD IN RCRD: CPT | Mod: CPTII,S$GLB,, | Performed by: NURSE PRACTITIONER

## 2022-04-07 RX ORDER — DICLOFENAC SODIUM 75 MG/1
75 TABLET, DELAYED RELEASE ORAL 2 TIMES DAILY PRN
Qty: 60 TABLET | Refills: 2 | Status: SHIPPED | OUTPATIENT
Start: 2022-04-07 | End: 2022-05-20

## 2022-04-07 NOTE — H&P (VIEW-ONLY)
Chronic patient Established Note (Follow up visit)      SUBJECTIVE:    Interval History 4/7/2022:  The patient is here for follow up of lower back and leg pain. Since previous encounter, she underwent bilateral L3/4 TF SANKET on 1/26/22. She reports 80% relief of pain for about 2 months. She feels like this gave her significant benefit during that time and her pain was mild. She is now again having severe back pain with radiation into the anterior thighs. She would like to repeat the procedure. She would also like me to place another referral for aquatic PT. She stopped Mobic because she found Diclofenac more helpful. She has been taking as needed but not daily. She does find Gabapentin helpful but it sometimes makes her drowsy. Her pain today is 9/10.    Interval History 1/11/2022:  The patient returns to discuss continued lower back pain. She has not restarted aquatic PT due to increase in Covid-19 cases. She continues with sharp lower back pain that radiates into anterior thighs, bilaterally. She has associated numbness. The pain is worse with increased activity. Her pain today is 7/10.    Interval History 11/11/2021:  The patient presents today for 2 month follow up of lower back pain. Since previous encounter, she has not started aquatic PT because she is on the waiting list. She continues to report lower back pain with radiation down the left leg. We previously discussed lumbar MBB/RFA which she does not wish to pursue at this time. She states that diclofenac is not helping very much with her aching pain at this time. She denies any new weakness or symptoms at this time. Her pain today is 8/10.    Interval History 8/19/2021:  Diana returns today for follow up of chronic lower back pain. She reports that he has continued with aching pain. She has been in PT but is not finding it very helpful. She has not tried aquatic PT in the past. She is still having intermittent radiation down her legs. She has been unable to  take 1200 mg daily of Gabapentin and has decreased to 600 mg daily due to sedation and dizziness with the medication. Otherwise, no new complaints today. Her pain today is 7/10.    Interval History 6/18/2021:  The patient is here for follow up of lower back pain.  She has radiation of her pain into her anterior thighs but not below the knees.  She denies numbness or tingling at this time.  Her back pain is currently greater than her leg pain.  She has a lot of stiffness when she wakes the morning states it improves when she moves.  She had limited benefit with recent repeat bilateral L3-4 transforaminal steroid injection.  Her pain today is 7/10    Interval History 5/4/2021:  The patient is here for follow up of lower back and leg pain.  Previous encounter, physical therapy was ordered.  She states that she did not start physical therapy and would like me to replace the order for her.  She does report that she is noticing more muscle fatigue and weakness particularly with activity.  She is also having more back pain with radiation into the anterior lateral thighs with the past month.  She previously had significant been hip with bilateral L3/4 transforaminal epidural steroid injection and wishes to repeat this.  She found this more helpful than previous procedures. Her pain today is 7/10.    Interval History 3/2/2021:  The patient is here for follow up of chronic pain. She is having more back pain today which she attributes to the cold front coming in. She is having radiation into the buttocks and right anterior thigh. Her back pain is her primary complaint today. She describes it as throbbing. She recently completed both Covid-19 vaccines which she tolerated well. She is now taking Gabapentin 600 mg twice daily regularly. She notices improvement in right leg numbness.  Her pain today is 7/10.    Interval History 12/1/2020:  The patient iss here for follow-up of back and leg pain.  She is now status post bilateral  L3/4 transforaminal epidural steroid injection on 11/11/2020. She is reporting 100% relief for about 2 weeks and then her pain started to return.  She is still having some benefit.  Her pain is located across the lower back with radiation into front and sides of the legs to the anterior feet with associated numbness.  She feels like when she walks sometimes her legs become weak.  This has improved slightly.  She denies any recent falls.  At her last OV, her Gabapentin was increased to 600 mg BID.  She says that she finds this helpful.  She has mild drowsiness.  She admits that she does not always take the medication and thought it was more of an as needed medication.  The patient denies any bowel or bladder incontinence or signs of saddle paresthesia.  She feels as though her pain today is worse than usual.  She rates her pain today as 10/10.    Interval history 10/15/2020:  Diana Herring presents to the clinic for a follow-up appointment for back pain. Since the last visit, Diana Herring states the pain has been worsening. Current pain intensity is 7/10. She reports that she continues to have low back pain that radiates to the bilateral hip and leg. She does report that she has noticed intermittent numbness in the right lateral and anterior thigh, that is sometimes associated with weakness in her right leg. She says that her leg gives out when this happens. She has not had any falls or other trauma. She did not get the bilateral L3/4 TFESI planned last time, and she is still taking only 300 mg bid of gabapentin.  Patient denies urinary incontinence, bowel incontinence, significant weight loss.    Initial visit:  Diana Herring presents to the clinic for the evaluation of low back pain. The pain started 2 years ago following loosing bone after a bone density and symptoms have been worsening.The pain is located in the low back area and radiates to the bilateral hip and leg.  The pain is described as  aching, numbing, sharp and tight band and is rated as 8/10. The pain is rated with a score of  7/10 on the BEST day and a score of 8/10 on the WORST day.  Symptoms interfere with daily activity and sleeping. The pain is exacerbated by Sitting, Standing, Bending, Coughing/Sneezing, Walking, Morning, Lifting and Getting out of bed/chair.  The pain is mitigated by heat, laying down and rest. She reports spending 0 hours per day reclining. The patient reports 6 hours of uninterrupted sleep per night.     Patient had L5/S1 ILESI 2 weeks ago and only gave her 1 day of relief.      Patient denies night fever/night sweats, urinary incontinence, bowel incontinence, significant weight loss and significant motor weakness.     Physical Therapy/Home Exercise: yes, was not beneficial      Pain Disability Index Review:  Last 3 PDI Scores 4/7/2022 1/11/2022 11/11/2021   Pain Disability Index (PDI) [phone]       Pain Medications:  Gabapentin 600 mg bid    Opioid Contract: no     report:  Reviewed and consistent with medication use as prescribed.    Pain Procedures:  1/26/22 Bilateral L3/4 TF SANKET  5/19/21 bilateral L3/4 TF SANKET  11/11/20 Bilateral L3/4 TF SANKET- 100% relief for 2 weeks  5/13/20 L5/S1 ILESI  10/30/19 TFESI Bilateral L3-L4  09/04/19 TFESI Bilateral L3     Physical Therapy/Home Exercise: yes    Imaging:  Narrative & Impression     EXAMINATION:  XR LUMBAR SPINE 5 VIEW WITH FLEX AND EXT     CLINICAL HISTORY:  Low back pain, >6wks conservative tx, persistent-progressive sx, surgical candidate;  Lumbago with sciatica, left side     TECHNIQUE:  Five views of the lumbar spine plus flexion extension views were performed.     COMPARISON:  07/23/2019     FINDINGS:  The alignment of the lumbar spine is normal.     The vertebral body height are well maintained.  Mild disc space narrowing L3-L4.     Flexion and extension views demonstrate no evidence of translational abnormalities.     Minimal osteophyte formation L2-L3 L4.     No  fracture or osseous lesions.     The sacroiliac joints appears symmetrical on the AP view.     The remainder of the visualized soft tissue and osseous structures appear normal.     There is atherosclerotic plaque of the abdominal aorta.     Impression:     Mild spondylosis of the lumbar spine, not significantly changed from the prior study.        Electronically signed by: Ava Brennan MD  Date:                                            05/29/2020  Time:                                           08:28        Narrative & Impression     EXAMINATION:  XR HIPS BILATERAL 2 VIEW INCL AP PELVIS     CLINICAL HISTORY:  Lumbago with sciatica, left side     TECHNIQUE:  AP view of the pelvis and frogleg lateral views of both hips were performed.     COMPARISON:  None.     FINDINGS:  Normal mineralization.  Pelvis demonstrates no acute fractures.  No dislocations are noted.  Hip joints appear to be symmetric and within normal limits with mild DJD of the hip joints.  There is no osteoblastic or lytic lesions.  Soft tissues are unremarkable.     SI joints are symmetric and within normal limits.  There is mild spondylotic changes in the lower lumbar spine.     There is moderate amount of retained stool in the right colon.     Impression:     Mild DJD of the hip joints.     1. No acute fractures.        Electronically signed by: David Vasquez MD  Date:                                            05/29/2020  Time:                                           08:28     Narrative & Impression     EXAMINATION:  MRI LUMBAR SPINE WITHOUT CONTRAST     CLINICAL HISTORY:  back and bilateral leg pain; Lumbago with sciatica, left side     TECHNIQUE:  Multiplanar, multisequence MR images were acquired from the thoracolumbar junction to the sacrum without the administration of contrast.     COMPARISON:  None.     FINDINGS:  MRI examination of the lumbar spine was performed.  Intravenous contrast was not utilized.  There are areas of  hyperintense T2 hypointense T1 lesions of the kidneys likely cysts not optimally evaluated on this exam.     There is multilevel degenerative loss of disc signal and there is loss of disc space height most notable at L3-4.  There are chronic appearing endplate changes noted with some mild STIR hyperintensity that likely relates to edema associated with degenerative endplate change.  There is no evidence for high-grade spondylolisthesis, there is no evidence for high-grade or acute compression fracture deformity.  There is no finding to specifically suggest aggressive bone marrow replacement process.     The sagittal imaging includes the majority of T10 through the S3 segment, axial imaging includes the inferior aspect of T12 through the majority of S1.  On the sagittal imaging there is appearance suggesting mild degenerative disc disease and disc bulge at T10-11 and T11-12 with some mild posterior ligamentous thickening suggested at T10-11 however without evidence for high-grade spinal canal stenosis.     The T12-L1 level demonstrates no evidence for high-grade spinal canal or foraminal stenosis.     The L1-2 level demonstrates degenerative disc bulge with anterior impression upon the dural sac.  There is posterior facet arthropathy and ligamentous thickening there is mild spinal canal stenosis.  There is encroachment into the neural foramen at the level of the disc plane without obvious exiting nerve root impingement.     The L2-3 level demonstrates mild degenerative loss of disc space height.  There is mild degenerative disc bulge with anterior impression upon the dural sac.  There is posterior facet arthropathy with somewhat prominent ligamentous thickening with associated posterolateral encroachment.  There is mild-to-moderate spinal canal stenosis.  There is bilateral foraminal narrowing.     The L3-4 level demonstrates the aforementioned appearance of loss of disc space height and degenerative loss of disc  signal.  There is diffuse degenerative disc bulge with anterior impression upon the dural sac there is posterior facet arthropathy and ligamentous thickening there is high-grade spinal canal stenosis.  There is bilateral foraminal stenosis.     The L4-5 level demonstrates degenerative disc bulge with anterior impression upon the dural sac there is posterior facet arthropathy and ligamentous thickening with mild to moderate spinal canal stenosis.  There is bilateral foraminal narrowing/stenosis without obvious exiting nerve root impingement.     The L5-S1 level demonstrates no evidence for high-grade spinal canal stenosis.  Facet arthropathy is noted there is no evidence for high-grade foraminal stenosis or exiting nerve root impingement.     The visualized spinal cord appears to taper appropriately and appears of appropriate signal and caliber.  Mild signal intensity within the spinal canal on T1 axial imaging may relate to a minimal lipoma along the filum terminalis.  There is no additional evidence for intraspinal mass or abnormal fluid collection     Impression:     Multilevel chronic change of the lumbar spine with variable spinal canal and foraminal narrowing/stenosis as detailed above.  Correlation for any specific level of symptomatology is needed.  The greatest level of spinal canal stenosis is seen at L3-4.        Electronically signed by: Obie Aggarwal  Date:                                            07/27/2019  Time:                                           03:45     CMP  Sodium   Date Value Ref Range Status   03/04/2022 140 136 - 145 mmol/L Final     Potassium   Date Value Ref Range Status   03/04/2022 3.6 3.5 - 5.1 mmol/L Final     Chloride   Date Value Ref Range Status   03/04/2022 103 95 - 110 mmol/L Final     CO2   Date Value Ref Range Status   03/04/2022 28 23 - 29 mmol/L Final     Glucose   Date Value Ref Range Status   03/04/2022 101 70 - 110 mg/dL Final     BUN   Date Value Ref Range Status    03/04/2022 14 8 - 23 mg/dL Final     Creatinine   Date Value Ref Range Status   03/04/2022 0.8 0.5 - 1.4 mg/dL Final     Calcium   Date Value Ref Range Status   03/04/2022 9.4 8.7 - 10.5 mg/dL Final     Total Protein   Date Value Ref Range Status   03/04/2022 7.9 6.0 - 8.4 g/dL Final     Albumin   Date Value Ref Range Status   03/04/2022 4.1 3.5 - 5.2 g/dL Final     Total Bilirubin   Date Value Ref Range Status   03/04/2022 0.8 0.1 - 1.0 mg/dL Final     Comment:     For infants and newborns, interpretation of results should be based  on gestational age, weight and in agreement with clinical  observations.    Premature Infant recommended reference ranges:  Up to 24 hours.............<8.0 mg/dL  Up to 48 hours............<12.0 mg/dL  3-5 days..................<15.0 mg/dL  6-29 days.................<15.0 mg/dL       Alkaline Phosphatase   Date Value Ref Range Status   03/04/2022 70 55 - 135 U/L Final     AST   Date Value Ref Range Status   03/04/2022 17 10 - 40 U/L Final     ALT   Date Value Ref Range Status   03/04/2022 20 10 - 44 U/L Final     Anion Gap   Date Value Ref Range Status   03/04/2022 9 8 - 16 mmol/L Final     eGFR if    Date Value Ref Range Status   03/04/2022 >60.0 >60 mL/min/1.73 m^2 Final     eGFR if non    Date Value Ref Range Status   03/04/2022 >60.0 >60 mL/min/1.73 m^2 Final     Comment:     Calculation used to obtain the estimated glomerular filtration  rate (eGFR) is the CKD-EPI equation.            Allergies: Review of patient's allergies indicates:  No Known Allergies    Current Medications:   Current Outpatient Medications   Medication Sig Dispense Refill    alendronate (FOSAMAX) 70 MG tablet TAKE 1 TABLET BY MOUTH ONE TIME PER WEEK 12 tablet 1    amLODIPine (NORVASC) 10 MG tablet TAKE 1 TABLET BY MOUTH EVERY DAY 90 tablet 3    atorvastatin (LIPITOR) 40 MG tablet TAKE 1 TABLET BY MOUTH EVERY DAY 90 tablet 3    calcium-vitamin D 500-125 mg-unit tablet Take  1 tablet by mouth once daily.      diclofenac (VOLTAREN) 75 MG EC tablet Take 75 mg by mouth 2 (two) times daily.      ergocalciferol (ERGOCALCIFEROL) 50,000 unit Cap TAKE 1 CAPSULE (50,000 UNITS TOTAL) BY MOUTH EVERY SUNDAY 12 capsule 1    fluticasone propionate (FLONASE) 50 mcg/actuation nasal spray 2 SPRAYS (100 MCG TOTAL) BY EACH NOSTRIL ROUTE ONCE DAILY. 48 mL 3    gabapentin (NEURONTIN) 300 MG capsule Take 2 capsules (600 mg total) by mouth 2 (two) times daily. 360 capsule 1    meloxicam (MOBIC) 7.5 MG tablet Take 1 tablet (7.5 mg total) by mouth 2 (two) times daily as needed for Pain (with food). 60 tablet 2    metoprolol succinate (TOPROL-XL) 100 MG 24 hr tablet TAKE 1 TABLET BY MOUTH EVERY DAY 90 tablet 3    multivitamin (THERAGRAN) per tablet Take 1 tablet by mouth once daily.      pantoprazole (PROTONIX) 40 MG tablet TAKE 1 TABLET BY MOUTH EVERY DAY 90 tablet 1    paroxetine (PAXIL) 20 MG tablet Take 1 tablet (20 mg total) by mouth every morning. 90 tablet 1    traZODone (DESYREL) 150 MG tablet TAKE 1 TABLET (150 MG TOTAL) BY MOUTH EVERY EVENING. 90 tablet 1     No current facility-administered medications for this visit.       REVIEW OF SYSTEMS:    GENERAL:  No weight loss, malaise or fevers.  HEENT:  Negative for frequent or significant headaches.  NECK:  Negative for lumps, goiter, pain and significant neck swelling.  RESPIRATORY:  Negative for cough, wheezing or shortness of breath.  CARDIOVASCULAR:  Negative for chest pain, leg swelling or palpitations.  GI:  Negative for abdominal discomfort, blood in stools or black stools or change in bowel habits. GERD.  MUSCULOSKELETAL:  See HPI.  SKIN:  Negative for lesions, rash, and itching.  PSYCH:  + for sleep disturbance, h/o depression  HEMATOLOGY/LYMPHOLOGY:  Negative for prolonged bleeding, bruising easily or swollen nodes.  NEURO:  + numbness, weakness. No history of headaches, syncope, paralysis, seizures or tremors.  All other reviewed and  negative other than HPI.     Past Medical History:  Past Medical History:   Diagnosis Date    Anxiety     Arthritis     Corneal abrasion s    ? eye     Depression     Full dentures     GERD (gastroesophageal reflux disease)     Hyperlipidemia     Hypertension     Nuclear sclerosis of both eyes 6/5/2017    Osteoporosis     Restless leg syndrome        Past Surgical History:  Past Surgical History:   Procedure Laterality Date    COLONOSCOPY N/A 5/14/2019    Procedure: COLONOSCOPY;  Surgeon: Nahid Cline MD;  Location[facility] ENDO;  Service: Endoscopy;  Laterality: N/A;    EPIDURAL STEROID INJECTION N/A 5/13/2020    Procedure: LUMBAR/CAUDAL L5/S1 IL SANKET ;  Surgeon: Miguel Latham MD;  Location: [facility] PAIN MGT;  Service: Pain Management;  Laterality: N/A;  NEEDS CONSENT    HYSTERECTOMY      PARTIAL HYSTERECTOMY      TRANSFORAMINAL EPIDURAL INJECTION OF STEROID Bilateral 9/4/2019    Procedure: Injection,steroid,epidural,transforaminal approach LUMBAR TRANSFORAMINAL BILATERAL L3 TF SANKET;  Surgeon: Miguel Latham MD;  Location: [facility] PAIN MGT;  Service: Pain Management;  Laterality: Bilateral;  NEEDS CONSENT    TRANSFORAMINAL EPIDURAL INJECTION OF STEROID Bilateral 10/30/2019    Procedure: TRANSFORAMINAL SANKET BILATERAL L3-L4;  Surgeon: Miguel Latham MD;  Location: [facility] PAIN MGT;  Service: Pain Management;  Laterality: Bilateral;  NEEDS CONSENT    TRANSFORAMINAL EPIDURAL INJECTION OF STEROID Bilateral 11/11/2020    Procedure: INJECTION, STEROID, EPIDURAL, TRANSFORAMINAL APPROACH, L3-L4;  Surgeon: Miguel Latham MD;  Location: [facility] PAIN MGT;  Service: Pain Management;  Laterality: Bilateral;    TRANSFORAMINAL EPIDURAL INJECTION OF STEROID Bilateral 5/19/2021    Procedure: INJECTION, STEROID, EPIDURAL, TRANSFORAMINAL APPROACH, L3-L4;  Surgeon: Miguel Latham MD;  Location: [facility] PAIN MGT;  Service: Pain Management;  Laterality: Bilateral;    TRANSFORAMINAL EPIDURAL INJECTION OF STEROID  Bilateral 2022    Procedure: Injection,steroid,epidural,transforaminal approach BILATERAL L3/4;  Surgeon: Miguel Latham MD;  Location: [facility];  Service: Pain Management;  Laterality: Bilateral;    TUBAL LIGATION Bilateral        Family History:  Family History   Problem Relation Age of Onset    Stroke Mother     Glaucoma Mother     Cataracts Mother     Cancer Father         Lung    No Known Problems Brother     No Known Problems Son     Stroke Brother     Heart disease Son     No Known Problems Sister     No Known Problems Maternal Aunt     No Known Problems Maternal Uncle     No Known Problems Paternal Aunt     No Known Problems Paternal Uncle     No Known Problems Maternal Grandmother     No Known Problems Maternal Grandfather     No Known Problems Paternal Grandmother     No Known Problems Paternal Grandfather     Amblyopia Neg Hx     Blindness Neg Hx     Diabetes Neg Hx     Hypertension Neg Hx     Macular degeneration Neg Hx     Retinal detachment Neg Hx     Strabismus Neg Hx     Thyroid disease Neg Hx        Social History:  Social History     Socioeconomic History    Marital status:    Tobacco Use    Smoking status: Never Smoker    Smokeless tobacco: Never Used   Substance and Sexual Activity    Alcohol use: No    Drug use: No    Sexual activity: Never   Social History Narrative    Patient is  w/ 5 children, one  from heart disease.The patient is retired.       OBJECTIVE:    BP (!) 164/87 (BP Location: Right arm, Patient Position: Sitting, BP Method: Medium (Automatic))   Pulse 80   Temp 97 °F (36.1 °C) (Temporal)   Resp 18   Ht 5' (1.524 m)   Wt 66.7 kg (147 lb)   BMI 28.71 kg/m²     PHYSICAL EXAMINATION:    General appearance: Well appearing, in no acute distress, alert and oriented x3.  Psych:  Mood and affect appropriate.  Skin: Skin color, texture, turgor normal, no rashes or lesions, in both upper and lower body.  Head/face:   Normocephalic, atraumatic. No palpable lymph nodes.  Back: Straight leg raising in the sitting and supine positions is negative. TTP over lumbar facet joints and paraspinals bilaterally. Limited range of motion with pain on flexion and extension. Positive facet loading bilaterally.  Extremities: Peripheral joint ROM is full and pain free without obvious instability or laxity in all four extremities. No deformities, edema, or skin discoloration. Good capillary refill.  Musculoskeletal: No atrophy or tone abnormalities are noted. TTP over bilateral SI joints.  PASCALE is negative bilaterally. 5/5 strength in right ankle with plantar and 4/5 on dorsiflexion. 5/5 strength in left ankle with plantar and 4/5 on dorsiflexion. 4/5 strength with right knee flexion and extension. 5/5 strength with left knee flexion and extension. 5/5 strength in right EHL, 5/5 strength in left EHL. Right hip flexion is 4/5, left is 4/5.  Neuro: Decreased sensation to in a bilateral L3 distribution.  Gait: Antalgic.    ASSESSMENT: 77 y.o. year old female with low back pain, consistent with     1. Lumbosacral radiculopathy  Procedure Order to Pain Management    Ambulatory referral/consult to Physical/Occupational Therapy   2. Spondylosis without myelopathy  Ambulatory referral/consult to Physical/Occupational Therapy   3. Radiculopathy of thoracolumbar region  Ambulatory referral/consult to Physical/Occupational Therapy   4. Spinal stenosis, lumbar region with neurogenic claudication     5. Lumbar radiculopathy     6. Other chronic pain           PLAN:     - I have stressed the importance of physical activity and a home exercise plan to help with pain and improve health.  - Patient can continue with medications for now since they are providing benefits, using them appropriately, and without side effects.  - Continue Gabapentin 600 mg BID.  - Stop Mobic and change back to Voltaren BID PRN pain as she finds this more helpful. She will only take as  needed not daily.  - Schedule for repeat bilateral L3/4 TF SANKET.  - Also consider bilateral L3, 4, 5 medial branch blocks we followed by radiofrequency ablation if positive for axial component of pain.    - RTC in 2 month sor sooner if needed.  - Counseled patient regarding the importance of activity modification, constant sleeping habits and physical therapy.    The above plan and management options were discussed at length with patient. Patient is in agreement with the above and verbalized [email or web address]    Christin Ventura, BRENDA  04/07/2022     No

## 2022-04-07 NOTE — PROGRESS NOTES
Chronic patient Established Note (Follow up visit)      SUBJECTIVE:    Interval History 4/7/2022:  The patient is here for follow up of lower back and leg pain. Since previous encounter, she underwent bilateral L3/4 TF SANKET on 1/26/22. She reports 80% relief of pain for about 2 months. She feels like this gave her significant benefit during that time and her pain was mild. She is now again having severe back pain with radiation into the anterior thighs. She would like to repeat the procedure. She would also like me to place another referral for aquatic PT. She stopped Mobic because she found Diclofenac more helpful. She has been taking as needed but not daily. She does find Gabapentin helpful but it sometimes makes her drowsy. Her pain today is 9/10.    Interval History 1/11/2022:  The patient returns to discuss continued lower back pain. She has not restarted aquatic PT due to increase in Covid-19 cases. She continues with sharp lower back pain that radiates into anterior thighs, bilaterally. She has associated numbness. The pain is worse with increased activity. Her pain today is 7/10.    Interval History 11/11/2021:  The patient presents today for 2 month follow up of lower back pain. Since previous encounter, she has not started aquatic PT because she is on the waiting list. She continues to report lower back pain with radiation down the left leg. We previously discussed lumbar MBB/RFA which she does not wish to pursue at this time. She states that diclofenac is not helping very much with her aching pain at this time. She denies any new weakness or symptoms at this time. Her pain today is 8/10.    Interval History 8/19/2021:  Diana returns today for follow up of chronic lower back pain. She reports that he has continued with aching pain. She has been in PT but is not finding it very helpful. She has not tried aquatic PT in the past. She is still having intermittent radiation down her legs. She has been unable to  take 1200 mg daily of Gabapentin and has decreased to 600 mg daily due to sedation and dizziness with the medication. Otherwise, no new complaints today. Her pain today is 7/10.    Interval History 6/18/2021:  The patient is here for follow up of lower back pain.  She has radiation of her pain into her anterior thighs but not below the knees.  She denies numbness or tingling at this time.  Her back pain is currently greater than her leg pain.  She has a lot of stiffness when she wakes the morning states it improves when she moves.  She had limited benefit with recent repeat bilateral L3-4 transforaminal steroid injection.  Her pain today is 7/10    Interval History 5/4/2021:  The patient is here for follow up of lower back and leg pain.  Previous encounter, physical therapy was ordered.  She states that she did not start physical therapy and would like me to replace the order for her.  She does report that she is noticing more muscle fatigue and weakness particularly with activity.  She is also having more back pain with radiation into the anterior lateral thighs with the past month.  She previously had significant been hip with bilateral L3/4 transforaminal epidural steroid injection and wishes to repeat this.  She found this more helpful than previous procedures. Her pain today is 7/10.    Interval History 3/2/2021:  The patient is here for follow up of chronic pain. She is having more back pain today which she attributes to the cold front coming in. She is having radiation into the buttocks and right anterior thigh. Her back pain is her primary complaint today. She describes it as throbbing. She recently completed both Covid-19 vaccines which she tolerated well. She is now taking Gabapentin 600 mg twice daily regularly. She notices improvement in right leg numbness.  Her pain today is 7/10.    Interval History 12/1/2020:  The patient iss here for follow-up of back and leg pain.  She is now status post bilateral  L3/4 transforaminal epidural steroid injection on 11/11/2020. She is reporting 100% relief for about 2 weeks and then her pain started to return.  She is still having some benefit.  Her pain is located across the lower back with radiation into front and sides of the legs to the anterior feet with associated numbness.  She feels like when she walks sometimes her legs become weak.  This has improved slightly.  She denies any recent falls.  At her last OV, her Gabapentin was increased to 600 mg BID.  She says that she finds this helpful.  She has mild drowsiness.  She admits that she does not always take the medication and thought it was more of an as needed medication.  The patient denies any bowel or bladder incontinence or signs of saddle paresthesia.  She feels as though her pain today is worse than usual.  She rates her pain today as 10/10.    Interval history 10/15/2020:  Diana Herring presents to the clinic for a follow-up appointment for back pain. Since the last visit, Diana Herring states the pain has been worsening. Current pain intensity is 7/10. She reports that she continues to have low back pain that radiates to the bilateral hip and leg. She does report that she has noticed intermittent numbness in the right lateral and anterior thigh, that is sometimes associated with weakness in her right leg. She says that her leg gives out when this happens. She has not had any falls or other trauma. She did not get the bilateral L3/4 TFESI planned last time, and she is still taking only 300 mg bid of gabapentin.  Patient denies urinary incontinence, bowel incontinence, significant weight loss.    Initial visit:  Diana Herring presents to the clinic for the evaluation of low back pain. The pain started 2 years ago following loosing bone after a bone density and symptoms have been worsening.The pain is located in the low back area and radiates to the bilateral hip and leg.  The pain is described as  aching, numbing, sharp and tight band and is rated as 8/10. The pain is rated with a score of  7/10 on the BEST day and a score of 8/10 on the WORST day.  Symptoms interfere with daily activity and sleeping. The pain is exacerbated by Sitting, Standing, Bending, Coughing/Sneezing, Walking, Morning, Lifting and Getting out of bed/chair.  The pain is mitigated by heat, laying down and rest. She reports spending 0 hours per day reclining. The patient reports 6 hours of uninterrupted sleep per night.     Patient had L5/S1 ILESI 2 weeks ago and only gave her 1 day of relief.      Patient denies night fever/night sweats, urinary incontinence, bowel incontinence, significant weight loss and significant motor weakness.     Physical Therapy/Home Exercise: yes, was not beneficial      Pain Disability Index Review:  Last 3 PDI Scores 4/7/2022 1/11/2022 11/11/2021   Pain Disability Index (PDI) [phone]       Pain Medications:  Gabapentin 600 mg bid    Opioid Contract: no     report:  Reviewed and consistent with medication use as prescribed.    Pain Procedures:  1/26/22 Bilateral L3/4 TF SANKET  5/19/21 bilateral L3/4 TF SANKET  11/11/20 Bilateral L3/4 TF SANKET- 100% relief for 2 weeks  5/13/20 L5/S1 ILESI  10/30/19 TFESI Bilateral L3-L4  09/04/19 TFESI Bilateral L3     Physical Therapy/Home Exercise: yes    Imaging:  Narrative & Impression     EXAMINATION:  XR LUMBAR SPINE 5 VIEW WITH FLEX AND EXT     CLINICAL HISTORY:  Low back pain, >6wks conservative tx, persistent-progressive sx, surgical candidate;  Lumbago with sciatica, left side     TECHNIQUE:  Five views of the lumbar spine plus flexion extension views were performed.     COMPARISON:  07/23/2019     FINDINGS:  The alignment of the lumbar spine is normal.     The vertebral body height are well maintained.  Mild disc space narrowing L3-L4.     Flexion and extension views demonstrate no evidence of translational abnormalities.     Minimal osteophyte formation L2-L3 L4.     No  fracture or osseous lesions.     The sacroiliac joints appears symmetrical on the AP view.     The remainder of the visualized soft tissue and osseous structures appear normal.     There is atherosclerotic plaque of the abdominal aorta.     Impression:     Mild spondylosis of the lumbar spine, not significantly changed from the prior study.        Electronically signed by: Ava Brennan MD  Date:                                            05/29/2020  Time:                                           08:28        Narrative & Impression     EXAMINATION:  XR HIPS BILATERAL 2 VIEW INCL AP PELVIS     CLINICAL HISTORY:  Lumbago with sciatica, left side     TECHNIQUE:  AP view of the pelvis and frogleg lateral views of both hips were performed.     COMPARISON:  None.     FINDINGS:  Normal mineralization.  Pelvis demonstrates no acute fractures.  No dislocations are noted.  Hip joints appear to be symmetric and within normal limits with mild DJD of the hip joints.  There is no osteoblastic or lytic lesions.  Soft tissues are unremarkable.     SI joints are symmetric and within normal limits.  There is mild spondylotic changes in the lower lumbar spine.     There is moderate amount of retained stool in the right colon.     Impression:     Mild DJD of the hip joints.     1. No acute fractures.        Electronically signed by: David Vasquez MD  Date:                                            05/29/2020  Time:                                           08:28     Narrative & Impression     EXAMINATION:  MRI LUMBAR SPINE WITHOUT CONTRAST     CLINICAL HISTORY:  back and bilateral leg pain; Lumbago with sciatica, left side     TECHNIQUE:  Multiplanar, multisequence MR images were acquired from the thoracolumbar junction to the sacrum without the administration of contrast.     COMPARISON:  None.     FINDINGS:  MRI examination of the lumbar spine was performed.  Intravenous contrast was not utilized.  There are areas of  hyperintense T2 hypointense T1 lesions of the kidneys likely cysts not optimally evaluated on this exam.     There is multilevel degenerative loss of disc signal and there is loss of disc space height most notable at L3-4.  There are chronic appearing endplate changes noted with some mild STIR hyperintensity that likely relates to edema associated with degenerative endplate change.  There is no evidence for high-grade spondylolisthesis, there is no evidence for high-grade or acute compression fracture deformity.  There is no finding to specifically suggest aggressive bone marrow replacement process.     The sagittal imaging includes the majority of T10 through the S3 segment, axial imaging includes the inferior aspect of T12 through the majority of S1.  On the sagittal imaging there is appearance suggesting mild degenerative disc disease and disc bulge at T10-11 and T11-12 with some mild posterior ligamentous thickening suggested at T10-11 however without evidence for high-grade spinal canal stenosis.     The T12-L1 level demonstrates no evidence for high-grade spinal canal or foraminal stenosis.     The L1-2 level demonstrates degenerative disc bulge with anterior impression upon the dural sac.  There is posterior facet arthropathy and ligamentous thickening there is mild spinal canal stenosis.  There is encroachment into the neural foramen at the level of the disc plane without obvious exiting nerve root impingement.     The L2-3 level demonstrates mild degenerative loss of disc space height.  There is mild degenerative disc bulge with anterior impression upon the dural sac.  There is posterior facet arthropathy with somewhat prominent ligamentous thickening with associated posterolateral encroachment.  There is mild-to-moderate spinal canal stenosis.  There is bilateral foraminal narrowing.     The L3-4 level demonstrates the aforementioned appearance of loss of disc space height and degenerative loss of disc  signal.  There is diffuse degenerative disc bulge with anterior impression upon the dural sac there is posterior facet arthropathy and ligamentous thickening there is high-grade spinal canal stenosis.  There is bilateral foraminal stenosis.     The L4-5 level demonstrates degenerative disc bulge with anterior impression upon the dural sac there is posterior facet arthropathy and ligamentous thickening with mild to moderate spinal canal stenosis.  There is bilateral foraminal narrowing/stenosis without obvious exiting nerve root impingement.     The L5-S1 level demonstrates no evidence for high-grade spinal canal stenosis.  Facet arthropathy is noted there is no evidence for high-grade foraminal stenosis or exiting nerve root impingement.     The visualized spinal cord appears to taper appropriately and appears of appropriate signal and caliber.  Mild signal intensity within the spinal canal on T1 axial imaging may relate to a minimal lipoma along the filum terminalis.  There is no additional evidence for intraspinal mass or abnormal fluid collection     Impression:     Multilevel chronic change of the lumbar spine with variable spinal canal and foraminal narrowing/stenosis as detailed above.  Correlation for any specific level of symptomatology is needed.  The greatest level of spinal canal stenosis is seen at L3-4.        Electronically signed by: Obie Aggarwal  Date:                                            07/27/2019  Time:                                           03:45     CMP  Sodium   Date Value Ref Range Status   03/04/2022 140 136 - 145 mmol/L Final     Potassium   Date Value Ref Range Status   03/04/2022 3.6 3.5 - 5.1 mmol/L Final     Chloride   Date Value Ref Range Status   03/04/2022 103 95 - 110 mmol/L Final     CO2   Date Value Ref Range Status   03/04/2022 28 23 - 29 mmol/L Final     Glucose   Date Value Ref Range Status   03/04/2022 101 70 - 110 mg/dL Final     BUN   Date Value Ref Range Status    03/04/2022 14 8 - 23 mg/dL Final     Creatinine   Date Value Ref Range Status   03/04/2022 0.8 0.5 - 1.4 mg/dL Final     Calcium   Date Value Ref Range Status   03/04/2022 9.4 8.7 - 10.5 mg/dL Final     Total Protein   Date Value Ref Range Status   03/04/2022 7.9 6.0 - 8.4 g/dL Final     Albumin   Date Value Ref Range Status   03/04/2022 4.1 3.5 - 5.2 g/dL Final     Total Bilirubin   Date Value Ref Range Status   03/04/2022 0.8 0.1 - 1.0 mg/dL Final     Comment:     For infants and newborns, interpretation of results should be based  on gestational age, weight and in agreement with clinical  observations.    Premature Infant recommended reference ranges:  Up to 24 hours.............<8.0 mg/dL  Up to 48 hours............<12.0 mg/dL  3-5 days..................<15.0 mg/dL  6-29 days.................<15.0 mg/dL       Alkaline Phosphatase   Date Value Ref Range Status   03/04/2022 70 55 - 135 U/L Final     AST   Date Value Ref Range Status   03/04/2022 17 10 - 40 U/L Final     ALT   Date Value Ref Range Status   03/04/2022 20 10 - 44 U/L Final     Anion Gap   Date Value Ref Range Status   03/04/2022 9 8 - 16 mmol/L Final     eGFR if    Date Value Ref Range Status   03/04/2022 >60.0 >60 mL/min/1.73 m^2 Final     eGFR if non    Date Value Ref Range Status   03/04/2022 >60.0 >60 mL/min/1.73 m^2 Final     Comment:     Calculation used to obtain the estimated glomerular filtration  rate (eGFR) is the CKD-EPI equation.            Allergies: Review of patient's allergies indicates:  No Known Allergies    Current Medications:   Current Outpatient Medications   Medication Sig Dispense Refill    alendronate (FOSAMAX) 70 MG tablet TAKE 1 TABLET BY MOUTH ONE TIME PER WEEK 12 tablet 1    amLODIPine (NORVASC) 10 MG tablet TAKE 1 TABLET BY MOUTH EVERY DAY 90 tablet 3    atorvastatin (LIPITOR) 40 MG tablet TAKE 1 TABLET BY MOUTH EVERY DAY 90 tablet 3    calcium-vitamin D 500-125 mg-unit tablet Take  1 tablet by mouth once daily.      diclofenac (VOLTAREN) 75 MG EC tablet Take 75 mg by mouth 2 (two) times daily.      ergocalciferol (ERGOCALCIFEROL) 50,000 unit Cap TAKE 1 CAPSULE (50,000 UNITS TOTAL) BY MOUTH EVERY SUNDAY 12 capsule 1    fluticasone propionate (FLONASE) 50 mcg/actuation nasal spray 2 SPRAYS (100 MCG TOTAL) BY EACH NOSTRIL ROUTE ONCE DAILY. 48 mL 3    gabapentin (NEURONTIN) 300 MG capsule Take 2 capsules (600 mg total) by mouth 2 (two) times daily. 360 capsule 1    meloxicam (MOBIC) 7.5 MG tablet Take 1 tablet (7.5 mg total) by mouth 2 (two) times daily as needed for Pain (with food). 60 tablet 2    metoprolol succinate (TOPROL-XL) 100 MG 24 hr tablet TAKE 1 TABLET BY MOUTH EVERY DAY 90 tablet 3    multivitamin (THERAGRAN) per tablet Take 1 tablet by mouth once daily.      pantoprazole (PROTONIX) 40 MG tablet TAKE 1 TABLET BY MOUTH EVERY DAY 90 tablet 1    paroxetine (PAXIL) 20 MG tablet Take 1 tablet (20 mg total) by mouth every morning. 90 tablet 1    traZODone (DESYREL) 150 MG tablet TAKE 1 TABLET (150 MG TOTAL) BY MOUTH EVERY EVENING. 90 tablet 1     No current facility-administered medications for this visit.       REVIEW OF SYSTEMS:    GENERAL:  No weight loss, malaise or fevers.  HEENT:  Negative for frequent or significant headaches.  NECK:  Negative for lumps, goiter, pain and significant neck swelling.  RESPIRATORY:  Negative for cough, wheezing or shortness of breath.  CARDIOVASCULAR:  Negative for chest pain, leg swelling or palpitations.  GI:  Negative for abdominal discomfort, blood in stools or black stools or change in bowel habits. GERD.  MUSCULOSKELETAL:  See HPI.  SKIN:  Negative for lesions, rash, and itching.  PSYCH:  + for sleep disturbance, h/o depression  HEMATOLOGY/LYMPHOLOGY:  Negative for prolonged bleeding, bruising easily or swollen nodes.  NEURO:  + numbness, weakness. No history of headaches, syncope, paralysis, seizures or tremors.  All other reviewed and  negative other than HPI.     Past Medical History:  Past Medical History:   Diagnosis Date    Anxiety     Arthritis     Corneal abrasion s    ? eye     Depression     Full dentures     GERD (gastroesophageal reflux disease)     Hyperlipidemia     Hypertension     Nuclear sclerosis of both eyes 6/5/2017    Osteoporosis     Restless leg syndrome        Past Surgical History:  Past Surgical History:   Procedure Laterality Date    COLONOSCOPY N/A 5/14/2019    Procedure: COLONOSCOPY;  Surgeon: Nahid Cline MD;  Location: [facility] ENDO;  Service: Endoscopy;  Laterality: N/A;    EPIDURAL STEROID INJECTION N/A 5/13/2020    Procedure: LUMBAR/CAUDAL L5/S1 IL SANKET ;  Surgeon: Miguel Latham MD;  Location: [facility] PAIN MGT;  Service: Pain Management;  Laterality: N/A;  NEEDS CONSENT    HYSTERECTOMY      PARTIAL HYSTERECTOMY      TRANSFORAMINAL EPIDURAL INJECTION OF STEROID Bilateral 9/4/2019    Procedure: Injection,steroid,epidural,transforaminal approach LUMBAR TRANSFORAMINAL BILATERAL L3 TF SANKET;  Surgeon: Miguel Latham MD;  Location: [facility] PAIN MGT;  Service: Pain Management;  Laterality: Bilateral;  NEEDS CONSENT    TRANSFORAMINAL EPIDURAL INJECTION OF STEROID Bilateral 10/30/2019    Procedure: TRANSFORAMINAL SANKET BILATERAL L3-L4;  Surgeon: Miguel Latham MD;  Location: [facility] PAIN MGT;  Service: Pain Management;  Laterality: Bilateral;  NEEDS CONSENT    TRANSFORAMINAL EPIDURAL INJECTION OF STEROID Bilateral 11/11/2020    Procedure: INJECTION, STEROID, EPIDURAL, TRANSFORAMINAL APPROACH, L3-L4;  Surgeon: Miguel Latham MD;  Location: [facility] PAIN MGT;  Service: Pain Management;  Laterality: Bilateral;    TRANSFORAMINAL EPIDURAL INJECTION OF STEROID Bilateral 5/19/2021    Procedure: INJECTION, STEROID, EPIDURAL, TRANSFORAMINAL APPROACH, L3-L4;  Surgeon: Miguel Latham MD;  Location: [facility] PAIN MGT;  Service: Pain Management;  Laterality: Bilateral;    TRANSFORAMINAL EPIDURAL INJECTION OF STEROID  Bilateral 2022    Procedure: Injection,steroid,epidural,transforaminal approach BILATERAL L3/4;  Surgeon: Miguel Latham MD;  Location: [facility];  Service: Pain Management;  Laterality: Bilateral;    TUBAL LIGATION Bilateral        Family History:  Family History   Problem Relation Age of Onset    Stroke Mother     Glaucoma Mother     Cataracts Mother     Cancer Father         Lung    No Known Problems Brother     No Known Problems Son     Stroke Brother     Heart disease Son     No Known Problems Sister     No Known Problems Maternal Aunt     No Known Problems Maternal Uncle     No Known Problems Paternal Aunt     No Known Problems Paternal Uncle     No Known Problems Maternal Grandmother     No Known Problems Maternal Grandfather     No Known Problems Paternal Grandmother     No Known Problems Paternal Grandfather     Amblyopia Neg Hx     Blindness Neg Hx     Diabetes Neg Hx     Hypertension Neg Hx     Macular degeneration Neg Hx     Retinal detachment Neg Hx     Strabismus Neg Hx     Thyroid disease Neg Hx        Social History:  Social History     Socioeconomic History    Marital status:    Tobacco Use    Smoking status: Never Smoker    Smokeless tobacco: Never Used   Substance and Sexual Activity    Alcohol use: No    Drug use: No    Sexual activity: Never   Social History Narrative    Patient is  w/ 5 children, one  from heart disease.The patient is retired.       OBJECTIVE:    BP (!) 164/87 (BP Location: Right arm, Patient Position: Sitting, BP Method: Medium (Automatic))   Pulse 80   Temp 97 °F (36.1 °C) (Temporal)   Resp 18   Ht 5' (1.524 m)   Wt 66.7 kg (147 lb)   BMI 28.71 kg/m²     PHYSICAL EXAMINATION:    General appearance: Well appearing, in no acute distress, alert and oriented x3.  Psych:  Mood and affect appropriate.  Skin: Skin color, texture, turgor normal, no rashes or lesions, in both upper and lower body.  Head/face:   Normocephalic, atraumatic. No palpable lymph nodes.  Back: Straight leg raising in the sitting and supine positions is negative. TTP over lumbar facet joints and paraspinals bilaterally. Limited range of motion with pain on flexion and extension. Positive facet loading bilaterally.  Extremities: Peripheral joint ROM is full and pain free without obvious instability or laxity in all four extremities. No deformities, edema, or skin discoloration. Good capillary refill.  Musculoskeletal: No atrophy or tone abnormalities are noted. TTP over bilateral SI joints.  PASCALE is negative bilaterally. 5/5 strength in right ankle with plantar and 4/5 on dorsiflexion. 5/5 strength in left ankle with plantar and 4/5 on dorsiflexion. 4/5 strength with right knee flexion and extension. 5/5 strength with left knee flexion and extension. 5/5 strength in right EHL, 5/5 strength in left EHL. Right hip flexion is 4/5, left is 4/5.  Neuro: Decreased sensation to in a bilateral L3 distribution.  Gait: Antalgic.    ASSESSMENT: 77 y.o. year old female with low back pain, consistent with     1. Lumbosacral radiculopathy  Procedure Order to Pain Management    Ambulatory referral/consult to Physical/Occupational Therapy   2. Spondylosis without myelopathy  Ambulatory referral/consult to Physical/Occupational Therapy   3. Radiculopathy of thoracolumbar region  Ambulatory referral/consult to Physical/Occupational Therapy   4. Spinal stenosis, lumbar region with neurogenic claudication     5. Lumbar radiculopathy     6. Other chronic pain           PLAN:     - I have stressed the importance of physical activity and a home exercise plan to help with pain and improve health.  - Patient can continue with medications for now since they are providing benefits, using them appropriately, and without side effects.  - Continue Gabapentin 600 mg BID.  - Stop Mobic and change back to Voltaren BID PRN pain as she finds this more helpful. She will only take as  needed not daily.  - Schedule for repeat bilateral L3/4 TF SANKET.  - Also consider bilateral L3, 4, 5 medial branch blocks we followed by radiofrequency ablation if positive for axial component of pain.    - RTC in 2 month sor sooner if needed.  - Counseled patient regarding the importance of activity modification, constant sleeping habits and physical therapy.    The above plan and management options were discussed at length with patient. Patient is in agreement with the above and verbalized understanding.    Christin Ventura, BRENDA  04/07/2022

## 2022-04-08 ENCOUNTER — TELEPHONE (OUTPATIENT)
Dept: ADMINISTRATIVE | Facility: OTHER | Age: 77
End: 2022-04-08
Payer: MEDICARE

## 2022-04-08 DIAGNOSIS — M54.16 LUMBAR RADICULOPATHY: Primary | ICD-10-CM

## 2022-04-20 ENCOUNTER — HOSPITAL ENCOUNTER (OUTPATIENT)
Facility: OTHER | Age: 77
Discharge: HOME | End: 2022-04-20
Attending: ANESTHESIOLOGY | Admitting: ANESTHESIOLOGY
Payer: MEDICARE

## 2022-04-20 VITALS
WEIGHT: 145 LBS | HEART RATE: 57 BPM | OXYGEN SATURATION: 98 % | HEIGHT: 60 IN | RESPIRATION RATE: 16 BRPM | SYSTOLIC BLOOD PRESSURE: 116 MMHG | DIASTOLIC BLOOD PRESSURE: 63 MMHG | TEMPERATURE: 98 F | BODY MASS INDEX: 28.47 KG/M2

## 2022-04-20 DIAGNOSIS — M51.36 DDD (DEGENERATIVE DISC DISEASE), LUMBAR: ICD-10-CM

## 2022-04-20 DIAGNOSIS — M54.15 RADICULOPATHY OF THORACOLUMBAR REGION: Primary | ICD-10-CM

## 2022-04-20 DIAGNOSIS — G89.29 CHRONIC PAIN: ICD-10-CM

## 2022-04-20 PROCEDURE — 64483 PR EPIDURAL INJ, ANES/STEROID, TRANSFORAMINAL, LUMB/SACR, SNGL LEVL: ICD-10-PCS | Mod: 50,,, | Performed by: ANESTHESIOLOGY

## 2022-04-20 PROCEDURE — 63600175 PHARM REV CODE 636 W HCPCS: Performed by: ANESTHESIOLOGY

## 2022-04-20 PROCEDURE — 25000003 PHARM REV CODE 250: Performed by: ANESTHESIOLOGY

## 2022-04-20 PROCEDURE — 64483 NJX AA&/STRD TFRM EPI L/S 1: CPT | Mod: 50,,, | Performed by: ANESTHESIOLOGY

## 2022-04-20 PROCEDURE — 64483 NJX AA&/STRD TFRM EPI L/S 1: CPT | Mod: 50 | Performed by: ANESTHESIOLOGY

## 2022-04-20 PROCEDURE — 25500020 PHARM REV CODE 255: Performed by: ANESTHESIOLOGY

## 2022-04-20 RX ORDER — LIDOCAINE HYDROCHLORIDE 20 MG/ML
INJECTION, SOLUTION INFILTRATION; PERINEURAL
Status: DISCONTINUED | OUTPATIENT
Start: 2022-04-20 | End: 2022-04-20 | Stop reason: HOSPADM

## 2022-04-20 RX ORDER — SODIUM CHLORIDE 9 MG/ML
500 INJECTION, SOLUTION INTRAVENOUS CONTINUOUS
Status: DISCONTINUED | OUTPATIENT
Start: 2022-04-20 | End: 2022-04-20 | Stop reason: HOSPADM

## 2022-04-20 RX ORDER — DEXAMETHASONE SODIUM PHOSPHATE 10 MG/ML
INJECTION INTRAMUSCULAR; INTRAVENOUS
Status: DISCONTINUED | OUTPATIENT
Start: 2022-04-20 | End: 2022-04-20 | Stop reason: HOSPADM

## 2022-04-20 RX ORDER — MIDAZOLAM HYDROCHLORIDE 1 MG/ML
INJECTION INTRAMUSCULAR; INTRAVENOUS
Status: DISCONTINUED | OUTPATIENT
Start: 2022-04-20 | End: 2022-04-20 | Stop reason: HOSPADM

## 2022-04-20 RX ORDER — FENTANYL CITRATE 50 UG/ML
INJECTION, SOLUTION INTRAMUSCULAR; INTRAVENOUS
Status: DISCONTINUED | OUTPATIENT
Start: 2022-04-20 | End: 2022-04-20 | Stop reason: HOSPADM

## 2022-04-20 RX ORDER — LIDOCAINE HYDROCHLORIDE 10 MG/ML
INJECTION, SOLUTION EPIDURAL; INFILTRATION; INTRACAUDAL; PERINEURAL
Status: DISCONTINUED | OUTPATIENT
Start: 2022-04-20 | End: 2022-04-20 | Stop reason: HOSPADM

## 2022-04-20 NOTE — DISCHARGE SUMMARY
Discharge Note  Short Stay      SUMMARY     Admit Date: 4/20/2022    Attending Physician: Marcos Murrell      Discharge Physician: Marcos Murrell      Discharge Date: 4/20/2022 10:42 AM    Procedure(s) (LRB):  INJECTION, STEROID, EPIDURAL, TRANSFORAMINAL APPROACH, Bilateral L3-L4 (Bilateral)    Final Diagnosis: Lumbar radiculopathy [M54.16]    Disposition: Home or self care    Patient Instructions:   Current Discharge Medication List      CONTINUE these medications which have NOT CHANGED    Details   alendronate (FOSAMAX) 70 MG tablet TAKE 1 TABLET BY MOUTH ONE TIME PER WEEK  Qty: 12 tablet, Refills: 1    Associated Diagnoses: Age-related osteoporosis without current pathological fracture      amLODIPine (NORVASC) 10 MG tablet TAKE 1 TABLET BY MOUTH EVERY DAY  Qty: 90 tablet, Refills: 3    Associated Diagnoses: Essential hypertension      atorvastatin (LIPITOR) 40 MG tablet TAKE 1 TABLET BY MOUTH EVERY DAY  Qty: 90 tablet, Refills: 3    Associated Diagnoses: Hyperlipidemia, unspecified hyperlipidemia type      calcium-vitamin D 500-125 mg-unit tablet Take 1 tablet by mouth once daily.      diclofenac (VOLTAREN) 75 MG EC tablet Take 1 tablet (75 mg total) by mouth 2 (two) times daily as needed (pain).  Qty: 60 tablet, Refills: 2      ergocalciferol (ERGOCALCIFEROL) 50,000 unit Cap TAKE 1 CAPSULE (50,000 UNITS TOTAL) BY MOUTH EVERY SUNDAY  Qty: 12 capsule, Refills: 1    Associated Diagnoses: Vitamin D deficiency      fluticasone propionate (FLONASE) 50 mcg/actuation nasal spray 2 SPRAYS (100 MCG TOTAL) BY EACH NOSTRIL ROUTE ONCE DAILY.  Qty: 48 mL, Refills: 3    Comments: NEED NEW SCRIPT  Associated Diagnoses: Allergic rhinitis, unspecified seasonality, unspecified trigger      gabapentin (NEURONTIN) 300 MG capsule Take 2 capsules (600 mg total) by mouth 2 (two) times daily.  Qty: 360 capsule, Refills: 1    Associated Diagnoses: Spinal stenosis, lumbar region with neurogenic claudication; Bilateral leg pain; DDD  (degenerative disc disease), lumbar; Radiculopathy of thoracolumbar region      metoprolol succinate (TOPROL-XL) 100 MG 24 hr tablet TAKE 1 TABLET BY MOUTH EVERY DAY  Qty: 90 tablet, Refills: 3    Associated Diagnoses: Essential hypertension      multivitamin (THERAGRAN) per tablet Take 1 tablet by mouth once daily.      pantoprazole (PROTONIX) 40 MG tablet TAKE 1 TABLET BY MOUTH EVERY DAY  Qty: 90 tablet, Refills: 1    Associated Diagnoses: Gastroesophageal reflux disease without esophagitis      paroxetine (PAXIL) 20 MG tablet Take 1 tablet (20 mg total) by mouth every morning.  Qty: 90 tablet, Refills: 1    Associated Diagnoses: Mild recurrent major depression; Anxiety      traZODone (DESYREL) 150 MG tablet TAKE 1 TABLET (150 MG TOTAL) BY MOUTH EVERY EVENING.  Qty: 90 tablet, Refills: 1    Associated Diagnoses: Insomnia, unspecified type                 Discharge Diagnosis: Lumbar radiculopathy [M54.16]  Condition on Discharge: Stable with no complications to procedure   Diet on Discharge: Same as before.  Activity: as per instruction sheet.  Discharge to: Home with a responsible adult.  Follow up: 2-4 weeks       Please call my office or pager at [phone] if experienced any weakness or loss of sensation, fever > 101.5, pain uncontrolled with oral medications, persistent nausea/vomiting/or diarrhea, redness or drainage from the incisions, or any other worrisome concerns. If physician on call was not reached or could not communicate with our office for any reason please go to the nearest emergency department

## 2022-04-20 NOTE — DISCHARGE INSTRUCTIONS
Thank you for allowing us to care for you today. You may receive a survey about the care we provided. Your feedback is valuable and helps us provide excellent care throughout the community.     Home Care Instructions for Pain Management:    1. DIET:   You may resume your normal diet today.   2. BATHING:   You may shower with luke warm water. No tub baths or anything that will soak injection sites under water for the next 24 hours.  3. DRESSING:   You may remove your bandage today.   4. ACTIVITY LEVEL:   You may resume your normal activities 24 hrs after your procedure. Nothing strenuous today.  5. MEDICATIONS:   You may resume your normal medications today. To restart blood thinners, ask your doctor.  6. DRIVING    If you have received any sedatives by mouth today, you may not drive for 12 hours.    If you have received any sedation through your IV, you may not drive for 24 hrs.   7. SPECIAL INSTRUCTIONS:   No heat to the injection site for 24 hrs including, hot bath or shower, heating pad, moist heat, or hot tubs.    Use ice pack to injection site for any pain or discomfort.  Apply ice packs for 20 minute intervals as needed.    IF you have diabetes, be sure to monitor your blood sugar more closely. IF your injection contained steroids your blood sugar levels may become higher than normal.    If you are still having pain upon discharge:  Your pain may improve over the next 48 hours. The anesthetic (numbing medication) works immediately to 48 hours. IF your injection contained a steroid (anti-inflammatory medication), it takes approximately 3 days to start feeling relief and 7-10 days to see your greatest results from the medication. It is possible you may need subsequent injections. This would be discussed at your follow up appointment with pain management or your referring doctor.    Please call the PAIN MANAGEMENT office at [phone] or ON CALL pager at [phone] if you experienced any:   -Weakness or  loss of sensation  -Fever > 101.5  -Pain uncontrolled with oral medications   -Persistent nausea, vomiting, or diarrhea  -Redness or drainage from the injection sites, or any other worrisome concerns.   If physician on call was not reached or could not communicate with our office for any reason please go to the nearest emergency department.     Adult Procedural Sedation Instructions    Recovery After Procedural Sedation (Adult)  You have been given medicine by vein to make you sleep during your surgery. This may have included both a pain medicine and sleeping medicine. Most of the effects have worn off. But you may still have some drowsiness for the next 6 to 8 hours.  Home care  Follow these guidelines when you get home:  For the next 8 hours, you should be watched by a responsible adult. This person should make sure your condition is not getting worse.  Don't drink any alcohol for the next 24 hours.  Don't drive, operate dangerous machinery, or make important business or personal decisions during the next 24 hours.  Note: Your healthcare provider may tell you not to take any medicine by mouth for pain or sleep in the next 4 hours. These medicines may react with the medicines you were given in the hospital. This could cause a much stronger response than usual.  Follow-up care  Follow up with your healthcare provider if you are not alert and back to your usual level of activity within 12 hours.  When to seek medical advice  Call your healthcare provider right away if any of these occur:  Drowsiness gets worse  Weakness or dizziness gets worse  Repeated vomiting  You can't be awakened   Date Last Reviewed: 10/18/2016  © 9759-9509 [employer]. [address] [place] [address], [place] [address]. All rights reserved. This information is not intended as a substitute for professional medical care. Always follow your healthcare professional's instructions.

## 2022-04-20 NOTE — PLAN OF CARE
Pt tolerated procedure well. Pt reports 0/10 pain after procedure. BP lower for second assessment.  BP monitored closely. IV NS given and oral intake encouraged.  Pt felt better and vitals reassessed.  Pt expressed desire to go home.  Assisted pt up for first time. Steady on feet. All discharge instructions given.  Spouse at bedside.

## 2022-04-20 NOTE — OP NOTE
Lumbar Transforaminal Epidural Steroid Injection under Fluoroscopic Guidance    The procedure, risks, benefits, and options were discussed with the patient. There are no contraindications to the procedure. The patent expressed understanding and agreed to the procedure. Informed written consent was obtained prior to the start of the procedure and can be found in the patient's chart.    PATIENT NAME: Mrs. Diana Herring   MRN: [id number]     DATE OF PROCEDURE: 04/20/2022    PROCEDURE:  Bilateral  L3/4 Lumbar Transforaminal Epidural Steroid Injection under Fluoroscopic Guidance    PRE-OP DIAGNOSIS: Lumbar radiculopathy [M54.16] Lumbar radiculopathy [M54.16]  DDD    POST-OP DIAGNOSIS: Same    PHYSICIAN: Miguel Latham MD    ASSISTANTS: Muddassir Sana, M.D. [facility]     MEDICATIONS INJECTED: Preservative-free Decadron 10mg with 5cc of Lidocaine 1% MPF     LOCAL ANESTHETIC INJECTED: Xylocaine 2%     SEDATION:   Versed 2mg and Fentanyl 100mcg                                                                                                                                                                                     Conscious sedation ordered by M.D. Patient re-evaluation prior to administration of conscious sedation. No changes noted in patient's status from initial evaluation. The patient's vital signs were monitored by RN and patient remained hemodynamically stable throughout the procedure.    Event Time In   Sedation Start 1033   Sedation End 1040       ESTIMATED BLOOD LOSS: None    COMPLICATIONS: None    TECHNIQUE: Time-out was performed to identify the patient and procedure to be performed. With the patient laying in a prone position, the surgical area was prepped and draped in the usual sterile fashion using ChloraPrep and a fenestrated drape.The levels were determined under fluoroscopy guidance. Skin anesthesia was achieved by injecting Lidocaine 2% over the injection sites. The transforaminal  spaces were then approached with a 22 gauge, 3.5 inch spinal quinke needle that was introduced under fluoroscopic guidance in the AP and Lateral views. Once the needle tip was in the area of the transforaminal space, and there was no blood, CSF or paraesthesias, contrast dye Omnipaque (300mg/ml) was injected to confirm placement and there was no vascular runoff. Fluoroscopic imaging in the AP and lateral views revealed a clear outline of the spinal nerve with proximal spread of agent through the neural foramen into the epidural space. 3 mL of the medication mixture listed above was injected slowly at each site. Displacement of the radio opaque contrast after injection of the medication confirmed that the medication went into the area of the transforaminal spaces. The needles were removed and bleeding was nil. A sterile dressing was applied. No specimens collected. The patient tolerated the procedure well.       The patient was monitored after the procedure in the recovery area. They were given post-procedure and discharge instructions to follow at home. The patient was discharged in a stable condition.    I reviewed and edited the fellow's note. I conducted my own interview and physical examination. I agree with the findings. I was present and supervising all critical portions of the procedure.    PHYSICIAN: Miguel Latham MD   Alert-The patient is alert, awake and responds to voice. The patient is oriented to time, place, and person. The triage nurse is able to obtain subjective information.

## 2022-05-06 ENCOUNTER — TELEPHONE (OUTPATIENT)
Dept: PAIN MEDICINE | Facility: CLINIC | Age: 77
End: 2022-05-06
Payer: MEDICARE

## 2022-05-06 DIAGNOSIS — E78.5 HYPERLIPIDEMIA, UNSPECIFIED HYPERLIPIDEMIA TYPE: ICD-10-CM

## 2022-05-06 DIAGNOSIS — E55.9 VITAMIN D DEFICIENCY: ICD-10-CM

## 2022-05-06 NOTE — TELEPHONE ENCOUNTER
No new care gaps identified.  [facility] Embedded Care Gaps. Reference number: [id number]. 5/06/2022   10:31:14 AM DAVIDT

## 2022-05-06 NOTE — TELEPHONE ENCOUNTER
This message is for patient in regards to his/her appointment 05/09/22 at 10:40am       [facility] Policy: For the safety of all patients and staff members.     Patient Visitor policy: During this visit we're asking all patients to only have one visitor over the age of 18yrs old to accompany to be seen by BRENDA Hackett. If patient do not required assistance with their visit, we're asking all visitors to remain outside the waiting area.    Upon arriving to your schedule appointment; patients are required to wear a face mask, if patient do not have a face mask one will be provided entering the facility. If you have any questions or concerns please contact [phone]

## 2022-05-06 NOTE — TELEPHONE ENCOUNTER
Refill Routing Note   Medication(s) are not appropriate for processing by [facility] for the following reason(s):      - Outside of protocol  - Patient has been seen in the ED/Hospital since the last PCP visit    [facility] action(s):  Defer  Route          Medication reconciliation completed: No     Appointments  past 12m or future 3m with PCP    Date Provider   Last Visit   3/7/2022 Luisito Marin MD   Next Visit   Visit date not found Luisito Marin MD   ED visits in past 90 days: 0        Note composed:3:02 PM 05/06/2022

## 2022-05-09 ENCOUNTER — PATIENT OUTREACH (OUTPATIENT)
Dept: ADMINISTRATIVE | Facility: OTHER | Age: 77
End: 2022-05-09
Payer: MEDICARE

## 2022-05-09 ENCOUNTER — OFFICE VISIT (OUTPATIENT)
Dept: PAIN MEDICINE | Facility: CLINIC | Age: 77
End: 2022-05-09
Payer: MEDICARE

## 2022-05-09 VITALS
OXYGEN SATURATION: 99 % | SYSTOLIC BLOOD PRESSURE: 132 MMHG | HEIGHT: 60 IN | HEART RATE: 66 BPM | WEIGHT: 147 LBS | TEMPERATURE: 97 F | BODY MASS INDEX: 28.86 KG/M2 | DIASTOLIC BLOOD PRESSURE: 70 MMHG | RESPIRATION RATE: 18 BRPM

## 2022-05-09 DIAGNOSIS — M79.604 BILATERAL LEG PAIN: ICD-10-CM

## 2022-05-09 DIAGNOSIS — M51.36 DDD (DEGENERATIVE DISC DISEASE), LUMBAR: ICD-10-CM

## 2022-05-09 DIAGNOSIS — G89.29 OTHER CHRONIC PAIN: ICD-10-CM

## 2022-05-09 DIAGNOSIS — M48.062 SPINAL STENOSIS, LUMBAR REGION WITH NEUROGENIC CLAUDICATION: ICD-10-CM

## 2022-05-09 DIAGNOSIS — M54.16 LUMBAR RADICULOPATHY: ICD-10-CM

## 2022-05-09 DIAGNOSIS — M47.819 SPONDYLOSIS WITHOUT MYELOPATHY: ICD-10-CM

## 2022-05-09 DIAGNOSIS — M79.605 BILATERAL LEG PAIN: ICD-10-CM

## 2022-05-09 DIAGNOSIS — M54.15 RADICULOPATHY OF THORACOLUMBAR REGION: ICD-10-CM

## 2022-05-09 DIAGNOSIS — M47.816 LUMBAR SPONDYLOSIS: Primary | ICD-10-CM

## 2022-05-09 PROCEDURE — 1125F PR PAIN SEVERITY QUANTIFIED, PAIN PRESENT: ICD-10-PCS | Mod: CPTII,S$GLB,, | Performed by: NURSE PRACTITIONER

## 2022-05-09 PROCEDURE — 3288F FALL RISK ASSESSMENT DOCD: CPT | Mod: CPTII,S$GLB,, | Performed by: NURSE PRACTITIONER

## 2022-05-09 PROCEDURE — 1159F MED LIST DOCD IN RCRD: CPT | Mod: CPTII,S$GLB,, | Performed by: NURSE PRACTITIONER

## 2022-05-09 PROCEDURE — 1159F PR MEDICATION LIST DOCUMENTED IN MEDICAL RECORD: ICD-10-PCS | Mod: CPTII,S$GLB,, | Performed by: NURSE PRACTITIONER

## 2022-05-09 PROCEDURE — 1160F RVW MEDS BY RX/DR IN RCRD: CPT | Mod: CPTII,S$GLB,, | Performed by: NURSE PRACTITIONER

## 2022-05-09 PROCEDURE — 3078F PR MOST RECENT DIASTOLIC BLOOD PRESSURE < 80 MM HG: ICD-10-PCS | Mod: CPTII,S$GLB,, | Performed by: NURSE PRACTITIONER

## 2022-05-09 PROCEDURE — 3075F SYST BP GE 130 - 139MM HG: CPT | Mod: CPTII,S$GLB,, | Performed by: NURSE PRACTITIONER

## 2022-05-09 PROCEDURE — 1101F PT FALLS ASSESS-DOCD LE1/YR: CPT | Mod: CPTII,S$GLB,, | Performed by: NURSE PRACTITIONER

## 2022-05-09 PROCEDURE — 1160F PR REVIEW ALL MEDS BY PRESCRIBER/CLIN PHARMACIST DOCUMENTED: ICD-10-PCS | Mod: CPTII,S$GLB,, | Performed by: NURSE PRACTITIONER

## 2022-05-09 PROCEDURE — 3075F PR MOST RECENT SYSTOLIC BLOOD PRESS GE 130-139MM HG: ICD-10-PCS | Mod: CPTII,S$GLB,, | Performed by: NURSE PRACTITIONER

## 2022-05-09 PROCEDURE — 3288F PR FALLS RISK ASSESSMENT DOCUMENTED: ICD-10-PCS | Mod: CPTII,S$GLB,, | Performed by: NURSE PRACTITIONER

## 2022-05-09 PROCEDURE — 1125F AMNT PAIN NOTED PAIN PRSNT: CPT | Mod: CPTII,S$GLB,, | Performed by: NURSE PRACTITIONER

## 2022-05-09 PROCEDURE — 99213 OFFICE O/P EST LOW 20 MIN: CPT | Mod: S$GLB,,, | Performed by: NURSE PRACTITIONER

## 2022-05-09 PROCEDURE — 99999 PR PBB SHADOW E&M-EST. PATIENT-LVL IV: ICD-10-PCS | Mod: PBBFAC,,, | Performed by: NURSE PRACTITIONER

## 2022-05-09 PROCEDURE — 1101F PR PT FALLS ASSESS DOC 0-1 FALLS W/OUT INJ PAST YR: ICD-10-PCS | Mod: CPTII,S$GLB,, | Performed by: NURSE PRACTITIONER

## 2022-05-09 PROCEDURE — 99213 PR OFFICE/OUTPT VISIT, EST, LEVL III, 20-29 MIN: ICD-10-PCS | Mod: S$GLB,,, | Performed by: NURSE PRACTITIONER

## 2022-05-09 PROCEDURE — 99999 PR PBB SHADOW E&M-EST. PATIENT-LVL IV: CPT | Mod: PBBFAC,,, | Performed by: NURSE PRACTITIONER

## 2022-05-09 PROCEDURE — 3078F DIAST BP <80 MM HG: CPT | Mod: CPTII,S$GLB,, | Performed by: NURSE PRACTITIONER

## 2022-05-09 RX ORDER — ATORVASTATIN CALCIUM 40 MG/1
TABLET, FILM COATED ORAL
Qty: 90 TABLET | Refills: 1 | Status: SHIPPED | OUTPATIENT
Start: 2022-05-09 | End: 2022-08-16

## 2022-05-09 RX ORDER — GABAPENTIN 300 MG/1
600 CAPSULE ORAL 2 TIMES DAILY
Qty: 360 CAPSULE | Refills: 1 | Status: SHIPPED | OUTPATIENT
Start: 2022-05-09 | End: 2022-11-11 | Stop reason: SDUPTHER

## 2022-05-09 RX ORDER — ERGOCALCIFEROL 1.25 MG/1
CAPSULE ORAL
Qty: 12 CAPSULE | Refills: 1 | Status: SHIPPED | OUTPATIENT
Start: 2022-05-09 | End: 2022-09-29 | Stop reason: SDUPTHER

## 2022-05-09 NOTE — PROGRESS NOTES
Chronic patient Established Note (Follow up visit)      SUBJECTIVE:    Interval History 5/9/2022:  The patient is here for follow up of chronic back pain. She is having more back pain and stiffness in the morning. We did previously discuss lumbar MBB but she is worried about not having IV sedation. She says that she would like to further discuss the procedure today. Leg pain has been mild since previous SANKET. She does have intermittent numbness still. Back pain is greater than leg pain. Her pain today is 7/10.    Interval History 4/7/2022:  The patient is here for follow up of lower back and leg pain. Since previous encounter, she underwent bilateral L3/4 TF SANKET on 1/26/22. She reports 80% relief of pain for about 2 months. She feels like this gave her significant benefit during that time and her pain was mild. She is now again having severe back pain with radiation into the anterior thighs. She would like to repeat the procedure. She would also like me to place another referral for aquatic PT. She stopped Mobic because she found Diclofenac more helpful. She has been taking as needed but not daily. She does find Gabapentin helpful but it sometimes makes her drowsy. Her pain today is 9/10.    Interval History 1/11/2022:  The patient returns to discuss continued lower back pain. She has not restarted aquatic PT due to increase in Covid-19 cases. She continues with sharp lower back pain that radiates into anterior thighs, bilaterally. She has associated numbness. The pain is worse with increased activity. Her pain today is 7/10.    Interval History 11/11/2021:  The patient presents today for 2 month follow up of lower back pain. Since previous encounter, she has not started aquatic PT because she is on the waiting list. She continues to report lower back pain with radiation down the left leg. We previously discussed lumbar MBB/RFA which she does not wish to pursue at this time. She states that diclofenac is not helping  very much with her aching pain at this time. She denies any new weakness or symptoms at this time. Her pain today is 8/10.    Interval History 8/19/2021:  Diana returns today for follow up of chronic lower back pain. She reports that he has continued with aching pain. She has been in PT but is not finding it very helpful. She has not tried aquatic PT in the past. She is still having intermittent radiation down her legs. She has been unable to take 1200 mg daily of Gabapentin and has decreased to 600 mg daily due to sedation and dizziness with the medication. Otherwise, no new complaints today. Her pain today is 7/10.    Interval History 6/18/2021:  The patient is here for follow up of lower back pain.  She has radiation of her pain into her anterior thighs but not below the knees.  She denies numbness or tingling at this time.  Her back pain is currently greater than her leg pain.  She has a lot of stiffness when she wakes the morning states it improves when she moves.  She had limited benefit with recent repeat bilateral L3-4 transforaminal steroid injection.  Her pain today is 7/10    Interval History 5/4/2021:  The patient is here for follow up of lower back and leg pain.  Previous encounter, physical therapy was ordered.  She states that she did not start physical therapy and would like me to replace the order for her.  She does report that she is noticing more muscle fatigue and weakness particularly with activity.  She is also having more back pain with radiation into the anterior lateral thighs with the past month.  She previously had significant been hip with bilateral L3/4 transforaminal epidural steroid injection and wishes to repeat this.  She found this more helpful than previous procedures. Her pain today is 7/10.    Interval History 3/2/2021:  The patient is here for follow up of chronic pain. She is having more back pain today which she attributes to the cold front coming in. She is having radiation  into the buttocks and right anterior thigh. Her back pain is her primary complaint today. She describes it as throbbing. She recently completed both Covid-19 vaccines which she tolerated well. She is now taking Gabapentin 600 mg twice daily regularly. She notices improvement in right leg numbness.  Her pain today is 7/10.    Interval History 12/1/2020:  The patient iss here for follow-up of back and leg pain.  She is now status post bilateral L3/4 transforaminal epidural steroid injection on 11/11/2020. She is reporting 100% relief for about 2 weeks and then her pain started to return.  She is still having some benefit.  Her pain is located across the lower back with radiation into front and sides of the legs to the anterior feet with associated numbness.  She feels like when she walks sometimes her legs become weak.  This has improved slightly.  She denies any recent falls.  At her last OV, her Gabapentin was increased to 600 mg BID.  She says that she finds this helpful.  She has mild drowsiness.  She admits that she does not always take the medication and thought it was more of an as needed medication.  The patient denies any bowel or bladder incontinence or signs of saddle paresthesia.  She feels as though her pain today is worse than usual.  She rates her pain today as 10/10.    Interval history 10/15/2020:  Diana Herring presents to the clinic for a follow-up appointment for back pain. Since the last visit, Diana Herring states the pain has been worsening. Current pain intensity is 7/10. She reports that she continues to have low back pain that radiates to the bilateral hip and leg. She does report that she has noticed intermittent numbness in the right lateral and anterior thigh, that is sometimes associated with weakness in her right leg. She says that her leg gives out when this happens. She has not had any falls or other trauma. She did not get the bilateral L3/4 TFESI planned last time, and she is  still taking only 300 mg bid of gabapentin.  Patient denies urinary incontinence, bowel incontinence, significant weight loss.    Initial visit:  Diana Herring presents to the clinic for the evaluation of low back pain. The pain started 2 years ago following loosing bone after a bone density and symptoms have been worsening.The pain is located in the low back area and radiates to the bilateral hip and leg.  The pain is described as aching, numbing, sharp and tight band and is rated as 8/10. The pain is rated with a score of  7/10 on the BEST day and a score of 8/10 on the WORST day.  Symptoms interfere with daily activity and sleeping. The pain is exacerbated by Sitting, Standing, Bending, Coughing/Sneezing, Walking, Morning, Lifting and Getting out of bed/chair.  The pain is mitigated by heat, laying down and rest. She reports spending 0 hours per day reclining. The patient reports 6 hours of uninterrupted sleep per night.     Patient had L5/S1 ILESI 2 weeks ago and only gave her 1 day of relief.      Patient denies night fever/night sweats, urinary incontinence, bowel incontinence, significant weight loss and significant motor weakness.     Physical Therapy/Home Exercise: yes, was not beneficial      Pain Disability Index Review:  Last 3 PDI Scores 5/9/2022 4/7/2022 1/11/2022   Pain Disability Index (PDI) [phone]       Pain Medications:  Gabapentin 600 mg bid    Opioid Contract: no     report:  Reviewed and consistent with medication use as prescribed.    Pain Procedures:  4/20/22 Bilateral L3/4 TF SANKET- 75% relief of leg pain  1/26/22 Bilateral L3/4 TF SANKET  5/19/21 bilateral L3/4 TF SANKET  11/11/20 Bilateral L3/4 TF SANKET- 100% relief for 2 weeks  5/13/20 L5/S1 ILESI  10/30/19 TFESI Bilateral L3-L4  09/04/19 TFESI Bilateral L3     Physical Therapy/Home Exercise: yes    Imaging:  Narrative & Impression     EXAMINATION:  XR LUMBAR SPINE 5 VIEW WITH FLEX AND EXT     CLINICAL HISTORY:  Low back pain, >6wks  conservative tx, persistent-progressive sx, surgical candidate;  Lumbago with sciatica, left side     TECHNIQUE:  Five views of the lumbar spine plus flexion extension views were performed.     COMPARISON:  07/23/2019     FINDINGS:  The alignment of the lumbar spine is normal.     The vertebral body height are well maintained.  Mild disc space narrowing L3-L4.     Flexion and extension views demonstrate no evidence of translational abnormalities.     Minimal osteophyte formation L2-L3 L4.     No fracture or osseous lesions.     The sacroiliac joints appears symmetrical on the AP view.     The remainder of the visualized soft tissue and osseous structures appear normal.     There is atherosclerotic plaque of the abdominal aorta.     Impression:     Mild spondylosis of the lumbar spine, not significantly changed from the prior study.        Electronically signed by: Ava Brennan MD  Date:                                            05/29/2020  Time:                                           08:28        Narrative & Impression     EXAMINATION:  XR HIPS BILATERAL 2 VIEW INCL AP PELVIS     CLINICAL HISTORY:  Lumbago with sciatica, left side     TECHNIQUE:  AP view of the pelvis and frogleg lateral views of both hips were performed.     COMPARISON:  None.     FINDINGS:  Normal mineralization.  Pelvis demonstrates no acute fractures.  No dislocations are noted.  Hip joints appear to be symmetric and within normal limits with mild DJD of the hip joints.  There is no osteoblastic or lytic lesions.  Soft tissues are unremarkable.     SI joints are symmetric and within normal limits.  There is mild spondylotic changes in the lower lumbar spine.     There is moderate amount of retained stool in the right colon.     Impression:     Mild DJD of the hip joints.     1. No acute fractures.        Electronically signed by: David Vasquez MD  Date:                                            05/29/2020  Time:                                            08:28     Narrative & Impression     EXAMINATION:  MRI LUMBAR SPINE WITHOUT CONTRAST     CLINICAL HISTORY:  back and bilateral leg pain; Lumbago with sciatica, left side     TECHNIQUE:  Multiplanar, multisequence MR images were acquired from the thoracolumbar junction to the sacrum without the administration of contrast.     COMPARISON:  None.     FINDINGS:  MRI examination of the lumbar spine was performed.  Intravenous contrast was not utilized.  There are areas of hyperintense T2 hypointense T1 lesions of the kidneys likely cysts not optimally evaluated on this exam.     There is multilevel degenerative loss of disc signal and there is loss of disc space height most notable at L3-4.  There are chronic appearing endplate changes noted with some mild STIR hyperintensity that likely relates to edema associated with degenerative endplate change.  There is no evidence for high-grade spondylolisthesis, there is no evidence for high-grade or acute compression fracture deformity.  There is no finding to specifically suggest aggressive bone marrow replacement process.     The sagittal imaging includes the majority of T10 through the S3 segment, axial imaging includes the inferior aspect of T12 through the majority of S1.  On the sagittal imaging there is appearance suggesting mild degenerative disc disease and disc bulge at T10-11 and T11-12 with some mild posterior ligamentous thickening suggested at T10-11 however without evidence for high-grade spinal canal stenosis.     The T12-L1 level demonstrates no evidence for high-grade spinal canal or foraminal stenosis.     The L1-2 level demonstrates degenerative disc bulge with anterior impression upon the dural sac.  There is posterior facet arthropathy and ligamentous thickening there is mild spinal canal stenosis.  There is encroachment into the neural foramen at the level of the disc plane without obvious exiting nerve root impingement.     The L2-3  level demonstrates mild degenerative loss of disc space height.  There is mild degenerative disc bulge with anterior impression upon the dural sac.  There is posterior facet arthropathy with somewhat prominent ligamentous thickening with associated posterolateral encroachment.  There is mild-to-moderate spinal canal stenosis.  There is bilateral foraminal narrowing.     The L3-4 level demonstrates the aforementioned appearance of loss of disc space height and degenerative loss of disc signal.  There is diffuse degenerative disc bulge with anterior impression upon the dural sac there is posterior facet arthropathy and ligamentous thickening there is high-grade spinal canal stenosis.  There is bilateral foraminal stenosis.     The L4-5 level demonstrates degenerative disc bulge with anterior impression upon the dural sac there is posterior facet arthropathy and ligamentous thickening with mild to moderate spinal canal stenosis.  There is bilateral foraminal narrowing/stenosis without obvious exiting nerve root impingement.     The L5-S1 level demonstrates no evidence for high-grade spinal canal stenosis.  Facet arthropathy is noted there is no evidence for high-grade foraminal stenosis or exiting nerve root impingement.     The visualized spinal cord appears to taper appropriately and appears of appropriate signal and caliber.  Mild signal intensity within the spinal canal on T1 axial imaging may relate to a minimal lipoma along the filum terminalis.  There is no additional evidence for intraspinal mass or abnormal fluid collection     Impression:     Multilevel chronic change of the lumbar spine with variable spinal canal and foraminal narrowing/stenosis as detailed above.  Correlation for any specific level of symptomatology is needed.  The greatest level of spinal canal stenosis is seen at L3-4.        Electronically signed by: Obie Aggarwal  Date:                                            07/27/2019  Time:                                            03:45     CMP  Sodium   Date Value Ref Range Status   03/04/2022 140 136 - 145 mmol/L Final     Potassium   Date Value Ref Range Status   03/04/2022 3.6 3.5 - 5.1 mmol/L Final     Chloride   Date Value Ref Range Status   03/04/2022 103 95 - 110 mmol/L Final     CO2   Date Value Ref Range Status   03/04/2022 28 23 - 29 mmol/L Final     Glucose   Date Value Ref Range Status   03/04/2022 101 70 - 110 mg/dL Final     BUN   Date Value Ref Range Status   03/04/2022 14 8 - 23 mg/dL Final     Creatinine   Date Value Ref Range Status   03/04/2022 0.8 0.5 - 1.4 mg/dL Final     Calcium   Date Value Ref Range Status   03/04/2022 9.4 8.7 - 10.5 mg/dL Final     Total Protein   Date Value Ref Range Status   03/04/2022 7.9 6.0 - 8.4 g/dL Final     Albumin   Date Value Ref Range Status   03/04/2022 4.1 3.5 - 5.2 g/dL Final     Total Bilirubin   Date Value Ref Range Status   03/04/2022 0.8 0.1 - 1.0 mg/dL Final     Comment:     For infants and newborns, interpretation of results should be based  on gestational age, weight and in agreement with clinical  observations.    Premature Infant recommended reference ranges:  Up to 24 hours.............<8.0 mg/dL  Up to 48 hours............<12.0 mg/dL  3-5 days..................<15.0 mg/dL  6-29 days.................<15.0 mg/dL       Alkaline Phosphatase   Date Value Ref Range Status   03/04/2022 70 55 - 135 U/L Final     AST   Date Value Ref Range Status   03/04/2022 17 10 - 40 U/L Final     ALT   Date Value Ref Range Status   03/04/2022 20 10 - 44 U/L Final     Anion Gap   Date Value Ref Range Status   03/04/2022 9 8 - 16 mmol/L Final     eGFR if    Date Value Ref Range Status   03/04/2022 >60.0 >60 mL/min/1.73 m^2 Final     eGFR if non    Date Value Ref Range Status   03/04/2022 >60.0 >60 mL/min/1.73 m^2 Final     Comment:     Calculation used to obtain the estimated glomerular filtration  rate (eGFR) is the CKD-EPI  equation.            Allergies: Review of patient's allergies indicates:  No Known Allergies    Current Medications:   Current Outpatient Medications   Medication Sig Dispense Refill    alendronate (FOSAMAX) 70 MG tablet TAKE 1 TABLET BY MOUTH ONE TIME PER WEEK 12 tablet 1    amLODIPine (NORVASC) 10 MG tablet TAKE 1 TABLET BY MOUTH EVERY DAY 90 tablet 3    atorvastatin (LIPITOR) 40 MG tablet TAKE 1 TABLET BY MOUTH EVERY DAY 90 tablet 1    calcium-vitamin D 500-125 mg-unit tablet Take 1 tablet by mouth once daily.      diclofenac (VOLTAREN) 75 MG EC tablet Take 1 tablet (75 mg total) by mouth 2 (two) times daily as needed (pain). 60 tablet 2    ergocalciferol (ERGOCALCIFEROL) 50,000 unit Cap TAKE 1 CAPSULE (50,000 UNITS TOTAL) BY MOUTH EVERY SUNDAY 12 capsule 1    fluticasone propionate (FLONASE) 50 mcg/actuation nasal spray 2 SPRAYS (100 MCG TOTAL) BY EACH NOSTRIL ROUTE ONCE DAILY. 48 mL 3    gabapentin (NEURONTIN) 300 MG capsule Take 2 capsules (600 mg total) by mouth 2 (two) times daily. 360 capsule 1    metoprolol succinate (TOPROL-XL) 100 MG 24 hr tablet TAKE 1 TABLET BY MOUTH EVERY DAY 90 tablet 3    multivitamin (THERAGRAN) per tablet Take 1 tablet by mouth once daily.      pantoprazole (PROTONIX) 40 MG tablet TAKE 1 TABLET BY MOUTH EVERY DAY 90 tablet 1    traZODone (DESYREL) 150 MG tablet TAKE 1 TABLET (150 MG TOTAL) BY MOUTH EVERY EVENING. 90 tablet 1    paroxetine (PAXIL) 20 MG tablet Take 1 tablet (20 mg total) by mouth every morning. 90 tablet 1     No current facility-administered medications for this visit.       REVIEW OF SYSTEMS:    GENERAL:  No weight loss, malaise or fevers.  HEENT:  Negative for frequent or significant headaches.  NECK:  Negative for lumps, goiter, pain and significant neck swelling.  RESPIRATORY:  Negative for cough, wheezing or shortness of breath.  CARDIOVASCULAR:  Negative for chest pain, leg swelling or palpitations.  GI:  Negative for abdominal discomfort, blood  in stools or black stools or change in bowel habits. GERD.  MUSCULOSKELETAL:  See HPI.  SKIN:  Negative for lesions, rash, and itching.  PSYCH:  + for sleep disturbance, h/o depression  HEMATOLOGY/LYMPHOLOGY:  Negative for prolonged bleeding, bruising easily or swollen nodes.  NEURO:  + numbness, weakness. No history of headaches, syncope, paralysis, seizures or tremors.  All other reviewed and negative other than HPI.     Past Medical History:  Past Medical History:   Diagnosis Date    Anxiety     Arthritis     Corneal abrasion s    ? eye     Depression     Full dentures     GERD (gastroesophageal reflux disease)     Hyperlipidemia     Hypertension     Nuclear sclerosis of both eyes 6/5/2017    Osteoporosis     Restless leg syndrome        Past Surgical History:  Past Surgical History:   Procedure Laterality Date    COLONOSCOPY N/A 5/14/2019    Procedure: COLONOSCOPY;  Surgeon: Nahid Cline MD;  Location: [facility];  Service: Endoscopy;  Laterality: N/A;    EPIDURAL STEROID INJECTION N/A 5/13/2020    Procedure: LUMBAR/CAUDAL L5/S1 IL SANKET ;  Surgeon: Miguel Latham MD;  Location: [facility];  Service: Pain Management;  Laterality: N/A;  NEEDS CONSENT    HYSTERECTOMY      PARTIAL HYSTERECTOMY      TRANSFORAMINAL EPIDURAL INJECTION OF STEROID Bilateral 9/4/2019    Procedure: Injection,steroid,epidural,transforaminal approach LUMBAR TRANSFORAMINAL BILATERAL L3 TF SANKET;  Surgeon: Miguel Latham MD;  Location: [facility];  Service: Pain Management;  Laterality: Bilateral;  NEEDS CONSENT    TRANSFORAMINAL EPIDURAL INJECTION OF STEROID Bilateral 10/30/2019    Procedure: TRANSFORAMINAL SANKET BILATERAL L3-L4;  Surgeon: Miguel Latham MD;  Location: [facility];  Service: Pain Management;  Laterality: Bilateral;  NEEDS CONSENT    TRANSFORAMINAL EPIDURAL INJECTION OF STEROID Bilateral 11/11/2020    Procedure: INJECTION, STEROID, EPIDURAL, TRANSFORAMINAL APPROACH, L3-L4;  Surgeon: Miguel NGUYEN  MD Noa;  Location: [facility];  Service: Pain Management;  Laterality: Bilateral;    TRANSFORAMINAL EPIDURAL INJECTION OF STEROID Bilateral 5/19/2021    Procedure: INJECTION, STEROID, EPIDURAL, TRANSFORAMINAL APPROACH, L3-L4;  Surgeon: Miguel Latham MD;  Location: [facility];  Service: Pain Management;  Laterality: Bilateral;    TRANSFORAMINAL EPIDURAL INJECTION OF STEROID Bilateral 1/26/2022    Procedure: Injection,steroid,epidural,transforaminal approach BILATERAL L3/4;  Surgeon: Miguel Latham MD;  Location: [facility];  Service: Pain Management;  Laterality: Bilateral;    TRANSFORAMINAL EPIDURAL INJECTION OF STEROID Bilateral 4/20/2022    Procedure: INJECTION, STEROID, EPIDURAL, TRANSFORAMINAL APPROACH, Bilateral L3-L4;  Surgeon: Miguel Latham MD;  Location: [facility];  Service: Pain Management;  Laterality: Bilateral;    TUBAL LIGATION Bilateral        Family History:  Family History   Problem Relation Age of Onset    Stroke Mother     Glaucoma Mother     Cataracts Mother     Cancer Father         Lung    No Known Problems Brother     No Known Problems Son     Stroke Brother     Heart disease Son     No Known Problems Sister     No Known Problems Maternal Aunt     No Known Problems Maternal Uncle     No Known Problems Paternal Aunt     No Known Problems Paternal Uncle     No Known Problems Maternal Grandmother     No Known Problems Maternal Grandfather     No Known Problems Paternal Grandmother     No Known Problems Paternal Grandfather     Amblyopia Neg Hx     Blindness Neg Hx     Diabetes Neg Hx     Hypertension Neg Hx     Macular degeneration Neg Hx     Retinal detachment Neg Hx     Strabismus Neg Hx     Thyroid disease Neg Hx        Social History:  Social History     Socioeconomic History    Marital status:    Tobacco Use    Smoking status: Never Smoker    Smokeless tobacco: Never Used   Substance and Sexual Activity    Alcohol use:  No    Drug use: No    Sexual activity: Never   Social History Narrative    Patient is  w/ 5 children, one  from heart disease.The patient is retired.       OBJECTIVE:    /70 (BP Location: Left arm, Patient Position: Sitting, BP Method: Medium (Automatic))   Pulse 66   Temp 97.2 °F (36.2 °C)   Resp 18   Ht 5' (1.524 m)   Wt 66.7 kg (147 lb)   SpO2 99%   BMI 28.71 kg/m²     PHYSICAL EXAMINATION:    General appearance: Well appearing, in no acute distress, alert and oriented x3.  Psych:  Mood and affect appropriate.  Skin: Skin color, texture, turgor normal, no rashes or lesions, in both upper and lower body.  Head/face:  Normocephalic, atraumatic. No palpable lymph nodes.  Back: Straight leg raising in the sitting and supine positions is negative. Limited range of motion with pain on flexion and extension. Positive facet loading bilaterally.  Extremities: Peripheral joint ROM is full and pain free without obvious instability or laxity in all four extremities. No deformities, edema, or skin discoloration. Good capillary refill.  Musculoskeletal: No atrophy or tone abnormalities are noted. TTP over bilateral SI joints.  PASCALE is negative bilaterally. 5/5 strength in right ankle with plantar and 4/5 on dorsiflexion. 5/5 strength in left ankle with plantar and 4/5 on dorsiflexion. 4/5 strength with right knee flexion and extension. 5/5 strength with left knee flexion and extension. 5/5 strength in right EHL, 5/5 strength in left EHL. Right hip flexion is 4/5, left is 4/5.  Neuro: No loss of sensation noted.  Gait: Antalgic.    ASSESSMENT: 77 y.o. year old female with lower back pain, consistent with     1. Lumbar spondylosis  Procedure Order to Pain Management   2. Other chronic pain     3. DDD (degenerative disc disease), lumbar  gabapentin (NEURONTIN) 300 MG capsule   4. Lumbar radiculopathy     5. Spondylosis without myelopathy     6. Spinal stenosis, lumbar region with neurogenic  claudication  gabapentin (NEURONTIN) 300 MG capsule   7. Bilateral leg pain  gabapentin (NEURONTIN) 300 MG capsule   8. Radiculopathy of thoracolumbar region  gabapentin (NEURONTIN) 300 MG capsule         PLAN:     - I have stressed the importance of physical activity and a home exercise plan to help with pain and improve health.  - Patient can continue with medications for now since they are providing benefits, using them appropriately, and without side effects.  - Continue Gabapentin 600 mg BID.  - Continue Voltaren BID PRN pain.  - Schedule for bilateral L3,4,5 MBB with audio follow up 10 days after. Can also call with pain diary after.  - Counseled patient regarding the importance of activity modification, constant sleeping habits and physical therapy.    The above plan and management options were discussed at length with patient. Patient is in agreement with the above and verbalized understanding.    Christin Ventura, BRENDA  05/09/2022

## 2022-05-09 NOTE — PROGRESS NOTES
Care Everywhere: updated  Immunization: updated  Health Maintenance: updated  Media Review:   Legacy Review:   DIS:  Order placed:   Upcoming appts:  EFAX:  Task Tickets:  Referrals:  Colonoscopy case request 2.22.2022

## 2022-05-19 ENCOUNTER — TELEPHONE (OUTPATIENT)
Dept: PAIN MEDICINE | Facility: CLINIC | Age: 77
End: 2022-05-19
Payer: MEDICARE

## 2022-05-19 NOTE — TELEPHONE ENCOUNTER
Staff contacted patient to reschedule her 6/03/22 with Christin Ventura    Staff informed patient that provider will not be available and would like to reschedule to the next available date & time (pt agree to appointment).     Staff was able to schedule pt for 06/06/22 at 1:20p  Pt verbalized understanding and accepted the new appt.      Pt was having concerns about scheduled procedure on 5/25/22 and wanted to speak to a provider beforehand.

## 2022-05-20 RX ORDER — IBUPROFEN 800 MG/1
800 TABLET ORAL 2 TIMES DAILY PRN
Qty: 60 TABLET | Refills: 1 | Status: SHIPPED | OUTPATIENT
Start: 2022-05-20 | End: 2022-06-22

## 2022-06-21 ENCOUNTER — PATIENT MESSAGE (OUTPATIENT)
Dept: PAIN MEDICINE | Facility: CLINIC | Age: 77
End: 2022-06-21
Payer: MEDICARE

## 2022-06-22 ENCOUNTER — TELEPHONE (OUTPATIENT)
Dept: PAIN MEDICINE | Facility: CLINIC | Age: 77
End: 2022-06-22
Payer: MEDICARE

## 2022-06-22 DIAGNOSIS — M48.062 SPINAL STENOSIS, LUMBAR REGION WITH NEUROGENIC CLAUDICATION: ICD-10-CM

## 2022-06-22 DIAGNOSIS — M79.604 BILATERAL LEG PAIN: ICD-10-CM

## 2022-06-22 DIAGNOSIS — M79.605 BILATERAL LEG PAIN: ICD-10-CM

## 2022-06-22 DIAGNOSIS — M54.15 RADICULOPATHY OF THORACOLUMBAR REGION: ICD-10-CM

## 2022-06-22 DIAGNOSIS — M54.17 LUMBOSACRAL RADICULOPATHY: Primary | ICD-10-CM

## 2022-06-22 DIAGNOSIS — M51.36 DDD (DEGENERATIVE DISC DISEASE), LUMBAR: ICD-10-CM

## 2022-06-22 RX ORDER — DICLOFENAC SODIUM 75 MG/1
75 TABLET, DELAYED RELEASE ORAL 2 TIMES DAILY PRN
Qty: 180 TABLET | Refills: 1 | Status: SHIPPED | OUTPATIENT
Start: 2022-06-22 | End: 2022-11-11 | Stop reason: SDUPTHER

## 2022-06-22 NOTE — TELEPHONE ENCOUNTER
Staff spoke with patient in regards to my chart virtual video on 6/22/22 with mare @ 2:40 pm but wasn't able to login due to her granddaughter is out of state and not able to help her with the virtual visit. Patient is requesting mare give her a call. Staff informed patient we will submit message to mare and once a response is received she will be updated. Patient does want mare to call [phone] if she  Calls.

## 2022-06-22 NOTE — TELEPHONE ENCOUNTER
----- Message from Malu Gonzalez sent at 6/22/2022  3:16 PM CDT -----  Regarding: Call BAck  Name of Who is Calling:SAMANTHA SINCLAIR [[id number]]              What is the request in detail: Patient requesting a call back. Has a appointment Virtual @ 2:40p and wasn't set up for it. Patient requesting a call Instead. Please assist              Can the clinic reply by [facility]: No              What [facility]:[phone]

## 2022-06-23 ENCOUNTER — TELEPHONE (OUTPATIENT)
Dept: PAIN MEDICINE | Facility: CLINIC | Age: 77
End: 2022-06-23
Payer: MEDICARE

## 2022-06-23 ENCOUNTER — TELEPHONE (OUTPATIENT)
Dept: ADMINISTRATIVE | Facility: OTHER | Age: 77
End: 2022-06-23
Payer: MEDICARE

## 2022-06-23 NOTE — TELEPHONE ENCOUNTER
Staff contacted pt in regards to getting her overall percentage from 04/20/22 INJECTION, STEROID, EPIDURAL, TRANSFORAMINAL APPROACH, Bilateral L3-L4 with Dr. Latham     Pt verbalized that she received 50% out of 100% relief

## 2022-06-23 NOTE — TELEPHONE ENCOUNTER
----- Message from Nan Ramos sent at 2022  9:17 AM CDT -----  Regardin/20  Good Morning,    PHN is requesting  relief %.     Please advise.    Thanks

## 2022-07-08 ENCOUNTER — PES CALL (OUTPATIENT)
Dept: ADMINISTRATIVE | Facility: CLINIC | Age: 77
End: 2022-07-08
Payer: MEDICARE

## 2022-07-13 ENCOUNTER — TELEPHONE (OUTPATIENT)
Dept: PAIN MEDICINE | Facility: CLINIC | Age: 77
End: 2022-07-13
Payer: MEDICARE

## 2022-07-13 NOTE — TELEPHONE ENCOUNTER
Staff spoke with pt in regards to procedure arrival time is 9:30 am on the 2nd floor in the procedure area.

## 2022-07-13 NOTE — TELEPHONE ENCOUNTER
----- Message from Tejinder Lloyd sent at 7/13/2022 11:33 AM CDT -----  Name of Who is Calling: SAMANTHA SINCLAIR [[id number]]           What is the request in detail: Patient is requesting a call back for arrival time for procedure.  Please assist.           Can the clinic reply by [facility]: No           What [facility] [phone]

## 2022-07-20 ENCOUNTER — HOSPITAL ENCOUNTER (OUTPATIENT)
Facility: OTHER | Age: 77
Discharge: HOME | End: 2022-07-20
Attending: ANESTHESIOLOGY | Admitting: ANESTHESIOLOGY
Payer: MEDICARE

## 2022-07-20 ENCOUNTER — TELEPHONE (OUTPATIENT)
Dept: PAIN MEDICINE | Facility: CLINIC | Age: 77
End: 2022-07-20
Payer: MEDICARE

## 2022-07-20 VITALS
RESPIRATION RATE: 16 BRPM | DIASTOLIC BLOOD PRESSURE: 62 MMHG | HEIGHT: 60 IN | HEART RATE: 65 BPM | TEMPERATURE: 98 F | OXYGEN SATURATION: 98 % | SYSTOLIC BLOOD PRESSURE: 106 MMHG | WEIGHT: 140 LBS | BODY MASS INDEX: 27.48 KG/M2

## 2022-07-20 DIAGNOSIS — G89.29 CHRONIC PAIN: ICD-10-CM

## 2022-07-20 DIAGNOSIS — M54.15 RADICULOPATHY OF THORACOLUMBAR REGION: ICD-10-CM

## 2022-07-20 DIAGNOSIS — M51.36 DDD (DEGENERATIVE DISC DISEASE), LUMBAR: Primary | ICD-10-CM

## 2022-07-20 PROCEDURE — 25500020 PHARM REV CODE 255: Performed by: ANESTHESIOLOGY

## 2022-07-20 PROCEDURE — 64483 NJX AA&/STRD TFRM EPI L/S 1: CPT | Mod: 50 | Performed by: ANESTHESIOLOGY

## 2022-07-20 PROCEDURE — 25000003 PHARM REV CODE 250: Performed by: STUDENT IN AN ORGANIZED HEALTH CARE EDUCATION/TRAINING PROGRAM

## 2022-07-20 PROCEDURE — 63600175 PHARM REV CODE 636 W HCPCS: Performed by: ANESTHESIOLOGY

## 2022-07-20 PROCEDURE — 64483 PR EPIDURAL INJ, ANES/STEROID, TRANSFORAMINAL, LUMB/SACR, SNGL LEVL: ICD-10-PCS | Mod: 50,,, | Performed by: ANESTHESIOLOGY

## 2022-07-20 PROCEDURE — 25000003 PHARM REV CODE 250: Performed by: ANESTHESIOLOGY

## 2022-07-20 PROCEDURE — 64483 NJX AA&/STRD TFRM EPI L/S 1: CPT | Mod: 50,,, | Performed by: ANESTHESIOLOGY

## 2022-07-20 RX ORDER — LIDOCAINE HYDROCHLORIDE 10 MG/ML
INJECTION, SOLUTION EPIDURAL; INFILTRATION; INTRACAUDAL; PERINEURAL
Status: DISCONTINUED | OUTPATIENT
Start: 2022-07-20 | End: 2022-07-20 | Stop reason: HOSPADM

## 2022-07-20 RX ORDER — FENTANYL CITRATE 50 UG/ML
INJECTION, SOLUTION INTRAMUSCULAR; INTRAVENOUS
Status: DISCONTINUED | OUTPATIENT
Start: 2022-07-20 | End: 2022-07-20 | Stop reason: HOSPADM

## 2022-07-20 RX ORDER — SODIUM CHLORIDE 9 MG/ML
500 INJECTION, SOLUTION INTRAVENOUS CONTINUOUS
Status: DISCONTINUED | OUTPATIENT
Start: 2022-07-20 | End: 2022-07-20 | Stop reason: HOSPADM

## 2022-07-20 RX ORDER — LIDOCAINE HYDROCHLORIDE 20 MG/ML
INJECTION, SOLUTION INFILTRATION; PERINEURAL
Status: DISCONTINUED | OUTPATIENT
Start: 2022-07-20 | End: 2022-07-20 | Stop reason: HOSPADM

## 2022-07-20 RX ORDER — MIDAZOLAM HYDROCHLORIDE 1 MG/ML
INJECTION INTRAMUSCULAR; INTRAVENOUS
Status: DISCONTINUED | OUTPATIENT
Start: 2022-07-20 | End: 2022-07-20 | Stop reason: HOSPADM

## 2022-07-20 RX ORDER — DEXAMETHASONE SODIUM PHOSPHATE 10 MG/ML
INJECTION INTRAMUSCULAR; INTRAVENOUS
Status: DISCONTINUED | OUTPATIENT
Start: 2022-07-20 | End: 2022-07-20 | Stop reason: HOSPADM

## 2022-07-20 NOTE — DISCHARGE INSTRUCTIONS

## 2022-07-20 NOTE — DISCHARGE SUMMARY
Discharge Note  Short Stay      SUMMARY     Admit Date: 7/20/2022    Attending Physician: Miguel Latham      Discharge Physician: Miguel Latham      Discharge Date: 7/20/2022 10:55 AM    Procedure(s) (LRB):  INJECTION, STEROID, EPIDURAL, TRANSFORAMINAL APPROACH, BILATERAL L3-L4 CONTRAST (Bilateral)    Final Diagnosis: Lumbosacral radiculopathy [M54.17]    Disposition: Home or self care    Patient Instructions:   Current Discharge Medication List      CONTINUE these medications which have NOT CHANGED    Details   alendronate (FOSAMAX) 70 MG tablet TAKE 1 TABLET BY MOUTH ONE TIME PER WEEK  Qty: 12 tablet, Refills: 1    Associated Diagnoses: Age-related osteoporosis without current pathological fracture      amLODIPine (NORVASC) 10 MG tablet TAKE 1 TABLET BY MOUTH EVERY DAY  Qty: 90 tablet, Refills: 3    Associated Diagnoses: Essential hypertension      atorvastatin (LIPITOR) 40 MG tablet TAKE 1 TABLET BY MOUTH EVERY DAY  Qty: 90 tablet, Refills: 1    Associated Diagnoses: Hyperlipidemia, unspecified hyperlipidemia type      calcium-vitamin D 500-125 mg-unit tablet Take 1 tablet by mouth once daily.      diclofenac (VOLTAREN) 75 MG EC tablet Take 1 tablet (75 mg total) by mouth 2 (two) times daily as needed (pain).  Qty: 180 tablet, Refills: 1    Comments: DX Code Needed  .  Associated Diagnoses: Spinal stenosis, lumbar region with neurogenic claudication; DDD (degenerative disc disease), lumbar; Bilateral leg pain; Radiculopathy of thoracolumbar region      ergocalciferol (ERGOCALCIFEROL) 50,000 unit Cap TAKE 1 CAPSULE (50,000 UNITS TOTAL) BY MOUTH EVERY SUNDAY  Qty: 12 capsule, Refills: 1    Associated Diagnoses: Vitamin D deficiency      fluticasone propionate (FLONASE) 50 mcg/actuation nasal spray 2 SPRAYS (100 MCG TOTAL) BY EACH NOSTRIL ROUTE ONCE DAILY.  Qty: 48 mL, Refills: 3    Comments: NEED NEW SCRIPT  Associated Diagnoses: Allergic rhinitis, unspecified seasonality, unspecified trigger      gabapentin  (NEURONTIN) 300 MG capsule Take 2 capsules (600 mg total) by mouth 2 (two) times daily.  Qty: 360 capsule, Refills: 1    Associated Diagnoses: DDD (degenerative disc disease), lumbar; Spinal stenosis, lumbar region with neurogenic claudication; Bilateral leg pain; Radiculopathy of thoracolumbar region      metoprolol succinate (TOPROL-XL) 100 MG 24 hr tablet TAKE 1 TABLET BY MOUTH EVERY DAY  Qty: 90 tablet, Refills: 3    Associated Diagnoses: Essential hypertension      multivitamin (THERAGRAN) per tablet Take 1 tablet by mouth once daily.      pantoprazole (PROTONIX) 40 MG tablet TAKE 1 TABLET BY MOUTH EVERY DAY  Qty: 90 tablet, Refills: 1    Associated Diagnoses: Gastroesophageal reflux disease without esophagitis      paroxetine (PAXIL) 20 MG tablet Take 1 tablet (20 mg total) by mouth every morning.  Qty: 90 tablet, Refills: 1    Associated Diagnoses: Mild recurrent major depression; Anxiety      traZODone (DESYREL) 150 MG tablet TAKE 1 TABLET (150 MG TOTAL) BY MOUTH EVERY EVENING.  Qty: 90 tablet, Refills: 1    Associated Diagnoses: Insomnia, unspecified type                 Discharge Diagnosis: Lumbosacral radiculopathy [M54.17]  Condition on Discharge: Stable with no complications to procedure   Diet on Discharge: Same as before.  Activity: as per instruction sheet.  Discharge to: Home with a responsible adult.  Follow up: 2-4 weeks       Please call my office or pager at [phone] if experienced any weakness or loss of sensation, fever > 101.5, pain uncontrolled with oral medications, persistent nausea/vomiting/or diarrhea, redness or drainage from the incisions, or any other worrisome concerns. If physician on call was not reached or could not communicate with our office for any reason please go to the nearest emergency department        Miguel Latham MD

## 2022-07-20 NOTE — H&P
POLY Herring presents for bilateral L3/4 TFESI due to lumbar radiculopathy.     Of note, she was offered bilateral L3, 4, 5 MBB for treatment lumbar spondylosis as mentioned in latest pain clinic note, but did not want to proceed with this procedure because she was scared to do it without sedation.     Past Medical History:   Diagnosis Date    Anxiety     Arthritis     Corneal abrasion s    ? eye     Depression     Full dentures     GERD (gastroesophageal reflux disease)     Hyperlipidemia     Hypertension     Nuclear sclerosis of both eyes 6/5/2017    Osteoporosis     Restless leg syndrome      Past Surgical History:   Procedure Laterality Date    COLONOSCOPY N/A 5/14/2019    Procedure: COLONOSCOPY;  Surgeon: Nahid Cline MD;  Location: [facility] ENDO;  Service: Endoscopy;  Laterality: N/A;    EPIDURAL STEROID INJECTION N/A 5/13/2020    Procedure: LUMBAR/CAUDAL L5/S1 IL SANKET ;  Surgeon: Miguel Latham MD;  Location: [facility] PAIN MGT;  Service: Pain Management;  Laterality: N/A;  NEEDS CONSENT    HYSTERECTOMY      PARTIAL HYSTERECTOMY      TRANSFORAMINAL EPIDURAL INJECTION OF STEROID Bilateral 9/4/2019    Procedure: Injection,steroid,epidural,transforaminal approach LUMBAR TRANSFORAMINAL BILATERAL L3 TF SANKET;  Surgeon: Miguel Latham MD;  Location: [facility] PAIN MGT;  Service: Pain Management;  Laterality: Bilateral;  NEEDS CONSENT    TRANSFORAMINAL EPIDURAL INJECTION OF STEROID Bilateral 10/30/2019    Procedure: TRANSFORAMINAL SANKET BILATERAL L3-L4;  Surgeon: Miguel Latham MD;  Location: [facility] PAIN MGT;  Service: Pain Management;  Laterality: Bilateral;  NEEDS CONSENT    TRANSFORAMINAL EPIDURAL INJECTION OF STEROID Bilateral 11/11/2020    Procedure: INJECTION, STEROID, EPIDURAL, TRANSFORAMINAL APPROACH, L3-L4;  Surgeon: Miguel Latham MD;  Location: [facility] PAIN MGT;  Service: Pain Management;  Laterality: Bilateral;    TRANSFORAMINAL EPIDURAL INJECTION OF STEROID Bilateral 5/19/2021     Procedure: INJECTION, STEROID, EPIDURAL, TRANSFORAMINAL APPROACH, L3-L4;  Surgeon: Miguel Latham MD;  Location: [facility] PAIN MGT;  Service: Pain Management;  Laterality: Bilateral;    TRANSFORAMINAL EPIDURAL INJECTION OF STEROID Bilateral 1/26/2022    Procedure: Injection,steroid,epidural,transforaminal approach BILATERAL L3/4;  Surgeon: Miguel Latham MD;  Location: [facility] PAIN MGT;  Service: Pain Management;  Laterality: Bilateral;    TRANSFORAMINAL EPIDURAL INJECTION OF STEROID Bilateral 4/20/2022    Procedure: INJECTION, STEROID, EPIDURAL, TRANSFORAMINAL APPROACH, Bilateral L3-L4;  Surgeon: Miguel Latham MD;  Location: [facility] PAIN MGT;  Service: Pain Management;  Laterality: Bilateral;    TUBAL LIGATION Bilateral      Review of patient's allergies indicates:  No Known Allergies     PMHx, PSHx, Allergies, Medications reviewed in epic      ROS negative except pain complaints in HPI    OBJECTIVE:    BP (!) 144/69   Pulse 67   Temp 97.8 °F (36.6 °C) (Oral)   Resp 16   Ht 5' (1.524 m)   Wt 63.5 kg (140 lb)   SpO2 96%   Breastfeeding No   BMI 27.34 kg/m²     PHYSICAL EXAMINATION:    GENERAL: Well appearing, in no acute distress, alert and oriented x3.  PSYCH:  Mood and affect appropriate.  SKIN: Skin color, texture, turgor normal, no rashes or lesions.  CV: RRR with palpation of the radial artery.  PULM: No evidence of respiratory difficulty, symmetric chest rise. Clear to auscultation.  MSK: positive facet loading bilaterally, antalgic gait.  NEURO: Cranial nerves grossly intact.    Plan:    Proceed with procedure as planned    Tere Mccarthy  07/20/2022

## 2022-07-20 NOTE — OP NOTE
Lumbar Transforaminal Epidural Steroid Injection under Fluoroscopic Guidance    The procedure, risks, benefits, and options were discussed with the patient. There are no contraindications to the procedure. The patent expressed understanding and agreed to the procedure. Informed written consent was obtained prior to the start of the procedure and can be found in the patient's chart.    PATIENT NAME: Mrs. Diana Herring   MRN: [id number]     DATE OF PROCEDURE: 07/20/2022    PROCEDURE:  Bilateral  L3/4 Lumbar Transforaminal Epidural Steroid Injection under Fluoroscopic Guidance    PRE-OP DIAGNOSIS: Lumbosacral radiculopathy [M54.17] Lumbar radiculopathy [M54.16] DDD    POST-OP DIAGNOSIS: Same    PHYSICIAN: Miguel Latham MD    MEDICATIONS INJECTED: Preservative-free Decadron 10mg with 5cc of Lidocaine 1% MPF     LOCAL ANESTHETIC INJECTED: Xylocaine 2%     SEDATION:   Versed 2mg and Fentanyl 100mcg                                                                                                                                                                                     Conscious sedation ordered by [email or web address]. Patient re-evaluation prior to administration of conscious sedation. No changes noted in patient's status from initial evaluation. The patient's vital signs were monitored by RN and patient remained hemodynamically stable throughout the procedure.    Event Time In   Sedation Start 1045   Sedation End 1049       ESTIMATED BLOOD LOSS: None    COMPLICATIONS: None    TECHNIQUE: Time-out was performed to identify the patient and procedure to be performed. With the patient laying in a prone position, the surgical area was prepped and draped in the usual sterile fashion using ChloraPrep and a fenestrated drape.The levels were determined under fluoroscopy guidance. Skin anesthesia was achieved by injecting Lidocaine 2% over the injection sites. The transforaminal spaces were then approached with a 22 gauge, 3.5 inch  spinal quinke needle that was introduced under fluoroscopic guidance in the AP and Lateral views. Once the needle tip was in the area of the transforaminal space, and there was no blood, CSF or paraesthesias, contrast dye Omnipaque (240mg/mL) was injected to confirm placement and there was no vascular runoff. Fluoroscopic imaging in the AP and lateral views revealed a clear outline of the spinal nerve with proximal spread of agent through the neural foramen into the epidural space. 3 mL of the medication mixture listed above was injected slowly at each site. Displacement of the radio opaque contrast after injection of the medication confirmed that the medication went into the area of the transforaminal spaces. The needles were removed and bleeding was nil. A sterile dressing was applied. No specimens collected. The patient tolerated the procedure well.       The patient was monitored after the procedure in the recovery area. They were given post-procedure and discharge instructions to follow at home. The patient was discharged in a stable condition.    I reviewed and edited the fellow's note. I conducted my own interview and physical examination. I agree with the findings. I was present and supervising all critical portions of the procedure.    Miguel Latham MD

## 2022-07-21 ENCOUNTER — PATIENT MESSAGE (OUTPATIENT)
Dept: PAIN MEDICINE | Facility: OTHER | Age: 77
End: 2022-07-21
Payer: MEDICARE

## 2022-07-30 DIAGNOSIS — K21.9 GASTROESOPHAGEAL REFLUX DISEASE WITHOUT ESOPHAGITIS: Chronic | ICD-10-CM

## 2022-07-30 DIAGNOSIS — I10 ESSENTIAL HYPERTENSION: Chronic | ICD-10-CM

## 2022-07-30 NOTE — TELEPHONE ENCOUNTER
No new care gaps identified.  [facility] Embedded Care Gaps. Reference number: [id number]. 7/30/2022   7:16:36 AM CDT

## 2022-08-01 RX ORDER — PANTOPRAZOLE SODIUM 40 MG/1
TABLET, DELAYED RELEASE ORAL
Qty: 90 TABLET | Refills: 1 | Status: SHIPPED | OUTPATIENT
Start: 2022-08-01 | End: 2023-02-02

## 2022-08-01 RX ORDER — METOPROLOL SUCCINATE 100 MG/1
TABLET, EXTENDED RELEASE ORAL
Qty: 90 TABLET | Refills: 1 | Status: SHIPPED | OUTPATIENT
Start: 2022-08-01 | End: 2022-12-14

## 2022-08-01 NOTE — TELEPHONE ENCOUNTER
Refill Routing Note   Medication(s) are not appropriate for processing by [facility] for the following reason(s):      - Drug-Disease Interaction (osteoporosis and pantoprazole)    [facility] action(s):  Approve  Defer Medication-related problems identified: Drug-disease interaction        Medication reconciliation completed: No     Appointments  past 12m or future 3m with PCP    Date Provider   Last Visit   3/7/2022 Luisito Marin MD   Next Visit   Visit date not found Luisito Marin MD   ED visits in past 90 days: 0        Note composed:3:37 PM 08/01/2022

## 2022-08-05 ENCOUNTER — CLINICAL SUPPORT (OUTPATIENT)
Dept: REHABILITATION | Facility: HOSPITAL | Age: 77
End: 2022-08-05
Payer: MEDICARE

## 2022-08-05 DIAGNOSIS — M48.062 SPINAL STENOSIS, LUMBAR REGION WITH NEUROGENIC CLAUDICATION: ICD-10-CM

## 2022-08-05 DIAGNOSIS — M54.15 RADICULOPATHY OF THORACOLUMBAR REGION: ICD-10-CM

## 2022-08-05 DIAGNOSIS — M62.81 MUSCLE WEAKNESS: ICD-10-CM

## 2022-08-05 DIAGNOSIS — M79.604 BILATERAL LEG PAIN: ICD-10-CM

## 2022-08-05 DIAGNOSIS — M47.819 SPONDYLOSIS WITHOUT MYELOPATHY: ICD-10-CM

## 2022-08-05 DIAGNOSIS — G89.29 OTHER CHRONIC PAIN: ICD-10-CM

## 2022-08-05 DIAGNOSIS — M79.605 BILATERAL LEG PAIN: ICD-10-CM

## 2022-08-05 DIAGNOSIS — M54.17 LUMBOSACRAL RADICULOPATHY: Primary | ICD-10-CM

## 2022-08-05 PROCEDURE — 97161 PT EVAL LOW COMPLEX 20 MIN: CPT

## 2022-08-05 PROCEDURE — 97110 THERAPEUTIC EXERCISES: CPT

## 2022-08-05 NOTE — PLAN OF CARE
[facility]   Physical Therapy Initial Evaluation     Date: 8/5/2022   Name: Diana Herring  Clinic Number: [id number]    Therapy Diagnosis:   Encounter Diagnoses   Name Primary?    Lumbosacral radiculopathy Yes    Spondylosis without myelopathy     Radiculopathy of thoracolumbar region     Spinal stenosis, lumbar region with neurogenic claudication     Other chronic pain     Muscle weakness     Bilateral leg pain      Physician: Christin Ventura FNP    Physician Orders: Aquatic Therapy PT Eval and Treat  Medical Diagnosis from Referral: M54.17 (ICD-10-CM) - Lumbosacral radiculopathy    M47.819 (ICD-10-CM) - Spondylosis without myelopathy    M54.15 (ICD-10-CM) - Radiculopathy of thoracolumbar region  Evaluation Date: 8/5/2022  Authorization Period Expiration: 9/02/2022  Plan of Care Expiration: 11/05/2022  Visit # / Visits authorized: 1/ 1     Precautions: Standard and Fall    Time In: 0900  Time Out: 0945  Total Appointment Time (timed & untimed codes): 45 minutes      SUBJECTIVE   Date of onset: 4 years ago    History of current condition - Diana reports: chronic low back pain, bilateral hip and thigh pain. She states that her pain is at its worst in the mornings upon waking. She reports BLE feel heavy. She has to lean on cart or toward furniture/wall after standing/walking for only short periods due to increased pain and weakness. She has received numerous injections for her LBP with radiculopathy. She reports intermittent N/T to BLE extending to feet, but the majority of her N/T is in her RUE lately.   She denies saddle paresthesias. She has had history of urinary urgency but states that she doesn't lose control of her bladder.    Falls: No recent falls reported.     Prior Therapy: Reports previous PT history with treatment for the same impairments; reports temporary positive outcomes. She states that she continues to do a few of the flexion based exercises from  previous HEP.   Social History: Currently resides in a two story home with 12 steps to enter, lives with spouse.  Occupation: Retired  Prior Level of Function: Independent.   Current Level of Function: Independent with reducing standing tolerance, reduced tolerance/participation in ADLs/iADLs due to pain.     Pain:  Current 7/10, worst 10/10, best 4/10   Location: bilateral back , buttocks , lower legs and upper legs   Description: Aching, Tight and Tingling  Aggravating Factors: Sitting, Standing, Morning and prolonged positions  Easing Factors: pain medication, injection and OTC topical analgesics    Patients goals: To decrease pain levels and return to prior level of function. Discussed plan for independent exercise goals following plan of care.     Medical History:   Past Medical History:   Diagnosis Date    Anxiety     Arthritis     Corneal abrasion s    ? eye     Depression     Full dentures     GERD (gastroesophageal reflux disease)     Hyperlipidemia     Hypertension     Nuclear sclerosis of both eyes 6/5/2017    Osteoporosis     Restless leg syndrome        Surgical History:   Diana Herring  has a past surgical history that includes Partial hysterectomy; Tubal ligation (Bilateral); Hysterectomy; Colonoscopy (N/A, 5/14/2019); Transforaminal epidural injection of steroid (Bilateral, 9/4/2019); Transforaminal epidural injection of steroid (Bilateral, 10/30/2019); Epidural steroid injection (N/A, 5/13/2020); Transforaminal epidural injection of steroid (Bilateral, 11/11/2020); Transforaminal epidural injection of steroid (Bilateral, 5/19/2021); Transforaminal epidural injection of steroid (Bilateral, 1/26/2022); Transforaminal epidural injection of steroid (Bilateral, 4/20/2022); and Transforaminal epidural injection of steroid (Bilateral, 7/20/2022).    Medications:   Diana has a current medication list which includes the following prescription(s): alendronate, amlodipine, atorvastatin,  calcium-vitamin d, diclofenac, ergocalciferol, fluticasone propionate, gabapentin, metoprolol succinate, multivitamin, pantoprazole, paroxetine, and trazodone.    Allergies:   Review of patient's allergies indicates:  No Known Allergies     Pool contraindications, including but not limited to, incontinence, seizures, fever/GI issues were reviewed with the patient. Patient agrees that based on their knowledge and medical history, they are appropriate for Aquatic Therapy.     OBJECTIVE   TUG: 10 sec without AD    5 Times Sit to Stand: 22.5 sec using UE assist on thighs.    Observation: Pleasant 78 y/o female ambulates into the clinic independently and in no acute distress. She is accompanied by her spouse.     Posture:  Mild rounded shoulders, anterior pelvic tilt, lean towards support structures/furniture or forward lean after short periods of standing.     Gait: Independent. Reduced step length. Adequate toe clearance and heel strike observed. Increased time/steps for turns/maneuvering. Stairs are step to pattern with railing.     Lumbar Range of Motion:    Degrees Pain   Flexion 25% limited  Mild LBP        Extension 50% limited   Immediate sharp/intense pain        Left Side Bending 18 cm More pain        Right Side Bending 20 cm Tolerate well        Left rotation   25% limitation More pain        Right Rotation   25% limitation Tolerated fairly              Lower Extremity Strength  Right LE  Left LE    Knee extension: 4+/5 Knee extension: 4+/5   Knee flexion: 4/5 Knee flexion: 4/5   Hip flexion: 4/5 Hip flexion: 4/5   Hip extension:  4-/5 Hip extension: 4-/5   Hip abduction: 4+/5 Hip abduction: 4+/5   Hip adduction: 4-/5 Hip adduction 4-/5   Ankle dorsiflexion: 4/5 Ankle dorsiflexion: 4/5   Ankle plantarflexion: 4/5 Ankle plantarflexion: 4/5   Ankle eversion 4/5 bilat    Neuro Dynamic Testing:    Sciatic nerve:      SLR: R = (+)     L = (+) more pain  BLE DTRs 1+  Balance: (-) Rhomberg EO, (+) EC for increased  sway. < 10s tandem. < 5s SLS    Palpation: TTP to lower lumbar paraspinals.    Sensation: Tactile sensation intact      TREATMENT     Total Treatment time (time-based codes) separate from Evaluation: 10 minutes      Diana received the treatments listed below:       THERAPEUTIC EXERCISES to develop ROM and flexibility for 10 minutes including HEP edu    PATIENT EDUCATION AND HOME EXERCISES     Education provided:   - Current medical status and being a candidate for skilled therapeutic intervention, HEP and importance of compliance, Role of PT, general joint arthrokinematics of spine, relevant anatomy, goals of POC, general principles of aquatic therapy, cryotherapy; thermotherapy; wellbeing.    Written Home Exercises Provided: yes. Exercises were reviewed and Diana was able to demonstrate them prior to the end of the session.  Diana demonstrated good  understanding of the education provided. See EMR under Patient Instructions for exercises provided during therapy sessions.    ASSESSMENT     Diana is a 77 y.o. female referred to outpatient Physical Therapy with a medical diagnosis of lumbosacral radiculopathy, thoracolumbar radiculopathy, and lumbosacral spondylosis without myelopathy. Patient presents with ROM, strength and functional limitations that impact the patient's ability to perform ADLs and preferred activities at prior level of function. These impairments, in addition to subjective reporting, would make Diana a good candidate for aquatic therapy.      Patient prognosis is Good.     Patient will benefit from skilled outpatient Physical Therapy to address the deficits stated above and in the chart below, provide patient /family education, and to maximize patientt's level of independence.     Plan of care discussed with patient: Yes    Patient's spiritual, cultural and educational needs considered and patient is agreeable to the plan of care and goals as stated below:     Anticipated Barriers for  therapy: None    Medical Necessity is demonstrated by the following  History  Co-morbidities and personal factors that may impact the plan of care Co-morbidities:   advanced age    Personal Factors:   no deficits     low   Examination  Body Structures and Functions, activity limitations and participation restrictions that may impact the plan of care Body Regions:   back  lower extremities  trunk    Body Systems:    ROM  strength  balance  gait  transfers    Participation Restrictions:   None.     Activity limitations:   Learning and applying knowledge  no deficits    General Tasks and Commands  no deficits    Communication  no deficits    Mobility  walking    Self care  no deficits    Domestic Life  doing house work (cleaning house, washing dishes, laundry)    Interactions/Relationships  no deficits    Life Areas  no deficits    Community and Social Life  no deficits         low   Clinical Presentation stable and uncomplicated low   Decision Making/ Complexity Score: low       Goals:  Short Term Goals: 6 weeks   1. Patient will be independent in HEP & progressions.  2. Patient will achieve TUG score of 9 sec to demonstrate improved mobility  3. The patient will achieve 5 sit to stand score of 19 seconds with </= 3/10 RPE to demonstrate improved transfers and endurance.     Long Term Goals: 12 weeks   1. The patient will demonstrate independence with extensive HEP.  2. Patient will achieve 5 sit to stand score of 16 seconds with </= 3/10 RPE to demonstrate improved transfers & endurance.  3. Patent is able to demonstrate MMT >/= 4/5 BLE without pain reports during testing.    4. Patient will report improved standing tolerance to > 20 minutes with minimal pain complaints for improved tolerance/participation in ADLs/iADLs.     PLAN   Plan of care Certification: 8/5/2022 to 11/05/2022.    Outpatient Physical Therapy 1-2 times weekly for 12 weeks to include the following interventions: manual therapy, aquatic therapy,  patient education, therapeutic exercise, therapeutic activities.    Patient may be seen by PTA as part of rehabilitation team.    Delano Goodrich, PT, DPT      I CERTIFY THE NEED FOR THESE SERVICES FURNISHED UNDER THIS PLAN OF TREATMENT AND WHILE UNDER MY CARE   Physician's comments:     Physician's Signature: ___________________________________________________

## 2022-08-05 NOTE — PROGRESS NOTES
[facility]   Physical Therapy Initial Evaluation     Date: 8/5/2022   Name: Diana Herring  Clinic Number: [id number]    Therapy Diagnosis:   Encounter Diagnoses   Name Primary?    Lumbosacral radiculopathy Yes    Spondylosis without myelopathy     Radiculopathy of thoracolumbar region     Spinal stenosis, lumbar region with neurogenic claudication     Other chronic pain     Muscle weakness     Bilateral leg pain      Physician: Christin Ventura,*    Physician Orders: Aquatic Therapy PT Eval and Treat  Medical Diagnosis from Referral: M54.17 (ICD-10-CM) - Lumbosacral radiculopathy    M47.819 (ICD-10-CM) - Spondylosis without myelopathy    M54.15 (ICD-10-CM) - Radiculopathy of thoracolumbar region  Evaluation Date: 8/5/2022  Authorization Period Expiration: 9/02/2022  Plan of Care Expiration: 11/05/2022  Visit # / Visits authorized: 1/ 1     Precautions: Standard and Fall    Time In: 0900  Time Out: 0945  Total Appointment Time (timed & untimed codes): 45 minutes      SUBJECTIVE   Date of onset: 4 years ago    History of current condition - Diana reports: chronic low back pain, bilateral hip and thigh pain. She states that her pain is at its worst in the mornings upon waking. She reports BLE feel heavy. She has to lean on cart or toward furniture/wall after standing/walking for only short periods due to increased pain and weakness. She has received numerous injections for her LBP with radiculopathy. She reports intermittent N/T to BLE extending to feet, but the majority of her N/T is in her RUE lately.   She denies saddle paresthesias. She has had history of urinary urgency but states that she doesn't lose control of her bladder.    Falls: No recent falls reported.     Prior Therapy: Reports previous PT history with treatment for the same impairments; reports temporary positive outcomes. She states that she continues to do a few of the flexion based exercises from previous  HEP.   Social History: Currently resides in a two story home with 12 steps to enter, lives with spouse.  Occupation: Retired  Prior Level of Function: Independent.   Current Level of Function: Independent with reducing standing tolerance, reduced tolerance/participation in ADLs/iADLs due to pain.     Pain:  Current 7/10, worst 10/10, best 4/10   Location: bilateral back , buttocks , lower legs and upper legs   Description: Aching, Tight and Tingling  Aggravating Factors: Sitting, Standing, Morning and prolonged positions  Easing Factors: pain medication, injection and OTC topical analgesics    Patients goals: To decrease pain levels and return to prior level of function. Discussed plan for independent exercise goals following plan of care.     Medical History:   Past Medical History:   Diagnosis Date    Anxiety     Arthritis     Corneal abrasion s    ? eye     Depression     Full dentures     GERD (gastroesophageal reflux disease)     Hyperlipidemia     Hypertension     Nuclear sclerosis of both eyes 6/5/2017    Osteoporosis     Restless leg syndrome        Surgical History:   Diana Herring  has a past surgical history that includes Partial hysterectomy; Tubal ligation (Bilateral); Hysterectomy; Colonoscopy (N/A, 5/14/2019); Transforaminal epidural injection of steroid (Bilateral, 9/4/2019); Transforaminal epidural injection of steroid (Bilateral, 10/30/2019); Epidural steroid injection (N/A, 5/13/2020); Transforaminal epidural injection of steroid (Bilateral, 11/11/2020); Transforaminal epidural injection of steroid (Bilateral, 5/19/2021); Transforaminal epidural injection of steroid (Bilateral, 1/26/2022); Transforaminal epidural injection of steroid (Bilateral, 4/20/2022); and Transforaminal epidural injection of steroid (Bilateral, 7/20/2022).    Medications:   Diana has a current medication list which includes the following prescription(s): alendronate, amlodipine, atorvastatin, calcium-vitamin  d, diclofenac, ergocalciferol, fluticasone propionate, gabapentin, metoprolol succinate, multivitamin, pantoprazole, paroxetine, and trazodone.    Allergies:   Review of patient's allergies indicates:  No Known Allergies     Pool contraindications, including but not limited to, incontinence, seizures, fever/GI issues were reviewed with the patient. Patient agrees that based on their knowledge and medical history, they are appropriate for Aquatic Therapy.     OBJECTIVE   TUG: 10 sec without AD    5 Times Sit to Stand: 22.5 sec using UE assist on thighs.    Observation: Pleasant 76 y/o female ambulates into the clinic independently and in no acute distress. She is accompanied by her spouse.     Posture:  Mild rounded shoulders, anterior pelvic tilt, lean towards support structures/furniture or forward lean after short periods of standing.     Gait: Independent. Reduced step length. Adequate toe clearance and heel strike observed. Increased time/steps for turns/maneuvering. Stairs are step to pattern with railing.     Lumbar Range of Motion:    Degrees Pain   Flexion 25% limited  Mild LBP        Extension 50% limited   Immediate sharp/intense pain        Left Side Bending 18 cm More pain        Right Side Bending 20 cm Tolerate well        Left rotation   25% limitation More pain        Right Rotation   25% limitation Tolerated fairly              Lower Extremity Strength  Right LE  Left LE    Knee extension: 4+/5 Knee extension: 4+/5   Knee flexion: 4/5 Knee flexion: 4/5   Hip flexion: 4/5 Hip flexion: 4/5   Hip extension:  4-/5 Hip extension: 4-/5   Hip abduction: 4+/5 Hip abduction: 4+/5   Hip adduction: 4-/5 Hip adduction 4-/5   Ankle dorsiflexion: 4/5 Ankle dorsiflexion: 4/5   Ankle plantarflexion: 4/5 Ankle plantarflexion: 4/5   Ankle eversion 4/5 bilat    Neuro Dynamic Testing:    Sciatic nerve:      SLR: R = (+)     L = (+) more pain  BLE DTRs 1+  Balance: (-) Rhomberg EO, (+) EC for increased sway. < 10s  tandem. < 5s SLS    Palpation: TTP to lower lumbar paraspinals.    Sensation: Tactile sensation intact      TREATMENT     Total Treatment time (time-based codes) separate from Evaluation: 10 minutes      Diana received the treatments listed below:       THERAPEUTIC EXERCISES to develop ROM and flexibility for 10 minutes including HEP edu    PATIENT EDUCATION AND HOME EXERCISES     Education provided:   - Current medical status and being a candidate for skilled therapeutic intervention, HEP and importance of compliance, Role of PT, general joint arthrokinematics of spine, relevant anatomy, goals of POC, general principles of aquatic therapy, cryotherapy; thermotherapy; wellbeing.    Written Home Exercises Provided: yes. Exercises were reviewed and Diana was able to demonstrate them prior to the end of the session.  Diana demonstrated good  understanding of the education provided. See EMR under Patient Instructions for exercises provided during therapy sessions.    ASSESSMENT     Diana is a 77 y.o. female referred to outpatient Physical Therapy with a medical diagnosis of lumbosacral radiculopathy, thoracolumbar radiculopathy, and lumbosacral spondylosis without myelopathy. Patient presents with ROM, strength and functional limitations that impact the patient's ability to perform ADLs and preferred activities at prior level of function. These impairments, in addition to subjective reporting, would make Diana a good candidate for aquatic therapy.      Patient prognosis is Good.     Patient will benefit from skilled outpatient Physical Therapy to address the deficits stated above and in the chart below, provide patient /family education, and to maximize patientt's level of independence.     Plan of care discussed with patient: Yes    Patient's spiritual, cultural and educational needs considered and patient is agreeable to the plan of care and goals as stated below:     Anticipated Barriers for therapy:  None    Medical Necessity is demonstrated by the following  History  Co-morbidities and personal factors that may impact the plan of care Co-morbidities:   advanced age    Personal Factors:   no deficits     low   Examination  Body Structures and Functions, activity limitations and participation restrictions that may impact the plan of care Body Regions:   back  lower extremities  trunk    Body Systems:    ROM  strength  balance  gait  transfers    Participation Restrictions:   None.     Activity limitations:   Learning and applying knowledge  no deficits    General Tasks and Commands  no deficits    Communication  no deficits    Mobility  walking    Self care  no deficits    Domestic Life  doing house work (cleaning house, washing dishes, laundry)    Interactions/Relationships  no deficits    Life Areas  no deficits    Community and Social Life  no deficits         low   Clinical Presentation stable and uncomplicated low   Decision Making/ Complexity Score: low       Goals:  Short Term Goals: 6 weeks   1. Patient will be independent in HEP & progressions.  2. Patient will achieve TUG score of 9 sec to demonstrate improved mobility  3. The patient will achieve 5 sit to stand score of 19 seconds with </= 3/10 RPE to demonstrate improved transfers and endurance.     Long Term Goals: 12 weeks   1. The patient will demonstrate independence with extensive HEP.  2. Patient will achieve 5 sit to stand score of 16 seconds with </= 3/10 RPE to demonstrate improved transfers & endurance.  3. Patent is able to demonstrate MMT >/= 4/5 BLE without pain reports during testing.    4. Patient will report improved standing tolerance to > 20 minutes with minimal pain complaints for improved tolerance/participation in ADLs/iADLs.     PLAN   Plan of care Certification: 8/5/2022 to 11/05/2022.    Outpatient Physical Therapy 1-2 times weekly for 12 weeks to include the following interventions: manual therapy, aquatic therapy, patient  education, therapeutic exercise, therapeutic activities.    Patient may be seen by PTA as part of rehabilitation team.    Delano Goodrich, PT, DPT      I CERTIFY THE NEED FOR THESE SERVICES FURNISHED UNDER THIS PLAN OF TREATMENT AND WHILE UNDER MY CARE   Physician's comments:     Physician's Signature: ___________________________________________________

## 2022-08-10 ENCOUNTER — TELEPHONE (OUTPATIENT)
Dept: PAIN MEDICINE | Facility: CLINIC | Age: 77
End: 2022-08-10
Payer: MEDICARE

## 2022-08-10 NOTE — TELEPHONE ENCOUNTER
This message is for patient in regards to his/her appointment 08/11/22 at 8:20a   With BRENDA Hackett.      [facility] Policy: For the safety of all patients and staff members.     Patient Visitor policy: During this visit we're informing all patients that face mask are required.     If you have any questions or concerns please contact [phone]

## 2022-08-11 ENCOUNTER — OFFICE VISIT (OUTPATIENT)
Dept: PAIN MEDICINE | Facility: CLINIC | Age: 77
End: 2022-08-11
Payer: MEDICARE

## 2022-08-11 VITALS
BODY MASS INDEX: 27.26 KG/M2 | SYSTOLIC BLOOD PRESSURE: 131 MMHG | HEART RATE: 61 BPM | WEIGHT: 138.88 LBS | TEMPERATURE: 98 F | RESPIRATION RATE: 18 BRPM | DIASTOLIC BLOOD PRESSURE: 66 MMHG | HEIGHT: 60 IN

## 2022-08-11 DIAGNOSIS — M47.819 SPONDYLOSIS WITHOUT MYELOPATHY: ICD-10-CM

## 2022-08-11 DIAGNOSIS — M54.17 LUMBOSACRAL RADICULOPATHY: ICD-10-CM

## 2022-08-11 DIAGNOSIS — M48.062 SPINAL STENOSIS, LUMBAR REGION WITH NEUROGENIC CLAUDICATION: ICD-10-CM

## 2022-08-11 DIAGNOSIS — G89.29 OTHER CHRONIC PAIN: Primary | ICD-10-CM

## 2022-08-11 PROCEDURE — 1159F PR MEDICATION LIST DOCUMENTED IN MEDICAL RECORD: ICD-10-PCS | Mod: CPTII,S$GLB,, | Performed by: NURSE PRACTITIONER

## 2022-08-11 PROCEDURE — 1101F PT FALLS ASSESS-DOCD LE1/YR: CPT | Mod: CPTII,S$GLB,, | Performed by: NURSE PRACTITIONER

## 2022-08-11 PROCEDURE — 3288F FALL RISK ASSESSMENT DOCD: CPT | Mod: CPTII,S$GLB,, | Performed by: NURSE PRACTITIONER

## 2022-08-11 PROCEDURE — 3078F PR MOST RECENT DIASTOLIC BLOOD PRESSURE < 80 MM HG: ICD-10-PCS | Mod: CPTII,S$GLB,, | Performed by: NURSE PRACTITIONER

## 2022-08-11 PROCEDURE — 1160F RVW MEDS BY RX/DR IN RCRD: CPT | Mod: CPTII,S$GLB,, | Performed by: NURSE PRACTITIONER

## 2022-08-11 PROCEDURE — 99999 PR PBB SHADOW E&M-EST. PATIENT-LVL IV: ICD-10-PCS | Mod: PBBFAC,,, | Performed by: NURSE PRACTITIONER

## 2022-08-11 PROCEDURE — 1159F MED LIST DOCD IN RCRD: CPT | Mod: CPTII,S$GLB,, | Performed by: NURSE PRACTITIONER

## 2022-08-11 PROCEDURE — 1125F AMNT PAIN NOTED PAIN PRSNT: CPT | Mod: CPTII,S$GLB,, | Performed by: NURSE PRACTITIONER

## 2022-08-11 PROCEDURE — 99999 PR PBB SHADOW E&M-EST. PATIENT-LVL IV: CPT | Mod: PBBFAC,,, | Performed by: NURSE PRACTITIONER

## 2022-08-11 PROCEDURE — 1125F PR PAIN SEVERITY QUANTIFIED, PAIN PRESENT: ICD-10-PCS | Mod: CPTII,S$GLB,, | Performed by: NURSE PRACTITIONER

## 2022-08-11 PROCEDURE — 3075F PR MOST RECENT SYSTOLIC BLOOD PRESS GE 130-139MM HG: ICD-10-PCS | Mod: CPTII,S$GLB,, | Performed by: NURSE PRACTITIONER

## 2022-08-11 PROCEDURE — 99213 OFFICE O/P EST LOW 20 MIN: CPT | Mod: S$GLB,,, | Performed by: NURSE PRACTITIONER

## 2022-08-11 PROCEDURE — 3075F SYST BP GE 130 - 139MM HG: CPT | Mod: CPTII,S$GLB,, | Performed by: NURSE PRACTITIONER

## 2022-08-11 PROCEDURE — 1101F PR PT FALLS ASSESS DOC 0-1 FALLS W/OUT INJ PAST YR: ICD-10-PCS | Mod: CPTII,S$GLB,, | Performed by: NURSE PRACTITIONER

## 2022-08-11 PROCEDURE — 99213 PR OFFICE/OUTPT VISIT, EST, LEVL III, 20-29 MIN: ICD-10-PCS | Mod: S$GLB,,, | Performed by: NURSE PRACTITIONER

## 2022-08-11 PROCEDURE — 3288F PR FALLS RISK ASSESSMENT DOCUMENTED: ICD-10-PCS | Mod: CPTII,S$GLB,, | Performed by: NURSE PRACTITIONER

## 2022-08-11 PROCEDURE — 1160F PR REVIEW ALL MEDS BY PRESCRIBER/CLIN PHARMACIST DOCUMENTED: ICD-10-PCS | Mod: CPTII,S$GLB,, | Performed by: NURSE PRACTITIONER

## 2022-08-11 PROCEDURE — 3078F DIAST BP <80 MM HG: CPT | Mod: CPTII,S$GLB,, | Performed by: NURSE PRACTITIONER

## 2022-08-11 NOTE — PROGRESS NOTES
Chronic patient Established Note (Follow up visit)      SUBJECTIVE:    Interval History 8/11/2022:  The patient is here for follow up of lower back pain. Since previous encounter, she underwent repeat L3/4 TF SANKET with 70% relief. We had originally planned for MBB but she started having more shooting pain into the legs. She feels like this was helpful. Her pain is tolerable at this time. Her pain today is 7/10.    Interval History 5/9/2022:  The patient is here for follow up of chronic back pain. She is having more back pain and stiffness in the morning. We did previously discuss lumbar MBB but she is worried about not having IV sedation. She says that she would like to further discuss the procedure today. Leg pain has been mild since previous SANKET. She does have intermittent numbness still. Back pain is greater than leg pain. Her pain today is 7/10.    Interval History 4/7/2022:  The patient is here for follow up of lower back and leg pain. Since previous encounter, she underwent bilateral L3/4 TF SANKET on 1/26/22. She reports 80% relief of pain for about 2 months. She feels like this gave her significant benefit during that time and her pain was mild. She is now again having severe back pain with radiation into the anterior thighs. She would like to repeat the procedure. She would also like me to place another referral for aquatic PT. She stopped Mobic because she found Diclofenac more helpful. She has been taking as needed but not daily. She does find Gabapentin helpful but it sometimes makes her drowsy. Her pain today is 9/10.    Interval History 1/11/2022:  The patient returns to discuss continued lower back pain. She has not restarted aquatic PT due to increase in Covid-19 cases. She continues with sharp lower back pain that radiates into anterior thighs, bilaterally. She has associated numbness. The pain is worse with increased activity. Her pain today is 7/10.    Interval History 11/11/2021:  The patient  presents today for 2 month follow up of lower back pain. Since previous encounter, she has not started aquatic PT because she is on the waiting list. She continues to report lower back pain with radiation down the left leg. We previously discussed lumbar MBB/RFA which she does not wish to pursue at this time. She states that diclofenac is not helping very much with her aching pain at this time. She denies any new weakness or symptoms at this time. Her pain today is 8/10.    Interval History 8/19/2021:  Diana returns today for follow up of chronic lower back pain. She reports that he has continued with aching pain. She has been in PT but is not finding it very helpful. She has not tried aquatic PT in the past. She is still having intermittent radiation down her legs. She has been unable to take 1200 mg daily of Gabapentin and has decreased to 600 mg daily due to sedation and dizziness with the medication. Otherwise, no new complaints today. Her pain today is 7/10.    Interval History 6/18/2021:  The patient is here for follow up of lower back pain.  She has radiation of her pain into her anterior thighs but not below the knees.  She denies numbness or tingling at this time.  Her back pain is currently greater than her leg pain.  She has a lot of stiffness when she wakes the morning states it improves when she moves.  She had limited benefit with recent repeat bilateral L3-4 transforaminal steroid injection.  Her pain today is 7/10    Interval History 5/4/2021:  The patient is here for follow up of lower back and leg pain.  Previous encounter, physical therapy was ordered.  She states that she did not start physical therapy and would like me to replace the order for her.  She does report that she is noticing more muscle fatigue and weakness particularly with activity.  She is also having more back pain with radiation into the anterior lateral thighs with the past month.  She previously had significant been hip with  bilateral L3/4 transforaminal epidural steroid injection and wishes to repeat this.  She found this more helpful than previous procedures. Her pain today is 7/10.    Interval History 3/2/2021:  The patient is here for follow up of chronic pain. She is having more back pain today which she attributes to the cold front coming in. She is having radiation into the buttocks and right anterior thigh. Her back pain is her primary complaint today. She describes it as throbbing. She recently completed both Covid-19 vaccines which she tolerated well. She is now taking Gabapentin 600 mg twice daily regularly. She notices improvement in right leg numbness.  Her pain today is 7/10.    Interval History 12/1/2020:  The patient iss here for follow-up of back and leg pain.  She is now status post bilateral L3/4 transforaminal epidural steroid injection on 11/11/2020. She is reporting 100% relief for about 2 weeks and then her pain started to return.  She is still having some benefit.  Her pain is located across the lower back with radiation into front and sides of the legs to the anterior feet with associated numbness.  She feels like when she walks sometimes her legs become weak.  This has improved slightly.  She denies any recent falls.  At her last OV, her Gabapentin was increased to 600 mg BID.  She says that she finds this helpful.  She has mild drowsiness.  She admits that she does not always take the medication and thought it was more of an as needed medication.  The patient denies any bowel or bladder incontinence or signs of saddle paresthesia.  She feels as though her pain today is worse than usual.  She rates her pain today as 10/10.    Interval history 10/15/2020:  Diana Herring presents to the clinic for a follow-up appointment for back pain. Since the last visit, Diana Herring states the pain has been worsening. Current pain intensity is 7/10. She reports that she continues to have low back pain that radiates  to the bilateral hip and leg. She does report that she has noticed intermittent numbness in the right lateral and anterior thigh, that is sometimes associated with weakness in her right leg. She says that her leg gives out when this happens. She has not had any falls or other trauma. She did not get the bilateral L3/4 TFESI planned last time, and she is still taking only 300 mg bid of gabapentin.  Patient denies urinary incontinence, bowel incontinence, significant weight loss.    Initial visit:  Diana Herring presents to the clinic for the evaluation of low back pain. The pain started 2 years ago following loosing bone after a bone density and symptoms have been worsening.The pain is located in the low back area and radiates to the bilateral hip and leg.  The pain is described as aching, numbing, sharp and tight band and is rated as 8/10. The pain is rated with a score of  7/10 on the BEST day and a score of 8/10 on the WORST day.  Symptoms interfere with daily activity and sleeping. The pain is exacerbated by Sitting, Standing, Bending, Coughing/Sneezing, Walking, Morning, Lifting and Getting out of bed/chair.  The pain is mitigated by heat, laying down and rest. She reports spending 0 hours per day reclining. The patient reports 6 hours of uninterrupted sleep per night.     Patient had L5/S1 ILESI 2 weeks ago and only gave her 1 day of relief.      Patient denies night fever/night sweats, urinary incontinence, bowel incontinence, significant weight loss and significant motor weakness.     Physical Therapy/Home Exercise: yes, was not beneficial      Pain Disability Index Review:  Last 3 PDI Scores 8/11/2022 5/9/2022 4/7/2022   Pain Disability Index (PDI) 32 [phone] 31       Pain Medications:  Gabapentin 600 mg bid    Opioid Contract: no     report:  Reviewed and consistent with medication use as prescribed.    Pain Procedures:  7/20/22 Bilateral L3/4 TF SANKET- 70% relief  4/20/22 Bilateral L3/4 TF SANKET- 75% relief  of leg pain  1/26/22 Bilateral L3/4 TF SANKET  5/19/21 bilateral L3/4 TF SANKET  11/11/20 Bilateral L3/4 TF SANKET- 100% relief for 2 weeks  5/13/20 L5/S1 ILESI  10/30/19 TFESI Bilateral L3-L4  09/04/19 TFESI Bilateral L3     Physical Therapy/Home Exercise: yes    Imaging:  Narrative & Impression     EXAMINATION:  XR LUMBAR SPINE 5 VIEW WITH FLEX AND EXT     CLINICAL HISTORY:  Low back pain, >6wks conservative tx, persistent-progressive sx, surgical candidate;  Lumbago with sciatica, left side     TECHNIQUE:  Five views of the lumbar spine plus flexion extension views were performed.     COMPARISON:  07/23/2019     FINDINGS:  The alignment of the lumbar spine is normal.     The vertebral body height are well maintained.  Mild disc space narrowing L3-L4.     Flexion and extension views demonstrate no evidence of translational abnormalities.     Minimal osteophyte formation L2-L3 L4.     No fracture or osseous lesions.     The sacroiliac joints appears symmetrical on the AP view.     The remainder of the visualized soft tissue and osseous structures appear normal.     There is atherosclerotic plaque of the abdominal aorta.     Impression:     Mild spondylosis of the lumbar spine, not significantly changed from the prior study.        Electronically signed by: Ava Brennan MD  Date:                                            05/29/2020  Time:                                           08:28        Narrative & Impression     EXAMINATION:  XR HIPS BILATERAL 2 VIEW INCL AP PELVIS     CLINICAL HISTORY:  Lumbago with sciatica, left side     TECHNIQUE:  AP view of the pelvis and frogleg lateral views of both hips were performed.     COMPARISON:  None.     FINDINGS:  Normal mineralization.  Pelvis demonstrates no acute fractures.  No dislocations are noted.  Hip joints appear to be symmetric and within normal limits with mild DJD of the hip joints.  There is no osteoblastic or lytic lesions.  Soft tissues are  unremarkable.     SI joints are symmetric and within normal limits.  There is mild spondylotic changes in the lower lumbar spine.     There is moderate amount of retained stool in the right colon.     Impression:     Mild DJD of the hip joints.     1. No acute fractures.        Electronically signed by: David Vasquez MD  Date:                                            05/29/2020  Time:                                           08:28     Narrative & Impression     EXAMINATION:  MRI LUMBAR SPINE WITHOUT CONTRAST     CLINICAL HISTORY:  back and bilateral leg pain; Lumbago with sciatica, left side     TECHNIQUE:  Multiplanar, multisequence MR images were acquired from the thoracolumbar junction to the sacrum without the administration of contrast.     COMPARISON:  None.     FINDINGS:  MRI examination of the lumbar spine was performed.  Intravenous contrast was not utilized.  There are areas of hyperintense T2 hypointense T1 lesions of the kidneys likely cysts not optimally evaluated on this exam.     There is multilevel degenerative loss of disc signal and there is loss of disc space height most notable at L3-4.  There are chronic appearing endplate changes noted with some mild STIR hyperintensity that likely relates to edema associated with degenerative endplate change.  There is no evidence for high-grade spondylolisthesis, there is no evidence for high-grade or acute compression fracture deformity.  There is no finding to specifically suggest aggressive bone marrow replacement process.     The sagittal imaging includes the majority of T10 through the S3 segment, axial imaging includes the inferior aspect of T12 through the majority of S1.  On the sagittal imaging there is appearance suggesting mild degenerative disc disease and disc bulge at T10-11 and T11-12 with some mild posterior ligamentous thickening suggested at T10-11 however without evidence for high-grade spinal canal stenosis.     The T12-L1 level  demonstrates no evidence for high-grade spinal canal or foraminal stenosis.     The L1-2 level demonstrates degenerative disc bulge with anterior impression upon the dural sac.  There is posterior facet arthropathy and ligamentous thickening there is mild spinal canal stenosis.  There is encroachment into the neural foramen at the level of the disc plane without obvious exiting nerve root impingement.     The L2-3 level demonstrates mild degenerative loss of disc space height.  There is mild degenerative disc bulge with anterior impression upon the dural sac.  There is posterior facet arthropathy with somewhat prominent ligamentous thickening with associated posterolateral encroachment.  There is mild-to-moderate spinal canal stenosis.  There is bilateral foraminal narrowing.     The L3-4 level demonstrates the aforementioned appearance of loss of disc space height and degenerative loss of disc signal.  There is diffuse degenerative disc bulge with anterior impression upon the dural sac there is posterior facet arthropathy and ligamentous thickening there is high-grade spinal canal stenosis.  There is bilateral foraminal stenosis.     The L4-5 level demonstrates degenerative disc bulge with anterior impression upon the dural sac there is posterior facet arthropathy and ligamentous thickening with mild to moderate spinal canal stenosis.  There is bilateral foraminal narrowing/stenosis without obvious exiting nerve root impingement.     The L5-S1 level demonstrates no evidence for high-grade spinal canal stenosis.  Facet arthropathy is noted there is no evidence for high-grade foraminal stenosis or exiting nerve root impingement.     The visualized spinal cord appears to taper appropriately and appears of appropriate signal and caliber.  Mild signal intensity within the spinal canal on T1 axial imaging may relate to a minimal lipoma along the filum terminalis.  There is no additional evidence for intraspinal mass or  abnormal fluid collection     Impression:     Multilevel chronic change of the lumbar spine with variable spinal canal and foraminal narrowing/stenosis as detailed above.  Correlation for any specific level of symptomatology is needed.  The greatest level of spinal canal stenosis is seen at L3-4.        Electronically signed by: Obie Aggarwal  Date:                                            07/27/2019  Time:                                           03:45     CMP  Sodium   Date Value Ref Range Status   03/04/2022 140 136 - 145 mmol/L Final     Potassium   Date Value Ref Range Status   03/04/2022 3.6 3.5 - 5.1 mmol/L Final     Chloride   Date Value Ref Range Status   03/04/2022 103 95 - 110 mmol/L Final     CO2   Date Value Ref Range Status   03/04/2022 28 23 - 29 mmol/L Final     Glucose   Date Value Ref Range Status   03/04/2022 101 70 - 110 mg/dL Final     BUN   Date Value Ref Range Status   03/04/2022 14 8 - 23 mg/dL Final     Creatinine   Date Value Ref Range Status   03/04/2022 0.8 0.5 - 1.4 mg/dL Final     Calcium   Date Value Ref Range Status   03/04/2022 9.4 8.7 - 10.5 mg/dL Final     Total Protein   Date Value Ref Range Status   03/04/2022 7.9 6.0 - 8.4 g/dL Final     Albumin   Date Value Ref Range Status   03/04/2022 4.1 3.5 - 5.2 g/dL Final     Total Bilirubin   Date Value Ref Range Status   03/04/2022 0.8 0.1 - 1.0 mg/dL Final     Comment:     For infants and newborns, interpretation of results should be based  on gestational age, weight and in agreement with clinical  observations.    Premature Infant recommended reference ranges:  Up to 24 hours.............<8.0 mg/dL  Up to 48 hours............<12.0 mg/dL  3-5 days..................<15.0 mg/dL  6-29 days.................<15.0 mg/dL       Alkaline Phosphatase   Date Value Ref Range Status   03/04/2022 70 55 - 135 U/L Final     AST   Date Value Ref Range Status   03/04/2022 17 10 - 40 U/L Final     ALT   Date Value Ref Range Status   03/04/2022 20 10  - 44 U/L Final     Anion Gap   Date Value Ref Range Status   03/04/2022 9 8 - 16 mmol/L Final     eGFR if    Date Value Ref Range Status   03/04/2022 >60.0 >60 mL/min/1.73 m^2 Final     eGFR if non    Date Value Ref Range Status   03/04/2022 >60.0 >60 mL/min/1.73 m^2 Final     Comment:     Calculation used to obtain the estimated glomerular filtration  rate (eGFR) is the CKD-EPI equation.            Allergies: Review of patient's allergies indicates:  No Known Allergies    Current Medications:   Current Outpatient Medications   Medication Sig Dispense Refill    alendronate (FOSAMAX) 70 MG tablet TAKE 1 TABLET BY MOUTH ONE TIME PER WEEK 12 tablet 1    amLODIPine (NORVASC) 10 MG tablet TAKE 1 TABLET BY MOUTH EVERY DAY 90 tablet 3    atorvastatin (LIPITOR) 40 MG tablet TAKE 1 TABLET BY MOUTH EVERY DAY 90 tablet 1    calcium-vitamin D 500-125 mg-unit tablet Take 1 tablet by mouth once daily.      diclofenac (VOLTAREN) 75 MG EC tablet Take 1 tablet (75 mg total) by mouth 2 (two) times daily as needed (pain). 180 tablet 1    ergocalciferol (ERGOCALCIFEROL) 50,000 unit Cap TAKE 1 CAPSULE (50,000 UNITS TOTAL) BY MOUTH EVERY SUNDAY 12 capsule 1    fluticasone propionate (FLONASE) 50 mcg/actuation nasal spray 2 SPRAYS (100 MCG TOTAL) BY EACH NOSTRIL ROUTE ONCE DAILY. 48 mL 3    gabapentin (NEURONTIN) 300 MG capsule Take 2 capsules (600 mg total) by mouth 2 (two) times daily. 360 capsule 1    metoprolol succinate (TOPROL-XL) 100 MG 24 hr tablet TAKE 1 TABLET BY MOUTH EVERY DAY 90 tablet 1    multivitamin (THERAGRAN) per tablet Take 1 tablet by mouth once daily.      pantoprazole (PROTONIX) 40 MG tablet TAKE 1 TABLET BY MOUTH EVERY DAY 90 tablet 1    traZODone (DESYREL) 150 MG tablet TAKE 1 TABLET (150 MG TOTAL) BY MOUTH EVERY EVENING. 90 tablet 1    paroxetine (PAXIL) 20 MG tablet Take 1 tablet (20 mg total) by mouth every morning. 90 tablet 1     No current facility-administered  medications for this visit.       REVIEW OF SYSTEMS:    GENERAL:  No weight loss, malaise or fevers.  HEENT:  Negative for frequent or significant headaches.  NECK:  Negative for lumps, goiter, pain and significant neck swelling.  RESPIRATORY:  Negative for cough, wheezing or shortness of breath.  CARDIOVASCULAR:  Negative for chest pain, leg swelling or palpitations.  GI:  Negative for abdominal discomfort, blood in stools or black stools or change in bowel habits. GERD.  MUSCULOSKELETAL:  See HPI.  SKIN:  Negative for lesions, rash, and itching.  PSYCH:  + for sleep disturbance, h/o depression  HEMATOLOGY/LYMPHOLOGY:  Negative for prolonged bleeding, bruising easily or swollen nodes.  NEURO:  + numbness, weakness. No history of headaches, syncope, paralysis, seizures or tremors.  All other reviewed and negative other than HPI.     Past Medical History:  Past Medical History:   Diagnosis Date    Anxiety     Arthritis     Corneal abrasion s    ? eye     Depression     Full dentures     GERD (gastroesophageal reflux disease)     Hyperlipidemia     Hypertension     Nuclear sclerosis of both eyes 6/5/2017    Osteoporosis     Restless leg syndrome        Past Surgical History:  Past Surgical History:   Procedure Laterality Date    COLONOSCOPY N/A 5/14/2019    Procedure: COLONOSCOPY;  Surgeon: Nahid Cline MD;  Location: [facility];  Service: Endoscopy;  Laterality: N/A;    EPIDURAL STEROID INJECTION N/A 5/13/2020    Procedure: LUMBAR/CAUDAL L5/S1 IL SANKET ;  Surgeon: Miguel Latham MD;  Location: [facility];  Service: Pain Management;  Laterality: N/A;  NEEDS CONSENT    HYSTERECTOMY      PARTIAL HYSTERECTOMY      TRANSFORAMINAL EPIDURAL INJECTION OF STEROID Bilateral 9/4/2019    Procedure: Injection,steroid,epidural,transforaminal approach LUMBAR TRANSFORAMINAL BILATERAL L3 TF SANKET;  Surgeon: Miguel Latham MD;  Location: [facility];  Service: Pain Management;  Laterality: Bilateral;  NEEDS  CONSENT    TRANSFORAMINAL EPIDURAL INJECTION OF STEROID Bilateral 10/30/2019    Procedure: TRANSFORAMINAL SANKET BILATERAL L3-L4;  Surgeon: Miguel Latham MD;  Location: [facility];  Service: Pain Management;  Laterality: Bilateral;  NEEDS CONSENT    TRANSFORAMINAL EPIDURAL INJECTION OF STEROID Bilateral 11/11/2020    Procedure: INJECTION, STEROID, EPIDURAL, TRANSFORAMINAL APPROACH, L3-L4;  Surgeon: Miguel Latham MD;  Location: [facility];  Service: Pain Management;  Laterality: Bilateral;    TRANSFORAMINAL EPIDURAL INJECTION OF STEROID Bilateral 5/19/2021    Procedure: INJECTION, STEROID, EPIDURAL, TRANSFORAMINAL APPROACH, L3-L4;  Surgeon: Miguel Latham MD;  Location: [facility];  Service: Pain Management;  Laterality: Bilateral;    TRANSFORAMINAL EPIDURAL INJECTION OF STEROID Bilateral 1/26/2022    Procedure: Injection,steroid,epidural,transforaminal approach BILATERAL L3/4;  Surgeon: Miguel Latham MD;  Location: [facility];  Service: Pain Management;  Laterality: Bilateral;    TRANSFORAMINAL EPIDURAL INJECTION OF STEROID Bilateral 4/20/2022    Procedure: INJECTION, STEROID, EPIDURAL, TRANSFORAMINAL APPROACH, Bilateral L3-L4;  Surgeon: Miguel Latham MD;  Location: [facility];  Service: Pain Management;  Laterality: Bilateral;    TRANSFORAMINAL EPIDURAL INJECTION OF STEROID Bilateral 7/20/2022    Procedure: INJECTION, STEROID, EPIDURAL, TRANSFORAMINAL APPROACH, BILATERAL L3-L4 CONTRAST;  Surgeon: Miguel Latham MD;  Location: [facility];  Service: Pain Management;  Laterality: Bilateral;    TUBAL LIGATION Bilateral        Family History:  Family History   Problem Relation Age of Onset    Stroke Mother     Glaucoma Mother     Cataracts Mother     Cancer Father         Lung    No Known Problems Brother     No Known Problems Son     Stroke Brother     Heart disease Son     No Known Problems Sister     No Known Problems Maternal Aunt     No Known Problems  Maternal Uncle     No Known Problems Paternal Aunt     No Known Problems Paternal Uncle     No Known Problems Maternal Grandmother     No Known Problems Maternal Grandfather     No Known Problems Paternal Grandmother     No Known Problems Paternal Grandfather     Amblyopia Neg Hx     Blindness Neg Hx     Diabetes Neg Hx     Hypertension Neg Hx     Macular degeneration Neg Hx     Retinal detachment Neg Hx     Strabismus Neg Hx     Thyroid disease Neg Hx        Social History:  Social History     Socioeconomic History    Marital status:    Tobacco Use    Smoking status: Never Smoker    Smokeless tobacco: Never Used   Substance and Sexual Activity    Alcohol use: No    Drug use: No    Sexual activity: Never   Social History Narrative    Patient is  w/ 5 children, one  from heart disease.The patient is retired.       OBJECTIVE:    /66   Pulse 61   Temp 97.9 °F (36.6 °C)   Resp 18   Ht 5' (1.524 m)   Wt 63 kg (138 lb 14.2 oz)   BMI 27.13 kg/m²     PHYSICAL EXAMINATION:    General appearance: Well appearing, in no acute distress, alert and oriented x3.  Psych:  Mood and affect appropriate.  Skin: Skin color, texture, turgor normal, no rashes or lesions, in both upper and lower body.  Head/face:  Normocephalic, atraumatic. No palpable lymph nodes.  Back: Straight leg raising in the sitting and supine positions is negative. Limited range of motion with pain on flexion and extension. Positive facet loading bilaterally.  Extremities: Peripheral joint ROM is full and pain free without obvious instability or laxity in all four extremities. No deformities, edema, or skin discoloration. Good capillary refill.  Musculoskeletal: No atrophy or tone abnormalities are noted. TTP over left SI joint.  PASCALE is negative bilaterally. 5/5 strength in right ankle with plantar and 4/5 on dorsiflexion. 5/5 strength in left ankle with plantar and 4/5 on dorsiflexion. 4/5 strength with right  knee flexion and extension. 5/5 strength with left knee flexion and extension. 5/5 strength in right EHL, 5/5 strength in left EHL. Right hip flexion is 4/5, left is 4/5.  Neuro: No loss of sensation noted.  Gait: Antalgic.    ASSESSMENT: 77 y.o. year old female with lower back pain, consistent with     1. Other chronic pain     2. Spinal stenosis, lumbar region with neurogenic claudication     3. Lumbosacral radiculopathy     4. Spondylosis without myelopathy           PLAN:     - I have stressed the importance of physical activity and a home exercise plan to help with pain and improve health.  - Patient can continue with medications for now since they are providing benefits, using them appropriately, and without side effects.  - Continue Gabapentin 600 mg BID.  - Continue Voltaren BID PRN pain.  - Can consider bilateral L3,4,5 MBB if back pain worsened.  - Counseled patient regarding the importance of activity modification, constant sleeping habits and physical therapy.  - RTC in 2-3 months or sooner if needed.    The above plan and management options were discussed at length with patient. Patient is in agreement with the above and verbalized understanding.    Christin Ventura, BRENDA  08/11/2022

## 2022-08-12 ENCOUNTER — CLINICAL SUPPORT (OUTPATIENT)
Dept: REHABILITATION | Facility: HOSPITAL | Age: 77
End: 2022-08-12
Payer: MEDICARE

## 2022-08-12 DIAGNOSIS — M79.605 BILATERAL LEG PAIN: ICD-10-CM

## 2022-08-12 DIAGNOSIS — G89.29 OTHER CHRONIC PAIN: ICD-10-CM

## 2022-08-12 DIAGNOSIS — M54.15 RADICULOPATHY OF THORACOLUMBAR REGION: Primary | ICD-10-CM

## 2022-08-12 DIAGNOSIS — M62.81 MUSCLE WEAKNESS: ICD-10-CM

## 2022-08-12 DIAGNOSIS — M48.062 SPINAL STENOSIS, LUMBAR REGION WITH NEUROGENIC CLAUDICATION: ICD-10-CM

## 2022-08-12 DIAGNOSIS — M79.604 BILATERAL LEG PAIN: ICD-10-CM

## 2022-08-12 PROCEDURE — 97113 AQUATIC THERAPY/EXERCISES: CPT

## 2022-08-12 NOTE — PROGRESS NOTES
[facility]   Physical Therapy Treatment Note     Name: Diana Herring  Clinic Number: [id number]    Therapy Diagnosis:   Encounter Diagnoses   Name Primary?    Radiculopathy of thoracolumbar region Yes    Spinal stenosis, lumbar region with neurogenic claudication     Other chronic pain     Bilateral leg pain     Muscle weakness      Physician: Christin Ventura,*    Visit Date: 8/12/2022    Physician Orders: Aquatic Therapy PT Eval and Treat  Medical Diagnosis from Referral: M54.17 (ICD-10-CM) - Lumbosacral radiculopathy    M47.819 (ICD-10-CM) - Spondylosis without myelopathy    M54.15 (ICD-10-CM) - Radiculopathy of thoracolumbar region  Evaluation Date: 8/5/2022  Authorization Period Expiration: 9/02/2022  Plan of Care Expiration: 11/05/2022  Visit # / Visits authorized: 2/24      Precautions: Standard and Fall    PTA Visit #: 0/5     Time In: 0700  Time Out: 0810  Total Billable Time: 60 minutes    SUBJECTIVE     Pt reports: increased LBP and bilateral hip/thigh pain with stiffness. She states that this is typical for her mornings. .  She was compliant with home exercise program. She states that the stretches do help.   Response to previous treatment: first pool visit  Functional change: initiated aquatic PT    Reviewed contraindications with pt to ensure aquatic therapy is appropriate. Per patient's review of contraindications and verbal report of no pertinent medical hx, patient is cleared for attending skilled aquatic physical therapy.     Pain: 8/10  Location: bilateral back , buttocks  and upper legs     OBJECTIVE     Objective Measures updated at progress report unless specified.       Treatment     Diana received aquatic therapeutic exercises to develop strength, endurance, ROM, flexibility, posture, and core stabilization for 60 minutes including:    FUNCTIONAL MOBILITY TRAINING x 2 laps each at beginning and 1 lap each at end of session  Walk  "forward/backward/lateral    STRETCHES 30 seconds each broken down into 10" intervals for initial tolerance.   Calf  Hip adductor  Hamstring    LE EX x 20  Squat  Heel raise with gluteal set  Hip abduction/adduction  Hip flex/ext  High knee marching  HS curl  LAQ standing    UE EX/CORE  x 10 patient has RUE paresthesias possibly related to cervical radiculopathy so closely monitored symptoms with each exercise.  Shoulder flex/ext TA activation paddles open  Shoulder horizontal abd/add TA activation paddles open  Mini squat with push/pull red kickboard x20  Upper trunk rotation with orange dumbbell x20    ENDURANCE  Bicycle in // bars 4'  LTR // x20      Patient Education and Home Exercises     Home Exercises Provided and Patient Education Provided     Education provided:   Role of aquatic therapy  Hydration post therapy      Written Home Exercises Provided: Patient instructed to cont prior HEP. Exercises were reviewed and Diana was able to demonstrate them prior to the end of the session.  Diana demonstrated good  understanding of the education provided. See EMR under Patient Instructions for exercises provided during therapy sessions    ASSESSMENT     This is the patient's first aquatic therapy treatment. Some time was needed to become acclimated to the water. Patient required consistent verbal cues for proper technique & core stabilization.  Treatment was well tolerated.     Diana Is progressing well towards her goals.   Pt prognosis is Good.     Pt will continue to benefit from skilled outpatient physical therapy to address the deficits listed in the problem list box on initial evaluation, provide pt/family education and to maximize pt's level of independence in the home and community environment.     Pt's spiritual, cultural and educational needs considered and pt agreeable to plan of care and goals.     Anticipated barriers to physical therapy: None.     Goals:  Short Term Goals: 6 weeks   1. Patient will be " independent in HEP & progressions.  2. Patient will achieve TUG score of 9 sec to demonstrate improved mobility  3. The patient will achieve 5 sit to stand score of 19 seconds with </= 3/10 RPE to demonstrate improved transfers and endurance.      Long Term Goals: 12 weeks   1. The patient will demonstrate independence with extensive HEP.  2. Patient will achieve 5 sit to stand score of 16 seconds with </= 3/10 RPE to demonstrate improved transfers & endurance.  3. Patent is able to demonstrate MMT >/= 4/5 BLE without pain reports during testing.    4. Patient will report improved standing tolerance to > 20 minutes with minimal pain complaints for improved tolerance/participation in ADLs/iADLs.     PLAN     Continue POC with aquatic therapy 2x week. Progress exercises and HEP as appropriate.     Delano Goodrich, PT

## 2022-08-15 DIAGNOSIS — E78.5 HYPERLIPIDEMIA, UNSPECIFIED HYPERLIPIDEMIA TYPE: ICD-10-CM

## 2022-08-15 NOTE — TELEPHONE ENCOUNTER
No new care gaps identified.  [facility] Embedded Care Gaps. Reference number: [id number]. 8/15/2022   10:27:21 AM CDT

## 2022-08-16 RX ORDER — ATORVASTATIN CALCIUM 40 MG/1
TABLET, FILM COATED ORAL
Qty: 90 TABLET | Refills: 2 | Status: SHIPPED | OUTPATIENT
Start: 2022-08-16 | End: 2023-07-04

## 2022-08-16 NOTE — TELEPHONE ENCOUNTER
Refill Decision Note   Diana Herring  is requesting a refill authorization.  Brief Assessment and Rationale for Refill:  Approve     Medication Therapy Plan:       Medication Reconciliation Completed: No   Comments:     No Care Gaps recommended.     Note composed:10:04 AM 08/16/2022

## 2022-08-23 ENCOUNTER — PES CALL (OUTPATIENT)
Dept: ADMINISTRATIVE | Facility: CLINIC | Age: 77
End: 2022-08-23
Payer: MEDICARE

## 2022-09-01 ENCOUNTER — CLINICAL SUPPORT (OUTPATIENT)
Dept: REHABILITATION | Facility: HOSPITAL | Age: 77
End: 2022-09-01
Payer: MEDICARE

## 2022-09-01 DIAGNOSIS — M62.81 MUSCLE WEAKNESS: ICD-10-CM

## 2022-09-01 DIAGNOSIS — M79.605 BILATERAL LEG PAIN: ICD-10-CM

## 2022-09-01 DIAGNOSIS — M79.604 BILATERAL LEG PAIN: ICD-10-CM

## 2022-09-01 DIAGNOSIS — G89.29 OTHER CHRONIC PAIN: ICD-10-CM

## 2022-09-01 DIAGNOSIS — M48.062 SPINAL STENOSIS, LUMBAR REGION WITH NEUROGENIC CLAUDICATION: ICD-10-CM

## 2022-09-01 DIAGNOSIS — M54.15 RADICULOPATHY OF THORACOLUMBAR REGION: Primary | ICD-10-CM

## 2022-09-01 PROCEDURE — 97113 AQUATIC THERAPY/EXERCISES: CPT

## 2022-09-01 NOTE — PROGRESS NOTES
"[facility]   Physical Therapy Treatment Note     Name: Diana Herring  Clinic Number: [id number]    Therapy Diagnosis:   Encounter Diagnoses   Name Primary?    Radiculopathy of thoracolumbar region Yes    Spinal stenosis, lumbar region with neurogenic claudication     Other chronic pain     Bilateral leg pain     Muscle weakness        Physician: Christin Ventura,*    Visit Date: 9/1/2022    Physician Orders: Aquatic Therapy PT Eval and Treat  Medical Diagnosis from Referral: M54.17 (ICD-10-CM) - Lumbosacral radiculopathy    M47.819 (ICD-10-CM) - Spondylosis without myelopathy    M54.15 (ICD-10-CM) - Radiculopathy of thoracolumbar region  Evaluation Date: 8/5/2022  Authorization Period Expiration: 9/02/2022  Plan of Care Expiration: 11/05/2022  Visit # / Visits authorized: 3/24      Precautions: Standard and Fall    PTA Visit #: 0/5     Time In: 1100  Time Out: 1210  Total Billable Time: 59 minutes    SUBJECTIVE     Pt reports: She reports doing "okay" this morning. She reports pain today that she attributes to being in a hurry "all the time."  She was compliant with home exercise program. She states that the stretches do help.   Response to previous treatment: No adverse reporting.  Functional change: No change    Reviewed contraindications with pt to ensure aquatic therapy is appropriate. Per patient's review of contraindications and verbal report of no pertinent medical hx, patient is cleared for attending skilled aquatic physical therapy.     Pain: 6/10  Location: bilateral back , buttocks  and upper legs     OBJECTIVE     Objective Measures updated at progress report unless specified.       Treatment     Diana received aquatic therapeutic exercises to develop strength, endurance, ROM, flexibility, posture, and core stabilization for 59 minutes including:    FUNCTIONAL MOBILITY TRAINING x 2 laps each at beginning and 1 lap each at end of session  Walk " forward/backward/lateral    STRETCHES 20 seconds ea  Calf  Hip adductor  Hamstring    LE EX x 20  Squat  Heel raise with gluteal set  Hip abduction/adduction  Hip flex/ext  High knee marching  HS curl  LAQ standing    UE EX/CORE  x 10 patient has RUE paresthesias possibly related to cervical radiculopathy so closely monitored symptoms with each exercise.  Shoulder flex/ext TA activation paddles open  Shoulder horizontal abd/add TA activation paddles open  Mini squat with push/pull red kickboard x20  Upper trunk rotation with orange dumbbell x20    ENDURANCE  Bicycle in // bars 3'  LTR // 2'      Patient Education and Home Exercises     Home Exercises Provided and Patient Education Provided     Education provided:   Role of aquatic therapy  Hydration post therapy      Written Home Exercises Provided: Patient instructed to cont prior HEP. Exercises were reviewed and Diana was able to demonstrate them prior to the end of the session.  Diana demonstrated good  understanding of the education provided. See EMR under Patient Instructions for exercises provided during therapy sessions    ASSESSMENT     Patient required consistent verbal cues for proper technique & core stabilization.  Treatment was well tolerated. Pt utilized the railing for the majority of treatment. Pt reported some R hip discomfort after getting out of the pool but did not seem too concerned or distressed about this.     Diana Is progressing well towards her goals.   Pt prognosis is Good.     Pt will continue to benefit from skilled outpatient physical therapy to address the deficits listed in the problem list box on initial evaluation, provide pt/family education and to maximize pt's level of independence in the home and community environment.     Pt's spiritual, cultural and educational needs considered and pt agreeable to plan of care and goals.     Anticipated barriers to physical therapy: None.     Goals:  Short Term Goals: 6 weeks   1. Patient  will be independent in HEP & progressions.  2. Patient will achieve TUG score of 9 sec to demonstrate improved mobility  3. The patient will achieve 5 sit to stand score of 19 seconds with </= 3/10 RPE to demonstrate improved transfers and endurance.      Long Term Goals: 12 weeks   1. The patient will demonstrate independence with extensive HEP.  2. Patient will achieve 5 sit to stand score of 16 seconds with </= 3/10 RPE to demonstrate improved transfers & endurance.  3. Patent is able to demonstrate MMT >/= 4/5 BLE without pain reports during testing.    4. Patient will report improved standing tolerance to > 20 minutes with minimal pain complaints for improved tolerance/participation in ADLs/iADLs.     PLAN     Continue POC with aquatic therapy 2x week. Progress exercises and HEP as appropriate.     Delano Goodrich, PT

## 2022-09-02 ENCOUNTER — CLINICAL SUPPORT (OUTPATIENT)
Dept: REHABILITATION | Facility: HOSPITAL | Age: 77
End: 2022-09-02
Payer: MEDICARE

## 2022-09-02 ENCOUNTER — PES CALL (OUTPATIENT)
Dept: ADMINISTRATIVE | Facility: CLINIC | Age: 77
End: 2022-09-02
Payer: MEDICARE

## 2022-09-02 DIAGNOSIS — M48.062 SPINAL STENOSIS, LUMBAR REGION WITH NEUROGENIC CLAUDICATION: ICD-10-CM

## 2022-09-02 DIAGNOSIS — M54.15 RADICULOPATHY OF THORACOLUMBAR REGION: Primary | ICD-10-CM

## 2022-09-02 DIAGNOSIS — M79.605 BILATERAL LEG PAIN: ICD-10-CM

## 2022-09-02 DIAGNOSIS — G89.29 OTHER CHRONIC PAIN: ICD-10-CM

## 2022-09-02 DIAGNOSIS — M79.604 BILATERAL LEG PAIN: ICD-10-CM

## 2022-09-02 DIAGNOSIS — M62.81 MUSCLE WEAKNESS: ICD-10-CM

## 2022-09-02 PROCEDURE — 97113 AQUATIC THERAPY/EXERCISES: CPT

## 2022-09-02 NOTE — PROGRESS NOTES
"[facility]   Physical Therapy Treatment Note     Name: Diana Herring  Clinic Number: [id number]    Therapy Diagnosis:   Encounter Diagnoses   Name Primary?    Radiculopathy of thoracolumbar region Yes    Spinal stenosis, lumbar region with neurogenic claudication     Other chronic pain     Bilateral leg pain     Muscle weakness      Physician: Christin Ventura,*    Visit Date: 9/2/2022    Physician Orders: Aquatic Therapy PT Eval and Treat  Medical Diagnosis from Referral: M54.17 (ICD-10-CM) - Lumbosacral radiculopathy    M47.819 (ICD-10-CM) - Spondylosis without myelopathy    M54.15 (ICD-10-CM) - Radiculopathy of thoracolumbar region  Evaluation Date: 8/5/2022  Authorization Period Expiration: 9/02/2022  Plan of Care Expiration: 11/05/2022  Visit # / Visits authorized: 3/24 + Evaluation     Precautions: Standard and Fall    PTA Visit #: 0/5     Time In: 0800  Time Out: 0915  Total Billable Time: 60 minutes    SUBJECTIVE     Pt reports: She feels fine this morning. When asked about how her previous session went, patient reports "I felt good after completing my session". She arrives with continued low back pain, however manageable.   She was compliant with home exercise program. She states that the stretches do help.   Response to previous treatment: No adverse reporting.  Functional change: No change    Pain: 7/10  Location: bilateral back , buttocks  and upper legs     OBJECTIVE     Objective Measures updated at progress report unless specified.     Treatment     Diana received aquatic therapeutic exercises to develop strength, endurance, ROM, flexibility, posture, and core stabilization for 75 minutes including:    FUNCTIONAL MOBILITY TRAINING x 2 laps each at beginning and 1 lap each at end of session  Walk forward/backward/lateral    STRETCHES 20 seconds ea  Calf  Hip adductor  Hamstring    LE EX x 20 with TITO-BUE support on handrails  Squat  Heel raise with gluteal set  Hip " abduction/adduction  Hip flex/ext  High knee marching  HS curl  LAQ standing    STS from pool stool x10 -- added today    UE EX/CORE  x 10 patient has RUE paresthesias possibly related to cervical radiculopathy so closely monitored symptoms with each exercise. Performed all exercises with back supported on wall today.  Shoulder flex/ext TA activation paddles open  Shoulder horizontal abd/add TA activation paddles open  Mini squat with push/pull red kickboard   Upper trunk rotation with orange dumbbell     ENDURANCE  Bicycle in // bars 3'  LTR // 2'    Patient Education and Home Exercises     Home Exercises Provided and Patient Education Provided     Education provided:   Role of aquatic therapy  Hydration post therapy    Written Home Exercises Provided: Patient instructed to cont prior HEP. Exercises were reviewed and Diana was able to demonstrate them prior to the end of the session.  Diana demonstrated good  understanding of the education provided. See EMR under Patient Instructions for exercises provided during therapy sessions    ASSESSMENT     Continued established therapeutic exercises to build patient's tolerance for completing aquatic therapeutic exercises, as well as introduce STS from pool stool to facilitate functional BLE strengthening required to perform daily transfers. Patient continues to require consistent verbal cues, as well as demonstration by PT for proper form and core stabilization. Patient tolerated all exercises today, with demonstrating proper exercise fatigue towards end of treatment.     Diana Is progressing well towards her goals.   Pt prognosis is Good.     Pt will continue to benefit from skilled outpatient physical therapy to address the deficits listed in the problem list box on initial evaluation, provide pt/family education and to maximize pt's level of independence in the home and community environment.     Pt's spiritual, cultural and educational needs considered and pt  agreeable to plan of care and goals.     Anticipated barriers to physical therapy: None.     Goals:  Short Term Goals: 6 weeks   1. Patient will be independent in HEP & progressions.  2. Patient will achieve TUG score of 9 sec to demonstrate improved mobility  3. The patient will achieve 5 sit to stand score of 19 seconds with </= 3/10 RPE to demonstrate improved transfers and endurance.      Long Term Goals: 12 weeks   1. The patient will demonstrate independence with extensive HEP.  2. Patient will achieve 5 sit to stand score of 16 seconds with </= 3/10 RPE to demonstrate improved transfers & endurance.  3. Patent is able to demonstrate MMT >/= 4/5 BLE without pain reports during testing.    4. Patient will report improved standing tolerance to > 20 minutes with minimal pain complaints for improved tolerance/participation in ADLs/iADLs.     PLAN     Continue POC with aquatic therapy 2x week. Progress exercises and HEP as appropriate.     Katelyn Jones, PT, DPT

## 2022-09-14 ENCOUNTER — CLINICAL SUPPORT (OUTPATIENT)
Dept: REHABILITATION | Facility: HOSPITAL | Age: 77
End: 2022-09-14
Payer: MEDICARE

## 2022-09-14 DIAGNOSIS — M54.17 LUMBOSACRAL RADICULOPATHY: ICD-10-CM

## 2022-09-14 DIAGNOSIS — M79.604 BILATERAL LEG PAIN: Primary | ICD-10-CM

## 2022-09-14 DIAGNOSIS — M62.81 MUSCLE WEAKNESS: ICD-10-CM

## 2022-09-14 DIAGNOSIS — M79.605 BILATERAL LEG PAIN: Primary | ICD-10-CM

## 2022-09-14 PROCEDURE — 97113 AQUATIC THERAPY/EXERCISES: CPT

## 2022-09-14 NOTE — PROGRESS NOTES
[facility]   Physical Therapy Treatment Note     Name: Diana Herring  Clinic Number: [id number]    Therapy Diagnosis:   No diagnosis found.    Physician: Christin Ventura,*    Visit Date: 9/14/2022    Physician Orders: Aquatic Therapy PT Eval and Treat  Medical Diagnosis from Referral: M54.17 (ICD-10-CM) - Lumbosacral radiculopathy    M47.819 (ICD-10-CM) - Spondylosis without myelopathy    M54.15 (ICD-10-CM) - Radiculopathy of thoracolumbar region  Evaluation Date: 8/5/2022  Authorization Period Expiration: 9/02/2022  Plan of Care Expiration: 11/05/2022  Visit # / Visits authorized: 3/24 + Evaluation     Precautions: Standard and Fall    PTA Visit #: 0/5     Time In: 8:30 am  Time Out:9:40 am   Total Billable Time: 60 minutes    SUBJECTIVE     Pt reports: her pain is a little worse today. She stated she had a mis step while walking up the stairs which she felt caused a little backslide and some increased pain.     She was compliant with home exercise program. She states that the stretches do help.   Response to previous treatment: No adverse reporting.  Functional change: No change    Pain: 7/10  Location: bilateral back , buttocks  and upper legs     OBJECTIVE     Objective Measures updated at progress report unless specified.     Treatment     Diana received aquatic therapeutic exercises to develop strength, endurance, ROM, flexibility, posture, and core stabilization for 70 minutes including:    FUNCTIONAL MOBILITY TRAINING x 2 laps each at beginning and 1 lap each at end of session  Walk forward/backward/lateral    STRETCHES 20 seconds ea  Calf  Hip adductor  Hamstring    LE EX x 22 with TITO-BUE support on handrails  Squat  Heel raise with gluteal set  Hip abduction/adduction  Hip flex/ext  High knee marching  HS curl  LAQ standing    STS from pool stool x20     UE EX/CORE  x22  patient has RUE paresthesias possibly related to cervical radiculopathy so closely monitored  symptoms with each exercise. Performed all exercises with back supported on wall today.  Shoulder flex/ext TA activation paddles open  Shoulder horizontal abd/add TA activation paddles open    Mini squat with push/pull red kickboard     Upper trunk rotation with orange dumbbell     ENDURANCE  Bicycle in // bars 3'  LTR // 2'    Patient Education and Home Exercises     Home Exercises Provided and Patient Education Provided     Education provided:   Role of aquatic therapy  Hydration post therapy    Written Home Exercises Provided: Patient instructed to cont prior HEP. Exercises were reviewed and Diana was able to demonstrate them prior to the end of the session.  Diana demonstrated good  understanding of the education provided. See EMR under Patient Instructions for exercises provided during therapy sessions    ASSESSMENT     The patient reported reduction of stiffness and tightness upon completion of the treatment today. She performed advanced reps for UE and LE exercises today good execution and without reports of increased symptoms.     Diana Is progressing well towards her goals.   Pt prognosis is Good.     Pt will continue to benefit from skilled outpatient physical therapy to address the deficits listed in the problem list box on initial evaluation, provide pt/family education and to maximize pt's level of independence in the home and community environment.     Pt's spiritual, cultural and educational needs considered and pt agreeable to plan of care and goals.     Anticipated barriers to physical therapy: None.     Goals:  Short Term Goals: 6 weeks   1. Patient will be independent in HEP & progressions.  2. Patient will achieve TUG score of 9 sec to demonstrate improved mobility  3. The patient will achieve 5 sit to stand score of 19 seconds with </= 3/10 RPE to demonstrate improved transfers and endurance.      Long Term Goals: 12 weeks   1. The patient will demonstrate independence with extensive HEP.  2.  Patient will achieve 5 sit to stand score of 16 seconds with </= 3/10 RPE to demonstrate improved transfers & endurance.  3. Patent is able to demonstrate MMT >/= 4/5 BLE without pain reports during testing.    4. Patient will report improved standing tolerance to > 20 minutes with minimal pain complaints for improved tolerance/participation in ADLs/iADLs.     PLAN     Continue POC with aquatic therapy 2x week. Progress exercises and HEP as appropriate.     Bianca Almodovar, PT, DPT

## 2022-09-16 ENCOUNTER — CLINICAL SUPPORT (OUTPATIENT)
Dept: REHABILITATION | Facility: HOSPITAL | Age: 77
End: 2022-09-16
Payer: MEDICARE

## 2022-09-16 PROCEDURE — 97113 AQUATIC THERAPY/EXERCISES: CPT | Mod: CQ

## 2022-09-16 NOTE — PROGRESS NOTES
[facility]   Physical Therapy Treatment Note     Name: Diana Herring  Clinic Number: [id number]    Therapy Diagnosis:   No diagnosis found.    Physician: Christin Ventura,*    Visit Date: 9/16/2022    Physician Orders: Aquatic Therapy PT Eval and Treat  Medical Diagnosis from Referral: M54.17 (ICD-10-CM) - Lumbosacral radiculopathy    M47.819 (ICD-10-CM) - Spondylosis without myelopathy    M54.15 (ICD-10-CM) - Radiculopathy of thoracolumbar region  Evaluation Date: 8/5/2022  Authorization Period Expiration: 9/02/2022  Plan of Care Expiration: 11/05/2022  Visit # / Visits authorized: 4/24 + Evaluation     Precautions: Standard and Fall    PTA Visit #: 1/5     Time In: 0900 am  Time Out:9:55 am   Total Billable Time: 45 minutes    SUBJECTIVE     Pt reports: no improvement in pain since her mis step on the stairs    She was compliant with home exercise program. She states that the stretches do help.   Response to previous treatment: No adverse reporting.  Functional change: No change    Pain: 7/10  Location: bilateral back , buttocks  and upper legs     OBJECTIVE     Objective Measures updated at progress report unless specified.     Treatment     Diana received aquatic therapeutic exercises to develop strength, endurance, ROM, flexibility, posture, and core stabilization for 65 minutes including:    FUNCTIONAL MOBILITY TRAINING x 2 laps each at beginning and 1 lap each at end of session  Walk forward/backward/lateral    STRETCHES 20 seconds ea  Calf  Hip adductor  Hamstring    LE EX x 22 with TITO-BUE support on handrails  Squat  Heel raise with gluteal set  Hip abduction/adduction  Hip flex/ext  High knee marching  HS curl  LAQ standing    STS from pool stool x20     UE EX/CORE  x22  patient has RUE paresthesias possibly related to cervical radiculopathy so closely monitored symptoms with each exercise. Performed all exercises with back supported on wall today.  Shoulder flex/ext  TA activation paddles open  Shoulder horizontal abd/add TA activation paddles open    Mini squat with push/pull red kickboard     Upper trunk rotation with orange dumbbell     ENDURANCE  Bicycle in // bars 3'  LTR // 2'    Patient Education and Home Exercises     Home Exercises Provided and Patient Education Provided     Education provided:   Role of aquatic therapy  Hydration post therapy    Written Home Exercises Provided: Patient instructed to cont prior HEP. Exercises were reviewed and Diana was able to demonstrate them prior to the end of the session.  Diana demonstrated good  understanding of the education provided. See EMR under Patient Instructions for exercises provided during therapy sessions    ASSESSMENT     Patient arrived to clinic today with reports of continued high levels of pain. She requires extensive cuing to perform squatting with correct technique with posterior chain activation and decreased excessive anterior tib translation. Poor response noted to correction of exercises with limited ability to perform with correct technique and frequent cues required. Reports overall improvement in back pain upon departing pool.    Diana Is progressing well towards her goals.   Pt prognosis is Good.     Pt will continue to benefit from skilled outpatient physical therapy to address the deficits listed in the problem list box on initial evaluation, provide pt/family education and to maximize pt's level of independence in the home and community environment.     Pt's spiritual, cultural and educational needs considered and pt agreeable to plan of care and goals.     Anticipated barriers to physical therapy: None.     Goals:  Short Term Goals: 6 weeks   1. Patient will be independent in HEP & progressions.  2. Patient will achieve TUG score of 9 sec to demonstrate improved mobility  3. The patient will achieve 5 sit to stand score of 19 seconds with </= 3/10 RPE to demonstrate improved transfers and  endurance.      Long Term Goals: 12 weeks   1. The patient will demonstrate independence with extensive HEP.  2. Patient will achieve 5 sit to stand score of 16 seconds with </= 3/10 RPE to demonstrate improved transfers & endurance.  3. Patent is able to demonstrate MMT >/= 4/5 BLE without pain reports during testing.    4. Patient will report improved standing tolerance to > 20 minutes with minimal pain complaints for improved tolerance/participation in ADLs/iADLs.     PLAN     Continue POC with aquatic therapy 2x week. Progress exercises and HEP as appropriate.     ANA BARRERA, PTA

## 2022-09-21 ENCOUNTER — PES CALL (OUTPATIENT)
Dept: ADMINISTRATIVE | Facility: CLINIC | Age: 77
End: 2022-09-21
Payer: MEDICARE

## 2022-09-21 ENCOUNTER — CLINICAL SUPPORT (OUTPATIENT)
Dept: REHABILITATION | Facility: HOSPITAL | Age: 77
End: 2022-09-21
Payer: MEDICARE

## 2022-09-21 DIAGNOSIS — M48.062 SPINAL STENOSIS, LUMBAR REGION WITH NEUROGENIC CLAUDICATION: Primary | ICD-10-CM

## 2022-09-21 PROCEDURE — 97113 AQUATIC THERAPY/EXERCISES: CPT

## 2022-09-21 NOTE — PROGRESS NOTES
"[facility]   Physical Therapy Treatment Note     Name: Diana Herring  Clinic Number: [id number]    Therapy Diagnosis:   Encounter Diagnosis   Name Primary?    Spinal stenosis, lumbar region with neurogenic claudication Yes     Physician: Christin Ventura,*    Visit Date: 9/21/2022    Physician Orders: Aquatic Therapy PT Eval and Treat  Medical Diagnosis from Referral: M54.17 (ICD-10-CM) - Lumbosacral radiculopathy    M47.819 (ICD-10-CM) - Spondylosis without myelopathy    M54.15 (ICD-10-CM) - Radiculopathy of thoracolumbar region  Evaluation Date: 8/5/2022  Authorization Period Expiration: 9/02/2022  Plan of Care Expiration: 11/05/2022  Visit # / Visits authorized: 6/24 + Evaluation     Precautions: Standard and Fall    PTA Visit #: 1/5     Time In: 0900 am  Time Out: 1000 am   Total Billable Time: 30 minutes    SUBJECTIVE     Pt reports: she has been in increased pain since this morning. She points to her R hip and buttock, stating "it always feels the worst in the morning and if I sit for too long". Despite her increases to her pain, she arrives ready to complete her exercises.     She was compliant with home exercise program. She states that the stretches do help.   Response to previous treatment: No adverse reporting.  Functional change: No change    Pain: 8/10  Location: bilateral back , buttocks  and upper legs     OBJECTIVE     Objective Measures updated at progress report unless specified.     Treatment     Diana received aquatic therapeutic exercises to develop strength, endurance, ROM, flexibility, posture, and core stabilization for 60 minutes including:    FUNCTIONAL MOBILITY TRAINING x 2 laps each at beginning and 1 lap each at end of session  Walk forward/backward/lateral    STRETCHES 20 seconds ea  Calf  Hip adductor  Hamstring    LE EX x 22 with TITO-BUE support on handrails  Squat  Heel raise with gluteal set  Hip abduction/adduction  Hip flex/ext  High knee " marching  HS curl  LAQ standing -- NP    STS from pool stool x20 -- NP    UE EX/CORE  x22  patient has RUE paresthesias possibly related to cervical radiculopathy so closely monitored symptoms with each exercise. Performed all exercises with back supported on wall today.  Shoulder flex/ext TA activation paddles open  Shoulder horizontal abd/add TA activation paddles open    Mini squat with push/pull red kickboard -- NP    Upper trunk rotation with orange dumbbell     ENDURANCE  Bicycle in // bars 3'  LTR // 2'    Patient Education and Home Exercises     Home Exercises Provided and Patient Education Provided     Education provided:   Role of aquatic therapy  Hydration post therapy    Written Home Exercises Provided: Patient instructed to cont prior HEP. Exercises were reviewed and Diana was able to demonstrate them prior to the end of the session.  Diana demonstrated good  understanding of the education provided. See EMR under Patient Instructions for exercises provided during therapy sessions    ASSESSMENT     Patient arrived with continued high-level pain today, however motivated to complete her exercises. Patient requires extensive cuing and verbal reminders to stay on task for her exercise program and complete with proper form. Exercises marked 'NP' due to restricted time of 1 hour for treatment and patient requiring extended rest breaks between exercise sets. Patient also demonstrated poor muscular endurance via inability to complete exercises with proper form for full repetition sets. Despite this, patient verbalized improved R hip symptoms towards end of treatment in comparison to how she felt prior to completing treatment. Will continue to monitor patient's symptoms and progress per her tolerance.     Diana Is progressing well towards her goals.   Pt prognosis is Good.     Pt will continue to benefit from skilled outpatient physical therapy to address the deficits listed in the problem list box on initial  evaluation, provide pt/family education and to maximize pt's level of independence in the home and community environment.     Pt's spiritual, cultural and educational needs considered and pt agreeable to plan of care and goals.     Anticipated barriers to physical therapy: None.     Goals:  Short Term Goals: 6 weeks   1. Patient will be independent in HEP & progressions.  2. Patient will achieve TUG score of 9 sec to demonstrate improved mobility  3. The patient will achieve 5 sit to stand score of 19 seconds with </= 3/10 RPE to demonstrate improved transfers and endurance.      Long Term Goals: 12 weeks   1. The patient will demonstrate independence with extensive HEP.  2. Patient will achieve 5 sit to stand score of 16 seconds with </= 3/10 RPE to demonstrate improved transfers & endurance.  3. Patent is able to demonstrate MMT >/= 4/5 BLE without pain reports during testing.    4. Patient will report improved standing tolerance to > 20 minutes with minimal pain complaints for improved tolerance/participation in ADLs/iADLs.     PLAN     Continue POC with aquatic therapy 2x week. Progress exercises and HEP as appropriate.     Katelyn Jones, PT

## 2022-09-23 ENCOUNTER — CLINICAL SUPPORT (OUTPATIENT)
Dept: REHABILITATION | Facility: HOSPITAL | Age: 77
End: 2022-09-23
Payer: MEDICARE

## 2022-09-23 DIAGNOSIS — M48.062 SPINAL STENOSIS, LUMBAR REGION WITH NEUROGENIC CLAUDICATION: Primary | ICD-10-CM

## 2022-09-23 PROCEDURE — 97113 AQUATIC THERAPY/EXERCISES: CPT

## 2022-09-23 NOTE — PROGRESS NOTES
[facility]   Physical Therapy Treatment Note     Name: Diana Herring  Clinic Number: [id number]    Therapy Diagnosis:   Encounter Diagnosis   Name Primary?    Spinal stenosis, lumbar region with neurogenic claudication Yes       Physician: Christin Ventura,Claudio    Visit Date: 9/23/2022    Physician Orders: Aquatic Therapy PT Eval and Treat  Medical Diagnosis from Referral: M54.17 (ICD-10-CM) - Lumbosacral radiculopathy    M47.819 (ICD-10-CM) - Spondylosis without myelopathy    M54.15 (ICD-10-CM) - Radiculopathy of thoracolumbar region  Evaluation Date: 8/5/2022  Authorization Period Expiration: 9/02/2022  Plan of Care Expiration: 11/05/2022  Visit # / Visits authorized: 7/24 + Evaluation     Precautions: Standard and Fall    PTA Visit #: 1/5     Time In: 0900 am  Time Out: 0955 am   Total Billable Time: 55 minutes    SUBJECTIVE     Pt reports: she more or less feels the same. She continues to report that morning is when her symptoms are the worst. She arrives ready to complete her exercises today.    She was compliant with home exercise program. She states that the stretches do help.   Response to previous treatment: No adverse reporting.  Functional change: No change    Pain: 7/10  Location: bilateral back , buttocks  and upper legs     OBJECTIVE     Objective Measures updated at progress report unless specified.     Treatment     Diana received aquatic therapeutic exercises to develop strength, endurance, ROM, flexibility, posture, and core stabilization for 55 minutes including:    FUNCTIONAL MOBILITY TRAINING x 2 laps each at beginning and 1 lap each at end of session  Walk forward/backward/lateral    STRETCHES 20 seconds ea  Calf  Hip adductor  Hamstring    LE EX x 1' on each -- modified to intervals today from x22; TITO-BUE support on handrails  Squat  Heel raise with gluteal set  Hip abduction/adduction  Hip flex  Hip ext  High knee marching -- completed in // bars today  HS  curl -- completed in // bars today  LAQ standing -- completed in // bars today    STS from pool stool    UE EX/CORE x 1' on each -- modified to intervals today from x22; patient has RUE paresthesias possibly related to cervical radiculopathy so closely monitored symptoms with each exercise. All exercises performed with back supported on wall today.    Shoulder flex/ext TA activation paddles open  Shoulder horizontal abd/add TA activation paddles open  Shoulder abd/add TA activation paddles open -- added today    Mini squat with push/pull red kickboard  Upper trunk rotation with orange dumbbell     ENDURANCE  Bicycle in // bars 3'  LTR // 2'    Patient Education and Home Exercises     Home Exercises Provided and Patient Education Provided     Education provided:   Role of aquatic therapy  Hydration post therapy    Written Home Exercises Provided: Patient instructed to cont prior HEP. Exercises were reviewed and Diana was able to demonstrate them prior to the end of the session.  Diana demonstrated good  understanding of the education provided. See EMR under Patient Instructions for exercises provided during therapy sessions    ASSESSMENT     Patient arrived with continued report of increased pain this morning. Modified today's treatment to complete each exercise with 1' minute intervals to improve patient's tolerance for completing full exercise program, as well as to conserve patient's energy to perform exercise with proper biomechanics. Observed improved ability to complete exercises with proper form; however patient continued to require frequent reminders and demonstration by PT for proper muscular engagement. At end of treatment, patient verbalized appreciation for treatment and reports her gross symptoms improved. Will continue to monitor patient's symptoms and progress per her tolerance.     Diana Is progressing well towards her goals.   Pt prognosis is Good.     Pt will continue to benefit from skilled  outpatient physical therapy to address the deficits listed in the problem list box on initial evaluation, provide pt/family education and to maximize pt's level of independence in the home and community environment.     Pt's spiritual, cultural and educational needs considered and pt agreeable to plan of care and goals.     Anticipated barriers to physical therapy: None.     Goals:  Short Term Goals: 6 weeks   1. Patient will be independent in HEP & progressions.  2. Patient will achieve TUG score of 9 sec to demonstrate improved mobility  3. The patient will achieve 5 sit to stand score of 19 seconds with </= 3/10 RPE to demonstrate improved transfers and endurance.      Long Term Goals: 12 weeks   1. The patient will demonstrate independence with extensive HEP.  2. Patient will achieve 5 sit to stand score of 16 seconds with </= 3/10 RPE to demonstrate improved transfers & endurance.  3. Patent is able to demonstrate MMT >/= 4/5 BLE without pain reports during testing.    4. Patient will report improved standing tolerance to > 20 minutes with minimal pain complaints for improved tolerance/participation in ADLs/iADLs.     PLAN     Continue POC with aquatic therapy 2x week. Progress exercises and HEP as appropriate.     Katelyn Jones, PT

## 2022-09-26 ENCOUNTER — CLINICAL SUPPORT (OUTPATIENT)
Dept: REHABILITATION | Facility: HOSPITAL | Age: 77
End: 2022-09-26
Payer: MEDICARE

## 2022-09-26 DIAGNOSIS — M48.062 SPINAL STENOSIS, LUMBAR REGION WITH NEUROGENIC CLAUDICATION: Primary | ICD-10-CM

## 2022-09-26 PROCEDURE — 97113 AQUATIC THERAPY/EXERCISES: CPT

## 2022-09-26 NOTE — PROGRESS NOTES
[facility]   Physical Therapy Treatment Note     Name: Diana Herring  Clinic Number: [id number]    Therapy Diagnosis:   Encounter Diagnosis   Name Primary?    Spinal stenosis, lumbar region with neurogenic claudication Yes       Physician: No ref. provider found    Visit Date: 9/26/2022    Physician Orders: Aquatic Therapy PT Eval and Treat  Medical Diagnosis from Referral: M54.17 (ICD-10-CM) - Lumbosacral radiculopathy    M47.819 (ICD-10-CM) - Spondylosis without myelopathy    M54.15 (ICD-10-CM) - Radiculopathy of thoracolumbar region  Evaluation Date: 8/5/2022  Authorization Period Expiration: 9/02/2022  Plan of Care Expiration: 11/05/2022  Visit # / Visits authorized: 8/24 + Evaluation     Precautions: Standard and Fall    PTA Visit #: 1/5     Time In: 8:30 am  Time Out:9: 40 am   Total Billable Time: 70 minutes    SUBJECTIVE     Pt reports: she is a little better today.     She was compliant with home exercise program. She states that the stretches do help.   Response to previous treatment: No adverse reporting.  Functional change: No change    Pain: 7/10  Location: bilateral back , buttocks  and upper legs     OBJECTIVE     Objective Measures updated at progress report unless specified.     Treatment     Diana received aquatic therapeutic exercises to develop strength, endurance, ROM, flexibility, posture, and core stabilization for 70 minutes including:    FUNCTIONAL MOBILITY TRAINING x 2 laps each at beginning and 1 lap each at end of session  Walk forward/backward/lateral    STRETCHES 20 seconds ea  Calf  Hip adductor  Hamstring    LE EX x 1' on each  TITO-BUE support on handrails  Squat  Heel raise   Hip abduction/adduction  Hip flex  Hip ext    High knee marching -- completed in // bars today  HS curl -- completed in // bars today  LAQ standing -- completed in // bars today    STS from pool stool    UE EX/CORE x 1' on each -- modified to intervals today from x22; patient has  RUE paresthesias possibly related to cervical radiculopathy so closely monitored symptoms with each exercise. All exercises performed with back supported on wall today.    Shoulder flex/ext TA activation paddles open  Shoulder horizontal abd/add TA activation paddles open  Shoulder abd/add TA activation paddles open     Mini squat with push/pull red kick board 2 x1'   Upper trunk rotation with orange dumbbell     ENDURANCE  Bicycle in // bars 3'  LTR // 2'    Patient Education and Home Exercises     Home Exercises Provided and Patient Education Provided     Education provided:   Role of aquatic therapy  Hydration post therapy    Written Home Exercises Provided: Patient instructed to cont prior HEP. Exercises were reviewed and Diana was able to demonstrate them prior to the end of the session.  Diana demonstrated good  understanding of the education provided. See EMR under Patient Instructions for exercises provided during therapy sessions    ASSESSMENT     The patient reports that felt less stiffness following the session but some heaviness when getting out of the pool. She required some minimal VC for correct posture but was able to self correct intermittently throughout the treatment.     Diana Is progressing well towards her goals.   Pt prognosis is Good.     Pt will continue to benefit from skilled outpatient physical therapy to address the deficits listed in the problem list box on initial evaluation, provide pt/family education and to maximize pt's level of independence in the home and community environment.     Pt's spiritual, cultural and educational needs considered and pt agreeable to plan of care and goals.     Anticipated barriers to physical therapy: None.     Goals:  Short Term Goals: 6 weeks   1. Patient will be independent in HEP & progressions.  2. Patient will achieve TUG score of 9 sec to demonstrate improved mobility  3. The patient will achieve 5 sit to stand score of 19 seconds with </= 3/10  RPE to demonstrate improved transfers and endurance.      Long Term Goals: 12 weeks   1. The patient will demonstrate independence with extensive HEP.  2. Patient will achieve 5 sit to stand score of 16 seconds with </= 3/10 RPE to demonstrate improved transfers & endurance.  3. Patent is able to demonstrate MMT >/= 4/5 BLE without pain reports during testing.    4. Patient will report improved standing tolerance to > 20 minutes with minimal pain complaints for improved tolerance/participation in ADLs/iADLs.     PLAN     Continue POC with aquatic therapy 2x week. Progress exercises and HEP as appropriate.     Bianca Almodovar, PT

## 2022-09-28 ENCOUNTER — TELEPHONE (OUTPATIENT)
Dept: ADMINISTRATIVE | Facility: CLINIC | Age: 77
End: 2022-09-28
Payer: MEDICARE

## 2022-09-28 NOTE — TELEPHONE ENCOUNTER
Called pt, no answer; left message informing pt I was calling to remind pt of her in office EAWV on 9/29/22 and to return my call if she had any questions; left my name and number

## 2022-09-29 ENCOUNTER — OFFICE VISIT (OUTPATIENT)
Dept: FAMILY MEDICINE | Facility: CLINIC | Age: 77
End: 2022-09-29
Payer: MEDICARE

## 2022-09-29 VITALS
TEMPERATURE: 99 F | WEIGHT: 144.75 LBS | OXYGEN SATURATION: 99 % | HEIGHT: 60 IN | DIASTOLIC BLOOD PRESSURE: 82 MMHG | BODY MASS INDEX: 28.42 KG/M2 | SYSTOLIC BLOOD PRESSURE: 132 MMHG | HEART RATE: 71 BPM

## 2022-09-29 DIAGNOSIS — I10 ESSENTIAL HYPERTENSION: Chronic | ICD-10-CM

## 2022-09-29 DIAGNOSIS — F51.04 PSYCHOPHYSIOLOGICAL INSOMNIA: Chronic | ICD-10-CM

## 2022-09-29 DIAGNOSIS — E55.9 VITAMIN D DEFICIENCY: ICD-10-CM

## 2022-09-29 DIAGNOSIS — F41.9 ANXIETY: ICD-10-CM

## 2022-09-29 DIAGNOSIS — Z00.00 ENCOUNTER FOR PREVENTIVE HEALTH EXAMINATION: Primary | ICD-10-CM

## 2022-09-29 DIAGNOSIS — F33.0 MILD RECURRENT MAJOR DEPRESSION: Chronic | ICD-10-CM

## 2022-09-29 DIAGNOSIS — I70.0 ATHEROSCLEROSIS OF AORTA: Chronic | ICD-10-CM

## 2022-09-29 DIAGNOSIS — K21.9 GASTROESOPHAGEAL REFLUX DISEASE, UNSPECIFIED WHETHER ESOPHAGITIS PRESENT: Chronic | ICD-10-CM

## 2022-09-29 DIAGNOSIS — Z23 NEEDS FLU SHOT: ICD-10-CM

## 2022-09-29 DIAGNOSIS — M81.0 OSTEOPOROSIS, UNSPECIFIED OSTEOPOROSIS TYPE, UNSPECIFIED PATHOLOGICAL FRACTURE PRESENCE: Chronic | ICD-10-CM

## 2022-09-29 PROCEDURE — 90694 VACC AIIV4 NO PRSRV 0.5ML IM: CPT | Mod: S$GLB,,, | Performed by: NURSE PRACTITIONER

## 2022-09-29 PROCEDURE — 1159F MED LIST DOCD IN RCRD: CPT | Mod: CPTII,S$GLB,, | Performed by: NURSE PRACTITIONER

## 2022-09-29 PROCEDURE — 1170F PR FUNCTIONAL STATUS ASSESSED: ICD-10-PCS | Mod: CPTII,S$GLB,, | Performed by: NURSE PRACTITIONER

## 2022-09-29 PROCEDURE — 1125F PR PAIN SEVERITY QUANTIFIED, PAIN PRESENT: ICD-10-PCS | Mod: CPTII,S$GLB,, | Performed by: NURSE PRACTITIONER

## 2022-09-29 PROCEDURE — G0439 PPPS, SUBSEQ VISIT: HCPCS | Mod: S$GLB,,, | Performed by: NURSE PRACTITIONER

## 2022-09-29 PROCEDURE — G0008 FLU VACCINE - QUADRIVALENT - ADJUVANTED: ICD-10-PCS | Mod: S$GLB,,, | Performed by: NURSE PRACTITIONER

## 2022-09-29 PROCEDURE — 1160F PR REVIEW ALL MEDS BY PRESCRIBER/CLIN PHARMACIST DOCUMENTED: ICD-10-PCS | Mod: CPTII,S$GLB,, | Performed by: NURSE PRACTITIONER

## 2022-09-29 PROCEDURE — 1170F FXNL STATUS ASSESSED: CPT | Mod: CPTII,S$GLB,, | Performed by: NURSE PRACTITIONER

## 2022-09-29 PROCEDURE — 3079F DIAST BP 80-89 MM HG: CPT | Mod: CPTII,S$GLB,, | Performed by: NURSE PRACTITIONER

## 2022-09-29 PROCEDURE — 3288F FALL RISK ASSESSMENT DOCD: CPT | Mod: CPTII,S$GLB,, | Performed by: NURSE PRACTITIONER

## 2022-09-29 PROCEDURE — G0439 PR MEDICARE ANNUAL WELLNESS SUBSEQUENT VISIT: ICD-10-PCS | Mod: S$GLB,,, | Performed by: NURSE PRACTITIONER

## 2022-09-29 PROCEDURE — 99999 PR PBB SHADOW E&M-EST. PATIENT-LVL IV: CPT | Mod: PBBFAC,,, | Performed by: NURSE PRACTITIONER

## 2022-09-29 PROCEDURE — 99999 PR PBB SHADOW E&M-EST. PATIENT-LVL IV: ICD-10-PCS | Mod: PBBFAC,,, | Performed by: NURSE PRACTITIONER

## 2022-09-29 PROCEDURE — 90694 FLU VACCINE - QUADRIVALENT - ADJUVANTED: ICD-10-PCS | Mod: S$GLB,,, | Performed by: NURSE PRACTITIONER

## 2022-09-29 PROCEDURE — 3075F SYST BP GE 130 - 139MM HG: CPT | Mod: CPTII,S$GLB,, | Performed by: NURSE PRACTITIONER

## 2022-09-29 PROCEDURE — 3079F PR MOST RECENT DIASTOLIC BLOOD PRESSURE 80-89 MM HG: ICD-10-PCS | Mod: CPTII,S$GLB,, | Performed by: NURSE PRACTITIONER

## 2022-09-29 PROCEDURE — G0008 ADMIN INFLUENZA VIRUS VAC: HCPCS | Mod: S$GLB,,, | Performed by: NURSE PRACTITIONER

## 2022-09-29 PROCEDURE — 1101F PR PT FALLS ASSESS DOC 0-1 FALLS W/OUT INJ PAST YR: ICD-10-PCS | Mod: CPTII,S$GLB,, | Performed by: NURSE PRACTITIONER

## 2022-09-29 PROCEDURE — 1125F AMNT PAIN NOTED PAIN PRSNT: CPT | Mod: CPTII,S$GLB,, | Performed by: NURSE PRACTITIONER

## 2022-09-29 PROCEDURE — 3288F PR FALLS RISK ASSESSMENT DOCUMENTED: ICD-10-PCS | Mod: CPTII,S$GLB,, | Performed by: NURSE PRACTITIONER

## 2022-09-29 PROCEDURE — 1160F RVW MEDS BY RX/DR IN RCRD: CPT | Mod: CPTII,S$GLB,, | Performed by: NURSE PRACTITIONER

## 2022-09-29 PROCEDURE — 3075F PR MOST RECENT SYSTOLIC BLOOD PRESS GE 130-139MM HG: ICD-10-PCS | Mod: CPTII,S$GLB,, | Performed by: NURSE PRACTITIONER

## 2022-09-29 PROCEDURE — 1159F PR MEDICATION LIST DOCUMENTED IN MEDICAL RECORD: ICD-10-PCS | Mod: CPTII,S$GLB,, | Performed by: NURSE PRACTITIONER

## 2022-09-29 PROCEDURE — 1101F PT FALLS ASSESS-DOCD LE1/YR: CPT | Mod: CPTII,S$GLB,, | Performed by: NURSE PRACTITIONER

## 2022-09-29 RX ORDER — ERGOCALCIFEROL 1.25 MG/1
50000 CAPSULE ORAL
Qty: 12 CAPSULE | Refills: 0 | Status: SHIPPED | OUTPATIENT
Start: 2022-10-02 | End: 2023-07-05

## 2022-09-29 NOTE — PATIENT INSTRUCTIONS
Counseling and Referral of Other Preventative  (Italic type indicates deductible and co-insurance are waived)    Patient Name: Diana Herring  Today's Date: 9/29/2022    Health Maintenance         Date Due Completion Date    COVID-19 Vaccine (4 - Booster for Pfizer series) 01/24/2022 11/29/2021    Influenza Vaccine (1) 09/01/2022 2/22/2022    Override on 12/1/2020: Declined    DEXA Scan 10/06/2022 10/6/2020    TETANUS VACCINE 08/31/2034 (Originally 1/4/1963) ---    Mammogram 03/29/2023 3/29/2022    Colonoscopy 06/16/2025 6/16/2022    Lipid Panel 03/04/2027 3/4/2022          No orders of the defined types were placed in this encounter.    The following information is provided to all patients.  This information is to help you find resources for any of the problems found today that may be affecting your health:                Living healthy guide: www.[facility].[place].[facility]      Understanding Diabetes: www.diabetes.org      Eating healthy: www.cdc.gov/healthyweight      [facility] home safety checklist: www.cdc.gov/steadi/patient.html      Agency on Aging: www.goea.[place].[facility]      Alcoholics anonymous (AA): www.aa.org      Physical Activity: www.[place].nih.gov/fu9koqv      Tobacco use: www.quitwithusla.org       Please call [employer]'s [employer] at [phone] to receive a rewards card for completing your Annual Wellness Visit. Thanks

## 2022-09-29 NOTE — PROGRESS NOTES
Diana Herring presented for a  Medicare AWV and comprehensive Health Risk Assessment today. The following components were reviewed and updated:    Medical history  Family History  Social history  Allergies and Current Medications  Health Risk Assessment  Health Maintenance  Care Team       ** See Completed Assessments for Annual Wellness Visit within the encounter summary.**       The following assessments were completed:  Living Situation  CAGE  Depression Screening  Timed Get Up and Go  Whisper Test  Cognitive Function Screening  Nutrition Screening  ADL Screening  PAQ Screening            Vitals:    09/29/22 1111   BP: 132/82   BP Location: Right arm   Patient Position: Sitting   BP Method: Large (Manual)   Pulse: 71   Temp: 98.5 °F (36.9 °C)   TempSrc: Oral   SpO2: 99%   Weight: 65.6 kg (144 lb 11.7 oz)   Height: 5' (1.524 m)     Body mass index is 28.27 kg/m².  Physical Exam  Vitals and nursing note reviewed.   Constitutional:       Appearance: Normal appearance.   Cardiovascular:      Rate and Rhythm: Normal rate.      Pulses: Normal pulses.      Heart sounds: Normal heart sounds.   Pulmonary:      Effort: Pulmonary effort is normal.      Breath sounds: Normal breath sounds.   Abdominal:      Palpations: Abdomen is soft.   Musculoskeletal:         General: Normal range of motion.   Neurological:      Mental Status: She is alert and oriented to person, place, and time.   Psychiatric:         Mood and Affect: Mood normal.         Behavior: Behavior normal.           Diagnoses and health risks identified today and associated recommendations/orders:    1. Encounter for preventive health examination  Pt was seen today for an Annual Wellness visit. Healthcare maintenance and screening recommendations were discussed and updated as indicated. Return in one year for AWV.    Review current opioid prescriptions:n/a  Screen for potential Substance Use Disorders:n/a    2. Atherosclerosis of aorta  Chronic. Lumbar X-Ray  5/29/20. The current medical regimen is effective;  continue present plan and medications.    3. Mild recurrent major depression  PHQ-2 total score of zero on today. The current medical regimen is effective;  continue present plan and medications.    4. Vitamin D deficiency  The current medical regimen is effective;  continue present plan and medications.    - ergocalciferol (ERGOCALCIFEROL) 50,000 unit Cap; Take 1 capsule (50,000 Units total) by mouth every Sunday.  Dispense: 12 capsule; Refill: 0    5. Essential hypertension  Stable. The current medical regimen is effective;  continue present plan and medications.    6. Gastroesophageal reflux disease, unspecified whether esophagitis present  The current medical regimen is effective;  continue present plan and medications.    7. Anxiety  The current medical regimen is effective;  continue present plan and medications.    8. Osteoporosis, unspecified osteoporosis type, unspecified pathological fracture presence  The current medical regimen is effective;  continue present plan and medications.    9. Psychophysiological insomnia  The current medical regimen is effective;  continue present plan and medications.    10. Needs flu shot  Seasonal flu discussed. Understanding verbalized.   - Influenza (FLUAD) - Quadrivalent (Adjuvanted) *Preferred* (65+) (PF)      Provided Diana with a 5-10 year written screening schedule and personal prevention plan. Recommendations were developed using the USPSTF age appropriate recommendations. Education, counseling, and referrals were provided as needed. After Visit Summary printed and given to patient which includes a list of additional screenings\tests needed.    Follow up in about 5 months (around 3/10/2023) with provider.    BRENDA Dorsey  I offered to discuss advanced care planning, including how to pick a person who would make decisions for you if you were unable to make them for yourself, called a health care power of  , and what kind of decisions you might make such as use of life sustaining treatments such as ventilators and tube feeding when faced with a life limiting illness recorded on a living will that they will need to know. (How you want to be cared for as you near the end of your natural life)     X Patient is interested in learning more about how to make advanced directives.  I provided them paperwork and offered to discuss this with them.

## 2022-10-03 ENCOUNTER — CLINICAL SUPPORT (OUTPATIENT)
Dept: REHABILITATION | Facility: HOSPITAL | Age: 77
End: 2022-10-03
Payer: MEDICARE

## 2022-10-03 DIAGNOSIS — M48.062 SPINAL STENOSIS, LUMBAR REGION WITH NEUROGENIC CLAUDICATION: Primary | ICD-10-CM

## 2022-10-03 PROCEDURE — 97113 AQUATIC THERAPY/EXERCISES: CPT

## 2022-10-03 NOTE — PROGRESS NOTES
[facility]   Physical Therapy Treatment Note     Name: Diana Herring  Clinic Number: [id number]    Therapy Diagnosis:   Encounter Diagnosis   Name Primary?    Spinal stenosis, lumbar region with neurogenic claudication Yes       Physician: Christin Ventura,Claudio    Visit Date: 10/3/2022    Physician Orders: Aquatic Therapy PT Eval and Treat  Medical Diagnosis from Referral: M54.17 (ICD-10-CM) - Lumbosacral radiculopathy    M47.819 (ICD-10-CM) - Spondylosis without myelopathy    M54.15 (ICD-10-CM) - Radiculopathy of thoracolumbar region  Evaluation Date: 8/5/2022  Authorization Period Expiration: 12/31/22  Plan of Care Expiration: 11/05/2022  Visit # / Visits authorized: 9/24 + Evaluation     Precautions: Standard and Fall    PTA Visit #: 1/5     Time In:9:30 am  Time Out: 10:30 am   Total Billable Time: 60 minutes    SUBJECTIVE     Pt reports: she went to her MD and overall everything looks good and she is healthy but she continues with LBP.    She was compliant with home exercise program. She states that the stretches do help.     Response to previous treatment: No adverse reporting.  Functional change: No change    Pain: 7/10  Location: bilateral back , buttocks  and upper legs     OBJECTIVE     Objective Measures updated at progress report unless specified.     Treatment     Diana received aquatic therapeutic exercises to develop strength, endurance, ROM, flexibility, posture, and core stabilization for 60 minutes including:    FUNCTIONAL MOBILITY TRAINING x 2 laps each at beginning and 1 lap each at end of session  Walk forward/backward/lateral    STRETCHES 20 seconds ea  Calf  Hip adductor  Hamstring    LE EX x 1' on each  TITO-BUE support on handrails  Squat  Heel raise   Hip abduction/adduction  Hip flex  Hip ext    Walking High knee marching -- completed in // bars today  HS curl -- completed in // bars today  LAQ standing -- completed in // bars today    STS from pool  stool  Seated alternating marches on pool stool     UE EX/CORE x 1' on each    Shoulder flex/ext TA activation paddles closed-advanced to closed paddles today  Shoulder horizontal abd/add TA activation paddles closed  Shoulder abd/add TA activation paddles closed    Mini squat with push/pull red kick board 2 x1'   Upper trunk rotation with orange dumbbell     ENDURANCE  Bicycle in // bars 3'  LTR // 2'    Patient Education and Home Exercises     Home Exercises Provided and Patient Education Provided     Education provided:   Role of aquatic therapy  Hydration post therapy    Written Home Exercises Provided: Patient instructed to cont prior HEP. Exercises were reviewed and Diana was able to demonstrate them prior to the end of the session.  Diana demonstrated good  understanding of the education provided. See EMR under Patient Instructions for exercises provided during therapy sessions    ASSESSMENT     The patient tolerated the advanced UE exercises to closed paddles without increased symptoms and advanced to walking marches in the // bars with good execution.    Diana Is progressing well towards her goals.   Pt prognosis is Good.     Pt will continue to benefit from skilled outpatient physical therapy to address the deficits listed in the problem list box on initial evaluation, provide pt/family education and to maximize pt's level of independence in the home and community environment.     Pt's spiritual, cultural and educational needs considered and pt agreeable to plan of care and goals.     Anticipated barriers to physical therapy: None.     Goals:  Short Term Goals: 6 weeks   1. Patient will be independent in HEP & progressions.  2. Patient will achieve TUG score of 9 sec to demonstrate improved mobility  3. The patient will achieve 5 sit to stand score of 19 seconds with </= 3/10 RPE to demonstrate improved transfers and endurance.      Long Term Goals: 12 weeks   1. The patient will demonstrate  independence with extensive HEP.  2. Patient will achieve 5 sit to stand score of 16 seconds with </= 3/10 RPE to demonstrate improved transfers & endurance.  3. Patent is able to demonstrate MMT >/= 4/5 BLE without pain reports during testing.    4. Patient will report improved standing tolerance to > 20 minutes with minimal pain complaints for improved tolerance/participation in ADLs/iADLs.     PLAN     Continue POC with aquatic therapy 2x week. Progress exercises and HEP as appropriate.     Bianca Almodovar, PT

## 2022-10-05 ENCOUNTER — CLINICAL SUPPORT (OUTPATIENT)
Dept: REHABILITATION | Facility: HOSPITAL | Age: 77
End: 2022-10-05
Payer: MEDICARE

## 2022-10-05 DIAGNOSIS — M48.062 SPINAL STENOSIS, LUMBAR REGION WITH NEUROGENIC CLAUDICATION: Primary | ICD-10-CM

## 2022-10-05 PROCEDURE — 97113 AQUATIC THERAPY/EXERCISES: CPT

## 2022-10-05 NOTE — PROGRESS NOTES
[facility]   Physical Therapy Treatment Note     Name: Diana Herring  Clinic Number: [id number]    Therapy Diagnosis:   Encounter Diagnosis   Name Primary?    Spinal stenosis, lumbar region with neurogenic claudication Yes       Physician: Christin Ventura,Claudio    Visit Date: 10/5/2022    Physician Orders: Aquatic Therapy PT Eval and Treat  Medical Diagnosis from Referral: M54.17 (ICD-10-CM) - Lumbosacral radiculopathy    M47.819 (ICD-10-CM) - Spondylosis without myelopathy    M54.15 (ICD-10-CM) - Radiculopathy of thoracolumbar region  Evaluation Date: 8/5/2022  Authorization Period Expiration: 12/31/22  Plan of Care Expiration: 11/05/2022  Visit # / Visits authorized: 9/24 + Evaluation     Precautions: Standard and Fall    PTA Visit #: 1/5     Time In: 8:36 am  Time Out: 9:30 am  Total Billable Time: 25 minutes    SUBJECTIVE     Pt reports: she is feeling ok today. She sated her pain is about a 7/10. She woke up and took her medication.     She was compliant with home exercise program. She states that the stretches do help.     Response to previous treatment: No adverse reporting.  Functional change: No change    Pain: 7/10  Location: bilateral back , buttocks  and upper legs     OBJECTIVE     Objective Measures updated at progress report unless specified.     Treatment     Diana received aquatic therapeutic exercises to develop strength, endurance, ROM, flexibility, posture, and core stabilization for 54 minutes including:    FUNCTIONAL MOBILITY TRAINING x 2 laps each at beginning and 1 lap each at end of session  Walk forward/backward/lateral    STRETCHES 20 seconds ea  Calf  Hip adductor  Hamstring    LE EX x 1' on each  TITO-BUE support on handrails  Squat  Heel raise   Hip abduction/adduction  Hip flex  Hip ext    Walking High knee marching -- completed in // bars today  HS curl -- completed in // bars today  LAQ standing -- completed in // bars today    STS from pool  stool  Seated alternating marches on pool stool   Seated LAQ--added today    UE EX/CORE x 1' on each    Shoulder flex/ext TA activation paddles closed  Shoulder horizontal abd/add TA activation paddles closed  Shoulder abd/add TA activation paddles closed    Mini squat with push/pull red kick board 2 x1'   Upper trunk rotation with orange dumbbell     ENDURANCE  Bicycle in // bars 3'  LTR // 2'    Patient Education and Home Exercises     Home Exercises Provided and Patient Education Provided     Education provided:   Role of aquatic therapy  Hydration post therapy    Written Home Exercises Provided: Patient instructed to cont prior HEP. Exercises were reviewed and Diana was able to demonstrate them prior to the end of the session.  Diana demonstrated good  understanding of the education provided. See EMR under Patient Instructions for exercises provided during therapy sessions    ASSESSMENT     The patient was able to perform all exercises well today with reduced VC required for correct execution. She continues to demonstrate improved postural awareness with ability to self correct throughout the session.     Diana Is progressing well towards her goals.   Pt prognosis is Good.     Pt will continue to benefit from skilled outpatient physical therapy to address the deficits listed in the problem list box on initial evaluation, provide pt/family education and to maximize pt's level of independence in the home and community environment.     Pt's spiritual, cultural and educational needs considered and pt agreeable to plan of care and goals.     Anticipated barriers to physical therapy: None.     Goals:  Short Term Goals: 6 weeks   1. Patient will be independent in HEP & progressions.  2. Patient will achieve TUG score of 9 sec to demonstrate improved mobility  3. The patient will achieve 5 sit to stand score of 19 seconds with </= 3/10 RPE to demonstrate improved transfers and endurance.      Long Term Goals: 12  weeks   1. The patient will demonstrate independence with extensive HEP.  2. Patient will achieve 5 sit to stand score of 16 seconds with </= 3/10 RPE to demonstrate improved transfers & endurance.  3. Patent is able to demonstrate MMT >/= 4/5 BLE without pain reports during testing.    4. Patient will report improved standing tolerance to > 20 minutes with minimal pain complaints for improved tolerance/participation in ADLs/iADLs.     PLAN     Continue POC with aquatic therapy 2x week. Progress exercises and HEP as appropriate.     Bianca Almodovar, PT

## 2022-10-12 ENCOUNTER — CLINICAL SUPPORT (OUTPATIENT)
Dept: REHABILITATION | Facility: HOSPITAL | Age: 77
End: 2022-10-12
Payer: MEDICARE

## 2022-10-12 DIAGNOSIS — M48.062 SPINAL STENOSIS, LUMBAR REGION WITH NEUROGENIC CLAUDICATION: Primary | ICD-10-CM

## 2022-10-12 PROCEDURE — 97110 THERAPEUTIC EXERCISES: CPT | Mod: CQ

## 2022-10-12 NOTE — PROGRESS NOTES
[facility]   Physical Therapy Treatment Note     Name: Diana Herring  Clinic Number: [id number]    Therapy Diagnosis:   Encounter Diagnosis   Name Primary?    Spinal stenosis, lumbar region with neurogenic claudication Yes       Physician: Christin Ventura,Claudio    Visit Date: 10/12/2022    Physician Orders: Aquatic Therapy PT Eval and Treat  Medical Diagnosis from Referral: M54.17 (ICD-10-CM) - Lumbosacral radiculopathy    M47.819 (ICD-10-CM) - Spondylosis without myelopathy    M54.15 (ICD-10-CM) - Radiculopathy of thoracolumbar region  Evaluation Date: 8/5/2022  Authorization Period Expiration: 12/31/22  Plan of Care Expiration: 11/05/2022  Visit # / Visits authorized: 10/24 + Evaluation     Precautions: Standard and Fall    PTA Visit #: 1/5     Time In: 8:30 am  Time Out: 9:30 am  Total Billable Time: 35 minutes    SUBJECTIVE     Pt reports: mornings are rough but does better once she gets moving    She was compliant with home exercise program. She states that the stretches do help.     Response to previous treatment: No adverse reporting.  Functional change: No change    Pain: 7/10  Location: bilateral back , buttocks  and upper legs     OBJECTIVE     Objective Measures updated at progress report unless specified.     Treatment     Diana received aquatic therapeutic exercises to develop strength, endurance, ROM, flexibility, posture, and core stabilization for 54 minutes including:    FUNCTIONAL MOBILITY TRAINING x 2 laps each at beginning and 1 lap each at end of session  Walk forward/backward/lateral    STRETCHES 20 seconds ea  Calf  Hip adductor  Hamstring    LE EX x 1' on each  TITO-BUE support on handrails  Squat  Heel raise   Hip abduction/adduction  Hip flex  Hip ext    Walking High knee marching -- completed in // bars today  HS curl -- completed in // bars today  LAQ standing -- completed in // bars today    STS from pool stool  Seated alternating marches on pool stool    Seated LAQ--added today    UE EX/CORE x 1' on each    Shoulder flex/ext TA activation paddles closed  Shoulder horizontal abd/add TA activation paddles closed  Shoulder abd/add TA activation paddles closed    Mini squat with push/pull red kick board 2 x1'   Upper trunk rotation with orange dumbbell     ENDURANCE  Bicycle in // bars 3'  LTR // 2'    Patient Education and Home Exercises     Home Exercises Provided and Patient Education Provided     Education provided:   Role of aquatic therapy  Hydration post therapy    Written Home Exercises Provided: Patient instructed to cont prior HEP. Exercises were reviewed and Diana was able to demonstrate them prior to the end of the session.  Diana demonstrated good  understanding of the education provided. See EMR under Patient Instructions for exercises provided during therapy sessions    ASSESSMENT     Patient reports no pain throughout today's session, difficulty with departing pool due to feeling of heaviness. Presents with improving posture throughout treatment and challenged to decrease A with UE. Plan to progress scap strength training per patient tolerance.    Diana Is progressing well towards her goals.   Pt prognosis is Good.     Pt will continue to benefit from skilled outpatient physical therapy to address the deficits listed in the problem list box on initial evaluation, provide pt/family education and to maximize pt's level of independence in the home and community environment.     Pt's spiritual, cultural and educational needs considered and pt agreeable to plan of care and goals.     Anticipated barriers to physical therapy: None.     Goals:  Short Term Goals: 6 weeks   1. Patient will be independent in HEP & progressions.  2. Patient will achieve TUG score of 9 sec to demonstrate improved mobility  3. The patient will achieve 5 sit to stand score of 19 seconds with </= 3/10 RPE to demonstrate improved transfers and endurance.      Long Term Goals: 12  weeks   1. The patient will demonstrate independence with extensive HEP.  2. Patient will achieve 5 sit to stand score of 16 seconds with </= 3/10 RPE to demonstrate improved transfers & endurance.  3. Patent is able to demonstrate MMT >/= 4/5 BLE without pain reports during testing.    4. Patient will report improved standing tolerance to > 20 minutes with minimal pain complaints for improved tolerance/participation in ADLs/iADLs.     PLAN     Continue POC with aquatic therapy 2x week. Progress exercises and HEP as appropriate.     ANA BARRERA, PTA

## 2022-10-14 ENCOUNTER — CLINICAL SUPPORT (OUTPATIENT)
Dept: REHABILITATION | Facility: HOSPITAL | Age: 77
End: 2022-10-14
Payer: MEDICARE

## 2022-10-14 DIAGNOSIS — M48.062 SPINAL STENOSIS, LUMBAR REGION WITH NEUROGENIC CLAUDICATION: Primary | ICD-10-CM

## 2022-10-14 PROCEDURE — 97113 AQUATIC THERAPY/EXERCISES: CPT

## 2022-10-14 NOTE — PROGRESS NOTES
[facility]   Physical Therapy Treatment Note     Name: Diana Herring  Clinic Number: [id number]    Therapy Diagnosis:   Encounter Diagnosis   Name Primary?    Spinal stenosis, lumbar region with neurogenic claudication Yes       Physician: Christin Ventura,*    Visit Date: 10/14/2022    Physician Orders: Aquatic Therapy PT Eval and Treat  Medical Diagnosis from Referral: M54.17 (ICD-10-CM) - Lumbosacral radiculopathy    M47.819 (ICD-10-CM) - Spondylosis without myelopathy    M54.15 (ICD-10-CM) - Radiculopathy of thoracolumbar region  Evaluation Date: 8/5/2022  Authorization Period Expiration: 12/31/22  Plan of Care Expiration: 11/05/2022  Visit # / Visits authorized: 11/24 + Evaluation     Precautions: Standard and Fall    PTA Visit #: 0/5     Time In: 0855  Time Out: 0955  Total Billable Time: 60 minutes; 45 minutes of 1 on 1 supervision.    SUBJECTIVE     Pt reports: mornings are rough but does better once she gets moving. She continues to report benefit from being in the pool.     She was compliant with home exercise program. She states that the stretches do help.     Response to previous treatment: No adverse reporting.  Functional change: No change    Pain: 6/10  Location: bilateral back , buttocks  and upper legs     OBJECTIVE     Objective Measures updated at progress report unless specified.     Treatment     Diana received aquatic therapeutic exercises to develop strength, endurance, ROM, flexibility, posture, and core stabilization for 60 minutes including:    FUNCTIONAL MOBILITY TRAINING x 2 laps each at beginning and 1 lap each at end of session  Walk forward/backward/lateral    STRETCHES 20 seconds ea  Calf  Hip adductor  Hamstring  Quad    LE EX x 1' on each  TITO-BUE support on handrails  Squat  Heel raise   Hip abduction/adduction  Hip flex  Hip ext    Walking High knee marching --  // bars 2 laps  HS curl -- completed in // bars today  LAQ standing -- completed in  // bars today    STS from pool stool  Seated alternating marches on pool stool --not performed  Seated LAQ--added today    UE EX/CORE x 1' on each    Shoulder flex/ext TA activation paddles closed  Shoulder horizontal abd/add TA activation paddles closed  Shoulder abd/add TA activation paddles closed    Mini squat with push/pull red kick board 2 x1'   Upper trunk rotation with orange dumbbell     ENDURANCE  Bicycle in // bars 3'  LTR // 2'    Patient Education and Home Exercises     Home Exercises Provided and Patient Education Provided     Education provided:   Role of aquatic therapy  Hydration post therapy    Written Home Exercises Provided: Patient instructed to cont prior HEP. Exercises were reviewed and Diana was able to demonstrate them prior to the end of the session.  Diana demonstrated good  understanding of the education provided. See EMR under Patient Instructions for exercises provided during therapy sessions    ASSESSMENT     Patient tolerated treatment very well. Continuing to use a timer for each exercise as pt does not keep count. Occasional additional cues for exercise technique and posture required.     Diana Is progressing well towards her goals.   Pt prognosis is Good.     Pt will continue to benefit from skilled outpatient physical therapy to address the deficits listed in the problem list box on initial evaluation, provide pt/family education and to maximize pt's level of independence in the home and community environment.     Pt's spiritual, cultural and educational needs considered and pt agreeable to plan of care and goals.     Anticipated barriers to physical therapy: None.     Goals:  Short Term Goals: 6 weeks   1. Patient will be independent in HEP & progressions.  2. Patient will achieve TUG score of 9 sec to demonstrate improved mobility  3. The patient will achieve 5 sit to stand score of 19 seconds with </= 3/10 RPE to demonstrate improved transfers and endurance.      Long Term  Goals: 12 weeks   1. The patient will demonstrate independence with extensive HEP.  2. Patient will achieve 5 sit to stand score of 16 seconds with </= 3/10 RPE to demonstrate improved transfers & endurance.  3. Patent is able to demonstrate MMT >/= 4/5 BLE without pain reports during testing.    4. Patient will report improved standing tolerance to > 20 minutes with minimal pain complaints for improved tolerance/participation in ADLs/iADLs.     PLAN     Continue POC with aquatic therapy 2x week. Progress exercises and HEP as appropriate.     Delano Goodrich, PT

## 2022-10-19 ENCOUNTER — CLINICAL SUPPORT (OUTPATIENT)
Dept: REHABILITATION | Facility: HOSPITAL | Age: 77
End: 2022-10-19
Payer: MEDICARE

## 2022-10-19 DIAGNOSIS — M48.062 SPINAL STENOSIS, LUMBAR REGION WITH NEUROGENIC CLAUDICATION: Primary | ICD-10-CM

## 2022-10-19 PROCEDURE — 97113 AQUATIC THERAPY/EXERCISES: CPT

## 2022-10-19 NOTE — PROGRESS NOTES
[facility]   Physical Therapy Treatment Note     Name: Diana Herring  Clinic Number: [id number]    Therapy Diagnosis:   Encounter Diagnosis   Name Primary?    Spinal stenosis, lumbar region with neurogenic claudication Yes       Physician: Christin Ventura,*    Visit Date: 10/19/2022    Physician Orders: Aquatic Therapy PT Eval and Treat  Medical Diagnosis from Referral: M54.17 (ICD-10-CM) - Lumbosacral radiculopathy    M47.819 (ICD-10-CM) - Spondylosis without myelopathy    M54.15 (ICD-10-CM) - Radiculopathy of thoracolumbar region    Evaluation Date: 8/5/2022  Authorization Period Expiration: 12/31/22  Plan of Care Expiration: 11/05/2022  Visit # / Visits authorized: 13/24 + Evaluation     Precautions: Standard and Fall    PTA Visit #: 0/5     Time In: 8:30 am   Time Out: 9:30 am  Total Billable Time: 30 minutes     SUBJECTIVE     Pt reports: she is having a rough morning and feels like her nerve pain is not going away. She has some increased pain today.     She was compliant with home exercise program. She states that the stretches do help.     Response to previous treatment: No adverse reporting.    Functional change: No change    Pain: 8/10  Location: bilateral back , buttocks  and upper legs     OBJECTIVE     Objective Measures updated at progress report unless specified.     Treatment     Diana received aquatic therapeutic exercises to develop strength, endurance, ROM, flexibility, posture, and core stabilization for 60 minutes including:    FUNCTIONAL MOBILITY TRAINING x 2 laps each at beginning and 1 lap each at end of session  Walk forward/backward/lateral    STRETCHES 20 seconds ea  Calf  Hip adductor--NP  Hamstring  Quad--NP    LE EX x 1' on each  TITO-BUE support on handrails  Squat  Heel raise   Hip abduction/adduction  Hip flex  Hip ext  Hip marches  Hs curl    Walking High knee marching --  // bars 2 laps  LAQ standing -- completed in // bars today    White pool  stool- NP today  Sit to stand  Seated LAQ    UE EX/CORE x 1' on each    Shoulder flex/ext TA activation paddles closed  Shoulder horizontal abd/add TA activation paddles closed  Shoulder abd/add TA activation paddles closed    Mini squat with push/pull red kick board 2 x1'       ENDURANCE  Bicycle in // bars 3'  LTR // 2'    Patient Education and Home Exercises     Home Exercises Provided and Patient Education Provided     Education provided:   Role of aquatic therapy  Hydration post therapy    Written Home Exercises Provided: Patient instructed to cont prior HEP. Exercises were reviewed and Diana was able to demonstrate them prior to the end of the session.  Diana demonstrated good  understanding of the education provided. See EMR under Patient Instructions for exercises provided during therapy sessions    ASSESSMENT     The patient tolerated the session well today with good execution and reduced VC required throughout the session today.     Diana Is progressing well towards her goals.   Pt prognosis is Good.     Pt will continue to benefit from skilled outpatient physical therapy to address the deficits listed in the problem list box on initial evaluation, provide pt/family education and to maximize pt's level of independence in the home and community environment.     Pt's spiritual, cultural and educational needs considered and pt agreeable to plan of care and goals.     Anticipated barriers to physical therapy: None.     Goals:  Short Term Goals: 6 weeks   1. Patient will be independent in HEP & progressions.  2. Patient will achieve TUG score of 9 sec to demonstrate improved mobility  3. The patient will achieve 5 sit to stand score of 19 seconds with </= 3/10 RPE to demonstrate improved transfers and endurance.      Long Term Goals: 12 weeks   1. The patient will demonstrate independence with extensive HEP.  2. Patient will achieve 5 sit to stand score of 16 seconds with </= 3/10 RPE to demonstrate improved  transfers & endurance.  3. Patent is able to demonstrate MMT >/= 4/5 BLE without pain reports during testing.    4. Patient will report improved standing tolerance to > 20 minutes with minimal pain complaints for improved tolerance/participation in ADLs/iADLs.     PLAN     Continue POC with aquatic therapy 2x week. Progress exercises and HEP as appropriate.     Bianca Almodovar, PT

## 2022-10-21 ENCOUNTER — CLINICAL SUPPORT (OUTPATIENT)
Dept: REHABILITATION | Facility: HOSPITAL | Age: 77
End: 2022-10-21
Payer: MEDICARE

## 2022-10-21 DIAGNOSIS — M48.062 SPINAL STENOSIS, LUMBAR REGION WITH NEUROGENIC CLAUDICATION: Primary | ICD-10-CM

## 2022-10-21 DIAGNOSIS — G89.29 OTHER CHRONIC PAIN: ICD-10-CM

## 2022-10-21 DIAGNOSIS — M79.604 BILATERAL LEG PAIN: ICD-10-CM

## 2022-10-21 DIAGNOSIS — M62.81 MUSCLE WEAKNESS: ICD-10-CM

## 2022-10-21 DIAGNOSIS — M79.605 BILATERAL LEG PAIN: ICD-10-CM

## 2022-10-21 PROCEDURE — 97113 AQUATIC THERAPY/EXERCISES: CPT

## 2022-10-21 NOTE — PROGRESS NOTES
[facility]   Physical Therapy Treatment Note     Name: Diana Herring  Clinic Number: [id number]    Therapy Diagnosis:   Encounter Diagnoses   Name Primary?    Spinal stenosis, lumbar region with neurogenic claudication Yes    Other chronic pain     Bilateral leg pain     Muscle weakness        Physician: Christin Ventura,*    Visit Date: 10/21/2022    Physician Orders: Aquatic Therapy PT Eval and Treat  Medical Diagnosis from Referral: M54.17 (ICD-10-CM) - Lumbosacral radiculopathy    M47.819 (ICD-10-CM) - Spondylosis without myelopathy    M54.15 (ICD-10-CM) - Radiculopathy of thoracolumbar region    Evaluation Date: 8/5/2022  Authorization Period Expiration: 12/31/22  Plan of Care Expiration: 11/05/2022  Visit # / Visits authorized: 14/24 + Evaluation     Precautions: Standard and Fall    PTA Visit #: 0/5     Time In: 0855  Time Out: 1000  Total Billable Time: 65 minutes; 30 minutes with 1 on 1 supervision.    SUBJECTIVE     Pt reports: She continues to report pain worse in the mornings. She took medication for pain prior to arrival. She is also    She was compliant with home exercise program. She states that the stretches do help.     Response to previous treatment: No adverse reporting.    Functional change: No change    Pain: 7/10  Location: bilateral back , buttocks  and upper legs     OBJECTIVE     Objective Measures updated at progress report unless specified.     Treatment     Diana received aquatic therapeutic exercises to develop strength, endurance, ROM, flexibility, posture, and core stabilization for 65 minutes including:    FUNCTIONAL MOBILITY TRAINING x 2 laps each at beginning and 1 lap each at end of session  Walk forward/backward/lateral    STRETCHES 20 seconds ea  Calf  Hip adductor--NP  Hamstring  Quad--NP    LE EX x 1' on each  TITO-BUE support on handrails  Squat  Heel raise   Hip abduction/adduction  Hip flex  Hip ext  Hip marches  Hs curl    Walking High  knee marching --  // bars 2 laps  LAQ standing -- NP  Tandem walk // bars 1 lap    White pool stool- NP today  Sit to stand 2x10  Seated LAQ -NP    UE EX/CORE x 1' on each    Shoulder flex/ext TA activation paddles closed  Shoulder horizontal abd/add TA activation paddles closed  Shoulder abd/add TA activation paddles closed    Mini squat with push/pull red kick board 2 x1'     ENDURANCE  Bicycle in // bars 3'  LTR // 2'    Patient Education and Home Exercises     Home Exercises Provided and Patient Education Provided     Education provided:   Role of aquatic therapy  Hydration post therapy    Written Home Exercises Provided: Patient instructed to cont prior HEP. Exercises were reviewed and Diana was able to demonstrate them prior to the end of the session.  Diana demonstrated good  understanding of the education provided. See EMR under Patient Instructions for exercises provided during therapy sessions    ASSESSMENT     The patient tolerated the session well today with good execution and reduced VC required throughout the session today. Added tandem walks in // bars for dynamic balance.     Diana Is progressing well towards her goals.   Pt prognosis is Good.     Pt will continue to benefit from skilled outpatient physical therapy to address the deficits listed in the problem list box on initial evaluation, provide pt/family education and to maximize pt's level of independence in the home and community environment.     Pt's spiritual, cultural and educational needs considered and pt agreeable to plan of care and goals.     Anticipated barriers to physical therapy: None.     Goals:  Short Term Goals: 6 weeks   1. Patient will be independent in HEP & progressions.  2. Patient will achieve TUG score of 9 sec to demonstrate improved mobility  3. The patient will achieve 5 sit to stand score of 19 seconds with </= 3/10 RPE to demonstrate improved transfers and endurance.      Long Term Goals: 12 weeks   1. The patient  will demonstrate independence with extensive HEP.  2. Patient will achieve 5 sit to stand score of 16 seconds with </= 3/10 RPE to demonstrate improved transfers & endurance.  3. Patent is able to demonstrate MMT >/= 4/5 BLE without pain reports during testing.    4. Patient will report improved standing tolerance to > 20 minutes with minimal pain complaints for improved tolerance/participation in ADLs/iADLs.     PLAN     Continue POC with aquatic therapy 2x week. Progress exercises and HEP as appropriate.     Delano Goodrich, PT

## 2022-10-26 ENCOUNTER — CLINICAL SUPPORT (OUTPATIENT)
Dept: REHABILITATION | Facility: HOSPITAL | Age: 77
End: 2022-10-26
Payer: MEDICARE

## 2022-10-26 DIAGNOSIS — M48.062 SPINAL STENOSIS, LUMBAR REGION WITH NEUROGENIC CLAUDICATION: Primary | ICD-10-CM

## 2022-10-26 PROCEDURE — 97113 AQUATIC THERAPY/EXERCISES: CPT

## 2022-10-26 NOTE — PROGRESS NOTES
[facility]   Physical Therapy Treatment Note     Name: Diana Herring  Clinic Number: [id number]    Therapy Diagnosis:   Encounter Diagnosis   Name Primary?    Spinal stenosis, lumbar region with neurogenic claudication Yes       Physician: Christin Ventura,Claudio    Visit Date: 10/26/2022    Physician Orders: Aquatic Therapy PT Eval and Treat  Medical Diagnosis from Referral: M54.17 (ICD-10-CM) - Lumbosacral radiculopathy    M47.819 (ICD-10-CM) - Spondylosis without myelopathy    M54.15 (ICD-10-CM) - Radiculopathy of thoracolumbar region    Evaluation Date: 8/5/2022  Authorization Period Expiration: 12/31/22  Plan of Care Expiration: 11/05/2022  Visit # / Visits authorized: 14/24 + Evaluation     Precautions: Standard and Fall    PTA Visit #: 0/5     Time In: 8:30 am   Time Out: 9:35    Total Billable Time: 65 minutes; 30 minutes with 1 on 1 supervision.    SUBJECTIVE     Pt reports: she is hurting a lot today. She stated her pain level is a 7.5/10 upon initiation of the session.     She was compliant with home exercise program. She states that the stretches do help.     Response to previous treatment: No adverse reporting.    Functional change: No change    Pain: 7/10  Location: bilateral back , buttocks  and upper legs     OBJECTIVE     Objective Measures updated at progress report unless specified.     Treatment     Diana received aquatic therapeutic exercises to develop strength, endurance, ROM, flexibility, posture, and core stabilization for 65 minutes including:    FUNCTIONAL MOBILITY TRAINING x 2 laps each at beginning and 1 lap each at end of session  Walk forward/backward/lateral    STRETCHES 20 seconds ea  Calf  Hip adductor--NP  Hamstring  Quad--NP    LE EX x 1' on each  TITO-BUE support on handrails  Squat  Heel raise   Hip abduction/adduction  Hip flex  Hip ext  Hip marches  Hs curl    Walking High knee marching --  // bars 2 laps    Tandem walk // bars 1 lap    White  pool stool- NP today  Sit to stand 2x10    UE EX/CORE x 30 on each    Shoulder flex/ext TA activation paddles closed  Shoulder horizontal abd/add TA activation paddles closed  Shoulder abd/add TA activation paddles closed    Mini squat with push/pull red kick board     ENDURANCE  Bicycle in // bars 3'  LTR // 2'    Patient Education and Home Exercises     Home Exercises Provided and Patient Education Provided     Education provided:   Role of aquatic therapy  Hydration post therapy    Written Home Exercises Provided: Patient instructed to cont prior HEP. Exercises were reviewed and Diana was able to demonstrate them prior to the end of the session.  Diana demonstrated good  understanding of the education provided. See EMR under Patient Instructions for exercises provided during therapy sessions    ASSESSMENT     The patient reported reduction of pain to a 5-6/10 upon completion of the treatment today.     Diana Is progressing well towards her goals.   Pt prognosis is Good.     Pt will continue to benefit from skilled outpatient physical therapy to address the deficits listed in the problem list box on initial evaluation, provide pt/family education and to maximize pt's level of independence in the home and community environment.     Pt's spiritual, cultural and educational needs considered and pt agreeable to plan of care and goals.     Anticipated barriers to physical therapy: None.     Goals:  Short Term Goals: 6 weeks   1. Patient will be independent in HEP & progressions.  2. Patient will achieve TUG score of 9 sec to demonstrate improved mobility  3. The patient will achieve 5 sit to stand score of 19 seconds with </= 3/10 RPE to demonstrate improved transfers and endurance.      Long Term Goals: 12 weeks   1. The patient will demonstrate independence with extensive HEP.  2. Patient will achieve 5 sit to stand score of 16 seconds with </= 3/10 RPE to demonstrate improved transfers & endurance.  3. Patent is  able to demonstrate MMT >/= 4/5 BLE without pain reports during testing.    4. Patient will report improved standing tolerance to > 20 minutes with minimal pain complaints for improved tolerance/participation in ADLs/iADLs.     PLAN     Continue POC with aquatic therapy 2x week. Progress exercises and HEP as appropriate.     Bianca Almodovar, PT

## 2022-11-02 ENCOUNTER — CLINICAL SUPPORT (OUTPATIENT)
Dept: REHABILITATION | Facility: HOSPITAL | Age: 77
End: 2022-11-02
Payer: MEDICARE

## 2022-11-02 DIAGNOSIS — M48.062 SPINAL STENOSIS, LUMBAR REGION WITH NEUROGENIC CLAUDICATION: Primary | ICD-10-CM

## 2022-11-02 PROCEDURE — 97113 AQUATIC THERAPY/EXERCISES: CPT

## 2022-11-02 NOTE — PROGRESS NOTES
[facility]   Physical Therapy Treatment Note     Name: Diana Herring  Clinic Number: [id number]    Therapy Diagnosis:   Encounter Diagnosis   Name Primary?    Spinal stenosis, lumbar region with neurogenic claudication Yes       Physician: Christin Ventura,Claudio    Visit Date: 11/2/2022    Physician Orders: Aquatic Therapy PT Eval and Treat  Medical Diagnosis from Referral: M54.17 (ICD-10-CM) - Lumbosacral radiculopathy    M47.819 (ICD-10-CM) - Spondylosis without myelopathy    M54.15 (ICD-10-CM) - Radiculopathy of thoracolumbar region    Evaluation Date: 8/5/2022  Authorization Period Expiration: 12/31/22  Plan of Care Expiration: 11/05/2022  Visit # / Visits authorized: 16/24 + Evaluation     Precautions: Standard and Fall    PTA Visit #: 0/5     Time In: 8:30 am   Time Out: 9:40 am  Total Billable Time: 70 minutes;     SUBJECTIVE     Pt reports: she is feeling mostly stiffness this morning.    She was compliant with home exercise program. She states that the stretches do help.     Response to previous treatment: No adverse reporting.    Functional change: No change    Pain: 7/10  Location: bilateral back , buttocks  and upper legs     OBJECTIVE     Objective Measures updated at progress report unless specified.     Treatment     Diana received aquatic therapeutic exercises to develop strength, endurance, ROM, flexibility, posture, and core stabilization for 70 minutes including:    FUNCTIONAL MOBILITY TRAINING x 2 laps each at beginning and 1 lap each at end of session  Walk forward/backward/lateral    STRETCHES 20 seconds ea  Calf  Hip adductor--NP  Hamstring  Quad--NP    LE EX x 30 on each  TITO-BUE support on handrails  Squat  Heel raise   Hip abduction/adduction  Hip flex  Hip ext  Hip marches  Hs curl    Walking High knee marching --  // bars 2 laps    Tandem walk // bars 1 lap    White pool stool  Sit to stand 2x10    UE EX/CORE x 30 on each    Shoulder flex/ext TA activation  paddles closed  Shoulder horizontal abd/add TA activation paddles closed  Shoulder abd/add TA activation paddles closed    Mini squat with push/pull red kick board     ENDURANCE  Bicycle in // bars 3'  LTR // 2'    Patient Education and Home Exercises     Home Exercises Provided and Patient Education Provided     Education provided:   Role of aquatic therapy  Hydration post therapy    Written Home Exercises Provided: Patient instructed to cont prior HEP. Exercises were reviewed and Diana was able to demonstrate them prior to the end of the session.  Diana demonstrated good  understanding of the education provided. See EMR under Patient Instructions for exercises provided during therapy sessions    ASSESSMENT     The patient was able to re-add sit to stands today without increased pain reports. She improved on her ability to count exercises and performed exercises with good execution and posture.     Diana Is progressing well towards her goals.   Pt prognosis is Good.     Pt will continue to benefit from skilled outpatient physical therapy to address the deficits listed in the problem list box on initial evaluation, provide pt/family education and to maximize pt's level of independence in the home and community environment.     Pt's spiritual, cultural and educational needs considered and pt agreeable to plan of care and goals.     Anticipated barriers to physical therapy: None.     Goals:  Short Term Goals: 6 weeks   1. Patient will be independent in HEP & progressions.  2. Patient will achieve TUG score of 9 sec to demonstrate improved mobility  3. The patient will achieve 5 sit to stand score of 19 seconds with </= 3/10 RPE to demonstrate improved transfers and endurance.      Long Term Goals: 12 weeks   1. The patient will demonstrate independence with extensive HEP.  2. Patient will achieve 5 sit to stand score of 16 seconds with </= 3/10 RPE to demonstrate improved transfers & endurance.  3. Patent is able  to demonstrate MMT >/= 4/5 BLE without pain reports during testing.    4. Patient will report improved standing tolerance to > 20 minutes with minimal pain complaints for improved tolerance/participation in ADLs/iADLs.     PLAN     Continue POC with aquatic therapy 2x week. Progress exercises and HEP as appropriate.     Bianca Almodovar, PT

## 2022-11-04 ENCOUNTER — CLINICAL SUPPORT (OUTPATIENT)
Dept: REHABILITATION | Facility: HOSPITAL | Age: 77
End: 2022-11-04
Payer: MEDICARE

## 2022-11-04 DIAGNOSIS — M48.062 SPINAL STENOSIS, LUMBAR REGION WITH NEUROGENIC CLAUDICATION: Primary | ICD-10-CM

## 2022-11-04 PROCEDURE — 97113 AQUATIC THERAPY/EXERCISES: CPT | Mod: CQ

## 2022-11-04 NOTE — PROGRESS NOTES
[facility]   Physical Therapy Treatment Note     Name: Diana Herring  Clinic Number: [id number]    Therapy Diagnosis:   Encounter Diagnosis   Name Primary?    Spinal stenosis, lumbar region with neurogenic claudication Yes       Physician: Christin Ventura,Claudio    Visit Date: 11/4/2022    Physician Orders: Aquatic Therapy PT Eval and Treat  Medical Diagnosis from Referral: M54.17 (ICD-10-CM) - Lumbosacral radiculopathy    M47.819 (ICD-10-CM) - Spondylosis without myelopathy    M54.15 (ICD-10-CM) - Radiculopathy of thoracolumbar region    Evaluation Date: 8/5/2022  Authorization Period Expiration: 12/31/22  Plan of Care Expiration: 11/05/2022  Visit # / Visits authorized: 17/24 + Evaluation     Precautions: Standard and Fall    PTA Visit #: 1/5     Time In: 8:35 am   Time Out: 9:30 am  Total Billable Time: 45 minutes    SUBJECTIVE     Pt reports: she was stiff and painful upon arrival but is feeling less pain after getting in the pool    She was compliant with home exercise program. She states that the stretches do help.     Response to previous treatment: No adverse reporting.    Functional change: No change    Pain: 7/10  Location: bilateral back , buttocks  and upper legs     OBJECTIVE     Objective Measures updated at progress report unless specified.     Treatment     Diana received aquatic therapeutic exercises to develop strength, endurance, ROM, flexibility, posture, and core stabilization for 60 minutes including:    FUNCTIONAL MOBILITY TRAINING x 2 laps each at beginning and 1 lap each at end of session  Walk forward/backward/lateral    STRETCHES 20 seconds ea  Calf  Hip adductor--NP  Hamstring  Quad--NP    LE EX x 30 on each  TITO-BUE support on handrails  Squat  Heel raise   Hip abduction/adduction  Hip flex  Hip ext  Hip marches  Hs curl    Walking High knee marching --  // bars 2 laps    Tandem walk // bars 1 lap    White pool stool  Sit to stand 2x10    UE EX/CORE x 30  on each    Shoulder flex/ext TA activation paddles closed  Shoulder horizontal abd/add TA activation paddles closed  Shoulder abd/add TA activation paddles closed    Mini squat with push/pull red kick board     ENDURANCE  Bicycle in // bars 3'  LTR // 2'    Patient Education and Home Exercises     Home Exercises Provided and Patient Education Provided     Education provided:   Role of aquatic therapy  Hydration post therapy    Written Home Exercises Provided: Patient instructed to cont prior HEP. Exercises were reviewed and Diana was able to demonstrate them prior to the end of the session.  Diana demonstrated good  understanding of the education provided. See EMR under Patient Instructions for exercises provided during therapy sessions    ASSESSMENT     The patient tolerated all treatment well today with reports of decreasing pain throughout session. She continues to require cues for correct glute activation with squats and hip abdcution.    Diana Is progressing well towards her goals.   Pt prognosis is Good.     Pt will continue to benefit from skilled outpatient physical therapy to address the deficits listed in the problem list box on initial evaluation, provide pt/family education and to maximize pt's level of independence in the home and community environment.     Pt's spiritual, cultural and educational needs considered and pt agreeable to plan of care and goals.     Anticipated barriers to physical therapy: None.     Goals:  Short Term Goals: 6 weeks   1. Patient will be independent in HEP & progressions.  2. Patient will achieve TUG score of 9 sec to demonstrate improved mobility  3. The patient will achieve 5 sit to stand score of 19 seconds with </= 3/10 RPE to demonstrate improved transfers and endurance.      Long Term Goals: 12 weeks   1. The patient will demonstrate independence with extensive HEP.  2. Patient will achieve 5 sit to stand score of 16 seconds with </= 3/10 RPE to demonstrate improved  transfers & endurance.  3. Patent is able to demonstrate MMT >/= 4/5 BLE without pain reports during testing.    4. Patient will report improved standing tolerance to > 20 minutes with minimal pain complaints for improved tolerance/participation in ADLs/iADLs.     PLAN     Continue POC with aquatic therapy 2x week. Progress exercises and HEP as appropriate.     ANA BARRERA, PTA

## 2022-11-09 ENCOUNTER — CLINICAL SUPPORT (OUTPATIENT)
Dept: REHABILITATION | Facility: HOSPITAL | Age: 77
End: 2022-11-09
Payer: MEDICARE

## 2022-11-09 DIAGNOSIS — M48.062 SPINAL STENOSIS, LUMBAR REGION WITH NEUROGENIC CLAUDICATION: Primary | ICD-10-CM

## 2022-11-09 PROCEDURE — 97113 AQUATIC THERAPY/EXERCISES: CPT

## 2022-11-10 ENCOUNTER — TELEPHONE (OUTPATIENT)
Dept: PAIN MEDICINE | Facility: CLINIC | Age: 77
End: 2022-11-10
Payer: MEDICARE

## 2022-11-10 NOTE — PLAN OF CARE
[facility]  Physical Therapy Plan of Care Note    Name: Diana Herring  Clinic Number: [id number]    Therapy Diagnosis:   Encounter Diagnosis   Name Primary?    Spinal stenosis, lumbar region with neurogenic claudication Yes     Physician: Christin Ventura,*    Visit Date: 11/9/2022    Physician Orders: Aquatic Therapy PT Eval and Treat  Medical Diagnosis from Referral: M54.17 (ICD-10-CM) - Lumbosacral radiculopathy    M47.819 (ICD-10-CM) - Spondylosis without myelopathy    M54.15 (ICD-10-CM) - Radiculopathy of thoracolumbar region    Evaluation Date: 8/5/2022  Authorization Period Expiration: 12/31/22  Plan of Care Expiration: 12/9/22  Visit # / Visits authorized: 18/24 + Evaluation     Precautions: Standard and Fall    PTA Visit #: 0/5     Time In: 8:35 am   Time Out: 9:30 am   Total Billable Time: 20 minutes    SUBJECTIVE     Update: She overall feels better than when she started with therapy.She continues to feeel pain with prolonged sitting greater than 30 minites and standng greater than 10 miutes if she is moving she feels better.      Pain:  Current 5-6/10, worst 8/10, best 3-4/10   Location: bilateral back , buttocks , lower legs and upper legs   Description: Aching, Tight and Tingling  Aggravating Factors: Sitting, Standing in line for too long, Morning and prolonged positions  Easing Factors: pain medication, injection and OTC topical analgesics     OBJECTIVE     Updated 11/9/22    TUG: 10 sec without AD     5 Times Sit to Stand: 21 sec using UE assist on thighs.     Lower Extremity Strength  Right LE   Left LE     Knee extension: 4+/5 Knee extension: 4+/5   Knee flexion: 4+/5 Knee flexion: 4+/5   Hip flexion: 4/5 Hip flexion: 4/5   Hip abduction: 4+/5 Hip abduction: 4+/5   Ankle dorsiflexion: 4+/5 Ankle dorsiflexion: 4+/5   Ankle plantarflexion: 4+/5 Ankle plantarflexion: 4+/5     TREATMENT     Diana received aquatic therapeutic exercises to develop strength,  endurance, ROM, flexibility, posture, and core stabilization for 60 minutes including:    FUNCTIONAL MOBILITY TRAINING x 2 laps each at beginning and 1 lap each at end of session  Walk forward/backward/lateral    STRETCHES 20 seconds ea  Calf  Hip adductor--NP  Hamstring  Quad--NP    LE EX x 30 on each  TITO-BUE support on handrails  Squat  Heel raise   Hip abduction/adduction  Hip flex  Hip ext  Hip marches  Hs curl    Walking High knee marching --  // bars 2 laps    Tandem walk // bars 1 lap    White pool stool  Sit to stand 2x10    UE EX/CORE x 30 on each    Shoulder flex/ext TA activation paddles closed  Shoulder horizontal abd/add TA activation paddles closed  Shoulder abd/add TA activation paddles closed    Mini squat with push/pull red kick board     ENDURANCE  Bicycle in // bars 3'  LTR // 2'      ASSESSMENT     Update:   The patient has attended 18 PT visits since the start of care. Subjectively she reports she is moving better since starting PT and slightly improved pain levels. Objectively she has slightly improved strength B LE, TUG and 5 times sit to stand since her initial evaluation. She has tolerated the exercise progressions well and could benefit from additional skilled PT interventions to improve function and reduce pain.    Previous Short Term Goals Status:     Short Term Goals: 6 weeks   1. Patient will be independent in HEP & progressions.--Met as of 11/9/22  2. Patient will achieve TUG score of 9 sec to demonstrate improved mobility--Ongoing as of 11/9/22  3. The patient will achieve 5 sit to stand score of 19 seconds with </= 3/10 RPE to demonstrate improved transfers and endurance. --Ongoing as of 11/9/22     Long Term Goals: 12 weeks   1. The patient will demonstrate independence with extensive HEP.  2. Patient will achieve 5 sit to stand score of 16 seconds with </= 3/10 RPE to demonstrate improved transfers & endurance.  3. Patent is able to demonstrate MMT >/= 4/5 BLE without pain reports  during testing.    4. Patient will report improved standing tolerance to > 20 minutes with minimal pain complaints for improved tolerance/participation in ADLs/iADLs.       Long Term Goal Status: continue per initial plan of care.    Reasons for Recertification of Therapy:   Pt's POC     PLAN     Updated Certification Period: 22 to 22  Recommended Treatment Plan: 2 times per week for 6 weeks:  Aquatic Therapy    Bianca Almodovar, PT    I CERTIFY THE NEED FOR THESE SERVICES FURNISHED UNDER THIS PLAN OF TREATMENT AND WHILE UNDER MY CARE  Physician's comments:      Physician's Signature: ___________________________________________________

## 2022-11-10 NOTE — TELEPHONE ENCOUNTER
This message is for patient in regards to his/her appointment 11/11/22 at 8:00 am   With Christin GUTIERREZ.      [facility] Policy: For the safety of all patients and staff members.   During this visit we're informing all patients and visitors to wear face mask at all time here at [facility].  If you have any questions or concerns please contact [phone]

## 2022-11-11 ENCOUNTER — TELEPHONE (OUTPATIENT)
Dept: PAIN MEDICINE | Facility: OTHER | Age: 77
End: 2022-11-11
Payer: MEDICARE

## 2022-11-11 ENCOUNTER — OFFICE VISIT (OUTPATIENT)
Dept: PAIN MEDICINE | Facility: CLINIC | Age: 77
End: 2022-11-11
Payer: MEDICARE

## 2022-11-11 VITALS
HEART RATE: 65 BPM | HEIGHT: 61 IN | SYSTOLIC BLOOD PRESSURE: 151 MMHG | BODY MASS INDEX: 27.47 KG/M2 | DIASTOLIC BLOOD PRESSURE: 78 MMHG | WEIGHT: 145.5 LBS

## 2022-11-11 DIAGNOSIS — M54.15 RADICULOPATHY OF THORACOLUMBAR REGION: ICD-10-CM

## 2022-11-11 DIAGNOSIS — M79.604 BILATERAL LEG PAIN: ICD-10-CM

## 2022-11-11 DIAGNOSIS — M51.36 DDD (DEGENERATIVE DISC DISEASE), LUMBAR: ICD-10-CM

## 2022-11-11 DIAGNOSIS — M54.17 LUMBOSACRAL RADICULOPATHY: Primary | ICD-10-CM

## 2022-11-11 DIAGNOSIS — M79.605 BILATERAL LEG PAIN: ICD-10-CM

## 2022-11-11 DIAGNOSIS — M48.062 SPINAL STENOSIS, LUMBAR REGION WITH NEUROGENIC CLAUDICATION: ICD-10-CM

## 2022-11-11 DIAGNOSIS — M47.816 LUMBAR SPONDYLOSIS: ICD-10-CM

## 2022-11-11 PROCEDURE — 1125F PR PAIN SEVERITY QUANTIFIED, PAIN PRESENT: ICD-10-PCS | Mod: CPTII,S$GLB,, | Performed by: NURSE PRACTITIONER

## 2022-11-11 PROCEDURE — 1101F PT FALLS ASSESS-DOCD LE1/YR: CPT | Mod: CPTII,S$GLB,, | Performed by: NURSE PRACTITIONER

## 2022-11-11 PROCEDURE — 99214 PR OFFICE/OUTPT VISIT, EST, LEVL IV, 30-39 MIN: ICD-10-PCS | Mod: S$GLB,,, | Performed by: NURSE PRACTITIONER

## 2022-11-11 PROCEDURE — 1159F PR MEDICATION LIST DOCUMENTED IN MEDICAL RECORD: ICD-10-PCS | Mod: CPTII,S$GLB,, | Performed by: NURSE PRACTITIONER

## 2022-11-11 PROCEDURE — 99214 OFFICE O/P EST MOD 30 MIN: CPT | Mod: S$GLB,,, | Performed by: NURSE PRACTITIONER

## 2022-11-11 PROCEDURE — 99999 PR PBB SHADOW E&M-EST. PATIENT-LVL III: CPT | Mod: PBBFAC,,, | Performed by: NURSE PRACTITIONER

## 2022-11-11 PROCEDURE — 3078F PR MOST RECENT DIASTOLIC BLOOD PRESSURE < 80 MM HG: ICD-10-PCS | Mod: CPTII,S$GLB,, | Performed by: NURSE PRACTITIONER

## 2022-11-11 PROCEDURE — 1125F AMNT PAIN NOTED PAIN PRSNT: CPT | Mod: CPTII,S$GLB,, | Performed by: NURSE PRACTITIONER

## 2022-11-11 PROCEDURE — 99999 PR PBB SHADOW E&M-EST. PATIENT-LVL III: ICD-10-PCS | Mod: PBBFAC,,, | Performed by: NURSE PRACTITIONER

## 2022-11-11 PROCEDURE — 3288F PR FALLS RISK ASSESSMENT DOCUMENTED: ICD-10-PCS | Mod: CPTII,S$GLB,, | Performed by: NURSE PRACTITIONER

## 2022-11-11 PROCEDURE — 3077F SYST BP >= 140 MM HG: CPT | Mod: CPTII,S$GLB,, | Performed by: NURSE PRACTITIONER

## 2022-11-11 PROCEDURE — 1159F MED LIST DOCD IN RCRD: CPT | Mod: CPTII,S$GLB,, | Performed by: NURSE PRACTITIONER

## 2022-11-11 PROCEDURE — 3288F FALL RISK ASSESSMENT DOCD: CPT | Mod: CPTII,S$GLB,, | Performed by: NURSE PRACTITIONER

## 2022-11-11 PROCEDURE — 3077F PR MOST RECENT SYSTOLIC BLOOD PRESSURE >= 140 MM HG: ICD-10-PCS | Mod: CPTII,S$GLB,, | Performed by: NURSE PRACTITIONER

## 2022-11-11 PROCEDURE — 3078F DIAST BP <80 MM HG: CPT | Mod: CPTII,S$GLB,, | Performed by: NURSE PRACTITIONER

## 2022-11-11 PROCEDURE — 1101F PR PT FALLS ASSESS DOC 0-1 FALLS W/OUT INJ PAST YR: ICD-10-PCS | Mod: CPTII,S$GLB,, | Performed by: NURSE PRACTITIONER

## 2022-11-11 RX ORDER — GABAPENTIN 300 MG/1
600 CAPSULE ORAL 2 TIMES DAILY
Qty: 360 CAPSULE | Refills: 1 | Status: SHIPPED | OUTPATIENT
Start: 2022-11-11 | End: 2023-05-05 | Stop reason: SDUPTHER

## 2022-11-11 RX ORDER — DICLOFENAC SODIUM 75 MG/1
75 TABLET, DELAYED RELEASE ORAL 2 TIMES DAILY PRN
Qty: 180 TABLET | Refills: 1 | Status: SHIPPED | OUTPATIENT
Start: 2022-11-11 | End: 2023-01-24

## 2022-11-11 NOTE — PROGRESS NOTES
Chronic patient Established Note (Follow up visit)      SUBJECTIVE:    Interval History 11/11/2022:  The patient returns today for follow up of back and leg pain. She has been in aquatic PT which she finds helpful. She attends twice weekly. Her back pain has improved somewhat because of this. She does have worsened right leg pain with walking and activity. The pain radiates down the front and side of the right leg with associated numbness. No current radiation on the left. She had benefit with ESIs in the past and wishes to repeat. Her pain today is 7/10.    Interval History 8/11/2022:  The patient is here for follow up of lower back pain. Since previous encounter, she underwent repeat L3/4 TF SANKET with 70% relief. We had originally planned for MBB but she started having more shooting pain into the legs. She feels like this was helpful. Her pain is tolerable at this time. Her pain today is 7/10.    Interval History 5/9/2022:  The patient is here for follow up of chronic back pain. She is having more back pain and stiffness in the morning. We did previously discuss lumbar MBB but she is worried about not having IV sedation. She says that she would like to further discuss the procedure today. Leg pain has been mild since previous SANKET. She does have intermittent numbness still. Back pain is greater than leg pain. Her pain today is 7/10.    Interval History 4/7/2022:  The patient is here for follow up of lower back and leg pain. Since previous encounter, she underwent bilateral L3/4 TF SANKET on 1/26/22. She reports 80% relief of pain for about 2 months. She feels like this gave her significant benefit during that time and her pain was mild. She is now again having severe back pain with radiation into the anterior thighs. She would like to repeat the procedure. She would also like me to place another referral for aquatic PT. She stopped Mobic because she found Diclofenac more helpful. She has been taking as needed but not  daily. She does find Gabapentin helpful but it sometimes makes her drowsy. Her pain today is 9/10.    Interval History 1/11/2022:  The patient returns to discuss continued lower back pain. She has not restarted aquatic PT due to increase in Covid-19 cases. She continues with sharp lower back pain that radiates into anterior thighs, bilaterally. She has associated numbness. The pain is worse with increased activity. Her pain today is 7/10.    Interval History 11/11/2021:  The patient presents today for 2 month follow up of lower back pain. Since previous encounter, she has not started aquatic PT because she is on the waiting list. She continues to report lower back pain with radiation down the left leg. We previously discussed lumbar MBB/RFA which she does not wish to pursue at this time. She states that diclofenac is not helping very much with her aching pain at this time. She denies any new weakness or symptoms at this time. Her pain today is 8/10.    Interval History 8/19/2021:  Diana returns today for follow up of chronic lower back pain. She reports that he has continued with aching pain. She has been in PT but is not finding it very helpful. She has not tried aquatic PT in the past. She is still having intermittent radiation down her legs. She has been unable to take 1200 mg daily of Gabapentin and has decreased to 600 mg daily due to sedation and dizziness with the medication. Otherwise, no new complaints today. Her pain today is 7/10.    Interval History 6/18/2021:  The patient is here for follow up of lower back pain.  She has radiation of her pain into her anterior thighs but not below the knees.  She denies numbness or tingling at this time.  Her back pain is currently greater than her leg pain.  She has a lot of stiffness when she wakes the morning states it improves when she moves.  She had limited benefit with recent repeat bilateral L3-4 transforaminal steroid injection.  Her pain today is  7/10    Interval History 5/4/2021:  The patient is here for follow up of lower back and leg pain.  Previous encounter, physical therapy was ordered.  She states that she did not start physical therapy and would like me to replace the order for her.  She does report that she is noticing more muscle fatigue and weakness particularly with activity.  She is also having more back pain with radiation into the anterior lateral thighs with the past month.  She previously had significant been hip with bilateral L3/4 transforaminal epidural steroid injection and wishes to repeat this.  She found this more helpful than previous procedures. Her pain today is 7/10.    Interval History 3/2/2021:  The patient is here for follow up of chronic pain. She is having more back pain today which she attributes to the cold front coming in. She is having radiation into the buttocks and right anterior thigh. Her back pain is her primary complaint today. She describes it as throbbing. She recently completed both Covid-19 vaccines which she tolerated well. She is now taking Gabapentin 600 mg twice daily regularly. She notices improvement in right leg numbness.  Her pain today is 7/10.    Interval History 12/1/2020:  The patient iss here for follow-up of back and leg pain.  She is now status post bilateral L3/4 transforaminal epidural steroid injection on 11/11/2020. She is reporting 100% relief for about 2 weeks and then her pain started to return.  She is still having some benefit.  Her pain is located across the lower back with radiation into front and sides of the legs to the anterior feet with associated numbness.  She feels like when she walks sometimes her legs become weak.  This has improved slightly.  She denies any recent falls.  At her last OV, her Gabapentin was increased to 600 mg BID.  She says that she finds this helpful.  She has mild drowsiness.  She admits that she does not always take the medication and thought it was more of  an as needed medication.  The patient denies any bowel or bladder incontinence or signs of saddle paresthesia.  She feels as though her pain today is worse than usual.  She rates her pain today as 10/10.    Interval history 10/15/2020:  Diana Herring presents to the clinic for a follow-up appointment for back pain. Since the last visit, Diana Herring states the pain has been worsening. Current pain intensity is 7/10. She reports that she continues to have low back pain that radiates to the bilateral hip and leg. She does report that she has noticed intermittent numbness in the right lateral and anterior thigh, that is sometimes associated with weakness in her right leg. She says that her leg gives out when this happens. She has not had any falls or other trauma. She did not get the bilateral L3/4 TFESI planned last time, and she is still taking only 300 mg bid of gabapentin.  Patient denies urinary incontinence, bowel incontinence, significant weight loss.    Initial visit:  Diana Herring presents to the clinic for the evaluation of low back pain. The pain started 2 years ago following loosing bone after a bone density and symptoms have been worsening.The pain is located in the low back area and radiates to the bilateral hip and leg.  The pain is described as aching, numbing, sharp and tight band and is rated as 8/10. The pain is rated with a score of  7/10 on the BEST day and a score of 8/10 on the WORST day.  Symptoms interfere with daily activity and sleeping. The pain is exacerbated by Sitting, Standing, Bending, Coughing/Sneezing, Walking, Morning, Lifting and Getting out of bed/chair.  The pain is mitigated by heat, laying down and rest. She reports spending 0 hours per day reclining. The patient reports 6 hours of uninterrupted sleep per night.     Patient had L5/S1 ILESI 2 weeks ago and only gave her 1 day of relief.      Patient denies night fever/night sweats, urinary incontinence, bowel  incontinence, significant weight loss and significant motor weakness.     Physical Therapy/Home Exercise: yes, was not beneficial      Pain Disability Index Review:  Last 3 PDI Scores 11/11/2022 8/11/2022 5/9/2022   Pain Disability Index (PDI) [phone]       Pain Medications:  Gabapentin 600 mg bid    Opioid Contract: no     report:  Reviewed and consistent with medication use as prescribed.    Pain Procedures:  7/20/22 Bilateral L3/4 TF SANKET- 70% relief  4/20/22 Bilateral L3/4 TF SANKET- 75% relief of leg pain  1/26/22 Bilateral L3/4 TF SANKET  5/19/21 bilateral L3/4 TF SANKET  11/11/20 Bilateral L3/4 TF SANKET- 100% relief for 2 weeks  5/13/20 L5/S1 ILESI  10/30/19 TFESI Bilateral L3-L4  09/04/19 TFESI Bilateral L3     Physical Therapy/Home Exercise: yes    Imaging:  Narrative & Impression     EXAMINATION:  XR LUMBAR SPINE 5 VIEW WITH FLEX AND EXT     CLINICAL HISTORY:  Low back pain, >6wks conservative tx, persistent-progressive sx, surgical candidate;  Lumbago with sciatica, left side     TECHNIQUE:  Five views of the lumbar spine plus flexion extension views were performed.     COMPARISON:  07/23/2019     FINDINGS:  The alignment of the lumbar spine is normal.     The vertebral body height are well maintained.  Mild disc space narrowing L3-L4.     Flexion and extension views demonstrate no evidence of translational abnormalities.     Minimal osteophyte formation L2-L3 L4.     No fracture or osseous lesions.     The sacroiliac joints appears symmetrical on the AP view.     The remainder of the visualized soft tissue and osseous structures appear normal.     There is atherosclerotic plaque of the abdominal aorta.     Impression:     Mild spondylosis of the lumbar spine, not significantly changed from the prior study.        Electronically signed by: Ava Brennan MD  Date:                                            05/29/2020  Time:                                           08:28        Narrative & Impression      EXAMINATION:  XR HIPS BILATERAL 2 VIEW INCL AP PELVIS     CLINICAL HISTORY:  Lumbago with sciatica, left side     TECHNIQUE:  AP view of the pelvis and frogleg lateral views of both hips were performed.     COMPARISON:  None.     FINDINGS:  Normal mineralization.  Pelvis demonstrates no acute fractures.  No dislocations are noted.  Hip joints appear to be symmetric and within normal limits with mild DJD of the hip joints.  There is no osteoblastic or lytic lesions.  Soft tissues are unremarkable.     SI joints are symmetric and within normal limits.  There is mild spondylotic changes in the lower lumbar spine.     There is moderate amount of retained stool in the right colon.     Impression:     Mild DJD of the hip joints.     1. No acute fractures.        Electronically signed by: David Vasquez MD  Date:                                            05/29/2020  Time:                                           08:28     Narrative & Impression     EXAMINATION:  MRI LUMBAR SPINE WITHOUT CONTRAST     CLINICAL HISTORY:  back and bilateral leg pain; Lumbago with sciatica, left side     TECHNIQUE:  Multiplanar, multisequence MR images were acquired from the thoracolumbar junction to the sacrum without the administration of contrast.     COMPARISON:  None.     FINDINGS:  MRI examination of the lumbar spine was performed.  Intravenous contrast was not utilized.  There are areas of hyperintense T2 hypointense T1 lesions of the kidneys likely cysts not optimally evaluated on this exam.     There is multilevel degenerative loss of disc signal and there is loss of disc space height most notable at L3-4.  There are chronic appearing endplate changes noted with some mild STIR hyperintensity that likely relates to edema associated with degenerative endplate change.  There is no evidence for high-grade spondylolisthesis, there is no evidence for high-grade or acute compression fracture deformity.  There is no finding to specifically  suggest aggressive bone marrow replacement process.     The sagittal imaging includes the majority of T10 through the S3 segment, axial imaging includes the inferior aspect of T12 through the majority of S1.  On the sagittal imaging there is appearance suggesting mild degenerative disc disease and disc bulge at T10-11 and T11-12 with some mild posterior ligamentous thickening suggested at T10-11 however without evidence for high-grade spinal canal stenosis.     The T12-L1 level demonstrates no evidence for high-grade spinal canal or foraminal stenosis.     The L1-2 level demonstrates degenerative disc bulge with anterior impression upon the dural sac.  There is posterior facet arthropathy and ligamentous thickening there is mild spinal canal stenosis.  There is encroachment into the neural foramen at the level of the disc plane without obvious exiting nerve root impingement.     The L2-3 level demonstrates mild degenerative loss of disc space height.  There is mild degenerative disc bulge with anterior impression upon the dural sac.  There is posterior facet arthropathy with somewhat prominent ligamentous thickening with associated posterolateral encroachment.  There is mild-to-moderate spinal canal stenosis.  There is bilateral foraminal narrowing.     The L3-4 level demonstrates the aforementioned appearance of loss of disc space height and degenerative loss of disc signal.  There is diffuse degenerative disc bulge with anterior impression upon the dural sac there is posterior facet arthropathy and ligamentous thickening there is high-grade spinal canal stenosis.  There is bilateral foraminal stenosis.     The L4-5 level demonstrates degenerative disc bulge with anterior impression upon the dural sac there is posterior facet arthropathy and ligamentous thickening with mild to moderate spinal canal stenosis.  There is bilateral foraminal narrowing/stenosis without obvious exiting nerve root impingement.     The  L5-S1 level demonstrates no evidence for high-grade spinal canal stenosis.  Facet arthropathy is noted there is no evidence for high-grade foraminal stenosis or exiting nerve root impingement.     The visualized spinal cord appears to taper appropriately and appears of appropriate signal and caliber.  Mild signal intensity within the spinal canal on T1 axial imaging may relate to a minimal lipoma along the filum terminalis.  There is no additional evidence for intraspinal mass or abnormal fluid collection     Impression:     Multilevel chronic change of the lumbar spine with variable spinal canal and foraminal narrowing/stenosis as detailed above.  Correlation for any specific level of symptomatology is needed.  The greatest level of spinal canal stenosis is seen at L3-4.        Electronically signed by: Obie Aggarwal  Date:                                            07/27/2019  Time:                                           03:45     CMP  Sodium   Date Value Ref Range Status   03/04/2022 140 136 - 145 mmol/L Final     Potassium   Date Value Ref Range Status   03/04/2022 3.6 3.5 - 5.1 mmol/L Final     Chloride   Date Value Ref Range Status   03/04/2022 103 95 - 110 mmol/L Final     CO2   Date Value Ref Range Status   03/04/2022 28 23 - 29 mmol/L Final     Glucose   Date Value Ref Range Status   03/04/2022 101 70 - 110 mg/dL Final     BUN   Date Value Ref Range Status   03/04/2022 14 8 - 23 mg/dL Final     Creatinine   Date Value Ref Range Status   03/04/2022 0.8 0.5 - 1.4 mg/dL Final     Calcium   Date Value Ref Range Status   03/04/2022 9.4 8.7 - 10.5 mg/dL Final     Total Protein   Date Value Ref Range Status   03/04/2022 7.9 6.0 - 8.4 g/dL Final     Albumin   Date Value Ref Range Status   03/04/2022 4.1 3.5 - 5.2 g/dL Final     Total Bilirubin   Date Value Ref Range Status   03/04/2022 0.8 0.1 - 1.0 mg/dL Final     Comment:     For infants and newborns, interpretation of results should be based  on gestational  age, weight and in agreement with clinical  observations.    Premature Infant recommended reference ranges:  Up to 24 hours.............<8.0 mg/dL  Up to 48 hours............<12.0 mg/dL  3-5 days..................<15.0 mg/dL  6-29 days.................<15.0 mg/dL       Alkaline Phosphatase   Date Value Ref Range Status   03/04/2022 70 55 - 135 U/L Final     AST   Date Value Ref Range Status   03/04/2022 17 10 - 40 U/L Final     ALT   Date Value Ref Range Status   03/04/2022 20 10 - 44 U/L Final     Anion Gap   Date Value Ref Range Status   03/04/2022 9 8 - 16 mmol/L Final     eGFR if    Date Value Ref Range Status   03/04/2022 >60.0 >60 mL/min/1.73 m^2 Final     eGFR if non    Date Value Ref Range Status   03/04/2022 >60.0 >60 mL/min/1.73 m^2 Final     Comment:     Calculation used to obtain the estimated glomerular filtration  rate (eGFR) is the CKD-EPI equation.            Allergies: Review of patient's allergies indicates:  No Known Allergies    Current Medications:   Current Outpatient Medications   Medication Sig Dispense Refill    alendronate (FOSAMAX) 70 MG tablet TAKE 1 TABLET BY MOUTH ONE TIME PER WEEK 12 tablet 1    amLODIPine (NORVASC) 10 MG tablet TAKE 1 TABLET BY MOUTH EVERY DAY 90 tablet 3    atorvastatin (LIPITOR) 40 MG tablet TAKE 1 TABLET BY MOUTH EVERY DAY 90 tablet 2    calcium-vitamin D 500-125 mg-unit tablet Take 1 tablet by mouth once daily.      diclofenac (VOLTAREN) 75 MG EC tablet Take 1 tablet (75 mg total) by mouth 2 (two) times daily as needed (pain). 180 tablet 1    ergocalciferol (ERGOCALCIFEROL) 50,000 unit Cap Take 1 capsule (50,000 Units total) by mouth every Sunday. 12 capsule 0    fluticasone propionate (FLONASE) 50 mcg/actuation nasal spray 2 SPRAYS (100 MCG TOTAL) BY EACH NOSTRIL ROUTE ONCE DAILY. 48 mL 3    metoprolol succinate (TOPROL-XL) 100 MG 24 hr tablet TAKE 1 TABLET BY MOUTH EVERY DAY 90 tablet 1    multivitamin (THERAGRAN) per tablet Take  1 tablet by mouth once daily.      pantoprazole (PROTONIX) 40 MG tablet TAKE 1 TABLET BY MOUTH EVERY DAY 90 tablet 1    traZODone (DESYREL) 150 MG tablet TAKE 1 TABLET (150 MG TOTAL) BY MOUTH EVERY EVENING. 90 tablet 1    gabapentin (NEURONTIN) 300 MG capsule Take 2 capsules (600 mg total) by mouth 2 (two) times daily. 360 capsule 1    paroxetine (PAXIL) 20 MG tablet Take 1 tablet (20 mg total) by mouth every morning. 90 tablet 1     No current facility-administered medications for this visit.       REVIEW OF SYSTEMS:    GENERAL:  No weight loss, malaise or fevers.  HEENT:  Negative for frequent or significant headaches.  NECK:  Negative for lumps, goiter, pain and significant neck swelling.  RESPIRATORY:  Negative for cough, wheezing or shortness of breath.  CARDIOVASCULAR:  Negative for chest pain, leg swelling or palpitations.  GI:  Negative for abdominal discomfort, blood in stools or black stools or change in bowel habits. GERD.  MUSCULOSKELETAL:  See HPI.  SKIN:  Negative for lesions, rash, and itching.  PSYCH:  + for sleep disturbance, h/o depression  HEMATOLOGY/LYMPHOLOGY:  Negative for prolonged bleeding, bruising easily or swollen nodes.  NEURO:  + numbness, weakness. No history of headaches, syncope, paralysis, seizures or tremors.  All other reviewed and negative other than HPI.     Past Medical History:  Past Medical History:   Diagnosis Date    Anxiety     Arthritis     Corneal abrasion s    ? eye     Depression     Full dentures     GERD (gastroesophageal reflux disease)     Hyperlipidemia     Hypertension     Nuclear sclerosis of both eyes 6/5/2017    Osteoporosis     Restless leg syndrome        Past Surgical History:  Past Surgical History:   Procedure Laterality Date    COLONOSCOPY N/A 5/14/2019    Procedure: COLONOSCOPY;  Surgeon: Nahid Cline MD;  Location: [facility];  Service: Endoscopy;  Laterality: N/A;    EPIDURAL STEROID INJECTION N/A 5/13/2020    Procedure: LUMBAR/CAUDAL L5/S1 IL SANKET ;   Surgeon: Miguel Latham MD;  Location: [facility];  Service: Pain Management;  Laterality: N/A;  NEEDS CONSENT    HYSTERECTOMY      PARTIAL HYSTERECTOMY      TRANSFORAMINAL EPIDURAL INJECTION OF STEROID Bilateral 9/4/2019    Procedure: Injection,steroid,epidural,transforaminal approach LUMBAR TRANSFORAMINAL BILATERAL L3 TF SANKET;  Surgeon: Miguel Latham MD;  Location: [facility];  Service: Pain Management;  Laterality: Bilateral;  NEEDS CONSENT    TRANSFORAMINAL EPIDURAL INJECTION OF STEROID Bilateral 10/30/2019    Procedure: TRANSFORAMINAL SANKET BILATERAL L3-L4;  Surgeon: Miguel Latham MD;  Location: [facility];  Service: Pain Management;  Laterality: Bilateral;  NEEDS CONSENT    TRANSFORAMINAL EPIDURAL INJECTION OF STEROID Bilateral 11/11/2020    Procedure: INJECTION, STEROID, EPIDURAL, TRANSFORAMINAL APPROACH, L3-L4;  Surgeon: Miguel Latham MD;  Location: [facility];  Service: Pain Management;  Laterality: Bilateral;    TRANSFORAMINAL EPIDURAL INJECTION OF STEROID Bilateral 5/19/2021    Procedure: INJECTION, STEROID, EPIDURAL, TRANSFORAMINAL APPROACH, L3-L4;  Surgeon: Miguel Latham MD;  Location: [facility];  Service: Pain Management;  Laterality: Bilateral;    TRANSFORAMINAL EPIDURAL INJECTION OF STEROID Bilateral 1/26/2022    Procedure: Injection,steroid,epidural,transforaminal approach BILATERAL L3/4;  Surgeon: Miguel aLtham MD;  Location: [facility];  Service: Pain Management;  Laterality: Bilateral;    TRANSFORAMINAL EPIDURAL INJECTION OF STEROID Bilateral 4/20/2022    Procedure: INJECTION, STEROID, EPIDURAL, TRANSFORAMINAL APPROACH, Bilateral L3-L4;  Surgeon: Miguel Latham MD;  Location: [facility];  Service: Pain Management;  Laterality: Bilateral;    TRANSFORAMINAL EPIDURAL INJECTION OF STEROID Bilateral 7/20/2022    Procedure: INJECTION, STEROID, EPIDURAL, TRANSFORAMINAL APPROACH, BILATERAL L3-L4 CONTRAST;  Surgeon: Miguel Latham MD;  Location: [facility]  "PAIN [facility]GT;  Service: Pain Management;  Laterality: Bilateral;    TUBAL LIGATION Bilateral        Family History:  Family History   Problem Relation Age of Onset    Diabetes Mother     Stroke Mother     Glaucoma Mother     Cataracts Mother     Cancer Father         Lung    No Known Problems Sister     Stroke Brother     Hypertension Daughter     Hypertension Daughter     Hypertension Daughter     No Known Problems Daughter     Heart disease Son     Early death Son     No Known Problems Maternal Aunt     No Known Problems Maternal Uncle     No Known Problems Paternal Aunt     No Known Problems Paternal Uncle     No Known Problems Maternal Grandmother     No Known Problems Maternal Grandfather     No Known Problems Paternal Grandmother     No Known Problems Paternal Grandfather     Amblyopia Neg Hx     Blindness Neg Hx     Macular degeneration Neg Hx     Retinal detachment Neg Hx     Strabismus Neg Hx     Thyroid disease Neg Hx        Social History:  Social History     Socioeconomic History    Marital status:    Tobacco Use    Smoking status: Never    Smokeless tobacco: Never   Substance and Sexual Activity    Alcohol use: No    Drug use: No    Sexual activity: Not Currently   Social History Narrative    Patient is  w/ 5 children, one  from heart disease.The patient is retired.       OBJECTIVE:    BP (!) 151/78 (BP Location: Right arm, Patient Position: Sitting, BP Method: Medium (Automatic))   Pulse 65   Ht 5' 1" (1.549 m)   Wt 66 kg (145 lb 8.1 oz)   BMI 27.49 kg/m²     PHYSICAL EXAMINATION:    General appearance: Well appearing, in no acute distress, alert and oriented x3.  Psych:  Mood and affect appropriate.  Skin: Skin color, texture, turgor normal, no rashes or lesions, in both upper and lower body.  Head/face:  Normocephalic, atraumatic. No palpable lymph nodes.  Back: Straight leg raising in the sitting and supine positions causes back pain on the right. Limited range of motion with " pain on flexion and extension. Positive facet loading bilaterally.  Extremities: Peripheral joint ROM is full and pain free without obvious instability or laxity in all four extremities. No deformities, edema, or skin discoloration. Good capillary refill.  Musculoskeletal: No atrophy or tone abnormalities are noted. 5/5 strength in right ankle with plantar and 4/5 on dorsiflexion. 5/5 strength in left ankle with plantar and 4/5 on dorsiflexion. 4/5 strength with right knee flexion and extension. 5/5 strength with left knee flexion and extension. 5/5 strength in right EHL, 5/5 strength in left EHL. Right hip flexion is 4/5, left is 4/5.  Neuro: No loss of sensation noted.  Gait: Antalgic.    ASSESSMENT: 77 y.o. year old female with lower back pain, consistent with     1. Lumbosacral radiculopathy  Procedure Order to Pain Management      2. Lumbar spondylosis        3. Spinal stenosis, lumbar region with neurogenic claudication  gabapentin (NEURONTIN) 300 MG capsule    diclofenac (VOLTAREN) 75 MG EC tablet      4. DDD (degenerative disc disease), lumbar  gabapentin (NEURONTIN) 300 MG capsule    diclofenac (VOLTAREN) 75 MG EC tablet      5. Bilateral leg pain  gabapentin (NEURONTIN) 300 MG capsule    diclofenac (VOLTAREN) 75 MG EC tablet      6. Radiculopathy of thoracolumbar region  gabapentin (NEURONTIN) 300 MG capsule    diclofenac (VOLTAREN) 75 MG EC tablet              PLAN:     - Previous imaging was reviewed and discussed with the patient today.  - I have stressed the importance of physical activity and a home exercise plan to help with pain and improve health.  - Patient can continue with medications for now since they are providing benefits, using them appropriately, and without side effects.  - Continue Gabapentin 600 mg BID.  - Continue Voltaren BID PRN pain.  - Schedule for right L3/4 and L4/5 TF SANKET.  - Counseled patient regarding the importance of activity modification, constant sleeping habits and  physical therapy. Continue with aquatic PT.  - RTC 2 weeks after SANKET.    The above plan and management options were discussed at length with patient. Patient is in agreement with the above and verbalized understanding.    BRENDA Flowers  11/11/2022

## 2022-11-11 NOTE — H&P (VIEW-ONLY)
Chronic patient Established Note (Follow up visit)      SUBJECTIVE:    Interval History 11/11/2022:  The patient returns today for follow up of back and leg pain. She has been in aquatic PT which she finds helpful. She attends twice weekly. Her back pain has improved somewhat because of this. She does have worsened right leg pain with walking and activity. The pain radiates down the front and side of the right leg with associated numbness. No current radiation on the left. She had benefit with ESIs in the past and wishes to repeat. Her pain today is 7/10.    Interval History 8/11/2022:  The patient is here for follow up of lower back pain. Since previous encounter, she underwent repeat L3/4 TF SANKET with 70% relief. We had originally planned for MBB but she started having more shooting pain into the legs. She feels like this was helpful. Her pain is tolerable at this time. Her pain today is 7/10.    Interval History 5/9/2022:  The patient is here for follow up of chronic back pain. She is having more back pain and stiffness in the morning. We did previously discuss lumbar MBB but she is worried about not having IV sedation. She says that she would like to further discuss the procedure today. Leg pain has been mild since previous SANKET. She does have intermittent numbness still. Back pain is greater than leg pain. Her pain today is 7/10.    Interval History 4/7/2022:  The patient is here for follow up of lower back and leg pain. Since previous encounter, she underwent bilateral L3/4 TF SANKET on 1/26/22. She reports 80% relief of pain for about 2 months. She feels like this gave her significant benefit during that time and her pain was mild. She is now again having severe back pain with radiation into the anterior thighs. She would like to repeat the procedure. She would also like me to place another referral for aquatic PT. She stopped Mobic because she found Diclofenac more helpful. She has been taking as needed but not  daily. She does find Gabapentin helpful but it sometimes makes her drowsy. Her pain today is 9/10.    Interval History 1/11/2022:  The patient returns to discuss continued lower back pain. She has not restarted aquatic PT due to increase in Covid-19 cases. She continues with sharp lower back pain that radiates into anterior thighs, bilaterally. She has associated numbness. The pain is worse with increased activity. Her pain today is 7/10.    Interval History 11/11/2021:  The patient presents today for 2 month follow up of lower back pain. Since previous encounter, she has not started aquatic PT because she is on the waiting list. She continues to report lower back pain with radiation down the left leg. We previously discussed lumbar MBB/RFA which she does not wish to pursue at this time. She states that diclofenac is not helping very much with her aching pain at this time. She denies any new weakness or symptoms at this time. Her pain today is 8/10.    Interval History 8/19/2021:  Diana returns today for follow up of chronic lower back pain. She reports that he has continued with aching pain. She has been in PT but is not finding it very helpful. She has not tried aquatic PT in the past. She is still having intermittent radiation down her legs. She has been unable to take 1200 mg daily of Gabapentin and has decreased to 600 mg daily due to sedation and dizziness with the medication. Otherwise, no new complaints today. Her pain today is 7/10.    Interval History 6/18/2021:  The patient is here for follow up of lower back pain.  She has radiation of her pain into her anterior thighs but not below the knees.  She denies numbness or tingling at this time.  Her back pain is currently greater than her leg pain.  She has a lot of stiffness when she wakes the morning states it improves when she moves.  She had limited benefit with recent repeat bilateral L3-4 transforaminal steroid injection.  Her pain today is  7/10    Interval History 5/4/2021:  The patient is here for follow up of lower back and leg pain.  Previous encounter, physical therapy was ordered.  She states that she did not start physical therapy and would like me to replace the order for her.  She does report that she is noticing more muscle fatigue and weakness particularly with activity.  She is also having more back pain with radiation into the anterior lateral thighs with the past month.  She previously had significant been hip with bilateral L3/4 transforaminal epidural steroid injection and wishes to repeat this.  She found this more helpful than previous procedures. Her pain today is 7/10.    Interval History 3/2/2021:  The patient is here for follow up of chronic pain. She is having more back pain today which she attributes to the cold front coming in. She is having radiation into the buttocks and right anterior thigh. Her back pain is her primary complaint today. She describes it as throbbing. She recently completed both Covid-19 vaccines which she tolerated well. She is now taking Gabapentin 600 mg twice daily regularly. She notices improvement in right leg numbness.  Her pain today is 7/10.    Interval History 12/1/2020:  The patient iss here for follow-up of back and leg pain.  She is now status post bilateral L3/4 transforaminal epidural steroid injection on 11/11/2020. She is reporting 100% relief for about 2 weeks and then her pain started to return.  She is still having some benefit.  Her pain is located across the lower back with radiation into front and sides of the legs to the anterior feet with associated numbness.  She feels like when she walks sometimes her legs become weak.  This has improved slightly.  She denies any recent falls.  At her last OV, her Gabapentin was increased to 600 mg BID.  She says that she finds this helpful.  She has mild drowsiness.  She admits that she does not always take the medication and thought it was more of  an as needed medication.  The patient denies any bowel or bladder incontinence or signs of saddle paresthesia.  She feels as though her pain today is worse than usual.  She rates her pain today as 10/10.    Interval history 10/15/2020:  Diana Herring presents to the clinic for a follow-up appointment for back pain. Since the last visit, Diana Herring states the pain has been worsening. Current pain intensity is 7/10. She reports that she continues to have low back pain that radiates to the bilateral hip and leg. She does report that she has noticed intermittent numbness in the right lateral and anterior thigh, that is sometimes associated with weakness in her right leg. She says that her leg gives out when this happens. She has not had any falls or other trauma. She did not get the bilateral L3/4 TFESI planned last time, and she is still taking only 300 mg bid of gabapentin.  Patient denies urinary incontinence, bowel incontinence, significant weight loss.    Initial visit:  Diana Herring presents to the clinic for the evaluation of low back pain. The pain started 2 years ago following loosing bone after a bone density and symptoms have been worsening.The pain is located in the low back area and radiates to the bilateral hip and leg.  The pain is described as aching, numbing, sharp and tight band and is rated as 8/10. The pain is rated with a score of  7/10 on the BEST day and a score of 8/10 on the WORST day.  Symptoms interfere with daily activity and sleeping. The pain is exacerbated by Sitting, Standing, Bending, Coughing/Sneezing, Walking, Morning, Lifting and Getting out of bed/chair.  The pain is mitigated by heat, laying down and rest. She reports spending 0 hours per day reclining. The patient reports 6 hours of uninterrupted sleep per night.     Patient had L5/S1 ILESI 2 weeks ago and only gave her 1 day of relief.      Patient denies night fever/night sweats, urinary incontinence, bowel  incontinence, significant weight loss and significant motor weakness.     Physical Therapy/Home Exercise: yes, was not beneficial      Pain Disability Index Review:  Last 3 PDI Scores 11/11/2022 8/11/2022 5/9/2022   Pain Disability Index (PDI) [phone]       Pain Medications:  Gabapentin 600 mg bid    Opioid Contract: no     report:  Reviewed and consistent with medication use as prescribed.    Pain Procedures:  7/20/22 Bilateral L3/4 TF SANKET- 70% relief  4/20/22 Bilateral L3/4 TF SANKET- 75% relief of leg pain  1/26/22 Bilateral L3/4 TF SANKET  5/19/21 bilateral L3/4 TF SANKET  11/11/20 Bilateral L3/4 TF SANKET- 100% relief for 2 weeks  5/13/20 L5/S1 ILESI  10/30/19 TFESI Bilateral L3-L4  09/04/19 TFESI Bilateral L3     Physical Therapy/Home Exercise: yes    Imaging:  Narrative & Impression     EXAMINATION:  XR LUMBAR SPINE 5 VIEW WITH FLEX AND EXT     CLINICAL HISTORY:  Low back pain, >6wks conservative tx, persistent-progressive sx, surgical candidate;  Lumbago with sciatica, left side     TECHNIQUE:  Five views of the lumbar spine plus flexion extension views were performed.     COMPARISON:  07/23/2019     FINDINGS:  The alignment of the lumbar spine is normal.     The vertebral body height are well maintained.  Mild disc space narrowing L3-L4.     Flexion and extension views demonstrate no evidence of translational abnormalities.     Minimal osteophyte formation L2-L3 L4.     No fracture or osseous lesions.     The sacroiliac joints appears symmetrical on the AP view.     The remainder of the visualized soft tissue and osseous structures appear normal.     There is atherosclerotic plaque of the abdominal aorta.     Impression:     Mild spondylosis of the lumbar spine, not significantly changed from the prior study.        Electronically signed by: Ava Brennan MD  Date:                                            05/29/2020  Time:                                           08:28        Narrative & Impression      EXAMINATION:  XR HIPS BILATERAL 2 VIEW INCL AP PELVIS     CLINICAL HISTORY:  Lumbago with sciatica, left side     TECHNIQUE:  AP view of the pelvis and frogleg lateral views of both hips were performed.     COMPARISON:  None.     FINDINGS:  Normal mineralization.  Pelvis demonstrates no acute fractures.  No dislocations are noted.  Hip joints appear to be symmetric and within normal limits with mild DJD of the hip joints.  There is no osteoblastic or lytic lesions.  Soft tissues are unremarkable.     SI joints are symmetric and within normal limits.  There is mild spondylotic changes in the lower lumbar spine.     There is moderate amount of retained stool in the right colon.     Impression:     Mild DJD of the hip joints.     1. No acute fractures.        Electronically signed by: David Vasquez MD  Date:                                            05/29/2020  Time:                                           08:28     Narrative & Impression     EXAMINATION:  MRI LUMBAR SPINE WITHOUT CONTRAST     CLINICAL HISTORY:  back and bilateral leg pain; Lumbago with sciatica, left side     TECHNIQUE:  Multiplanar, multisequence MR images were acquired from the thoracolumbar junction to the sacrum without the administration of contrast.     COMPARISON:  None.     FINDINGS:  MRI examination of the lumbar spine was performed.  Intravenous contrast was not utilized.  There are areas of hyperintense T2 hypointense T1 lesions of the kidneys likely cysts not optimally evaluated on this exam.     There is multilevel degenerative loss of disc signal and there is loss of disc space height most notable at L3-4.  There are chronic appearing endplate changes noted with some mild STIR hyperintensity that likely relates to edema associated with degenerative endplate change.  There is no evidence for high-grade spondylolisthesis, there is no evidence for high-grade or acute compression fracture deformity.  There is no finding to specifically  suggest aggressive bone marrow replacement process.     The sagittal imaging includes the majority of T10 through the S3 segment, axial imaging includes the inferior aspect of T12 through the majority of S1.  On the sagittal imaging there is appearance suggesting mild degenerative disc disease and disc bulge at T10-11 and T11-12 with some mild posterior ligamentous thickening suggested at T10-11 however without evidence for high-grade spinal canal stenosis.     The T12-L1 level demonstrates no evidence for high-grade spinal canal or foraminal stenosis.     The L1-2 level demonstrates degenerative disc bulge with anterior impression upon the dural sac.  There is posterior facet arthropathy and ligamentous thickening there is mild spinal canal stenosis.  There is encroachment into the neural foramen at the level of the disc plane without obvious exiting nerve root impingement.     The L2-3 level demonstrates mild degenerative loss of disc space height.  There is mild degenerative disc bulge with anterior impression upon the dural sac.  There is posterior facet arthropathy with somewhat prominent ligamentous thickening with associated posterolateral encroachment.  There is mild-to-moderate spinal canal stenosis.  There is bilateral foraminal narrowing.     The L3-4 level demonstrates the aforementioned appearance of loss of disc space height and degenerative loss of disc signal.  There is diffuse degenerative disc bulge with anterior impression upon the dural sac there is posterior facet arthropathy and ligamentous thickening there is high-grade spinal canal stenosis.  There is bilateral foraminal stenosis.     The L4-5 level demonstrates degenerative disc bulge with anterior impression upon the dural sac there is posterior facet arthropathy and ligamentous thickening with mild to moderate spinal canal stenosis.  There is bilateral foraminal narrowing/stenosis without obvious exiting nerve root impingement.     The  L5-S1 level demonstrates no evidence for high-grade spinal canal stenosis.  Facet arthropathy is noted there is no evidence for high-grade foraminal stenosis or exiting nerve root impingement.     The visualized spinal cord appears to taper appropriately and appears of appropriate signal and caliber.  Mild signal intensity within the spinal canal on T1 axial imaging may relate to a minimal lipoma along the filum terminalis.  There is no additional evidence for intraspinal mass or abnormal fluid collection     Impression:     Multilevel chronic change of the lumbar spine with variable spinal canal and foraminal narrowing/stenosis as detailed above.  Correlation for any specific level of symptomatology is needed.  The greatest level of spinal canal stenosis is seen at L3-4.        Electronically signed by: Obie Agagrwal  Date:                                            07/27/2019  Time:                                           03:45     CMP  Sodium   Date Value Ref Range Status   03/04/2022 140 136 - 145 mmol/L Final     Potassium   Date Value Ref Range Status   03/04/2022 3.6 3.5 - 5.1 mmol/L Final     Chloride   Date Value Ref Range Status   03/04/2022 103 95 - 110 mmol/L Final     CO2   Date Value Ref Range Status   03/04/2022 28 23 - 29 mmol/L Final     Glucose   Date Value Ref Range Status   03/04/2022 101 70 - 110 mg/dL Final     BUN   Date Value Ref Range Status   03/04/2022 14 8 - 23 mg/dL Final     Creatinine   Date Value Ref Range Status   03/04/2022 0.8 0.5 - 1.4 mg/dL Final     Calcium   Date Value Ref Range Status   03/04/2022 9.4 8.7 - 10.5 mg/dL Final     Total Protein   Date Value Ref Range Status   03/04/2022 7.9 6.0 - 8.4 g/dL Final     Albumin   Date Value Ref Range Status   03/04/2022 4.1 3.5 - 5.2 g/dL Final     Total Bilirubin   Date Value Ref Range Status   03/04/2022 0.8 0.1 - 1.0 mg/dL Final     Comment:     For infants and newborns, interpretation of results should be based  on gestational  age, weight and in agreement with clinical  observations.    Premature Infant recommended reference ranges:  Up to 24 hours.............<8.0 mg/dL  Up to 48 hours............<12.0 mg/dL  3-5 days..................<15.0 mg/dL  6-29 days.................<15.0 mg/dL       Alkaline Phosphatase   Date Value Ref Range Status   03/04/2022 70 55 - 135 U/L Final     AST   Date Value Ref Range Status   03/04/2022 17 10 - 40 U/L Final     ALT   Date Value Ref Range Status   03/04/2022 20 10 - 44 U/L Final     Anion Gap   Date Value Ref Range Status   03/04/2022 9 8 - 16 mmol/L Final     eGFR if    Date Value Ref Range Status   03/04/2022 >60.0 >60 mL/min/1.73 m^2 Final     eGFR if non    Date Value Ref Range Status   03/04/2022 >60.0 >60 mL/min/1.73 m^2 Final     Comment:     Calculation used to obtain the estimated glomerular filtration  rate (eGFR) is the CKD-EPI equation.            Allergies: Review of patient's allergies indicates:  No Known Allergies    Current Medications:   Current Outpatient Medications   Medication Sig Dispense Refill    alendronate (FOSAMAX) 70 MG tablet TAKE 1 TABLET BY MOUTH ONE TIME PER WEEK 12 tablet 1    amLODIPine (NORVASC) 10 MG tablet TAKE 1 TABLET BY MOUTH EVERY DAY 90 tablet 3    atorvastatin (LIPITOR) 40 MG tablet TAKE 1 TABLET BY MOUTH EVERY DAY 90 tablet 2    calcium-vitamin D 500-125 mg-unit tablet Take 1 tablet by mouth once daily.      diclofenac (VOLTAREN) 75 MG EC tablet Take 1 tablet (75 mg total) by mouth 2 (two) times daily as needed (pain). 180 tablet 1    ergocalciferol (ERGOCALCIFEROL) 50,000 unit Cap Take 1 capsule (50,000 Units total) by mouth every Sunday. 12 capsule 0    fluticasone propionate (FLONASE) 50 mcg/actuation nasal spray 2 SPRAYS (100 MCG TOTAL) BY EACH NOSTRIL ROUTE ONCE DAILY. 48 mL 3    metoprolol succinate (TOPROL-XL) 100 MG 24 hr tablet TAKE 1 TABLET BY MOUTH EVERY DAY 90 tablet 1    multivitamin (THERAGRAN) per tablet Take  1 tablet by mouth once daily.      pantoprazole (PROTONIX) 40 MG tablet TAKE 1 TABLET BY MOUTH EVERY DAY 90 tablet 1    traZODone (DESYREL) 150 MG tablet TAKE 1 TABLET (150 MG TOTAL) BY MOUTH EVERY EVENING. 90 tablet 1    gabapentin (NEURONTIN) 300 MG capsule Take 2 capsules (600 mg total) by mouth 2 (two) times daily. 360 capsule 1    paroxetine (PAXIL) 20 MG tablet Take 1 tablet (20 mg total) by mouth every morning. 90 tablet 1     No current facility-administered medications for this visit.       REVIEW OF SYSTEMS:    GENERAL:  No weight loss, malaise or fevers.  HEENT:  Negative for frequent or significant headaches.  NECK:  Negative for lumps, goiter, pain and significant neck swelling.  RESPIRATORY:  Negative for cough, wheezing or shortness of breath.  CARDIOVASCULAR:  Negative for chest pain, leg swelling or palpitations.  GI:  Negative for abdominal discomfort, blood in stools or black stools or change in bowel habits. GERD.  MUSCULOSKELETAL:  See HPI.  SKIN:  Negative for lesions, rash, and itching.  PSYCH:  + for sleep disturbance, h/o depression  HEMATOLOGY/LYMPHOLOGY:  Negative for prolonged bleeding, bruising easily or swollen nodes.  NEURO:  + numbness, weakness. No history of headaches, syncope, paralysis, seizures or tremors.  All other reviewed and negative other than HPI.     Past Medical History:  Past Medical History:   Diagnosis Date    Anxiety     Arthritis     Corneal abrasion s    ? eye     Depression     Full dentures     GERD (gastroesophageal reflux disease)     Hyperlipidemia     Hypertension     Nuclear sclerosis of both eyes 6/5/2017    Osteoporosis     Restless leg syndrome        Past Surgical History:  Past Surgical History:   Procedure Laterality Date    COLONOSCOPY N/A 5/14/2019    Procedure: COLONOSCOPY;  Surgeon: Nahid Cline MD;  Location: [facility];  Service: Endoscopy;  Laterality: N/A;    EPIDURAL STEROID INJECTION N/A 5/13/2020    Procedure: LUMBAR/CAUDAL L5/S1 IL SANKET ;   Surgeon: Miguel Latham MD;  Location: [facility];  Service: Pain Management;  Laterality: N/A;  NEEDS CONSENT    HYSTERECTOMY      PARTIAL HYSTERECTOMY      TRANSFORAMINAL EPIDURAL INJECTION OF STEROID Bilateral 9/4/2019    Procedure: Injection,steroid,epidural,transforaminal approach LUMBAR TRANSFORAMINAL BILATERAL L3 TF SANKET;  Surgeon: Miguel Latham MD;  Location: [facility];  Service: Pain Management;  Laterality: Bilateral;  NEEDS CONSENT    TRANSFORAMINAL EPIDURAL INJECTION OF STEROID Bilateral 10/30/2019    Procedure: TRANSFORAMINAL SANKET BILATERAL L3-L4;  Surgeon: Miguel Latham MD;  Location: [facility];  Service: Pain Management;  Laterality: Bilateral;  NEEDS CONSENT    TRANSFORAMINAL EPIDURAL INJECTION OF STEROID Bilateral 11/11/2020    Procedure: INJECTION, STEROID, EPIDURAL, TRANSFORAMINAL APPROACH, L3-L4;  Surgeon: Miguel Latham MD;  Location: [facility];  Service: Pain Management;  Laterality: Bilateral;    TRANSFORAMINAL EPIDURAL INJECTION OF STEROID Bilateral 5/19/2021    Procedure: INJECTION, STEROID, EPIDURAL, TRANSFORAMINAL APPROACH, L3-L4;  Surgeon: Miguel Latham MD;  Location: [facility];  Service: Pain Management;  Laterality: Bilateral;    TRANSFORAMINAL EPIDURAL INJECTION OF STEROID Bilateral 1/26/2022    Procedure: Injection,steroid,epidural,transforaminal approach BILATERAL L3/4;  Surgeon: Miguel Latham MD;  Location: [facility];  Service: Pain Management;  Laterality: Bilateral;    TRANSFORAMINAL EPIDURAL INJECTION OF STEROID Bilateral 4/20/2022    Procedure: INJECTION, STEROID, EPIDURAL, TRANSFORAMINAL APPROACH, Bilateral L3-L4;  Surgeon: Miguel Latham MD;  Location: [facility];  Service: Pain Management;  Laterality: Bilateral;    TRANSFORAMINAL EPIDURAL INJECTION OF STEROID Bilateral 7/20/2022    Procedure: INJECTION, STEROID, EPIDURAL, TRANSFORAMINAL APPROACH, BILATERAL L3-L4 CONTRAST;  Surgeon: Miguel Latham MD;  Location: [facility]  "[facility];  Service: Pain Management;  Laterality: Bilateral;    TUBAL LIGATION Bilateral        Family History:  Family History   Problem Relation Age of Onset    Diabetes Mother     Stroke Mother     Glaucoma Mother     Cataracts Mother     Cancer Father         Lung    No Known Problems Sister     Stroke Brother     Hypertension Daughter     Hypertension Daughter     Hypertension Daughter     No Known Problems Daughter     Heart disease Son     Early death Son     No Known Problems Maternal Aunt     No Known Problems Maternal Uncle     No Known Problems Paternal Aunt     No Known Problems Paternal Uncle     No Known Problems Maternal Grandmother     No Known Problems Maternal Grandfather     No Known Problems Paternal Grandmother     No Known Problems Paternal Grandfather     Amblyopia Neg Hx     Blindness Neg Hx     Macular degeneration Neg Hx     Retinal detachment Neg Hx     Strabismus Neg Hx     Thyroid disease Neg Hx        Social History:  Social History     Socioeconomic History    Marital status:    Tobacco Use    Smoking status: Never    Smokeless tobacco: Never   Substance and Sexual Activity    Alcohol use: No    Drug use: No    Sexual activity: Not Currently   Social History Narrative    Patient is  w/ 5 children, one  from heart disease.The patient is retired.       OBJECTIVE:    BP (!) 151/78 (BP Location: Right arm, Patient Position: Sitting, BP Method: Medium (Automatic))   Pulse 65   Ht 5' 1" (1.549 m)   Wt 66 kg (145 lb 8.1 oz)   BMI 27.49 kg/m²     PHYSICAL EXAMINATION:    General appearance: Well appearing, in no acute distress, alert and oriented x3.  Psych:  Mood and affect appropriate.  Skin: Skin color, texture, turgor normal, no rashes or lesions, in both upper and lower body.  Head/face:  Normocephalic, atraumatic. No palpable lymph nodes.  Back: Straight leg raising in the sitting and supine positions causes back pain on the right. Limited range of motion with " pain on flexion and extension. Positive facet loading bilaterally.  Extremities: Peripheral joint ROM is full and pain free without obvious instability or laxity in all four extremities. No deformities, edema, or skin discoloration. Good capillary refill.  Musculoskeletal: No atrophy or tone abnormalities are noted. 5/5 strength in right ankle with plantar and 4/5 on dorsiflexion. 5/5 strength in left ankle with plantar and 4/5 on dorsiflexion. 4/5 strength with right knee flexion and extension. 5/5 strength with left knee flexion and extension. 5/5 strength in right EHL, 5/5 strength in left EHL. Right hip flexion is 4/5, left is 4/5.  Neuro: No loss of sensation noted.  Gait: Antalgic.    ASSESSMENT: 77 y.o. year old female with lower back pain, consistent with     1. Lumbosacral radiculopathy  Procedure Order to Pain Management      2. Lumbar spondylosis        3. Spinal stenosis, lumbar region with neurogenic claudication  gabapentin (NEURONTIN) 300 MG capsule    diclofenac (VOLTAREN) 75 MG EC tablet      4. DDD (degenerative disc disease), lumbar  gabapentin (NEURONTIN) 300 MG capsule    diclofenac (VOLTAREN) 75 MG EC tablet      5. Bilateral leg pain  gabapentin (NEURONTIN) 300 MG capsule    diclofenac (VOLTAREN) 75 MG EC tablet      6. Radiculopathy of thoracolumbar region  gabapentin (NEURONTIN) 300 MG capsule    diclofenac (VOLTAREN) 75 MG EC tablet              PLAN:     - Previous imaging was reviewed and discussed with the patient today.  - I have stressed the importance of physical activity and a home exercise plan to help with pain and improve health.  - Patient can continue with medications for now since they are providing benefits, using them appropriately, and without side effects.  - Continue Gabapentin 600 mg BID.  - Continue Voltaren BID PRN pain.  - Schedule for right L3/4 and L4/5 TF SANKET.  - Counseled patient regarding the importance of activity modification, constant sleeping habits and  physical therapy. Continue with aquatic PT.  - RTC 2 weeks after SANKET.    The above plan and management options were discussed at length with patient. Patient is in agreement with the above and verbalized understanding.    BRENDA Flowers  11/11/2022

## 2022-11-14 ENCOUNTER — CLINICAL SUPPORT (OUTPATIENT)
Dept: REHABILITATION | Facility: HOSPITAL | Age: 77
End: 2022-11-14
Payer: MEDICARE

## 2022-11-14 DIAGNOSIS — M48.062 SPINAL STENOSIS, LUMBAR REGION WITH NEUROGENIC CLAUDICATION: Primary | ICD-10-CM

## 2022-11-14 PROCEDURE — 97113 AQUATIC THERAPY/EXERCISES: CPT

## 2022-11-14 NOTE — PROGRESS NOTES
[facility]  Physical Therapy Daily Treatment Note     Name: Diana Herring  Clinic Number: [id number]     Therapy Diagnosis:        Encounter Diagnosis   Name Primary?    Spinal stenosis, lumbar region with neurogenic claudication Yes      Physician: Christin Ventura,Claudio     Visit Date: 11/9/2022     Physician Orders: Aquatic Therapy PT Eval and Treat  Medical Diagnosis from Referral: M54.17 (ICD-10-CM) - Lumbosacral radiculopathy    M47.819 (ICD-10-CM) - Spondylosis without myelopathy    M54.15 (ICD-10-CM) - Radiculopathy of thoracolumbar region     Evaluation Date: 8/5/2022  Authorization Period Expiration: 12/31/22  Plan of Care Expiration: 12/9/22  Visit # / Visits authorized: 18/24 + Evaluation     Precautions: Standard and Fall     PTA Visit #: 0/5      Time In: 8:35 am   Time Out: 9:35 am   Total Billable Time: 30  minutes     SUBJECTIVE      Pt reports: She is feeling a lot of pain in her right hip and a lot of stiffness today.  She reports yesterday it took her a while to get up and moving due to the stiffness and coldness.         Pain:  Current 5-6/10, worst 8/10, best 3-4/10   Location: bilateral back , buttocks , lower legs and upper legs   Description: Aching, Tight and Tingling  Aggravating Factors: Sitting, Standing in line for too long, Morning and prolonged positions  Easing Factors: pain medication, injection and OTC topical analgesics     OBJECTIVE      Updated 11/9/22       TREATMENT      Diana received aquatic therapeutic exercises to develop strength, endurance, ROM, flexibility, posture, and core stabilization for 60 minutes including:     FUNCTIONAL MOBILITY TRAINING x 2 laps each at beginning and 1 lap each at end of session  Walk forward/backward/lateral     STRETCHES 20 seconds ea  Calf  Hip adductor--NP  Hamstring  Quad--NP     LE EX x 30 on each  TITO-BUE support on handrails  Squat  Heel raise   Hip abduction/adduction  Hip flex  Hip ext  Hip  marches  Hs curl     Walking High knee marching --  // bars x 2 laps  Tandem walk // bars 1 lap     White pool stool  Sit to stand 2x10     UE EX/CORE x 30 on each    Shoulder flex/ext TA activation paddles closed  Shoulder horizontal abd/add TA activation paddles closed  Shoulder abd/add TA activation paddles closed     Mini squat with push/pull red kick board      ENDURANCE  Bicycle in // bars 3'  LTR // 2'     ASSESSMENT       The patient was able to perform the visits today with VC for upright posture. She performed the marches with improved quality and reduce use of hands for support.      Previous Short Term Goals Status:     Short Term Goals: 6 weeks   1. Patient will be independent in HEP & progressions.--Met as of 22  2. Patient will achieve TUG score of 9 sec to demonstrate improved mobility--Ongoing as of 22  3. The patient will achieve 5 sit to stand score of 19 seconds with </= 3/10 RPE to demonstrate improved transfers and endurance. --Ongoing as of 22     Long Term Goals: 12 weeks   1. The patient will demonstrate independence with extensive HEP.  2. Patient will achieve 5 sit to stand score of 16 seconds with </= 3/10 RPE to demonstrate improved transfers & endurance.  3. Patent is able to demonstrate MMT >/= 4/5 BLE without pain reports during testing.    4. Patient will report improved standing tolerance to > 20 minutes with minimal pain complaints for improved tolerance/participation in ADLs/iADLs.         Long Term Goal Status: continue per initial plan of care.     Reasons for Recertification of Therapy:   Pt's POC      PLAN   Progress POC as tolerated by the patient.     Updated Certification Period: 22 to 22  Recommended Treatment Plan: 2 times per week for 6 weeks:  Aquatic Therapy     Bianca Almodovar, PT

## 2022-11-15 ENCOUNTER — TELEPHONE (OUTPATIENT)
Dept: ADMINISTRATIVE | Facility: OTHER | Age: 77
End: 2022-11-15
Payer: MEDICARE

## 2022-12-01 ENCOUNTER — CLINICAL SUPPORT (OUTPATIENT)
Dept: REHABILITATION | Facility: HOSPITAL | Age: 77
End: 2022-12-01
Payer: MEDICARE

## 2022-12-01 DIAGNOSIS — M79.605 BILATERAL LEG PAIN: Primary | ICD-10-CM

## 2022-12-01 DIAGNOSIS — M62.81 MUSCLE WEAKNESS: ICD-10-CM

## 2022-12-01 DIAGNOSIS — M79.604 BILATERAL LEG PAIN: Primary | ICD-10-CM

## 2022-12-01 PROCEDURE — 97113 AQUATIC THERAPY/EXERCISES: CPT

## 2022-12-01 NOTE — PROGRESS NOTES
" [facility]  Physical Therapy Daily Treatment Note     Name: Diana Herrign  Clinic Number: [id number]     Therapy Diagnosis:        Encounter Diagnosis   Name Primary?    Spinal stenosis, lumbar region with neurogenic claudication Yes      Physician: Christin Ventura,*     Visit Date: 11/9/2022     Physician Orders: Aquatic Therapy PT Eval and Treat  Medical Diagnosis from Referral: M54.17 (ICD-10-CM) - Lumbosacral radiculopathy    M47.819 (ICD-10-CM) - Spondylosis without myelopathy    M54.15 (ICD-10-CM) - Radiculopathy of thoracolumbar region     Evaluation Date: 8/5/2022  Authorization Period Expiration: 12/31/22  Plan of Care Expiration: 12/9/22  Visit # / Visits authorized: 20/24 + Evaluation     Precautions: Standard and Fall     PTA Visit #: 0/5      Time In: 8:35 am  Time Out: 9:35 am   Total Billable Time: 30  minutes     SUBJECTIVE      Pt reports: She is feeling stiff and walking "crooked" today.         Pain:  Current 5-6/10, worst 8/10, best 3-4/10   Location: bilateral back , buttocks , lower legs and upper legs   Description: Aching, Tight and Tingling  Aggravating Factors: Sitting, Standing in line for too long, Morning and prolonged positions  Easing Factors: pain medication, injection and OTC topical analgesics     OBJECTIVE      Updated 11/9/22       TREATMENT      Diana received aquatic therapeutic exercises to develop strength, endurance, ROM, flexibility, posture, and core stabilization for 60 minutes including:     FUNCTIONAL MOBILITY TRAINING x 2 laps each at beginning and 1 lap each at end of session  Walk forward/backward/lateral     STRETCHES 20 seconds ea  Calf  Hip adductor--NP  Hamstring  Quad--NP     LE EX x 30 on each  TITO-BUE support on handrails  Squat  Heel raise   Hip abduction/adduction  Hip flex  Hip ext  Hip marches  Hs curl     Walking High knee marching --  // bars x 2 laps  Tandem walk // bars 1 lap     White pool stool  Sit to stand " 2x10     UE EX/CORE x 30 on each    Shoulder flex/ext TA activation paddles closed  Shoulder horizontal abd/add TA activation paddles closed  Shoulder abd/add TA activation paddles closed     Mini squat with push/pull red kick board      ENDURANCE  Bicycle in // bars 3'  LTR // 2'     ASSESSMENT       The patient tolerated the session well today despite some stiffness.      Previous Short Term Goals Status:     Short Term Goals: 6 weeks   1. Patient will be independent in HEP & progressions.--Met as of 11/9/22  2. Patient will achieve TUG score of 9 sec to demonstrate improved mobility--Ongoing as of 11/9/22  3. The patient will achieve 5 sit to stand score of 19 seconds with </= 3/10 RPE to demonstrate improved transfers and endurance. --Ongoing as of 11/9/22     Long Term Goals: 12 weeks   1. The patient will demonstrate independence with extensive HEP.  2. Patient will achieve 5 sit to stand score of 16 seconds with </= 3/10 RPE to demonstrate improved transfers & endurance.  3. Patent is able to demonstrate MMT >/= 4/5 BLE without pain reports during testing.    4. Patient will report improved standing tolerance to > 20 minutes with minimal pain complaints for improved tolerance/participation in ADLs/iADLs.      PLAN   Progress POC as tolerated by the patient.     Updated Certification Period: 11/9/22 to 12/21/22  Recommended Treatment Plan: 2 times per week for 6 weeks:  Aquatic Therapy     Bianca Almodovar, PT

## 2022-12-07 ENCOUNTER — HOSPITAL ENCOUNTER (OUTPATIENT)
Facility: OTHER | Age: 77
Discharge: HOME | End: 2022-12-07
Attending: ANESTHESIOLOGY | Admitting: ANESTHESIOLOGY
Payer: MEDICARE

## 2022-12-07 VITALS
SYSTOLIC BLOOD PRESSURE: 156 MMHG | TEMPERATURE: 98 F | WEIGHT: 143 LBS | HEIGHT: 60 IN | BODY MASS INDEX: 28.07 KG/M2 | RESPIRATION RATE: 64 BRPM | OXYGEN SATURATION: 98 % | DIASTOLIC BLOOD PRESSURE: 68 MMHG | HEART RATE: 64 BPM

## 2022-12-07 DIAGNOSIS — M54.15 RADICULOPATHY OF THORACOLUMBAR REGION: Primary | ICD-10-CM

## 2022-12-07 DIAGNOSIS — G89.29 CHRONIC PAIN: ICD-10-CM

## 2022-12-07 DIAGNOSIS — M51.36 DDD (DEGENERATIVE DISC DISEASE), LUMBAR: ICD-10-CM

## 2022-12-07 PROCEDURE — 64483 NJX AA&/STRD TFRM EPI L/S 1: CPT | Mod: RT,,, | Performed by: ANESTHESIOLOGY

## 2022-12-07 PROCEDURE — 64484 NJX AA&/STRD TFRM EPI L/S EA: CPT | Mod: RT,,, | Performed by: ANESTHESIOLOGY

## 2022-12-07 PROCEDURE — 25000003 PHARM REV CODE 250: Performed by: STUDENT IN AN ORGANIZED HEALTH CARE EDUCATION/TRAINING PROGRAM

## 2022-12-07 PROCEDURE — 64483 PR EPIDURAL INJ, ANES/STEROID, TRANSFORAMINAL, LUMB/SACR, SNGL LEVL: ICD-10-PCS | Mod: RT,,, | Performed by: ANESTHESIOLOGY

## 2022-12-07 PROCEDURE — 64484 NJX AA&/STRD TFRM EPI L/S EA: CPT | Mod: RT | Performed by: ANESTHESIOLOGY

## 2022-12-07 PROCEDURE — 64483 NJX AA&/STRD TFRM EPI L/S 1: CPT | Mod: RT | Performed by: ANESTHESIOLOGY

## 2022-12-07 PROCEDURE — 25000003 PHARM REV CODE 250: Performed by: ANESTHESIOLOGY

## 2022-12-07 PROCEDURE — 25500020 PHARM REV CODE 255: Performed by: ANESTHESIOLOGY

## 2022-12-07 PROCEDURE — 63600175 PHARM REV CODE 636 W HCPCS: Performed by: ANESTHESIOLOGY

## 2022-12-07 PROCEDURE — 64484 PRA INJECT ANES/STEROID FORAMEN LUMBAR/SACRAL W IMG GUIDE ,EA ADD LEVEL: ICD-10-PCS | Mod: RT,,, | Performed by: ANESTHESIOLOGY

## 2022-12-07 RX ORDER — DEXAMETHASONE SODIUM PHOSPHATE 10 MG/ML
INJECTION INTRAMUSCULAR; INTRAVENOUS
Status: DISCONTINUED | OUTPATIENT
Start: 2022-12-07 | End: 2022-12-07 | Stop reason: HOSPADM

## 2022-12-07 RX ORDER — MIDAZOLAM HYDROCHLORIDE 1 MG/ML
INJECTION INTRAMUSCULAR; INTRAVENOUS
Status: DISCONTINUED | OUTPATIENT
Start: 2022-12-07 | End: 2022-12-07 | Stop reason: HOSPADM

## 2022-12-07 RX ORDER — FENTANYL CITRATE 50 UG/ML
INJECTION, SOLUTION INTRAMUSCULAR; INTRAVENOUS
Status: DISCONTINUED | OUTPATIENT
Start: 2022-12-07 | End: 2022-12-07 | Stop reason: HOSPADM

## 2022-12-07 RX ORDER — LIDOCAINE HYDROCHLORIDE 10 MG/ML
INJECTION, SOLUTION EPIDURAL; INFILTRATION; INTRACAUDAL; PERINEURAL
Status: DISCONTINUED | OUTPATIENT
Start: 2022-12-07 | End: 2022-12-07 | Stop reason: HOSPADM

## 2022-12-07 RX ORDER — SODIUM CHLORIDE 9 MG/ML
500 INJECTION, SOLUTION INTRAVENOUS CONTINUOUS
Status: DISCONTINUED | OUTPATIENT
Start: 2022-12-07 | End: 2022-12-07 | Stop reason: HOSPADM

## 2022-12-07 RX ORDER — LIDOCAINE HYDROCHLORIDE 20 MG/ML
INJECTION, SOLUTION INFILTRATION; PERINEURAL
Status: DISCONTINUED | OUTPATIENT
Start: 2022-12-07 | End: 2022-12-07 | Stop reason: HOSPADM

## 2022-12-07 NOTE — DISCHARGE SUMMARY
Discharge Note  Short Stay      SUMMARY     Admit Date: 12/7/2022    Attending Physician: Miguel Latham    Discharge Physician: Rupesh Odonnell      Discharge Date: 12/7/2022 11:58 AM    Procedure(s) (LRB):  INJECTION, STEROID, EPIDURAL, TRANSFORAMINAL APPROACH, RIGHT L3/L4 AND L4/L5 CONTRAST (Right)    Final Diagnosis: Lumbosacral radiculopathy [M54.17]    Disposition: Home or self care    Patient Instructions:   Current Discharge Medication List        CONTINUE these medications which have NOT CHANGED    Details   alendronate (FOSAMAX) 70 MG tablet TAKE 1 TABLET BY MOUTH ONE TIME PER WEEK  Qty: 12 tablet, Refills: 1    Associated Diagnoses: Age-related osteoporosis without current pathological fracture      amLODIPine (NORVASC) 10 MG tablet TAKE 1 TABLET BY MOUTH EVERY DAY  Qty: 90 tablet, Refills: 3    Associated Diagnoses: Essential hypertension      atorvastatin (LIPITOR) 40 MG tablet TAKE 1 TABLET BY MOUTH EVERY DAY  Qty: 90 tablet, Refills: 2    Associated Diagnoses: Hyperlipidemia, unspecified hyperlipidemia type      calcium-vitamin D 500-125 mg-unit tablet Take 1 tablet by mouth once daily.      diclofenac (VOLTAREN) 75 MG EC tablet Take 1 tablet (75 mg total) by mouth 2 (two) times daily as needed (pain).  Qty: 180 tablet, Refills: 1    Comments: DX Code Needed  .  Associated Diagnoses: Spinal stenosis, lumbar region with neurogenic claudication; DDD (degenerative disc disease), lumbar; Bilateral leg pain; Radiculopathy of thoracolumbar region      ergocalciferol (ERGOCALCIFEROL) 50,000 unit Cap Take 1 capsule (50,000 Units total) by mouth every Sunday.  Qty: 12 capsule, Refills: 0    Associated Diagnoses: Vitamin D deficiency      fluticasone propionate (FLONASE) 50 mcg/actuation nasal spray 2 SPRAYS (100 MCG TOTAL) BY EACH NOSTRIL ROUTE ONCE DAILY.  Qty: 48 mL, Refills: 3    Comments: NEED NEW SCRIPT  Associated Diagnoses: Allergic rhinitis, unspecified seasonality, unspecified trigger      gabapentin  (NEURONTIN) 300 MG capsule Take 2 capsules (600 mg total) by mouth 2 (two) times daily.  Qty: 360 capsule, Refills: 1    Associated Diagnoses: Spinal stenosis, lumbar region with neurogenic claudication; DDD (degenerative disc disease), lumbar; Bilateral leg pain; Radiculopathy of thoracolumbar region      metoprolol succinate (TOPROL-XL) 100 MG 24 hr tablet TAKE 1 TABLET BY MOUTH EVERY DAY  Qty: 90 tablet, Refills: 1    Associated Diagnoses: Essential hypertension      multivitamin (THERAGRAN) per tablet Take 1 tablet by mouth once daily.      pantoprazole (PROTONIX) 40 MG tablet TAKE 1 TABLET BY MOUTH EVERY DAY  Qty: 90 tablet, Refills: 1    Associated Diagnoses: Gastroesophageal reflux disease without esophagitis      paroxetine (PAXIL) 20 MG tablet Take 1 tablet (20 mg total) by mouth every morning.  Qty: 90 tablet, Refills: 1    Associated Diagnoses: Mild recurrent major depression; Anxiety      traZODone (DESYREL) 150 MG tablet TAKE 1 TABLET (150 MG TOTAL) BY MOUTH EVERY EVENING.  Qty: 90 tablet, Refills: 1    Associated Diagnoses: Insomnia, unspecified type                 Discharge Diagnosis: Lumbosacral radiculopathy [M54.17]  Condition on Discharge: Stable with no complications to procedure   Diet on Discharge: Same as before.  Activity: as per instruction sheet.  Discharge to: Home with a responsible adult.  Follow up: 2-4 weeks       Please call my office or pager at [phone] if experienced any weakness or loss of sensation, fever > 101.5, pain uncontrolled with oral medications, persistent nausea/vomiting/or diarrhea, redness or drainage from the incisions, or any other worrisome concerns. If physician on call was not reached or could not communicate with our office for any reason please go to the nearest emergency department        Miguel Latham

## 2022-12-07 NOTE — INTERVAL H&P NOTE
The patient was examined and no significant changes were noted from the updated H&P or last clinic note.    The risks and benefits of this procedure, including alternative therapies, were discussed with the patient.  The discussion of risks included infection, bleeding, need for additional procedures or surgery, nerve damage, paralysis, adverse medication reaction(s), stroke, and if appropriate for the procedure, death.  Questions regarding the procedure, risks, expected outcome, and possible side effects were solicited and answered to Diana's satisfaction.  Diana Herring wishes to proceed with the injection or procedure as confirmed by written consent.      Plan for right L3/4 and L4/5 TFESI

## 2022-12-07 NOTE — OP NOTE
Lumbar Transforaminal Epidural Steroid Injection under Fluoroscopic Guidance    The procedure, risks, benefits, and options were discussed with the patient. There are no contraindications to the procedure. The patent expressed understanding and agreed to the procedure. Informed written consent was obtained prior to the start of the procedure and can be found in the patient's chart.    PATIENT NAME: Mrs. Diana Herring   MRN: [id number]     DATE OF PROCEDURE: 12/07/2022    PROCEDURE:  Right  L3/4 and L4/5 Lumbar Transforaminal Epidural Steroid Injection under Fluoroscopic Guidance    PRE-OP DIAGNOSIS: Lumbosacral radiculopathy [M54.17] Lumbar radiculopathy [M54.16]    POST-OP DIAGNOSIS: Same    PHYSICIAN: Miguel Latham MD    ASSISTANTS: Rupesh Odonnell MD  PM&R     MEDICATIONS INJECTED: Preservative-free Decadron 10mg with 5cc of Lidocaine 1% MPF     LOCAL ANESTHETIC INJECTED: Xylocaine 2%     SEDATION: Versed 2mg and Fentanyl 100mcg                                                                                                                                                                                     Conscious sedation ordered by M.D. Patient re-evaluation prior to administration of conscious sedation. No changes noted in patient's status from initial evaluation. The patient's vital signs were monitored by RN and patient remained hemodynamically stable throughout the procedure.    Event Time In   Sedation Start 1151   Sedation End 1156       ESTIMATED BLOOD LOSS: None    COMPLICATIONS: None    TECHNIQUE: Time-out was performed to identify the patient and procedure to be performed. With the patient laying in a prone position, the surgical area was prepped and draped in the usual sterile fashion using ChloraPrep and a fenestrated drape.The levels were determined under fluoroscopy guidance. Skin anesthesia was achieved by injecting Lidocaine 2% over the injection sites. The transforaminal spaces were then  approached with a 22 gauge, 3.5 inch spinal quinke needle that was introduced under fluoroscopic guidance in the AP and Lateral views. Once the needle tip was in the area of the transforaminal space, and there was no blood, CSF or paraesthesias, contrast dye Omnipaque (300mg/mL) was injected to confirm placement and there was no vascular runoff. Fluoroscopic imaging in the AP and lateral views revealed a clear outline of the spinal nerve with proximal spread of agent through the neural foramen into the epidural space. 3 mL of the medication mixture listed above was injected slowly at each site. Displacement of the radio opaque contrast after injection of the medication confirmed that the medication went into the area of the transforaminal spaces. The needles were removed and bleeding was nil. A sterile dressing was applied. No specimens collected. The patient tolerated the procedure well.       The patient was monitored after the procedure in the recovery area. They were given post-procedure and discharge instructions to follow at home. The patient was discharged in a stable condition.    Rupesh Odonnell MD  PM&R    I reviewed and edited the fellow's note. I conducted my own interview and physical examination. I agree with the findings. I was present and supervising all critical portions of the procedure.    Miguel Latham MD

## 2022-12-07 NOTE — DISCHARGE INSTRUCTIONS
Thank you for allowing us to care for you today. You may receive a survey about the care we provided. Your feedback is valuable and helps us provide excellent care throughout the community.     Home Care Instructions for Pain Management:    1. DIET:   You may resume your normal diet today.   2. BATHING:   You may shower with luke warm water. No tub baths or anything that will soak injection sites under water for the next 24 hours.  3. DRESSING:   You may remove your bandage today.   4. ACTIVITY LEVEL:   You may resume your normal activities 24 hrs after your procedure. Nothing strenuous today.  5. MEDICATIONS:   You may resume your normal medications today. To restart blood thinners, ask your doctor.  6. DRIVING    If you have received any sedatives by mouth today, you may not drive for 12 hours.    If you have received any sedation through your IV, you may not drive for 24 hrs.   7. SPECIAL INSTRUCTIONS:   No heat to the injection site for 24 hrs including, hot bath or shower, heating pad, moist heat, or hot tubs.    Use ice pack to injection site for any pain or discomfort.  Apply ice packs for 20 minute intervals as needed.    IF you have diabetes, be sure to monitor your blood sugar more closely. IF your injection contained steroids your blood sugar levels may become higher than normal.    If you are still having pain upon discharge:  Your pain may improve over the next 48 hours. The anesthetic (numbing medication) works immediately to 48 hours. IF your injection contained a steroid (anti-inflammatory medication), it takes approximately 3 days to start feeling relief and 7-10 days to see your greatest results from the medication. It is possible you may need subsequent injections. This would be discussed at your follow up appointment with pain management or your referring doctor.    Please call the PAIN MANAGEMENT office at [phone] or ON CALL pager at [phone] if you experienced any:   -Weakness or  loss of sensation  -Fever > 101.5  -Pain uncontrolled with oral medications   -Persistent nausea, vomiting, or diarrhea  -Redness or drainage from the injection sites, or any other worrisome concerns.   If physician on call was not reached or could not communicate with our office for any reason please go to the nearest emergency department. Adult Procedural Sedation Instructions    Recovery After Procedural Sedation (Adult)  You have been given medicine by vein to make you sleep during your surgery. This may have included both a pain medicine and sleeping medicine. Most of the effects have worn off. But you may still have some drowsiness for the next 6 to 8 hours.  Home care  Follow these guidelines when you get home:  For the next 8 hours, you should be watched by a responsible adult. This person should make sure your condition is not getting worse.  Don't drink any alcohol for the next 24 hours.  Don't drive, operate dangerous machinery, or make important business or personal decisions during the next 24 hours.  Note: Your healthcare provider may tell you not to take any medicine by mouth for pain or sleep in the next 4 hours. These medicines may react with the medicines you were given in the hospital. This could cause a much stronger response than usual.  Follow-up care  Follow up with your healthcare provider if you are not alert and back to your usual level of activity within 12 hours.  When to seek medical advice  Call your healthcare provider right away if any of these occur:  Drowsiness gets worse  Weakness or dizziness gets worse  Repeated vomiting  You can't be awakened   Date Last Reviewed: 10/18/2016  © 1917-6835 [employer]. [address] [place] [address], [place] [address]. All rights reserved. This information is not intended as a substitute for professional medical care. Always follow your healthcare professional's instructions.          Patient is a 89y old  Male who presents with a chief complaint of SOB (17 Dec 2018 10:42)    HPI:  89 y/o M with PMH of CAD s/p PCI / CABG, systolic CHF, h/o NSVT, s/p AICD, HTN, dylsipidemia, h/o orthostatic hypotension, CKD III, a-fib (on coumadin), parkinson's disease, admitted on  for evaluation of increasing shortness of breath and wheezing associated nonproductive cough. The patient was receiving breathing treatment and was speaking in single words, as so short of breath. Unable to provide a history, history per medical record.             PMH: as above  PSH: as above  Meds: per reconciliation sheet, noted below  MEDICATIONS  (STANDING):  ALBUTerol    90 MICROgram(s) HFA Inhaler 1 Puff(s) Inhalation every 4 hours  ALBUTerol/ipratropium for Nebulization 3 milliLiter(s) Nebulizer every 4 hours  atorvastatin 10 milliGRAM(s) Oral at bedtime  azithromycin   Tablet 500 milliGRAM(s) Oral daily  carbidopa/levodopa  10/100 1 Tablet(s) Oral three times a day  cefTRIAXone Injectable. 1000 milliGRAM(s) IV Push once  cefTRIAXone Injectable.      clopidogrel Tablet 75 milliGRAM(s) Oral daily  donepezil 5 milliGRAM(s) Oral at bedtime  fludroCORTISONE 0.1 milliGRAM(s) Oral daily  furosemide   Oral Tab/Cap - Peds 40 milliGRAM(s) Oral daily  metoprolol succinate ER 25 milliGRAM(s) Oral daily  multivitamin 1 Tablet(s) Oral daily  pantoprazole    Tablet 40 milliGRAM(s) Oral before breakfast  potassium chloride    Tablet ER 20 milliEquivalent(s) Oral daily  tiotropium 18 MICROgram(s) Capsule 1 Capsule(s) Inhalation daily  warfarin 3 milliGRAM(s) Oral daily    MEDICATIONS  (PRN):  docusate sodium 100 milliGRAM(s) Oral three times a day PRN Constipation    Allergies    morphine (Headache)    Intolerances      Social: no smoking, no alcohol, no illegal drugs; no recent travel, no exposure to TB  FAMILY HISTORY:     no history of premature cardiovascular disease in first degree relatives  ROS: unable to obtain secondary to patient medical condition     Vital Signs Last 24 Hrs  T(C): 36.2 (17 Dec 2018 06:16), Max: 36.6 (17 Dec 2018 05:15)  T(F): 97.1 (17 Dec 2018 06:16), Max: 97.8 (17 Dec 2018 05:15)  HR: 62 (17 Dec 2018 11:46) (50 - 64)  BP: 115/48 (17 Dec 2018 06:16) (103/43 - 118/53)  BP(mean): --  RR: 18 (17 Dec 2018 06:16) (18 - 22)  SpO2: 100% (17 Dec 2018 06:16) (95% - 100%)  Daily Height in cm: 167.64 (16 Dec 2018 17:30)    Daily     PE:    Constitutional: frail looking  HEENT: NC/AT, EOMI, PERRLA, conjunctivae clear; ears and nose atraumatic; pharynx clear  Neck: supple; thyroid not palpable  Back: no tenderness  Respiratory: respiratory effort increased; bilateral rhonchi and wheezing  Cardiovascular: S1S2 regular, no murmurs  Abdomen: soft, not tender, not distended, positive BS; no liver or spleen organomegaly  Genitourinary: no suprapubic tenderness  Musculoskeletal: no muscle tenderness, mild lower extremity edema and mild erythema  Neurological/ Psychiatric: AxOx3, judgement and insight normal;  moving all extremities  Skin: no rashes; no palpable lesions    Labs: all available labs reviewed                        11.3   3.84  )-----------( 141      ( 17 Dec 2018 06:00 )             35.0         139  |  99  |  36<H>  ----------------------------<  110<H>  2.8<LL>   |  31  |  1.83<H>    Ca    8.3<L>      17 Dec 2018 06:00  Phos  4.1     12-  Mg     2.1         TPro  7.3  /  Alb  3.1<L>  /  TBili  0.6  /  DBili  x   /  AST  19  /  ALT  8<L>  /  AlkPhos  110  12-17     LIVER FUNCTIONS - ( 17 Dec 2018 06:00 )  Alb: 3.1 g/dL / Pro: 7.3 gm/dL / ALK PHOS: 110 U/L / ALT: 8 U/L / AST: 19 U/L / GGT: x           Urinalysis Basic - ( 16 Dec 2018 18:42 )    Color: Yellow / Appearance: Clear / S.015 / pH: x  Gluc: x / Ketone: Negative  / Bili: Negative / Urobili: Negative mg/dL   Blood: x / Protein: Negative mg/dL / Nitrite: Negative   Leuk Esterase: Negative / RBC: 3-5 /HPF / WBC 0-2   Sq Epi: x / Non Sq Epi: Negative / Bacteria: Negative      Rapid Respiratory Viral Panel (18 @ 18:42)    Rapid RVP Result: Detected: This Respiratory Panel uses polymerase chain reaction (PCR) to detect for  adenovirus; coronavirus (HKU1, NL63, 229E, OC43); human metapneumovirus  (hMPV); human enterovirus/rhinovirus (Entero/RV); influenza A; influenza  A/H1; influenza A/H3; influenza A/H1-2009; influenza B; parainfluenza  viruses 1, 2, 3, 4; respiratory syncytial virus; Mycoplasma pneumoniae;  and Chlamydophila pneumoniae.    hMPV (RapRVP): Detected: Test Name:RVP  Called by:mreim  Called to: gavin magana  Read back 2 Pt IDs:y Read back values:y Date/Tm:18 20:59        < from: CT Chest No Cont (18 @ 08:49) >    EXAM:  CT CHEST                            PROCEDURE DATE:  2018          INTERPRETATION:  CT Chest without IV contrast        CLINICAL INFORMATION:  Cough.    TECHNIQUE: Contiguous axial sections were obtained through the chest   using single helical acquisition.  Images were acquired without IV   contrast.  In addition, sagittal and coronal reformatted images were   obtained.  This scan was performed using automatic exposure control   (radiation dose reduction software) to obtain a diagnostic image quality   scan with patient dose as low as reasonably achievable.     COMPARISON: Chest radiograph dated 2018 and chest CT dated   2016 are available for review.    FINDINGS:       LINES/TUBES: Single lead left-sided pacemaker/AICD is again noted.    LUNGS, AIRWAYS: Respiratory motion artifact decreasing sensitivity for   full evaluation. Central airways are patent. Opacification of the left   lower subsegmental bronchi.    New extensive mixed groundglass/consolidated infiltrates within the   posterior and inferior left upper lobe and within the superior left lower   lobe, concerning for pneumonia. There is moderate left lower lobe   subsegmental atelectasis which may be obstructive secondary to mucous.   Mild bronchiolitis at the superior right lower lobe (image 52 series 2).    Unchanged biapical paraseptal cysts, greater on right which may represent   emphysematous changes or interstitial lung disease. Chronic mild-moderate   pleural/parenchymal scarring of the left lower lobe. Unchanged lateral   right upper lobe pleural-based 0.5 cm nodule (image 43 series 2).    PLEURA: Chronic unchanged left posterior and basilar pleural thickening   with stable trace loculated left pleural effusion at the posterior left  lower lung.    HEART AND VESSELS: Stable marked cardiomegaly. No pericardial effusion.   Thoracic aorta is of normal caliber. Sternotomy wires and mediastinal   surgical clips consistent with CABG. Severe coronary artery   calcifications and/or stents. Redemonstration of small curvilinear   calcification within the left ventricle which may represent calcified   papillary muscle. Calcification of the left ventricular wall at the apex   likely sequela of remote myocardial infarction.    MEDIASTINUM, SHIRLEY, AXILLAE: Small hiatal hernia. Esophagus is mildly   patulous and air-fluid.    BONES: No acute bony pathology or fracture. Diffuse osteopenia. T12   inferior endplate Schmorl's node, unchanged.    CHEST WALL/SOFT TISSUES: Within normal limits    UPPER ABDOMEN: Unchanged indistinct 1.4 cm hypodensity within the spleen,   statistically likely hemangioma. A few subcentimeter coarse   calcifications within the spleen are again noted, likely sequela of   remote granulomatous disease.    IMPRESSION:      Extensive left upper and lower lobe mixed groundglass/consolidated   infiltrates concerning for pneumonia.    Opacification of the left lower segmental bronchi with associated   moderate obstructive left lower lobe subsegmental atelectasis.    Minimal superior right lower lobe bronchiolitis.    Remaining findings are unchanged since chest CT of 16.     < end of copied text >            Radiology: all available radiological tests reviewed    Advanced directives addressed: full resuscitation Patient is a 89y old  Male who presents with a chief complaint of SOB (17 Dec 2018 10:42)    HPI:  89 y/o M with PMH of CAD s/p PCI / CABG, systolic CHF, h/o NSVT, s/p AICD, HTN, dylsipidemia, h/o orthostatic hypotension, CKD III, a-fib (on coumadin), parkinson's disease, admitted on  for evaluation of increasing shortness of breath and wheezing associated nonproductive cough. The patient was receiving breathing treatment and was speaking in single words, as so short of breath. Unable to provide a history, history per medical record.             PMH: as above  PSH: as above  Meds: per reconciliation sheet, noted below  MEDICATIONS  (STANDING):  ALBUTerol    90 MICROgram(s) HFA Inhaler 1 Puff(s) Inhalation every 4 hours  ALBUTerol/ipratropium for Nebulization 3 milliLiter(s) Nebulizer every 4 hours  atorvastatin 10 milliGRAM(s) Oral at bedtime  azithromycin   Tablet 500 milliGRAM(s) Oral daily  carbidopa/levodopa  10/100 1 Tablet(s) Oral three times a day  cefTRIAXone Injectable. 1000 milliGRAM(s) IV Push once  cefTRIAXone Injectable.      clopidogrel Tablet 75 milliGRAM(s) Oral daily  donepezil 5 milliGRAM(s) Oral at bedtime  fludroCORTISONE 0.1 milliGRAM(s) Oral daily  furosemide   Oral Tab/Cap - Peds 40 milliGRAM(s) Oral daily  metoprolol succinate ER 25 milliGRAM(s) Oral daily  multivitamin 1 Tablet(s) Oral daily  pantoprazole    Tablet 40 milliGRAM(s) Oral before breakfast  potassium chloride    Tablet ER 20 milliEquivalent(s) Oral daily  tiotropium 18 MICROgram(s) Capsule 1 Capsule(s) Inhalation daily  warfarin 3 milliGRAM(s) Oral daily    MEDICATIONS  (PRN):  docusate sodium 100 milliGRAM(s) Oral three times a day PRN Constipation    Allergies    morphine (Headache)    Intolerances      Social: no smoking, no alcohol, no illegal drugs; no recent travel, no exposure to TB  FAMILY HISTORY:     no history of premature cardiovascular disease in first degree relatives  ROS: unable to obtain secondary to patient medical condition     Vital Signs Last 24 Hrs  T(C): 36.2 (17 Dec 2018 06:16), Max: 36.6 (17 Dec 2018 05:15)  T(F): 97.1 (17 Dec 2018 06:16), Max: 97.8 (17 Dec 2018 05:15)  HR: 62 (17 Dec 2018 11:46) (50 - 64)  BP: 115/48 (17 Dec 2018 06:16) (103/43 - 118/53)  BP(mean): --  RR: 18 (17 Dec 2018 06:16) (18 - 22)  SpO2: 100% (17 Dec 2018 06:16) (95% - 100%)  Daily Height in cm: 167.64 (16 Dec 2018 17:30)    Daily     PE:    Constitutional: frail looking  HEENT: NC/AT, EOMI, PERRLA, conjunctivae clear; ears and nose atraumatic; pharynx clear  Neck: supple; thyroid not palpable  Back: no tenderness  Respiratory: respiratory effort increased; bilateral rhonchi and wheezing  Cardiovascular: S1S2 regular, no murmurs  Abdomen: soft, not tender, not distended, positive BS; no liver or spleen organomegaly  Genitourinary: no suprapubic tenderness  Musculoskeletal: no muscle tenderness, mild lower extremity edema and mild erythema  Neurological/ Psychiatric:   moving all extremities  Skin: no rashes; no palpable lesions    Labs: all available labs reviewed                        11.3   3.84  )-----------( 141      ( 17 Dec 2018 06:00 )             35.0         139  |  99  |  36<H>  ----------------------------<  110<H>  2.8<LL>   |  31  |  1.83<H>    Ca    8.3<L>      17 Dec 2018 06:00  Phos  4.1       Mg     2.1         TPro  7.3  /  Alb  3.1<L>  /  TBili  0.6  /  DBili  x   /  AST  19  /  ALT  8<L>  /  AlkPhos  110       LIVER FUNCTIONS - ( 17 Dec 2018 06:00 )  Alb: 3.1 g/dL / Pro: 7.3 gm/dL / ALK PHOS: 110 U/L / ALT: 8 U/L / AST: 19 U/L / GGT: x           Urinalysis Basic - ( 16 Dec 2018 18:42 )    Color: Yellow / Appearance: Clear / S.015 / pH: x  Gluc: x / Ketone: Negative  / Bili: Negative / Urobili: Negative mg/dL   Blood: x / Protein: Negative mg/dL / Nitrite: Negative   Leuk Esterase: Negative / RBC: 3-5 /HPF / WBC 0-2   Sq Epi: x / Non Sq Epi: Negative / Bacteria: Negative      Rapid Respiratory Viral Panel (18 @ 18:42)    Rapid RVP Result: Detected: This Respiratory Panel uses polymerase chain reaction (PCR) to detect for  adenovirus; coronavirus (HKU1, NL63, 229E, OC43); human metapneumovirus  (hMPV); human enterovirus/rhinovirus (Entero/RV); influenza A; influenza  A/H1; influenza A/H3; influenza A/H1-2009; influenza B; parainfluenza  viruses 1, 2, 3, 4; respiratory syncytial virus; Mycoplasma pneumoniae;  and Chlamydophila pneumoniae.    hMPV (RapRVP): Detected: Test Name:RVP  Called by:mreim  Called to: gavin magana  Read back 2 Pt IDs:y Read back values:y Date/Tm:18 20:59        < from: CT Chest No Cont (18 @ 08:49) >    EXAM:  CT CHEST                            PROCEDURE DATE:  2018          INTERPRETATION:  CT Chest without IV contrast        CLINICAL INFORMATION:  Cough.    TECHNIQUE: Contiguous axial sections were obtained through the chest   using single helical acquisition.  Images were acquired without IV   contrast.  In addition, sagittal and coronal reformatted images were   obtained.  This scan was performed using automatic exposure control   (radiation dose reduction software) to obtain a diagnostic image quality   scan with patient dose as low as reasonably achievable.     COMPARISON: Chest radiograph dated 2018 and chest CT dated   2016 are available for review.    FINDINGS:       LINES/TUBES: Single lead left-sided pacemaker/AICD is again noted.    LUNGS, AIRWAYS: Respiratory motion artifact decreasing sensitivity for   full evaluation. Central airways are patent. Opacification of the left   lower subsegmental bronchi.    New extensive mixed groundglass/consolidated infiltrates within the   posterior and inferior left upper lobe and within the superior left lower   lobe, concerning for pneumonia. There is moderate left lower lobe   subsegmental atelectasis which may be obstructive secondary to mucous.   Mild bronchiolitis at the superior right lower lobe (image 52 series 2).    Unchanged biapical paraseptal cysts, greater on right which may represent   emphysematous changes or interstitial lung disease. Chronic mild-moderate   pleural/parenchymal scarring of the left lower lobe. Unchanged lateral   right upper lobe pleural-based 0.5 cm nodule (image 43 series 2).    PLEURA: Chronic unchanged left posterior and basilar pleural thickening   with stable trace loculated left pleural effusion at the posterior left  lower lung.    HEART AND VESSELS: Stable marked cardiomegaly. No pericardial effusion.   Thoracic aorta is of normal caliber. Sternotomy wires and mediastinal   surgical clips consistent with CABG. Severe coronary artery   calcifications and/or stents. Redemonstration of small curvilinear   calcification within the left ventricle which may represent calcified   papillary muscle. Calcification of the left ventricular wall at the apex   likely sequela of remote myocardial infarction.    MEDIASTINUM, SHIRLEY, AXILLAE: Small hiatal hernia. Esophagus is mildly   patulous and air-fluid.    BONES: No acute bony pathology or fracture. Diffuse osteopenia. T12   inferior endplate Schmorl's node, unchanged.    CHEST WALL/SOFT TISSUES: Within normal limits    UPPER ABDOMEN: Unchanged indistinct 1.4 cm hypodensity within the spleen,   statistically likely hemangioma. A few subcentimeter coarse   calcifications within the spleen are again noted, likely sequela of   remote granulomatous disease.    IMPRESSION:      Extensive left upper and lower lobe mixed groundglass/consolidated   infiltrates concerning for pneumonia.    Opacification of the left lower segmental bronchi with associated   moderate obstructive left lower lobe subsegmental atelectasis.    Minimal superior right lower lobe bronchiolitis.    Remaining findings are unchanged since chest CT of 16.     < end of copied text >            Radiology: all available radiological tests reviewed    Advanced directives addressed: full resuscitation

## 2022-12-29 ENCOUNTER — TELEPHONE (OUTPATIENT)
Dept: FAMILY MEDICINE | Facility: CLINIC | Age: 77
End: 2022-12-29
Payer: MEDICARE

## 2023-01-03 ENCOUNTER — PATIENT MESSAGE (OUTPATIENT)
Dept: FAMILY MEDICINE | Facility: CLINIC | Age: 78
End: 2023-01-03
Payer: MEDICARE

## 2023-01-23 ENCOUNTER — TELEPHONE (OUTPATIENT)
Dept: PAIN MEDICINE | Facility: CLINIC | Age: 78
End: 2023-01-23
Payer: MEDICARE

## 2023-01-24 ENCOUNTER — OFFICE VISIT (OUTPATIENT)
Dept: PAIN MEDICINE | Facility: CLINIC | Age: 78
End: 2023-01-24
Payer: MEDICARE

## 2023-01-24 VITALS
BODY MASS INDEX: 28.57 KG/M2 | SYSTOLIC BLOOD PRESSURE: 137 MMHG | DIASTOLIC BLOOD PRESSURE: 74 MMHG | HEART RATE: 60 BPM | RESPIRATION RATE: 18 BRPM | HEIGHT: 60 IN | WEIGHT: 145.5 LBS

## 2023-01-24 DIAGNOSIS — M47.816 LUMBAR SPONDYLOSIS: ICD-10-CM

## 2023-01-24 DIAGNOSIS — M51.36 DDD (DEGENERATIVE DISC DISEASE), LUMBAR: ICD-10-CM

## 2023-01-24 DIAGNOSIS — M48.062 SPINAL STENOSIS, LUMBAR REGION WITH NEUROGENIC CLAUDICATION: ICD-10-CM

## 2023-01-24 DIAGNOSIS — M54.17 LUMBOSACRAL RADICULOPATHY: Primary | ICD-10-CM

## 2023-01-24 PROCEDURE — 3075F PR MOST RECENT SYSTOLIC BLOOD PRESS GE 130-139MM HG: ICD-10-PCS | Mod: CPTII,S$GLB,, | Performed by: NURSE PRACTITIONER

## 2023-01-24 PROCEDURE — 99499 UNLISTED E&M SERVICE: CPT | Mod: S$GLB,,, | Performed by: NURSE PRACTITIONER

## 2023-01-24 PROCEDURE — 1160F PR REVIEW ALL MEDS BY PRESCRIBER/CLIN PHARMACIST DOCUMENTED: ICD-10-PCS | Mod: CPTII,S$GLB,, | Performed by: NURSE PRACTITIONER

## 2023-01-24 PROCEDURE — 1101F PR PT FALLS ASSESS DOC 0-1 FALLS W/OUT INJ PAST YR: ICD-10-PCS | Mod: CPTII,S$GLB,, | Performed by: NURSE PRACTITIONER

## 2023-01-24 PROCEDURE — 99999 PR PBB SHADOW E&M-EST. PATIENT-LVL III: ICD-10-PCS | Mod: PBBFAC,,, | Performed by: NURSE PRACTITIONER

## 2023-01-24 PROCEDURE — 3078F PR MOST RECENT DIASTOLIC BLOOD PRESSURE < 80 MM HG: ICD-10-PCS | Mod: CPTII,S$GLB,, | Performed by: NURSE PRACTITIONER

## 2023-01-24 PROCEDURE — 1159F MED LIST DOCD IN RCRD: CPT | Mod: CPTII,S$GLB,, | Performed by: NURSE PRACTITIONER

## 2023-01-24 PROCEDURE — 1159F PR MEDICATION LIST DOCUMENTED IN MEDICAL RECORD: ICD-10-PCS | Mod: CPTII,S$GLB,, | Performed by: NURSE PRACTITIONER

## 2023-01-24 PROCEDURE — 99499 RISK ADDL DX/OHS AUDIT: ICD-10-PCS | Mod: S$GLB,,, | Performed by: NURSE PRACTITIONER

## 2023-01-24 PROCEDURE — 3075F SYST BP GE 130 - 139MM HG: CPT | Mod: CPTII,S$GLB,, | Performed by: NURSE PRACTITIONER

## 2023-01-24 PROCEDURE — 1101F PT FALLS ASSESS-DOCD LE1/YR: CPT | Mod: CPTII,S$GLB,, | Performed by: NURSE PRACTITIONER

## 2023-01-24 PROCEDURE — 1125F AMNT PAIN NOTED PAIN PRSNT: CPT | Mod: CPTII,S$GLB,, | Performed by: NURSE PRACTITIONER

## 2023-01-24 PROCEDURE — 99213 PR OFFICE/OUTPT VISIT, EST, LEVL III, 20-29 MIN: ICD-10-PCS | Mod: S$GLB,,, | Performed by: NURSE PRACTITIONER

## 2023-01-24 PROCEDURE — 3288F PR FALLS RISK ASSESSMENT DOCUMENTED: ICD-10-PCS | Mod: CPTII,S$GLB,, | Performed by: NURSE PRACTITIONER

## 2023-01-24 PROCEDURE — 99999 PR PBB SHADOW E&M-EST. PATIENT-LVL III: CPT | Mod: PBBFAC,,, | Performed by: NURSE PRACTITIONER

## 2023-01-24 PROCEDURE — 3078F DIAST BP <80 MM HG: CPT | Mod: CPTII,S$GLB,, | Performed by: NURSE PRACTITIONER

## 2023-01-24 PROCEDURE — 3288F FALL RISK ASSESSMENT DOCD: CPT | Mod: CPTII,S$GLB,, | Performed by: NURSE PRACTITIONER

## 2023-01-24 PROCEDURE — 1125F PR PAIN SEVERITY QUANTIFIED, PAIN PRESENT: ICD-10-PCS | Mod: CPTII,S$GLB,, | Performed by: NURSE PRACTITIONER

## 2023-01-24 PROCEDURE — 99213 OFFICE O/P EST LOW 20 MIN: CPT | Mod: S$GLB,,, | Performed by: NURSE PRACTITIONER

## 2023-01-24 PROCEDURE — 1160F RVW MEDS BY RX/DR IN RCRD: CPT | Mod: CPTII,S$GLB,, | Performed by: NURSE PRACTITIONER

## 2023-01-24 RX ORDER — ETODOLAC 400 MG/1
400 TABLET, FILM COATED ORAL EVERY 12 HOURS PRN
Qty: 60 TABLET | Refills: 2 | Status: SHIPPED | OUTPATIENT
Start: 2023-01-24 | End: 2023-04-24

## 2023-01-24 NOTE — H&P (VIEW-ONLY)
Chronic patient Established Note (Follow up visit)      SUBJECTIVE:    Interval History 1/24/2023:  Diana is here for follow up of back and right leg pain. She is now s/p right L3/4 and L4/5 TF SANKET on 12/7/22. She reports about 80% relief for about one month. Today, she is reporting lower back pain with radiation into the front and side of the right leg. She has numbness to the right leg intermittently without warning. She also has been having cramps to lower legs but says she thinks that she has not been drinking enough water. She completed aquatic PT which she thinks was helpful. Her pain today is 8/10.    Interval History 11/11/2022:  The patient returns today for follow up of back and leg pain. She has been in aquatic PT which she finds helpful. She attends twice weekly. Her back pain has improved somewhat because of this. She does have worsened right leg pain with walking and activity. The pain radiates down the front and side of the right leg with associated numbness. No current radiation on the left. She had benefit with ESIs in the past and wishes to repeat. Her pain today is 7/10.    Interval History 8/11/2022:  The patient is here for follow up of lower back pain. Since previous encounter, she underwent repeat L3/4 TF SANKET with 70% relief. We had originally planned for MBB but she started having more shooting pain into the legs. She feels like this was helpful. Her pain is tolerable at this time. Her pain today is 7/10.    Interval History 5/9/2022:  The patient is here for follow up of chronic back pain. She is having more back pain and stiffness in the morning. We did previously discuss lumbar MBB but she is worried about not having IV sedation. She says that she would like to further discuss the procedure today. Leg pain has been mild since previous SANKET. She does have intermittent numbness still. Back pain is greater than leg pain. Her pain today is 7/10.    Interval History 4/7/2022:  The patient is  here for follow up of lower back and leg pain. Since previous encounter, she underwent bilateral L3/4 TF SANKET on 1/26/22. She reports 80% relief of pain for about 2 months. She feels like this gave her significant benefit during that time and her pain was mild. She is now again having severe back pain with radiation into the anterior thighs. She would like to repeat the procedure. She would also like me to place another referral for aquatic PT. She stopped Mobic because she found Diclofenac more helpful. She has been taking as needed but not daily. She does find Gabapentin helpful but it sometimes makes her drowsy. Her pain today is 9/10.    Interval History 1/11/2022:  The patient returns to discuss continued lower back pain. She has not restarted aquatic PT due to increase in Covid-19 cases. She continues with sharp lower back pain that radiates into anterior thighs, bilaterally. She has associated numbness. The pain is worse with increased activity. Her pain today is 7/10.    Interval History 11/11/2021:  The patient presents today for 2 month follow up of lower back pain. Since previous encounter, she has not started aquatic PT because she is on the waiting list. She continues to report lower back pain with radiation down the left leg. We previously discussed lumbar MBB/RFA which she does not wish to pursue at this time. She states that diclofenac is not helping very much with her aching pain at this time. She denies any new weakness or symptoms at this time. Her pain today is 8/10.    Interval History 8/19/2021:  Diana returns today for follow up of chronic lower back pain. She reports that he has continued with aching pain. She has been in PT but is not finding it very helpful. She has not tried aquatic PT in the past. She is still having intermittent radiation down her legs. She has been unable to take 1200 mg daily of Gabapentin and has decreased to 600 mg daily due to sedation and dizziness with the  medication. Otherwise, no new complaints today. Her pain today is 7/10.    Interval History 6/18/2021:  The patient is here for follow up of lower back pain.  She has radiation of her pain into her anterior thighs but not below the knees.  She denies numbness or tingling at this time.  Her back pain is currently greater than her leg pain.  She has a lot of stiffness when she wakes the morning states it improves when she moves.  She had limited benefit with recent repeat bilateral L3-4 transforaminal steroid injection.  Her pain today is 7/10    Interval History 5/4/2021:  The patient is here for follow up of lower back and leg pain.  Previous encounter, physical therapy was ordered.  She states that she did not start physical therapy and would like me to replace the order for her.  She does report that she is noticing more muscle fatigue and weakness particularly with activity.  She is also having more back pain with radiation into the anterior lateral thighs with the past month.  She previously had significant been hip with bilateral L3/4 transforaminal epidural steroid injection and wishes to repeat this.  She found this more helpful than previous procedures. Her pain today is 7/10.    Interval History 3/2/2021:  The patient is here for follow up of chronic pain. She is having more back pain today which she attributes to the cold front coming in. She is having radiation into the buttocks and right anterior thigh. Her back pain is her primary complaint today. She describes it as throbbing. She recently completed both Covid-19 vaccines which she tolerated well. She is now taking Gabapentin 600 mg twice daily regularly. She notices improvement in right leg numbness.  Her pain today is 7/10.    Interval History 12/1/2020:  The patient iss here for follow-up of back and leg pain.  She is now status post bilateral L3/4 transforaminal epidural steroid injection on 11/11/2020. She is reporting 100% relief for about 2  weeks and then her pain started to return.  She is still having some benefit.  Her pain is located across the lower back with radiation into front and sides of the legs to the anterior feet with associated numbness.  She feels like when she walks sometimes her legs become weak.  This has improved slightly.  She denies any recent falls.  At her last OV, her Gabapentin was increased to 600 mg BID.  She says that she finds this helpful.  She has mild drowsiness.  She admits that she does not always take the medication and thought it was more of an as needed medication.  The patient denies any bowel or bladder incontinence or signs of saddle paresthesia.  She feels as though her pain today is worse than usual.  She rates her pain today as 10/10.    Interval history 10/15/2020:  Diana Herring presents to the clinic for a follow-up appointment for back pain. Since the last visit, Diana Herring states the pain has been worsening. Current pain intensity is 7/10. She reports that she continues to have low back pain that radiates to the bilateral hip and leg. She does report that she has noticed intermittent numbness in the right lateral and anterior thigh, that is sometimes associated with weakness in her right leg. She says that her leg gives out when this happens. She has not had any falls or other trauma. She did not get the bilateral L3/4 TFESI planned last time, and she is still taking only 300 mg bid of gabapentin.  Patient denies urinary incontinence, bowel incontinence, significant weight loss.    Initial visit:  Diana Herring presents to the clinic for the evaluation of low back pain. The pain started 2 years ago following loosing bone after a bone density and symptoms have been worsening.The pain is located in the low back area and radiates to the bilateral hip and leg.  The pain is described as aching, numbing, sharp and tight band and is rated as 8/10. The pain is rated with a score of  7/10 on the  BEST day and a score of 8/10 on the WORST day.  Symptoms interfere with daily activity and sleeping. The pain is exacerbated by Sitting, Standing, Bending, Coughing/Sneezing, Walking, Morning, Lifting and Getting out of bed/chair.  The pain is mitigated by heat, laying down and rest. She reports spending 0 hours per day reclining. The patient reports 6 hours of uninterrupted sleep per night.     Patient had L5/S1 ILESI 2 weeks ago and only gave her 1 day of relief.      Patient denies night fever/night sweats, urinary incontinence, bowel incontinence, significant weight loss and significant motor weakness.     Physical Therapy/Home Exercise: yes, was not beneficial      Pain Disability Index Review:  Last 3 PDI Scores 1/24/2023 11/11/2022 8/11/2022   Pain Disability Index (PDI) 40 35 32       Pain Medications:  Gabapentin 600 mg bid    Opioid Contract: no     report:  Reviewed and consistent with medication use as prescribed.    Pain Procedures:  12/7/22 Right L3/4 and L4/5 TF SANKET- % relief  7/20/22 Bilateral L3/4 TF SANKET- 70% relief  4/20/22 Bilateral L3/4 TF SANKET- 75% relief of leg pain  1/26/22 Bilateral L3/4 TF SANKET  5/19/21 bilateral L3/4 TF SANKET  11/11/20 Bilateral L3/4 TF SANKET- 100% relief for 2 weeks  5/13/20 L5/S1 ILESI  10/30/19 TFESI Bilateral L3-L4  09/04/19 TFESI Bilateral L3     Physical Therapy/Home Exercise: yes    Imaging:  Narrative & Impression     EXAMINATION:  XR LUMBAR SPINE 5 VIEW WITH FLEX AND EXT     CLINICAL HISTORY:  Low back pain, >6wks conservative tx, persistent-progressive sx, surgical candidate;  Lumbago with sciatica, left side     TECHNIQUE:  Five views of the lumbar spine plus flexion extension views were performed.     COMPARISON:  07/23/2019     FINDINGS:  The alignment of the lumbar spine is normal.     The vertebral body height are well maintained.  Mild disc space narrowing L3-L4.     Flexion and extension views demonstrate no evidence of translational abnormalities.     Minimal  osteophyte formation L2-L3 L4.     No fracture or osseous lesions.     The sacroiliac joints appears symmetrical on the AP view.     The remainder of the visualized soft tissue and osseous structures appear normal.     There is atherosclerotic plaque of the abdominal aorta.     Impression:     Mild spondylosis of the lumbar spine, not significantly changed from the prior study.        Electronically signed by: Ava Brennan MD  Date:                                            05/29/2020  Time:                                           08:28        Narrative & Impression     EXAMINATION:  XR HIPS BILATERAL 2 VIEW INCL AP PELVIS     CLINICAL HISTORY:  Lumbago with sciatica, left side     TECHNIQUE:  AP view of the pelvis and frogleg lateral views of both hips were performed.     COMPARISON:  None.     FINDINGS:  Normal mineralization.  Pelvis demonstrates no acute fractures.  No dislocations are noted.  Hip joints appear to be symmetric and within normal limits with mild DJD of the hip joints.  There is no osteoblastic or lytic lesions.  Soft tissues are unremarkable.     SI joints are symmetric and within normal limits.  There is mild spondylotic changes in the lower lumbar spine.     There is moderate amount of retained stool in the right colon.     Impression:     Mild DJD of the hip joints.     1. No acute fractures.        Electronically signed by: David Vasquez MD  Date:                                            05/29/2020  Time:                                           08:28     Narrative & Impression     EXAMINATION:  MRI LUMBAR SPINE WITHOUT CONTRAST     CLINICAL HISTORY:  back and bilateral leg pain; Lumbago with sciatica, left side     TECHNIQUE:  Multiplanar, multisequence MR images were acquired from the thoracolumbar junction to the sacrum without the administration of contrast.     COMPARISON:  None.     FINDINGS:  MRI examination of the lumbar spine was performed.  Intravenous contrast was not  utilized.  There are areas of hyperintense T2 hypointense T1 lesions of the kidneys likely cysts not optimally evaluated on this exam.     There is multilevel degenerative loss of disc signal and there is loss of disc space height most notable at L3-4.  There are chronic appearing endplate changes noted with some mild STIR hyperintensity that likely relates to edema associated with degenerative endplate change.  There is no evidence for high-grade spondylolisthesis, there is no evidence for high-grade or acute compression fracture deformity.  There is no finding to specifically suggest aggressive bone marrow replacement process.     The sagittal imaging includes the majority of T10 through the S3 segment, axial imaging includes the inferior aspect of T12 through the majority of S1.  On the sagittal imaging there is appearance suggesting mild degenerative disc disease and disc bulge at T10-11 and T11-12 with some mild posterior ligamentous thickening suggested at T10-11 however without evidence for high-grade spinal canal stenosis.     The T12-L1 level demonstrates no evidence for high-grade spinal canal or foraminal stenosis.     The L1-2 level demonstrates degenerative disc bulge with anterior impression upon the dural sac.  There is posterior facet arthropathy and ligamentous thickening there is mild spinal canal stenosis.  There is encroachment into the neural foramen at the level of the disc plane without obvious exiting nerve root impingement.     The L2-3 level demonstrates mild degenerative loss of disc space height.  There is mild degenerative disc bulge with anterior impression upon the dural sac.  There is posterior facet arthropathy with somewhat prominent ligamentous thickening with associated posterolateral encroachment.  There is mild-to-moderate spinal canal stenosis.  There is bilateral foraminal narrowing.     The L3-4 level demonstrates the aforementioned appearance of loss of disc space height and  degenerative loss of disc signal.  There is diffuse degenerative disc bulge with anterior impression upon the dural sac there is posterior facet arthropathy and ligamentous thickening there is high-grade spinal canal stenosis.  There is bilateral foraminal stenosis.     The L4-5 level demonstrates degenerative disc bulge with anterior impression upon the dural sac there is posterior facet arthropathy and ligamentous thickening with mild to moderate spinal canal stenosis.  There is bilateral foraminal narrowing/stenosis without obvious exiting nerve root impingement.     The L5-S1 level demonstrates no evidence for high-grade spinal canal stenosis.  Facet arthropathy is noted there is no evidence for high-grade foraminal stenosis or exiting nerve root impingement.     The visualized spinal cord appears to taper appropriately and appears of appropriate signal and caliber.  Mild signal intensity within the spinal canal on T1 axial imaging may relate to a minimal lipoma along the filum terminalis.  There is no additional evidence for intraspinal mass or abnormal fluid collection     Impression:     Multilevel chronic change of the lumbar spine with variable spinal canal and foraminal narrowing/stenosis as detailed above.  Correlation for any specific level of symptomatology is needed.  The greatest level of spinal canal stenosis is seen at L3-4.        Electronically signed by: Obie Aggarwal  Date:                                            07/27/2019  Time:                                           03:45     CMP  Sodium   Date Value Ref Range Status   03/04/2022 140 136 - 145 mmol/L Final     Potassium   Date Value Ref Range Status   03/04/2022 3.6 3.5 - 5.1 mmol/L Final     Chloride   Date Value Ref Range Status   03/04/2022 103 95 - 110 mmol/L Final     CO2   Date Value Ref Range Status   03/04/2022 28 23 - 29 mmol/L Final     Glucose   Date Value Ref Range Status   03/04/2022 101 70 - 110 mg/dL Final     BUN   Date  Value Ref Range Status   03/04/2022 14 8 - 23 mg/dL Final     Creatinine   Date Value Ref Range Status   03/04/2022 0.8 0.5 - 1.4 mg/dL Final     Calcium   Date Value Ref Range Status   03/04/2022 9.4 8.7 - 10.5 mg/dL Final     Total Protein   Date Value Ref Range Status   03/04/2022 7.9 6.0 - 8.4 g/dL Final     Albumin   Date Value Ref Range Status   03/04/2022 4.1 3.5 - 5.2 g/dL Final     Total Bilirubin   Date Value Ref Range Status   03/04/2022 0.8 0.1 - 1.0 mg/dL Final     Comment:     For infants and newborns, interpretation of results should be based  on gestational age, weight and in agreement with clinical  observations.    Premature Infant recommended reference ranges:  Up to 24 hours.............<8.0 mg/dL  Up to 48 hours............<12.0 mg/dL  3-5 days..................<15.0 mg/dL  6-29 days.................<15.0 mg/dL       Alkaline Phosphatase   Date Value Ref Range Status   03/04/2022 70 55 - 135 U/L Final     AST   Date Value Ref Range Status   03/04/2022 17 10 - 40 U/L Final     ALT   Date Value Ref Range Status   03/04/2022 20 10 - 44 U/L Final     Anion Gap   Date Value Ref Range Status   03/04/2022 9 8 - 16 mmol/L Final     eGFR if    Date Value Ref Range Status   03/04/2022 >60.0 >60 mL/min/1.73 m^2 Final     eGFR if non    Date Value Ref Range Status   03/04/2022 >60.0 >60 mL/min/1.73 m^2 Final     Comment:     Calculation used to obtain the estimated glomerular filtration  rate (eGFR) is the CKD-EPI equation.            Allergies: Review of patient's allergies indicates:  No Known Allergies    Current Medications:   Current Outpatient Medications   Medication Sig Dispense Refill    alendronate (FOSAMAX) 70 MG tablet TAKE 1 TABLET BY MOUTH ONE TIME PER WEEK 12 tablet 1    amLODIPine (NORVASC) 10 MG tablet TAKE 1 TABLET BY MOUTH EVERY DAY 90 tablet 3    atorvastatin (LIPITOR) 40 MG tablet TAKE 1 TABLET BY MOUTH EVERY DAY 90 tablet 2    calcium-vitamin D 500-125  mg-unit tablet Take 1 tablet by mouth once daily.      diclofenac (VOLTAREN) 75 MG EC tablet Take 1 tablet (75 mg total) by mouth 2 (two) times daily as needed (pain). 180 tablet 1    ergocalciferol (ERGOCALCIFEROL) 50,000 unit Cap Take 1 capsule (50,000 Units total) by mouth every Sunday. 12 capsule 0    fluticasone propionate (FLONASE) 50 mcg/actuation nasal spray 2 SPRAYS (100 MCG TOTAL) BY EACH NOSTRIL ROUTE ONCE DAILY. 48 mL 3    gabapentin (NEURONTIN) 300 MG capsule Take 2 capsules (600 mg total) by mouth 2 (two) times daily. 360 capsule 1    metoprolol succinate (TOPROL-XL) 100 MG 24 hr tablet TAKE 1 TABLET BY MOUTH EVERY DAY 90 tablet 3    multivitamin (THERAGRAN) per tablet Take 1 tablet by mouth once daily.      pantoprazole (PROTONIX) 40 MG tablet TAKE 1 TABLET BY MOUTH EVERY DAY 90 tablet 1    traZODone (DESYREL) 150 MG tablet TAKE 1 TABLET (150 MG TOTAL) BY MOUTH EVERY EVENING. 90 tablet 1    paroxetine (PAXIL) 20 MG tablet Take 1 tablet (20 mg total) by mouth every morning. 90 tablet 1     No current facility-administered medications for this visit.       REVIEW OF SYSTEMS:    GENERAL:  No weight loss, malaise or fevers.  HEENT:  Negative for frequent or significant headaches.  NECK:  Negative for lumps, goiter, pain and significant neck swelling.  RESPIRATORY:  Negative for cough, wheezing or shortness of breath.  CARDIOVASCULAR:  Negative for chest pain, leg swelling or palpitations.  GI:  Negative for abdominal discomfort, blood in stools or black stools or change in bowel habits. GERD.  MUSCULOSKELETAL:  See HPI.  SKIN:  Negative for lesions, rash, and itching.  PSYCH:  + for sleep disturbance, h/o depression  HEMATOLOGY/LYMPHOLOGY:  Negative for prolonged bleeding, bruising easily or swollen nodes.  NEURO:  + numbness. No history of headaches, syncope, paralysis, seizures or tremors.  All other reviewed and negative other than HPI.     Past Medical History:  Past Medical History:   Diagnosis Date     Anxiety     Arthritis     Corneal abrasion s    ? eye     Depression     Full dentures     GERD (gastroesophageal reflux disease)     Hyperlipidemia     Hypertension     Nuclear sclerosis of both eyes 6/5/2017    Osteoporosis     Restless leg syndrome        Past Surgical History:  Past Surgical History:   Procedure Laterality Date    COLONOSCOPY N/A 5/14/2019    Procedure: COLONOSCOPY;  Surgeon: Nahid Cline MD;  Location: [facility] ENDO;  Service: Endoscopy;  Laterality: N/A;    EPIDURAL STEROID INJECTION N/A 5/13/2020    Procedure: LUMBAR/CAUDAL L5/S1 IL SANKET ;  Surgeon: Miguel Latham MD;  Location: [facility] PAIN MGT;  Service: Pain Management;  Laterality: N/A;  NEEDS CONSENT    HYSTERECTOMY      PARTIAL HYSTERECTOMY      TRANSFORAMINAL EPIDURAL INJECTION OF STEROID Bilateral 9/4/2019    Procedure: Injection,steroid,epidural,transforaminal approach LUMBAR TRANSFORAMINAL BILATERAL L3 TF SANKET;  Surgeon: Miguel Latham MD;  Location: [facility] PAIN MGT;  Service: Pain Management;  Laterality: Bilateral;  NEEDS CONSENT    TRANSFORAMINAL EPIDURAL INJECTION OF STEROID Bilateral 10/30/2019    Procedure: TRANSFORAMINAL SANKET BILATERAL L3-L4;  Surgeon: Miguel Latham MD;  Location: [facility] PAIN MGT;  Service: Pain Management;  Laterality: Bilateral;  NEEDS CONSENT    TRANSFORAMINAL EPIDURAL INJECTION OF STEROID Bilateral 11/11/2020    Procedure: INJECTION, STEROID, EPIDURAL, TRANSFORAMINAL APPROACH, L3-L4;  Surgeon: Miguel Latham MD;  Location: [facility] PAIN MGT;  Service: Pain Management;  Laterality: Bilateral;    TRANSFORAMINAL EPIDURAL INJECTION OF STEROID Bilateral 5/19/2021    Procedure: INJECTION, STEROID, EPIDURAL, TRANSFORAMINAL APPROACH, L3-L4;  Surgeon: Miguel Latham MD;  Location: [facility] PAIN MGT;  Service: Pain Management;  Laterality: Bilateral;    TRANSFORAMINAL EPIDURAL INJECTION OF STEROID Bilateral 1/26/2022    Procedure: Injection,steroid,epidural,transforaminal approach BILATERAL L3/4;  Surgeon:  Miguel Latham MD;  Location: [facility] PAIN MGT;  Service: Pain Management;  Laterality: Bilateral;    TRANSFORAMINAL EPIDURAL INJECTION OF STEROID Bilateral 4/20/2022    Procedure: INJECTION, STEROID, EPIDURAL, TRANSFORAMINAL APPROACH, Bilateral L3-L4;  Surgeon: Miguel Latham MD;  Location: [facility] PAIN MGT;  Service: Pain Management;  Laterality: Bilateral;    TRANSFORAMINAL EPIDURAL INJECTION OF STEROID Bilateral 7/20/2022    Procedure: INJECTION, STEROID, EPIDURAL, TRANSFORAMINAL APPROACH, BILATERAL L3-L4 CONTRAST;  Surgeon: Miguel Latham MD;  Location: [facility] PAIN MGT;  Service: Pain Management;  Laterality: Bilateral;    TRANSFORAMINAL EPIDURAL INJECTION OF STEROID Right 12/7/2022    Procedure: INJECTION, STEROID, EPIDURAL, TRANSFORAMINAL APPROACH, RIGHT L3/L4 AND L4/L5 CONTRAST;  Surgeon: Miguel Latham MD;  Location: [facility] PAIN MGT;  Service: Pain Management;  Laterality: Right;    TUBAL LIGATION Bilateral        Family History:  Family History   Problem Relation Age of Onset    Diabetes Mother     Stroke Mother     Glaucoma Mother     Cataracts Mother     Cancer Father         Lung    No Known Problems Sister     Stroke Brother     Hypertension Daughter     Hypertension Daughter     Hypertension Daughter     No Known Problems Daughter     Heart disease Son     Early death Son     No Known Problems Maternal Aunt     No Known Problems Maternal Uncle     No Known Problems Paternal Aunt     No Known Problems Paternal Uncle     No Known Problems Maternal Grandmother     No Known Problems Maternal Grandfather     No Known Problems Paternal Grandmother     No Known Problems Paternal Grandfather     Amblyopia Neg Hx     Blindness Neg Hx     Macular degeneration Neg Hx     Retinal detachment Neg Hx     Strabismus Neg Hx     Thyroid disease Neg Hx        Social History:  Social History     Socioeconomic History    Marital status:    Tobacco Use    Smoking status: Never    Smokeless tobacco: Never    Substance and Sexual Activity    Alcohol use: No    Drug use: No    Sexual activity: Not Currently   Social History Narrative    Patient is  w/ 5 children, one  from heart disease.The patient is retired.       OBJECTIVE:    /74   Pulse 60   Resp 18   Ht 5' (1.524 m)   Wt 66 kg (145 lb 8.1 oz)   BMI 28.42 kg/m²     PHYSICAL EXAMINATION:    General appearance: Well appearing, in no acute distress, alert and oriented x3.  Psych:  Mood and affect appropriate.  Skin: Skin color, texture, turgor normal, no rashes or lesions, in both upper and lower body.  Head/face:  Normocephalic, atraumatic. No palpable lymph nodes.  Back: Straight leg raising in the sitting and supine positions causes back pain on the right. Limited range of motion with pain on flexion and extension. Positive facet loading bilaterally, R>L.  Extremities: Peripheral joint ROM is full and pain free without obvious instability or laxity in all four extremities. No deformities, edema, or skin discoloration. Good capillary refill.  Musculoskeletal: No atrophy or tone abnormalities are noted. 5/5 strength in right ankle with plantar and 4/5 on dorsiflexion. 5/5 strength in left ankle with plantar and 4/5 on dorsiflexion. 4/5 strength with right knee flexion and extension. 5/5 strength with left knee flexion and extension. 5/5 strength in right EHL, 5/5 strength in left EHL. Right hip flexion is 4/5, left is 5/5.  Neuro: No loss of sensation noted.  Gait: Antalgic.    ASSESSMENT: 78 y.o. year old female with lower back pain, consistent with     1. Lumbosacral radiculopathy  Procedure Order to Pain Management      2. Lumbar spondylosis        3. Spinal stenosis, lumbar region with neurogenic claudication        4. DDD (degenerative disc disease), lumbar            PLAN:     - Previous imaging was reviewed and discussed with the patient today.  - I have stressed the importance of physical activity and a home exercise plan to help  with pain and improve health.  - Patient can continue with medications for now since they are providing benefits, using them appropriately, and without side effects.  - Continue Gabapentin 600 mg BID.  - D/C Voltaren BID PRN pain as she says it is no longer helpful. Start Etodolac 400 mg BID PRN pain with food. Recommend lowest dose for shortest duration possible. Pt denies hx of GI ulcers or bleeds, no blood thinners, so significant known cardiac disease, no kidney disease.   - Schedule for repeat right L3/4 and L4/5 TF SANKET.  - Counseled patient regarding the importance of activity modification, constant sleeping habits and physical therapy. Continue with aquatic PT.  - RTC 4 weeks after SANKET.    The above plan and management options were discussed at length with patient. Patient is in agreement with the above and verbalized understanding.    Christin Ventura, BRENDA  01/24/2023

## 2023-01-24 NOTE — PROGRESS NOTES
Chronic patient Established Note (Follow up visit)      SUBJECTIVE:    Interval History 1/24/2023:  Dinaa is here for follow up of back and right leg pain. She is now s/p right L3/4 and L4/5 TF SANKET on 12/7/22. She reports about 80% relief for about one month. Today, she is reporting lower back pain with radiation into the front and side of the right leg. She has numbness to the right leg intermittently without warning. She also has been having cramps to lower legs but says she thinks that she has not been drinking enough water. She completed aquatic PT which she thinks was helpful. Her pain today is 8/10.    Interval History 11/11/2022:  The patient returns today for follow up of back and leg pain. She has been in aquatic PT which she finds helpful. She attends twice weekly. Her back pain has improved somewhat because of this. She does have worsened right leg pain with walking and activity. The pain radiates down the front and side of the right leg with associated numbness. No current radiation on the left. She had benefit with ESIs in the past and wishes to repeat. Her pain today is 7/10.    Interval History 8/11/2022:  The patient is here for follow up of lower back pain. Since previous encounter, she underwent repeat L3/4 TF SANKET with 70% relief. We had originally planned for MBB but she started having more shooting pain into the legs. She feels like this was helpful. Her pain is tolerable at this time. Her pain today is 7/10.    Interval History 5/9/2022:  The patient is here for follow up of chronic back pain. She is having more back pain and stiffness in the morning. We did previously discuss lumbar MBB but she is worried about not having IV sedation. She says that she would like to further discuss the procedure today. Leg pain has been mild since previous SANKET. She does have intermittent numbness still. Back pain is greater than leg pain. Her pain today is 7/10.    Interval History 4/7/2022:  The patient is  here for follow up of lower back and leg pain. Since previous encounter, she underwent bilateral L3/4 TF SANKET on 1/26/22. She reports 80% relief of pain for about 2 months. She feels like this gave her significant benefit during that time and her pain was mild. She is now again having severe back pain with radiation into the anterior thighs. She would like to repeat the procedure. She would also like me to place another referral for aquatic PT. She stopped Mobic because she found Diclofenac more helpful. She has been taking as needed but not daily. She does find Gabapentin helpful but it sometimes makes her drowsy. Her pain today is 9/10.    Interval History 1/11/2022:  The patient returns to discuss continued lower back pain. She has not restarted aquatic PT due to increase in Covid-19 cases. She continues with sharp lower back pain that radiates into anterior thighs, bilaterally. She has associated numbness. The pain is worse with increased activity. Her pain today is 7/10.    Interval History 11/11/2021:  The patient presents today for 2 month follow up of lower back pain. Since previous encounter, she has not started aquatic PT because she is on the waiting list. She continues to report lower back pain with radiation down the left leg. We previously discussed lumbar MBB/RFA which she does not wish to pursue at this time. She states that diclofenac is not helping very much with her aching pain at this time. She denies any new weakness or symptoms at this time. Her pain today is 8/10.    Interval History 8/19/2021:  Diana returns today for follow up of chronic lower back pain. She reports that he has continued with aching pain. She has been in PT but is not finding it very helpful. She has not tried aquatic PT in the past. She is still having intermittent radiation down her legs. She has been unable to take 1200 mg daily of Gabapentin and has decreased to 600 mg daily due to sedation and dizziness with the  medication. Otherwise, no new complaints today. Her pain today is 7/10.    Interval History 6/18/2021:  The patient is here for follow up of lower back pain.  She has radiation of her pain into her anterior thighs but not below the knees.  She denies numbness or tingling at this time.  Her back pain is currently greater than her leg pain.  She has a lot of stiffness when she wakes the morning states it improves when she moves.  She had limited benefit with recent repeat bilateral L3-4 transforaminal steroid injection.  Her pain today is 7/10    Interval History 5/4/2021:  The patient is here for follow up of lower back and leg pain.  Previous encounter, physical therapy was ordered.  She states that she did not start physical therapy and would like me to replace the order for her.  She does report that she is noticing more muscle fatigue and weakness particularly with activity.  She is also having more back pain with radiation into the anterior lateral thighs with the past month.  She previously had significant been hip with bilateral L3/4 transforaminal epidural steroid injection and wishes to repeat this.  She found this more helpful than previous procedures. Her pain today is 7/10.    Interval History 3/2/2021:  The patient is here for follow up of chronic pain. She is having more back pain today which she attributes to the cold front coming in. She is having radiation into the buttocks and right anterior thigh. Her back pain is her primary complaint today. She describes it as throbbing. She recently completed both Covid-19 vaccines which she tolerated well. She is now taking Gabapentin 600 mg twice daily regularly. She notices improvement in right leg numbness.  Her pain today is 7/10.    Interval History 12/1/2020:  The patient iss here for follow-up of back and leg pain.  She is now status post bilateral L3/4 transforaminal epidural steroid injection on 11/11/2020. She is reporting 100% relief for about 2  weeks and then her pain started to return.  She is still having some benefit.  Her pain is located across the lower back with radiation into front and sides of the legs to the anterior feet with associated numbness.  She feels like when she walks sometimes her legs become weak.  This has improved slightly.  She denies any recent falls.  At her last OV, her Gabapentin was increased to 600 mg BID.  She says that she finds this helpful.  She has mild drowsiness.  She admits that she does not always take the medication and thought it was more of an as needed medication.  The patient denies any bowel or bladder incontinence or signs of saddle paresthesia.  She feels as though her pain today is worse than usual.  She rates her pain today as 10/10.    Interval history 10/15/2020:  Diana Herring presents to the clinic for a follow-up appointment for back pain. Since the last visit, Diana Herring states the pain has been worsening. Current pain intensity is 7/10. She reports that she continues to have low back pain that radiates to the bilateral hip and leg. She does report that she has noticed intermittent numbness in the right lateral and anterior thigh, that is sometimes associated with weakness in her right leg. She says that her leg gives out when this happens. She has not had any falls or other trauma. She did not get the bilateral L3/4 TFESI planned last time, and she is still taking only 300 mg bid of gabapentin.  Patient denies urinary incontinence, bowel incontinence, significant weight loss.    Initial visit:  Diana Herring presents to the clinic for the evaluation of low back pain. The pain started 2 years ago following loosing bone after a bone density and symptoms have been worsening.The pain is located in the low back area and radiates to the bilateral hip and leg.  The pain is described as aching, numbing, sharp and tight band and is rated as 8/10. The pain is rated with a score of  7/10 on the  BEST day and a score of 8/10 on the WORST day.  Symptoms interfere with daily activity and sleeping. The pain is exacerbated by Sitting, Standing, Bending, Coughing/Sneezing, Walking, Morning, Lifting and Getting out of bed/chair.  The pain is mitigated by heat, laying down and rest. She reports spending 0 hours per day reclining. The patient reports 6 hours of uninterrupted sleep per night.     Patient had L5/S1 ILESI 2 weeks ago and only gave her 1 day of relief.      Patient denies night fever/night sweats, urinary incontinence, bowel incontinence, significant weight loss and significant motor weakness.     Physical Therapy/Home Exercise: yes, was not beneficial      Pain Disability Index Review:  Last 3 PDI Scores 1/24/2023 11/11/2022 8/11/2022   Pain Disability Index (PDI) 40 35 32       Pain Medications:  Gabapentin 600 mg bid    Opioid Contract: no     report:  Reviewed and consistent with medication use as prescribed.    Pain Procedures:  12/7/22 Right L3/4 and L4/5 TF SANKET- % relief  7/20/22 Bilateral L3/4 TF SANKET- 70% relief  4/20/22 Bilateral L3/4 TF SANKET- 75% relief of leg pain  1/26/22 Bilateral L3/4 TF SANKET  5/19/21 bilateral L3/4 TF SANKET  11/11/20 Bilateral L3/4 TF SANKET- 100% relief for 2 weeks  5/13/20 L5/S1 ILESI  10/30/19 TFESI Bilateral L3-L4  09/04/19 TFESI Bilateral L3     Physical Therapy/Home Exercise: yes    Imaging:  Narrative & Impression     EXAMINATION:  XR LUMBAR SPINE 5 VIEW WITH FLEX AND EXT     CLINICAL HISTORY:  Low back pain, >6wks conservative tx, persistent-progressive sx, surgical candidate;  Lumbago with sciatica, left side     TECHNIQUE:  Five views of the lumbar spine plus flexion extension views were performed.     COMPARISON:  07/23/2019     FINDINGS:  The alignment of the lumbar spine is normal.     The vertebral body height are well maintained.  Mild disc space narrowing L3-L4.     Flexion and extension views demonstrate no evidence of translational abnormalities.     Minimal  osteophyte formation L2-L3 L4.     No fracture or osseous lesions.     The sacroiliac joints appears symmetrical on the AP view.     The remainder of the visualized soft tissue and osseous structures appear normal.     There is atherosclerotic plaque of the abdominal aorta.     Impression:     Mild spondylosis of the lumbar spine, not significantly changed from the prior study.        Electronically signed by: Ava Brennan MD  Date:                                            05/29/2020  Time:                                           08:28        Narrative & Impression     EXAMINATION:  XR HIPS BILATERAL 2 VIEW INCL AP PELVIS     CLINICAL HISTORY:  Lumbago with sciatica, left side     TECHNIQUE:  AP view of the pelvis and frogleg lateral views of both hips were performed.     COMPARISON:  None.     FINDINGS:  Normal mineralization.  Pelvis demonstrates no acute fractures.  No dislocations are noted.  Hip joints appear to be symmetric and within normal limits with mild DJD of the hip joints.  There is no osteoblastic or lytic lesions.  Soft tissues are unremarkable.     SI joints are symmetric and within normal limits.  There is mild spondylotic changes in the lower lumbar spine.     There is moderate amount of retained stool in the right colon.     Impression:     Mild DJD of the hip joints.     1. No acute fractures.        Electronically signed by: Daivd Vasquez MD  Date:                                            05/29/2020  Time:                                           08:28     Narrative & Impression     EXAMINATION:  MRI LUMBAR SPINE WITHOUT CONTRAST     CLINICAL HISTORY:  back and bilateral leg pain; Lumbago with sciatica, left side     TECHNIQUE:  Multiplanar, multisequence MR images were acquired from the thoracolumbar junction to the sacrum without the administration of contrast.     COMPARISON:  None.     FINDINGS:  MRI examination of the lumbar spine was performed.  Intravenous contrast was not  utilized.  There are areas of hyperintense T2 hypointense T1 lesions of the kidneys likely cysts not optimally evaluated on this exam.     There is multilevel degenerative loss of disc signal and there is loss of disc space height most notable at L3-4.  There are chronic appearing endplate changes noted with some mild STIR hyperintensity that likely relates to edema associated with degenerative endplate change.  There is no evidence for high-grade spondylolisthesis, there is no evidence for high-grade or acute compression fracture deformity.  There is no finding to specifically suggest aggressive bone marrow replacement process.     The sagittal imaging includes the majority of T10 through the S3 segment, axial imaging includes the inferior aspect of T12 through the majority of S1.  On the sagittal imaging there is appearance suggesting mild degenerative disc disease and disc bulge at T10-11 and T11-12 with some mild posterior ligamentous thickening suggested at T10-11 however without evidence for high-grade spinal canal stenosis.     The T12-L1 level demonstrates no evidence for high-grade spinal canal or foraminal stenosis.     The L1-2 level demonstrates degenerative disc bulge with anterior impression upon the dural sac.  There is posterior facet arthropathy and ligamentous thickening there is mild spinal canal stenosis.  There is encroachment into the neural foramen at the level of the disc plane without obvious exiting nerve root impingement.     The L2-3 level demonstrates mild degenerative loss of disc space height.  There is mild degenerative disc bulge with anterior impression upon the dural sac.  There is posterior facet arthropathy with somewhat prominent ligamentous thickening with associated posterolateral encroachment.  There is mild-to-moderate spinal canal stenosis.  There is bilateral foraminal narrowing.     The L3-4 level demonstrates the aforementioned appearance of loss of disc space height and  degenerative loss of disc signal.  There is diffuse degenerative disc bulge with anterior impression upon the dural sac there is posterior facet arthropathy and ligamentous thickening there is high-grade spinal canal stenosis.  There is bilateral foraminal stenosis.     The L4-5 level demonstrates degenerative disc bulge with anterior impression upon the dural sac there is posterior facet arthropathy and ligamentous thickening with mild to moderate spinal canal stenosis.  There is bilateral foraminal narrowing/stenosis without obvious exiting nerve root impingement.     The L5-S1 level demonstrates no evidence for high-grade spinal canal stenosis.  Facet arthropathy is noted there is no evidence for high-grade foraminal stenosis or exiting nerve root impingement.     The visualized spinal cord appears to taper appropriately and appears of appropriate signal and caliber.  Mild signal intensity within the spinal canal on T1 axial imaging may relate to a minimal lipoma along the filum terminalis.  There is no additional evidence for intraspinal mass or abnormal fluid collection     Impression:     Multilevel chronic change of the lumbar spine with variable spinal canal and foraminal narrowing/stenosis as detailed above.  Correlation for any specific level of symptomatology is needed.  The greatest level of spinal canal stenosis is seen at L3-4.        Electronically signed by: Obie Aggarwal  Date:                                            07/27/2019  Time:                                           03:45     CMP  Sodium   Date Value Ref Range Status   03/04/2022 140 136 - 145 mmol/L Final     Potassium   Date Value Ref Range Status   03/04/2022 3.6 3.5 - 5.1 mmol/L Final     Chloride   Date Value Ref Range Status   03/04/2022 103 95 - 110 mmol/L Final     CO2   Date Value Ref Range Status   03/04/2022 28 23 - 29 mmol/L Final     Glucose   Date Value Ref Range Status   03/04/2022 101 70 - 110 mg/dL Final     BUN   Date  Value Ref Range Status   03/04/2022 14 8 - 23 mg/dL Final     Creatinine   Date Value Ref Range Status   03/04/2022 0.8 0.5 - 1.4 mg/dL Final     Calcium   Date Value Ref Range Status   03/04/2022 9.4 8.7 - 10.5 mg/dL Final     Total Protein   Date Value Ref Range Status   03/04/2022 7.9 6.0 - 8.4 g/dL Final     Albumin   Date Value Ref Range Status   03/04/2022 4.1 3.5 - 5.2 g/dL Final     Total Bilirubin   Date Value Ref Range Status   03/04/2022 0.8 0.1 - 1.0 mg/dL Final     Comment:     For infants and newborns, interpretation of results should be based  on gestational age, weight and in agreement with clinical  observations.    Premature Infant recommended reference ranges:  Up to 24 hours.............<8.0 mg/dL  Up to 48 hours............<12.0 mg/dL  3-5 days..................<15.0 mg/dL  6-29 days.................<15.0 mg/dL       Alkaline Phosphatase   Date Value Ref Range Status   03/04/2022 70 55 - 135 U/L Final     AST   Date Value Ref Range Status   03/04/2022 17 10 - 40 U/L Final     ALT   Date Value Ref Range Status   03/04/2022 20 10 - 44 U/L Final     Anion Gap   Date Value Ref Range Status   03/04/2022 9 8 - 16 mmol/L Final     eGFR if    Date Value Ref Range Status   03/04/2022 >60.0 >60 mL/min/1.73 m^2 Final     eGFR if non    Date Value Ref Range Status   03/04/2022 >60.0 >60 mL/min/1.73 m^2 Final     Comment:     Calculation used to obtain the estimated glomerular filtration  rate (eGFR) is the CKD-EPI equation.            Allergies: Review of patient's allergies indicates:  No Known Allergies    Current Medications:   Current Outpatient Medications   Medication Sig Dispense Refill    alendronate (FOSAMAX) 70 MG tablet TAKE 1 TABLET BY MOUTH ONE TIME PER WEEK 12 tablet 1    amLODIPine (NORVASC) 10 MG tablet TAKE 1 TABLET BY MOUTH EVERY DAY 90 tablet 3    atorvastatin (LIPITOR) 40 MG tablet TAKE 1 TABLET BY MOUTH EVERY DAY 90 tablet 2    calcium-vitamin D 500-125  mg-unit tablet Take 1 tablet by mouth once daily.      diclofenac (VOLTAREN) 75 MG EC tablet Take 1 tablet (75 mg total) by mouth 2 (two) times daily as needed (pain). 180 tablet 1    ergocalciferol (ERGOCALCIFEROL) 50,000 unit Cap Take 1 capsule (50,000 Units total) by mouth every Sunday. 12 capsule 0    fluticasone propionate (FLONASE) 50 mcg/actuation nasal spray 2 SPRAYS (100 MCG TOTAL) BY EACH NOSTRIL ROUTE ONCE DAILY. 48 mL 3    gabapentin (NEURONTIN) 300 MG capsule Take 2 capsules (600 mg total) by mouth 2 (two) times daily. 360 capsule 1    metoprolol succinate (TOPROL-XL) 100 MG 24 hr tablet TAKE 1 TABLET BY MOUTH EVERY DAY 90 tablet 3    multivitamin (THERAGRAN) per tablet Take 1 tablet by mouth once daily.      pantoprazole (PROTONIX) 40 MG tablet TAKE 1 TABLET BY MOUTH EVERY DAY 90 tablet 1    traZODone (DESYREL) 150 MG tablet TAKE 1 TABLET (150 MG TOTAL) BY MOUTH EVERY EVENING. 90 tablet 1    paroxetine (PAXIL) 20 MG tablet Take 1 tablet (20 mg total) by mouth every morning. 90 tablet 1     No current facility-administered medications for this visit.       REVIEW OF SYSTEMS:    GENERAL:  No weight loss, malaise or fevers.  HEENT:  Negative for frequent or significant headaches.  NECK:  Negative for lumps, goiter, pain and significant neck swelling.  RESPIRATORY:  Negative for cough, wheezing or shortness of breath.  CARDIOVASCULAR:  Negative for chest pain, leg swelling or palpitations.  GI:  Negative for abdominal discomfort, blood in stools or black stools or change in bowel habits. GERD.  MUSCULOSKELETAL:  See HPI.  SKIN:  Negative for lesions, rash, and itching.  PSYCH:  + for sleep disturbance, h/o depression  HEMATOLOGY/LYMPHOLOGY:  Negative for prolonged bleeding, bruising easily or swollen nodes.  NEURO:  + numbness. No history of headaches, syncope, paralysis, seizures or tremors.  All other reviewed and negative other than HPI.     Past Medical History:  Past Medical History:   Diagnosis Date     Anxiety     Arthritis     Corneal abrasion s    ? eye     Depression     Full dentures     GERD (gastroesophageal reflux disease)     Hyperlipidemia     Hypertension     Nuclear sclerosis of both eyes 6/5/2017    Osteoporosis     Restless leg syndrome        Past Surgical History:  Past Surgical History:   Procedure Laterality Date    COLONOSCOPY N/A 5/14/2019    Procedure: COLONOSCOPY;  Surgeon: Nahid Cline MD;  Location: [facility] ENDO;  Service: Endoscopy;  Laterality: N/A;    EPIDURAL STEROID INJECTION N/A 5/13/2020    Procedure: LUMBAR/CAUDAL L5/S1 IL SANKET ;  Surgeon: Miguel Latham MD;  Location: [facility] PAIN MGT;  Service: Pain Management;  Laterality: N/A;  NEEDS CONSENT    HYSTERECTOMY      PARTIAL HYSTERECTOMY      TRANSFORAMINAL EPIDURAL INJECTION OF STEROID Bilateral 9/4/2019    Procedure: Injection,steroid,epidural,transforaminal approach LUMBAR TRANSFORAMINAL BILATERAL L3 TF SANKET;  Surgeon: Miguel Latham MD;  Location: [facility] PAIN MGT;  Service: Pain Management;  Laterality: Bilateral;  NEEDS CONSENT    TRANSFORAMINAL EPIDURAL INJECTION OF STEROID Bilateral 10/30/2019    Procedure: TRANSFORAMINAL SANKET BILATERAL L3-L4;  Surgeon: Miguel Latham MD;  Location: [facility] PAIN MGT;  Service: Pain Management;  Laterality: Bilateral;  NEEDS CONSENT    TRANSFORAMINAL EPIDURAL INJECTION OF STEROID Bilateral 11/11/2020    Procedure: INJECTION, STEROID, EPIDURAL, TRANSFORAMINAL APPROACH, L3-L4;  Surgeon: Miguel Latham MD;  Location: [facility] PAIN MGT;  Service: Pain Management;  Laterality: Bilateral;    TRANSFORAMINAL EPIDURAL INJECTION OF STEROID Bilateral 5/19/2021    Procedure: INJECTION, STEROID, EPIDURAL, TRANSFORAMINAL APPROACH, L3-L4;  Surgeon: Miguel Latham MD;  Location: [facility] PAIN MGT;  Service: Pain Management;  Laterality: Bilateral;    TRANSFORAMINAL EPIDURAL INJECTION OF STEROID Bilateral 1/26/2022    Procedure: Injection,steroid,epidural,transforaminal approach BILATERAL L3/4;  Surgeon:  Miguel Latham MD;  Location: [facility] PAIN MGT;  Service: Pain Management;  Laterality: Bilateral;    TRANSFORAMINAL EPIDURAL INJECTION OF STEROID Bilateral 4/20/2022    Procedure: INJECTION, STEROID, EPIDURAL, TRANSFORAMINAL APPROACH, Bilateral L3-L4;  Surgeon: Miguel Latham MD;  Location: [facility] PAIN MGT;  Service: Pain Management;  Laterality: Bilateral;    TRANSFORAMINAL EPIDURAL INJECTION OF STEROID Bilateral 7/20/2022    Procedure: INJECTION, STEROID, EPIDURAL, TRANSFORAMINAL APPROACH, BILATERAL L3-L4 CONTRAST;  Surgeon: Miguel Latham MD;  Location: [facility] PAIN MGT;  Service: Pain Management;  Laterality: Bilateral;    TRANSFORAMINAL EPIDURAL INJECTION OF STEROID Right 12/7/2022    Procedure: INJECTION, STEROID, EPIDURAL, TRANSFORAMINAL APPROACH, RIGHT L3/L4 AND L4/L5 CONTRAST;  Surgeon: Miguel Latham MD;  Location: [facility] PAIN MGT;  Service: Pain Management;  Laterality: Right;    TUBAL LIGATION Bilateral        Family History:  Family History   Problem Relation Age of Onset    Diabetes Mother     Stroke Mother     Glaucoma Mother     Cataracts Mother     Cancer Father         Lung    No Known Problems Sister     Stroke Brother     Hypertension Daughter     Hypertension Daughter     Hypertension Daughter     No Known Problems Daughter     Heart disease Son     Early death Son     No Known Problems Maternal Aunt     No Known Problems Maternal Uncle     No Known Problems Paternal Aunt     No Known Problems Paternal Uncle     No Known Problems Maternal Grandmother     No Known Problems Maternal Grandfather     No Known Problems Paternal Grandmother     No Known Problems Paternal Grandfather     Amblyopia Neg Hx     Blindness Neg Hx     Macular degeneration Neg Hx     Retinal detachment Neg Hx     Strabismus Neg Hx     Thyroid disease Neg Hx        Social History:  Social History     Socioeconomic History    Marital status:    Tobacco Use    Smoking status: Never    Smokeless tobacco: Never    Substance and Sexual Activity    Alcohol use: No    Drug use: No    Sexual activity: Not Currently   Social History Narrative    Patient is  w/ 5 children, one  from heart disease.The patient is retired.       OBJECTIVE:    /74   Pulse 60   Resp 18   Ht 5' (1.524 m)   Wt 66 kg (145 lb 8.1 oz)   BMI 28.42 kg/m²     PHYSICAL EXAMINATION:    General appearance: Well appearing, in no acute distress, alert and oriented x3.  Psych:  Mood and affect appropriate.  Skin: Skin color, texture, turgor normal, no rashes or lesions, in both upper and lower body.  Head/face:  Normocephalic, atraumatic. No palpable lymph nodes.  Back: Straight leg raising in the sitting and supine positions causes back pain on the right. Limited range of motion with pain on flexion and extension. Positive facet loading bilaterally, R>L.  Extremities: Peripheral joint ROM is full and pain free without obvious instability or laxity in all four extremities. No deformities, edema, or skin discoloration. Good capillary refill.  Musculoskeletal: No atrophy or tone abnormalities are noted. 5/5 strength in right ankle with plantar and 4/5 on dorsiflexion. 5/5 strength in left ankle with plantar and 4/5 on dorsiflexion. 4/5 strength with right knee flexion and extension. 5/5 strength with left knee flexion and extension. 5/5 strength in right EHL, 5/5 strength in left EHL. Right hip flexion is 4/5, left is 5/5.  Neuro: No loss of sensation noted.  Gait: Antalgic.    ASSESSMENT: 78 y.o. year old female with lower back pain, consistent with     1. Lumbosacral radiculopathy  Procedure Order to Pain Management      2. Lumbar spondylosis        3. Spinal stenosis, lumbar region with neurogenic claudication        4. DDD (degenerative disc disease), lumbar            PLAN:     - Previous imaging was reviewed and discussed with the patient today.  - I have stressed the importance of physical activity and a home exercise plan to help  with pain and improve health.  - Patient can continue with medications for now since they are providing benefits, using them appropriately, and without side effects.  - Continue Gabapentin 600 mg BID.  - D/C Voltaren BID PRN pain as she says it is no longer helpful. Start Etodolac 400 mg BID PRN pain with food. Recommend lowest dose for shortest duration possible. Pt denies hx of GI ulcers or bleeds, no blood thinners, so significant known cardiac disease, no kidney disease.   - Schedule for repeat right L3/4 and L4/5 TF SANKET.  - Counseled patient regarding the importance of activity modification, constant sleeping habits and physical therapy. Continue with aquatic PT.  - RTC 4 weeks after SANKET.    The above plan and management options were discussed at length with patient. Patient is in agreement with the above and verbalized understanding.    Christin Ventura, BRENDA  01/24/2023

## 2023-02-08 ENCOUNTER — HOSPITAL ENCOUNTER (OUTPATIENT)
Facility: OTHER | Age: 78
Discharge: HOME | End: 2023-02-08
Attending: ANESTHESIOLOGY | Admitting: ANESTHESIOLOGY
Payer: MEDICARE

## 2023-02-08 VITALS
TEMPERATURE: 98 F | BODY MASS INDEX: 28.07 KG/M2 | OXYGEN SATURATION: 98 % | HEIGHT: 60 IN | DIASTOLIC BLOOD PRESSURE: 57 MMHG | HEART RATE: 60 BPM | SYSTOLIC BLOOD PRESSURE: 113 MMHG | RESPIRATION RATE: 16 BRPM | WEIGHT: 143 LBS

## 2023-02-08 DIAGNOSIS — M48.062 SPINAL STENOSIS, LUMBAR REGION WITH NEUROGENIC CLAUDICATION: Primary | ICD-10-CM

## 2023-02-08 DIAGNOSIS — G89.29 CHRONIC PAIN: ICD-10-CM

## 2023-02-08 DIAGNOSIS — M51.36 DDD (DEGENERATIVE DISC DISEASE), LUMBAR: ICD-10-CM

## 2023-02-08 PROCEDURE — 25000003 PHARM REV CODE 250: Performed by: ANESTHESIOLOGY

## 2023-02-08 PROCEDURE — 64483 PR EPIDURAL INJ, ANES/STEROID, TRANSFORAMINAL, LUMB/SACR, SNGL LEVL: ICD-10-PCS | Mod: RT,,, | Performed by: ANESTHESIOLOGY

## 2023-02-08 PROCEDURE — 64483 NJX AA&/STRD TFRM EPI L/S 1: CPT | Mod: RT,,, | Performed by: ANESTHESIOLOGY

## 2023-02-08 PROCEDURE — 64483 NJX AA&/STRD TFRM EPI L/S 1: CPT | Mod: RT | Performed by: ANESTHESIOLOGY

## 2023-02-08 PROCEDURE — 25500020 PHARM REV CODE 255: Performed by: ANESTHESIOLOGY

## 2023-02-08 PROCEDURE — 64484 NJX AA&/STRD TFRM EPI L/S EA: CPT | Mod: RT,,, | Performed by: ANESTHESIOLOGY

## 2023-02-08 PROCEDURE — 25000003 PHARM REV CODE 250: Performed by: STUDENT IN AN ORGANIZED HEALTH CARE EDUCATION/TRAINING PROGRAM

## 2023-02-08 PROCEDURE — 64484 PRA INJECT ANES/STEROID FORAMEN LUMBAR/SACRAL W IMG GUIDE ,EA ADD LEVEL: ICD-10-PCS | Mod: RT,,, | Performed by: ANESTHESIOLOGY

## 2023-02-08 PROCEDURE — 63600175 PHARM REV CODE 636 W HCPCS: Performed by: ANESTHESIOLOGY

## 2023-02-08 PROCEDURE — 64484 NJX AA&/STRD TFRM EPI L/S EA: CPT | Mod: RT | Performed by: ANESTHESIOLOGY

## 2023-02-08 RX ORDER — MIDAZOLAM HYDROCHLORIDE 1 MG/ML
INJECTION INTRAMUSCULAR; INTRAVENOUS
Status: DISCONTINUED | OUTPATIENT
Start: 2023-02-08 | End: 2023-02-08 | Stop reason: HOSPADM

## 2023-02-08 RX ORDER — DEXAMETHASONE SODIUM PHOSPHATE 10 MG/ML
INJECTION INTRAMUSCULAR; INTRAVENOUS
Status: DISCONTINUED | OUTPATIENT
Start: 2023-02-08 | End: 2023-02-08 | Stop reason: HOSPADM

## 2023-02-08 RX ORDER — LIDOCAINE HYDROCHLORIDE 20 MG/ML
INJECTION, SOLUTION INFILTRATION; PERINEURAL
Status: DISCONTINUED | OUTPATIENT
Start: 2023-02-08 | End: 2023-02-08 | Stop reason: HOSPADM

## 2023-02-08 RX ORDER — SODIUM CHLORIDE 9 MG/ML
500 INJECTION, SOLUTION INTRAVENOUS CONTINUOUS
Status: DISCONTINUED | OUTPATIENT
Start: 2023-02-08 | End: 2023-02-08 | Stop reason: HOSPADM

## 2023-02-08 RX ORDER — LIDOCAINE HYDROCHLORIDE 10 MG/ML
INJECTION, SOLUTION EPIDURAL; INFILTRATION; INTRACAUDAL; PERINEURAL
Status: DISCONTINUED | OUTPATIENT
Start: 2023-02-08 | End: 2023-02-08 | Stop reason: HOSPADM

## 2023-02-08 RX ORDER — FENTANYL CITRATE 50 UG/ML
INJECTION, SOLUTION INTRAMUSCULAR; INTRAVENOUS
Status: DISCONTINUED | OUTPATIENT
Start: 2023-02-08 | End: 2023-02-08 | Stop reason: HOSPADM

## 2023-02-08 NOTE — DISCHARGE INSTRUCTIONS

## 2023-02-08 NOTE — DISCHARGE SUMMARY
Discharge Note  Short Stay      SUMMARY     Admit Date: 2/8/2023    Attending Physician: Miguel Latham    Discharge Physician: Miguel Latham      Discharge Date: 2/8/2023     Procedure(s) (LRB):  INJECTION, STEROID, EPIDURAL, TRANSFORAMINAL APPROACH RIGHT L3/4 AND L4/5 CONTRAST (Right)    Final Diagnosis: Lumbosacral radiculopathy [M54.17]    Disposition: Home or self care    Patient Instructions:   Discharge Medication List as of 2/8/2023  8:42 AM        CONTINUE these medications which have NOT CHANGED    Details   alendronate (FOSAMAX) 70 MG tablet TAKE 1 TABLET BY MOUTH ONE TIME PER WEEK, Normal      amLODIPine (NORVASC) 10 MG tablet TAKE 1 TABLET BY MOUTH EVERY DAY, Normal      atorvastatin (LIPITOR) 40 MG tablet TAKE 1 TABLET BY MOUTH EVERY DAY, Normal      calcium-vitamin D 500-125 mg-unit tablet Take 1 tablet by mouth once daily., Historical Med      ergocalciferol (ERGOCALCIFEROL) 50,000 unit Cap Take 1 capsule (50,000 Units total) by mouth every Sunday., Starting Sun 10/2/2022, Normal      etodolac (LODINE) 400 MG tablet Take 1 tablet (400 mg total) by mouth every 12 (twelve) hours as needed (pain)., Starting Tue 1/24/2023, Until Mon 4/24/2023 at 2359, Normal      fluticasone propionate (FLONASE) 50 mcg/actuation nasal spray 2 SPRAYS (100 MCG TOTAL) BY EACH NOSTRIL ROUTE ONCE DAILY., Starting Fri 12/17/2021, Normal      gabapentin (NEURONTIN) 300 MG capsule Take 2 capsules (600 mg total) by mouth 2 (two) times daily., Starting Fri 11/11/2022, Until Wed 5/10/2023, Normal      metoprolol succinate (TOPROL-XL) 100 MG 24 hr tablet TAKE 1 TABLET BY MOUTH EVERY DAY, Normal      multivitamin (THERAGRAN) per tablet Take 1 tablet by mouth once daily., Historical Med      pantoprazole (PROTONIX) 40 MG tablet TAKE 1 TABLET BY MOUTH EVERY DAY, Normal      paroxetine (PAXIL) 20 MG tablet Take 1 tablet (20 mg total) by mouth every morning., Starting Thu 3/4/2021, Until Tue 8/31/2021, Normal      traZODone  (DESYREL) 150 MG tablet TAKE 1 TABLET (150 MG TOTAL) BY MOUTH EVERY EVENING., Starting Tue 3/29/2022, Until Wed 3/29/2023, Normal                 Discharge Diagnosis: Lumbosacral radiculopathy [M54.17]  Condition on Discharge: Stable with no complications to procedure   Diet on Discharge: Same as before.  Activity: as per instruction sheet.  Discharge to: Home with a responsible adult.  Follow up: 2-4 weeks       Please call my office or pager at [phone] if experienced any weakness or loss of sensation, fever > 101.5, pain uncontrolled with oral medications, persistent nausea/vomiting/or diarrhea, redness or drainage from the incisions, or any other worrisome concerns. If physician on call was not reached or could not communicate with our office for any reason please go to the nearest emergency department        Miguel Latham

## 2023-02-08 NOTE — OP NOTE
Lumbar Transforaminal Epidural Steroid Injection under Fluoroscopic Guidance    The procedure, risks, benefits, and options were discussed with the patient. There are no contraindications to the procedure. The patent expressed understanding and agreed to the procedure. Informed written consent was obtained prior to the start of the procedure and can be found in the patient's chart.    PATIENT NAME: Mrs. Diana Herring   MRN: [id number]     DATE OF PROCEDURE: 02/08/2023    PROCEDURE:  Right  L3/4 and L4/5 Lumbar Transforaminal Epidural Steroid Injection under Fluoroscopic Guidance    PRE-OP DIAGNOSIS: Lumbosacral radiculopathy [M54.17] Lumbar radiculopathy [M54.16]    POST-OP DIAGNOSIS: Same    PHYSICIAN: Miguel Latham MD      MEDICATIONS INJECTED: Preservative-free Decadron 10mg with 5cc of Lidocaine 1% MPF     LOCAL ANESTHETIC INJECTED: Xylocaine 2%     SEDATION: Versed 2mg and Fentanyl 50mcg                                                                                                                                                                                     Conscious sedation ordered by [email or web address]. Patient re-evaluation prior to administration of conscious sedation. No changes noted in patient's status from initial evaluation. The patient's vital signs were monitored by RN and patient remained hemodynamically stable throughout the procedure.    Event Time In   Sedation Start 0822   Sedation End 0829       ESTIMATED BLOOD LOSS: None    COMPLICATIONS: None    TECHNIQUE: Time-out was performed to identify the patient and procedure to be performed. With the patient laying in a prone position, the surgical area was prepped and draped in the usual sterile fashion using ChloraPrep and a fenestrated drape.The levels were determined under fluoroscopy guidance. Skin anesthesia was achieved by injecting Lidocaine 2% over the injection sites. The transforaminal spaces were then approached with a 22 gauge, 5 inch spinal  quinke needle that was introduced under fluoroscopic guidance in the AP and Lateral views. Once the needle tip was in the area of the transforaminal space, and there was no blood, CSF or paraesthesias, contrast dye Omnipaque (240mg/mL) was injected to confirm placement and there was no vascular runoff. Fluoroscopic imaging in the AP and lateral views revealed a clear outline of the spinal nerve with proximal spread of agent through the neural foramen into the epidural space. 3 mL of the medication mixture listed above was injected slowly at each site. Displacement of the radio opaque contrast after injection of the medication confirmed that the medication went into the area of the transforaminal spaces. The needles were removed and bleeding was nil. A sterile dressing was applied. No specimens collected. The patient tolerated the procedure well.       The patient was monitored after the procedure in the recovery area. They were given post-procedure and discharge instructions to follow at home. The patient was discharged in a stable condition.    I reviewed and edited the fellow's note. I conducted my own interview and physical examination. I agree with the findings. I was present and supervising all critical portions of the procedure.    Miguel Latham MD

## 2023-03-07 ENCOUNTER — LAB VISIT (OUTPATIENT)
Dept: LAB | Facility: HOSPITAL | Age: 78
End: 2023-03-07
Attending: INTERNAL MEDICINE
Payer: MEDICARE

## 2023-03-07 DIAGNOSIS — I10 ESSENTIAL HYPERTENSION: Chronic | ICD-10-CM

## 2023-03-07 LAB
ALBUMIN SERPL BCP-MCNC: 3.9 G/DL (ref 3.5–5.2)
ALP SERPL-CCNC: 70 U/L (ref 55–135)
ALT SERPL W/O P-5'-P-CCNC: 16 U/L (ref 10–44)
ANION GAP SERPL CALC-SCNC: 7 MMOL/L (ref 8–16)
AST SERPL-CCNC: 15 U/L (ref 10–40)
BASOPHILS # BLD AUTO: 0.1 K/UL (ref 0–0.2)
BASOPHILS NFR BLD: 1.5 % (ref 0–1.9)
BILIRUB SERPL-MCNC: 0.6 MG/DL (ref 0.1–1)
BUN SERPL-MCNC: 12 MG/DL (ref 8–23)
CALCIUM SERPL-MCNC: 9.4 MG/DL (ref 8.7–10.5)
CHLORIDE SERPL-SCNC: 108 MMOL/L (ref 95–110)
CHOLEST SERPL-MCNC: 173 MG/DL (ref 120–199)
CHOLEST/HDLC SERPL: 4.7 {RATIO} (ref 2–5)
CO2 SERPL-SCNC: 28 MMOL/L (ref 23–29)
CREAT SERPL-MCNC: 0.8 MG/DL (ref 0.5–1.4)
DIFFERENTIAL METHOD: ABNORMAL
EOSINOPHIL # BLD AUTO: 0.3 K/UL (ref 0–0.5)
EOSINOPHIL NFR BLD: 4 % (ref 0–8)
ERYTHROCYTE [DISTWIDTH] IN BLOOD BY AUTOMATED COUNT: 12.8 % (ref 11.5–14.5)
EST. GFR  (NO RACE VARIABLE): >60 ML/MIN/1.73 M^2
GLUCOSE SERPL-MCNC: 113 MG/DL (ref 70–110)
HCT VFR BLD AUTO: 40.8 % (ref 37–48.5)
HDLC SERPL-MCNC: 37 MG/DL (ref 40–75)
HDLC SERPL: 21.4 % (ref 20–50)
HGB BLD-MCNC: 12.6 G/DL (ref 12–16)
IMM GRANULOCYTES # BLD AUTO: 0.02 K/UL (ref 0–0.04)
IMM GRANULOCYTES NFR BLD AUTO: 0.3 % (ref 0–0.5)
LDLC SERPL CALC-MCNC: 113.8 MG/DL (ref 63–159)
LYMPHOCYTES # BLD AUTO: 3.8 K/UL (ref 1–4.8)
LYMPHOCYTES NFR BLD: 55.8 % (ref 18–48)
MCH RBC QN AUTO: 29.6 PG (ref 27–31)
MCHC RBC AUTO-ENTMCNC: 30.9 G/DL (ref 32–36)
MCV RBC AUTO: 96 FL (ref 82–98)
MONOCYTES # BLD AUTO: 0.4 K/UL (ref 0.3–1)
MONOCYTES NFR BLD: 5.6 % (ref 4–15)
NEUTROPHILS # BLD AUTO: 2.2 K/UL (ref 1.8–7.7)
NEUTROPHILS NFR BLD: 32.8 % (ref 38–73)
NONHDLC SERPL-MCNC: 136 MG/DL
NRBC BLD-RTO: 0 /100 WBC
PLATELET # BLD AUTO: 247 K/UL (ref 150–450)
PMV BLD AUTO: 11 FL (ref 9.2–12.9)
POTASSIUM SERPL-SCNC: 3.4 MMOL/L (ref 3.5–5.1)
PROT SERPL-MCNC: 7.6 G/DL (ref 6–8.4)
RBC # BLD AUTO: 4.25 M/UL (ref 4–5.4)
SODIUM SERPL-SCNC: 143 MMOL/L (ref 136–145)
TRIGL SERPL-MCNC: 111 MG/DL (ref 30–150)
WBC # BLD AUTO: 6.81 K/UL (ref 3.9–12.7)

## 2023-03-07 PROCEDURE — 80053 COMPREHEN METABOLIC PANEL: CPT | Performed by: INTERNAL MEDICINE

## 2023-03-07 PROCEDURE — 80061 LIPID PANEL: CPT | Performed by: INTERNAL MEDICINE

## 2023-03-07 PROCEDURE — 85025 COMPLETE CBC W/AUTO DIFF WBC: CPT | Performed by: INTERNAL MEDICINE

## 2023-03-07 PROCEDURE — 36415 COLL VENOUS BLD VENIPUNCTURE: CPT | Mod: PO | Performed by: INTERNAL MEDICINE

## 2023-03-08 ENCOUNTER — OFFICE VISIT (OUTPATIENT)
Dept: FAMILY MEDICINE | Facility: CLINIC | Age: 78
End: 2023-03-08
Payer: MEDICARE

## 2023-03-08 VITALS
HEIGHT: 60 IN | TEMPERATURE: 98 F | DIASTOLIC BLOOD PRESSURE: 90 MMHG | SYSTOLIC BLOOD PRESSURE: 152 MMHG | OXYGEN SATURATION: 97 % | WEIGHT: 142.5 LBS | BODY MASS INDEX: 27.98 KG/M2 | HEART RATE: 78 BPM

## 2023-03-08 DIAGNOSIS — I70.0 ATHEROSCLEROSIS OF AORTA: Chronic | ICD-10-CM

## 2023-03-08 DIAGNOSIS — F33.42 RECURRENT MAJOR DEPRESSIVE DISORDER, IN FULL REMISSION: Chronic | ICD-10-CM

## 2023-03-08 DIAGNOSIS — Z00.00 ROUTINE MEDICAL EXAM: Primary | ICD-10-CM

## 2023-03-08 DIAGNOSIS — F51.04 PSYCHOPHYSIOLOGICAL INSOMNIA: Chronic | ICD-10-CM

## 2023-03-08 DIAGNOSIS — I10 ESSENTIAL HYPERTENSION: Chronic | ICD-10-CM

## 2023-03-08 DIAGNOSIS — M81.0 AGE-RELATED OSTEOPOROSIS WITHOUT CURRENT PATHOLOGICAL FRACTURE: Chronic | ICD-10-CM

## 2023-03-08 PROCEDURE — 1101F PT FALLS ASSESS-DOCD LE1/YR: CPT | Mod: CPTII,S$GLB,, | Performed by: INTERNAL MEDICINE

## 2023-03-08 PROCEDURE — 99999 PR PBB SHADOW E&M-EST. PATIENT-LVL V: CPT | Mod: PBBFAC,,, | Performed by: INTERNAL MEDICINE

## 2023-03-08 PROCEDURE — 3288F PR FALLS RISK ASSESSMENT DOCUMENTED: ICD-10-PCS | Mod: CPTII,S$GLB,, | Performed by: INTERNAL MEDICINE

## 2023-03-08 PROCEDURE — 3077F SYST BP >= 140 MM HG: CPT | Mod: CPTII,S$GLB,, | Performed by: INTERNAL MEDICINE

## 2023-03-08 PROCEDURE — 99397 PER PM REEVAL EST PAT 65+ YR: CPT | Mod: GZ,S$GLB,, | Performed by: INTERNAL MEDICINE

## 2023-03-08 PROCEDURE — 99999 PR PBB SHADOW E&M-EST. PATIENT-LVL V: ICD-10-PCS | Mod: PBBFAC,,, | Performed by: INTERNAL MEDICINE

## 2023-03-08 PROCEDURE — 1101F PR PT FALLS ASSESS DOC 0-1 FALLS W/OUT INJ PAST YR: ICD-10-PCS | Mod: CPTII,S$GLB,, | Performed by: INTERNAL MEDICINE

## 2023-03-08 PROCEDURE — 1126F AMNT PAIN NOTED NONE PRSNT: CPT | Mod: CPTII,S$GLB,, | Performed by: INTERNAL MEDICINE

## 2023-03-08 PROCEDURE — 1159F PR MEDICATION LIST DOCUMENTED IN MEDICAL RECORD: ICD-10-PCS | Mod: CPTII,S$GLB,, | Performed by: INTERNAL MEDICINE

## 2023-03-08 PROCEDURE — 1160F PR REVIEW ALL MEDS BY PRESCRIBER/CLIN PHARMACIST DOCUMENTED: ICD-10-PCS | Mod: CPTII,S$GLB,, | Performed by: INTERNAL MEDICINE

## 2023-03-08 PROCEDURE — 3077F PR MOST RECENT SYSTOLIC BLOOD PRESSURE >= 140 MM HG: ICD-10-PCS | Mod: CPTII,S$GLB,, | Performed by: INTERNAL MEDICINE

## 2023-03-08 PROCEDURE — 3288F FALL RISK ASSESSMENT DOCD: CPT | Mod: CPTII,S$GLB,, | Performed by: INTERNAL MEDICINE

## 2023-03-08 PROCEDURE — 3080F DIAST BP >= 90 MM HG: CPT | Mod: CPTII,S$GLB,, | Performed by: INTERNAL MEDICINE

## 2023-03-08 PROCEDURE — 1126F PR PAIN SEVERITY QUANTIFIED, NO PAIN PRESENT: ICD-10-PCS | Mod: CPTII,S$GLB,, | Performed by: INTERNAL MEDICINE

## 2023-03-08 PROCEDURE — 3080F PR MOST RECENT DIASTOLIC BLOOD PRESSURE >= 90 MM HG: ICD-10-PCS | Mod: CPTII,S$GLB,, | Performed by: INTERNAL MEDICINE

## 2023-03-08 PROCEDURE — 1160F RVW MEDS BY RX/DR IN RCRD: CPT | Mod: CPTII,S$GLB,, | Performed by: INTERNAL MEDICINE

## 2023-03-08 PROCEDURE — 1159F MED LIST DOCD IN RCRD: CPT | Mod: CPTII,S$GLB,, | Performed by: INTERNAL MEDICINE

## 2023-03-08 PROCEDURE — 99397 PR PREVENTIVE VISIT,EST,65 & OVER: ICD-10-PCS | Mod: GZ,S$GLB,, | Performed by: INTERNAL MEDICINE

## 2023-03-08 RX ORDER — AMLODIPINE BESYLATE 10 MG/1
10 TABLET ORAL DAILY
Qty: 90 TABLET | Refills: 3 | Status: SHIPPED | OUTPATIENT
Start: 2023-03-08 | End: 2024-03-08

## 2023-03-08 NOTE — PROGRESS NOTES
Assessment & Plan  Problem List Items Addressed This Visit          Psychiatric    Recurrent major depressive disorder, in full remission (Chronic)    Overview     Previously did well on Paxil.  Doing great off of this.             Cardiac/Vascular    Essential hypertension (Chronic)    Current Assessment & Plan     Hasn't taken BP meds for 2 days.  Forgetting.  Counseled on adherence.  Nurse BP check in 2 weeks         Relevant Medications    amLODIPine (NORVASC) 10 MG tablet    Other Relevant Orders    CBC Auto Differential    Comprehensive Metabolic Panel    Lipid Panel    Atherosclerosis of aorta (Chronic)    Overview     Stable, asymptomatic chronic condition.  Will continue to maximize risk factor reduction and adjust medication as needed.             Orthopedic    Osteoporosis (Chronic)    Overview     Taking weekly fosamax         Current Assessment & Plan     Due for repeat DEXA         Relevant Orders    DXA Bone Density Axial Skeleton 1 or more sites       Other    Psychophysiological insomnia (Chronic)    Current Assessment & Plan     Counseled on safety of full tab PRN          Other Visit Diagnoses       Routine medical exam    -  Primary  -    Discussed healthy diet, regular exercise, necessary labs, age appropriate cancer screening, and routine vaccinations.                 Health Maintenance reviewed, as above.    Follow-up: Follow up in about 1 year (around 3/8/2024) for Routine Physical.  ______________________________________________________________________    Chief Complaint  Chief Complaint   Patient presents with    Annual Exam       HPI  Diana Herring is a 78 y.o. female with medical diagnoses as listed in the medical history and problem list that presents for routine physical.  Pt is known to me with their last appointment 9/29/2022.  Labs prior to OV showed generally reassuring CBC CMP lipids.     No acute complaints.  Working with pain team on back pain with radiculopathy      PAST  MEDICAL HISTORY:  Past Medical History:   Diagnosis Date    Anxiety     Arthritis     Corneal abrasion s    ? eye     Depression     Full dentures     GERD (gastroesophageal reflux disease)     Hyperlipidemia     Hypertension     Nuclear sclerosis of both eyes 6/5/2017    Osteoporosis     Restless leg syndrome        PAST SURGICAL HISTORY:  Past Surgical History:   Procedure Laterality Date    COLONOSCOPY N/A 5/14/2019    Procedure: COLONOSCOPY;  Surgeon: Nahid Cline MD;  Location: [facility];  Service: Endoscopy;  Laterality: N/A;    EPIDURAL STEROID INJECTION N/A 5/13/2020    Procedure: LUMBAR/CAUDAL L5/S1 IL SANKET ;  Surgeon: Miguel Latham MD;  Location: [facility];  Service: Pain Management;  Laterality: N/A;  NEEDS CONSENT    HYSTERECTOMY      PARTIAL HYSTERECTOMY      TRANSFORAMINAL EPIDURAL INJECTION OF STEROID Bilateral 9/4/2019    Procedure: Injection,steroid,epidural,transforaminal approach LUMBAR TRANSFORAMINAL BILATERAL L3 TF SANKET;  Surgeon: Miguel Latham MD;  Location: [facility];  Service: Pain Management;  Laterality: Bilateral;  NEEDS CONSENT    TRANSFORAMINAL EPIDURAL INJECTION OF STEROID Bilateral 10/30/2019    Procedure: TRANSFORAMINAL SANKET BILATERAL L3-L4;  Surgeon: Miguel Latham MD;  Location: [facility];  Service: Pain Management;  Laterality: Bilateral;  NEEDS CONSENT    TRANSFORAMINAL EPIDURAL INJECTION OF STEROID Bilateral 11/11/2020    Procedure: INJECTION, STEROID, EPIDURAL, TRANSFORAMINAL APPROACH, L3-L4;  Surgeon: Miguel Latham MD;  Location: [facility];  Service: Pain Management;  Laterality: Bilateral;    TRANSFORAMINAL EPIDURAL INJECTION OF STEROID Bilateral 5/19/2021    Procedure: INJECTION, STEROID, EPIDURAL, TRANSFORAMINAL APPROACH, L3-L4;  Surgeon: Miguel Latham MD;  Location: [facility];  Service: Pain Management;  Laterality: Bilateral;    TRANSFORAMINAL EPIDURAL INJECTION OF STEROID Bilateral 1/26/2022    Procedure:  Injection,steroid,epidural,transforaminal approach BILATERAL L3/4;  Surgeon: Miguel Latham MD;  Location: [facility];  Service: Pain Management;  Laterality: Bilateral;    TRANSFORAMINAL EPIDURAL INJECTION OF STEROID Bilateral 2022    Procedure: INJECTION, STEROID, EPIDURAL, TRANSFORAMINAL APPROACH, Bilateral L3-L4;  Surgeon: Miguel Latham MD;  Location: [facility];  Service: Pain Management;  Laterality: Bilateral;    TRANSFORAMINAL EPIDURAL INJECTION OF STEROID Bilateral 2022    Procedure: INJECTION, STEROID, EPIDURAL, TRANSFORAMINAL APPROACH, BILATERAL L3-L4 CONTRAST;  Surgeon: Miguel Latham MD;  Location: [facility];  Service: Pain Management;  Laterality: Bilateral;    TRANSFORAMINAL EPIDURAL INJECTION OF STEROID Right 2022    Procedure: INJECTION, STEROID, EPIDURAL, TRANSFORAMINAL APPROACH, RIGHT L3/L4 AND L4/L5 CONTRAST;  Surgeon: Miguel Latham MD;  Location: [facility];  Service: Pain Management;  Laterality: Right;    TRANSFORAMINAL EPIDURAL INJECTION OF STEROID Right 2023    Procedure: INJECTION, STEROID, EPIDURAL, TRANSFORAMINAL APPROACH RIGHT L3/4 AND L4/5 CONTRAST;  Surgeon: Miguel Latham MD;  Location: [facility];  Service: Pain Management;  Laterality: Right;    TUBAL LIGATION Bilateral        SOCIAL HISTORY:  Social History     Socioeconomic History    Marital status:    Tobacco Use    Smoking status: Never     Passive exposure: Never    Smokeless tobacco: Never   Substance and Sexual Activity    Alcohol use: No    Drug use: No    Sexual activity: Not Currently   Social History Narrative    Patient is  w/ 5 children, one  from heart disease.The patient is retired.       FAMILY HISTORY:  Family History   Problem Relation Age of Onset    Diabetes Mother     Stroke Mother     Glaucoma Mother     Cataracts Mother     Cancer Father         Lung    No Known Problems Sister     Stroke Brother     Hypertension Daughter      Hypertension Daughter     Hypertension Daughter     No Known Problems Daughter     Heart disease Son     Early death Son     No Known Problems Maternal Aunt     No Known Problems Maternal Uncle     No Known Problems Paternal Aunt     No Known Problems Paternal Uncle     No Known Problems Maternal Grandmother     No Known Problems Maternal Grandfather     No Known Problems Paternal Grandmother     No Known Problems Paternal Grandfather     Amblyopia Neg Hx     Blindness Neg Hx     Macular degeneration Neg Hx     Retinal detachment Neg Hx     Strabismus Neg Hx     Thyroid disease Neg Hx        ALLERGIES AND MEDICATIONS: updated and reviewed.  Review of patient's allergies indicates:  No Known Allergies  Current Outpatient Medications   Medication Sig Dispense Refill    alendronate (FOSAMAX) 70 MG tablet TAKE 1 TABLET BY MOUTH ONE TIME PER WEEK 12 tablet 1    atorvastatin (LIPITOR) 40 MG tablet TAKE 1 TABLET BY MOUTH EVERY DAY 90 tablet 2    calcium-vitamin D 500-125 mg-unit tablet Take 1 tablet by mouth once daily.      ergocalciferol (ERGOCALCIFEROL) 50,000 unit Cap Take 1 capsule (50,000 Units total) by mouth every Sunday. 12 capsule 0    etodolac (LODINE) 400 MG tablet Take 1 tablet (400 mg total) by mouth every 12 (twelve) hours as needed (pain). 60 tablet 2    fluticasone propionate (FLONASE) 50 mcg/actuation nasal spray 2 SPRAYS (100 MCG TOTAL) BY EACH NOSTRIL ROUTE ONCE DAILY. 48 mL 3    gabapentin (NEURONTIN) 300 MG capsule Take 2 capsules (600 mg total) by mouth 2 (two) times daily. 360 capsule 1    metoprolol succinate (TOPROL-XL) 100 MG 24 hr tablet TAKE 1 TABLET BY MOUTH EVERY DAY 90 tablet 3    multivitamin (THERAGRAN) per tablet Take 1 tablet by mouth once daily.      pantoprazole (PROTONIX) 40 MG tablet TAKE 1 TABLET BY MOUTH EVERY DAY 90 tablet 0    traZODone (DESYREL) 150 MG tablet TAKE 1 TABLET (150 MG TOTAL) BY MOUTH EVERY EVENING. 90 tablet 1    amLODIPine (NORVASC) 10 MG tablet Take 1 tablet (10  mg total) by mouth once daily. 90 tablet 3    paroxetine (PAXIL) 20 MG tablet Take 1 tablet (20 mg total) by mouth every morning. 90 tablet 1     No current facility-administered medications for this visit.         ROS  Review of Systems   Constitutional:  Negative for chills, fever and unexpected weight change.   Eyes:  Negative for discharge.   Respiratory:  Negative for cough, chest tightness, shortness of breath and wheezing.    Cardiovascular:  Negative for chest pain, palpitations and leg swelling.   Gastrointestinal:  Negative for abdominal pain and blood in stool.   Genitourinary:  Negative for decreased urine volume, dysuria and hematuria.   Musculoskeletal:  Positive for back pain. Negative for arthralgias, joint swelling and myalgias.   Neurological:  Negative for dizziness, light-headedness and headaches.   Psychiatric/Behavioral:  Negative for decreased concentration, dysphoric mood, sleep disturbance and suicidal ideas.          Physical Exam  Vitals:    03/08/23 0859 03/08/23 0943   BP: (!) 148/90 (!) 152/90   Pulse: 78    Temp: 98 °F (36.7 °C)    SpO2: 97%    Weight: 64.7 kg (142 lb 8.4 oz)    Height: 5' (1.524 m)     Body mass index is 27.84 kg/m².  Weight: 64.7 kg (142 lb 8.4 oz)   Height: 5' (152.4 cm)   Physical Exam  Constitutional:       General: She is not in acute distress.     Appearance: She is well-developed.   HENT:      Head: Normocephalic and atraumatic.   Eyes:      General: Lids are normal. No scleral icterus.     Conjunctiva/sclera: Conjunctivae normal.      Pupils: Pupils are equal, round, and reactive to light.   Neck:      Thyroid: No thyromegaly.      Vascular: No carotid bruit or JVD.   Cardiovascular:      Rate and Rhythm: Normal rate and regular rhythm.      Pulses: Normal pulses.      Heart sounds: Normal heart sounds. No murmur heard.    No friction rub. No S3 or S4 sounds.   Pulmonary:      Effort: Pulmonary effort is normal.      Breath sounds: Normal breath sounds. No  wheezing, rhonchi or rales.   Abdominal:      General: Bowel sounds are normal.      Palpations: Abdomen is soft.      Tenderness: There is no abdominal tenderness.   Musculoskeletal:      Cervical back: Full passive range of motion without pain and neck supple.      Right lower leg: No edema.      Left lower leg: No edema.   Skin:     General: Skin is warm and dry.      Findings: No rash.   Neurological:      Mental Status: She is alert and oriented to person, place, and time.   Psychiatric:         Speech: Speech normal.         Behavior: Behavior normal.         Thought Content: Thought content normal.           Health Maintenance         Date Due Completion Date    DEXA Scan 10/06/2022 10/6/2020    Mammogram 03/29/2023 3/29/2022    TETANUS VACCINE 08/31/2034 (Originally 1/4/1963) ---    Colonoscopy 06/16/2025 6/16/2022    Lipid Panel 03/07/2028 3/7/2023

## 2023-03-09 ENCOUNTER — OFFICE VISIT (OUTPATIENT)
Dept: PAIN MEDICINE | Facility: CLINIC | Age: 78
End: 2023-03-09
Payer: MEDICARE

## 2023-03-09 VITALS
WEIGHT: 144.19 LBS | HEART RATE: 62 BPM | DIASTOLIC BLOOD PRESSURE: 72 MMHG | HEIGHT: 61 IN | BODY MASS INDEX: 27.22 KG/M2 | SYSTOLIC BLOOD PRESSURE: 157 MMHG

## 2023-03-09 DIAGNOSIS — M54.17 LUMBOSACRAL RADICULOPATHY: Primary | ICD-10-CM

## 2023-03-09 DIAGNOSIS — M51.36 DDD (DEGENERATIVE DISC DISEASE), LUMBAR: ICD-10-CM

## 2023-03-09 DIAGNOSIS — G89.29 OTHER CHRONIC PAIN: ICD-10-CM

## 2023-03-09 DIAGNOSIS — M48.062 SPINAL STENOSIS, LUMBAR REGION WITH NEUROGENIC CLAUDICATION: ICD-10-CM

## 2023-03-09 DIAGNOSIS — M54.9 DORSALGIA, UNSPECIFIED: ICD-10-CM

## 2023-03-09 PROCEDURE — 3078F PR MOST RECENT DIASTOLIC BLOOD PRESSURE < 80 MM HG: ICD-10-PCS | Mod: CPTII,S$GLB,, | Performed by: NURSE PRACTITIONER

## 2023-03-09 PROCEDURE — 1101F PR PT FALLS ASSESS DOC 0-1 FALLS W/OUT INJ PAST YR: ICD-10-PCS | Mod: CPTII,S$GLB,, | Performed by: NURSE PRACTITIONER

## 2023-03-09 PROCEDURE — 99999 PR PBB SHADOW E&M-EST. PATIENT-LVL IV: ICD-10-PCS | Mod: PBBFAC,,, | Performed by: NURSE PRACTITIONER

## 2023-03-09 PROCEDURE — 3077F SYST BP >= 140 MM HG: CPT | Mod: CPTII,S$GLB,, | Performed by: NURSE PRACTITIONER

## 2023-03-09 PROCEDURE — 1159F MED LIST DOCD IN RCRD: CPT | Mod: CPTII,S$GLB,, | Performed by: NURSE PRACTITIONER

## 2023-03-09 PROCEDURE — 3288F PR FALLS RISK ASSESSMENT DOCUMENTED: ICD-10-PCS | Mod: CPTII,S$GLB,, | Performed by: NURSE PRACTITIONER

## 2023-03-09 PROCEDURE — 1125F AMNT PAIN NOTED PAIN PRSNT: CPT | Mod: CPTII,S$GLB,, | Performed by: NURSE PRACTITIONER

## 2023-03-09 PROCEDURE — 3078F DIAST BP <80 MM HG: CPT | Mod: CPTII,S$GLB,, | Performed by: NURSE PRACTITIONER

## 2023-03-09 PROCEDURE — 1101F PT FALLS ASSESS-DOCD LE1/YR: CPT | Mod: CPTII,S$GLB,, | Performed by: NURSE PRACTITIONER

## 2023-03-09 PROCEDURE — 99999 PR PBB SHADOW E&M-EST. PATIENT-LVL IV: CPT | Mod: PBBFAC,,, | Performed by: NURSE PRACTITIONER

## 2023-03-09 PROCEDURE — 99213 OFFICE O/P EST LOW 20 MIN: CPT | Mod: S$GLB,,, | Performed by: NURSE PRACTITIONER

## 2023-03-09 PROCEDURE — 3288F FALL RISK ASSESSMENT DOCD: CPT | Mod: CPTII,S$GLB,, | Performed by: NURSE PRACTITIONER

## 2023-03-09 PROCEDURE — 1160F PR REVIEW ALL MEDS BY PRESCRIBER/CLIN PHARMACIST DOCUMENTED: ICD-10-PCS | Mod: CPTII,S$GLB,, | Performed by: NURSE PRACTITIONER

## 2023-03-09 PROCEDURE — 3077F PR MOST RECENT SYSTOLIC BLOOD PRESSURE >= 140 MM HG: ICD-10-PCS | Mod: CPTII,S$GLB,, | Performed by: NURSE PRACTITIONER

## 2023-03-09 PROCEDURE — 1125F PR PAIN SEVERITY QUANTIFIED, PAIN PRESENT: ICD-10-PCS | Mod: CPTII,S$GLB,, | Performed by: NURSE PRACTITIONER

## 2023-03-09 PROCEDURE — 99213 PR OFFICE/OUTPT VISIT, EST, LEVL III, 20-29 MIN: ICD-10-PCS | Mod: S$GLB,,, | Performed by: NURSE PRACTITIONER

## 2023-03-09 PROCEDURE — 1159F PR MEDICATION LIST DOCUMENTED IN MEDICAL RECORD: ICD-10-PCS | Mod: CPTII,S$GLB,, | Performed by: NURSE PRACTITIONER

## 2023-03-09 PROCEDURE — 1160F RVW MEDS BY RX/DR IN RCRD: CPT | Mod: CPTII,S$GLB,, | Performed by: NURSE PRACTITIONER

## 2023-03-09 NOTE — PROGRESS NOTES
Chronic patient Established Note (Follow up visit)      SUBJECTIVE:    Interval History 3/9/2023:  The patient presents today for follow up of back and leg pain. She is now s/p repeat right L3/4 and L4/5 TF SANKET on 2/8/23 with 80% benefit for about 2 weeks only. Previous did help for longer. She is reporting pain that start to right lower back with radiation into the front and back of the right leg to the anterior shin. She has numbness on the right lower leg. She feels like her pain has worsened over the past few months. Her last MRI was in 2019. She had some benefit with PT in the past and is open to repeat. Her pain today is 7/10.    Interval History 1/24/2023:  Diana is here for follow up of back and right leg pain. She is now s/p right L3/4 and L4/5 TF SANKET on 12/7/22. She reports about 80% relief for about one month. Today, she is reporting lower back pain with radiation into the front and side of the right leg. She has numbness to the right leg intermittently without warning. She also has been having cramps to lower legs but says she thinks that she has not been drinking enough water. She completed aquatic PT which she thinks was helpful. Her pain today is 8/10.    Interval History 11/11/2022:  The patient returns today for follow up of back and leg pain. She has been in aquatic PT which she finds helpful. She attends twice weekly. Her back pain has improved somewhat because of this. She does have worsened right leg pain with walking and activity. The pain radiates down the front and side of the right leg with associated numbness. No current radiation on the left. She had benefit with ESIs in the past and wishes to repeat. Her pain today is 7/10.    Interval History 8/11/2022:  The patient is here for follow up of lower back pain. Since previous encounter, she underwent repeat L3/4 TF SANKET with 70% relief. We had originally planned for MBB but she started having more shooting pain into the legs. She feels  like this was helpful. Her pain is tolerable at this time. Her pain today is 7/10.    Interval History 5/9/2022:  The patient is here for follow up of chronic back pain. She is having more back pain and stiffness in the morning. We did previously discuss lumbar MBB but she is worried about not having IV sedation. She says that she would like to further discuss the procedure today. Leg pain has been mild since previous SANKET. She does have intermittent numbness still. Back pain is greater than leg pain. Her pain today is 7/10.    Interval History 4/7/2022:  The patient is here for follow up of lower back and leg pain. Since previous encounter, she underwent bilateral L3/4 TF SANKET on 1/26/22. She reports 80% relief of pain for about 2 months. She feels like this gave her significant benefit during that time and her pain was mild. She is now again having severe back pain with radiation into the anterior thighs. She would like to repeat the procedure. She would also like me to place another referral for aquatic PT. She stopped Mobic because she found Diclofenac more helpful. She has been taking as needed but not daily. She does find Gabapentin helpful but it sometimes makes her drowsy. Her pain today is 9/10.    Interval History 1/11/2022:  The patient returns to discuss continued lower back pain. She has not restarted aquatic PT due to increase in Covid-19 cases. She continues with sharp lower back pain that radiates into anterior thighs, bilaterally. She has associated numbness. The pain is worse with increased activity. Her pain today is 7/10.    Interval History 11/11/2021:  The patient presents today for 2 month follow up of lower back pain. Since previous encounter, she has not started aquatic PT because she is on the waiting list. She continues to report lower back pain with radiation down the left leg. We previously discussed lumbar MBB/RFA which she does not wish to pursue at this time. She states that diclofenac  is not helping very much with her aching pain at this time. She denies any new weakness or symptoms at this time. Her pain today is 8/10.    Interval History 8/19/2021:  Diana returns today for follow up of chronic lower back pain. She reports that he has continued with aching pain. She has been in PT but is not finding it very helpful. She has not tried aquatic PT in the past. She is still having intermittent radiation down her legs. She has been unable to take 1200 mg daily of Gabapentin and has decreased to 600 mg daily due to sedation and dizziness with the medication. Otherwise, no new complaints today. Her pain today is 7/10.    Interval History 6/18/2021:  The patient is here for follow up of lower back pain.  She has radiation of her pain into her anterior thighs but not below the knees.  She denies numbness or tingling at this time.  Her back pain is currently greater than her leg pain.  She has a lot of stiffness when she wakes the morning states it improves when she moves.  She had limited benefit with recent repeat bilateral L3-4 transforaminal steroid injection.  Her pain today is 7/10    Interval History 5/4/2021:  The patient is here for follow up of lower back and leg pain.  Previous encounter, physical therapy was ordered.  She states that she did not start physical therapy and would like me to replace the order for her.  She does report that she is noticing more muscle fatigue and weakness particularly with activity.  She is also having more back pain with radiation into the anterior lateral thighs with the past month.  She previously had significant been hip with bilateral L3/4 transforaminal epidural steroid injection and wishes to repeat this.  She found this more helpful than previous procedures. Her pain today is 7/10.    Interval History 3/2/2021:  The patient is here for follow up of chronic pain. She is having more back pain today which she attributes to the cold front coming in. She is  having radiation into the buttocks and right anterior thigh. Her back pain is her primary complaint today. She describes it as throbbing. She recently completed both Covid-19 vaccines which she tolerated well. She is now taking Gabapentin 600 mg twice daily regularly. She notices improvement in right leg numbness.  Her pain today is 7/10.    Interval History 12/1/2020:  The patient iss here for follow-up of back and leg pain.  She is now status post bilateral L3/4 transforaminal epidural steroid injection on 11/11/2020. She is reporting 100% relief for about 2 weeks and then her pain started to return.  She is still having some benefit.  Her pain is located across the lower back with radiation into front and sides of the legs to the anterior feet with associated numbness.  She feels like when she walks sometimes her legs become weak.  This has improved slightly.  She denies any recent falls.  At her last OV, her Gabapentin was increased to 600 mg BID.  She says that she finds this helpful.  She has mild drowsiness.  She admits that she does not always take the medication and thought it was more of an as needed medication.  The patient denies any bowel or bladder incontinence or signs of saddle paresthesia.  She feels as though her pain today is worse than usual.  She rates her pain today as 10/10.    Interval history 10/15/2020:  Diana Herring presents to the clinic for a follow-up appointment for back pain. Since the last visit, Diana Herring states the pain has been worsening. Current pain intensity is 7/10. She reports that she continues to have low back pain that radiates to the bilateral hip and leg. She does report that she has noticed intermittent numbness in the right lateral and anterior thigh, that is sometimes associated with weakness in her right leg. She says that her leg gives out when this happens. She has not had any falls or other trauma. She did not get the bilateral L3/4 TFESI planned last  time, and she is still taking only 300 mg bid of gabapentin.  Patient denies urinary incontinence, bowel incontinence, significant weight loss.    Initial visit:  Diana Herring presents to the clinic for the evaluation of low back pain. The pain started 2 years ago following loosing bone after a bone density and symptoms have been worsening.The pain is located in the low back area and radiates to the bilateral hip and leg.  The pain is described as aching, numbing, sharp and tight band and is rated as 8/10. The pain is rated with a score of  7/10 on the BEST day and a score of 8/10 on the WORST day.  Symptoms interfere with daily activity and sleeping. The pain is exacerbated by Sitting, Standing, Bending, Coughing/Sneezing, Walking, Morning, Lifting and Getting out of bed/chair.  The pain is mitigated by heat, laying down and rest. She reports spending 0 hours per day reclining. The patient reports 6 hours of uninterrupted sleep per night.     Patient had L5/S1 ILESI 2 weeks ago and only gave her 1 day of relief.      Patient denies night fever/night sweats, urinary incontinence, bowel incontinence, significant weight loss and significant motor weakness.     Physical Therapy/Home Exercise: yes, was not beneficial      Pain Disability Index Review:  Last 3 PDI Scores 3/9/2023 1/24/2023 11/11/2022   Pain Disability Index (PDI) 35 40 35       Pain Medications:  Gabapentin 600 mg bid    Opioid Contract: no     report:  Reviewed and consistent with medication use as prescribed.    Pain Procedures:  2/8/22 Right L3/4 and L4/5 TF SANKET- 80% relief for 2 weeks  12/7/22 Right L3/4 and L4/5 TF SANKET- 80% relief for one month  7/20/22 Bilateral L3/4 TF SANKET- 70% relief  4/20/22 Bilateral L3/4 TF SANKET- 75% relief of leg pain  1/26/22 Bilateral L3/4 TF SANKET  5/19/21 bilateral L3/4 TF SANKET  11/11/20 Bilateral L3/4 TF SANKET- 100% relief for 2 weeks  5/13/20 L5/S1 ILESI  10/30/19 TFESI Bilateral L3-L4  09/04/19 TFESI Bilateral L3      Physical Therapy/Home Exercise: yes    Imaging:  Narrative & Impression     EXAMINATION:  XR LUMBAR SPINE 5 VIEW WITH FLEX AND EXT     CLINICAL HISTORY:  Low back pain, >6wks conservative tx, persistent-progressive sx, surgical candidate;  Lumbago with sciatica, left side     TECHNIQUE:  Five views of the lumbar spine plus flexion extension views were performed.     COMPARISON:  07/23/2019     FINDINGS:  The alignment of the lumbar spine is normal.     The vertebral body height are well maintained.  Mild disc space narrowing L3-L4.     Flexion and extension views demonstrate no evidence of translational abnormalities.     Minimal osteophyte formation L2-L3 L4.     No fracture or osseous lesions.     The sacroiliac joints appears symmetrical on the AP view.     The remainder of the visualized soft tissue and osseous structures appear normal.     There is atherosclerotic plaque of the abdominal aorta.     Impression:     Mild spondylosis of the lumbar spine, not significantly changed from the prior study.        Electronically signed by: Ava Brennan MD  Date:                                            05/29/2020  Time:                                           08:28        Narrative & Impression     EXAMINATION:  XR HIPS BILATERAL 2 VIEW INCL AP PELVIS     CLINICAL HISTORY:  Lumbago with sciatica, left side     TECHNIQUE:  AP view of the pelvis and frogleg lateral views of both hips were performed.     COMPARISON:  None.     FINDINGS:  Normal mineralization.  Pelvis demonstrates no acute fractures.  No dislocations are noted.  Hip joints appear to be symmetric and within normal limits with mild DJD of the hip joints.  There is no osteoblastic or lytic lesions.  Soft tissues are unremarkable.     SI joints are symmetric and within normal limits.  There is mild spondylotic changes in the lower lumbar spine.     There is moderate amount of retained stool in the right colon.     Impression:     Mild DJD of  the hip joints.     1. No acute fractures.        Electronically signed by: David Vasquez MD  Date:                                            05/29/2020  Time:                                           08:28     Narrative & Impression     EXAMINATION:  MRI LUMBAR SPINE WITHOUT CONTRAST     CLINICAL HISTORY:  back and bilateral leg pain; Lumbago with sciatica, left side     TECHNIQUE:  Multiplanar, multisequence MR images were acquired from the thoracolumbar junction to the sacrum without the administration of contrast.     COMPARISON:  None.     FINDINGS:  MRI examination of the lumbar spine was performed.  Intravenous contrast was not utilized.  There are areas of hyperintense T2 hypointense T1 lesions of the kidneys likely cysts not optimally evaluated on this exam.     There is multilevel degenerative loss of disc signal and there is loss of disc space height most notable at L3-4.  There are chronic appearing endplate changes noted with some mild STIR hyperintensity that likely relates to edema associated with degenerative endplate change.  There is no evidence for high-grade spondylolisthesis, there is no evidence for high-grade or acute compression fracture deformity.  There is no finding to specifically suggest aggressive bone marrow replacement process.     The sagittal imaging includes the majority of T10 through the S3 segment, axial imaging includes the inferior aspect of T12 through the majority of S1.  On the sagittal imaging there is appearance suggesting mild degenerative disc disease and disc bulge at T10-11 and T11-12 with some mild posterior ligamentous thickening suggested at T10-11 however without evidence for high-grade spinal canal stenosis.     The T12-L1 level demonstrates no evidence for high-grade spinal canal or foraminal stenosis.     The L1-2 level demonstrates degenerative disc bulge with anterior impression upon the dural sac.  There is posterior facet arthropathy and ligamentous  thickening there is mild spinal canal stenosis.  There is encroachment into the neural foramen at the level of the disc plane without obvious exiting nerve root impingement.     The L2-3 level demonstrates mild degenerative loss of disc space height.  There is mild degenerative disc bulge with anterior impression upon the dural sac.  There is posterior facet arthropathy with somewhat prominent ligamentous thickening with associated posterolateral encroachment.  There is mild-to-moderate spinal canal stenosis.  There is bilateral foraminal narrowing.     The L3-4 level demonstrates the aforementioned appearance of loss of disc space height and degenerative loss of disc signal.  There is diffuse degenerative disc bulge with anterior impression upon the dural sac there is posterior facet arthropathy and ligamentous thickening there is high-grade spinal canal stenosis.  There is bilateral foraminal stenosis.     The L4-5 level demonstrates degenerative disc bulge with anterior impression upon the dural sac there is posterior facet arthropathy and ligamentous thickening with mild to moderate spinal canal stenosis.  There is bilateral foraminal narrowing/stenosis without obvious exiting nerve root impingement.     The L5-S1 level demonstrates no evidence for high-grade spinal canal stenosis.  Facet arthropathy is noted there is no evidence for high-grade foraminal stenosis or exiting nerve root impingement.     The visualized spinal cord appears to taper appropriately and appears of appropriate signal and caliber.  Mild signal intensity within the spinal canal on T1 axial imaging may relate to a minimal lipoma along the filum terminalis.  There is no additional evidence for intraspinal mass or abnormal fluid collection     Impression:     Multilevel chronic change of the lumbar spine with variable spinal canal and foraminal narrowing/stenosis as detailed above.  Correlation for any specific level of symptomatology is  needed.  The greatest level of spinal canal stenosis is seen at L3-4.        Electronically signed by: Obie Aggarwal  Date:                                            07/27/2019  Time:                                           03:45     CMP  Sodium   Date Value Ref Range Status   03/07/2023 143 136 - 145 mmol/L Final     Potassium   Date Value Ref Range Status   03/07/2023 3.4 (L) 3.5 - 5.1 mmol/L Final     Chloride   Date Value Ref Range Status   03/07/2023 108 95 - 110 mmol/L Final     CO2   Date Value Ref Range Status   03/07/2023 28 23 - 29 mmol/L Final     Glucose   Date Value Ref Range Status   03/07/2023 113 (H) 70 - 110 mg/dL Final     BUN   Date Value Ref Range Status   03/07/2023 12 8 - 23 mg/dL Final     Creatinine   Date Value Ref Range Status   03/07/2023 0.8 0.5 - 1.4 mg/dL Final     Calcium   Date Value Ref Range Status   03/07/2023 9.4 8.7 - 10.5 mg/dL Final     Total Protein   Date Value Ref Range Status   03/07/2023 7.6 6.0 - 8.4 g/dL Final     Albumin   Date Value Ref Range Status   03/07/2023 3.9 3.5 - 5.2 g/dL Final     Total Bilirubin   Date Value Ref Range Status   03/07/2023 0.6 0.1 - 1.0 mg/dL Final     Comment:     For infants and newborns, interpretation of results should be based  on gestational age, weight and in agreement with clinical  observations.    Premature Infant recommended reference ranges:  Up to 24 hours.............<8.0 mg/dL  Up to 48 hours............<12.0 mg/dL  3-5 days..................<15.0 mg/dL  6-29 days.................<15.0 mg/dL       Alkaline Phosphatase   Date Value Ref Range Status   03/07/2023 70 55 - 135 U/L Final     AST   Date Value Ref Range Status   03/07/2023 15 10 - 40 U/L Final     ALT   Date Value Ref Range Status   03/07/2023 16 10 - 44 U/L Final     Anion Gap   Date Value Ref Range Status   03/07/2023 7 (L) 8 - 16 mmol/L Final     eGFR if    Date Value Ref Range Status   03/04/2022 >60.0 >60 mL/min/1.73 m^2 Final     eGFR if non     Date Value Ref Range Status   03/04/2022 >60.0 >60 mL/min/1.73 m^2 Final     Comment:     Calculation used to obtain the estimated glomerular filtration  rate (eGFR) is the CKD-EPI equation.            Allergies: Review of patient's allergies indicates:  No Known Allergies    Current Medications:   Current Outpatient Medications   Medication Sig Dispense Refill    alendronate (FOSAMAX) 70 MG tablet TAKE 1 TABLET BY MOUTH ONE TIME PER WEEK 12 tablet 1    amLODIPine (NORVASC) 10 MG tablet Take 1 tablet (10 mg total) by mouth once daily. 90 tablet 3    atorvastatin (LIPITOR) 40 MG tablet TAKE 1 TABLET BY MOUTH EVERY DAY 90 tablet 2    calcium-vitamin D 500-125 mg-unit tablet Take 1 tablet by mouth once daily.      ergocalciferol (ERGOCALCIFEROL) 50,000 unit Cap Take 1 capsule (50,000 Units total) by mouth every Sunday. 12 capsule 0    etodolac (LODINE) 400 MG tablet Take 1 tablet (400 mg total) by mouth every 12 (twelve) hours as needed (pain). 60 tablet 2    fluticasone propionate (FLONASE) 50 mcg/actuation nasal spray 2 SPRAYS (100 MCG TOTAL) BY EACH NOSTRIL ROUTE ONCE DAILY. 48 mL 3    gabapentin (NEURONTIN) 300 MG capsule Take 2 capsules (600 mg total) by mouth 2 (two) times daily. 360 capsule 1    metoprolol succinate (TOPROL-XL) 100 MG 24 hr tablet TAKE 1 TABLET BY MOUTH EVERY DAY 90 tablet 3    multivitamin (THERAGRAN) per tablet Take 1 tablet by mouth once daily.      pantoprazole (PROTONIX) 40 MG tablet TAKE 1 TABLET BY MOUTH EVERY DAY 90 tablet 0    traZODone (DESYREL) 150 MG tablet TAKE 1 TABLET (150 MG TOTAL) BY MOUTH EVERY EVENING. 90 tablet 1    paroxetine (PAXIL) 20 MG tablet Take 1 tablet (20 mg total) by mouth every morning. 90 tablet 1     No current facility-administered medications for this visit.       REVIEW OF SYSTEMS:    GENERAL:  No weight loss, malaise or fevers.  HEENT:  Negative for frequent or significant headaches.  NECK:  Negative for lumps, goiter, pain and significant  neck swelling.  RESPIRATORY:  Negative for cough, wheezing or shortness of breath.  CARDIOVASCULAR:  Negative for chest pain, leg swelling or palpitations.  GI:  Negative for abdominal discomfort, blood in stools or black stools or change in bowel habits. GERD.  MUSCULOSKELETAL:  See HPI.  SKIN:  Negative for lesions, rash, and itching.  PSYCH:  + for sleep disturbance, h/o depression  HEMATOLOGY/LYMPHOLOGY:  Negative for prolonged bleeding, bruising easily or swollen nodes.  NEURO:  + numbness. No history of headaches, syncope, paralysis, seizures or tremors.  All other reviewed and negative other than HPI.     Past Medical History:  Past Medical History:   Diagnosis Date    Anxiety     Arthritis     Corneal abrasion s    ? eye     Depression     Full dentures     GERD (gastroesophageal reflux disease)     Hyperlipidemia     Hypertension     Nuclear sclerosis of both eyes 6/5/2017    Osteoporosis     Restless leg syndrome        Past Surgical History:  Past Surgical History:   Procedure Laterality Date    COLONOSCOPY N/A 5/14/2019    Procedure: COLONOSCOPY;  Surgeon: Nahid Cline MD;  Location: [facility];  Service: Endoscopy;  Laterality: N/A;    EPIDURAL STEROID INJECTION N/A 5/13/2020    Procedure: LUMBAR/CAUDAL L5/S1 IL SANKET ;  Surgeon: Miguel Latham MD;  Location: [facility];  Service: Pain Management;  Laterality: N/A;  NEEDS CONSENT    HYSTERECTOMY      PARTIAL HYSTERECTOMY      TRANSFORAMINAL EPIDURAL INJECTION OF STEROID Bilateral 9/4/2019    Procedure: Injection,steroid,epidural,transforaminal approach LUMBAR TRANSFORAMINAL BILATERAL L3 TF SANKET;  Surgeon: Miguel Latham MD;  [facility];  Service: Pain Management;  Laterality: Bilateral;  NEEDS CONSENT    TRANSFORAMINAL EPIDURAL INJECTION OF STEROID Bilateral 10/30/2019    Procedure: TRANSFORAMINAL SANKET BILATERAL L3-L4;  Surgeon: Miguel Latham MD;  Location: [facility];  Service: Pain Management;  Laterality: Bilateral;   NEEDS CONSENT    TRANSFORAMINAL EPIDURAL INJECTION OF STEROID Bilateral 11/11/2020    Procedure: INJECTION, STEROID, EPIDURAL, TRANSFORAMINAL APPROACH, L3-L4;  Surgeon: Miguel Latham MD;  Location: [facility];  Service: Pain Management;  Laterality: Bilateral;    TRANSFORAMINAL EPIDURAL INJECTION OF STEROID Bilateral 5/19/2021    Procedure: INJECTION, STEROID, EPIDURAL, TRANSFORAMINAL APPROACH, L3-L4;  Surgeon: Miguel Latham MD;  Location: [facility];  Service: Pain Management;  Laterality: Bilateral;    TRANSFORAMINAL EPIDURAL INJECTION OF STEROID Bilateral 1/26/2022    Procedure: Injection,steroid,epidural,transforaminal approach BILATERAL L3/4;  Surgeon: Miguel Latham MD;  Location: [facility];  Service: Pain Management;  Laterality: Bilateral;    TRANSFORAMINAL EPIDURAL INJECTION OF STEROID Bilateral 4/20/2022    Procedure: INJECTION, STEROID, EPIDURAL, TRANSFORAMINAL APPROACH, Bilateral L3-L4;  Surgeon: Miguel Latham MD;  Location: [facility];  Service: Pain Management;  Laterality: Bilateral;    TRANSFORAMINAL EPIDURAL INJECTION OF STEROID Bilateral 7/20/2022    Procedure: INJECTION, STEROID, EPIDURAL, TRANSFORAMINAL APPROACH, BILATERAL L3-L4 CONTRAST;  Surgeon: Miguel Latham MD;  Location: [facility];  Service: Pain Management;  Laterality: Bilateral;    TRANSFORAMINAL EPIDURAL INJECTION OF STEROID Right 12/7/2022    Procedure: INJECTION, STEROID, EPIDURAL, TRANSFORAMINAL APPROACH, RIGHT L3/L4 AND L4/L5 CONTRAST;  Surgeon: Miguel Latham MD;  Location: [facility];  Service: Pain Management;  Laterality: Right;    TRANSFORAMINAL EPIDURAL INJECTION OF STEROID Right 2/8/2023    Procedure: INJECTION, STEROID, EPIDURAL, TRANSFORAMINAL APPROACH RIGHT L3/4 AND L4/5 CONTRAST;  Surgeon: Miguel Latham MD;  Location: [facility];  Service: Pain Management;  Laterality: Right;    TUBAL LIGATION Bilateral        Family History:  Family History   Problem Relation Age of  "Onset    Diabetes Mother     Stroke Mother     Glaucoma Mother     Cataracts Mother     Cancer Father         Lung    No Known Problems Sister     Stroke Brother     Hypertension Daughter     Hypertension Daughter     Hypertension Daughter     No Known Problems Daughter     Heart disease Son     Early death Son     No Known Problems Maternal Aunt     No Known Problems Maternal Uncle     No Known Problems Paternal Aunt     No Known Problems Paternal Uncle     No Known Problems Maternal Grandmother     No Known Problems Maternal Grandfather     No Known Problems Paternal Grandmother     No Known Problems Paternal Grandfather     Amblyopia Neg Hx     Blindness Neg Hx     Macular degeneration Neg Hx     Retinal detachment Neg Hx     Strabismus Neg Hx     Thyroid disease Neg Hx        Social History:  Social History     Socioeconomic History    Marital status:    Tobacco Use    Smoking status: Never     Passive exposure: Never    Smokeless tobacco: Never   Substance and Sexual Activity    Alcohol use: No    Drug use: No    Sexual activity: Not Currently   Social History Narrative    Patient is  w/ 5 children, one  from heart disease.The patient is retired.       OBJECTIVE:    BP (!) 157/72 (BP Location: Left arm, Patient Position: Sitting, BP Method: Large (Automatic))   Pulse 62   Ht 5' 1" (1.549 m)   Wt 65.4 kg (144 lb 2.9 oz)   BMI 27.24 kg/m²     PHYSICAL EXAMINATION:    General appearance: Well appearing, in no acute distress, alert and oriented x3.  Psych:  Mood and affect appropriate.  Skin: Skin color, texture, turgor normal, no rashes or lesions, in both upper and lower body.  Head/face:  Normocephalic, atraumatic. No palpable lymph nodes.  Back: Straight leg raising in the sitting and supine positions causes back pain on the right. Limited range of motion with pain on flexion and extension. Positive facet loading bilaterally.  Extremities: Peripheral joint ROM is full and pain free " without obvious instability or laxity in all four extremities. No deformities, edema, or skin discoloration. Good capillary refill.  Musculoskeletal: No atrophy or tone abnormalities are noted. 5/5 strength in right ankle with plantar and 4/5 on dorsiflexion. 5/5 strength in left ankle with plantar and 4/5 on dorsiflexion. 4/5 strength with right knee flexion and extension. 5/5 strength with left knee flexion and extension. 5/5 strength in right EHL, 5/5 strength in left EHL. Right hip flexion is 4/5, left is 5/5. Mild TTP over right GTB. Full ROM of hips with pain on external rotation. Mahad's is positive on the right.  Neuro: Decreased sensation at a right L4 distribution.  Gait: Antalgic- ambulates without assistance.    ASSESSMENT: 78 y.o. year old female with lower back pain, consistent with     1. Lumbosacral radiculopathy  Ambulatory referral/consult to Physical/Occupational Therapy      2. Spinal stenosis, lumbar region with neurogenic claudication        3. Other chronic pain        4. DDD (degenerative disc disease), lumbar        5. Dorsalgia, unspecified  MRI Lumbar Spine Without Contrast            PLAN:     - Previous imaging was reviewed and discussed with the patient today.  - I have stressed the importance of physical activity and a home exercise plan to help with pain and improve health.  - I will start the patient in PT.  - Patient can continue with medications for now since they are providing benefits, using them appropriately, and without side effects.  - Continue Gabapentin 600 mg BID.  - Continue Etodolac 400 mg BID PRN pain with food. Recommend lowest dose for shortest duration possible. Pt denies hx of GI ulcers or bleeds, no blood thinners, so significant known cardiac disease, no kidney disease.   - Obtain updated lumbar MRI given that most recent procedures were less effective. Last was in 2019.  - RTC after MRI for review.    The above plan and management options were discussed at  length with patient. Patient is in agreement with the above and verbalized understanding.    Christin Ventura, BRENDA  03/09/2023

## 2023-03-17 ENCOUNTER — HOSPITAL ENCOUNTER (OUTPATIENT)
Dept: RADIOLOGY | Facility: HOSPITAL | Age: 78
Discharge: HOME | End: 2023-03-17
Attending: NURSE PRACTITIONER
Payer: MEDICARE

## 2023-03-17 DIAGNOSIS — M54.9 DORSALGIA, UNSPECIFIED: ICD-10-CM

## 2023-03-17 PROCEDURE — 72148 MRI LUMBAR SPINE W/O DYE: CPT | Mod: 26,,, | Performed by: RADIOLOGY

## 2023-03-17 PROCEDURE — 72148 MRI LUMBAR SPINE W/O DYE: CPT | Mod: TC

## 2023-03-17 PROCEDURE — 72148 MRI LUMBAR SPINE WITHOUT CONTRAST: ICD-10-PCS | Mod: 26,,, | Performed by: RADIOLOGY

## 2023-03-22 ENCOUNTER — TELEPHONE (OUTPATIENT)
Dept: PAIN MEDICINE | Facility: CLINIC | Age: 78
End: 2023-03-22
Payer: MEDICARE

## 2023-03-22 NOTE — TELEPHONE ENCOUNTER
This message is for patient in regards to his/her appointment 3/23/23 at 8 am With Christin Wiley Np at [facility] on the 9th floor [id number].If you have any questions or concerns please contact [phone]

## 2023-03-23 ENCOUNTER — PATIENT MESSAGE (OUTPATIENT)
Dept: ADMINISTRATIVE | Facility: HOSPITAL | Age: 78
End: 2023-03-23
Payer: MEDICARE

## 2023-03-23 ENCOUNTER — OFFICE VISIT (OUTPATIENT)
Dept: PAIN MEDICINE | Facility: CLINIC | Age: 78
End: 2023-03-23
Payer: MEDICARE

## 2023-03-23 VITALS
HEART RATE: 63 BPM | SYSTOLIC BLOOD PRESSURE: 140 MMHG | BODY MASS INDEX: 27.18 KG/M2 | DIASTOLIC BLOOD PRESSURE: 78 MMHG | WEIGHT: 143.94 LBS | HEIGHT: 61 IN

## 2023-03-23 DIAGNOSIS — M54.16 LUMBAR RADICULOPATHY: Primary | ICD-10-CM

## 2023-03-23 DIAGNOSIS — G89.29 OTHER CHRONIC PAIN: ICD-10-CM

## 2023-03-23 DIAGNOSIS — M51.36 DDD (DEGENERATIVE DISC DISEASE), LUMBAR: ICD-10-CM

## 2023-03-23 DIAGNOSIS — M48.062 SPINAL STENOSIS, LUMBAR REGION WITH NEUROGENIC CLAUDICATION: ICD-10-CM

## 2023-03-23 PROCEDURE — 99213 PR OFFICE/OUTPT VISIT, EST, LEVL III, 20-29 MIN: ICD-10-PCS | Mod: S$GLB,,, | Performed by: NURSE PRACTITIONER

## 2023-03-23 PROCEDURE — 99999 PR PBB SHADOW E&M-EST. PATIENT-LVL III: ICD-10-PCS | Mod: PBBFAC,,, | Performed by: NURSE PRACTITIONER

## 2023-03-23 PROCEDURE — 99213 OFFICE O/P EST LOW 20 MIN: CPT | Mod: S$GLB,,, | Performed by: NURSE PRACTITIONER

## 2023-03-23 PROCEDURE — 3077F SYST BP >= 140 MM HG: CPT | Mod: CPTII,S$GLB,, | Performed by: NURSE PRACTITIONER

## 2023-03-23 PROCEDURE — 3288F FALL RISK ASSESSMENT DOCD: CPT | Mod: CPTII,S$GLB,, | Performed by: NURSE PRACTITIONER

## 2023-03-23 PROCEDURE — 99999 PR PBB SHADOW E&M-EST. PATIENT-LVL III: CPT | Mod: PBBFAC,,, | Performed by: NURSE PRACTITIONER

## 2023-03-23 PROCEDURE — 1160F RVW MEDS BY RX/DR IN RCRD: CPT | Mod: CPTII,S$GLB,, | Performed by: NURSE PRACTITIONER

## 2023-03-23 PROCEDURE — 1101F PT FALLS ASSESS-DOCD LE1/YR: CPT | Mod: CPTII,S$GLB,, | Performed by: NURSE PRACTITIONER

## 2023-03-23 PROCEDURE — 1125F AMNT PAIN NOTED PAIN PRSNT: CPT | Mod: CPTII,S$GLB,, | Performed by: NURSE PRACTITIONER

## 2023-03-23 PROCEDURE — 1160F PR REVIEW ALL MEDS BY PRESCRIBER/CLIN PHARMACIST DOCUMENTED: ICD-10-PCS | Mod: CPTII,S$GLB,, | Performed by: NURSE PRACTITIONER

## 2023-03-23 PROCEDURE — 3078F DIAST BP <80 MM HG: CPT | Mod: CPTII,S$GLB,, | Performed by: NURSE PRACTITIONER

## 2023-03-23 PROCEDURE — 3288F PR FALLS RISK ASSESSMENT DOCUMENTED: ICD-10-PCS | Mod: CPTII,S$GLB,, | Performed by: NURSE PRACTITIONER

## 2023-03-23 PROCEDURE — 1101F PR PT FALLS ASSESS DOC 0-1 FALLS W/OUT INJ PAST YR: ICD-10-PCS | Mod: CPTII,S$GLB,, | Performed by: NURSE PRACTITIONER

## 2023-03-23 PROCEDURE — 1159F PR MEDICATION LIST DOCUMENTED IN MEDICAL RECORD: ICD-10-PCS | Mod: CPTII,S$GLB,, | Performed by: NURSE PRACTITIONER

## 2023-03-23 PROCEDURE — 3077F PR MOST RECENT SYSTOLIC BLOOD PRESSURE >= 140 MM HG: ICD-10-PCS | Mod: CPTII,S$GLB,, | Performed by: NURSE PRACTITIONER

## 2023-03-23 PROCEDURE — 3078F PR MOST RECENT DIASTOLIC BLOOD PRESSURE < 80 MM HG: ICD-10-PCS | Mod: CPTII,S$GLB,, | Performed by: NURSE PRACTITIONER

## 2023-03-23 PROCEDURE — 1125F PR PAIN SEVERITY QUANTIFIED, PAIN PRESENT: ICD-10-PCS | Mod: CPTII,S$GLB,, | Performed by: NURSE PRACTITIONER

## 2023-03-23 PROCEDURE — 1159F MED LIST DOCD IN RCRD: CPT | Mod: CPTII,S$GLB,, | Performed by: NURSE PRACTITIONER

## 2023-03-23 NOTE — PROGRESS NOTES
Chronic patient Established Note (Follow up visit)      SUBJECTIVE:    Interval History 3/23/2023:  The patient presents for follow up of chronic back pain. She is here for review up updated lumbar MRI. Her results show severe spondylitic central spinal stenosis at L3-4 and L4-5, L4-5 subarticular zones severe stenosis bilaterally and severe right-sided neural foraminal stenosis. TF ESIs have been helpful short-term. She is not interested in surgical consult at this time. No bowel or bladder incontinence. Her biggest complaint today is right sided back pain with radiation down the side of the right leg and numbness. Her pain today is 9/10.    Interval History 3/9/2023:  The patient presents today for follow up of back and leg pain. She is now s/p repeat right L3/4 and L4/5 TF SANKET on 2/8/23 with 80% benefit for about 2 weeks only. Previous did help for longer. She is reporting pain that start to right lower back with radiation into the front and back of the right leg to the anterior shin. She has numbness on the right lower leg. She feels like her pain has worsened over the past few months. Her last MRI was in 2019. She had some benefit with PT in the past and is open to repeat. Her pain today is 7/10.    Interval History 1/24/2023:  Diana is here for follow up of back and right leg pain. She is now s/p right L3/4 and L4/5 TF SANKET on 12/7/22. She reports about 80% relief for about one month. Today, she is reporting lower back pain with radiation into the front and side of the right leg. She has numbness to the right leg intermittently without warning. She also has been having cramps to lower legs but says she thinks that she has not been drinking enough water. She completed aquatic PT which she thinks was helpful. Her pain today is 8/10.    Interval History 11/11/2022:  The patient returns today for follow up of back and leg pain. She has been in aquatic PT which she finds helpful. She attends twice weekly. Her  back pain has improved somewhat because of this. She does have worsened right leg pain with walking and activity. The pain radiates down the front and side of the right leg with associated numbness. No current radiation on the left. She had benefit with ESIs in the past and wishes to repeat. Her pain today is 7/10.    Interval History 8/11/2022:  The patient is here for follow up of lower back pain. Since previous encounter, she underwent repeat L3/4 TF SANKET with 70% relief. We had originally planned for MBB but she started having more shooting pain into the legs. She feels like this was helpful. Her pain is tolerable at this time. Her pain today is 7/10.    Interval History 5/9/2022:  The patient is here for follow up of chronic back pain. She is having more back pain and stiffness in the morning. We did previously discuss lumbar MBB but she is worried about not having IV sedation. She says that she would like to further discuss the procedure today. Leg pain has been mild since previous SANKET. She does have intermittent numbness still. Back pain is greater than leg pain. Her pain today is 7/10.    Interval History 4/7/2022:  The patient is here for follow up of lower back and leg pain. Since previous encounter, she underwent bilateral L3/4 TF SANKET on 1/26/22. She reports 80% relief of pain for about 2 months. She feels like this gave her significant benefit during that time and her pain was mild. She is now again having severe back pain with radiation into the anterior thighs. She would like to repeat the procedure. She would also like me to place another referral for aquatic PT. She stopped Mobic because she found Diclofenac more helpful. She has been taking as needed but not daily. She does find Gabapentin helpful but it sometimes makes her drowsy. Her pain today is 9/10.    Interval History 1/11/2022:  The patient returns to discuss continued lower back pain. She has not restarted aquatic PT due to increase in  Covid-19 cases. She continues with sharp lower back pain that radiates into anterior thighs, bilaterally. She has associated numbness. The pain is worse with increased activity. Her pain today is 7/10.    Interval History 11/11/2021:  The patient presents today for 2 month follow up of lower back pain. Since previous encounter, she has not started aquatic PT because she is on the waiting list. She continues to report lower back pain with radiation down the left leg. We previously discussed lumbar MBB/RFA which she does not wish to pursue at this time. She states that diclofenac is not helping very much with her aching pain at this time. She denies any new weakness or symptoms at this time. Her pain today is 8/10.    Interval History 8/19/2021:  Diana returns today for follow up of chronic lower back pain. She reports that he has continued with aching pain. She has been in PT but is not finding it very helpful. She has not tried aquatic PT in the past. She is still having intermittent radiation down her legs. She has been unable to take 1200 mg daily of Gabapentin and has decreased to 600 mg daily due to sedation and dizziness with the medication. Otherwise, no new complaints today. Her pain today is 7/10.    Interval History 6/18/2021:  The patient is here for follow up of lower back pain.  She has radiation of her pain into her anterior thighs but not below the knees.  She denies numbness or tingling at this time.  Her back pain is currently greater than her leg pain.  She has a lot of stiffness when she wakes the morning states it improves when she moves.  She had limited benefit with recent repeat bilateral L3-4 transforaminal steroid injection.  Her pain today is 7/10    Interval History 5/4/2021:  The patient is here for follow up of lower back and leg pain.  Previous encounter, physical therapy was ordered.  She states that she did not start physical therapy and would like me to replace the order for her.   She does report that she is noticing more muscle fatigue and weakness particularly with activity.  She is also having more back pain with radiation into the anterior lateral thighs with the past month.  She previously had significant been hip with bilateral L3/4 transforaminal epidural steroid injection and wishes to repeat this.  She found this more helpful than previous procedures. Her pain today is 7/10.    Interval History 3/2/2021:  The patient is here for follow up of chronic pain. She is having more back pain today which she attributes to the cold front coming in. She is having radiation into the buttocks and right anterior thigh. Her back pain is her primary complaint today. She describes it as throbbing. She recently completed both Covid-19 vaccines which she tolerated well. She is now taking Gabapentin 600 mg twice daily regularly. She notices improvement in right leg numbness.  Her pain today is 7/10.    Interval History 12/1/2020:  The patient iss here for follow-up of back and leg pain.  She is now status post bilateral L3/4 transforaminal epidural steroid injection on 11/11/2020. She is reporting 100% relief for about 2 weeks and then her pain started to return.  She is still having some benefit.  Her pain is located across the lower back with radiation into front and sides of the legs to the anterior feet with associated numbness.  She feels like when she walks sometimes her legs become weak.  This has improved slightly.  She denies any recent falls.  At her last OV, her Gabapentin was increased to 600 mg BID.  She says that she finds this helpful.  She has mild drowsiness.  She admits that she does not always take the medication and thought it was more of an as needed medication.  The patient denies any bowel or bladder incontinence or signs of saddle paresthesia.  She feels as though her pain today is worse than usual.  She rates her pain today as 10/10.    Interval history 10/15/2020:  Diana HOUSTON  Nima presents to the clinic for a follow-up appointment for back pain. Since the last visit, Diana Herring states the pain has been worsening. Current pain intensity is 7/10. She reports that she continues to have low back pain that radiates to the bilateral hip and leg. She does report that she has noticed intermittent numbness in the right lateral and anterior thigh, that is sometimes associated with weakness in her right leg. She says that her leg gives out when this happens. She has not had any falls or other trauma. She did not get the bilateral L3/4 TFESI planned last time, and she is still taking only 300 mg bid of gabapentin.  Patient denies urinary incontinence, bowel incontinence, significant weight loss.    Initial visit:  Diana Herring presents to the clinic for the evaluation of low back pain. The pain started 2 years ago following loosing bone after a bone density and symptoms have been worsening.The pain is located in the low back area and radiates to the bilateral hip and leg.  The pain is described as aching, numbing, sharp and tight band and is rated as 8/10. The pain is rated with a score of  7/10 on the BEST day and a score of 8/10 on the WORST day.  Symptoms interfere with daily activity and sleeping. The pain is exacerbated by Sitting, Standing, Bending, Coughing/Sneezing, Walking, Morning, Lifting and Getting out of bed/chair.  The pain is mitigated by heat, laying down and rest. She reports spending 0 hours per day reclining. The patient reports 6 hours of uninterrupted sleep per night.     Patient had L5/S1 ILESI 2 weeks ago and only gave her 1 day of relief.      Patient denies night fever/night sweats, urinary incontinence, bowel incontinence, significant weight loss and significant motor weakness.     Physical Therapy/Home Exercise: yes, was not beneficial      Pain Disability Index Review:  Last 3 PDI Scores 3/23/2023 3/9/2023 1/24/2023   Pain Disability Index (PDI) 45 35 40        Pain Medications:  Gabapentin 600 mg bid    Opioid Contract: no     report:  Reviewed and consistent with medication use as prescribed.    Pain Procedures:  2/8/23 Right L3/4 and L4/5 TF SANKET- 80% relief for 2 weeks  12/7/22 Right L3/4 and L4/5 TF SANKET- 80% relief for one month  7/20/22 Bilateral L3/4 TF SANKET- 70% relief  4/20/22 Bilateral L3/4 TF SANKET- 75% relief of leg pain  1/26/22 Bilateral L3/4 TF SANKET  5/19/21 bilateral L3/4 TF SANKET  11/11/20 Bilateral L3/4 TF SANKET- 100% relief for 2 weeks  5/13/20 L5/S1 ILESI  10/30/19 TFESI Bilateral L3-L4  09/04/19 TFESI Bilateral L3     Physical Therapy/Home Exercise: yes    Imaging:    Narrative & Impression  EXAMINATION:  MRI LUMBAR SPINE WITHOUT CONTRAST     CLINICAL HISTORY:  Dorsalgia, unspecifiedLow back pain, symptoms persist with > 6wks conservative treatment;     TECHNIQUE:  Sagittal T1, sagittal T2, sagittal STIR, axial T1 and axial T2 weighted images of the lumbar spine obtained without contrast.     COMPARISON:  X-ray 05/29/2020 and lumbar spine radiograph 07/26/2019     FINDINGS:  Lumbar spine alignment is within normal limits. The vertebral body heights are well maintained, with no fracture.  Degenerative edema signal at opposing endplates of L4-5 and degenerative sclerosis at multiple endplates from L2 through L5 with associated Schmorl's nodes.  Otherwise, marrow signal normal.     The conus is normal in appearance.  There are bilateral T2 hyperintense renal lesions likely related to cysts.     Bunching of cauda equina nerve roots posterior to L2 and L3 vertebral bodies.     L1-L2: Circumferential disc bulge, spondylitic spurring and mild facet arthritis.Mild central spinal stenosis.     L2-L3: Circumferential disc bulge, spondylitic spurring and facet arthritis.  Mild central spinal stenosis and left-sided foraminal stenosis.     L3-L4: Circumferential disc bulge, spondylitic spurring, severe facet arthritis and ligamentum flavum thickening produce  severe central spinal stenosis and mild bilateral foraminal stenosis.     L4-L5: Circumferential disc bulge, spondylitic spurring, facet arthritis and ligamentum flavum thickening.  Bilateral facet joint effusions.  Severe central spinal stenosis and bilateral subarticular zone stenosis.  Moderate left and severe right-sided neural foraminal stenosis.     L5-S1: Circumferential disc bulge and minimal facet arthritis.  No central canal or foraminal stenosis.     Impression:     Severe spondylitic central spinal stenosis at L3-4 and L4-5.  L4-5 subarticular zones severe stenosis bilaterally and severe right-sided neural foraminal stenosis.  Additional degrees of central spinal stenosis and foraminal stenosis as above.     Bilateral renal lesions likely cysts which can be confirmed with ultrasound on a nonemergent basis.       CMP  Sodium   Date Value Ref Range Status   03/07/2023 143 136 - 145 mmol/L Final     Potassium   Date Value Ref Range Status   03/07/2023 3.4 (L) 3.5 - 5.1 mmol/L Final     Chloride   Date Value Ref Range Status   03/07/2023 108 95 - 110 mmol/L Final     CO2   Date Value Ref Range Status   03/07/2023 28 23 - 29 mmol/L Final     Glucose   Date Value Ref Range Status   03/07/2023 113 (H) 70 - 110 mg/dL Final     BUN   Date Value Ref Range Status   03/07/2023 12 8 - 23 mg/dL Final     Creatinine   Date Value Ref Range Status   03/07/2023 0.8 0.5 - 1.4 mg/dL Final     Calcium   Date Value Ref Range Status   03/07/2023 9.4 8.7 - 10.5 mg/dL Final     Total Protein   Date Value Ref Range Status   03/07/2023 7.6 6.0 - 8.4 g/dL Final     Albumin   Date Value Ref Range Status   03/07/2023 3.9 3.5 - 5.2 g/dL Final     Total Bilirubin   Date Value Ref Range Status   03/07/2023 0.6 0.1 - 1.0 mg/dL Final     Comment:     For infants and newborns, interpretation of results should be based  on gestational age, weight and in agreement with clinical  observations.    Premature Infant recommended reference  ranges:  Up to 24 hours.............<8.0 mg/dL  Up to 48 hours............<12.0 mg/dL  3-5 days..................<15.0 mg/dL  6-29 days.................<15.0 mg/dL       Alkaline Phosphatase   Date Value Ref Range Status   03/07/2023 70 55 - 135 U/L Final     AST   Date Value Ref Range Status   03/07/2023 15 10 - 40 U/L Final     ALT   Date Value Ref Range Status   03/07/2023 16 10 - 44 U/L Final     Anion Gap   Date Value Ref Range Status   03/07/2023 7 (L) 8 - 16 mmol/L Final     eGFR if    Date Value Ref Range Status   03/04/2022 >60.0 >60 mL/min/1.73 m^2 Final     eGFR if non    Date Value Ref Range Status   03/04/2022 >60.0 >60 mL/min/1.73 m^2 Final     Comment:     Calculation used to obtain the estimated glomerular filtration  rate (eGFR) is the CKD-EPI equation.            Allergies: Review of patient's allergies indicates:  No Known Allergies    Current Medications:   Current Outpatient Medications   Medication Sig Dispense Refill    alendronate (FOSAMAX) 70 MG tablet TAKE 1 TABLET BY MOUTH ONE TIME PER WEEK 12 tablet 1    amLODIPine (NORVASC) 10 MG tablet Take 1 tablet (10 mg total) by mouth once daily. 90 tablet 3    atorvastatin (LIPITOR) 40 MG tablet TAKE 1 TABLET BY MOUTH EVERY DAY 90 tablet 2    calcium-vitamin D 500-125 mg-unit tablet Take 1 tablet by mouth once daily.      ergocalciferol (ERGOCALCIFEROL) 50,000 unit Cap Take 1 capsule (50,000 Units total) by mouth every Sunday. 12 capsule 0    etodolac (LODINE) 400 MG tablet Take 1 tablet (400 mg total) by mouth every 12 (twelve) hours as needed (pain). 60 tablet 2    fluticasone propionate (FLONASE) 50 mcg/actuation nasal spray 2 SPRAYS (100 MCG TOTAL) BY EACH NOSTRIL ROUTE ONCE DAILY. 48 mL 3    gabapentin (NEURONTIN) 300 MG capsule Take 2 capsules (600 mg total) by mouth 2 (two) times daily. 360 capsule 1    metoprolol succinate (TOPROL-XL) 100 MG 24 hr tablet TAKE 1 TABLET BY MOUTH EVERY DAY 90 tablet 3     multivitamin (THERAGRAN) per tablet Take 1 tablet by mouth once daily.      pantoprazole (PROTONIX) 40 MG tablet TAKE 1 TABLET BY MOUTH EVERY DAY 90 tablet 0    traZODone (DESYREL) 150 MG tablet TAKE 1 TABLET (150 MG TOTAL) BY MOUTH EVERY EVENING. 90 tablet 1    paroxetine (PAXIL) 20 MG tablet Take 1 tablet (20 mg total) by mouth every morning. 90 tablet 1     No current facility-administered medications for this visit.       REVIEW OF SYSTEMS:    GENERAL:  No weight loss, malaise or fevers.  HEENT:  Negative for frequent or significant headaches.  NECK:  Negative for lumps, goiter, pain and significant neck swelling.  RESPIRATORY:  Negative for cough, wheezing or shortness of breath.  CARDIOVASCULAR:  Negative for chest pain, leg swelling or palpitations.  GI:  Negative for abdominal discomfort, blood in stools or black stools or change in bowel habits. GERD.  MUSCULOSKELETAL:  See HPI.  SKIN:  Negative for lesions, rash, and itching.  PSYCH:  + for sleep disturbance, h/o depression  HEMATOLOGY/LYMPHOLOGY:  Negative for prolonged bleeding, bruising easily or swollen nodes.  NEURO:  + numbness. No history of headaches, syncope, paralysis, seizures or tremors.  All other reviewed and negative other than HPI.     Past Medical History:  Past Medical History:   Diagnosis Date    Anxiety     Arthritis     Corneal abrasion s    ? eye     Depression     Full dentures     GERD (gastroesophageal reflux disease)     Hyperlipidemia     Hypertension     Nuclear sclerosis of both eyes 6/5/2017    Osteoporosis     Restless leg syndrome        Past Surgical History:  Past Surgical History:   Procedure Laterality Date    COLONOSCOPY N/A 5/14/2019    Procedure: COLONOSCOPY;  Surgeon: Nahid Cline MD;  Location: [facility] ENDO;  Service: Endoscopy;  Laterality: N/A;    EPIDURAL STEROID INJECTION N/A 5/13/2020    Procedure: LUMBAR/CAUDAL L5/S1 IL SANKET ;  Surgeon: Miguel Latham MD;  Location: [facility] PAIN MGT;  Service: Pain Management;   Laterality: N/A;  NEEDS CONSENT    HYSTERECTOMY      PARTIAL HYSTERECTOMY      TRANSFORAMINAL EPIDURAL INJECTION OF STEROID Bilateral 9/4/2019    Procedure: Injection,steroid,epidural,transforaminal approach LUMBAR TRANSFORAMINAL BILATERAL L3 TF SANKET;  Surgeon: Miguel Latham MD;  Location: [facility] PAIN MGT;  Service: Pain Management;  Laterality: Bilateral;  NEEDS CONSENT    TRANSFORAMINAL EPIDURAL INJECTION OF STEROID Bilateral 10/30/2019    Procedure: TRANSFORAMINAL SANKET BILATERAL L3-L4;  Surgeon: Miguel Latham MD;  Location: [facility] PAIN MGT;  Service: Pain Management;  Laterality: Bilateral;  NEEDS CONSENT    TRANSFORAMINAL EPIDURAL INJECTION OF STEROID Bilateral 11/11/2020    Procedure: INJECTION, STEROID, EPIDURAL, TRANSFORAMINAL APPROACH, L3-L4;  Surgeon: Miguel Latham MD;  Location: [facility] PAIN MGT;  Service: Pain Management;  Laterality: Bilateral;    TRANSFORAMINAL EPIDURAL INJECTION OF STEROID Bilateral 5/19/2021    Procedure: INJECTION, STEROID, EPIDURAL, TRANSFORAMINAL APPROACH, L3-L4;  Surgeon: Miguel Latham MD;  Location: [facility] PAIN MGT;  Service: Pain Management;  Laterality: Bilateral;    TRANSFORAMINAL EPIDURAL INJECTION OF STEROID Bilateral 1/26/2022    Procedure: Injection,steroid,epidural,transforaminal approach BILATERAL L3/4;  Surgeon: Miguel Latham MD;  Location: [facility] PAIN MGT;  Service: Pain Management;  Laterality: Bilateral;    TRANSFORAMINAL EPIDURAL INJECTION OF STEROID Bilateral 4/20/2022    Procedure: INJECTION, STEROID, EPIDURAL, TRANSFORAMINAL APPROACH, Bilateral L3-L4;  Surgeon: Miguel Latham MD;  Location: [facility] PAIN MGT;  Service: Pain Management;  Laterality: Bilateral;    TRANSFORAMINAL EPIDURAL INJECTION OF STEROID Bilateral 7/20/2022    Procedure: INJECTION, STEROID, EPIDURAL, TRANSFORAMINAL APPROACH, BILATERAL L3-L4 CONTRAST;  Surgeon: Miguel Latham MD;  Location: [facility] PAIN MGT;  Service: Pain Management;  Laterality: Bilateral;    TRANSFORAMINAL  "EPIDURAL INJECTION OF STEROID Right 2022    Procedure: INJECTION, STEROID, EPIDURAL, TRANSFORAMINAL APPROACH, RIGHT L3/L4 AND L4/L5 CONTRAST;  Surgeon: Miguel Latham MD;  Location: [facility] PAIN MGT;  Service: Pain Management;  Laterality: Right;    TRANSFORAMINAL EPIDURAL INJECTION OF STEROID Right 2023    Procedure: INJECTION, STEROID, EPIDURAL, TRANSFORAMINAL APPROACH RIGHT L3/4 AND L4/5 CONTRAST;  Surgeon: Miguel Latham MD;  Location: [facility] PAIN MGT;  Service: Pain Management;  Laterality: Right;    TUBAL LIGATION Bilateral        Family History:  Family History   Problem Relation Age of Onset    Diabetes Mother     Stroke Mother     Glaucoma Mother     Cataracts Mother     Cancer Father         Lung    No Known Problems Sister     Stroke Brother     Hypertension Daughter     Hypertension Daughter     Hypertension Daughter     No Known Problems Daughter     Heart disease Son     Early death Son     No Known Problems Maternal Aunt     No Known Problems Maternal Uncle     No Known Problems Paternal Aunt     No Known Problems Paternal Uncle     No Known Problems Maternal Grandmother     No Known Problems Maternal Grandfather     No Known Problems Paternal Grandmother     No Known Problems Paternal Grandfather     Amblyopia Neg Hx     Blindness Neg Hx     Macular degeneration Neg Hx     Retinal detachment Neg Hx     Strabismus Neg Hx     Thyroid disease Neg Hx        Social History:  Social History     Socioeconomic History    Marital status:    Tobacco Use    Smoking status: Never     Passive exposure: Never    Smokeless tobacco: Never   Substance and Sexual Activity    Alcohol use: No    Drug use: No    Sexual activity: Not Currently   Social History Narrative    Patient is  w/ 5 children, one  from heart disease.The patient is retired.       OBJECTIVE:    BP (!) 140/78 (BP Location: Right arm, Patient Position: Sitting, BP Method: Medium (Automatic))   Pulse 63   Ht 5' 1" " (1.549 m)   Wt 65.3 kg (143 lb 15.4 oz)   BMI 27.20 kg/m²     PHYSICAL EXAMINATION:    General appearance: Well appearing, in no acute distress, alert and oriented x3.  Psych:  Mood and affect appropriate.  Skin: Skin color, texture, turgor normal, no rashes or lesions, in both upper and lower body.  Head/face:  Normocephalic, atraumatic. No palpable lymph nodes.  Back: Straight leg raising in the sitting and supine positions causes back pain on the right. Limited range of motion with pain on flexion and extension. Positive facet loading bilaterally, R>L.  Extremities: Peripheral joint ROM is full and pain free without obvious instability or laxity in all four extremities. No deformities, edema, or skin discoloration. Good capillary refill.  Musculoskeletal: No atrophy or tone abnormalities are noted. 5/5 strength in right ankle with plantar and 4/5 on dorsiflexion. 5/5 strength in left ankle with plantar and 4/5 on dorsiflexion. 4/5 strength with right knee flexion and extension. 5/5 strength with left knee flexion and extension. 5/5 strength in right EHL, 5/5 strength in left EHL. Right hip flexion is 4/5, left is 5/5. TTP over right GTB. Full ROM of hips with pain on external rotation. Mahad's is positive on the right.  Neuro: Decreased sensation at a right L4 distribution.  Gait: Antalgic- ambulates without assistance.    ASSESSMENT: 78 y.o. year old female with lower back pain, consistent with     1. Lumbar radiculopathy        2. Spinal stenosis, lumbar region with neurogenic claudication        3. DDD (degenerative disc disease), lumbar        4. Other chronic pain            PLAN:     - Recent lumbar MRI was reviewed and discussed with the patient today.  - She declined surgical consult. Can repeat SAKNET if needed in the future.  - I have stressed the importance of physical activity and a home exercise plan to help with pain and improve health.  - She is restarting PT next week.  - Patient can continue  with medications for now since they are providing benefits, using them appropriately, and without side effects.  - Continue Gabapentin 600 mg BID.  - Continue Etodolac 400 mg BID PRN pain with food. Recommend lowest dose for shortest duration possible. Pt denies hx of GI ulcers or bleeds, no blood thinners, so significant known cardiac disease, no kidney disease.   - RTC in 6-8 weeks to determine effectiveness of PT.    The above plan and management options were discussed at length with patient. Patient is in agreement with the above and verbalized understanding.    Christin Ventura, BRENDA  03/23/2023

## 2023-03-28 ENCOUNTER — PES CALL (OUTPATIENT)
Dept: ADMINISTRATIVE | Facility: CLINIC | Age: 78
End: 2023-03-28
Payer: MEDICARE

## 2023-03-31 ENCOUNTER — CLINICAL SUPPORT (OUTPATIENT)
Dept: REHABILITATION | Facility: HOSPITAL | Age: 78
End: 2023-03-31
Payer: MEDICARE

## 2023-03-31 DIAGNOSIS — M54.17 LUMBOSACRAL RADICULOPATHY: ICD-10-CM

## 2023-03-31 DIAGNOSIS — M54.50 LUMBAR PAIN: ICD-10-CM

## 2023-03-31 DIAGNOSIS — R53.1 DECREASED STRENGTH: ICD-10-CM

## 2023-03-31 PROCEDURE — 97162 PT EVAL MOD COMPLEX 30 MIN: CPT

## 2023-03-31 PROCEDURE — 97110 THERAPEUTIC EXERCISES: CPT

## 2023-03-31 NOTE — PLAN OF CARE
[facility]   Physical Therapy Initial Evaluation       Name: Diana Herring  Clinic Number: [id number]    Therapy Diagnosis:   Encounter Diagnoses   Name Primary?    Lumbosacral radiculopathy     Lumbar pain     Decreased strength         Physician: Christin Ventura,*    Physician Orders: PT Eval and Treat   Medical Diagnosis from Referral: M54.17 (ICD-10-CM) - Lumbosacral radiculopathy  Evaluation Date: 3/31/2023  Authorization Period Expiration: 4/28/23  Plan of Care Expiration: 5/12/23  Progress Note Due: 4/30/23  Visit # / Visits authorized: 1/ 1   FOTO: 1    Precautions: Standard and osteoporosis       Time In: 8:37 am   Time Out: 9:08 am   Total Appointment Time (timed & untimed codes): 31 minutes      SUBJECTIVE     Date of onset: approximately two years ago     History of current condition - Diana reports: Pt stated that her back has been bothering her for about 2 two years. Pt denies particular injury. Pt stated that pain radiates down back of (R) LE to calf. Pt stated that when she wakes up in the morning she is stiff and then after showering and moving around loosens up.  Pt stated that she has had a lot of injections, which help temporarily. Pt stated that MD also gave her some medicine for her pain.   Falls: no     Imaging: see imaging section in pt chart review    Prior Therapy: aquatic therapy end of last year and pt stated did help  Social History: Pt stated that she lives with her . Pt stated that she lives in a two story home, bedroom is upstairs. Pt stated that sometimes she has difficulty ascending/descending stairs, holds onto rail.   Occupation: retired   Prior Level of Function: independent   Current Level of Function: report of pain/difficulty performing aggravating factors listed below    Pain:  Current 8/10, worst 10/10, best 4/10   Location: bilateral  back radiating into posterior (R) LE to calf   Description: aching, sharp   Aggravating Factors:  stiff when wake up in the morning, stairs, sitting for prolonged period of time, lying down a long period of time, lifting tasks, reaching, standing for prolonged period of time   Easing Factors:  moving/walking if don't go too far,  pain medicine, injections temporarily    Patients goals: be able to do every day chores with less pain/difficulty   Medical History:   Past Medical History:   Diagnosis Date    Anxiety     Arthritis     Corneal abrasion s    ? eye     Depression     Full dentures     GERD (gastroesophageal reflux disease)     Hyperlipidemia     Hypertension     Nuclear sclerosis of both eyes 6/5/2017    Osteoporosis     Restless leg syndrome        Surgical History:   Diana Herring  has a past surgical history that includes Partial hysterectomy; Tubal ligation (Bilateral); Hysterectomy; Colonoscopy (N/A, 5/14/2019); Transforaminal epidural injection of steroid (Bilateral, 9/4/2019); Transforaminal epidural injection of steroid (Bilateral, 10/30/2019); Epidural steroid injection (N/A, 5/13/2020); Transforaminal epidural injection of steroid (Bilateral, 11/11/2020); Transforaminal epidural injection of steroid (Bilateral, 5/19/2021); Transforaminal epidural injection of steroid (Bilateral, 1/26/2022); Transforaminal epidural injection of steroid (Bilateral, 4/20/2022); Transforaminal epidural injection of steroid (Bilateral, 7/20/2022); Transforaminal epidural injection of steroid (Right, 12/7/2022); and Transforaminal epidural injection of steroid (Right, 2/8/2023).    Medications:   Diana has a current medication list which includes the following prescription(s): alendronate, amlodipine, atorvastatin, calcium-vitamin d, ergocalciferol, etodolac, fluticasone propionate, gabapentin, metoprolol succinate, multivitamin, pantoprazole, paroxetine, and trazodone.    Allergies:   Review of patient's allergies indicates:  No Known Allergies       OBJECTIVE     Lumbar AROM: deferred     Hip Right  Left   "Pain/Dysfunction with Movement    AROM MMT AROM MMT    Flexion WFL 3+/5 WFL 4/5    Extension NT NT NT NT Pt performed poor bridge against gravity and c/o pain when performed   Abduction WFL 3+/5 WFL 4-/5    External rotation WFL 4-/5 WFL 4-/5       Knee Right  Left  Pain/Dysfunction with Movement    AROM MMT AROM MMT    Flexion WFL 4/5 WFL 4/5    Extension WFL 4/5 WFL 4/5      Ankle Right  Left  Pain/Dysfunction with Movement    AROM MMT AROM MMT    Plantarflexion WFL 4+/5 WFL 4+/5    Dorsiflexion WFL 5/5 WFL 5/5        SLR Test: (R) = positive, (L) = negative     90/90 Hamstring Test: (R) =20 degrees lacking, (L) = 30 degrees lacking    Palpation: pt reported tenderness to palpation along (B) lumbar paraspinals and (B) buttocks    Posture: flexed trunk in sitting    Gait: pt ambulated without AD, demo antalgic gait     Limitation/Restriction for FOTO lumbar  Survey    Therapist reviewed FOTO scores for Diana Herring on 3/31/2023.   FOTO documents entered into EPIC - see Media section.    Limitation Score: 51%         TREATMENT     Total Treatment time (time-based codes) separate from Evaluation: 8 minutes      Diana received the treatments listed below:      therapeutic exercises to develop ROM and flexibility for 8 minutes including:  Piriformis stretch 2x30" B   (S) KTC 5" x10 B          PATIENT EDUCATION AND HOME EXERCISES     Education provided:   - Role of PT - pt verbalized understanding  - HEP - pt was instructed to stop performing particular therex if particular therex causes/increases pain - pt verbalized understanding    Written Home Exercises Provided: yes. Exercises were reviewed and Diana was able to demonstrate them prior to the end of the session.  Diana demonstrated good  understanding of the education provided. See EMR under Patient Instructions for exercises provided during therapy sessions.    ASSESSMENT     Diana is a 78 y.o. female referred to outpatient Physical Therapy with a medical " diagnosis of Lumbosacral radiculopathy. Patient presents with impaired posture, decreased LE MMT scores, decreased (B) hamstring flexibility, and positive (R) SLR test. Pt will benefit from skilled PT in order to return pt to her highest level of function with decreased pain and limitation.     Patient prognosis is Fair.   Patient will benefit from skilled outpatient Physical Therapy to address the deficits stated above and in the chart below, provide patient /family education, and to maximize patientt's level of independence.     Plan of care discussed with patient: Yes  Patient's spiritual, cultural and educational needs considered and patient is agreeable to the plan of care and goals as stated below:     Anticipated Barriers for therapy: chronicity of pain     Medical Necessity is demonstrated by the following  History  Co-morbidities and personal factors that may impact the plan of care Co-morbidities:   HTN    Personal Factors:   no deficits     moderate   Examination  Body Structures and Functions, activity limitations and participation restrictions that may impact the plan of care Body Regions:   back  lower extremities    Body Systems:    ROM  strength  gait  transfers    Participation Restrictions:   None mentioned    Activity limitations:   Learning and applying knowledge  no deficits    General Tasks and Commands  no deficits    Communication  no deficits    Mobility  lifting and carrying objects    Self care  no deficits    Domestic Life  cooking  doing house work (cleaning house, washing dishes, laundry)  Shopping     Interactions/Relationships  no deficits    Life Areas  no deficits    Community and Social Life  no deficits         high   Clinical Presentation evolving clinical presentation with changing clinical characteristics moderate   Decision Making/ Complexity Score: moderate     Goals:  Short Term Goals: 2 weeks   Pt will be independent with HEP to supplement PT with decreasing pain and  improving functional mobility    Long Term Goals: 6 weeks   Pt will improve FOTO score to </= 44% limited in order to demo improved functional mobility  Pt will improve LE MMT scores by at least 1/2 grade where deficits noted in order to improve strength for functional tasks  Pt will improve (B) hamstring flexibility by at least 10 degrees in order to improve functional mobility  Pt will report lumbar pain and pain radiating into (R) LE </= 6/10 at worst in order to be able to perform ADLs with less difficulty     PLAN   Plan of care Certification: 3/31/2023 to 5/12/23.    Outpatient Physical Therapy 2 times weekly for 6 weeks to include the following interventions: Gait Training, Manual Therapy, Moist Heat/ Ice, Neuromuscular Re-ed, Patient Education, Self Care, Therapeutic Activities, and Therapeutic Exercise, dry needling, IASTM, and modalities andry Cannon, PT      I CERTIFY THE NEED FOR THESE SERVICES FURNISHED UNDER THIS PLAN OF TREATMENT AND WHILE UNDER MY CARE   Physician's comments:     Physician's Signature: ___________________________________________________

## 2023-04-03 ENCOUNTER — CLINICAL SUPPORT (OUTPATIENT)
Dept: REHABILITATION | Facility: HOSPITAL | Age: 78
End: 2023-04-03
Payer: MEDICARE

## 2023-04-03 DIAGNOSIS — R53.1 DECREASED STRENGTH: ICD-10-CM

## 2023-04-03 DIAGNOSIS — M54.50 LUMBAR PAIN: Primary | ICD-10-CM

## 2023-04-03 DIAGNOSIS — M62.81 MUSCLE WEAKNESS: ICD-10-CM

## 2023-04-03 PROCEDURE — 97110 THERAPEUTIC EXERCISES: CPT

## 2023-04-03 NOTE — PROGRESS NOTES
"  Physical Therapy Daily Treatment Note     Name: Diana Herring  Clinic Number: [id number]    Therapy Diagnosis:   Encounter Diagnoses   Name Primary?    Lumbar pain Yes    Decreased strength     Muscle weakness        Physician: Christin Ventura,Claudio    Visit Date: 4/3/2023    Physician Orders: PT Eval and Treat   Medical Diagnosis from Referral: M54.17 (ICD-10-CM) - Lumbosacral radiculopathy  Evaluation Date: 3/31/2023  Authorization Period Expiration: 4/28/23  Plan of Care Expiration: 5/12/23  Progress Note Due: 4/30/23  Visit # / Visits authorized: 1/ 1, 1/11  FOTO: 1     Time In: 11:36 am  Time Out: 12:18 pm  Total Billable Time: 42 minutes    Precautions: Standard and osteoporosis    Subjective     Pt reports: She is feeling pain in tailbone region with radicular symptoms down RLE.  She was compliant with home exercise program.  Response to previous treatment: no adverse response at eval  Functional change: too soon    Pain: 7/10  Location: bilateral back with radicular symptoms down RLE     Objective     Diana received therapeutic exercises to develop strength, endurance, ROM, flexibility, posture, and core stabilization for 42 minutes including:    NuStep x 5'    Piriformis stretch 3 x 30" each  SKTC 5 x 10"  Supine sciatic nerve glide x 10 each    LTR x 20  Posterior pelvic tilt x 20  Ball squeeze with TrA with 5" hold x 2'  Isometric hip abd with belt 20 x 5"    Home Exercises Provided and Patient Education Provided     Education provided:   Cont to perform HEP as provided.     Written Home Exercises Provided: yes.Will need a new print out, pictures were not printing correctly on 4/3/2023.  Exercises were reviewed and Diana was able to demonstrate them prior to the end of the session.  Diana demonstrated good  understanding of the education provided.     See EMR under Patient Instructions for exercises provided 4/3/2023.    Assessment     Diana tolerated initial treatment well. Difficulty initially " with posterior pelvic tilt, but improved with verbal cues. Good tolerance to all progression of core and hip strengthening. Instructed in nerve glides to reduce radicular symptoms down LE.     Diana Is progressing well towards her goals.   Pt prognosis is Fair.     Pt will continue to benefit from skilled outpatient physical therapy to address the deficits listed in the problem list box on initial evaluation, provide pt/family education and to maximize pt's level of independence in the home and community environment.     Pt's spiritual, cultural and educational needs considered and pt agreeable to plan of care and goals.    Anticipated barriers to physical therapy: chronicity of pain    Goals:   Short Term Goals: 2 weeks   Pt will be independent with HEP to supplement PT with decreasing pain and improving functional mobility     Long Term Goals: 6 weeks   Pt will improve FOTO score to </= 44% limited in order to demo improved functional mobility  Pt will improve LE MMT scores by at least 1/2 grade where deficits noted in order to improve strength for functional tasks  Pt will improve (B) hamstring flexibility by at least 10 degrees in order to improve functional mobility  Pt will report lumbar pain and pain radiating into (R) LE </= 6/10 at worst in order to be able to perform ADLs with less difficulty     Plan     Certification date: 3/31/2023 to 5/12/23.     Outpatient Physical Therapy 2 times weekly for 6 weeks to include the following interventions: Gait Training, Manual Therapy, Moist Heat/ Ice, Neuromuscular Re-ed, Patient Education, Self Care, Therapeutic Activities, and Therapeutic Exercise, dry needling, IASTM, and modalities prn       Cont skilled PT session towards PT and patient's goals.    Wandy Bullock, PT   04/03/2023

## 2023-04-06 ENCOUNTER — PES CALL (OUTPATIENT)
Dept: ADMINISTRATIVE | Facility: CLINIC | Age: 78
End: 2023-04-06
Payer: MEDICARE

## 2023-04-12 ENCOUNTER — CLINICAL SUPPORT (OUTPATIENT)
Dept: REHABILITATION | Facility: HOSPITAL | Age: 78
End: 2023-04-12
Payer: MEDICARE

## 2023-04-12 DIAGNOSIS — R53.1 DECREASED STRENGTH: ICD-10-CM

## 2023-04-12 DIAGNOSIS — M54.50 LUMBAR PAIN: Primary | ICD-10-CM

## 2023-04-12 PROCEDURE — 97110 THERAPEUTIC EXERCISES: CPT | Mod: CQ

## 2023-04-12 PROCEDURE — 97112 NEUROMUSCULAR REEDUCATION: CPT | Mod: CQ

## 2023-04-12 NOTE — PROGRESS NOTES
"  Physical Therapy Daily Treatment Note     Name: Diana Herring  Clinic Number: [id number]    Therapy Diagnosis:   Encounter Diagnoses   Name Primary?    Lumbar pain Yes    Decreased strength          Physician: Christin Ventura,*    Visit Date: 4/12/2023    Physician Orders: PT Eval and Treat   Medical Diagnosis from Referral: M54.17 (ICD-10-CM) - Lumbosacral radiculopathy  Evaluation Date: 3/31/2023  Authorization Period Expiration: 4/28/23  Plan of Care Expiration: 5/12/23  Progress Note Due: 4/30/23  Visit # / Visits authorized: 1/ 1, 2/11  FOTO: 1     Time In: 8:15 am  Time Out: 9:05 am  Total Billable Time: 50 minutes    Precautions: Standard and osteoporosis    Subjective     Pt reports: today is a rough day.  She was compliant with home exercise program.  Response to previous treatment: no adverse response at eval  Functional change: too soon    Pain: 7/10  Location: bilateral back with radicular symptoms down RLE     Objective     Diana received therapeutic exercises to develop strength, endurance, ROM, flexibility, posture, and core stabilization for 42 minutes including:    NuStep x 5'  Piriformis stretch 3 x 30" each  SKTC 5 x 10"  Supine sciatic nerve glide x 10 each  LTR x 20  Posterior pelvic tilt x 20    NEUROMUSCULAR RE-EDUCATION ACTIVITIES to improve Balance, Coordination, Kinesthetic, Sense, Proprioception, and Posture for 8 minutes.  The following were included: .  Ball squeeze with TrA with 5" hold x 2'  Isometric hip abd with belt 20 x 5"  SB TrA activation 20x 5s hold    Home Exercises Provided and Patient Education Provided     Education provided:   Cont to perform HEP as provided.     Written Home Exercises Provided: yes.Will need a new print out, pictures were not printing correctly on 4/3/2023.  Exercises were reviewed and Diana was able to demonstrate them prior to the end of the session.  Diana demonstrated good  understanding of the education provided.     See EMR under " Patient Instructions for exercises provided 4/3/2023.    Assessment     Diana tolerated treatment well. Difficulty initially with posterior pelvic tilt, but improved with verbal cues. Good tolerance to all progression of core and hip strengthening. Added TrA activation for improved core stability.    Diana Is progressing well towards her goals.   Pt prognosis is Fair.     Pt will continue to benefit from skilled outpatient physical therapy to address the deficits listed in the problem list box on initial evaluation, provide pt/family education and to maximize pt's level of independence in the home and community environment.     Pt's spiritual, cultural and educational needs considered and pt agreeable to plan of care and goals.    Anticipated barriers to physical therapy: chronicity of pain    Goals:   Short Term Goals: 2 weeks   Pt will be independent with HEP to supplement PT with decreasing pain and improving functional mobility     Long Term Goals: 6 weeks   Pt will improve FOTO score to </= 44% limited in order to demo improved functional mobility  Pt will improve LE MMT scores by at least 1/2 grade where deficits noted in order to improve strength for functional tasks  Pt will improve (B) hamstring flexibility by at least 10 degrees in order to improve functional mobility  Pt will report lumbar pain and pain radiating into (R) LE </= 6/10 at worst in order to be able to perform ADLs with less difficulty     Plan     Certification date: 3/31/2023 to 5/12/23.     Outpatient Physical Therapy 2 times weekly for 6 weeks to include the following interventions: Gait Training, Manual Therapy, Moist Heat/ Ice, Neuromuscular Re-ed, Patient Education, Self Care, Therapeutic Activities, and Therapeutic Exercise, dry needling, IASTM, and modalities prn       Cont skilled PT session towards PT and patient's goals.    Abdi Hawthorne, PTA   04/12/2023

## 2023-04-17 ENCOUNTER — HOSPITAL ENCOUNTER (OUTPATIENT)
Dept: RADIOLOGY | Facility: CLINIC | Age: 78
Discharge: HOME | End: 2023-04-17
Attending: INTERNAL MEDICINE
Payer: MEDICARE

## 2023-04-17 DIAGNOSIS — M81.0 AGE-RELATED OSTEOPOROSIS WITHOUT CURRENT PATHOLOGICAL FRACTURE: Chronic | ICD-10-CM

## 2023-04-17 PROCEDURE — 77080 DXA BONE DENSITY AXIAL: CPT | Mod: 26,,, | Performed by: INTERNAL MEDICINE

## 2023-04-17 PROCEDURE — 77080 DXA BONE DENSITY AXIAL: CPT | Mod: TC,PO

## 2023-04-17 PROCEDURE — 77080 DXA BONE DENSITY AXIAL SKELETON 1 OR MORE SITES: ICD-10-PCS | Mod: 26,,, | Performed by: INTERNAL MEDICINE

## 2023-04-18 ENCOUNTER — TELEPHONE (OUTPATIENT)
Dept: FAMILY MEDICINE | Facility: CLINIC | Age: 78
End: 2023-04-18
Payer: MEDICARE

## 2023-04-18 NOTE — TELEPHONE ENCOUNTER
Please let the patient know that her bone density shows osteoporosis, similar to previous. Continue Fosamax, Calcium, and Vitamin D.    Thanks!  Bianca BOLES

## 2023-04-19 ENCOUNTER — CLINICAL SUPPORT (OUTPATIENT)
Dept: REHABILITATION | Facility: HOSPITAL | Age: 78
End: 2023-04-19
Payer: MEDICARE

## 2023-04-19 DIAGNOSIS — R53.1 DECREASED STRENGTH: ICD-10-CM

## 2023-04-19 DIAGNOSIS — M54.50 LUMBAR PAIN: Primary | ICD-10-CM

## 2023-04-19 PROCEDURE — 97110 THERAPEUTIC EXERCISES: CPT

## 2023-04-19 NOTE — PROGRESS NOTES
"  Physical Therapy Daily Treatment Note     Name: Diana Herring  Clinic Number: [id number]    Therapy Diagnosis:   Encounter Diagnoses   Name Primary?    Lumbar pain Yes    Decreased strength      Physician: Christin Ventura,*    Visit Date: 4/19/2023    Physician Orders: PT Eval and Treat   Medical Diagnosis from Referral: M54.17 (ICD-10-CM) - Lumbosacral radiculopathy  Evaluation Date: 3/31/2023  Authorization Period Expiration: 4/28/23  Plan of Care Expiration: 5/12/23  Progress Note Due: 4/30/23  Visit # / Visits authorized: 1/ 1, 3/11  FOTO: 4    Time In: 8:17 am  Time Out: 9:03 am   Total Billable Time: 41 minutes    Precautions: Standard and osteoporosis    Subjective     Pt reports: that (B) hips are feeling sore this morning.  She was compliant with home exercise program.  Response to previous treatment: no adverse effects  Functional change: progressing    Pain: 7/10 (B) hips      Objective       Diana received therapeutic exercises to develop strength, endurance, ROM, flexibility, posture, and core stabilization for 46 minutes including:    NuStep x 5' - supervised   Piriformis stretch 3 x 30" each  90/90 hamstring stretch 3x30" B   SKTC 5" x10 B   Supine sciatic nerve glide x 10 each not performed   LTR x 20 not performed   Posterior pelvic tilt 5" x20  SB TrA activation 20x 5s hold  Supine clams RTB 2x10   Scap retractions 2x10 3"   Shuttle DL 2c 2x10     NEUROMUSCULAR RE-EDUCATION ACTIVITIES to improve Balance, Coordination, Kinesthetic, Sense, Proprioception, and Posture for 0 minutes.  The following were included: .  Ball squeeze with TrA with 5" hold x 2' - not performed  Isometric hip abd with belt 20 x 5" - not performed     Home Exercises Provided and Patient Education Provided     Education provided:   - HEP     Written Home Exercises Provided: Patient instructed to cont prior HEP.   Diana demonstrated good  understanding of the education provided.     See EMR under Patient Instructions " for exercises provided prior visit.      Assessment     Added scap retractions today for improved posture and shuttle for LE strengthening. Pt tolerated all therex without c/o increased pain.   Diana Is progressing towards her goals.   Pt prognosis is Fair.     Pt will continue to benefit from skilled outpatient physical therapy to address the deficits listed in the problem list box on initial evaluation, provide pt/family education and to maximize pt's level of independence in the home and community environment.     Pt's spiritual, cultural and educational needs considered and pt agreeable to plan of care and goals.    Short Term Goals: 2 weeks   Pt will be independent with HEP to supplement PT with decreasing pain and improving functional mobility     Long Term Goals: 6 weeks   Pt will improve FOTO score to </= 44% limited in order to demo improved functional mobility  Pt will improve LE MMT scores by at least 1/2 grade where deficits noted in order to improve strength for functional tasks  Pt will improve (B) hamstring flexibility by at least 10 degrees in order to improve functional mobility  Pt will report lumbar pain and pain radiating into (R) LE </= 6/10 at worst in order to be able to perform ADLs with less difficulty     Plan     Continue per POC, progress as tolerated    Kiera Cannon, PT

## 2023-04-20 NOTE — PROGRESS NOTES
"  Physical Therapy Daily Treatment Note     Name: Diana Herring  Clinic Number: [id number]    Therapy Diagnosis:   Encounter Diagnoses   Name Primary?    Lumbar pain Yes    Decreased strength        Physician: Christin Ventura,*    Visit Date: 4/21/2023    Physician Orders: PT Eval and Treat   Medical Diagnosis from Referral: M54.17 (ICD-10-CM) - Lumbosacral radiculopathy  Evaluation Date: 3/31/2023  Authorization Period Expiration: 4/28/23  Plan of Care Expiration: 5/12/23  Progress Note Due: 4/30/23  Visit # / Visits authorized: 1/ 1, 4/11  FOTO: 5    Time In: 8:28 am  Time Out: 9:15 am   Total Billable Time: 47 minutes    Precautions: Standard and osteoporosis    Subjective     Pt reports: that she is definitely feeling better today, however continues with pain in her hips/glutes. Pt states that she feels that sometimes the pain makes her feel like she needs to walk hunched over, but makes a point to stand up straight.   She was compliant with home exercise program.  Response to previous treatment: no adverse effects  Functional change: progressing    Pain: 6/10 (B) hips      Objective       Diana received therapeutic exercises to develop strength, endurance, ROM, flexibility, posture, and core stabilization for 47 minutes including:    NuStep x 5' - supervised   Piriformis stretch 3 x 30" each  90/90 hamstring stretch 3x30" B   SKTC 5" x10 B   Supine sciatic nerve glide x 10 each not performed   LTR x 20 not performed   Posterior pelvic tilt 5" x20  SB TrA activation 20x 5s hold  Sidelying clams RTB 2x10   Bridges c/ RTB 2x10   Shoulder ext c/ RTB 2x10   Scap retractions 2x10 3"   Shuttle DL 2c 2x10     NEUROMUSCULAR RE-EDUCATION ACTIVITIES to improve Balance, Coordination, Kinesthetic, Sense, Proprioception, and Posture for 0 minutes.  The following were included: .  Ball squeeze with TrA with 5" hold x 2' - not performed  Isometric hip abd with belt 20 x 5" - not performed     Home Exercises Provided " and Patient Education Provided     Education provided:   - HEP     Written Home Exercises Provided: Patient instructed to cont prior HEP.   Diana demonstrated good  understanding of the education provided.     See EMR under Patient Instructions for exercises provided prior visit.      Assessment     Pt required tactile cues during sidelying clams to reduce posterior trunk rolling and during shoulder extensions to reduce trunk flexion compensations. Pt with no adverse effects noted during new or established therex this session. Will continue to progress pt per her tolerance during upcoming sessions.     Diana Is progressing towards her goals.   Pt prognosis is Fair.     Pt will continue to benefit from skilled outpatient physical therapy to address the deficits listed in the problem list box on initial evaluation, provide pt/family education and to maximize pt's level of independence in the home and community environment.     Pt's spiritual, cultural and educational needs considered and pt agreeable to plan of care and goals.    Short Term Goals: 2 weeks   Pt will be independent with HEP to supplement PT with decreasing pain and improving functional mobility     Long Term Goals: 6 weeks   Pt will improve FOTO score to </= 44% limited in order to demo improved functional mobility  Pt will improve LE MMT scores by at least 1/2 grade where deficits noted in order to improve strength for functional tasks  Pt will improve (B) hamstring flexibility by at least 10 degrees in order to improve functional mobility  Pt will report lumbar pain and pain radiating into (R) LE </= 6/10 at worst in order to be able to perform ADLs with less difficulty     Plan     Continue per POC, progress as tolerated    Lisa Lara, AIDAN

## 2023-04-21 ENCOUNTER — CLINICAL SUPPORT (OUTPATIENT)
Dept: REHABILITATION | Facility: HOSPITAL | Age: 78
End: 2023-04-21
Payer: MEDICARE

## 2023-04-21 DIAGNOSIS — R53.1 DECREASED STRENGTH: ICD-10-CM

## 2023-04-21 DIAGNOSIS — M54.50 LUMBAR PAIN: Primary | ICD-10-CM

## 2023-04-21 PROCEDURE — 97110 THERAPEUTIC EXERCISES: CPT | Mod: CQ

## 2023-04-25 ENCOUNTER — CLINICAL SUPPORT (OUTPATIENT)
Dept: REHABILITATION | Facility: HOSPITAL | Age: 78
End: 2023-04-25
Payer: MEDICARE

## 2023-04-25 DIAGNOSIS — M54.50 LUMBAR PAIN: Primary | ICD-10-CM

## 2023-04-25 DIAGNOSIS — R53.1 DECREASED STRENGTH: ICD-10-CM

## 2023-04-25 PROCEDURE — 97110 THERAPEUTIC EXERCISES: CPT

## 2023-04-25 PROCEDURE — 97112 NEUROMUSCULAR REEDUCATION: CPT

## 2023-04-25 NOTE — PROGRESS NOTES
"  Physical Therapy Daily Treatment Note     Name: Diana Herring  Clinic Number: [id number]    Therapy Diagnosis:   Encounter Diagnoses   Name Primary?    Lumbar pain Yes    Decreased strength      Physician: Christin Ventura,*    Visit Date: 4/25/2023    Physician Orders: PT Eval and Treat   Medical Diagnosis from Referral: M54.17 (ICD-10-CM) - Lumbosacral radiculopathy  Evaluation Date: 3/31/2023  Authorization Period Expiration: 4/28/23  Plan of Care Expiration: 5/12/23  Progress Note Due: 4/30/23  Visit # / Visits authorized: 1/ 1, 5/11  FOTO: 6    Time In: 8:15 am   Time Out: 8:59 am   Total Billable Time: 39 minutes    Precautions: Standard and osteoporosis    Subjective     Pt reports: experiencing pain in back this morning. Pt stated that she felt looser after last PT session.   She was compliant with home exercise program.  Response to previous treatment: no adverse effects- pt reports feeling looser   Functional change: progressing     Pain: 6/10  Location: bilateral back      Objective     Diana received therapeutic exercises to develop strength, endurance, ROM, flexibility for 33 minutes including:    NuStep x 5' - supervised   Piriformis stretch 3 x 30" each  90/90 hamstring stretch 3x30" B   SKTC 5" x10 B   Supine sciatic nerve glide x 10 each not performed   LTR x 20 not performed   Sidelying clams RTB 2x10 3" B  Bridges c/ RTB 2x10   Shoulder ext c/ RTB 2x10 not performed   Shuttle DL 2c 2x10 not performed  Standing hip abduction 2x10 B     NEUROMUSCULAR RE-EDUCATION ACTIVITIES to improve motor control, Coordination, core stabilization, and Posture for 11 minutes.  The following were included: .  Rows w/scap retractions 2x10 3"   TA bracing w/ alt march 2x10 B   Palloff press RTB 1x10 R, held on (L) due to hip pain         Home Exercises Provided and Patient Education Provided     Education provided:   - HEP     Written Home Exercises Provided: Patient instructed to cont prior HEP.   Diana " demonstrated good  understanding of the education provided.     See EMR under Patient Instructions for exercises provided prior visit.      Assessment     Palloff press held on (L) due to pt c/o pain in hip when performing. Pt was able to tolerate other NMR activities for improved core stabilization and posture and addition of standing hip abduction for hip strengthening. Pt received TC to avoid shrugging shoulders when performing scap retraction with rows.   Diana Is progressing well towards her goals.   Pt prognosis is fair.     Pt will continue to benefit from skilled outpatient physical therapy to address the deficits listed in the problem list box on initial evaluation, provide pt/family education and to maximize pt's level of independence in the home and community environment.     Pt's spiritual, cultural and educational needs considered and pt agreeable to plan of care and goals.    Anticipated barriers to physical therapy: chronicity of pain     Goals:     Short Term Goals: 2 weeks   Pt will be independent with HEP to supplement PT with decreasing pain and improving functional mobility     Long Term Goals: 6 weeks   Pt will improve FOTO score to </= 44% limited in order to demo improved functional mobility  Pt will improve LE MMT scores by at least 1/2 grade where deficits noted in order to improve strength for functional tasks  Pt will improve (B) hamstring flexibility by at least 10 degrees in order to improve functional mobility  Pt will report lumbar pain and pain radiating into (R) LE </= 6/10 at worst in order to be able to perform ADLs with less difficulty     Plan     Continue per POC, progress as tolerated     Kiera Cannon, PT

## 2023-04-28 ENCOUNTER — CLINICAL SUPPORT (OUTPATIENT)
Dept: REHABILITATION | Facility: HOSPITAL | Age: 78
End: 2023-04-28
Payer: MEDICARE

## 2023-04-28 DIAGNOSIS — R53.1 DECREASED STRENGTH: ICD-10-CM

## 2023-04-28 DIAGNOSIS — M54.50 LUMBAR PAIN: Primary | ICD-10-CM

## 2023-04-28 PROCEDURE — 97110 THERAPEUTIC EXERCISES: CPT

## 2023-04-28 PROCEDURE — 97112 NEUROMUSCULAR REEDUCATION: CPT

## 2023-04-28 NOTE — PROGRESS NOTES
"  Physical Therapy Daily Treatment Note     Name: Diana Herring  Clinic Number: [id number]    Therapy Diagnosis:   Encounter Diagnoses   Name Primary?    Lumbar pain Yes    Decreased strength      Physician: Christin Ventura,*    Visit Date: 4/28/2023    Physician Orders: PT Eval and Treat   Medical Diagnosis from Referral: M54.17 (ICD-10-CM) - Lumbosacral radiculopathy  Evaluation Date: 3/31/2023  Authorization Period Expiration: 4/28/23  Plan of Care Expiration: 5/12/23  Progress Note Due: 4/30/23  Visit # / Visits authorized: 1/ 1, 6/11  FOTO: 7    Time In: 8:31 am   Time Out: 9:15 am   Total Billable Time: 39 minutes    Precautions: Standard and osteoporosis    Subjective     Pt reports: that she is experiencing pain in (R) back currently.  She was compliant with home exercise program.  Response to previous treatment: no adverse effects  Functional change: progressing    Pain: 6/10 (R) back       Objective       Diana received therapeutic exercises to develop strength, endurance, ROM, flexibility for 36 minutes including:    NuStep x 5' - supervised   Piriformis stretch 3 x 30" each  90/90 hamstring stretch 3x30" B   SKTC 5" x10 B   Supine sciatic nerve glide x 10 each not performed   LTR x 20 not performed   Sidelying clams RTB 2x10 3" B  Bridges c/ RTB x8 then held due to pain   Shoulder ext c/ RTB 2x10 not performed   Shuttle DL 2c 2x10 not performed  Standing hip abduction 2x10 B     NEUROMUSCULAR RE-EDUCATION ACTIVITIES to improve motor control, Coordination, core stabilization, and Posture for 8 minutes.  The following were included: .  Rows w/scap retractions 2x10 3"   TA bracing w/ alt march 2x10 B   Palloff press RTB 1x10 R, - not performed       Home Exercises Provided and Patient Education Provided     Education provided:   - HEP     Written Home Exercises Provided: Patient instructed to cont prior HEP.    Diana demonstrated good  understanding of the education provided.     See EMR under " Patient Instructions for exercises provided prior visit.      Assessment     Pt received TC/VC for proper technique when performing rows w/scap retraction. Additional reps of bridges held due to c/o pain when performing. Pt was able to tolerate all other therex without c/o increased pain.   Diana Is progressing towards her goals.   Pt prognosis is Fair.     Pt will continue to benefit from skilled outpatient physical therapy to address the deficits listed in the problem list box on initial evaluation, provide pt/family education and to maximize pt's level of independence in the home and community environment.     Pt's spiritual, cultural and educational needs considered and pt agreeable to plan of care and goals.    Anticipated barriers to physical therapy: chronicity of pain     Goals:     Short Term Goals: 2 weeks   Pt will be independent with HEP to supplement PT with decreasing pain and improving functional mobility     Long Term Goals: 6 weeks   Pt will improve FOTO score to </= 44% limited in order to demo improved functional mobility  Pt will improve LE MMT scores by at least 1/2 grade where deficits noted in order to improve strength for functional tasks  Pt will improve (B) hamstring flexibility by at least 10 degrees in order to improve functional mobility  Pt will report lumbar pain and pain radiating into (R) LE </= 6/10 at worst in order to be able to perform ADLs with less difficulty     Plan     Continue per POC, progress as tolerated     Kiera Cannon, PT

## 2023-05-01 ENCOUNTER — CLINICAL SUPPORT (OUTPATIENT)
Dept: REHABILITATION | Facility: HOSPITAL | Age: 78
End: 2023-05-01
Payer: MEDICARE

## 2023-05-01 DIAGNOSIS — R53.1 DECREASED STRENGTH: ICD-10-CM

## 2023-05-01 DIAGNOSIS — M54.50 LUMBAR PAIN: Primary | ICD-10-CM

## 2023-05-01 PROCEDURE — 97110 THERAPEUTIC EXERCISES: CPT

## 2023-05-01 PROCEDURE — 97112 NEUROMUSCULAR REEDUCATION: CPT

## 2023-05-01 NOTE — PROGRESS NOTES
"  Physical Therapy Daily Treatment Note     Name: Diana Herring  Clinic Number: [id number]    Therapy Diagnosis:   Encounter Diagnoses   Name Primary?    Lumbar pain Yes    Decreased strength      Physician: Christin Ventura,*    Visit Date: 5/1/2023    Physician Orders: PT Eval and Treat   Medical Diagnosis from Referral: M54.17 (ICD-10-CM) - Lumbosacral radiculopathy  Evaluation Date: 3/31/2023  Authorization Period Expiration: 4/28/23  Plan of Care Expiration: 5/12/23  Progress Note Due: 4/30/23  Visit # / Visits authorized: 1/ 1, 7/11  FOTO: 8    Time In: 8:35 am   Time Out: 9:17 am   Total Billable Time: 42 minutes    Precautions: Standard and osteoporosis    Subjective     Pt reports: experiencing pain in (R) back currently. Pt stated that she feels like her posture is better since SOC. Pt stated that she didn't take Gabapentin this past weekend and feels like she can move better even with pain. Pt stated that she has follow up apt with pain management MD later this week.   She was compliant with home exercise program.  Response to previous treatment: a little stiff   Functional change: pt stated that she feels like her posture is better since SOC    Pain: 5/10 (R) back       Objective       Diana received objective measurements and therapeutic exercises to develop strength, endurance, ROM, flexibility for 33 minutes including:    NuStep x 5' - while taking subjective  Piriformis stretch 3 x 30" each  90/90 hamstring stretch 3x30" B   SKTC 5" x10 B   Supine sciatic nerve glide x 10 each not performed   LTR x 20 not performed   Sidelying clams RTB 2x10 3" B not performed   Bridges c/ RTB not performed   Shoulder ext c/ RTB 2x10 not performed   Shuttle DL 2c 2x10 not performed  Standing hip abduction 2x10 B 1#  Standing hip extension 2x10 B 1#    Hip Right   Left   Pain/Dysfunction with Movement     AROM MMT AROM MMT     Flexion WFL 4/5 WFL 4/5     Extension NT NT NT NT Pt performed poor bridge against " "gravity and c/o pain when performed   Abduction WFL 4-/5 WFL 4-/5     External rotation WFL 4/5 WFL 4/5        Knee Right   Left   Pain/Dysfunction with Movement     AROM MMT AROM MMT     Flexion WFL 4/5 WFL 4/5     Extension WFL 4/5 WFL 4/5        Ankle Right   Left   Pain/Dysfunction with Movement     AROM MMT AROM MMT     Plantarflexion WFL 4+/5 WFL 4+/5     Dorsiflexion WFL 5/5 WFL 5/5           SLR Test: (R) = negative     90/90 Hamstring Test: (B) = 15 degrees lacking      NEUROMUSCULAR RE-EDUCATION ACTIVITIES to improve motor control, Coordination, core stabilization, and Posture for 9 minutes.  The following were included: .  Rows w/scap retractions GTB 3x10 3"   TA bracing w/ alt march 2x10 B   Palloff press RTB 1x10 R, - not performed     Home Exercises Provided and Patient Education Provided     Education provided:   - HEP     Written Home Exercises Provided: Patient instructed to cont prior HEP.    Diana demonstrated good  understanding of the education provided.     See EMR under Patient Instructions for exercises provided prior visit.      Assessment     Updated measurements taken and pt demo improvements in LE MMT scores, improved (B) hamstring flexibility, and presented with negative (R) SLR test today compared to initial evaluation. Pt received TC for proper technique when performing rows w/scap retractions. Pt was able to tolerate progression of standing hip strengthening therex.   Diana Is progressing well towards her goals.   Pt prognosis is Fair.     Pt will continue to benefit from skilled outpatient physical therapy to address the deficits listed in the problem list box on initial evaluation, provide pt/family education and to maximize pt's level of independence in the home and community environment.     Pt's spiritual, cultural and educational needs considered and pt agreeable to plan of care and goals.    Anticipated barriers to physical therapy: chronicity of pain     Goals:     Short " Term Goals: 2 weeks   Pt will be independent with HEP to supplement PT with decreasing pain and improving functional mobility     Long Term Goals: 6 weeks   Pt will improve FOTO score to </= 44% limited in order to demo improved functional mobility  Pt will improve LE MMT scores by at least 1/2 grade where deficits noted in order to improve strength for functional tasks  Pt will improve (B) hamstring flexibility by at least 10 degrees in order to improve functional mobility  Pt will report lumbar pain and pain radiating into (R) LE </= 6/10 at worst in order to be able to perform ADLs with less difficulty    Plan     Continue per POC, progress as tolerated     Kiera Cannon, PT

## 2023-05-04 ENCOUNTER — TELEPHONE (OUTPATIENT)
Dept: PAIN MEDICINE | Facility: CLINIC | Age: 78
End: 2023-05-04
Payer: MEDICARE

## 2023-05-04 NOTE — TELEPHONE ENCOUNTER
This message is for patient in regards to his/her appointment 5/5/23 at 9:20 am With Christin Wiley Np at [facility] on the 9th floor [id number].If you have any questions or concerns please contact [phone]

## 2023-05-05 ENCOUNTER — OFFICE VISIT (OUTPATIENT)
Dept: PAIN MEDICINE | Facility: CLINIC | Age: 78
End: 2023-05-05
Payer: MEDICARE

## 2023-05-05 VITALS
BODY MASS INDEX: 27.06 KG/M2 | TEMPERATURE: 98 F | HEART RATE: 63 BPM | DIASTOLIC BLOOD PRESSURE: 72 MMHG | WEIGHT: 143.31 LBS | HEIGHT: 61 IN | RESPIRATION RATE: 18 BRPM | SYSTOLIC BLOOD PRESSURE: 142 MMHG

## 2023-05-05 DIAGNOSIS — M48.062 SPINAL STENOSIS, LUMBAR REGION WITH NEUROGENIC CLAUDICATION: ICD-10-CM

## 2023-05-05 DIAGNOSIS — M51.36 DDD (DEGENERATIVE DISC DISEASE), LUMBAR: ICD-10-CM

## 2023-05-05 DIAGNOSIS — M79.605 BILATERAL LEG PAIN: ICD-10-CM

## 2023-05-05 DIAGNOSIS — M54.15 RADICULOPATHY OF THORACOLUMBAR REGION: ICD-10-CM

## 2023-05-05 DIAGNOSIS — M79.604 BILATERAL LEG PAIN: ICD-10-CM

## 2023-05-05 DIAGNOSIS — M54.16 LUMBAR RADICULOPATHY: Primary | ICD-10-CM

## 2023-05-05 DIAGNOSIS — G89.29 OTHER CHRONIC PAIN: ICD-10-CM

## 2023-05-05 PROCEDURE — 3288F FALL RISK ASSESSMENT DOCD: CPT | Mod: CPTII,S$GLB,, | Performed by: NURSE PRACTITIONER

## 2023-05-05 PROCEDURE — 99999 PR PBB SHADOW E&M-EST. PATIENT-LVL IV: CPT | Mod: PBBFAC,,, | Performed by: NURSE PRACTITIONER

## 2023-05-05 PROCEDURE — 99213 PR OFFICE/OUTPT VISIT, EST, LEVL III, 20-29 MIN: ICD-10-PCS | Mod: S$GLB,,, | Performed by: NURSE PRACTITIONER

## 2023-05-05 PROCEDURE — 1159F MED LIST DOCD IN RCRD: CPT | Mod: CPTII,S$GLB,, | Performed by: NURSE PRACTITIONER

## 2023-05-05 PROCEDURE — 3077F SYST BP >= 140 MM HG: CPT | Mod: CPTII,S$GLB,, | Performed by: NURSE PRACTITIONER

## 2023-05-05 PROCEDURE — 1160F PR REVIEW ALL MEDS BY PRESCRIBER/CLIN PHARMACIST DOCUMENTED: ICD-10-PCS | Mod: CPTII,S$GLB,, | Performed by: NURSE PRACTITIONER

## 2023-05-05 PROCEDURE — 3078F PR MOST RECENT DIASTOLIC BLOOD PRESSURE < 80 MM HG: ICD-10-PCS | Mod: CPTII,S$GLB,, | Performed by: NURSE PRACTITIONER

## 2023-05-05 PROCEDURE — 1160F RVW MEDS BY RX/DR IN RCRD: CPT | Mod: CPTII,S$GLB,, | Performed by: NURSE PRACTITIONER

## 2023-05-05 PROCEDURE — 3078F DIAST BP <80 MM HG: CPT | Mod: CPTII,S$GLB,, | Performed by: NURSE PRACTITIONER

## 2023-05-05 PROCEDURE — 1101F PT FALLS ASSESS-DOCD LE1/YR: CPT | Mod: CPTII,S$GLB,, | Performed by: NURSE PRACTITIONER

## 2023-05-05 PROCEDURE — 3288F PR FALLS RISK ASSESSMENT DOCUMENTED: ICD-10-PCS | Mod: CPTII,S$GLB,, | Performed by: NURSE PRACTITIONER

## 2023-05-05 PROCEDURE — 1159F PR MEDICATION LIST DOCUMENTED IN MEDICAL RECORD: ICD-10-PCS | Mod: CPTII,S$GLB,, | Performed by: NURSE PRACTITIONER

## 2023-05-05 PROCEDURE — 1125F AMNT PAIN NOTED PAIN PRSNT: CPT | Mod: CPTII,S$GLB,, | Performed by: NURSE PRACTITIONER

## 2023-05-05 PROCEDURE — 1101F PR PT FALLS ASSESS DOC 0-1 FALLS W/OUT INJ PAST YR: ICD-10-PCS | Mod: CPTII,S$GLB,, | Performed by: NURSE PRACTITIONER

## 2023-05-05 PROCEDURE — 1125F PR PAIN SEVERITY QUANTIFIED, PAIN PRESENT: ICD-10-PCS | Mod: CPTII,S$GLB,, | Performed by: NURSE PRACTITIONER

## 2023-05-05 PROCEDURE — 99213 OFFICE O/P EST LOW 20 MIN: CPT | Mod: S$GLB,,, | Performed by: NURSE PRACTITIONER

## 2023-05-05 PROCEDURE — 3077F PR MOST RECENT SYSTOLIC BLOOD PRESSURE >= 140 MM HG: ICD-10-PCS | Mod: CPTII,S$GLB,, | Performed by: NURSE PRACTITIONER

## 2023-05-05 PROCEDURE — 99999 PR PBB SHADOW E&M-EST. PATIENT-LVL IV: ICD-10-PCS | Mod: PBBFAC,,, | Performed by: NURSE PRACTITIONER

## 2023-05-05 RX ORDER — GABAPENTIN 300 MG/1
600 CAPSULE ORAL 2 TIMES DAILY
Qty: 360 CAPSULE | Refills: 1 | Status: SHIPPED | OUTPATIENT
Start: 2023-05-05 | End: 2023-12-14 | Stop reason: SDUPTHER

## 2023-05-05 NOTE — PROGRESS NOTES
Chronic patient Established Note (Follow up visit)      SUBJECTIVE:    Interval History 5/5/2023:  The patient returns today for follow up of back and leg pain. She has been in PT which she thinks is helping her range of motion. She is still having back pain with radiation down the right leg. There is associated numbness. She previously had benefit with SANKET but declined to repeat at last OV. She also declined surgical consult. She has benefit with Gabapentin 1200 mg daily with some benefit but it does cause some sedation. Her pain today is 6/10.    Interval History 3/23/2023:  The patient presents for follow up of chronic back pain. She is here for review up updated lumbar MRI. Her results show severe spondylitic central spinal stenosis at L3-4 and L4-5, L4-5 subarticular zones severe stenosis bilaterally and severe right-sided neural foraminal stenosis. TF ESIs have been helpful short-term. She is not interested in surgical consult at this time. No bowel or bladder incontinence. Her biggest complaint today is right sided back pain with radiation down the side of the right leg and numbness. Her pain today is 9/10.    Interval History 3/9/2023:  The patient presents today for follow up of back and leg pain. She is now s/p repeat right L3/4 and L4/5 TF SANKET on 2/8/23 with 80% benefit for about 2 weeks only. Previous did help for longer. She is reporting pain that start to right lower back with radiation into the front and back of the right leg to the anterior shin. She has numbness on the right lower leg. She feels like her pain has worsened over the past few months. Her last MRI was in 2019. She had some benefit with PT in the past and is open to repeat. Her pain today is 7/10.    Interval History 1/24/2023:  Diana is here for follow up of back and right leg pain. She is now s/p right L3/4 and L4/5 TF SANKET on 12/7/22. She reports about 80% relief for about one month. Today, she is reporting lower back pain with  radiation into the front and side of the right leg. She has numbness to the right leg intermittently without warning. She also has been having cramps to lower legs but says she thinks that she has not been drinking enough water. She completed aquatic PT which she thinks was helpful. Her pain today is 8/10.    Interval History 11/11/2022:  The patient returns today for follow up of back and leg pain. She has been in aquatic PT which she finds helpful. She attends twice weekly. Her back pain has improved somewhat because of this. She does have worsened right leg pain with walking and activity. The pain radiates down the front and side of the right leg with associated numbness. No current radiation on the left. She had benefit with ESIs in the past and wishes to repeat. Her pain today is 7/10.    Interval History 8/11/2022:  The patient is here for follow up of lower back pain. Since previous encounter, she underwent repeat L3/4 TF SANKET with 70% relief. We had originally planned for MBB but she started having more shooting pain into the legs. She feels like this was helpful. Her pain is tolerable at this time. Her pain today is 7/10.    Interval History 5/9/2022:  The patient is here for follow up of chronic back pain. She is having more back pain and stiffness in the morning. We did previously discuss lumbar MBB but she is worried about not having IV sedation. She says that she would like to further discuss the procedure today. Leg pain has been mild since previous SANKET. She does have intermittent numbness still. Back pain is greater than leg pain. Her pain today is 7/10.    Interval History 4/7/2022:  The patient is here for follow up of lower back and leg pain. Since previous encounter, she underwent bilateral L3/4 TF SANKET on 1/26/22. She reports 80% relief of pain for about 2 months. She feels like this gave her significant benefit during that time and her pain was mild. She is now again having severe back pain with  radiation into the anterior thighs. She would like to repeat the procedure. She would also like me to place another referral for aquatic PT. She stopped Mobic because she found Diclofenac more helpful. She has been taking as needed but not daily. She does find Gabapentin helpful but it sometimes makes her drowsy. Her pain today is 9/10.    Interval History 1/11/2022:  The patient returns to discuss continued lower back pain. She has not restarted aquatic PT due to increase in Covid-19 cases. She continues with sharp lower back pain that radiates into anterior thighs, bilaterally. She has associated numbness. The pain is worse with increased activity. Her pain today is 7/10.    Interval History 11/11/2021:  The patient presents today for 2 month follow up of lower back pain. Since previous encounter, she has not started aquatic PT because she is on the waiting list. She continues to report lower back pain with radiation down the left leg. We previously discussed lumbar MBB/RFA which she does not wish to pursue at this time. She states that diclofenac is not helping very much with her aching pain at this time. She denies any new weakness or symptoms at this time. Her pain today is 8/10.    Interval History 8/19/2021:  Diana returns today for follow up of chronic lower back pain. She reports that he has continued with aching pain. She has been in PT but is not finding it very helpful. She has not tried aquatic PT in the past. She is still having intermittent radiation down her legs. She has been unable to take 1200 mg daily of Gabapentin and has decreased to 600 mg daily due to sedation and dizziness with the medication. Otherwise, no new complaints today. Her pain today is 7/10.    Interval History 6/18/2021:  The patient is here for follow up of lower back pain.  She has radiation of her pain into her anterior thighs but not below the knees.  She denies numbness or tingling at this time.  Her back pain is currently  greater than her leg pain.  She has a lot of stiffness when she wakes the morning states it improves when she moves.  She had limited benefit with recent repeat bilateral L3-4 transforaminal steroid injection.  Her pain today is 7/10    Interval History 5/4/2021:  The patient is here for follow up of lower back and leg pain.  Previous encounter, physical therapy was ordered.  She states that she did not start physical therapy and would like me to replace the order for her.  She does report that she is noticing more muscle fatigue and weakness particularly with activity.  She is also having more back pain with radiation into the anterior lateral thighs with the past month.  She previously had significant been hip with bilateral L3/4 transforaminal epidural steroid injection and wishes to repeat this.  She found this more helpful than previous procedures. Her pain today is 7/10.    Interval History 3/2/2021:  The patient is here for follow up of chronic pain. She is having more back pain today which she attributes to the cold front coming in. She is having radiation into the buttocks and right anterior thigh. Her back pain is her primary complaint today. She describes it as throbbing. She recently completed both Covid-19 vaccines which she tolerated well. She is now taking Gabapentin 600 mg twice daily regularly. She notices improvement in right leg numbness.  Her pain today is 7/10.    Interval History 12/1/2020:  The patient iss here for follow-up of back and leg pain.  She is now status post bilateral L3/4 transforaminal epidural steroid injection on 11/11/2020. She is reporting 100% relief for about 2 weeks and then her pain started to return.  She is still having some benefit.  Her pain is located across the lower back with radiation into front and sides of the legs to the anterior feet with associated numbness.  She feels like when she walks sometimes her legs become weak.  This has improved slightly.  She  denies any recent falls.  At her last OV, her Gabapentin was increased to 600 mg BID.  She says that she finds this helpful.  She has mild drowsiness.  She admits that she does not always take the medication and thought it was more of an as needed medication.  The patient denies any bowel or bladder incontinence or signs of saddle paresthesia.  She feels as though her pain today is worse than usual.  She rates her pain today as 10/10.    Interval history 10/15/2020:  Diana Herring presents to the clinic for a follow-up appointment for back pain. Since the last visit, Diana Herring states the pain has been worsening. Current pain intensity is 7/10. She reports that she continues to have low back pain that radiates to the bilateral hip and leg. She does report that she has noticed intermittent numbness in the right lateral and anterior thigh, that is sometimes associated with weakness in her right leg. She says that her leg gives out when this happens. She has not had any falls or other trauma. She did not get the bilateral L3/4 TFESI planned last time, and she is still taking only 300 mg bid of gabapentin.  Patient denies urinary incontinence, bowel incontinence, significant weight loss.    Initial visit:  Diana Herring presents to the clinic for the evaluation of low back pain. The pain started 2 years ago following loosing bone after a bone density and symptoms have been worsening.The pain is located in the low back area and radiates to the bilateral hip and leg.  The pain is described as aching, numbing, sharp and tight band and is rated as 8/10. The pain is rated with a score of  7/10 on the BEST day and a score of 8/10 on the WORST day.  Symptoms interfere with daily activity and sleeping. The pain is exacerbated by Sitting, Standing, Bending, Coughing/Sneezing, Walking, Morning, Lifting and Getting out of bed/chair.  The pain is mitigated by heat, laying down and rest. She reports spending 0 hours  per day reclining. The patient reports 6 hours of uninterrupted sleep per night.     Patient had L5/S1 ILESI 2 weeks ago and only gave her 1 day of relief.      Patient denies night fever/night sweats, urinary incontinence, bowel incontinence, significant weight loss and significant motor weakness.     Physical Therapy/Home Exercise: yes, was not beneficial      Pain Disability Index Review:  Last 3 PDI Scores 5/5/2023 3/23/2023 3/9/2023   Pain Disability Index (PDI) 30 45 35       Pain Medications:  Gabapentin 600 mg bid    Opioid Contract: no     report:  Reviewed and consistent with medication use as prescribed.    Pain Procedures:  2/8/23 Right L3/4 and L4/5 TF SANKET- 80% relief for 2 weeks  12/7/22 Right L3/4 and L4/5 TF SANKET- 80% relief for one month  7/20/22 Bilateral L3/4 TF SANKET- 70% relief  4/20/22 Bilateral L3/4 TF SANKET- 75% relief of leg pain  1/26/22 Bilateral L3/4 TF SANKET  5/19/21 bilateral L3/4 TF SANKET  11/11/20 Bilateral L3/4 TF SANKET- 100% relief for 2 weeks  5/13/20 L5/S1 ILESI  10/30/19 TFESI Bilateral L3-L4  09/04/19 TFESI Bilateral L3     Physical Therapy/Home Exercise: yes    Imaging:    Narrative & Impression  EXAMINATION:  MRI LUMBAR SPINE WITHOUT CONTRAST     CLINICAL HISTORY:  Dorsalgia, unspecifiedLow back pain, symptoms persist with > 6wks conservative treatment;     TECHNIQUE:  Sagittal T1, sagittal T2, sagittal STIR, axial T1 and axial T2 weighted images of the lumbar spine obtained without contrast.     COMPARISON:  X-ray 05/29/2020 and lumbar spine radiograph 07/26/2019     FINDINGS:  Lumbar spine alignment is within normal limits. The vertebral body heights are well maintained, with no fracture.  Degenerative edema signal at opposing endplates of L4-5 and degenerative sclerosis at multiple endplates from L2 through L5 with associated Schmorl's nodes.  Otherwise, marrow signal normal.     The conus is normal in appearance.  There are bilateral T2 hyperintense renal lesions likely related to  cysts.     Bunching of cauda equina nerve roots posterior to L2 and L3 vertebral bodies.     L1-L2: Circumferential disc bulge, spondylitic spurring and mild facet arthritis.Mild central spinal stenosis.     L2-L3: Circumferential disc bulge, spondylitic spurring and facet arthritis.  Mild central spinal stenosis and left-sided foraminal stenosis.     L3-L4: Circumferential disc bulge, spondylitic spurring, severe facet arthritis and ligamentum flavum thickening produce severe central spinal stenosis and mild bilateral foraminal stenosis.     L4-L5: Circumferential disc bulge, spondylitic spurring, facet arthritis and ligamentum flavum thickening.  Bilateral facet joint effusions.  Severe central spinal stenosis and bilateral subarticular zone stenosis.  Moderate left and severe right-sided neural foraminal stenosis.     L5-S1: Circumferential disc bulge and minimal facet arthritis.  No central canal or foraminal stenosis.     Impression:     Severe spondylitic central spinal stenosis at L3-4 and L4-5.  L4-5 subarticular zones severe stenosis bilaterally and severe right-sided neural foraminal stenosis.  Additional degrees of central spinal stenosis and foraminal stenosis as above.     Bilateral renal lesions likely cysts which can be confirmed with ultrasound on a nonemergent basis.       CMP  Sodium   Date Value Ref Range Status   03/07/2023 143 136 - 145 mmol/L Final     Potassium   Date Value Ref Range Status   03/07/2023 3.4 (L) 3.5 - 5.1 mmol/L Final     Chloride   Date Value Ref Range Status   03/07/2023 108 95 - 110 mmol/L Final     CO2   Date Value Ref Range Status   03/07/2023 28 23 - 29 mmol/L Final     Glucose   Date Value Ref Range Status   03/07/2023 113 (H) 70 - 110 mg/dL Final     BUN   Date Value Ref Range Status   03/07/2023 12 8 - 23 mg/dL Final     Creatinine   Date Value Ref Range Status   03/07/2023 0.8 0.5 - 1.4 mg/dL Final     Calcium   Date Value Ref Range Status   03/07/2023 9.4 8.7 - 10.5  mg/dL Final     Total Protein   Date Value Ref Range Status   03/07/2023 7.6 6.0 - 8.4 g/dL Final     Albumin   Date Value Ref Range Status   03/07/2023 3.9 3.5 - 5.2 g/dL Final     Total Bilirubin   Date Value Ref Range Status   03/07/2023 0.6 0.1 - 1.0 mg/dL Final     Comment:     For infants and newborns, interpretation of results should be based  on gestational age, weight and in agreement with clinical  observations.    Premature Infant recommended reference ranges:  Up to 24 hours.............<8.0 mg/dL  Up to 48 hours............<12.0 mg/dL  3-5 days..................<15.0 mg/dL  6-29 days.................<15.0 mg/dL       Alkaline Phosphatase   Date Value Ref Range Status   03/07/2023 70 55 - 135 U/L Final     AST   Date Value Ref Range Status   03/07/2023 15 10 - 40 U/L Final     ALT   Date Value Ref Range Status   03/07/2023 16 10 - 44 U/L Final     Anion Gap   Date Value Ref Range Status   03/07/2023 7 (L) 8 - 16 mmol/L Final     eGFR if    Date Value Ref Range Status   03/04/2022 >60.0 >60 mL/min/1.73 m^2 Final     eGFR if non    Date Value Ref Range Status   03/04/2022 >60.0 >60 mL/min/1.73 m^2 Final     Comment:     Calculation used to obtain the estimated glomerular filtration  rate (eGFR) is the CKD-EPI equation.            Allergies: Review of patient's allergies indicates:  No Known Allergies    Current Medications:   Current Outpatient Medications   Medication Sig Dispense Refill    alendronate (FOSAMAX) 70 MG tablet TAKE 1 TABLET BY MOUTH ONE TIME PER WEEK 12 tablet 1    amLODIPine (NORVASC) 10 MG tablet Take 1 tablet (10 mg total) by mouth once daily. 90 tablet 3    atorvastatin (LIPITOR) 40 MG tablet TAKE 1 TABLET BY MOUTH EVERY DAY 90 tablet 2    calcium-vitamin D 500-125 mg-unit tablet Take 1 tablet by mouth once daily.      ergocalciferol (ERGOCALCIFEROL) 50,000 unit Cap Take 1 capsule (50,000 Units total) by mouth every Sunday. 12 capsule 0    fluticasone  propionate (FLONASE) 50 mcg/actuation nasal spray 2 SPRAYS (100 MCG TOTAL) BY EACH NOSTRIL ROUTE ONCE DAILY. 48 mL 3    gabapentin (NEURONTIN) 300 MG capsule Take 2 capsules (600 mg total) by mouth 2 (two) times daily. 360 capsule 1    metoprolol succinate (TOPROL-XL) 100 MG 24 hr tablet TAKE 1 TABLET BY MOUTH EVERY DAY 90 tablet 3    multivitamin (THERAGRAN) per tablet Take 1 tablet by mouth once daily.      pantoprazole (PROTONIX) 40 MG tablet TAKE 1 TABLET BY MOUTH EVERY DAY 90 tablet 3    paroxetine (PAXIL) 20 MG tablet Take 1 tablet (20 mg total) by mouth every morning. 90 tablet 1    traZODone (DESYREL) 150 MG tablet TAKE 1 TABLET (150 MG TOTAL) BY MOUTH EVERY EVENING. 90 tablet 1     No current facility-administered medications for this visit.       REVIEW OF SYSTEMS:    GENERAL:  No weight loss, malaise or fevers.  HEENT:  Negative for frequent or significant headaches.  NECK:  Negative for lumps, goiter, pain and significant neck swelling.  RESPIRATORY:  Negative for cough, wheezing or shortness of breath.  CARDIOVASCULAR:  Negative for chest pain, leg swelling or palpitations.  GI:  Negative for abdominal discomfort, blood in stools or black stools or change in bowel habits. GERD.  MUSCULOSKELETAL:  See HPI.  SKIN:  Negative for lesions, rash, and itching.  PSYCH:  + for sleep disturbance, h/o depression  HEMATOLOGY/LYMPHOLOGY:  Negative for prolonged bleeding, bruising easily or swollen nodes.  NEURO:  + numbness. No history of headaches, syncope, paralysis, seizures or tremors.  All other reviewed and negative other than HPI.     Past Medical History:  Past Medical History:   Diagnosis Date    Anxiety     Arthritis     Corneal abrasion s    ? eye     Depression     Full dentures     GERD (gastroesophageal reflux disease)     Hyperlipidemia     Hypertension     Nuclear sclerosis of both eyes 6/5/2017    Osteoporosis     Restless leg syndrome        Past Surgical History:  Past Surgical History:    Procedure Laterality Date    COLONOSCOPY N/A 5/14/2019    Procedure: COLONOSCOPY;  Surgeon: Nahid Cline MD;  Location: [facility] END[facility];  Service: Endoscopy;  Laterality: N/A;    EPIDURAL STEROID INJECTION N/A 5/13/2020    Procedure: LUMBAR/CAUDAL L5/S1 IL SANKET ;  Surgeon: Miguel Latahm MD;  Location: [facility];  Service: Pain Management;  Laterality: N/A;  NEEDS CONSENT    HYSTERECTOMY      PARTIAL HYSTERECTOMY      TRANSFORAMINAL EPIDURAL INJECTION OF STEROID Bilateral 9/4/2019    Procedure: Injection,steroid,epidural,transforaminal approach LUMBAR TRANSFORAMINAL BILATERAL L3 TF SANKET;  Surgeon: Miguel Latham MD;  Location: [facility];  Service: Pain Management;  Laterality: Bilateral;  NEEDS CONSENT    TRANSFORAMINAL EPIDURAL INJECTION OF STEROID Bilateral 10/30/2019    Procedure: TRANSFORAMINAL SANKET BILATERAL L3-L4;  Surgeon: Miguel Latham MD;  Location: [facility];  Service: Pain Management;  Laterality: Bilateral;  NEEDS CONSENT    TRANSFORAMINAL EPIDURAL INJECTION OF STEROID Bilateral 11/11/2020    Procedure: INJECTION, STEROID, EPIDURAL, TRANSFORAMINAL APPROACH, L3-L4;  Surgeon: Miguel Latham MD;  Location: [facility];  Service: Pain Management;  Laterality: Bilateral;    TRANSFORAMINAL EPIDURAL INJECTION OF STEROID Bilateral 5/19/2021    Procedure: INJECTION, STEROID, EPIDURAL, TRANSFORAMINAL APPROACH, L3-L4;  Surgeon: Miguel Latham MD;  Location: [facility]GT;  Service: Pain Management;  Laterality: Bilateral;    TRANSFORAMINAL EPIDURAL INJECTION OF STEROID Bilateral 1/26/2022    Procedure: Injection,steroid,epidural,transforaminal approach BILATERAL L3/4;  Surgeon: Miguel Latham MD;  Location: [facility];  Service: Pain Management;  Laterality: Bilateral;    TRANSFORAMINAL EPIDURAL INJECTION OF STEROID Bilateral 4/20/2022    Procedure: INJECTION, STEROID, EPIDURAL, TRANSFORAMINAL APPROACH, Bilateral L3-L4;  Surgeon: Miguel Latham MD;  Location: [facility];   Service: Pain Management;  Laterality: Bilateral;    TRANSFORAMINAL EPIDURAL INJECTION OF STEROID Bilateral 7/20/2022    Procedure: INJECTION, STEROID, EPIDURAL, TRANSFORAMINAL APPROACH, BILATERAL L3-L4 CONTRAST;  Surgeon: Miguel Latham MD;  Location: [facility]GT;  Service: Pain Management;  Laterality: Bilateral;    TRANSFORAMINAL EPIDURAL INJECTION OF STEROID Right 12/7/2022    Procedure: INJECTION, STEROID, EPIDURAL, TRANSFORAMINAL APPROACH, RIGHT L3/L4 AND L4/L5 CONTRAST;  Surgeon: Miguel Latham MD;  Location: [facility]IN [facility]GT;  Service: Pain Management;  Laterality: Right;    TRANSFORAMINAL EPIDURAL INJECTION OF STEROID Right 2/8/2023    Procedure: INJECTION, STEROID, EPIDURAL, TRANSFORAMINAL APPROACH RIGHT L3/4 AND L4/5 CONTRAST;  Surgeon: Miguel Latham MD;  Location: [facility]IN [facility]GT;  Service: Pain Management;  Laterality: Right;    TUBAL LIGATION Bilateral        Family History:  Family History   Problem Relation Age of Onset    Diabetes Mother     Stroke Mother     Glaucoma Mother     Cataracts Mother     Cancer Father         Lung    No Known Problems Sister     Stroke Brother     Hypertension Daughter     Hypertension Daughter     Hypertension Daughter     No Known Problems Daughter     Heart disease Son     Early death Son     No Known Problems Maternal Aunt     No Known Problems Maternal Uncle     No Known Problems Paternal Aunt     No Known Problems Paternal Uncle     No Known Problems Maternal Grandmother     No Known Problems Maternal Grandfather     No Known Problems Paternal Grandmother     No Known Problems Paternal Grandfather     Amblyopia Neg Hx     Blindness Neg Hx     Macular degeneration Neg Hx     Retinal detachment Neg Hx     Strabismus Neg Hx     Thyroid disease Neg Hx        Social History:  Social History     Socioeconomic History    Marital status:    Tobacco Use    Smoking status: Never     Passive exposure: Never    Smokeless tobacco: Never   Substance and Sexual  "Activity    Alcohol use: No    Drug use: No    Sexual activity: Not Currently   Social History Narrative    Patient is  w/ 5 children, one  from heart disease.The patient is retired.       OBJECTIVE:    BP (!) 142/72   Pulse 63   Temp 97.7 °F (36.5 °C)   Resp 18   Ht 5' 1" (1.549 m)   Wt 65 kg (143 lb 4.8 oz)   BMI 27.08 kg/m²     PHYSICAL EXAMINATION:    General appearance: Well appearing, in no acute distress, alert and oriented x3.  Psych:  Mood and affect appropriate.  Skin: Skin color, texture, turgor normal, no rashes or lesions, in both upper and lower body.  Head/face:  Normocephalic, atraumatic. No palpable lymph nodes.  Back: Straight leg raising in the sitting position is positive on the right. Limited range of motion with pain on flexion and extension. Positive facet loading bilaterally, R>L.  Extremities: Peripheral joint ROM is full and pain free without obvious instability or laxity in all four extremities. No deformities, edema, or skin discoloration. Good capillary refill.  Musculoskeletal: No atrophy or tone abnormalities are noted. 5/5 strength in right ankle with plantar and 4/5 on dorsiflexion. 5/5 strength in left ankle with plantar and 4/5 on dorsiflexion. 4/5 strength with right knee flexion and extension. 5/5 strength with left knee flexion and extension. Right hip flexion is 4/5, left is 5/5. TTP over right GTB. Full ROM of hips with pain on external rotation.   Neuro: Decreased sensation at a right L4 and L5 distribution.  Gait: Antalgic- ambulates without assistance.    ASSESSMENT: 78 y.o. year old female with lower back pain, consistent with     1. Lumbar radiculopathy        2. Spinal stenosis, lumbar region with neurogenic claudication        3. DDD (degenerative disc disease), lumbar        4. Other chronic pain            PLAN:     - Previous lumbar MRI was reviewed and discussed with the patient today.  - She declined surgical consult. Can repeat SANKET if needed " in the future which she declined at this time.  - I have stressed the importance of physical activity and a home exercise plan to help with pain and improve health.  - Continue with PT.  - Patient can continue with medications for now since they are providing benefits, using them appropriately, and without side effects.  - Continue Gabapentin 600 mg BID.  - Continue Etodolac 400 mg QD PRN. Recommend lowest dose for shortest duration possible. Pt denies hx of GI ulcers or bleeds, no blood thinners, so significant known cardiac disease, no kidney disease.   - RTC in 2 months or sooner if needed.    The above plan and management options were discussed at length with patient. Patient is in agreement with the above and verbalized understanding.    Christin Ventura, BRENDA  05/05/2023

## 2023-05-08 ENCOUNTER — CLINICAL SUPPORT (OUTPATIENT)
Dept: REHABILITATION | Facility: HOSPITAL | Age: 78
End: 2023-05-08
Payer: MEDICARE

## 2023-05-08 DIAGNOSIS — R53.1 DECREASED STRENGTH: ICD-10-CM

## 2023-05-08 DIAGNOSIS — M54.50 LUMBAR PAIN: Primary | ICD-10-CM

## 2023-05-08 PROCEDURE — 97112 NEUROMUSCULAR REEDUCATION: CPT

## 2023-05-08 PROCEDURE — 97110 THERAPEUTIC EXERCISES: CPT

## 2023-05-08 NOTE — PROGRESS NOTES
"  Physical Therapy Daily Treatment Note     Name: Diana Herring  Clinic Number: [id number]    Therapy Diagnosis:   Encounter Diagnoses   Name Primary?    Lumbar pain Yes    Decreased strength      Physician: Christin Ventura,*    Visit Date: 5/8/2023    Physician Orders: PT Eval and Treat   Medical Diagnosis from Referral: M54.17 (ICD-10-CM) - Lumbosacral radiculopathy  Evaluation Date: 3/31/2023  Authorization Period Expiration: 4/28/23  Plan of Care Expiration: 5/12/23  Progress Note Due: 4/30/23  Visit # / Visits authorized: 1/ 1, 8/11  FOTO: 9    Time In: 8:33 am   Time Out: 9:17 am   Total Billable Time: 39 minutes    Precautions: Standard and osteoporosis    Subjective     Pt reports: that her back is aching this morning. Pt stated that she had appointment with pain management last week and does not want to get an injection right now.   She was compliant with home exercise program.  Response to previous treatment: no adverse effects  Functional change: pt stated that she feels like her posture is better since SOC    Pain: 6/10 (R) back       Objective       therapeutic exercises to develop strength, endurance, ROM, flexibility for 33 minutes including:    NuStep x 5' - supervised   Piriformis stretch 3 x 30" each  90/90 hamstring stretch 3x30" B   SKTC 5" x10 B   Supine sciatic nerve glide x 10 each not performed   LTR x 20 not performed   Sidelying clams RTB 2x10 3" B   Bridges c/ RTB not performed   Shoulder ext c/ RTB 2x10 not performed   Shuttle DL 2c 2x10 not performed  Standing hip abduction 2x10 B 1#  Standing hip extension x10 B 1# then held due to c/o pain         NEUROMUSCULAR RE-EDUCATION ACTIVITIES to improve motor control, Coordination, core stabilization, and Posture for 11  minutes.  The following were included: .  Rows w/scap retractions GTB 3x10 3"   TA bracing w/ alt march 2x10 B   Palloff press YTB 1x10 L then held due to pain in back/hips from prolonged standing      Home Exercises " Provided and Patient Education Provided     Education provided:   - HEP     Written Home Exercises Provided: Patient instructed to cont prior HEP.   Diana demonstrated good  understanding of the education provided.     See EMR under Patient Instructions for exercises provided prior visit.      Assessment     Additional set of standing hip abduction and paloff press held due to pt report of experiencing increased back/hip pain, most likely due to prolonged standing when performing these exercises, plan to alternate standing and mat exercises/activities next visit. Pt received VC/TC for proper technique when performing rows w/scap retraction.   Diana Is progressing towards her goals.   Pt prognosis is Fair.     Pt will continue to benefit from skilled outpatient physical therapy to address the deficits listed in the problem list box on initial evaluation, provide pt/family education and to maximize pt's level of independence in the home and community environment.     Pt's spiritual, cultural and educational needs considered and pt agreeable to plan of care and goals.    Anticipated barriers to physical therapy: chronicity of pain      Goals:      Short Term Goals: 2 weeks   Pt will be independent with HEP to supplement PT with decreasing pain and improving functional mobility     Long Term Goals: 6 weeks   Pt will improve FOTO score to </= 44% limited in order to demo improved functional mobility  Pt will improve LE MMT scores by at least 1/2 grade where deficits noted in order to improve strength for functional tasks  Pt will improve (B) hamstring flexibility by at least 10 degrees in order to improve functional mobility  Pt will report lumbar pain and pain radiating into (R) LE </= 6/10 at worst in order to be able to perform ADLs with less difficulty    Plan     Continue per AMRITA Cannon, PT

## 2023-05-09 ENCOUNTER — TELEPHONE (OUTPATIENT)
Dept: ADMINISTRATIVE | Facility: CLINIC | Age: 78
End: 2023-05-09
Payer: MEDICARE

## 2023-05-11 ENCOUNTER — OFFICE VISIT (OUTPATIENT)
Dept: FAMILY MEDICINE | Facility: CLINIC | Age: 78
End: 2023-05-11
Payer: MEDICARE

## 2023-05-11 VITALS
WEIGHT: 144.31 LBS | OXYGEN SATURATION: 98 % | HEART RATE: 66 BPM | TEMPERATURE: 98 F | BODY MASS INDEX: 27.25 KG/M2 | HEIGHT: 61 IN | SYSTOLIC BLOOD PRESSURE: 132 MMHG | DIASTOLIC BLOOD PRESSURE: 74 MMHG

## 2023-05-11 DIAGNOSIS — F33.42 RECURRENT MAJOR DEPRESSIVE DISORDER, IN FULL REMISSION: Chronic | ICD-10-CM

## 2023-05-11 DIAGNOSIS — I10 ESSENTIAL HYPERTENSION: Chronic | ICD-10-CM

## 2023-05-11 DIAGNOSIS — F51.04 PSYCHOPHYSIOLOGICAL INSOMNIA: Chronic | ICD-10-CM

## 2023-05-11 DIAGNOSIS — Z00.00 ENCOUNTER FOR PREVENTIVE HEALTH EXAMINATION: Primary | ICD-10-CM

## 2023-05-11 DIAGNOSIS — I70.0 ATHEROSCLEROSIS OF AORTA: Chronic | ICD-10-CM

## 2023-05-11 DIAGNOSIS — M81.0 OSTEOPOROSIS, UNSPECIFIED OSTEOPOROSIS TYPE, UNSPECIFIED PATHOLOGICAL FRACTURE PRESENCE: ICD-10-CM

## 2023-05-11 DIAGNOSIS — K21.9 GASTROESOPHAGEAL REFLUX DISEASE, UNSPECIFIED WHETHER ESOPHAGITIS PRESENT: Chronic | ICD-10-CM

## 2023-05-11 PROCEDURE — 3078F DIAST BP <80 MM HG: CPT | Mod: CPTII,S$GLB,, | Performed by: NURSE PRACTITIONER

## 2023-05-11 PROCEDURE — 1101F PR PT FALLS ASSESS DOC 0-1 FALLS W/OUT INJ PAST YR: ICD-10-PCS | Mod: CPTII,S$GLB,, | Performed by: NURSE PRACTITIONER

## 2023-05-11 PROCEDURE — 1126F PR PAIN SEVERITY QUANTIFIED, NO PAIN PRESENT: ICD-10-PCS | Mod: CPTII,S$GLB,, | Performed by: NURSE PRACTITIONER

## 2023-05-11 PROCEDURE — 1170F FXNL STATUS ASSESSED: CPT | Mod: CPTII,S$GLB,, | Performed by: NURSE PRACTITIONER

## 2023-05-11 PROCEDURE — 1160F RVW MEDS BY RX/DR IN RCRD: CPT | Mod: CPTII,S$GLB,, | Performed by: NURSE PRACTITIONER

## 2023-05-11 PROCEDURE — G0439 PPPS, SUBSEQ VISIT: HCPCS | Mod: S$GLB,,, | Performed by: NURSE PRACTITIONER

## 2023-05-11 PROCEDURE — 3078F PR MOST RECENT DIASTOLIC BLOOD PRESSURE < 80 MM HG: ICD-10-PCS | Mod: CPTII,S$GLB,, | Performed by: NURSE PRACTITIONER

## 2023-05-11 PROCEDURE — G0439 PR MEDICARE ANNUAL WELLNESS SUBSEQUENT VISIT: ICD-10-PCS | Mod: S$GLB,,, | Performed by: NURSE PRACTITIONER

## 2023-05-11 PROCEDURE — 1159F PR MEDICATION LIST DOCUMENTED IN MEDICAL RECORD: ICD-10-PCS | Mod: CPTII,S$GLB,, | Performed by: NURSE PRACTITIONER

## 2023-05-11 PROCEDURE — 1159F MED LIST DOCD IN RCRD: CPT | Mod: CPTII,S$GLB,, | Performed by: NURSE PRACTITIONER

## 2023-05-11 PROCEDURE — 3075F PR MOST RECENT SYSTOLIC BLOOD PRESS GE 130-139MM HG: ICD-10-PCS | Mod: CPTII,S$GLB,, | Performed by: NURSE PRACTITIONER

## 2023-05-11 PROCEDURE — 3075F SYST BP GE 130 - 139MM HG: CPT | Mod: CPTII,S$GLB,, | Performed by: NURSE PRACTITIONER

## 2023-05-11 PROCEDURE — 3288F PR FALLS RISK ASSESSMENT DOCUMENTED: ICD-10-PCS | Mod: CPTII,S$GLB,, | Performed by: NURSE PRACTITIONER

## 2023-05-11 PROCEDURE — 99999 PR PBB SHADOW E&M-EST. PATIENT-LVL IV: ICD-10-PCS | Mod: PBBFAC,,, | Performed by: NURSE PRACTITIONER

## 2023-05-11 PROCEDURE — 1160F PR REVIEW ALL MEDS BY PRESCRIBER/CLIN PHARMACIST DOCUMENTED: ICD-10-PCS | Mod: CPTII,S$GLB,, | Performed by: NURSE PRACTITIONER

## 2023-05-11 PROCEDURE — 1101F PT FALLS ASSESS-DOCD LE1/YR: CPT | Mod: CPTII,S$GLB,, | Performed by: NURSE PRACTITIONER

## 2023-05-11 PROCEDURE — 1170F PR FUNCTIONAL STATUS ASSESSED: ICD-10-PCS | Mod: CPTII,S$GLB,, | Performed by: NURSE PRACTITIONER

## 2023-05-11 PROCEDURE — 99999 PR PBB SHADOW E&M-EST. PATIENT-LVL IV: CPT | Mod: PBBFAC,,, | Performed by: NURSE PRACTITIONER

## 2023-05-11 PROCEDURE — 3288F FALL RISK ASSESSMENT DOCD: CPT | Mod: CPTII,S$GLB,, | Performed by: NURSE PRACTITIONER

## 2023-05-11 PROCEDURE — 1126F AMNT PAIN NOTED NONE PRSNT: CPT | Mod: CPTII,S$GLB,, | Performed by: NURSE PRACTITIONER

## 2023-05-11 NOTE — PROGRESS NOTES
"  Diana Herring presented for a  Medicare AWV and comprehensive Health Risk Assessment today. The following components were reviewed and updated:    Medical history  Family History  Social history  Allergies and Current Medications  Health Risk Assessment  Health Maintenance  Care Team       ** See Completed Assessments for Annual Wellness Visit within the encounter summary.**       The following assessments were completed:  Living Situation  CAGE  Depression Screening  Timed Get Up and Go  Whisper Test  Cognitive Function Screening  Nutrition Screening  ADL Screening  PAQ Screening          Vitals:    05/11/23 0852   BP: 132/74   BP Location: Right arm   Patient Position: Sitting   BP Method: Large (Manual)   Pulse: 66   Temp: 97.8 °F (36.6 °C)   TempSrc: Oral   SpO2: 98%   Weight: 65.5 kg (144 lb 4.7 oz)   Height: 5' 1" (1.549 m)     Body mass index is 27.26 kg/m².  Physical Exam  Vitals and nursing note reviewed.   Constitutional:       Appearance: Normal appearance.   Cardiovascular:      Rate and Rhythm: Normal rate.      Pulses: Normal pulses.      Heart sounds: Normal heart sounds.   Pulmonary:      Effort: Pulmonary effort is normal.      Breath sounds: Normal breath sounds.   Musculoskeletal:         General: Normal range of motion.   Neurological:      Mental Status: She is alert and oriented to person, place, and time.   Psychiatric:         Mood and Affect: Mood normal.         Behavior: Behavior normal.           Diagnoses and health risks identified today and associated recommendations/orders:    1. Encounter for preventive health examination  Pt was seen today for an Annual Wellness visit. Healthcare maintenance and screening recommendations were discussed and updated as indicated. Return in one year for AWV.    Review current opioid prescriptions:n/a  Screen for potential Substance Use Disorders:n/a    2. Atherosclerosis of aorta  The current medical regimen is effective;  continue present plan and " medications.    3. Recurrent major depressive disorder, in full remission  PHQ-2 total score two. The current medical regimen is effective;  continue present plan and medications.    4. Essential hypertension  The current medical regimen is effective;  continue present plan and medications.    5. Gastroesophageal reflux disease, unspecified whether esophagitis present  The current medical regimen is effective;  continue present plan and medications.    6. Osteoporosis, unspecified osteoporosis type, unspecified pathological fracture presence  The current medical regimen is effective;  continue present plan and medications.    7. Psychophysiological insomnia  The current medical regimen is effective;  continue present plan and medications.        Provided Diana with a 5-10 year written screening schedule and personal prevention plan. Recommendations were developed using the USPSTF age appropriate recommendations. Education, counseling, and referrals were provided as needed. After Visit Summary printed and given to patient which includes a list of additional screenings\tests needed.    Follow up in about 1 year (around 5/11/2024).    BRENDA Dorsey  I offered to discuss advanced care planning, including how to pick a person who would make decisions for you if you were unable to make them for yourself, called a health care power of , and what kind of decisions you might make such as use of life sustaining treatments such as ventilators and tube feeding when faced with a life limiting illness recorded on a living will that they will need to know. (How you want to be cared for as you near the end of your natural life)     X Patient is interested in learning more about how to make advanced directives.  I provided them paperwork and offered to discuss this with them.

## 2023-05-11 NOTE — PATIENT INSTRUCTIONS
Counseling and Referral of Other Preventative  (Italic type indicates deductible and co-insurance are waived)    Patient Name: Diana Herring  Today's Date: 5/11/2023    Health Maintenance         Date Due Completion Date    TETANUS VACCINE 08/31/2034 (Originally 1/4/1963) ---    DEXA Scan 04/17/2025 4/17/2023    Colonoscopy 06/16/2025 6/16/2022    Lipid Panel 03/07/2028 3/7/2023          No orders of the defined types were placed in this encounter.    The following information is provided to all patients.  This information is to help you find resources for any of the problems found today that may be affecting your health:                Living healthy guide: www.[email or web address].[place].[facility]      Understanding Diabetes: www.diabetes.org      Eating healthy: www.cdc.gov/healthyweight      CDC home safety checklist: www.cdc.gov/steadi/patient.html      Agency on Aging: www.goea.[place].[facility]      Alcoholics anonymous (AA): www.aa.org      Physical Activity: www.[place].nih.gov/pp8kauo      Tobacco use: www.quitwithusla.org       Please call [employer] at [phone] to receive a rewards card for completing your Annual Wellness Visit. Thanks

## 2023-06-26 ENCOUNTER — PATIENT MESSAGE (OUTPATIENT)
Dept: FAMILY MEDICINE | Facility: CLINIC | Age: 78
End: 2023-06-26
Payer: MEDICARE

## 2023-06-26 ENCOUNTER — PATIENT OUTREACH (OUTPATIENT)
Dept: ADMINISTRATIVE | Facility: HOSPITAL | Age: 78
End: 2023-06-26
Payer: MEDICARE

## 2023-06-26 RX ORDER — ETODOLAC 400 MG/1
TABLET, FILM COATED ORAL
COMMUNITY
Start: 2023-06-09 | End: 2023-12-14

## 2023-06-26 NOTE — PROGRESS NOTES
[facility] 1 BP Gap Report 05.01.23 - the patient has a compliant blood pressure reading of 132/74 documented on 05/11/2023.     No. GUY screening performed.  STOP BANG Legend: 0-2 = LOW Risk; 3-4 = INTERMEDIATE Risk; 5-8 = HIGH Risk

## 2023-07-03 DIAGNOSIS — E78.5 HYPERLIPIDEMIA, UNSPECIFIED HYPERLIPIDEMIA TYPE: ICD-10-CM

## 2023-07-03 DIAGNOSIS — M81.0 AGE-RELATED OSTEOPOROSIS WITHOUT CURRENT PATHOLOGICAL FRACTURE: Chronic | ICD-10-CM

## 2023-07-03 DIAGNOSIS — E55.9 VITAMIN D DEFICIENCY: ICD-10-CM

## 2023-07-03 NOTE — TELEPHONE ENCOUNTER
No care due was identified.  [facility] Embedded Care Due Messages. Reference number: [id number].   7/03/2023 3:05:26 PM CDT

## 2023-07-04 RX ORDER — ATORVASTATIN CALCIUM 40 MG/1
40 TABLET, FILM COATED ORAL DAILY
Qty: 90 TABLET | Refills: 2 | Status: SHIPPED | OUTPATIENT
Start: 2023-07-04

## 2023-07-04 NOTE — TELEPHONE ENCOUNTER
Refill Routing Note   Medication(s) are not appropriate for processing by [facility] for the following reason(s):      Medication outside of protocol:  non-delegated    [facility] action(s):  Route  Approve Care Due:  None identified          Appointments  past 12m or future 3m with PCP    Date Provider   Last Visit   3/8/2023 Luisito Marin MD   Next Visit   Visit date not found Luisito Marin MD   ED visits in past 90 days: 0        Note composed:9:06 AM 07/04/2023

## 2023-07-05 RX ORDER — ALENDRONATE SODIUM 70 MG/1
TABLET ORAL
Qty: 12 TABLET | Refills: 1 | Status: SHIPPED | OUTPATIENT
Start: 2023-07-05

## 2023-07-05 RX ORDER — ERGOCALCIFEROL 1.25 MG/1
50000 CAPSULE ORAL
Qty: 12 CAPSULE | Refills: 1 | Status: SHIPPED | OUTPATIENT
Start: 2023-07-09

## 2023-07-11 ENCOUNTER — TELEPHONE (OUTPATIENT)
Dept: PAIN MEDICINE | Facility: CLINIC | Age: 78
End: 2023-07-11
Payer: MEDICARE

## 2023-07-14 ENCOUNTER — OFFICE VISIT (OUTPATIENT)
Dept: PAIN MEDICINE | Facility: CLINIC | Age: 78
End: 2023-07-14
Payer: MEDICARE

## 2023-07-14 VITALS
HEIGHT: 61 IN | WEIGHT: 144.81 LBS | SYSTOLIC BLOOD PRESSURE: 132 MMHG | TEMPERATURE: 98 F | HEART RATE: 62 BPM | BODY MASS INDEX: 27.34 KG/M2 | OXYGEN SATURATION: 100 % | RESPIRATION RATE: 18 BRPM | DIASTOLIC BLOOD PRESSURE: 69 MMHG

## 2023-07-14 DIAGNOSIS — G89.29 OTHER CHRONIC PAIN: ICD-10-CM

## 2023-07-14 DIAGNOSIS — M48.062 SPINAL STENOSIS, LUMBAR REGION WITH NEUROGENIC CLAUDICATION: ICD-10-CM

## 2023-07-14 DIAGNOSIS — M47.816 LUMBAR SPONDYLOSIS: ICD-10-CM

## 2023-07-14 DIAGNOSIS — M54.16 LUMBAR RADICULOPATHY: Primary | ICD-10-CM

## 2023-07-14 DIAGNOSIS — M51.36 DDD (DEGENERATIVE DISC DISEASE), LUMBAR: ICD-10-CM

## 2023-07-14 PROCEDURE — 99999 PR PBB SHADOW E&M-EST. PATIENT-LVL V: CPT | Mod: PBBFAC,,, | Performed by: NURSE PRACTITIONER

## 2023-07-14 PROCEDURE — 3078F PR MOST RECENT DIASTOLIC BLOOD PRESSURE < 80 MM HG: ICD-10-PCS | Mod: CPTII,S$GLB,, | Performed by: NURSE PRACTITIONER

## 2023-07-14 PROCEDURE — 3078F DIAST BP <80 MM HG: CPT | Mod: CPTII,S$GLB,, | Performed by: NURSE PRACTITIONER

## 2023-07-14 PROCEDURE — 3288F FALL RISK ASSESSMENT DOCD: CPT | Mod: CPTII,S$GLB,, | Performed by: NURSE PRACTITIONER

## 2023-07-14 PROCEDURE — 99213 PR OFFICE/OUTPT VISIT, EST, LEVL III, 20-29 MIN: ICD-10-PCS | Mod: S$GLB,,, | Performed by: NURSE PRACTITIONER

## 2023-07-14 PROCEDURE — 3075F SYST BP GE 130 - 139MM HG: CPT | Mod: CPTII,S$GLB,, | Performed by: NURSE PRACTITIONER

## 2023-07-14 PROCEDURE — 1160F RVW MEDS BY RX/DR IN RCRD: CPT | Mod: CPTII,S$GLB,, | Performed by: NURSE PRACTITIONER

## 2023-07-14 PROCEDURE — 1101F PR PT FALLS ASSESS DOC 0-1 FALLS W/OUT INJ PAST YR: ICD-10-PCS | Mod: CPTII,S$GLB,, | Performed by: NURSE PRACTITIONER

## 2023-07-14 PROCEDURE — 99999 PR PBB SHADOW E&M-EST. PATIENT-LVL V: ICD-10-PCS | Mod: PBBFAC,,, | Performed by: NURSE PRACTITIONER

## 2023-07-14 PROCEDURE — 1125F PR PAIN SEVERITY QUANTIFIED, PAIN PRESENT: ICD-10-PCS | Mod: CPTII,S$GLB,, | Performed by: NURSE PRACTITIONER

## 2023-07-14 PROCEDURE — 1125F AMNT PAIN NOTED PAIN PRSNT: CPT | Mod: CPTII,S$GLB,, | Performed by: NURSE PRACTITIONER

## 2023-07-14 PROCEDURE — 1159F MED LIST DOCD IN RCRD: CPT | Mod: CPTII,S$GLB,, | Performed by: NURSE PRACTITIONER

## 2023-07-14 PROCEDURE — 1160F PR REVIEW ALL MEDS BY PRESCRIBER/CLIN PHARMACIST DOCUMENTED: ICD-10-PCS | Mod: CPTII,S$GLB,, | Performed by: NURSE PRACTITIONER

## 2023-07-14 PROCEDURE — 99213 OFFICE O/P EST LOW 20 MIN: CPT | Mod: S$GLB,,, | Performed by: NURSE PRACTITIONER

## 2023-07-14 PROCEDURE — 3075F PR MOST RECENT SYSTOLIC BLOOD PRESS GE 130-139MM HG: ICD-10-PCS | Mod: CPTII,S$GLB,, | Performed by: NURSE PRACTITIONER

## 2023-07-14 PROCEDURE — 3288F PR FALLS RISK ASSESSMENT DOCUMENTED: ICD-10-PCS | Mod: CPTII,S$GLB,, | Performed by: NURSE PRACTITIONER

## 2023-07-14 PROCEDURE — 1101F PT FALLS ASSESS-DOCD LE1/YR: CPT | Mod: CPTII,S$GLB,, | Performed by: NURSE PRACTITIONER

## 2023-07-14 PROCEDURE — 1159F PR MEDICATION LIST DOCUMENTED IN MEDICAL RECORD: ICD-10-PCS | Mod: CPTII,S$GLB,, | Performed by: NURSE PRACTITIONER

## 2023-07-14 NOTE — PROGRESS NOTES
Chronic patient Established Note (Follow up visit)      SUBJECTIVE:    Interval History 7/14/2023:  The patient is here for follow up of back and leg pain. The pain starts to the lower back and tailbone with radiation down the back of both legs with numbness and tingling. The pain is worse to the posterior legs but she also has pain to the front and sides of both legs. She says that the last right sided TF SANKET did not last as long as previous procedures. She has performed home PT exercises for over 6 weeks with minimal benefit. She previously declined surgical referral for severe stenosis. Her pain today is 6/10.    Interval History 5/5/2023:  The patient returns today for follow up of back and leg pain. She has been in PT which she thinks is helping her range of motion. She is still having back pain with radiation down the right leg. There is associated numbness. She previously had benefit with SANKET but declined to repeat at last OV. She also declined surgical consult. She has benefit with Gabapentin 1200 mg daily with some benefit but it does cause some sedation. Her pain today is 6/10.    Interval History 3/23/2023:  The patient presents for follow up of chronic back pain. She is here for review up updated lumbar MRI. Her results show severe spondylitic central spinal stenosis at L3-4 and L4-5, L4-5 subarticular zones severe stenosis bilaterally and severe right-sided neural foraminal stenosis. TF ESIs have been helpful short-term. She is not interested in surgical consult at this time. No bowel or bladder incontinence. Her biggest complaint today is right sided back pain with radiation down the side of the right leg and numbness. Her pain today is 9/10.    Interval History 3/9/2023:  The patient presents today for follow up of back and leg pain. She is now s/p repeat right L3/4 and L4/5 TF SANKET on 2/8/23 with 80% benefit for about 2 weeks only. Previous did help for longer. She is reporting pain that start to  right lower back with radiation into the front and back of the right leg to the anterior shin. She has numbness on the right lower leg. She feels like her pain has worsened over the past few months. Her last MRI was in 2019. She had some benefit with PT in the past and is open to repeat. Her pain today is 7/10.    Interval History 1/24/2023:  Diana is here for follow up of back and right leg pain. She is now s/p right L3/4 and L4/5 TF SANKET on 12/7/22. She reports about 80% relief for about one month. Today, she is reporting lower back pain with radiation into the front and side of the right leg. She has numbness to the right leg intermittently without warning. She also has been having cramps to lower legs but says she thinks that she has not been drinking enough water. She completed aquatic PT which she thinks was helpful. Her pain today is 8/10.    Interval History 11/11/2022:  The patient returns today for follow up of back and leg pain. She has been in aquatic PT which she finds helpful. She attends twice weekly. Her back pain has improved somewhat because of this. She does have worsened right leg pain with walking and activity. The pain radiates down the front and side of the right leg with associated numbness. No current radiation on the left. She had benefit with ESIs in the past and wishes to repeat. Her pain today is 7/10.    Interval History 8/11/2022:  The patient is here for follow up of lower back pain. Since previous encounter, she underwent repeat L3/4 TF SANKET with 70% relief. We had originally planned for MBB but she started having more shooting pain into the legs. She feels like this was helpful. Her pain is tolerable at this time. Her pain today is 7/10.    Interval History 5/9/2022:  The patient is here for follow up of chronic back pain. She is having more back pain and stiffness in the morning. We did previously discuss lumbar MBB but she is worried about not having IV sedation. She says that she  would like to further discuss the procedure today. Leg pain has been mild since previous SANKET. She does have intermittent numbness still. Back pain is greater than leg pain. Her pain today is 7/10.    Interval History 4/7/2022:  The patient is here for follow up of lower back and leg pain. Since previous encounter, she underwent bilateral L3/4 TF SANKET on 1/26/22. She reports 80% relief of pain for about 2 months. She feels like this gave her significant benefit during that time and her pain was mild. She is now again having severe back pain with radiation into the anterior thighs. She would like to repeat the procedure. She would also like me to place another referral for aquatic PT. She stopped Mobic because she found Diclofenac more helpful. She has been taking as needed but not daily. She does find Gabapentin helpful but it sometimes makes her drowsy. Her pain today is 9/10.    Interval History 1/11/2022:  The patient returns to discuss continued lower back pain. She has not restarted aquatic PT due to increase in Covid-19 cases. She continues with sharp lower back pain that radiates into anterior thighs, bilaterally. She has associated numbness. The pain is worse with increased activity. Her pain today is 7/10.    Interval History 11/11/2021:  The patient presents today for 2 month follow up of lower back pain. Since previous encounter, she has not started aquatic PT because she is on the waiting list. She continues to report lower back pain with radiation down the left leg. We previously discussed lumbar MBB/RFA which she does not wish to pursue at this time. She states that diclofenac is not helping very much with her aching pain at this time. She denies any new weakness or symptoms at this time. Her pain today is 8/10.    Interval History 8/19/2021:  Diana returns today for follow up of chronic lower back pain. She reports that he has continued with aching pain. She has been in PT but is not finding it very  helpful. She has not tried aquatic PT in the past. She is still having intermittent radiation down her legs. She has been unable to take 1200 mg daily of Gabapentin and has decreased to 600 mg daily due to sedation and dizziness with the medication. Otherwise, no new complaints today. Her pain today is 7/10.    Interval History 6/18/2021:  The patient is here for follow up of lower back pain.  She has radiation of her pain into her anterior thighs but not below the knees.  She denies numbness or tingling at this time.  Her back pain is currently greater than her leg pain.  She has a lot of stiffness when she wakes the morning states it improves when she moves.  She had limited benefit with recent repeat bilateral L3-4 transforaminal steroid injection.  Her pain today is 7/10    Interval History 5/4/2021:  The patient is here for follow up of lower back and leg pain.  Previous encounter, physical therapy was ordered.  She states that she did not start physical therapy and would like me to replace the order for her.  She does report that she is noticing more muscle fatigue and weakness particularly with activity.  She is also having more back pain with radiation into the anterior lateral thighs with the past month.  She previously had significant been hip with bilateral L3/4 transforaminal epidural steroid injection and wishes to repeat this.  She found this more helpful than previous procedures. Her pain today is 7/10.    Interval History 3/2/2021:  The patient is here for follow up of chronic pain. She is having more back pain today which she attributes to the cold front coming in. She is having radiation into the buttocks and right anterior thigh. Her back pain is her primary complaint today. She describes it as throbbing. She recently completed both Covid-19 vaccines which she tolerated well. She is now taking Gabapentin 600 mg twice daily regularly. She notices improvement in right leg numbness.  Her pain today  is 7/10.    Interval History 12/1/2020:  The patient iss here for follow-up of back and leg pain.  She is now status post bilateral L3/4 transforaminal epidural steroid injection on 11/11/2020. She is reporting 100% relief for about 2 weeks and then her pain started to return.  She is still having some benefit.  Her pain is located across the lower back with radiation into front and sides of the legs to the anterior feet with associated numbness.  She feels like when she walks sometimes her legs become weak.  This has improved slightly.  She denies any recent falls.  At her last OV, her Gabapentin was increased to 600 mg BID.  She says that she finds this helpful.  She has mild drowsiness.  She admits that she does not always take the medication and thought it was more of an as needed medication.  The patient denies any bowel or bladder incontinence or signs of saddle paresthesia.  She feels as though her pain today is worse than usual.  She rates her pain today as 10/10.    Interval history 10/15/2020:  Diana Herring presents to the clinic for a follow-up appointment for back pain. Since the last visit, Diana Herring states the pain has been worsening. Current pain intensity is 7/10. She reports that she continues to have low back pain that radiates to the bilateral hip and leg. She does report that she has noticed intermittent numbness in the right lateral and anterior thigh, that is sometimes associated with weakness in her right leg. She says that her leg gives out when this happens. She has not had any falls or other trauma. She did not get the bilateral L3/4 TFESI planned last time, and she is still taking only 300 mg bid of gabapentin.  Patient denies urinary incontinence, bowel incontinence, significant weight loss.    Initial visit:  Diana Herring presents to the clinic for the evaluation of low back pain. The pain started 2 years ago following loosing bone after a bone density and symptoms have  been worsening.The pain is located in the low back area and radiates to the bilateral hip and leg.  The pain is described as aching, numbing, sharp and tight band and is rated as 8/10. The pain is rated with a score of  7/10 on the BEST day and a score of 8/10 on the WORST day.  Symptoms interfere with daily activity and sleeping. The pain is exacerbated by Sitting, Standing, Bending, Coughing/Sneezing, Walking, Morning, Lifting and Getting out of bed/chair.  The pain is mitigated by heat, laying down and rest. She reports spending 0 hours per day reclining. The patient reports 6 hours of uninterrupted sleep per night.     Patient had L5/S1 ILESI 2 weeks ago and only gave her 1 day of relief.      Patient denies night fever/night sweats, urinary incontinence, bowel incontinence, significant weight loss and significant motor weakness.     Physical Therapy/Home Exercise: yes, was not beneficial      Pain Disability Index Review:  Last 3 PDI Scores 7/14/2023 5/5/2023 3/23/2023   Pain Disability Index (PDI) 31 30 45       Pain Medications:  Gabapentin 600 mg bid    Opioid Contract: no     report:  Reviewed and consistent with medication use as prescribed.    Pain Procedures:  2/8/23 Right L3/4 and L4/5 TF SANKET- 80% relief for 2 weeks  12/7/22 Right L3/4 and L4/5 TF SANKET- 80% relief for one month  7/20/22 Bilateral L3/4 TF SANKET- 70% relief  4/20/22 Bilateral L3/4 TF SANKET- 75% relief of leg pain  1/26/22 Bilateral L3/4 TF SANKET  5/19/21 bilateral L3/4 TF SANKET  11/11/20 Bilateral L3/4 TF SANKET- 100% relief for 2 weeks  5/13/20 L5/S1 ILESI  10/30/19 TFESI Bilateral L3-L4  09/04/19 TFESI Bilateral L3     Physical Therapy/Home Exercise: yes    Imaging:    Narrative & Impression  EXAMINATION:  MRI LUMBAR SPINE WITHOUT CONTRAST     CLINICAL HISTORY:  Dorsalgia, unspecifiedLow back pain, symptoms persist with > 6wks conservative treatment;     TECHNIQUE:  Sagittal T1, sagittal T2, sagittal STIR, axial T1 and axial T2 weighted images  of the lumbar spine obtained without contrast.     COMPARISON:  X-ray 05/29/2020 and lumbar spine radiograph 07/26/2019     FINDINGS:  Lumbar spine alignment is within normal limits. The vertebral body heights are well maintained, with no fracture.  Degenerative edema signal at opposing endplates of L4-5 and degenerative sclerosis at multiple endplates from L2 through L5 with associated Schmorl's nodes.  Otherwise, marrow signal normal.     The conus is normal in appearance.  There are bilateral T2 hyperintense renal lesions likely related to cysts.     Bunching of cauda equina nerve roots posterior to L2 and L3 vertebral bodies.     L1-L2: Circumferential disc bulge, spondylitic spurring and mild facet arthritis.Mild central spinal stenosis.     L2-L3: Circumferential disc bulge, spondylitic spurring and facet arthritis.  Mild central spinal stenosis and left-sided foraminal stenosis.     L3-L4: Circumferential disc bulge, spondylitic spurring, severe facet arthritis and ligamentum flavum thickening produce severe central spinal stenosis and mild bilateral foraminal stenosis.     L4-L5: Circumferential disc bulge, spondylitic spurring, facet arthritis and ligamentum flavum thickening.  Bilateral facet joint effusions.  Severe central spinal stenosis and bilateral subarticular zone stenosis.  Moderate left and severe right-sided neural foraminal stenosis.     L5-S1: Circumferential disc bulge and minimal facet arthritis.  No central canal or foraminal stenosis.     Impression:     Severe spondylitic central spinal stenosis at L3-4 and L4-5.  L4-5 subarticular zones severe stenosis bilaterally and severe right-sided neural foraminal stenosis.  Additional degrees of central spinal stenosis and foraminal stenosis as above.     Bilateral renal lesions likely cysts which can be confirmed with ultrasound on a nonemergent basis.       CMP  Sodium   Date Value Ref Range Status   03/07/2023 143 136 - 145 mmol/L Final      Potassium   Date Value Ref Range Status   03/07/2023 3.4 (L) 3.5 - 5.1 mmol/L Final     Chloride   Date Value Ref Range Status   03/07/2023 108 95 - 110 mmol/L Final     CO2   Date Value Ref Range Status   03/07/2023 28 23 - 29 mmol/L Final     Glucose   Date Value Ref Range Status   03/07/2023 113 (H) 70 - 110 mg/dL Final     BUN   Date Value Ref Range Status   03/07/2023 12 8 - 23 mg/dL Final     Creatinine   Date Value Ref Range Status   03/07/2023 0.8 0.5 - 1.4 mg/dL Final     Calcium   Date Value Ref Range Status   03/07/2023 9.4 8.7 - 10.5 mg/dL Final     Total Protein   Date Value Ref Range Status   03/07/2023 7.6 6.0 - 8.4 g/dL Final     Albumin   Date Value Ref Range Status   03/07/2023 3.9 3.5 - 5.2 g/dL Final     Total Bilirubin   Date Value Ref Range Status   03/07/2023 0.6 0.1 - 1.0 mg/dL Final     Comment:     For infants and newborns, interpretation of results should be based  on gestational age, weight and in agreement with clinical  observations.    Premature Infant recommended reference ranges:  Up to 24 hours.............<8.0 mg/dL  Up to 48 hours............<12.0 mg/dL  3-5 days..................<15.0 mg/dL  6-29 days.................<15.0 mg/dL       Alkaline Phosphatase   Date Value Ref Range Status   03/07/2023 70 55 - 135 U/L Final     AST   Date Value Ref Range Status   03/07/2023 15 10 - 40 U/L Final     ALT   Date Value Ref Range Status   03/07/2023 16 10 - 44 U/L Final     Anion Gap   Date Value Ref Range Status   03/07/2023 7 (L) 8 - 16 mmol/L Final     eGFR if    Date Value Ref Range Status   03/04/2022 >60.0 >60 mL/min/1.73 m^2 Final     eGFR if non    Date Value Ref Range Status   03/04/2022 >60.0 >60 mL/min/1.73 m^2 Final     Comment:     Calculation used to obtain the estimated glomerular filtration  rate (eGFR) is the CKD-EPI equation.            Allergies: Review of patient's allergies indicates:  No Known Allergies    Current Medications:    Current Outpatient Medications   Medication Sig Dispense Refill    alendronate (FOSAMAX) 70 MG tablet TAKE 1 TABLET BY MOUTH ONE TIME PER WEEK 12 tablet 1    amLODIPine (NORVASC) 10 MG tablet Take 1 tablet (10 mg total) by mouth once daily. 90 tablet 3    atorvastatin (LIPITOR) 40 MG tablet Take 1 tablet (40 mg total) by mouth once daily. 90 tablet 2    calcium-vitamin D 500-125 mg-unit tablet Take 1 tablet by mouth once daily.      ergocalciferol (ERGOCALCIFEROL) 50,000 unit Cap TAKE 1 CAPSULE (50,000 UNITS TOTAL) BY MOUTH EVERY SUNDAY. 12 capsule 1    etodolac (LODINE) 400 MG tablet TAKE 1 TABLET (400 MG TOTAL) BY MOUTH EVERY 12 (TWELVE) HOURS AS NEEDED (PAIN).      fluticasone propionate (FLONASE) 50 mcg/actuation nasal spray 2 SPRAYS (100 MCG TOTAL) BY EACH NOSTRIL ROUTE ONCE DAILY. 48 mL 3    gabapentin (NEURONTIN) 300 MG capsule Take 2 capsules (600 mg total) by mouth 2 (two) times daily. 360 capsule 1    metoprolol succinate (TOPROL-XL) 100 MG 24 hr tablet TAKE 1 TABLET BY MOUTH EVERY DAY 90 tablet 3    multivitamin (THERAGRAN) per tablet Take 1 tablet by mouth once daily.      pantoprazole (PROTONIX) 40 MG tablet TAKE 1 TABLET BY MOUTH EVERY DAY 90 tablet 3    paroxetine (PAXIL) 20 MG tablet Take 1 tablet (20 mg total) by mouth every morning. 90 tablet 1    traZODone (DESYREL) 150 MG tablet TAKE 1 TABLET (150 MG TOTAL) BY MOUTH EVERY EVENING. 90 tablet 1     No current facility-administered medications for this visit.       REVIEW OF SYSTEMS:    GENERAL:  No weight loss, malaise or fevers.  HEENT:  Negative for frequent or significant headaches.  NECK:  Negative for lumps, goiter, pain and significant neck swelling.  RESPIRATORY:  Negative for cough, wheezing or shortness of breath.  CARDIOVASCULAR:  Negative for chest pain, leg swelling or palpitations.  GI:  Negative for abdominal discomfort, blood in stools or black stools or change in bowel habits. GERD.  MUSCULOSKELETAL:  See HPI.  SKIN:  Negative for  lesions, rash, and itching.  PSYCH:  + for sleep disturbance, h/o depression  HEMATOLOGY/LYMPHOLOGY:  Negative for prolonged bleeding, bruising easily or swollen nodes.  NEURO:  + numbness. No history of headaches, syncope, paralysis, seizures or tremors.  All other reviewed and negative other than HPI.     Past Medical History:  Past Medical History:   Diagnosis Date    Anxiety     Arthritis     Corneal abrasion s    ? eye     Depression     Full dentures     GERD (gastroesophageal reflux disease)     Hyperlipidemia     Hypertension     Nuclear sclerosis of both eyes 6/5/2017    Osteoporosis     Restless leg syndrome        Past Surgical History:  Past Surgical History:   Procedure Laterality Date    COLONOSCOPY N/A 5/14/2019    Procedure: COLONOSCOPY;  Surgeon: Nahid Cline MD;  Location: [facility] END[facility];  Service: Endoscopy;  Laterality: N/A;    EPIDURAL STEROID INJECTION N/A 5/13/2020    Procedure: LUMBAR/CAUDAL L5/S1 IL SANKET ;  Surgeon: Miguel Latham MD;  Location: [facility] PAIN [facility]GT;  Service: Pain Management;  Laterality: N/A;  NEEDS CONSENT    HYSTERECTOMY      PARTIAL HYSTERECTOMY      TRANSFORAMINAL EPIDURAL INJECTION OF STEROID Bilateral 9/4/2019    Procedure: Injection,steroid,epidural,transforaminal approach LUMBAR TRANSFORAMINAL BILATERAL L3 TF SANKET;  Surgeon: Miguel Latham MD;  Location: [facility] PAIN [facility]GT;  Service: Pain Management;  Laterality: Bilateral;  NEEDS CONSENT    TRANSFORAMINAL EPIDURAL INJECTION OF STEROID Bilateral 10/30/2019    Procedure: TRANSFORAMINAL SANKET BILATERAL L3-L4;  Surgeon: Miguel Latham MD;  Location: [facility] PAIN [facility]GT;  Service: Pain Management;  Laterality: Bilateral;  NEEDS CONSENT    TRANSFORAMINAL EPIDURAL INJECTION OF STEROID Bilateral 11/11/2020    Procedure: INJECTION, STEROID, EPIDURAL, TRANSFORAMINAL APPROACH, L3-L4;  Surgeon: Miguel Latham MD;  Location: [facility] PAIN [facility]GT;  Service: Pain Management;  Laterality: Bilateral;    TRANSFORAMINAL EPIDURAL INJECTION OF  STEROID Bilateral 5/19/2021    Procedure: INJECTION, STEROID, EPIDURAL, TRANSFORAMINAL APPROACH, L3-L4;  Surgeon: Miguel Latham MD;  Location: [facility]GT;  Service: Pain Management;  Laterality: Bilateral;    TRANSFORAMINAL EPIDURAL INJECTION OF STEROID Bilateral 1/26/2022    Procedure: Injection,steroid,epidural,transforaminal approach BILATERAL L3/4;  Surgeon: Miguel Latham MD;  Location: [facility];  Service: Pain Management;  Laterality: Bilateral;    TRANSFORAMINAL EPIDURAL INJECTION OF STEROID Bilateral 4/20/2022    Procedure: INJECTION, STEROID, EPIDURAL, TRANSFORAMINAL APPROACH, Bilateral L3-L4;  Surgeon: Miguel Latham MD;  Location: [facility];  Service: Pain Management;  Laterality: Bilateral;    TRANSFORAMINAL EPIDURAL INJECTION OF STEROID Bilateral 7/20/2022    Procedure: INJECTION, STEROID, EPIDURAL, TRANSFORAMINAL APPROACH, BILATERAL L3-L4 CONTRAST;  Surgeon: Miguel Latham MD;  Location: [facility];  Service: Pain Management;  Laterality: Bilateral;    TRANSFORAMINAL EPIDURAL INJECTION OF STEROID Right 12/7/2022    Procedure: INJECTION, STEROID, EPIDURAL, TRANSFORAMINAL APPROACH, RIGHT L3/L4 AND L4/L5 CONTRAST;  Surgeon: Miguel Latham MD;  Location: [facility] PAIN [facility]GT;  Service: Pain Management;  Laterality: Right;    TRANSFORAMINAL EPIDURAL INJECTION OF STEROID Right 2/8/2023    Procedure: INJECTION, STEROID, EPIDURAL, TRANSFORAMINAL APPROACH RIGHT L3/4 AND L4/5 CONTRAST;  Surgeon: Miguel Latham MD;  Location: [facility] PAIN [facility]GT;  Service: Pain Management;  Laterality: Right;    TUBAL LIGATION Bilateral        Family History:  Family History   Problem Relation Age of Onset    Diabetes Mother     Stroke Mother     Glaucoma Mother     Cataracts Mother     Cancer Father         Lung    No Known Problems Sister     Stroke Brother     Hypertension Daughter     Hypertension Daughter     Hypertension Daughter     No Known Problems Daughter     Heart disease Son     Early death  "Son     No Known Problems Maternal Aunt     No Known Problems Maternal Uncle     No Known Problems Paternal Aunt     No Known Problems Paternal Uncle     No Known Problems Maternal Grandmother     No Known Problems Maternal Grandfather     No Known Problems Paternal Grandmother     No Known Problems Paternal Grandfather     Amblyopia Neg Hx     Blindness Neg Hx     Macular degeneration Neg Hx     Retinal detachment Neg Hx     Strabismus Neg Hx     Thyroid disease Neg Hx        Social History:  Social History     Socioeconomic History    Marital status:    Tobacco Use    Smoking status: Never     Passive exposure: Never    Smokeless tobacco: Never   Substance and Sexual Activity    Alcohol use: Not Currently     Comment: once a year    Drug use: No    Sexual activity: Not Currently   Social History Narrative    Patient is  w/ 5 children, one  from heart disease.The patient is retired.       OBJECTIVE:    /69 (BP Location: Right arm, Patient Position: Sitting, BP Method: Small (Automatic))   Pulse 62   Temp 98 °F (36.7 °C) (Oral)   Resp 18   Ht 5' 1" (1.549 m)   Wt 65.7 kg (144 lb 13.5 oz)   SpO2 100%   BMI 27.37 kg/m²     PHYSICAL EXAMINATION:    General appearance: Well appearing, in no acute distress, alert and oriented x3.  Psych:  Mood and affect appropriate.  Skin: Skin color, texture, turgor normal, no rashes or lesions, in both upper and lower body.  Head/face:  Normocephalic, atraumatic. No palpable lymph nodes.  Back: Straight leg raising in the sitting position is positive on the right. Limited range of motion with pain on flexion and extension. Positive facet loading bilaterally.  Extremities: Peripheral joint ROM is full and pain free without obvious instability or laxity in all four extremities. No deformities, edema, or skin discoloration. Good capillary refill.  Musculoskeletal: No atrophy or tone abnormalities are noted. 4/5 strength in right ankle with plantar and " 4/5 on dorsiflexion. 5/5 strength in left ankle with plantar and 4/5 on dorsiflexion. 4/5 strength with right knee flexion and extension. 5/5 strength with left knee flexion and extension. Right hip flexion is 4/5, left is 5/5. TTP over right GTB. Full ROM of hips with pain on external rotation.   Neuro: Decreased sensation at a right L4 and L5 distribution.  Gait: Antalgic- ambulates without assistance.    ASSESSMENT: 78 y.o. year old female with lower back and right leg pain, consistent with     1. Lumbar radiculopathy  Procedure Order to Pain Management      2. Spinal stenosis, lumbar region with neurogenic claudication        3. DDD (degenerative disc disease), lumbar        4. Other chronic pain        5. Lumbar spondylosis              PLAN:     - Previous lumbar MRI was reviewed and discussed with the patient today.  - She declined surgical consult.   - Schedule for caudal SANKET with catheter.  - I have stressed the importance of physical activity and a home exercise plan to help with pain and improve health.  - Patient can continue with medications for now since they are providing benefits, using them appropriately, and without side effects.  - Continue Gabapentin 600 mg BID.  - RTC 2 weeks after SANKET.    The above plan and management options were discussed at length with patient. Patient is in agreement with the above and verbalized understanding.    Christin Ventura, BRENDA  07/14/2023

## 2023-07-19 ENCOUNTER — TELEPHONE (OUTPATIENT)
Dept: ADMINISTRATIVE | Facility: OTHER | Age: 78
End: 2023-07-19
Payer: MEDICARE

## 2023-07-22 ENCOUNTER — PATIENT MESSAGE (OUTPATIENT)
Dept: PAIN MEDICINE | Facility: OTHER | Age: 78
End: 2023-07-22
Payer: MEDICARE

## 2023-08-30 ENCOUNTER — HOSPITAL ENCOUNTER (OUTPATIENT)
Facility: OTHER | Age: 78
Discharge: HOME | End: 2023-08-30
Attending: ANESTHESIOLOGY | Admitting: ANESTHESIOLOGY
Payer: MEDICARE

## 2023-08-30 VITALS
TEMPERATURE: 98 F | OXYGEN SATURATION: 97 % | DIASTOLIC BLOOD PRESSURE: 57 MMHG | RESPIRATION RATE: 16 BRPM | HEART RATE: 55 BPM | HEIGHT: 60 IN | BODY MASS INDEX: 28.27 KG/M2 | SYSTOLIC BLOOD PRESSURE: 119 MMHG | WEIGHT: 144 LBS

## 2023-08-30 DIAGNOSIS — M54.17 LUMBOSACRAL RADICULOPATHY: ICD-10-CM

## 2023-08-30 DIAGNOSIS — G89.29 CHRONIC PAIN: ICD-10-CM

## 2023-08-30 DIAGNOSIS — M51.36 DDD (DEGENERATIVE DISC DISEASE), LUMBAR: Primary | ICD-10-CM

## 2023-08-30 PROCEDURE — 62323 PR INJ LUMBAR/SACRAL, W/IMAGING GUIDANCE: ICD-10-PCS | Mod: ,,, | Performed by: ANESTHESIOLOGY

## 2023-08-30 PROCEDURE — 62323 NJX INTERLAMINAR LMBR/SAC: CPT | Mod: ,,, | Performed by: ANESTHESIOLOGY

## 2023-08-30 PROCEDURE — 63600175 PHARM REV CODE 636 W HCPCS: Performed by: ANESTHESIOLOGY

## 2023-08-30 PROCEDURE — 25000003 PHARM REV CODE 250: Performed by: ANESTHESIOLOGY

## 2023-08-30 PROCEDURE — 25000003 PHARM REV CODE 250: Performed by: STUDENT IN AN ORGANIZED HEALTH CARE EDUCATION/TRAINING PROGRAM

## 2023-08-30 PROCEDURE — 62323 NJX INTERLAMINAR LMBR/SAC: CPT | Performed by: ANESTHESIOLOGY

## 2023-08-30 PROCEDURE — 25500020 PHARM REV CODE 255: Performed by: ANESTHESIOLOGY

## 2023-08-30 RX ORDER — SODIUM CHLORIDE 9 MG/ML
INJECTION, SOLUTION INTRAVENOUS CONTINUOUS
Status: DISCONTINUED | OUTPATIENT
Start: 2023-08-30 | End: 2023-08-30 | Stop reason: HOSPADM

## 2023-08-30 RX ORDER — LIDOCAINE HYDROCHLORIDE 20 MG/ML
INJECTION, SOLUTION INFILTRATION; PERINEURAL
Status: DISCONTINUED | OUTPATIENT
Start: 2023-08-30 | End: 2023-08-30 | Stop reason: HOSPADM

## 2023-08-30 RX ORDER — FENTANYL CITRATE 50 UG/ML
INJECTION, SOLUTION INTRAMUSCULAR; INTRAVENOUS
Status: DISCONTINUED | OUTPATIENT
Start: 2023-08-30 | End: 2023-08-30 | Stop reason: HOSPADM

## 2023-08-30 RX ORDER — MIDAZOLAM HYDROCHLORIDE 1 MG/ML
INJECTION INTRAMUSCULAR; INTRAVENOUS
Status: DISCONTINUED | OUTPATIENT
Start: 2023-08-30 | End: 2023-08-30 | Stop reason: HOSPADM

## 2023-08-30 RX ORDER — DEXAMETHASONE SODIUM PHOSPHATE 10 MG/ML
INJECTION INTRAMUSCULAR; INTRAVENOUS
Status: DISCONTINUED | OUTPATIENT
Start: 2023-08-30 | End: 2023-08-30 | Stop reason: HOSPADM

## 2023-08-30 RX ORDER — LIDOCAINE HYDROCHLORIDE 10 MG/ML
INJECTION, SOLUTION EPIDURAL; INFILTRATION; INTRACAUDAL; PERINEURAL
Status: DISCONTINUED | OUTPATIENT
Start: 2023-08-30 | End: 2023-08-30 | Stop reason: HOSPADM

## 2023-08-30 NOTE — DISCHARGE SUMMARY
Discharge Note  Short Stay      SUMMARY     Admit Date: 8/30/2023    Attending Physician: Miguel Latham MD      Discharge Physician: George La      Discharge Date: 8/30/2023 8:34 AM    Procedure(s) (LRB):  INJECTION, STEROID, EPIDURAL, CAUDAL W/ CATH SOONER DATE (N/A)    Final Diagnosis: Lumbar radiculopathy [M54.16]    Disposition: Home or self care    Patient Instructions:   Current Discharge Medication List        CONTINUE these medications which have NOT CHANGED    Details   alendronate (FOSAMAX) 70 MG tablet TAKE 1 TABLET BY MOUTH ONE TIME PER WEEK  Qty: 12 tablet, Refills: 1    Associated Diagnoses: Age-related osteoporosis without current pathological fracture      amLODIPine (NORVASC) 10 MG tablet Take 1 tablet (10 mg total) by mouth once daily.  Qty: 90 tablet, Refills: 3    Comments: .  Associated Diagnoses: Essential hypertension      atorvastatin (LIPITOR) 40 MG tablet Take 1 tablet (40 mg total) by mouth once daily.  Qty: 90 tablet, Refills: 2    Associated Diagnoses: Hyperlipidemia, unspecified hyperlipidemia type      calcium-vitamin D 500-125 mg-unit tablet Take 1 tablet by mouth once daily.      ergocalciferol (ERGOCALCIFEROL) 50,000 unit Cap TAKE 1 CAPSULE (50,000 UNITS TOTAL) BY MOUTH EVERY SUNDAY.  Qty: 12 capsule, Refills: 1    Associated Diagnoses: Vitamin D deficiency      etodolac (LODINE) 400 MG tablet TAKE 1 TABLET (400 MG TOTAL) BY MOUTH EVERY 12 (TWELVE) HOURS AS NEEDED (PAIN).      fluticasone propionate (FLONASE) 50 mcg/actuation nasal spray 2 SPRAYS (100 MCG TOTAL) BY EACH NOSTRIL ROUTE ONCE DAILY.  Qty: 48 mL, Refills: 3    Comments: NEED NEW SCRIPT  Associated Diagnoses: Allergic rhinitis, unspecified seasonality, unspecified trigger      gabapentin (NEURONTIN) 300 MG capsule Take 2 capsules (600 mg total) by mouth 2 (two) times daily.  Qty: 360 capsule, Refills: 1    Associated Diagnoses: Spinal stenosis, lumbar region with neurogenic claudication; DDD (degenerative disc  disease), lumbar; Bilateral leg pain; Radiculopathy of thoracolumbar region      metoprolol succinate (TOPROL-XL) 100 MG 24 hr tablet TAKE 1 TABLET BY MOUTH EVERY DAY  Qty: 90 tablet, Refills: 3    Comments: DX Code Needed  NEED REFILL.  Associated Diagnoses: Essential hypertension      multivitamin (THERAGRAN) per tablet Take 1 tablet by mouth once daily.      pantoprazole (PROTONIX) 40 MG tablet TAKE 1 TABLET BY MOUTH EVERY DAY  Qty: 90 tablet, Refills: 3    Associated Diagnoses: Gastroesophageal reflux disease without esophagitis      paroxetine (PAXIL) 20 MG tablet Take 1 tablet (20 mg total) by mouth every morning.  Qty: 90 tablet, Refills: 1    Associated Diagnoses: Mild recurrent major depression; Anxiety      traZODone (DESYREL) 150 MG tablet TAKE 1 TABLET (150 MG TOTAL) BY MOUTH EVERY EVENING.  Qty: 90 tablet, Refills: 1    Associated Diagnoses: Insomnia, unspecified type                 Discharge Diagnosis: Lumbar radiculopathy [M54.16]  Condition on Discharge: Stable with no complications to procedure   Diet on Discharge: Same as before.  Activity: as per instruction sheet.  Discharge to: Home with a responsible adult.  Follow up: 2-4 weeks

## 2023-08-30 NOTE — DISCHARGE INSTRUCTIONS

## 2023-08-30 NOTE — OP NOTE
Caudal Epidural Steroid Injection with Catheter under Fluoroscopic Guidance    The procedure, risks, benefits, and options were discussed with the patient. There are no contraindications to the procedure. The patent expressed understanding and agreed to the procedure. Informed written consent was obtained prior to the start of the procedure and can be found in the patient's chart.    PATIENT NAME: Mrs. Diana Herring   MRN: [id number]     DATE OF PROCEDURE: 08/30/2023    PROCEDURE: Caudal Epidural Steroid Injection under Fluoroscopic Guidance    PRE-OP DIAGNOSIS: Lumbar radiculopathy [M54.16] Lumbar radiculopathy [M54.16] DDD    POST-OP DIAGNOSIS: Same    PHYSICIAN: Miguel Latham MD    ASSISTANTS: none     MEDICATIONS INJECTED: Preservative-free Decadron 10mg with 4cc of Lidocaine 1% MPF     LOCAL ANESTHETIC INJECTED: Xylocaine 2%     SEDATION: Versed 2mg and Fentanyl 50mcg                                                                                                                                                                                     Conscious sedation ordered by M.D. Patient re-evaluation prior to administration of conscious sedation. No changes noted in patient's status from initial evaluation. The patient's vital signs were monitored by RN and patient remained hemodynamically stable throughout the procedure.    Event Time In   Sedation Start 0830   Sedation End 0834       ESTIMATED BLOOD LOSS: None    COMPLICATIONS: None    TECHNIQUE: Time-out was performed to identify the patient and procedure to be performed. With the patient laying in a prone position, the surgical area was prepped and draped in the usual sterile fashion using ChloraPrep and a fenestrated drape. The injection site was determined under fluoroscopy guidance. Skin anesthesia was achieved by injecting Lidocaine 2% over the injection site. The sacrum and sacral cornua were then approached with a 16 gauge, 3.5 inch epidural  needle that was introduced and advanced into the sacral hiatus under fluoroscopic guidance with AP, lateral and/or contralateral oblique imaging. After the needle passed through the sacrococcygeal ligament, the needle angle was lowered and the needle was advanced 1 cm. After negative aspiration of blood or CSF, and no evidence of paraesthesias, the catheter was threaded through the needle into the epidural space using live fluoroscopy, contrast dye Omnipaque (300mg/mL) was injected to confirm placement and there was no vascular runoff. Fluoroscopic imaging in the AP and lateral views revealed a clear outline of the spinal nerve with proximal spread of agent through the caudal epidural space. Then 10 mL of the medication mixture listed above was injected slowly. Displacement of the radio opaque contrast after injection of the medication confirmed that the medication went into the area of the transforaminal spaces. The needles were removed and bleeding was nil. A sterile dressing was applied. No specimens collected. The patient tolerated the procedure well.     The patient was monitored after the procedure in the recovery area. They were given post-procedure and discharge instructions to follow at home. The patient was discharged in a stable condition.    George La MD     I reviewed and edited the fellow's note. I conducted my own interview and physical examination. I agree with the findings. I was present and supervising all critical portions of the procedure.    Miguel Latham MD

## 2023-08-30 NOTE — H&P
HPI  Patient presenting for Procedure(s) (LRB):  INJECTION, STEROID, EPIDURAL, CAUDAL W/ CATH SOONER DATE (N/A)     Patient on Anti-coagulation No    No health changes since previous encounter    Past Medical History:   Diagnosis Date    Anxiety     Arthritis     Corneal abrasion s    ? eye     Depression     Full dentures     GERD (gastroesophageal reflux disease)     Hyperlipidemia     Hypertension     Nuclear sclerosis of both eyes 6/5/2017    Osteoporosis     Restless leg syndrome      Past Surgical History:   Procedure Laterality Date    COLONOSCOPY N/A 5/14/2019    Procedure: COLONOSCOPY;  Surgeon: Nahid Cline MD;  Location: [facility] ENDO;  Service: Endoscopy;  Laterality: N/A;    EPIDURAL STEROID INJECTION N/A 5/13/2020    Procedure: LUMBAR/CAUDAL L5/S1 IL SANKET ;  Surgeon: Miguel Latham MD;  Location: [facility] PAIN MGT;  Service: Pain Management;  Laterality: N/A;  NEEDS CONSENT    HYSTERECTOMY      PARTIAL HYSTERECTOMY      TRANSFORAMINAL EPIDURAL INJECTION OF STEROID Bilateral 9/4/2019    Procedure: Injection,steroid,epidural,transforaminal approach LUMBAR TRANSFORAMINAL BILATERAL L3 TF SANKET;  Surgeon: Miguel Latham MD;  Location: [facility] PAIN MGT;  Service: Pain Management;  Laterality: Bilateral;  NEEDS CONSENT    TRANSFORAMINAL EPIDURAL INJECTION OF STEROID Bilateral 10/30/2019    Procedure: TRANSFORAMINAL SANKET BILATERAL L3-L4;  Surgeon: Miguel Latham MD;  Location: [facility] PAIN MGT;  Service: Pain Management;  Laterality: Bilateral;  NEEDS CONSENT    TRANSFORAMINAL EPIDURAL INJECTION OF STEROID Bilateral 11/11/2020    Procedure: INJECTION, STEROID, EPIDURAL, TRANSFORAMINAL APPROACH, L3-L4;  Surgeon: Miguel Latham MD;  Location: [facility] PAIN MGT;  Service: Pain Management;  Laterality: Bilateral;    TRANSFORAMINAL EPIDURAL INJECTION OF STEROID Bilateral 5/19/2021    Procedure: INJECTION, STEROID, EPIDURAL, TRANSFORAMINAL APPROACH, L3-L4;  Surgeon: Miguel Latham MD;  Location: [facility] PAIN MGT;   Service: Pain Management;  Laterality: Bilateral;    TRANSFORAMINAL EPIDURAL INJECTION OF STEROID Bilateral 1/26/2022    Procedure: Injection,steroid,epidural,transforaminal approach BILATERAL L3/4;  Surgeon: Miguel Latham MD;  Location: [facility] PAIN MGT;  Service: Pain Management;  Laterality: Bilateral;    TRANSFORAMINAL EPIDURAL INJECTION OF STEROID Bilateral 4/20/2022    Procedure: INJECTION, STEROID, EPIDURAL, TRANSFORAMINAL APPROACH, Bilateral L3-L4;  Surgeon: Miguel Latham MD;  Location: [facility] PAIN MGT;  Service: Pain Management;  Laterality: Bilateral;    TRANSFORAMINAL EPIDURAL INJECTION OF STEROID Bilateral 7/20/2022    Procedure: INJECTION, STEROID, EPIDURAL, TRANSFORAMINAL APPROACH, BILATERAL L3-L4 CONTRAST;  Surgeon: Miguel Latham MD;  Location: [facility] PAIN MGT;  Service: Pain Management;  Laterality: Bilateral;    TRANSFORAMINAL EPIDURAL INJECTION OF STEROID Right 12/7/2022    Procedure: INJECTION, STEROID, EPIDURAL, TRANSFORAMINAL APPROACH, RIGHT L3/L4 AND L4/L5 CONTRAST;  Surgeon: Miguel Latham MD;  Location: [facility] PAIN MGT;  Service: Pain Management;  Laterality: Right;    TRANSFORAMINAL EPIDURAL INJECTION OF STEROID Right 2/8/2023    Procedure: INJECTION, STEROID, EPIDURAL, TRANSFORAMINAL APPROACH RIGHT L3/4 AND L4/5 CONTRAST;  Surgeon: Miguel Latham MD;  Location: [facility] PAIN MGT;  Service: Pain Management;  Laterality: Right;    TUBAL LIGATION Bilateral      Review of patient's allergies indicates:  No Known Allergies   Current Facility-Administered Medications   Medication    0.9%  NaCl infusion    dexAMETHasone sodium phos (PF) injection    iohexoL (OMNIPAQUE 300) injection    LIDOcaine (PF) 10 mg/ml (1%) injection    LIDOcaine HCL 20 mg/ml (2%) injection       PMHx, PSHx, Allergies, Medications reviewed in epic    ROS negative except pain complaints in HPI    OBJECTIVE:    BP (!) 147/66 (BP Location: Right arm, Patient Position: Sitting)   Pulse 68   Temp 97.8 °F (36.6 °C)  (Oral)   Resp 16   Ht 5' (1.524 m)   Wt 65.3 kg (144 lb)   SpO2 98%   BMI 28.12 kg/m²     PHYSICAL EXAMINATION:    GENERAL: Well appearing, in no acute distress, alert and oriented x3.  PSYCH:  Mood and affect appropriate.  SKIN: Skin color, texture, turgor normal, no rashes or lesions which will impact the procedure.  CV: RRR with palpation of the radial artery.  PULM: No evidence of respiratory difficulty, symmetric chest rise. Clear to auscultation.  NEURO: Cranial nerves grossly intact.    Plan:    Proceed with procedure as planned Procedure(s) (LRB):  INJECTION, STEROID, EPIDURAL, CAUDAL W/ CATH SOONER DATE (N/A)    George La  08/30/2023

## 2023-09-12 NOTE — TELEPHONE ENCOUNTER
Was discontinued in June of last year. Unsure on what you'd like to do. So I attached the medication just incase.

## 2023-09-13 RX ORDER — IBUPROFEN 800 MG/1
800 TABLET ORAL 2 TIMES DAILY PRN
Qty: 60 TABLET | Refills: 1 | Status: SHIPPED | OUTPATIENT
Start: 2023-09-13 | End: 2023-12-14 | Stop reason: SDUPTHER

## 2023-09-14 ENCOUNTER — OFFICE VISIT (OUTPATIENT)
Dept: PAIN MEDICINE | Facility: CLINIC | Age: 78
End: 2023-09-14
Payer: MEDICARE

## 2023-09-14 VITALS
WEIGHT: 146.19 LBS | BODY MASS INDEX: 28.55 KG/M2 | HEART RATE: 65 BPM | SYSTOLIC BLOOD PRESSURE: 135 MMHG | RESPIRATION RATE: 18 BRPM | OXYGEN SATURATION: 100 % | DIASTOLIC BLOOD PRESSURE: 75 MMHG

## 2023-09-14 DIAGNOSIS — M48.062 SPINAL STENOSIS, LUMBAR REGION WITH NEUROGENIC CLAUDICATION: ICD-10-CM

## 2023-09-14 DIAGNOSIS — M54.17 LUMBOSACRAL RADICULOPATHY: ICD-10-CM

## 2023-09-14 DIAGNOSIS — M54.15 RADICULOPATHY OF THORACOLUMBAR REGION: ICD-10-CM

## 2023-09-14 DIAGNOSIS — G89.4 CHRONIC PAIN SYNDROME: Primary | ICD-10-CM

## 2023-09-14 PROCEDURE — 1160F PR REVIEW ALL MEDS BY PRESCRIBER/CLIN PHARMACIST DOCUMENTED: ICD-10-PCS | Mod: CPTII,S$GLB,, | Performed by: NURSE PRACTITIONER

## 2023-09-14 PROCEDURE — 99213 PR OFFICE/OUTPT VISIT, EST, LEVL III, 20-29 MIN: ICD-10-PCS | Mod: S$GLB,,, | Performed by: NURSE PRACTITIONER

## 2023-09-14 PROCEDURE — 1101F PT FALLS ASSESS-DOCD LE1/YR: CPT | Mod: CPTII,S$GLB,, | Performed by: NURSE PRACTITIONER

## 2023-09-14 PROCEDURE — 3075F PR MOST RECENT SYSTOLIC BLOOD PRESS GE 130-139MM HG: ICD-10-PCS | Mod: CPTII,S$GLB,, | Performed by: NURSE PRACTITIONER

## 2023-09-14 PROCEDURE — 1160F RVW MEDS BY RX/DR IN RCRD: CPT | Mod: CPTII,S$GLB,, | Performed by: NURSE PRACTITIONER

## 2023-09-14 PROCEDURE — 1125F AMNT PAIN NOTED PAIN PRSNT: CPT | Mod: CPTII,S$GLB,, | Performed by: NURSE PRACTITIONER

## 2023-09-14 PROCEDURE — 3288F PR FALLS RISK ASSESSMENT DOCUMENTED: ICD-10-PCS | Mod: CPTII,S$GLB,, | Performed by: NURSE PRACTITIONER

## 2023-09-14 PROCEDURE — 99999 PR PBB SHADOW E&M-EST. PATIENT-LVL IV: ICD-10-PCS | Mod: PBBFAC,,, | Performed by: NURSE PRACTITIONER

## 2023-09-14 PROCEDURE — 1159F PR MEDICATION LIST DOCUMENTED IN MEDICAL RECORD: ICD-10-PCS | Mod: CPTII,S$GLB,, | Performed by: NURSE PRACTITIONER

## 2023-09-14 PROCEDURE — 3288F FALL RISK ASSESSMENT DOCD: CPT | Mod: CPTII,S$GLB,, | Performed by: NURSE PRACTITIONER

## 2023-09-14 PROCEDURE — 3078F DIAST BP <80 MM HG: CPT | Mod: CPTII,S$GLB,, | Performed by: NURSE PRACTITIONER

## 2023-09-14 PROCEDURE — 1159F MED LIST DOCD IN RCRD: CPT | Mod: CPTII,S$GLB,, | Performed by: NURSE PRACTITIONER

## 2023-09-14 PROCEDURE — 3075F SYST BP GE 130 - 139MM HG: CPT | Mod: CPTII,S$GLB,, | Performed by: NURSE PRACTITIONER

## 2023-09-14 PROCEDURE — 99213 OFFICE O/P EST LOW 20 MIN: CPT | Mod: S$GLB,,, | Performed by: NURSE PRACTITIONER

## 2023-09-14 PROCEDURE — 99999 PR PBB SHADOW E&M-EST. PATIENT-LVL IV: CPT | Mod: PBBFAC,,, | Performed by: NURSE PRACTITIONER

## 2023-09-14 PROCEDURE — 1125F PR PAIN SEVERITY QUANTIFIED, PAIN PRESENT: ICD-10-PCS | Mod: CPTII,S$GLB,, | Performed by: NURSE PRACTITIONER

## 2023-09-14 PROCEDURE — 1101F PR PT FALLS ASSESS DOC 0-1 FALLS W/OUT INJ PAST YR: ICD-10-PCS | Mod: CPTII,S$GLB,, | Performed by: NURSE PRACTITIONER

## 2023-09-14 PROCEDURE — 3078F PR MOST RECENT DIASTOLIC BLOOD PRESSURE < 80 MM HG: ICD-10-PCS | Mod: CPTII,S$GLB,, | Performed by: NURSE PRACTITIONER

## 2023-09-14 NOTE — PROGRESS NOTES
Chronic patient Established Note (Follow up visit)      SUBJECTIVE:    Interval History 9/14/2023:  The patient is here for follow up of back pain. She is now s/p caudal SANKET with 75% relief which started to wane down at 1 week. She is still having some relief at this time. She does say that this was more helpful that more recent ESIs. She does find Ibuprofen helpful but tries not to take daily. She plans to restart her home PT exercises. She has also been having some right shoulder pain, mainly with sleeping on that side. Her pain today is 8/10.    Interval History 7/14/2023:  The patient is here for follow up of back and leg pain. The pain starts to the lower back and tailbone with radiation down the back of both legs with numbness and tingling. The pain is worse to the posterior legs but she also has pain to the front and sides of both legs. She says that the last right sided TF SANKET did not last as long as previous procedures. She has performed home PT exercises for over 6 weeks with minimal benefit. She previously declined surgical referral for severe stenosis. Her pain today is 6/10.    Interval History 5/5/2023:  The patient returns today for follow up of back and leg pain. She has been in PT which she thinks is helping her range of motion. She is still having back pain with radiation down the right leg. There is associated numbness. She previously had benefit with ASNKET but declined to repeat at last OV. She also declined surgical consult. She has benefit with Gabapentin 1200 mg daily with some benefit but it does cause some sedation. Her pain today is 6/10.    Interval History 3/23/2023:  The patient presents for follow up of chronic back pain. She is here for review up updated lumbar MRI. Her results show severe spondylitic central spinal stenosis at L3-4 and L4-5, L4-5 subarticular zones severe stenosis bilaterally and severe right-sided neural foraminal stenosis. TF ESIs have been helpful short-term. She  is not interested in surgical consult at this time. No bowel or bladder incontinence. Her biggest complaint today is right sided back pain with radiation down the side of the right leg and numbness. Her pain today is 9/10.    Interval History 3/9/2023:  The patient presents today for follow up of back and leg pain. She is now s/p repeat right L3/4 and L4/5 TF SANKET on 2/8/23 with 80% benefit for about 2 weeks only. Previous did help for longer. She is reporting pain that start to right lower back with radiation into the front and back of the right leg to the anterior shin. She has numbness on the right lower leg. She feels like her pain has worsened over the past few months. Her last MRI was in 2019. She had some benefit with PT in the past and is open to repeat. Her pain today is 7/10.    Interval History 1/24/2023:  Diana is here for follow up of back and right leg pain. She is now s/p right L3/4 and L4/5 TF SANKET on 12/7/22. She reports about 80% relief for about one month. Today, she is reporting lower back pain with radiation into the front and side of the right leg. She has numbness to the right leg intermittently without warning. She also has been having cramps to lower legs but says she thinks that she has not been drinking enough water. She completed aquatic PT which she thinks was helpful. Her pain today is 8/10.    Interval History 11/11/2022:  The patient returns today for follow up of back and leg pain. She has been in aquatic PT which she finds helpful. She attends twice weekly. Her back pain has improved somewhat because of this. She does have worsened right leg pain with walking and activity. The pain radiates down the front and side of the right leg with associated numbness. No current radiation on the left. She had benefit with ESIs in the past and wishes to repeat. Her pain today is 7/10.    Interval History 8/11/2022:  The patient is here for follow up of lower back pain. Since previous encounter,  she underwent repeat L3/4 TF SANKET with 70% relief. We had originally planned for MBB but she started having more shooting pain into the legs. She feels like this was helpful. Her pain is tolerable at this time. Her pain today is 7/10.    Interval History 5/9/2022:  The patient is here for follow up of chronic back pain. She is having more back pain and stiffness in the morning. We did previously discuss lumbar MBB but she is worried about not having IV sedation. She says that she would like to further discuss the procedure today. Leg pain has been mild since previous SANKET. She does have intermittent numbness still. Back pain is greater than leg pain. Her pain today is 7/10.    Interval History 4/7/2022:  The patient is here for follow up of lower back and leg pain. Since previous encounter, she underwent bilateral L3/4 TF SANKET on 1/26/22. She reports 80% relief of pain for about 2 months. She feels like this gave her significant benefit during that time and her pain was mild. She is now again having severe back pain with radiation into the anterior thighs. She would like to repeat the procedure. She would also like me to place another referral for aquatic PT. She stopped Mobic because she found Diclofenac more helpful. She has been taking as needed but not daily. She does find Gabapentin helpful but it sometimes makes her drowsy. Her pain today is 9/10.    Interval History 1/11/2022:  The patient returns to discuss continued lower back pain. She has not restarted aquatic PT due to increase in Covid-19 cases. She continues with sharp lower back pain that radiates into anterior thighs, bilaterally. She has associated numbness. The pain is worse with increased activity. Her pain today is 7/10.    Interval History 11/11/2021:  The patient presents today for 2 month follow up of lower back pain. Since previous encounter, she has not started aquatic PT because she is on the waiting list. She continues to report lower back  pain with radiation down the left leg. We previously discussed lumbar MBB/RFA which she does not wish to pursue at this time. She states that diclofenac is not helping very much with her aching pain at this time. She denies any new weakness or symptoms at this time. Her pain today is 8/10.    Interval History 8/19/2021:  Diana returns today for follow up of chronic lower back pain. She reports that he has continued with aching pain. She has been in PT but is not finding it very helpful. She has not tried aquatic PT in the past. She is still having intermittent radiation down her legs. She has been unable to take 1200 mg daily of Gabapentin and has decreased to 600 mg daily due to sedation and dizziness with the medication. Otherwise, no new complaints today. Her pain today is 7/10.    Interval History 6/18/2021:  The patient is here for follow up of lower back pain.  She has radiation of her pain into her anterior thighs but not below the knees.  She denies numbness or tingling at this time.  Her back pain is currently greater than her leg pain.  She has a lot of stiffness when she wakes the morning states it improves when she moves.  She had limited benefit with recent repeat bilateral L3-4 transforaminal steroid injection.  Her pain today is 7/10    Interval History 5/4/2021:  The patient is here for follow up of lower back and leg pain.  Previous encounter, physical therapy was ordered.  She states that she did not start physical therapy and would like me to replace the order for her.  She does report that she is noticing more muscle fatigue and weakness particularly with activity.  She is also having more back pain with radiation into the anterior lateral thighs with the past month.  She previously had significant been hip with bilateral L3/4 transforaminal epidural steroid injection and wishes to repeat this.  She found this more helpful than previous procedures. Her pain today is 7/10.    Interval History  3/2/2021:  The patient is here for follow up of chronic pain. She is having more back pain today which she attributes to the cold front coming in. She is having radiation into the buttocks and right anterior thigh. Her back pain is her primary complaint today. She describes it as throbbing. She recently completed both Covid-19 vaccines which she tolerated well. She is now taking Gabapentin 600 mg twice daily regularly. She notices improvement in right leg numbness.  Her pain today is 7/10.    Interval History 12/1/2020:  The patient iss here for follow-up of back and leg pain.  She is now status post bilateral L3/4 transforaminal epidural steroid injection on 11/11/2020. She is reporting 100% relief for about 2 weeks and then her pain started to return.  She is still having some benefit.  Her pain is located across the lower back with radiation into front and sides of the legs to the anterior feet with associated numbness.  She feels like when she walks sometimes her legs become weak.  This has improved slightly.  She denies any recent falls.  At her last OV, her Gabapentin was increased to 600 mg BID.  She says that she finds this helpful.  She has mild drowsiness.  She admits that she does not always take the medication and thought it was more of an as needed medication.  The patient denies any bowel or bladder incontinence or signs of saddle paresthesia.  She feels as though her pain today is worse than usual.  She rates her pain today as 10/10.    Interval history 10/15/2020:  Diana Herring presents to the clinic for a follow-up appointment for back pain. Since the last visit, Diana Herring states the pain has been worsening. Current pain intensity is 7/10. She reports that she continues to have low back pain that radiates to the bilateral hip and leg. She does report that she has noticed intermittent numbness in the right lateral and anterior thigh, that is sometimes associated with weakness in her  right leg. She says that her leg gives out when this happens. She has not had any falls or other trauma. She did not get the bilateral L3/4 TFESI planned last time, and she is still taking only 300 mg bid of gabapentin.  Patient denies urinary incontinence, bowel incontinence, significant weight loss.    Initial visit:  Diana Herring presents to the clinic for the evaluation of low back pain. The pain started 2 years ago following loosing bone after a bone density and symptoms have been worsening.The pain is located in the low back area and radiates to the bilateral hip and leg.  The pain is described as aching, numbing, sharp and tight band and is rated as 8/10. The pain is rated with a score of  7/10 on the BEST day and a score of 8/10 on the WORST day.  Symptoms interfere with daily activity and sleeping. The pain is exacerbated by Sitting, Standing, Bending, Coughing/Sneezing, Walking, Morning, Lifting and Getting out of bed/chair.  The pain is mitigated by heat, laying down and rest. She reports spending 0 hours per day reclining. The patient reports 6 hours of uninterrupted sleep per night.     Patient had L5/S1 ILESI 2 weeks ago and only gave her 1 day of relief.      Patient denies night fever/night sweats, urinary incontinence, bowel incontinence, significant weight loss and significant motor weakness.     Physical Therapy/Home Exercise: yes, was not beneficial      Pain Disability Index Review:      7/14/2023     8:42 AM 5/5/2023     9:25 AM 3/23/2023     8:09 AM   Last 3 PDI Scores   Pain Disability Index (PDI) 31 30 45       Pain Medications:  Gabapentin 600 mg bid    Opioid Contract: no     report:  Reviewed and consistent with medication use as prescribed.    Pain Procedures:  8/30/23 Caudal SANKET- helpful  2/8/23 Right L3/4 and L4/5 TF SANKET- 80% relief for 2 weeks  12/7/22 Right L3/4 and L4/5 TF SANKET- 80% relief for one month  7/20/22 Bilateral L3/4 TF SANKET- 70% relief  4/20/22 Bilateral L3/4 TF  SANKET- 75% relief of leg pain  1/26/22 Bilateral L3/4 TF SANKET  5/19/21 bilateral L3/4 TF SANKET  11/11/20 Bilateral L3/4 TF SANKET- 100% relief for 2 weeks  5/13/20 L5/S1 ILESI  10/30/19 TFESI Bilateral L3-L4  09/04/19 TFESI Bilateral L3     Physical Therapy/Home Exercise: yes    Imaging:    Narrative & Impression  EXAMINATION:  MRI LUMBAR SPINE WITHOUT CONTRAST     CLINICAL HISTORY:  Dorsalgia, unspecifiedLow back pain, symptoms persist with > 6wks conservative treatment;     TECHNIQUE:  Sagittal T1, sagittal T2, sagittal STIR, axial T1 and axial T2 weighted images of the lumbar spine obtained without contrast.     COMPARISON:  X-ray 05/29/2020 and lumbar spine radiograph 07/26/2019     FINDINGS:  Lumbar spine alignment is within normal limits. The vertebral body heights are well maintained, with no fracture.  Degenerative edema signal at opposing endplates of L4-5 and degenerative sclerosis at multiple endplates from L2 through L5 with associated Schmorl's nodes.  Otherwise, marrow signal normal.     The conus is normal in appearance.  There are bilateral T2 hyperintense renal lesions likely related to cysts.     Bunching of cauda equina nerve roots posterior to L2 and L3 vertebral bodies.     L1-L2: Circumferential disc bulge, spondylitic spurring and mild facet arthritis.Mild central spinal stenosis.     L2-L3: Circumferential disc bulge, spondylitic spurring and facet arthritis.  Mild central spinal stenosis and left-sided foraminal stenosis.     L3-L4: Circumferential disc bulge, spondylitic spurring, severe facet arthritis and ligamentum flavum thickening produce severe central spinal stenosis and mild bilateral foraminal stenosis.     L4-L5: Circumferential disc bulge, spondylitic spurring, facet arthritis and ligamentum flavum thickening.  Bilateral facet joint effusions.  Severe central spinal stenosis and bilateral subarticular zone stenosis.  Moderate left and severe right-sided neural foraminal stenosis.      L5-S1: Circumferential disc bulge and minimal facet arthritis.  No central canal or foraminal stenosis.     Impression:     Severe spondylitic central spinal stenosis at L3-4 and L4-5.  L4-5 subarticular zones severe stenosis bilaterally and severe right-sided neural foraminal stenosis.  Additional degrees of central spinal stenosis and foraminal stenosis as above.     Bilateral renal lesions likely cysts which can be confirmed with ultrasound on a nonemergent basis.       CMP  Sodium   Date Value Ref Range Status   03/07/2023 143 136 - 145 mmol/L Final     Potassium   Date Value Ref Range Status   03/07/2023 3.4 (L) 3.5 - 5.1 mmol/L Final     Chloride   Date Value Ref Range Status   03/07/2023 108 95 - 110 mmol/L Final     CO2   Date Value Ref Range Status   03/07/2023 28 23 - 29 mmol/L Final     Glucose   Date Value Ref Range Status   03/07/2023 113 (H) 70 - 110 mg/dL Final     BUN   Date Value Ref Range Status   03/07/2023 12 8 - 23 mg/dL Final     Creatinine   Date Value Ref Range Status   03/07/2023 0.8 0.5 - 1.4 mg/dL Final     Calcium   Date Value Ref Range Status   03/07/2023 9.4 8.7 - 10.5 mg/dL Final     Total Protein   Date Value Ref Range Status   03/07/2023 7.6 6.0 - 8.4 g/dL Final     Albumin   Date Value Ref Range Status   03/07/2023 3.9 3.5 - 5.2 g/dL Final     Total Bilirubin   Date Value Ref Range Status   03/07/2023 0.6 0.1 - 1.0 mg/dL Final     Comment:     For infants and newborns, interpretation of results should be based  on gestational age, weight and in agreement with clinical  observations.    Premature Infant recommended reference ranges:  Up to 24 hours.............<8.0 mg/dL  Up to 48 hours............<12.0 mg/dL  3-5 days..................<15.0 mg/dL  6-29 days.................<15.0 mg/dL       Alkaline Phosphatase   Date Value Ref Range Status   03/07/2023 70 55 - 135 U/L Final     AST   Date Value Ref Range Status   03/07/2023 15 10 - 40 U/L Final     ALT   Date Value Ref Range  Status   03/07/2023 16 10 - 44 U/L Final     Anion Gap   Date Value Ref Range Status   03/07/2023 7 (L) 8 - 16 mmol/L Final     eGFR if    Date Value Ref Range Status   03/04/2022 >60.0 >60 mL/min/1.73 m^2 Final     eGFR if non    Date Value Ref Range Status   03/04/2022 >60.0 >60 mL/min/1.73 m^2 Final     Comment:     Calculation used to obtain the estimated glomerular filtration  rate (eGFR) is the CKD-EPI equation.            Allergies: Review of patient's allergies indicates:  No Known Allergies    Current Medications:   Current Outpatient Medications   Medication Sig Dispense Refill    alendronate (FOSAMAX) 70 MG tablet TAKE 1 TABLET BY MOUTH ONE TIME PER WEEK 12 tablet 1    amLODIPine (NORVASC) 10 MG tablet Take 1 tablet (10 mg total) by mouth once daily. 90 tablet 3    atorvastatin (LIPITOR) 40 MG tablet Take 1 tablet (40 mg total) by mouth once daily. 90 tablet 2    calcium-vitamin D 500-125 mg-unit tablet Take 1 tablet by mouth once daily.      ergocalciferol (ERGOCALCIFEROL) 50,000 unit Cap TAKE 1 CAPSULE (50,000 UNITS TOTAL) BY MOUTH EVERY SUNDAY. 12 capsule 1    etodolac (LODINE) 400 MG tablet TAKE 1 TABLET (400 MG TOTAL) BY MOUTH EVERY 12 (TWELVE) HOURS AS NEEDED (PAIN).      fluticasone propionate (FLONASE) 50 mcg/actuation nasal spray 2 SPRAYS (100 MCG TOTAL) BY EACH NOSTRIL ROUTE ONCE DAILY. 48 mL 3    gabapentin (NEURONTIN) 300 MG capsule Take 2 capsules (600 mg total) by mouth 2 (two) times daily. 360 capsule 1    ibuprofen (ADVIL,MOTRIN) 800 MG tablet TAKE 1 TABLET BY MOUTH 2 TIMES DAILY AS NEEDED FOR PAIN. 60 tablet 1    metoprolol succinate (TOPROL-XL) 100 MG 24 hr tablet TAKE 1 TABLET BY MOUTH EVERY DAY 90 tablet 3    multivitamin (THERAGRAN) per tablet Take 1 tablet by mouth once daily.      pantoprazole (PROTONIX) 40 MG tablet TAKE 1 TABLET BY MOUTH EVERY DAY 90 tablet 3    paroxetine (PAXIL) 20 MG tablet Take 1 tablet (20 mg total) by mouth every morning. 90  tablet 1    traZODone (DESYREL) 150 MG tablet TAKE 1 TABLET (150 MG TOTAL) BY MOUTH EVERY EVENING. 90 tablet 1     No current facility-administered medications for this visit.       REVIEW OF SYSTEMS:    GENERAL:  No weight loss, malaise or fevers.  HEENT:  Negative for frequent or significant headaches.  NECK:  Negative for lumps, goiter, pain and significant neck swelling.  RESPIRATORY:  Negative for cough, wheezing or shortness of breath.  CARDIOVASCULAR:  Negative for chest pain, leg swelling or palpitations.  GI:  Negative for abdominal discomfort, blood in stools or black stools or change in bowel habits. GERD.  MUSCULOSKELETAL:  See HPI.  SKIN:  Negative for lesions, rash, and itching.  PSYCH:  + for sleep disturbance, h/o depression  HEMATOLOGY/LYMPHOLOGY:  Negative for prolonged bleeding, bruising easily or swollen nodes.  NEURO:  + numbness. No history of headaches, syncope, paralysis, seizures or tremors.  All other reviewed and negative other than HPI.     Past Medical History:  Past Medical History:   Diagnosis Date    Anxiety     Arthritis     Corneal abrasion s    ? eye     Depression     Full dentures     GERD (gastroesophageal reflux disease)     Hyperlipidemia     Hypertension     Nuclear sclerosis of both eyes 6/5/2017    Osteoporosis     Restless leg syndrome        Past Surgical History:  Past Surgical History:   Procedure Laterality Date    COLONOSCOPY N/A 5/14/2019    Procedure: COLONOSCOPY;  Surgeon: Nahid Cline MD;  Location: [facility];  Service: Endoscopy;  Laterality: N/A;    EPIDURAL STEROID INJECTION N/A 5/13/2020    Procedure: LUMBAR/CAUDAL L5/S1 IL SANKET ;  Surgeon: Miguel Latham MD;  Location: [facility];  Service: Pain Management;  Laterality: N/A;  NEEDS CONSENT    EPIDURAL STEROID INJECTION N/A 8/30/2023    Procedure: INJECTION, STEROID, EPIDURAL, CAUDAL W/ CATH SOONER DATE;  Surgeon: Miguel Latham MD;  Location: [facility];  Service: Pain Management;   Laterality: N/A;    HYSTERECTOMY      PARTIAL HYSTERECTOMY      TRANSFORAMINAL EPIDURAL INJECTION OF STEROID Bilateral 9/4/2019    Procedure: Injection,steroid,epidural,transforaminal approach LUMBAR TRANSFORAMINAL BILATERAL L3 TF SANKET;  Surgeon: Miguel Latham MD;  Location: [facility] [place] [facility];  Service: Pain Management;  Laterality: Bilateral;  NEEDS CONSENT    TRANSFORAMINAL EPIDURAL INJECTION OF STEROID Bilateral 10/30/2019    Procedure: TRANSFORAMINAL SANKET BILATERAL L3-L4;  Surgeon: Miguel Latham MD;  Location: [facility] [place] [facility];  Service: Pain Management;  Laterality: Bilateral;  NEEDS CONSENT    TRANSFORAMINAL EPIDURAL INJECTION OF STEROID Bilateral 11/11/2020    Procedure: INJECTION, STEROID, EPIDURAL, TRANSFORAMINAL APPROACH, L3-L4;  Surgeon: Miguel Latham MD;  Location: [facility] [place] [facility];  Service: Pain Management;  Laterality: Bilateral;    TRANSFORAMINAL EPIDURAL INJECTION OF STEROID Bilateral 5/19/2021    Procedure: INJECTION, STEROID, EPIDURAL, TRANSFORAMINAL APPROACH, L3-L4;  Surgeon: Miguel Latham MD;  Location: [facility] [place] [facility];  Service: Pain Management;  Laterality: Bilateral;    TRANSFORAMINAL EPIDURAL INJECTION OF STEROID Bilateral 1/26/2022    Procedure: Injection,steroid,epidural,transforaminal approach BILATERAL L3/4;  Surgeon: Miguel Latham MD;  Location: [facility] [place] [facility];  Service: Pain Management;  Laterality: Bilateral;    TRANSFORAMINAL EPIDURAL INJECTION OF STEROID Bilateral 4/20/2022    Procedure: INJECTION, STEROID, EPIDURAL, TRANSFORAMINAL APPROACH, Bilateral L3-L4;  Surgeon: Miguel Latham MD;  Location: [facility] [place] [facility];  Service: Pain Management;  Laterality: Bilateral;    TRANSFORAMINAL EPIDURAL INJECTION OF STEROID Bilateral 7/20/2022    Procedure: INJECTION, STEROID, EPIDURAL, TRANSFORAMINAL APPROACH, BILATERAL L3-L4 CONTRAST;  Surgeon: Miguel Latham MD;  Location: [facility] [place] [facility];  Service: Pain Management;  Laterality: Bilateral;    TRANSFORAMINAL EPIDURAL INJECTION OF  STEROID Right 2022    Procedure: INJECTION, STEROID, EPIDURAL, TRANSFORAMINAL APPROACH, RIGHT L3/L4 AND L4/L5 CONTRAST;  Surgeon: Miguel Latham MD;  Location: [facility];  Service: Pain Management;  Laterality: Right;    TRANSFORAMINAL EPIDURAL INJECTION OF STEROID Right 2023    Procedure: INJECTION, STEROID, EPIDURAL, TRANSFORAMINAL APPROACH RIGHT L3/4 AND L4/5 CONTRAST;  Surgeon: Miguel Latham MD;  Location: [facility];  Service: Pain Management;  Laterality: Right;    TUBAL LIGATION Bilateral        Family History:  Family History   Problem Relation Age of Onset    Diabetes Mother     Stroke Mother     Glaucoma Mother     Cataracts Mother     Cancer Father         Lung    No Known Problems Sister     Stroke Brother     Hypertension Daughter     Hypertension Daughter     Hypertension Daughter     No Known Problems Daughter     Heart disease Son     Early death Son     No Known Problems Maternal Aunt     No Known Problems Maternal Uncle     No Known Problems Paternal Aunt     No Known Problems Paternal Uncle     No Known Problems Maternal Grandmother     No Known Problems Maternal Grandfather     No Known Problems Paternal Grandmother     No Known Problems Paternal Grandfather     Amblyopia Neg Hx     Blindness Neg Hx     Macular degeneration Neg Hx     Retinal detachment Neg Hx     Strabismus Neg Hx     Thyroid disease Neg Hx        Social History:  Social History     Socioeconomic History    Marital status:    Tobacco Use    Smoking status: Never     Passive exposure: Never    Smokeless tobacco: Never   Substance and Sexual Activity    Alcohol use: Not Currently     Comment: once a year    Drug use: No    Sexual activity: Not Currently   Social History Narrative    Patient is  w/ 5 children, one  from heart disease.The patient is retired.       OBJECTIVE:    /75   Pulse 65   Resp 18   Wt 66.3 kg (146 lb 2.6 oz)   SpO2 100%   BMI 28.55 kg/m²     PHYSICAL  EXAMINATION:    General appearance: Well appearing, in no acute distress, alert and oriented x3.  Psych:  Mood and affect appropriate.  Skin: Skin color, texture, turgor normal, no rashes or lesions, in both upper and lower body.  Head/face:  Normocephalic, atraumatic. No palpable lymph nodes.  Back: Straight leg raising in the sitting position is positive on the right. Full range of motion with pain on flexion and extension. Positive facet loading bilaterally.  Extremities: Peripheral joint ROM is full and pain free without obvious instability or laxity in all four extremities. No deformities, edema, or skin discoloration. Good capillary refill.  Musculoskeletal: No atrophy or tone abnormalities are noted. 4/5 strength in right ankle with plantar and 4/5 on dorsiflexion. 5/5 strength in left ankle with plantar and 4/5 on dorsiflexion. 4/5 strength with right knee flexion and extension. 5/5 strength with left knee flexion and extension. No TTP over right shoulder or subacromial bursa. Full ROM of right shoulder without pain.  Neuro: No loss of sensation noted.  Gait: Antalgic- ambulates without assistance.    ASSESSMENT: 78 y.o. year old female with lower back and right leg pain, consistent with     1. Chronic pain syndrome        2. Lumbosacral radiculopathy        3. Spinal stenosis, lumbar region with neurogenic claudication        4. Radiculopathy of thoracolumbar region            PLAN:     - Previous lumbar MRI was reviewed and discussed with the patient today.  - She declined surgical consult.   - She had benefit with caudal SANKET with catheter. Can repeat as needed.  - I have stressed the importance of physical activity and a home exercise plan to help with pain and improve health.  - Patient can continue with medications for now since they are providing benefits, using them appropriately, and without side effects.  - Continue Gabapentin 600 mg BID.  - RTC in 2-3 months or sooner if needed.    The above plan  and management options were discussed at length with patient. Patient is in agreement with the above and verbalized understanding.    Christin Ventura, BRENDA  09/14/2023

## 2023-09-25 ENCOUNTER — HOSPITAL ENCOUNTER (EMERGENCY)
Facility: HOSPITAL | Age: 78
Discharge: HOME | End: 2023-09-25
Attending: EMERGENCY MEDICINE
Payer: MEDICARE

## 2023-09-25 VITALS
HEART RATE: 76 BPM | BODY MASS INDEX: 27.88 KG/M2 | HEIGHT: 60 IN | TEMPERATURE: 98 F | RESPIRATION RATE: 20 BRPM | OXYGEN SATURATION: 100 % | DIASTOLIC BLOOD PRESSURE: 75 MMHG | WEIGHT: 142 LBS | SYSTOLIC BLOOD PRESSURE: 128 MMHG

## 2023-09-25 DIAGNOSIS — R10.13 EPIGASTRIC PAIN: ICD-10-CM

## 2023-09-25 DIAGNOSIS — R11.2 NAUSEA AND VOMITING, UNSPECIFIED VOMITING TYPE: Primary | ICD-10-CM

## 2023-09-25 DIAGNOSIS — K52.9 GASTROENTERITIS: ICD-10-CM

## 2023-09-25 LAB
ALBUMIN SERPL BCP-MCNC: 4.5 G/DL (ref 3.5–5.2)
ALP SERPL-CCNC: 95 U/L (ref 55–135)
ALT SERPL W/O P-5'-P-CCNC: 22 U/L (ref 10–44)
ANION GAP SERPL CALC-SCNC: 7 MMOL/L (ref 8–16)
AST SERPL-CCNC: 20 U/L (ref 10–40)
BACTERIA #/AREA URNS HPF: ABNORMAL /HPF
BASOPHILS # BLD AUTO: 0.04 K/UL (ref 0–0.2)
BASOPHILS NFR BLD: 0.4 % (ref 0–1.9)
BILIRUB SERPL-MCNC: 0.9 MG/DL (ref 0.1–1)
BILIRUB UR QL STRIP: NEGATIVE
BUN SERPL-MCNC: 16 MG/DL (ref 8–23)
CALCIUM SERPL-MCNC: 9.7 MG/DL (ref 8.7–10.5)
CHLORIDE SERPL-SCNC: 107 MMOL/L (ref 95–110)
CLARITY UR: CLEAR
CO2 SERPL-SCNC: 25 MMOL/L (ref 23–29)
COLOR UR: YELLOW
CREAT SERPL-MCNC: 0.9 MG/DL (ref 0.5–1.4)
DIFFERENTIAL METHOD: ABNORMAL
EOSINOPHIL # BLD AUTO: 0 K/UL (ref 0–0.5)
EOSINOPHIL NFR BLD: 0.1 % (ref 0–8)
ERYTHROCYTE [DISTWIDTH] IN BLOOD BY AUTOMATED COUNT: 12.1 % (ref 11.5–14.5)
EST. GFR  (NO RACE VARIABLE): >60 ML/MIN/1.73 M^2
GLUCOSE SERPL-MCNC: 106 MG/DL (ref 70–110)
GLUCOSE UR QL STRIP: NEGATIVE
HCT VFR BLD AUTO: 42.9 % (ref 37–48.5)
HGB BLD-MCNC: 13.7 G/DL (ref 12–16)
HGB UR QL STRIP: NEGATIVE
IMM GRANULOCYTES # BLD AUTO: 0.02 K/UL (ref 0–0.04)
IMM GRANULOCYTES NFR BLD AUTO: 0.2 % (ref 0–0.5)
KETONES UR QL STRIP: NEGATIVE
LEUKOCYTE ESTERASE UR QL STRIP: ABNORMAL
LIPASE SERPL-CCNC: 13 U/L (ref 4–60)
LYMPHOCYTES # BLD AUTO: 0.9 K/UL (ref 1–4.8)
LYMPHOCYTES NFR BLD: 9.9 % (ref 18–48)
MCH RBC QN AUTO: 30 PG (ref 27–31)
MCHC RBC AUTO-ENTMCNC: 31.9 G/DL (ref 32–36)
MCV RBC AUTO: 94 FL (ref 82–98)
MICROSCOPIC COMMENT: ABNORMAL
MONOCYTES # BLD AUTO: 0.3 K/UL (ref 0.3–1)
MONOCYTES NFR BLD: 3.6 % (ref 4–15)
NEUTROPHILS # BLD AUTO: 8 K/UL (ref 1.8–7.7)
NEUTROPHILS NFR BLD: 85.8 % (ref 38–73)
NITRITE UR QL STRIP: NEGATIVE
NRBC BLD-RTO: 0 /100 WBC
PH UR STRIP: 6 [PH] (ref 5–8)
PLATELET # BLD AUTO: 240 K/UL (ref 150–450)
PMV BLD AUTO: 10.7 FL (ref 9.2–12.9)
POTASSIUM SERPL-SCNC: 4 MMOL/L (ref 3.5–5.1)
PROT SERPL-MCNC: 9 G/DL (ref 6–8.4)
PROT UR QL STRIP: NEGATIVE
RBC # BLD AUTO: 4.56 M/UL (ref 4–5.4)
RBC #/AREA URNS HPF: 1 /HPF (ref 0–4)
SODIUM SERPL-SCNC: 139 MMOL/L (ref 136–145)
SP GR UR STRIP: >1.03 (ref 1–1.03)
SQUAMOUS #/AREA URNS HPF: 3 /HPF
URN SPEC COLLECT METH UR: ABNORMAL
UROBILINOGEN UR STRIP-ACNC: NEGATIVE EU/DL
WBC # BLD AUTO: 9.34 K/UL (ref 3.9–12.7)
WBC #/AREA URNS HPF: 6 /HPF (ref 0–5)

## 2023-09-25 PROCEDURE — 85025 COMPLETE CBC W/AUTO DIFF WBC: CPT | Performed by: EMERGENCY MEDICINE

## 2023-09-25 PROCEDURE — 96361 HYDRATE IV INFUSION ADD-ON: CPT

## 2023-09-25 PROCEDURE — 93010 ELECTROCARDIOGRAM REPORT: CPT | Mod: ,,, | Performed by: INTERNAL MEDICINE

## 2023-09-25 PROCEDURE — 93010 EKG 12-LEAD: ICD-10-PCS | Mod: ,,, | Performed by: INTERNAL MEDICINE

## 2023-09-25 PROCEDURE — 81000 URINALYSIS NONAUTO W/SCOPE: CPT | Performed by: EMERGENCY MEDICINE

## 2023-09-25 PROCEDURE — 25000003 PHARM REV CODE 250: Performed by: EMERGENCY MEDICINE

## 2023-09-25 PROCEDURE — 96375 TX/PRO/DX INJ NEW DRUG ADDON: CPT

## 2023-09-25 PROCEDURE — 96374 THER/PROPH/DIAG INJ IV PUSH: CPT | Mod: 59

## 2023-09-25 PROCEDURE — 83690 ASSAY OF LIPASE: CPT | Performed by: EMERGENCY MEDICINE

## 2023-09-25 PROCEDURE — 93005 ELECTROCARDIOGRAM TRACING: CPT

## 2023-09-25 PROCEDURE — 80053 COMPREHEN METABOLIC PANEL: CPT | Performed by: EMERGENCY MEDICINE

## 2023-09-25 PROCEDURE — 25500020 PHARM REV CODE 255: Performed by: EMERGENCY MEDICINE

## 2023-09-25 PROCEDURE — 63600175 PHARM REV CODE 636 W HCPCS: Performed by: EMERGENCY MEDICINE

## 2023-09-25 PROCEDURE — 99285 EMERGENCY DEPT VISIT HI MDM: CPT | Mod: 25

## 2023-09-25 RX ORDER — KETOROLAC TROMETHAMINE 30 MG/ML
15 INJECTION, SOLUTION INTRAMUSCULAR; INTRAVENOUS
Status: COMPLETED | OUTPATIENT
Start: 2023-09-25 | End: 2023-09-25

## 2023-09-25 RX ORDER — ONDANSETRON 2 MG/ML
4 INJECTION INTRAMUSCULAR; INTRAVENOUS
Status: COMPLETED | OUTPATIENT
Start: 2023-09-25 | End: 2023-09-25

## 2023-09-25 RX ORDER — ONDANSETRON 4 MG/1
4 TABLET, ORALLY DISINTEGRATING ORAL EVERY 8 HOURS PRN
Qty: 20 TABLET | Refills: 0 | Status: SHIPPED | OUTPATIENT
Start: 2023-09-25

## 2023-09-25 RX ORDER — MAG HYDROX/ALUMINUM HYD/SIMETH 200-200-20
30 SUSPENSION, ORAL (FINAL DOSE FORM) ORAL ONCE
Status: COMPLETED | OUTPATIENT
Start: 2023-09-25 | End: 2023-09-25

## 2023-09-25 RX ORDER — DICYCLOMINE HYDROCHLORIDE 20 MG/1
20 TABLET ORAL 2 TIMES DAILY
Qty: 20 TABLET | Refills: 0 | Status: SHIPPED | OUTPATIENT
Start: 2023-09-25 | End: 2023-10-25

## 2023-09-25 RX ADMIN — IOHEXOL 75 ML: 350 INJECTION, SOLUTION INTRAVENOUS at 03:09

## 2023-09-25 RX ADMIN — SODIUM CHLORIDE 1000 ML: 9 INJECTION, SOLUTION INTRAVENOUS at 01:09

## 2023-09-25 RX ADMIN — ONDANSETRON 4 MG: 2 INJECTION INTRAMUSCULAR; INTRAVENOUS at 01:09

## 2023-09-25 RX ADMIN — ALUMINUM HYDROXIDE, MAGNESIUM HYDROXIDE, AND DIMETHICONE 30 ML: 200; 20; 200 SUSPENSION ORAL at 12:09

## 2023-09-25 RX ADMIN — KETOROLAC TROMETHAMINE 15 MG: 30 INJECTION, SOLUTION INTRAMUSCULAR; INTRAVENOUS at 01:09

## 2023-09-25 NOTE — ED TRIAGE NOTES
Pt to ED reporting n/v/d and upper quadrant and intermittent abdominal cramping that began this morning around 0200a. Pt also reporting a HA. Pt denies eating, drinking or taking any new medications. Pt denies any recent abdominal surgeries, SOB, chest pain, urinary issues or any other symptoms.  Pmhx of HTN, and GERD. Pt is AAOx4, ambulatory, NADN.

## 2023-09-25 NOTE — DISCHARGE INSTRUCTIONS
Thank you for coming to our Emergency Department today. It is important to remember that some problems are difficult to diagnose and may not be found during your Emergency Department visit. Be sure to follow up with your primary care doctor and review all labs/imaging/tests that were performed during this visit with them. Some labs/tests may be outside of the normal range and require non-emergent follow-up and further investigation to help diagnose/exclude/prevent complications or other medical conditions.    If you do not have a primary care doctor, you may contact the one listed on your discharge paperwork or you may also call the [facility] Appointment Desk at [phone] to schedule an appointment and establish care with one. It is important to your health that you have a primary care doctor.    Medicaid Escalation Line:   [phone] - Please contact this number if you are having difficulty getting follow up with a Primary Care Provider or Speciality Provider.     Please take all medications as directed. All medications may potentially have side-effects and it is impossible to predict which medications may give you side-effects or what side-effects (if any) they will give you.. If you feel that you are having a negative effect or side-effect of any medication you should immediately stop taking them and seek medical attention. If you feel that you are having a life-threatening reaction call [phone].    Return to the ER with any questions/concerns, new/concerning symptoms, worsening or failure to improve.     Do not drive, swim, climb to height, take a bath or make any important decisions for 24 hours if you have received any pain medications, sedatives or mood altering drugs during your ER visit.        BELOW THIS LINE ONLY APPLIES IF YOU HAVE A COVID TEST PENDING OR IF YOU HAVE BEEN DIAGNOSED WITH COVID:  Please access [facility] to review the results of your test. Until the results of your COVID test  return, you should isolate yourself so as not to potentially spread illness to others.   If your COVID test returns positive, you should isolate yourself so as not to spread illness to others. After five full days, if you are feeling better and you have not had fever for 24 hours, you can return to your typical daily activities, but you must wear a mask around others for an additional 5 days.   If your COVID test returns negative and you are either unvaccinated or more than six months out from your two-dose vaccine and are not yet boosted, you should still quarantine for 5 full days followed by strict mask use for an additional 5 full days.   If your COVID test returns negative and you have received your 2-dose initial vaccine as well as a booster, you should continue strict mask use for 10 full days after the exposure.  For all those exposed, best practice includes a test at day 5 after the exposure. This can be a home test or a test through one of the many testing centers throughout our community.   Masking is always advised to limit the spread of COVID. Cdc.gov is an excellent site to obtain the latest up to date recommendations regarding COVID and COVID testing.     CDC Testing and Quarantine Guidelines for patients with exposure to a known-positive COVID-19 person:  A close exposure is defined as anyone who has had an exposure (masked or unmasked) to a known COVID -19 positive person within 6 feet of someone for a cumulative total of 15 minutes or more over a 24-hour period.   Vaccinated and/or if you recently had a positive covid test within 90 days do NOT need to quarantine after contact with someone who had COVID-19 unless you develop symptoms.   Fully vaccinated people who have not had a positive test within 90 days, should get tested 3-5 days after their exposure, even if they don't have symptoms and wear a mask indoors in public for 14 days following exposure or until their test result is  negative.      Unvaccinated and/or NOT had a positive test within 90 days and meet close exposure  You are required by CDC guidelines to quarantine for at least 5 days from time of exposure followed by 5 days of strict masking. It is recommended, but not required to test after 5 days, unless you develop symptoms, in which case you should test at that time.  If you get tested after 5 days and your test is positive, your 5 day period of isolation starts the day of the positive test.    If your exposure does not meet the above definition, you can return to your normal daily activities to include social distancing, wearing a mask and frequent handwashing.      Here is a link to guidance from the CDC:  https://www.cdc.gov/media/releases/2021/s1227-isolation-quarantine-guidance.html      [facility] Testing Sites:  https://ldh.la.gov/page/3934      [facility] website with testing locations and guidance:  [email or web address]          CT Abdomen Pelvis With Contrast (Final result)  Result time 09/25/23 16:01:24  Final result by Florin Lawrence MD (09/25/23 16:01:24)                Impression:      1. No acute process or CT findings identified to explain patient's symptoms of left lower quadrant pain.  Specifically, no significant colonic diverticular disease or inflammatory process within the left lower quadrant.   2. Bilateral renal hypodense masses, many of which are too small to adequately characterize, some of which appear simple and at least 1 measuring 4.3 cm in the left kidney is not consistent with a simple cyst and may reflect complex/hemorrhagic cyst, incompletely characterized.  Further characterization with elective/nonemergent renal ultrasound can be obtained to confirm cystic components as warranted.   3. Atherosclerosis.   4. Hysterectomy.   5. Left lower lobe 4 mm solid pulmonary nodule. For a solid nodule <6 mm, Fleischner Society 2017 guidelines recommend no routine follow up for  a low risk patient, or follow-up with non-contrast chest CT at 12 months in a high risk patient.   This report was flagged in Epic as containing an incidental finding.       Electronically signed by: Florin Lawrence MD   Date: 09/25/2023   Time: 16:01            Narrative:    EXAMINATION:   CT ABDOMEN PELVIS WITH CONTRAST     CLINICAL HISTORY:   LLQ abdominal pain;Nausea/vomiting;     TECHNIQUE:   Low dose axial images, sagittal and coronal reformations were obtained from the lung bases to the pubic symphysis following the IV administration of 75 mL of Omnipaque 350 .  Oral contrast was not given.     COMPARISON:   Lumbar spine series and bilateral hip series 05/29/2020, renal ultrasound 08/29/2019, MRI lumbar spine 07/26/2019     FINDINGS:   Imaged lung bases show mild dependent atelectasis with few scattered areas of platelike scarring versus atelectasis.  There is also 4 mm solid nodule within the peripheral left lower lobe (2, 4).  No consolidation or pleural effusion.     Base of the heart is normal in size without significant pericardial fluid.  Aortic annular calcifications noted.     Liver, gallbladder, pancreas, spleen, stomach, duodenum and bilateral adrenal glands are within normal limits.  No biliary ductal dilatation.     Bilateral kidneys are normal in size, shape and location with symmetric normal enhancement.  No hydronephrosis or significant perinephric stranding.  Several scattered hypodense parenchymal masses throughout each kidney some of which are suggestive of cysts while others are subcentimeter and therefore too small to adequately characterize.  Largest on the right measures 4.6 cm and appears simple.  The largest on the left measures 4.3 cm with average 32 Hounsfield units not consistent with a simple cyst.  Ureters are normal in course and caliber.  Urinary bladder is well distended without wall thickening.     Uterus not identified and likely surgically absent.  No adnexal mass or  significant free pelvic fluid.  Few scattered punctate pelvic phleboliths noted.     Appendix and terminal ileum are within normal limits.  No evidence of bowel obstruction or acute inflammation.  No pneumatosis or portal venous gas.     No ascites, free air or lymphadenopathy.  Mild to moderate scattered calcific atherosclerosis of the abdominal aorta extending into its iliac branches.  No aortic aneurysm or dissection.  Likely up to 50% stenosis at the origin of the celiac trunk.     Tiny fat containing umbilical hernia.     Osseous structures show age-related degenerative change most prominent at L3-4 and L4-5 levels, without acute or destructive process seen.

## 2023-09-25 NOTE — ED PROVIDER NOTES
Encounter Date: 9/25/2023    SCRIBE #1 NOTE: I, Wendi Barbosa, am scribing for, and in the presence of,  Wandy Feliciano MD. I have scribed the following portions of the note - Other sections scribed: HPI, ROS, PE.       History     Chief Complaint   Patient presents with    Vomiting     Patient with N/V that started this morning     Diana Herring is a 78 y.o. female, with a PMHx of GERD, HTN, HLD, who presents to the ED with nausea and vomiting onset 2 AM this morning. Patient reports she is also having some mild diarrhea, headache, and epigastric abdominal pain.  Surgical history of hysterectomy. Denies any sick contact. No other exacerbating or alleviating factors. Denies cough, dysuria, or other associated symptoms.       The history is provided by the patient. No  was used.     Review of patient's allergies indicates:  No Known Allergies  Past Medical History:   Diagnosis Date    Anxiety     Arthritis     Corneal abrasion s    ? eye     Depression     Full dentures     GERD (gastroesophageal reflux disease)     Hyperlipidemia     Hypertension     Nuclear sclerosis of both eyes 6/5/2017    Osteoporosis     Restless leg syndrome      Past Surgical History:   Procedure Laterality Date    COLONOSCOPY N/A 5/14/2019    Procedure: COLONOSCOPY;  Surgeon: Nahid Cline MD;  Location: [facility];  Service: Endoscopy;  Laterality: N/A;    EPIDURAL STEROID INJECTION N/A 5/13/2020    Procedure: LUMBAR/CAUDAL L5/S1 IL SANKET ;  Surgeon: Miguel Latham MD;  Location: [facility];  Service: Pain Management;  Laterality: N/A;  NEEDS CONSENT    EPIDURAL STEROID INJECTION N/A 8/30/2023    Procedure: INJECTION, STEROID, EPIDURAL, CAUDAL W/ CATH SOONER DATE;  Surgeon: Miguel Latham MD;  [facility];  Service: Pain Management;  Laterality: N/A;    HYSTERECTOMY      PARTIAL HYSTERECTOMY      TRANSFORAMINAL EPIDURAL INJECTION OF STEROID Bilateral 9/4/2019    Procedure:  Injection,steroid,epidural,transforaminal approach LUMBAR TRANSFORAMINAL BILATERAL L3 TF SANKET;  Surgeon: Miguel Latham MD;  Location: [facility];  Service: Pain Management;  Laterality: Bilateral;  NEEDS CONSENT    TRANSFORAMINAL EPIDURAL INJECTION OF STEROID Bilateral 10/30/2019    Procedure: TRANSFORAMINAL SANKET BILATERAL L3-L4;  Surgeon: Miguel Latham MD;  Location: [facility];  Service: Pain Management;  Laterality: Bilateral;  NEEDS CONSENT    TRANSFORAMINAL EPIDURAL INJECTION OF STEROID Bilateral 11/11/2020    Procedure: INJECTION, STEROID, EPIDURAL, TRANSFORAMINAL APPROACH, L3-L4;  Surgeon: Miguel Latham MD;  Location: [facility];  Service: Pain Management;  Laterality: Bilateral;    TRANSFORAMINAL EPIDURAL INJECTION OF STEROID Bilateral 5/19/2021    Procedure: INJECTION, STEROID, EPIDURAL, TRANSFORAMINAL APPROACH, L3-L4;  Surgeon: Miguel Latham MD;  Location: [facility];  Service: Pain Management;  Laterality: Bilateral;    TRANSFORAMINAL EPIDURAL INJECTION OF STEROID Bilateral 1/26/2022    Procedure: Injection,steroid,epidural,transforaminal approach BILATERAL L3/4;  Surgeon: Miguel Latham MD;  Location: [facility];  Service: Pain Management;  Laterality: Bilateral;    TRANSFORAMINAL EPIDURAL INJECTION OF STEROID Bilateral 4/20/2022    Procedure: INJECTION, STEROID, EPIDURAL, TRANSFORAMINAL APPROACH, Bilateral L3-L4;  Surgeon: Miguel Latham MD;  Location: [facility];  Service: Pain Management;  Laterality: Bilateral;    TRANSFORAMINAL EPIDURAL INJECTION OF STEROID Bilateral 7/20/2022    Procedure: INJECTION, STEROID, EPIDURAL, TRANSFORAMINAL APPROACH, BILATERAL L3-L4 CONTRAST;  Surgeon: Miguel Latham MD;  Location: [facility];  Service: Pain Management;  Laterality: Bilateral;    TRANSFORAMINAL EPIDURAL INJECTION OF STEROID Right 12/7/2022    Procedure: INJECTION, STEROID, EPIDURAL, TRANSFORAMINAL APPROACH, RIGHT L3/L4 AND L4/L5 CONTRAST;  Surgeon: Miguel  PATRICK Latham MD;  Location: [facility];  Service: Pain Management;  Laterality: Right;    TRANSFORAMINAL EPIDURAL INJECTION OF STEROID Right 2/8/2023    Procedure: INJECTION, STEROID, EPIDURAL, TRANSFORAMINAL APPROACH RIGHT L3/4 AND L4/5 CONTRAST;  Surgeon: Miguel Latham MD;  Location: [facility];  Service: Pain Management;  Laterality: Right;    TUBAL LIGATION Bilateral      Family History   Problem Relation Age of Onset    Diabetes Mother     Stroke Mother     Glaucoma Mother     Cataracts Mother     Cancer Father         Lung    No Known Problems Sister     Stroke Brother     Hypertension Daughter     Hypertension Daughter     Hypertension Daughter     No Known Problems Daughter     Heart disease Son     Early death Son     No Known Problems Maternal Aunt     No Known Problems Maternal Uncle     No Known Problems Paternal Aunt     No Known Problems Paternal Uncle     No Known Problems Maternal Grandmother     No Known Problems Maternal Grandfather     No Known Problems Paternal Grandmother     No Known Problems Paternal Grandfather     Amblyopia Neg Hx     Blindness Neg Hx     Macular degeneration Neg Hx     Retinal detachment Neg Hx     Strabismus Neg Hx     Thyroid disease Neg Hx      Social History     Tobacco Use    Smoking status: Never     Passive exposure: Never    Smokeless tobacco: Never   Substance Use Topics    Alcohol use: Not Currently     Comment: once a year    Drug use: No     Review of Systems   Constitutional:  Negative for chills and fever.   HENT:  Negative for congestion and sore throat.    Eyes:  Negative for visual disturbance.   Respiratory:  Negative for cough and shortness of breath.    Cardiovascular:  Negative for chest pain.   Gastrointestinal:  Positive for abdominal pain (epigastric), diarrhea, nausea and vomiting.   Genitourinary:  Negative for dysuria and vaginal discharge.   Skin:  Negative for rash.   Neurological:  Positive for headaches.   Psychiatric/Behavioral:   Negative for decreased concentration.        Physical Exam     Initial Vitals [09/25/23 1145]   BP Pulse Resp Temp SpO2   (!) 148/65 85 18 98.3 °F (36.8 °C) 99 %      MAP       --         Physical Exam    Nursing note and vitals reviewed.  Constitutional: She appears well-developed and well-nourished. She is not diaphoretic. No distress.   HENT:   Mouth/Throat: Oropharynx is clear and moist.   Eyes: Pupils are equal, round, and reactive to light.   Neck: Neck supple.   Cardiovascular:  Normal rate and regular rhythm.           Pulmonary/Chest: Breath sounds normal.   Abdominal: Abdomen is soft. There is abdominal tenderness in the epigastric area, left upper quadrant and left lower quadrant.   Musculoskeletal:         General: No edema.      Cervical back: Neck supple.     Neurological: She is alert and oriented to person, place, and time.   Skin: Skin is warm and dry.   Psychiatric: She has a normal mood and affect.         ED Course   Procedures  Labs Reviewed   CBC W/ AUTO DIFFERENTIAL - Abnormal; Notable for the following components:       Result Value    MCHC 31.9 (*)     Gran # (ANC) 8.0 (*)     Lymph # 0.9 (*)     Gran % 85.8 (*)     Lymph % 9.9 (*)     Mono % 3.6 (*)     All other components within normal limits   COMPREHENSIVE METABOLIC PANEL - Abnormal; Notable for the following components:    Total Protein 9.0 (*)     Anion Gap 7 (*)     All other components within normal limits   URINALYSIS, REFLEX TO URINE CULTURE - Abnormal; Notable for the following components:    Specific Gravity, UA >1.030 (*)     Leukocytes, UA 1+ (*)     All other components within normal limits    Narrative:     Specimen Source->Urine   URINALYSIS MICROSCOPIC - Abnormal; Notable for the following components:    WBC, UA 6 (*)     All other components within normal limits    Narrative:     Specimen Source->Urine   LIPASE     EKG Readings: (Independently Interpreted)   Normal sinus rhythm, rate 74 beats per minute, normal SD  interval,  milliseconds, no STEMI.     ECG Results              EKG 12-lead (Final result)  Result time 09/26/23 14:10:03      [facility] (09/26/23 14:10:03)                   Narrative:    Test Reason : R10.13,    Vent. Rate : 074 BPM     Atrial Rate : 074 BPM     P-R Int : 196 ms          QRS Dur : 072 ms      QT Int : 394 ms       P-R-T Axes : 067 007 032 degrees     QTc Int : 437 ms    Normal sinus rhythm  Nonspecific T wave abnormality  Abnormal ECG  When compared with ECG of 13-FEB-2017 16:00,  Significant changes have occurred  Confirmed by Greg Alvarez MD ([id number]) on 9/26/2023 2:09:57 PM    Referred By: [email or web address]   SELF           Confirmed By:Greg Alvarez MD                                     EKG 12-LEAD (Final result)  Result time 09/26/23 14:22:32      Final result by Unknown User (09/26/23 14:22:32)                                         EKG 12-LEAD (Final result)  Result time 10/04/23 13:31:27      Final result by Unknown User (10/04/23 13:31:27)                                      Imaging Results               CT Abdomen Pelvis With Contrast (Final result)  Result time 09/25/23 16:01:24      Final result by Florin Lawrence MD (09/25/23 16:01:24)                   Impression:      1. No acute process or CT findings identified to explain patient's symptoms of left lower quadrant pain.  Specifically, no significant colonic diverticular disease or inflammatory process within the left lower quadrant.  2. Bilateral renal hypodense masses, many of which are too small to adequately characterize, some of which appear simple and at least 1 measuring 4.3 cm in the left kidney is not consistent with a simple cyst and may reflect complex/hemorrhagic cyst, incompletely characterized.  Further characterization with elective/nonemergent renal ultrasound can be obtained to confirm cystic components as warranted.  3. Atherosclerosis.  4. Hysterectomy.  5. Left lower lobe 4 mm  solid pulmonary nodule. For a solid nodule <6 mm, Fleischner Society 2017 guidelines recommend no routine follow up for a low risk patient, or follow-up with non-contrast chest CT at 12 months in a high risk patient.  This report was flagged in Epic as containing an incidental finding.      Electronically signed by: Florin Lawrence MD  Date:    09/25/2023  Time:    16:01               Narrative:    EXAMINATION:  CT ABDOMEN PELVIS WITH CONTRAST    CLINICAL HISTORY:  LLQ abdominal pain;Nausea/vomiting;    TECHNIQUE:  Low dose axial images, sagittal and coronal reformations were obtained from the lung bases to the pubic symphysis following the IV administration of 75 mL of Omnipaque 350 .  Oral contrast was not given.    COMPARISON:  Lumbar spine series and bilateral hip series 05/29/2020, renal ultrasound 08/29/2019, MRI lumbar spine 07/26/2019    FINDINGS:  Imaged lung bases show mild dependent atelectasis with few scattered areas of platelike scarring versus atelectasis.  There is also 4 mm solid nodule within the peripheral left lower lobe (2, 4).  No consolidation or pleural effusion.    Base of the heart is normal in size without significant pericardial fluid.  Aortic annular calcifications noted.    Liver, gallbladder, pancreas, spleen, stomach, duodenum and bilateral adrenal glands are within normal limits.  No biliary ductal dilatation.    Bilateral kidneys are normal in size, shape and location with symmetric normal enhancement.  No hydronephrosis or significant perinephric stranding.  Several scattered hypodense parenchymal masses throughout each kidney some of which are suggestive of cysts while others are subcentimeter and therefore too small to adequately characterize.  Largest on the right measures 4.6 cm and appears simple.  The largest on the left measures 4.3 cm with average 32 Hounsfield units not consistent with a simple cyst.  Ureters are normal in course and caliber.  Urinary bladder is well  distended without wall thickening.    Uterus not identified and likely surgically absent.  No adnexal mass or significant free pelvic fluid.  Few scattered punctate pelvic phleboliths noted.    Appendix and terminal ileum are within normal limits.  No evidence of bowel obstruction or acute inflammation.  No pneumatosis or portal venous gas.    No ascites, free air or lymphadenopathy.  Mild to moderate scattered calcific atherosclerosis of the abdominal aorta extending into its iliac branches.  No aortic aneurysm or dissection.  Likely up to 50% stenosis at the origin of the celiac trunk.    Tiny fat containing umbilical hernia.    Osseous structures show age-related degenerative change most prominent at L3-4 and L4-5 levels, without acute or destructive process seen.                                       Medications   sodium chloride 0.9% bolus 1,000 mL 1,000 mL (0 mLs Intravenous Stopped 9/25/23 1435)   ondansetron injection 4 mg (4 mg Intravenous Given 9/25/23 1337)   aluminum-magnesium hydroxide-simethicone 200-200-20 mg/5 mL suspension 30 mL (30 mLs Oral Given 9/25/23 1256)   ketorolac injection 15 mg (15 mg Intravenous Given 9/25/23 1340)   iohexoL (OMNIPAQUE 350) injection 75 mL (75 mLs Intravenous Given 9/25/23 1514)     Medical Decision Making  78-year-old female with history of hypertension and GERD presents with nausea, vomiting, abdominal pain.  Symptoms started overnight.  She reports a little diarrhea.  Pain is mainly epigastric and left-sided abdominal pain.  Previous surgical history of hysterectomy.  No known sick contacts.  On exam, the patient shows no acute distress.  Her abdomen is tender in the epigastrium and diffusely along the left side, no rigidity or guarding.  No abdominal distention.  Differential includes but not limited to pancreatitis, gastritis, colitis, diverticulitis.  Workup initiated with labs, CT, treating with IV fluids and Zofran.    Amount and/or Complexity of Data  Reviewed  Labs: ordered.  Radiology: ordered.    Risk  OTC drugs.  Prescription drug management.      Evaluated patient, abnormalities as noted above. Pt presentation consistent with suspected gastroenteritis. Discussed diagnosis and further treatment with patient, including f/u with PCP in the next week.  Return precautions given and all questions answered.  Patient in understanding of plan.  Pt discharged to home improved and stable.          Scribe Attestation:   Scribe #1: I performed the above scribed service and the documentation accurately describes the services I performed. I attest to the accuracy of the note.        ED Course as of 12/06/23 2159   Mon Sep 25, 2023   1559 Care signed out to Dr. Vargas pending UA and CT, reassessment, and final disposition.  [LH]      ED Course User Index  [LH] Wandy Feliciano MD                    Clinical Impression:   Final diagnoses:  [R10.13] Epigastric pain  [R11.2] Nausea and vomiting, unspecified vomiting type (Primary)  [K52.9] Gastroenteritis        ED Disposition Condition    Discharge Stable          ED Prescriptions       Medication Sig Dispense Start Date End Date Auth. Provider    ondansetron (ZOFRAN-ODT) 4 MG TbDL Take 1 tablet (4 mg total) by mouth every 8 (eight) hours as needed. 20 tablet 2023 -- Attila Vargas MD    dicyclomine (BENTYL) 20 mg tablet () Take 1 tablet (20 mg total) by mouth 2 (two) times daily. 20 tablet 2023 10/25/2023 Attila Vargas MD          Follow-up Information       Follow up With Specialties Details Why Contact Info    Luisito Marin MD Internal Medicine Schedule an appointment as soon as possible for a visit on 2023 To recheck your symptoms [address]  [place] [address]  [phone]      [facility] - Emergency Dept Emergency Medicine  As needed, If symptoms worsen [address] [facility] [place] [address] [phone]           I, Wandy Feliciano MD, personally performed the services described in this documentation. All medical record entries made by the scribe were at my direction and in my presence.  I have reviewed the chart and agree that the record reflects my personal performance and is accurate and complete.    This dictation has been generated using M-Modal Fluency Direct dictation; some phonetic errors may occur.        Wandy Feliciano MD  09/27/23 0904       Attila Vargas MD  12/06/23 5170

## 2023-12-14 ENCOUNTER — OFFICE VISIT (OUTPATIENT)
Dept: PAIN MEDICINE | Facility: CLINIC | Age: 78
End: 2023-12-14
Payer: MEDICARE

## 2023-12-14 VITALS
OXYGEN SATURATION: 100 % | HEART RATE: 63 BPM | TEMPERATURE: 98 F | WEIGHT: 141.31 LBS | SYSTOLIC BLOOD PRESSURE: 139 MMHG | DIASTOLIC BLOOD PRESSURE: 74 MMHG | HEIGHT: 60 IN | RESPIRATION RATE: 16 BRPM | BODY MASS INDEX: 27.74 KG/M2

## 2023-12-14 DIAGNOSIS — M79.605 BILATERAL LEG PAIN: ICD-10-CM

## 2023-12-14 DIAGNOSIS — M51.36 DDD (DEGENERATIVE DISC DISEASE), LUMBAR: ICD-10-CM

## 2023-12-14 DIAGNOSIS — M47.816 LUMBAR SPONDYLOSIS: ICD-10-CM

## 2023-12-14 DIAGNOSIS — M54.17 LUMBOSACRAL RADICULOPATHY: Primary | ICD-10-CM

## 2023-12-14 DIAGNOSIS — M48.062 SPINAL STENOSIS, LUMBAR REGION WITH NEUROGENIC CLAUDICATION: ICD-10-CM

## 2023-12-14 DIAGNOSIS — G89.4 CHRONIC PAIN SYNDROME: ICD-10-CM

## 2023-12-14 DIAGNOSIS — M79.604 BILATERAL LEG PAIN: ICD-10-CM

## 2023-12-14 DIAGNOSIS — M54.15 RADICULOPATHY OF THORACOLUMBAR REGION: ICD-10-CM

## 2023-12-14 PROCEDURE — 3075F SYST BP GE 130 - 139MM HG: CPT | Mod: CPTII,S$GLB,, | Performed by: NURSE PRACTITIONER

## 2023-12-14 PROCEDURE — 1101F PR PT FALLS ASSESS DOC 0-1 FALLS W/OUT INJ PAST YR: ICD-10-PCS | Mod: CPTII,S$GLB,, | Performed by: NURSE PRACTITIONER

## 2023-12-14 PROCEDURE — 1159F PR MEDICATION LIST DOCUMENTED IN MEDICAL RECORD: ICD-10-PCS | Mod: CPTII,S$GLB,, | Performed by: NURSE PRACTITIONER

## 2023-12-14 PROCEDURE — 3078F PR MOST RECENT DIASTOLIC BLOOD PRESSURE < 80 MM HG: ICD-10-PCS | Mod: CPTII,S$GLB,, | Performed by: NURSE PRACTITIONER

## 2023-12-14 PROCEDURE — 1159F MED LIST DOCD IN RCRD: CPT | Mod: CPTII,S$GLB,, | Performed by: NURSE PRACTITIONER

## 2023-12-14 PROCEDURE — 1160F PR REVIEW ALL MEDS BY PRESCRIBER/CLIN PHARMACIST DOCUMENTED: ICD-10-PCS | Mod: CPTII,S$GLB,, | Performed by: NURSE PRACTITIONER

## 2023-12-14 PROCEDURE — 1101F PT FALLS ASSESS-DOCD LE1/YR: CPT | Mod: CPTII,S$GLB,, | Performed by: NURSE PRACTITIONER

## 2023-12-14 PROCEDURE — 3075F PR MOST RECENT SYSTOLIC BLOOD PRESS GE 130-139MM HG: ICD-10-PCS | Mod: CPTII,S$GLB,, | Performed by: NURSE PRACTITIONER

## 2023-12-14 PROCEDURE — 3288F FALL RISK ASSESSMENT DOCD: CPT | Mod: CPTII,S$GLB,, | Performed by: NURSE PRACTITIONER

## 2023-12-14 PROCEDURE — 1160F RVW MEDS BY RX/DR IN RCRD: CPT | Mod: CPTII,S$GLB,, | Performed by: NURSE PRACTITIONER

## 2023-12-14 PROCEDURE — 99999 PR PBB SHADOW E&M-EST. PATIENT-LVL IV: CPT | Mod: PBBFAC,,, | Performed by: NURSE PRACTITIONER

## 2023-12-14 PROCEDURE — 99214 OFFICE O/P EST MOD 30 MIN: CPT | Mod: S$GLB,,, | Performed by: NURSE PRACTITIONER

## 2023-12-14 PROCEDURE — 99999 PR PBB SHADOW E&M-EST. PATIENT-LVL IV: ICD-10-PCS | Mod: PBBFAC,,, | Performed by: NURSE PRACTITIONER

## 2023-12-14 PROCEDURE — 1125F AMNT PAIN NOTED PAIN PRSNT: CPT | Mod: CPTII,S$GLB,, | Performed by: NURSE PRACTITIONER

## 2023-12-14 PROCEDURE — 3288F PR FALLS RISK ASSESSMENT DOCUMENTED: ICD-10-PCS | Mod: CPTII,S$GLB,, | Performed by: NURSE PRACTITIONER

## 2023-12-14 PROCEDURE — 3078F DIAST BP <80 MM HG: CPT | Mod: CPTII,S$GLB,, | Performed by: NURSE PRACTITIONER

## 2023-12-14 PROCEDURE — 1125F PR PAIN SEVERITY QUANTIFIED, PAIN PRESENT: ICD-10-PCS | Mod: CPTII,S$GLB,, | Performed by: NURSE PRACTITIONER

## 2023-12-14 PROCEDURE — 99214 PR OFFICE/OUTPT VISIT, EST, LEVL IV, 30-39 MIN: ICD-10-PCS | Mod: S$GLB,,, | Performed by: NURSE PRACTITIONER

## 2023-12-14 RX ORDER — GABAPENTIN 300 MG/1
600 CAPSULE ORAL 2 TIMES DAILY
Qty: 360 CAPSULE | Refills: 1 | Status: SHIPPED | OUTPATIENT
Start: 2023-12-14 | End: 2024-06-11

## 2023-12-14 RX ORDER — IBUPROFEN 800 MG/1
800 TABLET ORAL 2 TIMES DAILY PRN
Qty: 60 TABLET | Refills: 1 | Status: SHIPPED | OUTPATIENT
Start: 2023-12-14 | End: 2024-03-18 | Stop reason: SDUPTHER

## 2023-12-14 NOTE — PROGRESS NOTES
Chronic patient Established Note (Follow up visit)      SUBJECTIVE:    Interval History 12/14/2023:  The patient presents for 3 follow up of chronic lower back pain. She continues to report radiation into the buttocks and legs. Back pain is greater than leg pain. She does have some stiffness in the morning. This improves with walking around. She also has intermittent numbness to legs. She takes Ibuprofen PRN, usually once daily. She also takes Gabapentin 600 mg BID which also helps. She says that she has been performing her home exercises. Her shoulder pain has improved with home PT regimen. Her pain today is 7/10.    Interval History 9/14/2023:  The patient is here for follow up of back pain. She is now s/p caudal SANKET with 75% relief which started to wane down at 1 week. She is still having some relief at this time. She does say that this was more helpful that more recent ESIs. She does find Ibuprofen helpful but tries not to take daily. She plans to restart her home PT exercises. She has also been having some right shoulder pain, mainly with sleeping on that side. Her pain today is 8/10.    Interval History 7/14/2023:  The patient is here for follow up of back and leg pain. The pain starts to the lower back and tailbone with radiation down the back of both legs with numbness and tingling. The pain is worse to the posterior legs but she also has pain to the front and sides of both legs. She says that the last right sided TF SANKET did not last as long as previous procedures. She has performed home PT exercises for over 6 weeks with minimal benefit. She previously declined surgical referral for severe stenosis. Her pain today is 6/10.    Interval History 5/5/2023:  The patient returns today for follow up of back and leg pain. She has been in PT which she thinks is helping her range of motion. She is still having back pain with radiation down the right leg. There is associated numbness. She previously had benefit  with SANKET but declined to repeat at last OV. She also declined surgical consult. She has benefit with Gabapentin 1200 mg daily with some benefit but it does cause some sedation. Her pain today is 6/10.    Interval History 3/23/2023:  The patient presents for follow up of chronic back pain. She is here for review up updated lumbar MRI. Her results show severe spondylitic central spinal stenosis at L3-4 and L4-5, L4-5 subarticular zones severe stenosis bilaterally and severe right-sided neural foraminal stenosis. TF ESIs have been helpful short-term. She is not interested in surgical consult at this time. No bowel or bladder incontinence. Her biggest complaint today is right sided back pain with radiation down the side of the right leg and numbness. Her pain today is 9/10.    Interval History 3/9/2023:  The patient presents today for follow up of back and leg pain. She is now s/p repeat right L3/4 and L4/5 TF SANKET on 2/8/23 with 80% benefit for about 2 weeks only. Previous did help for longer. She is reporting pain that start to right lower back with radiation into the front and back of the right leg to the anterior shin. She has numbness on the right lower leg. She feels like her pain has worsened over the past few months. Her last MRI was in 2019. She had some benefit with PT in the past and is open to repeat. Her pain today is 7/10.    Interval History 1/24/2023:  Diana is here for follow up of back and right leg pain. She is now s/p right L3/4 and L4/5 TF SANKET on 12/7/22. She reports about 80% relief for about one month. Today, she is reporting lower back pain with radiation into the front and side of the right leg. She has numbness to the right leg intermittently without warning. She also has been having cramps to lower legs but says she thinks that she has not been drinking enough water. She completed aquatic PT which she thinks was helpful. Her pain today is 8/10.    Interval History 11/11/2022:  The patient  returns today for follow up of back and leg pain. She has been in aquatic PT which she finds helpful. She attends twice weekly. Her back pain has improved somewhat because of this. She does have worsened right leg pain with walking and activity. The pain radiates down the front and side of the right leg with associated numbness. No current radiation on the left. She had benefit with ESIs in the past and wishes to repeat. Her pain today is 7/10.    Interval History 8/11/2022:  The patient is here for follow up of lower back pain. Since previous encounter, she underwent repeat L3/4 TF SANKET with 70% relief. We had originally planned for MBB but she started having more shooting pain into the legs. She feels like this was helpful. Her pain is tolerable at this time. Her pain today is 7/10.    Interval History 5/9/2022:  The patient is here for follow up of chronic back pain. She is having more back pain and stiffness in the morning. We did previously discuss lumbar MBB but she is worried about not having IV sedation. She says that she would like to further discuss the procedure today. Leg pain has been mild since previous SANKET. She does have intermittent numbness still. Back pain is greater than leg pain. Her pain today is 7/10.    Interval History 4/7/2022:  The patient is here for follow up of lower back and leg pain. Since previous encounter, she underwent bilateral L3/4 TF SANKET on 1/26/22. She reports 80% relief of pain for about 2 months. She feels like this gave her significant benefit during that time and her pain was mild. She is now again having severe back pain with radiation into the anterior thighs. She would like to repeat the procedure. She would also like me to place another referral for aquatic PT. She stopped Mobic because she found Diclofenac more helpful. She has been taking as needed but not daily. She does find Gabapentin helpful but it sometimes makes her drowsy. Her pain today is 9/10.    Interval  History 1/11/2022:  The patient returns to discuss continued lower back pain. She has not restarted aquatic PT due to increase in Covid-19 cases. She continues with sharp lower back pain that radiates into anterior thighs, bilaterally. She has associated numbness. The pain is worse with increased activity. Her pain today is 7/10.    Interval History 11/11/2021:  The patient presents today for 2 month follow up of lower back pain. Since previous encounter, she has not started aquatic PT because she is on the waiting list. She continues to report lower back pain with radiation down the left leg. We previously discussed lumbar MBB/RFA which she does not wish to pursue at this time. She states that diclofenac is not helping very much with her aching pain at this time. She denies any new weakness or symptoms at this time. Her pain today is 8/10.    Interval History 8/19/2021:  Diana returns today for follow up of chronic lower back pain. She reports that he has continued with aching pain. She has been in PT but is not finding it very helpful. She has not tried aquatic PT in the past. She is still having intermittent radiation down her legs. She has been unable to take 1200 mg daily of Gabapentin and has decreased to 600 mg daily due to sedation and dizziness with the medication. Otherwise, no new complaints today. Her pain today is 7/10.    Interval History 6/18/2021:  The patient is here for follow up of lower back pain.  She has radiation of her pain into her anterior thighs but not below the knees.  She denies numbness or tingling at this time.  Her back pain is currently greater than her leg pain.  She has a lot of stiffness when she wakes the morning states it improves when she moves.  She had limited benefit with recent repeat bilateral L3-4 transforaminal steroid injection.  Her pain today is 7/10    Interval History 5/4/2021:  The patient is here for follow up of lower back and leg pain.  Previous encounter,  physical therapy was ordered.  She states that she did not start physical therapy and would like me to replace the order for her.  She does report that she is noticing more muscle fatigue and weakness particularly with activity.  She is also having more back pain with radiation into the anterior lateral thighs with the past month.  She previously had significant been hip with bilateral L3/4 transforaminal epidural steroid injection and wishes to repeat this.  She found this more helpful than previous procedures. Her pain today is 7/10.    Interval History 3/2/2021:  The patient is here for follow up of chronic pain. She is having more back pain today which she attributes to the cold front coming in. She is having radiation into the buttocks and right anterior thigh. Her back pain is her primary complaint today. She describes it as throbbing. She recently completed both Covid-19 vaccines which she tolerated well. She is now taking Gabapentin 600 mg twice daily regularly. She notices improvement in right leg numbness.  Her pain today is 7/10.    Interval History 12/1/2020:  The patient iss here for follow-up of back and leg pain.  She is now status post bilateral L3/4 transforaminal epidural steroid injection on 11/11/2020. She is reporting 100% relief for about 2 weeks and then her pain started to return.  She is still having some benefit.  Her pain is located across the lower back with radiation into front and sides of the legs to the anterior feet with associated numbness.  She feels like when she walks sometimes her legs become weak.  This has improved slightly.  She denies any recent falls.  At her last OV, her Gabapentin was increased to 600 mg BID.  She says that she finds this helpful.  She has mild drowsiness.  She admits that she does not always take the medication and thought it was more of an as needed medication.  The patient denies any bowel or bladder incontinence or signs of saddle paresthesia.  She  feels as though her pain today is worse than usual.  She rates her pain today as 10/10.    Interval history 10/15/2020:  Diana Herring presents to the clinic for a follow-up appointment for back pain. Since the last visit, Diana Herring states the pain has been worsening. Current pain intensity is 7/10. She reports that she continues to have low back pain that radiates to the bilateral hip and leg. She does report that she has noticed intermittent numbness in the right lateral and anterior thigh, that is sometimes associated with weakness in her right leg. She says that her leg gives out when this happens. She has not had any falls or other trauma. She did not get the bilateral L3/4 TFESI planned last time, and she is still taking only 300 mg bid of gabapentin.  Patient denies urinary incontinence, bowel incontinence, significant weight loss.    Initial visit:  Diana Herring presents to the clinic for the evaluation of low back pain. The pain started 2 years ago following loosing bone after a bone density and symptoms have been worsening.The pain is located in the low back area and radiates to the bilateral hip and leg.  The pain is described as aching, numbing, sharp and tight band and is rated as 8/10. The pain is rated with a score of  7/10 on the BEST day and a score of 8/10 on the WORST day.  Symptoms interfere with daily activity and sleeping. The pain is exacerbated by Sitting, Standing, Bending, Coughing/Sneezing, Walking, Morning, Lifting and Getting out of bed/chair.  The pain is mitigated by heat, laying down and rest. She reports spending 0 hours per day reclining. The patient reports 6 hours of uninterrupted sleep per night.     Patient had L5/S1 ILESI 2 weeks ago and only gave her 1 day of relief.      Patient denies night fever/night sweats, urinary incontinence, bowel incontinence, significant weight loss and significant motor weakness.     Physical Therapy/Home Exercise: yes, was not  beneficial      Pain Disability Index Review:      12/14/2023     8:15 AM 7/14/2023     8:42 AM 5/5/2023     9:25 AM   Last 3 PDI Scores   Pain Disability Index (PDI) 32 31 30       Pain Medications:  Gabapentin 600 mg bid    Opioid Contract: no     report:  Reviewed and consistent with medication use as prescribed.    Pain Procedures:  8/30/23 Caudal SANKET- 70% relief for 3 months  2/8/23 Right L3/4 and L4/5 TF SANKET- 80% relief for 2 weeks  12/7/22 Right L3/4 and L4/5 TF SANKET- 80% relief for one month  7/20/22 Bilateral L3/4 TF SANKET- 70% relief  4/20/22 Bilateral L3/4 TF SANKET- 75% relief of leg pain  1/26/22 Bilateral L3/4 TF SANKET  5/19/21 bilateral L3/4 TF SANKET  11/11/20 Bilateral L3/4 TF SANKET- 100% relief for 2 weeks  5/13/20 L5/S1 ILESI  10/30/19 TFESI Bilateral L3-L4  09/04/19 TFESI Bilateral L3     Physical Therapy/Home Exercise: yes    Imaging:    Narrative & Impression  EXAMINATION:  MRI LUMBAR SPINE WITHOUT CONTRAST     CLINICAL HISTORY:  Dorsalgia, unspecifiedLow back pain, symptoms persist with > 6wks conservative treatment;     TECHNIQUE:  Sagittal T1, sagittal T2, sagittal STIR, axial T1 and axial T2 weighted images of the lumbar spine obtained without contrast.     COMPARISON:  X-ray 05/29/2020 and lumbar spine radiograph 07/26/2019     FINDINGS:  Lumbar spine alignment is within normal limits. The vertebral body heights are well maintained, with no fracture.  Degenerative edema signal at opposing endplates of L4-5 and degenerative sclerosis at multiple endplates from L2 through L5 with associated Schmorl's nodes.  Otherwise, marrow signal normal.     The conus is normal in appearance.  There are bilateral T2 hyperintense renal lesions likely related to cysts.     Bunching of cauda equina nerve roots posterior to L2 and L3 vertebral bodies.     L1-L2: Circumferential disc bulge, spondylitic spurring and mild facet arthritis.Mild central spinal stenosis.     L2-L3: Circumferential disc bulge, spondylitic  spurring and facet arthritis.  Mild central spinal stenosis and left-sided foraminal stenosis.     L3-L4: Circumferential disc bulge, spondylitic spurring, severe facet arthritis and ligamentum flavum thickening produce severe central spinal stenosis and mild bilateral foraminal stenosis.     L4-L5: Circumferential disc bulge, spondylitic spurring, facet arthritis and ligamentum flavum thickening.  Bilateral facet joint effusions.  Severe central spinal stenosis and bilateral subarticular zone stenosis.  Moderate left and severe right-sided neural foraminal stenosis.     L5-S1: Circumferential disc bulge and minimal facet arthritis.  No central canal or foraminal stenosis.     Impression:     Severe spondylitic central spinal stenosis at L3-4 and L4-5.  L4-5 subarticular zones severe stenosis bilaterally and severe right-sided neural foraminal stenosis.  Additional degrees of central spinal stenosis and foraminal stenosis as above.     Bilateral renal lesions likely cysts which can be confirmed with ultrasound on a nonemergent basis.       CMP  Sodium   Date Value Ref Range Status   09/25/2023 139 136 - 145 mmol/L Final     Potassium   Date Value Ref Range Status   09/25/2023 4.0 3.5 - 5.1 mmol/L Final     Chloride   Date Value Ref Range Status   09/25/2023 107 95 - 110 mmol/L Final     CO2   Date Value Ref Range Status   09/25/2023 25 23 - 29 mmol/L Final     Glucose   Date Value Ref Range Status   09/25/2023 106 70 - 110 mg/dL Final     BUN   Date Value Ref Range Status   09/25/2023 16 8 - 23 mg/dL Final     Creatinine   Date Value Ref Range Status   09/25/2023 0.9 0.5 - 1.4 mg/dL Final     Calcium   Date Value Ref Range Status   09/25/2023 9.7 8.7 - 10.5 mg/dL Final     Total Protein   Date Value Ref Range Status   09/25/2023 9.0 (H) 6.0 - 8.4 g/dL Final     Albumin   Date Value Ref Range Status   09/25/2023 4.5 3.5 - 5.2 g/dL Final     Total Bilirubin   Date Value Ref Range Status   09/25/2023 0.9 0.1 - 1.0  mg/dL Final     Comment:     For infants and newborns, interpretation of results should be based  on gestational age, weight and in agreement with clinical  observations.    Premature Infant recommended reference ranges:  Up to 24 hours.............<8.0 mg/dL  Up to 48 hours............<12.0 mg/dL  3-5 days..................<15.0 mg/dL  6-29 days.................<15.0 mg/dL       Alkaline Phosphatase   Date Value Ref Range Status   09/25/2023 95 55 - 135 U/L Final     AST   Date Value Ref Range Status   09/25/2023 20 10 - 40 U/L Final     ALT   Date Value Ref Range Status   09/25/2023 22 10 - 44 U/L Final     Anion Gap   Date Value Ref Range Status   09/25/2023 7 (L) 8 - 16 mmol/L Final     eGFR if    Date Value Ref Range Status   03/04/2022 >60.0 >60 mL/min/1.73 m^2 Final     eGFR if non    Date Value Ref Range Status   03/04/2022 >60.0 >60 mL/min/1.73 m^2 Final     Comment:     Calculation used to obtain the estimated glomerular filtration  rate (eGFR) is the CKD-EPI equation.            Allergies: Review of patient's allergies indicates:  No Known Allergies    Current Medications:   Current Outpatient Medications   Medication Sig Dispense Refill    alendronate (FOSAMAX) 70 MG tablet TAKE 1 TABLET BY MOUTH ONE TIME PER WEEK 12 tablet 1    amLODIPine (NORVASC) 10 MG tablet Take 1 tablet (10 mg total) by mouth once daily. 90 tablet 3    atorvastatin (LIPITOR) 40 MG tablet Take 1 tablet (40 mg total) by mouth once daily. 90 tablet 2    calcium-vitamin D 500-125 mg-unit tablet Take 1 tablet by mouth once daily.      ergocalciferol (ERGOCALCIFEROL) 50,000 unit Cap TAKE 1 CAPSULE (50,000 UNITS TOTAL) BY MOUTH EVERY SUNDAY. 12 capsule 1    etodolac (LODINE) 400 MG tablet TAKE 1 TABLET (400 MG TOTAL) BY MOUTH EVERY 12 (TWELVE) HOURS AS NEEDED (PAIN).      fluticasone propionate (FLONASE) 50 mcg/actuation nasal spray 2 SPRAYS (100 MCG TOTAL) BY EACH NOSTRIL ROUTE ONCE DAILY. 48 mL 3     ibuprofen (ADVIL,MOTRIN) 800 MG tablet TAKE 1 TABLET BY MOUTH 2 TIMES DAILY AS NEEDED FOR PAIN. 60 tablet 1    metoprolol succinate (TOPROL-XL) 100 MG 24 hr tablet TAKE 1 TABLET BY MOUTH EVERY DAY 90 tablet 3    multivitamin (THERAGRAN) per tablet Take 1 tablet by mouth once daily.      ondansetron (ZOFRAN-ODT) 4 MG TbDL Take 1 tablet (4 mg total) by mouth every 8 (eight) hours as needed. 20 tablet 0    pantoprazole (PROTONIX) 40 MG tablet TAKE 1 TABLET BY MOUTH EVERY DAY 90 tablet 3    gabapentin (NEURONTIN) 300 MG capsule Take 2 capsules (600 mg total) by mouth 2 (two) times daily. 360 capsule 1    paroxetine (PAXIL) 20 MG tablet Take 1 tablet (20 mg total) by mouth every morning. 90 tablet 1    traZODone (DESYREL) 150 MG tablet TAKE 1 TABLET (150 MG TOTAL) BY MOUTH EVERY EVENING. 90 tablet 1     No current facility-administered medications for this visit.       REVIEW OF SYSTEMS:    GENERAL:  No weight loss, malaise or fevers.  HEENT:  Negative for frequent or significant headaches.  NECK:  Negative for lumps, goiter, pain and significant neck swelling.  RESPIRATORY:  Negative for cough, wheezing or shortness of breath.  CARDIOVASCULAR:  Negative for chest pain, leg swelling or palpitations.  GI:  Negative for abdominal discomfort, blood in stools or black stools or change in bowel habits. GERD.  MUSCULOSKELETAL:  See HPI.  SKIN:  Negative for lesions, rash, and itching.  PSYCH:  + for sleep disturbance, h/o depression  HEMATOLOGY/LYMPHOLOGY:  Negative for prolonged bleeding, bruising easily or swollen nodes.  NEURO:  + numbness. No history of headaches, syncope, paralysis, seizures or tremors.  All other reviewed and negative other than HPI.     Past Medical History:  Past Medical History:   Diagnosis Date    Anxiety     Arthritis     Corneal abrasion s    ? eye     Depression     Full dentures     GERD (gastroesophageal reflux disease)     Hyperlipidemia     Hypertension     Nuclear sclerosis of both eyes  6/5/2017    Osteoporosis     Restless leg syndrome        Past Surgical History:  Past Surgical History:   Procedure Laterality Date    COLONOSCOPY N/A 5/14/2019    Procedure: COLONOSCOPY;  Surgeon: Nahid Cline MD;  Location: [facility] ENDO;  Service: Endoscopy;  Laterality: N/A;    EPIDURAL STEROID INJECTION N/A 5/13/2020    Procedure: LUMBAR/CAUDAL L5/S1 IL SANKET ;  Surgeon: Miguel Latham MD;  Location: [facility] PAIN MGT;  Service: Pain Management;  Laterality: N/A;  NEEDS CONSENT    EPIDURAL STEROID INJECTION N/A 8/30/2023    Procedure: INJECTION, STEROID, EPIDURAL, CAUDAL W/ CATH SOONER DATE;  Surgeon: Miguel Latham MD;  Location: [facility] PAIN MGT;  Service: Pain Management;  Laterality: N/A;    HYSTERECTOMY      PARTIAL HYSTERECTOMY      TRANSFORAMINAL EPIDURAL INJECTION OF STEROID Bilateral 9/4/2019    Procedure: Injection,steroid,epidural,transforaminal approach LUMBAR TRANSFORAMINAL BILATERAL L3 TF SANKET;  Surgeon: Miguel Latham MD;  Location: [facility] PAIN MGT;  Service: Pain Management;  Laterality: Bilateral;  NEEDS CONSENT    TRANSFORAMINAL EPIDURAL INJECTION OF STEROID Bilateral 10/30/2019    Procedure: TRANSFORAMINAL SANKET BILATERAL L3-L4;  Surgeon: Miguel Latham MD;  Location: [facility] PAIN MGT;  Service: Pain Management;  Laterality: Bilateral;  NEEDS CONSENT    TRANSFORAMINAL EPIDURAL INJECTION OF STEROID Bilateral 11/11/2020    Procedure: INJECTION, STEROID, EPIDURAL, TRANSFORAMINAL APPROACH, L3-L4;  Surgeon: Miguel Latham MD;  Location: [facility] PAIN MGT;  Service: Pain Management;  Laterality: Bilateral;    TRANSFORAMINAL EPIDURAL INJECTION OF STEROID Bilateral 5/19/2021    Procedure: INJECTION, STEROID, EPIDURAL, TRANSFORAMINAL APPROACH, L3-L4;  Surgeon: Miguel Latham MD;  Location: [facility] PAIN MGT;  Service: Pain Management;  Laterality: Bilateral;    TRANSFORAMINAL EPIDURAL INJECTION OF STEROID Bilateral 1/26/2022    Procedure: Injection,steroid,epidural,transforaminal approach BILATERAL  L3/4;  Surgeon: Miguel Latham MD;  Location: [facility] PAIN MGT;  Service: Pain Management;  Laterality: Bilateral;    TRANSFORAMINAL EPIDURAL INJECTION OF STEROID Bilateral 4/20/2022    Procedure: INJECTION, STEROID, EPIDURAL, TRANSFORAMINAL APPROACH, Bilateral L3-L4;  Surgeon: Miguel Latham MD;  Location: [facility] PAIN MGT;  Service: Pain Management;  Laterality: Bilateral;    TRANSFORAMINAL EPIDURAL INJECTION OF STEROID Bilateral 7/20/2022    Procedure: INJECTION, STEROID, EPIDURAL, TRANSFORAMINAL APPROACH, BILATERAL L3-L4 CONTRAST;  Surgeon: Miguel Latham MD;  Location: [facility] PAIN MGT;  Service: Pain Management;  Laterality: Bilateral;    TRANSFORAMINAL EPIDURAL INJECTION OF STEROID Right 12/7/2022    Procedure: INJECTION, STEROID, EPIDURAL, TRANSFORAMINAL APPROACH, RIGHT L3/L4 AND L4/L5 CONTRAST;  Surgeon: Miguel Latham MD;  Location: [facility] PAIN MGT;  Service: Pain Management;  Laterality: Right;    TRANSFORAMINAL EPIDURAL INJECTION OF STEROID Right 2/8/2023    Procedure: INJECTION, STEROID, EPIDURAL, TRANSFORAMINAL APPROACH RIGHT L3/4 AND L4/5 CONTRAST;  Surgeon: Miguel Latham MD;  Location: [facility] PAIN [facility]GT;  Service: Pain Management;  Laterality: Right;    TUBAL LIGATION Bilateral        Family History:  Family History   Problem Relation Age of Onset    Diabetes Mother     Stroke Mother     Glaucoma Mother     Cataracts Mother     Cancer Father         Lung    No Known Problems Sister     Stroke Brother     Hypertension Daughter     Hypertension Daughter     Hypertension Daughter     No Known Problems Daughter     Heart disease Son     Early death Son     No Known Problems Maternal Aunt     No Known Problems Maternal Uncle     No Known Problems Paternal Aunt     No Known Problems Paternal Uncle     No Known Problems Maternal Grandmother     No Known Problems Maternal Grandfather     No Known Problems Paternal Grandmother     No Known Problems Paternal Grandfather     Amblyopia Neg Hx     Blindness  Neg Hx     Macular degeneration Neg Hx     Retinal detachment Neg Hx     Strabismus Neg Hx     Thyroid disease Neg Hx        Social History:  Social History     Socioeconomic History    Marital status:    Tobacco Use    Smoking status: Never     Passive exposure: Never    Smokeless tobacco: Never   Substance and Sexual Activity    Alcohol use: Not Currently     Comment: once a year    Drug use: No    Sexual activity: Not Currently   Social History Narrative    Patient is  w/ 5 children, one  from heart disease.The patient is retired.       OBJECTIVE:    /74 (BP Location: Right arm, Patient Position: Sitting, BP Method: Small (Automatic))   Pulse 63   Temp 98 °F (36.7 °C) (Oral)   Resp 16   Ht 5' (1.524 m)   Wt 64.1 kg (141 lb 5 oz)   SpO2 100%   BMI 27.60 kg/m²     PHYSICAL EXAMINATION:    General appearance: Well appearing, in no acute distress, alert and oriented x3.  Psych:  Mood and affect appropriate.  Skin: Skin color, texture, turgor normal, no rashes or lesions, in both upper and lower body.  Head/face:  Normocephalic, atraumatic. No palpable lymph nodes.  Back: Straight leg raising in the sitting position is positive on the right. Full range of motion with pain on flexion and extension. Positive facet loading bilaterally.  Extremities: Peripheral joint ROM is full and pain free without obvious instability or laxity in all four extremities. No deformities, edema, or skin discoloration. Good capillary refill.  Musculoskeletal: No atrophy or tone abnormalities are noted. 4/5 strength in right ankle with plantar and 4/5 on dorsiflexion. 5/5 strength in left ankle with plantar and 4/5 on dorsiflexion. 4/5 strength with right knee flexion and extension. 5/5 strength with left knee flexion and extension. No TTP over right shoulder or subacromial bursa. Full ROM of right shoulder without pain.  Neuro: Decreased sensation to RLE.  Gait: Antalgic- ambulates without  assistance.    ASSESSMENT: 78 y.o. year old female with lower back and right leg pain, consistent with     1. Lumbosacral radiculopathy  Procedure Order to Pain Management      2. Chronic pain syndrome        3. Spinal stenosis, lumbar region with neurogenic claudication  gabapentin (NEURONTIN) 300 MG capsule      4. Radiculopathy of thoracolumbar region  gabapentin (NEURONTIN) 300 MG capsule      5. Lumbar spondylosis        6. DDD (degenerative disc disease), lumbar  gabapentin (NEURONTIN) 300 MG capsule      7. Bilateral leg pain  gabapentin (NEURONTIN) 300 MG capsule            PLAN:     - Previous lumbar MRI was reviewed and discussed with the patient today.  - She declined surgical consult.   - She had benefit with caudal SANKET for 3 months. She would like to repeat next year. Orders placed.  - I have stressed the importance of physical activity and a home exercise plan to help with pain and improve health.  - Patient can continue with medications for now since they are providing benefits, using them appropriately, and without side effects.  - Continue Gabapentin 600 mg BID- refilled today.  - RTC 4 weeks after SANKET.    The above plan and management options were discussed at length with patient. Patient is in agreement with the above and verbalized understanding.    BRENDA Flowers  12/14/2023

## 2023-12-15 ENCOUNTER — PATIENT MESSAGE (OUTPATIENT)
Dept: PAIN MEDICINE | Facility: OTHER | Age: 78
End: 2023-12-15
Payer: MEDICARE

## 2024-01-31 ENCOUNTER — HOSPITAL ENCOUNTER (OUTPATIENT)
Facility: OTHER | Age: 79
Discharge: HOME | End: 2024-01-31
Attending: ANESTHESIOLOGY | Admitting: ANESTHESIOLOGY
Payer: MEDICARE

## 2024-01-31 VITALS
WEIGHT: 145 LBS | BODY MASS INDEX: 28.47 KG/M2 | HEIGHT: 60 IN | HEART RATE: 54 BPM | RESPIRATION RATE: 16 BRPM | OXYGEN SATURATION: 98 % | DIASTOLIC BLOOD PRESSURE: 58 MMHG | SYSTOLIC BLOOD PRESSURE: 117 MMHG | TEMPERATURE: 98 F

## 2024-01-31 DIAGNOSIS — M51.36 DDD (DEGENERATIVE DISC DISEASE), LUMBAR: ICD-10-CM

## 2024-01-31 DIAGNOSIS — G89.29 CHRONIC PAIN: ICD-10-CM

## 2024-01-31 DIAGNOSIS — M54.17 LUMBOSACRAL RADICULOPATHY: ICD-10-CM

## 2024-01-31 DIAGNOSIS — M48.062 SPINAL STENOSIS, LUMBAR REGION WITH NEUROGENIC CLAUDICATION: Primary | ICD-10-CM

## 2024-01-31 PROCEDURE — 25500020 PHARM REV CODE 255: Performed by: ANESTHESIOLOGY

## 2024-01-31 PROCEDURE — 25000003 PHARM REV CODE 250: Performed by: ANESTHESIOLOGY

## 2024-01-31 PROCEDURE — 63600175 PHARM REV CODE 636 W HCPCS: Performed by: ANESTHESIOLOGY

## 2024-01-31 PROCEDURE — 62323 NJX INTERLAMINAR LMBR/SAC: CPT | Performed by: ANESTHESIOLOGY

## 2024-01-31 PROCEDURE — 62323 NJX INTERLAMINAR LMBR/SAC: CPT | Mod: ,,, | Performed by: ANESTHESIOLOGY

## 2024-01-31 PROCEDURE — 99152 MOD SED SAME PHYS/QHP 5/>YRS: CPT | Performed by: ANESTHESIOLOGY

## 2024-01-31 RX ORDER — LIDOCAINE HYDROCHLORIDE 20 MG/ML
INJECTION, SOLUTION INFILTRATION; PERINEURAL
Status: DISCONTINUED | OUTPATIENT
Start: 2024-01-31 | End: 2024-01-31 | Stop reason: HOSPADM

## 2024-01-31 RX ORDER — FENTANYL CITRATE 50 UG/ML
INJECTION, SOLUTION INTRAMUSCULAR; INTRAVENOUS
Status: DISCONTINUED | OUTPATIENT
Start: 2024-01-31 | End: 2024-01-31 | Stop reason: HOSPADM

## 2024-01-31 RX ORDER — LIDOCAINE HYDROCHLORIDE 10 MG/ML
INJECTION, SOLUTION EPIDURAL; INFILTRATION; INTRACAUDAL; PERINEURAL
Status: DISCONTINUED | OUTPATIENT
Start: 2024-01-31 | End: 2024-01-31 | Stop reason: HOSPADM

## 2024-01-31 RX ORDER — MIDAZOLAM HYDROCHLORIDE 1 MG/ML
INJECTION INTRAMUSCULAR; INTRAVENOUS
Status: DISCONTINUED | OUTPATIENT
Start: 2024-01-31 | End: 2024-01-31 | Stop reason: HOSPADM

## 2024-01-31 RX ORDER — DEXAMETHASONE SODIUM PHOSPHATE 10 MG/ML
INJECTION INTRAMUSCULAR; INTRAVENOUS
Status: DISCONTINUED | OUTPATIENT
Start: 2024-01-31 | End: 2024-01-31 | Stop reason: HOSPADM

## 2024-01-31 RX ORDER — SODIUM CHLORIDE 9 MG/ML
INJECTION, SOLUTION INTRAVENOUS CONTINUOUS
Status: DISCONTINUED | OUTPATIENT
Start: 2024-01-31 | End: 2024-01-31 | Stop reason: HOSPADM

## 2024-01-31 NOTE — DISCHARGE SUMMARY
Discharge Note  Short Stay      SUMMARY     Admit Date: 1/31/2024    Attending Physician: Miguel Latham MD    Discharge Physician: George La MD      Discharge Date: 1/31/2024 8:26 AM    Procedure(s) (LRB):  CAUDAL SANKET (N/A)    Final Diagnosis: Lumbosacral radiculopathy [M54.17]    Disposition: Home or self care    Patient Instructions:   Current Discharge Medication List        CONTINUE these medications which have NOT CHANGED    Details   alendronate (FOSAMAX) 70 MG tablet TAKE 1 TABLET BY MOUTH ONE TIME PER WEEK  Qty: 12 tablet, Refills: 1    Associated Diagnoses: Age-related osteoporosis without current pathological fracture      amLODIPine (NORVASC) 10 MG tablet Take 1 tablet (10 mg total) by mouth once daily.  Qty: 90 tablet, Refills: 3    Comments: .  Associated Diagnoses: Essential hypertension      atorvastatin (LIPITOR) 40 MG tablet Take 1 tablet (40 mg total) by mouth once daily.  Qty: 90 tablet, Refills: 2    Associated Diagnoses: Hyperlipidemia, unspecified hyperlipidemia type      calcium-vitamin D 500-125 mg-unit tablet Take 1 tablet by mouth once daily.      ergocalciferol (ERGOCALCIFEROL) 50,000 unit Cap TAKE 1 CAPSULE (50,000 UNITS TOTAL) BY MOUTH EVERY SUNDAY.  Qty: 12 capsule, Refills: 1    Associated Diagnoses: Vitamin D deficiency      fluticasone propionate (FLONASE) 50 mcg/actuation nasal spray 2 SPRAYS (100 MCG TOTAL) BY EACH NOSTRIL ROUTE ONCE DAILY.  Qty: 48 mL, Refills: 3    Comments: NEED NEW SCRIPT  Associated Diagnoses: Allergic rhinitis, unspecified seasonality, unspecified trigger      gabapentin (NEURONTIN) 300 MG capsule Take 2 capsules (600 mg total) by mouth 2 (two) times daily.  Qty: 360 capsule, Refills: 1    Associated Diagnoses: Spinal stenosis, lumbar region with neurogenic claudication; Radiculopathy of thoracolumbar region; DDD (degenerative disc disease), lumbar; Bilateral leg pain      ibuprofen (ADVIL,MOTRIN) 800 MG tablet Take 1 tablet (800 mg total) by mouth  2 (two) times daily as needed for Pain.  Qty: 60 tablet, Refills: 1      metoprolol succinate (TOPROL-XL) 100 MG 24 hr tablet TAKE 1 TABLET BY MOUTH EVERY DAY  Qty: 90 tablet, Refills: 0    Comments: .  Associated Diagnoses: Essential hypertension      multivitamin (THERAGRAN) per tablet Take 1 tablet by mouth once daily.      ondansetron (ZOFRAN-ODT) 4 MG TbDL Take 1 tablet (4 mg total) by mouth every 8 (eight) hours as needed.  Qty: 20 tablet, Refills: 0      pantoprazole (PROTONIX) 40 MG tablet TAKE 1 TABLET BY MOUTH EVERY DAY  Qty: 90 tablet, Refills: 3    Associated Diagnoses: Gastroesophageal reflux disease without esophagitis      paroxetine (PAXIL) 20 MG tablet Take 1 tablet (20 mg total) by mouth every morning.  Qty: 90 tablet, Refills: 1    Associated Diagnoses: Mild recurrent major depression; Anxiety      traZODone (DESYREL) 150 MG tablet TAKE 1 TABLET (150 MG TOTAL) BY MOUTH EVERY EVENING.  Qty: 90 tablet, Refills: 1    Associated Diagnoses: Insomnia, unspecified type                 Discharge Diagnosis: Lumbosacral radiculopathy [M54.17]  Condition on Discharge: Stable with no complications to procedure   Diet on Discharge: Same as before.  Activity: as per instruction sheet.  Discharge to: Home with a responsible adult.  Follow up: 2-4 weeks       Please call my office or pager at [phone] if experienced any weakness or loss of sensation, fever > 101.5, pain uncontrolled with oral medications, persistent nausea/vomiting/or diarrhea, redness or drainage from the incisions, or any other worrisome concerns. If physician on call was not reached or could not communicate with our office for any reason please go to the nearest emergency department      George La M.D.  PGY-5  Interventional Pain Management Fellow  [facility]  Pager: [phone]    01/31/2024

## 2024-01-31 NOTE — DISCHARGE INSTRUCTIONS

## 2024-01-31 NOTE — H&P
HPI  Patient presenting for Procedure(s) (LRB):  CAUDAL SANKET (N/A)     Patient on Anti-coagulation No    No health changes since previous encounter    Past Medical History:   Diagnosis Date    Anxiety     Arthritis     Corneal abrasion s    ? eye     Depression     Full dentures     GERD (gastroesophageal reflux disease)     Hyperlipidemia     Hypertension     Nuclear sclerosis of both eyes 6/5/2017    Osteoporosis     Restless leg syndrome      Past Surgical History:   Procedure Laterality Date    COLONOSCOPY N/A 5/14/2019    Procedure: COLONOSCOPY;  Surgeon: Nahid Cline MD;  Location: [facility] ENDO;  Service: Endoscopy;  Laterality: N/A;    EPIDURAL STEROID INJECTION N/A 5/13/2020    Procedure: LUMBAR/CAUDAL L5/S1 IL SANKET ;  Surgeon: Miguel Latham MD;  Location: [facility] PAIN MGT;  Service: Pain Management;  Laterality: N/A;  NEEDS CONSENT    EPIDURAL STEROID INJECTION N/A 8/30/2023    Procedure: INJECTION, STEROID, EPIDURAL, CAUDAL W/ CATH SOONER DATE;  Surgeon: Miguel Latham MD;  Location: [facility] PAIN MGT;  Service: Pain Management;  Laterality: N/A;    HYSTERECTOMY      PARTIAL HYSTERECTOMY      TRANSFORAMINAL EPIDURAL INJECTION OF STEROID Bilateral 9/4/2019    Procedure: Injection,steroid,epidural,transforaminal approach LUMBAR TRANSFORAMINAL BILATERAL L3 TF SANKET;  Surgeon: Miguel Latham MD;  Location: [facility] PAIN MGT;  Service: Pain Management;  Laterality: Bilateral;  NEEDS CONSENT    TRANSFORAMINAL EPIDURAL INJECTION OF STEROID Bilateral 10/30/2019    Procedure: TRANSFORAMINAL SANKET BILATERAL L3-L4;  Surgeon: Miguel Latham MD;  Location: [facility] PAIN MGT;  Service: Pain Management;  Laterality: Bilateral;  NEEDS CONSENT    TRANSFORAMINAL EPIDURAL INJECTION OF STEROID Bilateral 11/11/2020    Procedure: INJECTION, STEROID, EPIDURAL, TRANSFORAMINAL APPROACH, L3-L4;  Surgeon: Miguel Latham MD;  Location: [facility] PAIN [facility]GT;  Service: Pain Management;  Laterality: Bilateral;    TRANSFORAMINAL EPIDURAL  INJECTION OF STEROID Bilateral 5/19/2021    Procedure: INJECTION, STEROID, EPIDURAL, TRANSFORAMINAL APPROACH, L3-L4;  Surgeon: Miguel Latham MD;  Location: [facility] PAIN [facility]GT;  Service: Pain Management;  Laterality: Bilateral;    TRANSFORAMINAL EPIDURAL INJECTION OF STEROID Bilateral 1/26/2022    Procedure: Injection,steroid,epidural,transforaminal approach BILATERAL L3/4;  Surgeon: Miguel Latham MD;  Location: [facility] PAIN [facility]GT;  Service: Pain Management;  Laterality: Bilateral;    TRANSFORAMINAL EPIDURAL INJECTION OF STEROID Bilateral 4/20/2022    Procedure: INJECTION, STEROID, EPIDURAL, TRANSFORAMINAL APPROACH, Bilateral L3-L4;  Surgeon: Miguel Latham MD;  Location: [facility] PAIN [facility]GT;  Service: Pain Management;  Laterality: Bilateral;    TRANSFORAMINAL EPIDURAL INJECTION OF STEROID Bilateral 7/20/2022    Procedure: INJECTION, STEROID, EPIDURAL, TRANSFORAMINAL APPROACH, BILATERAL L3-L4 CONTRAST;  Surgeon: Miguel Latham MD;  Location: [facility] PAIN [facility]GT;  Service: Pain Management;  Laterality: Bilateral;    TRANSFORAMINAL EPIDURAL INJECTION OF STEROID Right 12/7/2022    Procedure: INJECTION, STEROID, EPIDURAL, TRANSFORAMINAL APPROACH, RIGHT L3/L4 AND L4/L5 CONTRAST;  Surgeon: Miguel Latham MD;  Location: [facility] PAIN [facility]GT;  Service: Pain Management;  Laterality: Right;    TRANSFORAMINAL EPIDURAL INJECTION OF STEROID Right 2/8/2023    Procedure: INJECTION, STEROID, EPIDURAL, TRANSFORAMINAL APPROACH RIGHT L3/4 AND L4/5 CONTRAST;  Surgeon: Miguel Latham MD;  Location: [facility] PAIN [facility]GT;  Service: Pain Management;  Laterality: Right;    TUBAL LIGATION Bilateral      Review of patient's allergies indicates:  No Known Allergies   Current Facility-Administered Medications   Medication    0.9%  NaCl infusion       PMHx, PSHx, Allergies, Medications reviewed in epic    ROS negative except pain complaints in HPI    OBJECTIVE:    BP (!) 155/67 (BP Location: Right arm, Patient Position: Sitting)   Pulse 62   Temp 97.5 °F  (36.4 °C) (Oral)   Resp 16   Ht 5' (1.524 m)   Wt 65.8 kg (145 lb)   SpO2 98%   Breastfeeding No   BMI 28.32 kg/m²     PHYSICAL EXAMINATION:    GENERAL: Well appearing, in no acute distress, alert and oriented x3.  PSYCH:  Mood and affect appropriate.  SKIN: Skin color, texture, turgor normal, no rashes or lesions which will impact the procedure.  CV: RRR with palpation of the radial artery.  PULM: No evidence of respiratory difficulty, symmetric chest rise. Clear to auscultation.  NEURO: Cranial nerves grossly intact.    Plan:    Proceed with procedure as planned Procedure(s) (LRB):  CAUDAL SANKET (N/A)    George La M.D.  PGY-5  Interventional Pain Management Fellow  [facility]  Pager: [phone]    01/31/2024

## 2024-01-31 NOTE — OP NOTE
Caudal Epidural Steroid Injection with Catheter under Fluoroscopic Guidance    The procedure, risks, benefits, and options were discussed with the patient. There are no contraindications to the procedure. The patent expressed understanding and agreed to the procedure. Informed written consent was obtained prior to the start of the procedure and can be found in the patient's chart.    PATIENT NAME: Mrs. Diana Herring   MRN: [id number]     DATE OF PROCEDURE: 01/31/2024    PROCEDURE: Caudal Epidural Steroid Injection under Fluoroscopic Guidance    PRE-OP DIAGNOSIS: Lumbosacral radiculopathy [M54.17] Lumbar radiculopathy [M54.16]    POST-OP DIAGNOSIS: Same    PHYSICIAN: Miguel Latham MD    ASSISTANTS: George La M.D. ([facility])       MEDICATIONS INJECTED: Preservative-free Decadron 10mg with 4cc of Lidocaine 1% MPF     LOCAL ANESTHETIC INJECTED: Xylocaine 2%     SEDATION: Versed 2mg and Fentanyl 50mcg                                                                                                                                                                                     Conscious sedation ordered by M.D. Patient re-evaluation prior to administration of conscious sedation. No changes noted in patient's status from initial evaluation. The patient's vital signs were monitored by RN and patient remained hemodynamically stable throughout the procedure.    Event Time In   Sedation Start 0821   Sedation End 0833       ESTIMATED BLOOD LOSS: None    COMPLICATIONS: None    TECHNIQUE: Time-out was performed to identify the patient and procedure to be performed. With the patient laying in a prone position, the surgical area was prepped and draped in the usual sterile fashion using ChloraPrep and a fenestrated drape. The injection site was determined under fluoroscopy guidance. Skin anesthesia was achieved by injecting Lidocaine 2% over the injection site. The sacrum and sacral cornua were then approached  with a 16 gauge, 3.5 inch epidural needle that was introduced and advanced into the sacral hiatus under fluoroscopic guidance with AP, lateral and/or contralateral oblique imaging. After the needle passed through the sacrococcygeal ligament, the needle angle was lowered and the needle was advanced 1 cm. After negative aspiration of blood or CSF, and no evidence of paraesthesias, the catheter was threaded through the needle into the epidural space using live fluoroscopy, contrast dye Omnipaque (300mg/mL) was injected to confirm placement and there was no vascular runoff. Fluoroscopic imaging in the AP and lateral views revealed a clear outline of the spinal nerve with proximal spread of agent through the caudal epidural space. Then 10 mL of the medication mixture listed above was injected slowly. Displacement of the radio opaque contrast after injection of the medication confirmed that the medication went into the area of the transforaminal spaces. The needles were removed and bleeding was nil. A sterile dressing was applied. No specimens collected. The patient tolerated the procedure well.       The patient was monitored after the procedure in the recovery area. They were given post-procedure and discharge instructions to follow at home. The patient was discharged in a stable condition.    George La MD     I reviewed and edited the fellow's note. I conducted my own interview and physical examination. I agree with the findings. I was present and supervising all critical portions of the procedure.    Miguel Latham MD

## 2024-03-08 ENCOUNTER — LAB VISIT (OUTPATIENT)
Dept: LAB | Facility: HOSPITAL | Age: 79
End: 2024-03-08
Attending: INTERNAL MEDICINE
Payer: MEDICARE

## 2024-03-08 DIAGNOSIS — I10 ESSENTIAL HYPERTENSION: Chronic | ICD-10-CM

## 2024-03-08 LAB
ALBUMIN SERPL BCP-MCNC: 4.1 G/DL (ref 3.5–5.2)
ALP SERPL-CCNC: 89 U/L (ref 55–135)
ALT SERPL W/O P-5'-P-CCNC: 14 U/L (ref 10–44)
ANION GAP SERPL CALC-SCNC: 9 MMOL/L (ref 8–16)
AST SERPL-CCNC: 14 U/L (ref 10–40)
BASOPHILS # BLD AUTO: 0.07 K/UL (ref 0–0.2)
BASOPHILS NFR BLD: 0.8 % (ref 0–1.9)
BILIRUB SERPL-MCNC: 0.8 MG/DL (ref 0.1–1)
BUN SERPL-MCNC: 12 MG/DL (ref 8–23)
CALCIUM SERPL-MCNC: 10.1 MG/DL (ref 8.7–10.5)
CHLORIDE SERPL-SCNC: 105 MMOL/L (ref 95–110)
CHOLEST SERPL-MCNC: 149 MG/DL (ref 120–199)
CHOLEST/HDLC SERPL: 3.7 {RATIO} (ref 2–5)
CO2 SERPL-SCNC: 28 MMOL/L (ref 23–29)
CREAT SERPL-MCNC: 0.8 MG/DL (ref 0.5–1.4)
DIFFERENTIAL METHOD BLD: NORMAL
EOSINOPHIL # BLD AUTO: 0.3 K/UL (ref 0–0.5)
EOSINOPHIL NFR BLD: 3.4 % (ref 0–8)
ERYTHROCYTE [DISTWIDTH] IN BLOOD BY AUTOMATED COUNT: 12.7 % (ref 11.5–14.5)
EST. GFR  (NO RACE VARIABLE): >60 ML/MIN/1.73 M^2
GLUCOSE SERPL-MCNC: 104 MG/DL (ref 70–110)
HCT VFR BLD AUTO: 41.7 % (ref 37–48.5)
HDLC SERPL-MCNC: 40 MG/DL (ref 40–75)
HDLC SERPL: 26.8 % (ref 20–50)
HGB BLD-MCNC: 13.4 G/DL (ref 12–16)
IMM GRANULOCYTES # BLD AUTO: 0.02 K/UL (ref 0–0.04)
IMM GRANULOCYTES NFR BLD AUTO: 0.2 % (ref 0–0.5)
LDLC SERPL CALC-MCNC: 85.2 MG/DL (ref 63–159)
LYMPHOCYTES # BLD AUTO: 4 K/UL (ref 1–4.8)
LYMPHOCYTES NFR BLD: 44.3 % (ref 18–48)
MCH RBC QN AUTO: 30.4 PG (ref 27–31)
MCHC RBC AUTO-ENTMCNC: 32.1 G/DL (ref 32–36)
MCV RBC AUTO: 95 FL (ref 82–98)
MONOCYTES # BLD AUTO: 0.5 K/UL (ref 0.3–1)
MONOCYTES NFR BLD: 5.3 % (ref 4–15)
NEUTROPHILS # BLD AUTO: 4.2 K/UL (ref 1.8–7.7)
NEUTROPHILS NFR BLD: 46 % (ref 38–73)
NONHDLC SERPL-MCNC: 109 MG/DL
NRBC BLD-RTO: 0 /100 WBC
PLATELET # BLD AUTO: 234 K/UL (ref 150–450)
PMV BLD AUTO: 11.6 FL (ref 9.2–12.9)
POTASSIUM SERPL-SCNC: 3.7 MMOL/L (ref 3.5–5.1)
PROT SERPL-MCNC: 8 G/DL (ref 6–8.4)
RBC # BLD AUTO: 4.41 M/UL (ref 4–5.4)
SODIUM SERPL-SCNC: 142 MMOL/L (ref 136–145)
TRIGL SERPL-MCNC: 119 MG/DL (ref 30–150)
WBC # BLD AUTO: 9.1 K/UL (ref 3.9–12.7)

## 2024-03-08 PROCEDURE — 36415 COLL VENOUS BLD VENIPUNCTURE: CPT | Mod: PO | Performed by: INTERNAL MEDICINE

## 2024-03-08 PROCEDURE — 80061 LIPID PANEL: CPT | Performed by: INTERNAL MEDICINE

## 2024-03-08 PROCEDURE — 85025 COMPLETE CBC W/AUTO DIFF WBC: CPT | Performed by: INTERNAL MEDICINE

## 2024-03-08 PROCEDURE — 80053 COMPREHEN METABOLIC PANEL: CPT | Performed by: INTERNAL MEDICINE

## 2024-03-08 RX ORDER — AMLODIPINE BESYLATE 10 MG/1
10 TABLET ORAL
Qty: 90 TABLET | Refills: 0 | Status: SHIPPED | OUTPATIENT
Start: 2024-03-08 | End: 2024-06-04

## 2024-03-08 NOTE — TELEPHONE ENCOUNTER
Care Due:                  Date            Visit Type   Department     Provider  --------------------------------------------------------------------------------                                EP -         [facility] FAMILY                              PRIMARY      MED/ INTERNAL  Last Visit: 03-      CARE ([facility])   MED/ PEDS      Luisito Marin                              EP -                              PRIMARY      [facility] INTERNAL  Next Visit: 03-      CARE ([facility])   MEDICINE       Luisito Marin                                                            Last  Test          Frequency    Reason                     Performed    Due Date  --------------------------------------------------------------------------------    Vitamin D...  12 months..  alendronate,               Not Found    Overdue                             ergocalciferol...........    [employer] Embedded Care Due Messages. Reference number: [id number].   3/08/2024 12:16:48 AM CST

## 2024-03-08 NOTE — TELEPHONE ENCOUNTER
Refill Decision Note   Diana Herring  is requesting a refill authorization.  Brief Assessment and Rationale for Refill:  Approve     [facility]      Pharmacist review requested: Yes   Comments:     Note composed:2:11 PM 03/08/2024

## 2024-03-08 NOTE — TELEPHONE ENCOUNTER
Refill Routing Note   Medication(s) are not appropriate for processing by [facility] for the following reason(s):        Drug-drug interaction  Drug-disease interaction  ED/Hospital Visit since last OV with provider    [facility] action(s):  Defer   Requires labs : Yes      Medication Therapy Plan: Lumbar pain; Left shoulder pain; CALCIUM/VITAD    Pharmacist review requested: Yes     Appointments  past 12m or future 3m with PCP    Date Provider   Last Visit   3/8/2023 Luisito Marin MD   Next Visit   3/11/2024 Luisito Marin MD   ED visits in past 90 days: 0        Note composed:8:04 AM 03/08/2024

## 2024-03-11 ENCOUNTER — TELEPHONE (OUTPATIENT)
Dept: INTERNAL MEDICINE | Facility: CLINIC | Age: 79
End: 2024-03-11
Payer: MEDICARE

## 2024-03-11 PROCEDURE — 93005 ELECTROCARDIOGRAM TRACING: CPT | Mod: S$GLB,,, | Performed by: NURSE PRACTITIONER

## 2024-03-11 PROCEDURE — 93010 ELECTROCARDIOGRAM REPORT: CPT | Mod: S$GLB,,, | Performed by: INTERNAL MEDICINE

## 2024-03-11 NOTE — TELEPHONE ENCOUNTER
----- Message from Clary Kaminski sent at 3/11/2024  8:54 AM CDT -----  Contact: [phone] patient  [id number]     Patient is calling for Medical Advice regarding:Patient called and coming to 9 am. Patient state that she went to [facility].     How long has patient had these symptoms:    Pharmacy name and phone#:    Would like response via [employer]:  call     Comments:

## 2024-03-12 ENCOUNTER — OFFICE VISIT (OUTPATIENT)
Dept: INTERNAL MEDICINE | Facility: CLINIC | Age: 79
End: 2024-03-12
Payer: MEDICARE

## 2024-03-12 ENCOUNTER — DOCUMENTATION ONLY (OUTPATIENT)
Dept: REHABILITATION | Facility: HOSPITAL | Age: 79
End: 2024-03-12
Payer: MEDICARE

## 2024-03-12 VITALS
OXYGEN SATURATION: 98 % | HEIGHT: 63 IN | DIASTOLIC BLOOD PRESSURE: 74 MMHG | WEIGHT: 142.44 LBS | BODY MASS INDEX: 25.24 KG/M2 | SYSTOLIC BLOOD PRESSURE: 118 MMHG | HEART RATE: 61 BPM

## 2024-03-12 DIAGNOSIS — F51.04 PSYCHOPHYSIOLOGICAL INSOMNIA: Chronic | ICD-10-CM

## 2024-03-12 DIAGNOSIS — R07.89 ATYPICAL CHEST PAIN: ICD-10-CM

## 2024-03-12 DIAGNOSIS — I70.0 ATHEROSCLEROSIS OF AORTA: Chronic | ICD-10-CM

## 2024-03-12 DIAGNOSIS — M48.062 SPINAL STENOSIS, LUMBAR REGION WITH NEUROGENIC CLAUDICATION: ICD-10-CM

## 2024-03-12 DIAGNOSIS — M51.36 DDD (DEGENERATIVE DISC DISEASE), LUMBAR: Chronic | ICD-10-CM

## 2024-03-12 DIAGNOSIS — Z00.00 ROUTINE MEDICAL EXAM: Primary | ICD-10-CM

## 2024-03-12 DIAGNOSIS — K21.9 GASTROESOPHAGEAL REFLUX DISEASE, UNSPECIFIED WHETHER ESOPHAGITIS PRESENT: Chronic | ICD-10-CM

## 2024-03-12 DIAGNOSIS — M54.15 RADICULOPATHY OF THORACOLUMBAR REGION: ICD-10-CM

## 2024-03-12 DIAGNOSIS — Z91.89 AT RISK FOR SLEEP APNEA: ICD-10-CM

## 2024-03-12 DIAGNOSIS — I10 ESSENTIAL HYPERTENSION: Chronic | ICD-10-CM

## 2024-03-12 DIAGNOSIS — G25.81 RLS (RESTLESS LEGS SYNDROME): Chronic | ICD-10-CM

## 2024-03-12 LAB
OHS QRS DURATION: 78 MS
OHS QTC CALCULATION: 410 MS

## 2024-03-12 PROCEDURE — 99999 PR PBB SHADOW E&M-EST. PATIENT-LVL IV: CPT | Mod: PBBFAC,,, | Performed by: NURSE PRACTITIONER

## 2024-03-12 PROCEDURE — 99397 PER PM REEVAL EST PAT 65+ YR: CPT | Mod: S$GLB,,, | Performed by: NURSE PRACTITIONER

## 2024-03-12 NOTE — PROGRESS NOTES
History of Present Illness   Diana Herring is a 79 y.o. woman with medical history as listed below who presents today for routine physical exam. She did have labs prior to our visit, overall unremarkable. She is taking medications as prescribed without perceived SE. Complaints today: she reports sensation of chest tightness with fluttering in chest that occurs 3-4 times per week and lasts for a few seconds, associated with waking up at night feeling need to catch her breathe. She also reports difficulty falling asleep despite taking Trazodone as prescribe and waking up a few times throughout the night. Pertinent negatives as listed in ROS. We will address HM today.    Past Medical History:   Diagnosis Date    Anxiety     Arthritis     Corneal abrasion s    ? eye     Depression     Full dentures     GERD (gastroesophageal reflux disease)     Hyperlipidemia     Hypertension     Nuclear sclerosis of both eyes 6/5/2017    Osteoporosis     Restless leg syndrome        Past Surgical History:   Procedure Laterality Date    COLONOSCOPY N/A 5/14/2019    Procedure: COLONOSCOPY;  Surgeon: Nahid Cline MD;  Location: [facility];  Service: Endoscopy;  Laterality: N/A;    EPIDURAL STEROID INJECTION N/A 5/13/2020    Procedure: LUMBAR/CAUDAL L5/S1 IL SANKET ;  Surgeon: Miguel Latham MD;  Location: [facility];  Service: Pain Management;  Laterality: N/A;  NEEDS CONSENT    EPIDURAL STEROID INJECTION N/A 8/30/2023    Procedure: INJECTION, STEROID, EPIDURAL, CAUDAL W/ CATH SOONER DATE;  Surgeon: Miguel Latham MD;  Location: [facility] PA[facility] T;  Service: Pain Management;  Laterality: N/A;    EPIDURAL STEROID INJECTION N/A 1/31/2024    Procedure: CAUDAL SANKET;  Surgeon: Miguel Latham MD;  Location: [facility] T;  Service: Pain Management;  Laterality: N/A;  [phone]  4 WK F/U LOLIS    HYSTERECTOMY      PARTIAL HYSTERECTOMY      TRANSFORAMINAL EPIDURAL INJECTION OF STEROID Bilateral 9/4/2019    Procedure:  Injection,steroid,epidural,transforaminal approach LUMBAR TRANSFORAMINAL BILATERAL L3 TF SANKET;  Surgeon: Miguel Latham MD;  Location: [facility]IN [facility];  Service: Pain Management;  Laterality: Bilateral;  NEEDS CONSENT    TRANSFORAMINAL EPIDURAL INJECTION OF STEROID Bilateral 10/30/2019    Procedure: TRANSFORAMINAL SANKET BILATERAL L3-L4;  Surgeon: Miguel Latham MD;  Location: [facility];  Service: Pain Management;  Laterality: Bilateral;  NEEDS CONSENT    TRANSFORAMINAL EPIDURAL INJECTION OF STEROID Bilateral 11/11/2020    Procedure: INJECTION, STEROID, EPIDURAL, TRANSFORAMINAL APPROACH, L3-L4;  Surgeon: Miguel Latham MD;  Location: [facility];  Service: Pain Management;  Laterality: Bilateral;    TRANSFORAMINAL EPIDURAL INJECTION OF STEROID Bilateral 5/19/2021    Procedure: INJECTION, STEROID, EPIDURAL, TRANSFORAMINAL APPROACH, L3-L4;  Surgeon: Miguel Latham MD;  Location: [facility];  Service: Pain Management;  Laterality: Bilateral;    TRANSFORAMINAL EPIDURAL INJECTION OF STEROID Bilateral 1/26/2022    Procedure: Injection,steroid,epidural,transforaminal approach BILATERAL L3/4;  Surgeon: Miguel Latham MD;  Location: [facility];  Service: Pain Management;  Laterality: Bilateral;    TRANSFORAMINAL EPIDURAL INJECTION OF STEROID Bilateral 4/20/2022    Procedure: INJECTION, STEROID, EPIDURAL, TRANSFORAMINAL APPROACH, Bilateral L3-L4;  Surgeon: Miguel Latham MD;  Location: [facility];  Service: Pain Management;  Laterality: Bilateral;    TRANSFORAMINAL EPIDURAL INJECTION OF STEROID Bilateral 7/20/2022    Procedure: INJECTION, STEROID, EPIDURAL, TRANSFORAMINAL APPROACH, BILATERAL L3-L4 CONTRAST;  Surgeon: Miguel Latham MD;  Location: [facility];  Service: Pain Management;  Laterality: Bilateral;    TRANSFORAMINAL EPIDURAL INJECTION OF STEROID Right 12/7/2022    Procedure: INJECTION, STEROID, EPIDURAL, TRANSFORAMINAL APPROACH, RIGHT L3/L4 AND L4/L5 CONTRAST;  Surgeon: Miguel  PATRICK Latham MD;  Location: [facility];  Service: Pain Management;  Laterality: Right;    TRANSFORAMINAL EPIDURAL INJECTION OF STEROID Right 2023    Procedure: INJECTION, STEROID, EPIDURAL, TRANSFORAMINAL APPROACH RIGHT L3/4 AND L4/5 CONTRAST;  Surgeon: Miguel Latham MD;  Location: [facility] [place];  Service: Pain Management;  Laterality: Right;    TUBAL LIGATION Bilateral        Social History     Socioeconomic History    Marital status:    Tobacco Use    Smoking status: Never     Passive exposure: Never    Smokeless tobacco: Never   Substance and Sexual Activity    Alcohol use: Not Currently     Comment: once a year    Drug use: No    Sexual activity: Not Currently   Social History Narrative    Patient is  w/ 5 children, one  from heart disease.The patient is retired.       Family History   Problem Relation Age of Onset    Diabetes Mother     Stroke Mother     Glaucoma Mother     Cataracts Mother     Cancer Father         Lung    No Known Problems Sister     Stroke Brother     Hypertension Daughter     Hypertension Daughter     Hypertension Daughter     No Known Problems Daughter     Heart disease Son     Early death Son     No Known Problems Maternal Aunt     No Known Problems Maternal Uncle     No Known Problems Paternal Aunt     No Known Problems Paternal Uncle     No Known Problems Maternal Grandmother     No Known Problems Maternal Grandfather     No Known Problems Paternal Grandmother     No Known Problems Paternal Grandfather     Amblyopia Neg Hx     Blindness Neg Hx     Macular degeneration Neg Hx     Retinal detachment Neg Hx     Strabismus Neg Hx     Thyroid disease Neg Hx        Review of Systems  Review of Systems   Constitutional:  Negative for malaise/fatigue and weight loss.   HENT:  Negative for hearing loss and tinnitus.    Eyes:  Negative for blurred vision and double vision.   Respiratory:  Negative for cough, shortness of breath and wheezing.    Cardiovascular:   "Positive for chest pain (tightness), palpitations ("fluttering") and PND. Negative for orthopnea, claudication and leg swelling.   Gastrointestinal:  Negative for blood in stool and melena.   Genitourinary:  Negative for flank pain and hematuria.   Musculoskeletal:  Negative for back pain, joint pain and neck pain.   Skin:  Negative for itching and rash.   Neurological:  Negative for dizziness, loss of consciousness and headaches.   Endo/Heme/Allergies:  Negative for polydipsia.   Psychiatric/Behavioral:  Negative for depression. The patient is not nervous/anxious and does not have insomnia.      A complete review of systems was otherwise negative.    Physical Exam  /74 (BP Location: Left arm, Patient Position: Sitting, BP Method: Small (Manual))   Pulse 61   Ht 5' 3" (1.6 m)   Wt 64.6 kg (142 lb 6.7 oz)   SpO2 98%   BMI 25.23 kg/m²   General appearance: alert, appears stated age, cooperative, and no distress  Head: Normocephalic, without obvious abnormality, atraumatic  Eyes: negative findings: lids and lashes normal and conjunctivae and sclerae normal  Ears: normal TM's and external ear canals both ears  Nose: Nares normal. Septum midline. Mucosa normal. No drainage or sinus tenderness.  Throat: lips, mucosa, and tongue normal; teeth and gums normal  Neck: no adenopathy, no carotid bruit, no JVD, supple, symmetrical, trachea midline, and thyroid not enlarged, symmetric, no tenderness/mass/nodules  Back: symmetric, no curvature. ROM normal. No CVA tenderness.  Lungs: clear to auscultation bilaterally  Heart: regular rate and rhythm, S1, S2 normal, no murmur, click, rub or gallop  Abdomen: soft, non-tender; bowel sounds normal; no masses,  no organomegaly  Extremities: extremities normal, atraumatic, no cyanosis or edema  Pulses: 2+ and symmetric  Skin: Skin color, texture, turgor normal. No rashes or lesions  Lymph nodes: Cervical, supraclavicular, and axillary nodes normal.  Neurologic: Grossly " normal    Assessment/Plan  Routine medical exam  Age appropriate screening recommendations and HM were discussed and updated.  Discussed importance of heart healthy diet and exercise.  Reviewed recent labs.  Follow-up TBD pending sleep study and cardiology evaluation.    Essential hypertension  The current medical regimen is effective;  continue present plan and medications.  BP WNL today.    Atherosclerosis of aorta  Stable. Statin therapy. Monitor.    Gastroesophageal reflux disease, unspecified whether esophagitis present  The current medical regimen is effective;  continue present plan and medications.    Atypical chest pain  EKG NSR with inferior/anterior infarct, age undetermined.  Referral to cardiology for further evaluation.  -     IN OFFICE EKG 12-LEAD (to [place])  -     Ambulatory referral/consult to Cardiology; Future; Expected date: 03/19/2024    At risk for sleep apnea  Nighttime waking feeling need to catch breath with heart flutters.  Proceed with sleep study for FELIPE evaluation.  -     Polysomnogram (CPAP will be added if patient meets diagnostic criteria.); Future    DDD (degenerative disc disease), lumbar  The current medical regimen is effective;  continue present plan and medications.  Followed by pain management.    Radiculopathy of thoracolumbar region  The current medical regimen is effective;  continue present plan and medications.  Followed by pain management.    Spinal stenosis, lumbar region with neurogenic claudication  The current medical regimen is effective;  continue present plan and medications.  Followed by pain management.    RLS (restless legs syndrome)  The current medical regimen is effective;  continue present plan and medications.  Followed by pain management.    Psychophysiological insomnia  We discussed not watching TV or using electronics in room.  Take Trazodone 30 minutes before desired sleep time and sleep in a dark room.  She enjoyed having the sound form the TV, suggest  white noise instead.  Will evaluate for FELIPE and make sleep hygiene changes before adding/changing Trazodone.    Patient has verbalized understanding and is in agreement with plan of care.    Follow up for TBD Pending cardiology eval and FELIPE eval.

## 2024-03-12 NOTE — PROGRESS NOTES
[facility]   Discharge Note    Name: Diana Herring  Clinic Number: [id number]    Therapy Diagnosis: No diagnosis found.  Physician: No ref. provider found    Physician Orders: PT Eval and Treat   Medical Diagnosis from Referral: M54.17 (ICD-10-CM) - Lumbosacral radiculopathy  Evaluation Date: 3/31/2023      Date of Last visit: 5/8/23  Total Visits Received: 9    ASSESSMENT        Discharge reason: Patient has not attended therapy since 5/8/23      Goals: not able to determine goal status     PLAN   This patient is discharged from Physical Therapy      Kiera Cannon, PT

## 2024-03-17 ENCOUNTER — TELEPHONE (OUTPATIENT)
Dept: PULMONOLOGY | Facility: CLINIC | Age: 79
End: 2024-03-17
Payer: MEDICARE

## 2024-03-18 ENCOUNTER — TELEPHONE (OUTPATIENT)
Dept: SLEEP MEDICINE | Facility: HOSPITAL | Age: 79
End: 2024-03-18
Payer: MEDICARE

## 2024-03-18 RX ORDER — IBUPROFEN 800 MG/1
800 TABLET ORAL 2 TIMES DAILY PRN
Qty: 60 TABLET | Refills: 1 | Status: SHIPPED | OUTPATIENT
Start: 2024-03-18 | End: 2024-05-27

## 2024-04-25 ENCOUNTER — OFFICE VISIT (OUTPATIENT)
Dept: CARDIOLOGY | Facility: CLINIC | Age: 79
End: 2024-04-25
Payer: MEDICARE

## 2024-04-25 VITALS
SYSTOLIC BLOOD PRESSURE: 142 MMHG | BODY MASS INDEX: 27.71 KG/M2 | DIASTOLIC BLOOD PRESSURE: 76 MMHG | RESPIRATION RATE: 15 BRPM | WEIGHT: 141.13 LBS | HEIGHT: 60 IN | HEART RATE: 65 BPM | OXYGEN SATURATION: 95 %

## 2024-04-25 DIAGNOSIS — I10 ESSENTIAL HYPERTENSION: Chronic | ICD-10-CM

## 2024-04-25 DIAGNOSIS — R07.9 CHEST PAIN, UNSPECIFIED TYPE: Primary | ICD-10-CM

## 2024-04-25 DIAGNOSIS — K21.9 GASTROESOPHAGEAL REFLUX DISEASE, UNSPECIFIED WHETHER ESOPHAGITIS PRESENT: Chronic | ICD-10-CM

## 2024-04-25 DIAGNOSIS — R07.89 ATYPICAL CHEST PAIN: ICD-10-CM

## 2024-04-25 PROCEDURE — 1125F AMNT PAIN NOTED PAIN PRSNT: CPT | Mod: CPTII,S$GLB,, | Performed by: INTERNAL MEDICINE

## 2024-04-25 PROCEDURE — 3288F FALL RISK ASSESSMENT DOCD: CPT | Mod: CPTII,S$GLB,, | Performed by: INTERNAL MEDICINE

## 2024-04-25 PROCEDURE — 99999 PR PBB SHADOW E&M-EST. PATIENT-LVL IV: CPT | Mod: PBBFAC,,, | Performed by: INTERNAL MEDICINE

## 2024-04-25 PROCEDURE — 3077F SYST BP >= 140 MM HG: CPT | Mod: CPTII,S$GLB,, | Performed by: INTERNAL MEDICINE

## 2024-04-25 PROCEDURE — 1101F PT FALLS ASSESS-DOCD LE1/YR: CPT | Mod: CPTII,S$GLB,, | Performed by: INTERNAL MEDICINE

## 2024-04-25 PROCEDURE — 3078F DIAST BP <80 MM HG: CPT | Mod: CPTII,S$GLB,, | Performed by: INTERNAL MEDICINE

## 2024-04-25 PROCEDURE — 99204 OFFICE O/P NEW MOD 45 MIN: CPT | Mod: S$GLB,,, | Performed by: INTERNAL MEDICINE

## 2024-04-25 PROCEDURE — 1159F MED LIST DOCD IN RCRD: CPT | Mod: CPTII,S$GLB,, | Performed by: INTERNAL MEDICINE

## 2024-04-25 NOTE — PROGRESS NOTES
CARDIOVASCULAR CONSULTATION    REASON FOR CONSULT:   Diana Herring is a 79 y.o. female who presents for   Chief Complaint   Patient presents with    Consult          HISTORY OF PRESENT ILLNESS:     Patient is a pleasant 79-year-old lady.  Here for evaluation of chest pain.  Substernal.  Sometimes occurs at rest and at other times on exertion.  But does not occur every time on exertion.  Denies orthopnea PND swelling of feet.    Results for orders placed or performed in visit on 03/12/24   IN OFFICE EKG 12-LEAD (to [employer])    Collection Time: 03/11/24  9:27 PM   Result Value Ref Range    QRS Duration 78 ms    [facility] QTC Calculation 410 ms    Narrative    Test Reason : R07.89,    Vent. Rate : 060 BPM     Atrial Rate : 060 BPM     P-R Int : 182 ms          QRS Dur : 078 ms      QT Int : 410 ms       P-R-T Axes : 006 -21 002 degrees     QTc Int : 410 ms    Normal sinus rhythm  Minimal voltage criteria for LVH, may be normal variant  Inferior infarct ,age undetermined  T wave abnormality, consider anterior ischemia  Abnormal ECG  When compared with ECG of 25-SEP-2023 16:28,  Inferior infarct is now Present  Confirmed by Vanessa Foster MD ([id number]) on 3/12/2024 11:41:24 AM    Referred By:  VIVI LOZA NP           Confirmed By:Vanessa Foster MD          ECHO    No results found for this or any previous visit.      STRESS TEST    No results found for this or any previous visit.        CATH    No results found for this or any previous visit.      PAST MEDICAL HISTORY:     Past Medical History:   Diagnosis Date    Anxiety     Arthritis     Corneal abrasion s    ? eye     Depression     Full dentures     GERD (gastroesophageal reflux disease)     Hyperlipidemia     Hypertension     Nuclear sclerosis of both eyes 6/5/2017    Osteoporosis     Restless leg syndrome        PAST SURGICAL HISTORY:     Past Surgical History:   Procedure Laterality Date    COLONOSCOPY N/A 5/14/2019    Procedure: COLONOSCOPY;  Surgeon: Nahid  MD Marlyn;  Location: [facility] ENDO;  Service: Endoscopy;  Laterality: N/A;    EPIDURAL STEROID INJECTION N/A 5/13/2020    Procedure: LUMBAR/CAUDAL L5/S1 IL SANKET ;  Surgeon: Miguel Latham MD;  Location: [facility] PAIN MGT;  Service: Pain Management;  Laterality: N/A;  NEEDS CONSENT    EPIDURAL STEROID INJECTION N/A 8/30/2023    Procedure: INJECTION, STEROID, EPIDURAL, CAUDAL W/ CATH SOONER DATE;  Surgeon: Miguel Latham MD;  Location: [facility] PAIN MGT;  Service: Pain Management;  Laterality: N/A;    EPIDURAL STEROID INJECTION N/A 1/31/2024    Procedure: CAUDAL SANKET;  Surgeon: Miguel Latham MD;  Location: [facility] PAIN MGT;  Service: Pain Management;  Laterality: N/A;  [phone]  4 WK F/U LOLIS    HYSTERECTOMY      PARTIAL HYSTERECTOMY      TRANSFORAMINAL EPIDURAL INJECTION OF STEROID Bilateral 9/4/2019    Procedure: Injection,steroid,epidural,transforaminal approach LUMBAR TRANSFORAMINAL BILATERAL L3 TF SANKET;  Surgeon: Miguel Latham MD;  Location: [facility] PAIN MGT;  Service: Pain Management;  Laterality: Bilateral;  NEEDS CONSENT    TRANSFORAMINAL EPIDURAL INJECTION OF STEROID Bilateral 10/30/2019    Procedure: TRANSFORAMINAL SANKET BILATERAL L3-L4;  Surgeon: Miguel Latham MD;  Location: [facility] PAIN MGT;  Service: Pain Management;  Laterality: Bilateral;  NEEDS CONSENT    TRANSFORAMINAL EPIDURAL INJECTION OF STEROID Bilateral 11/11/2020    Procedure: INJECTION, STEROID, EPIDURAL, TRANSFORAMINAL APPROACH, L3-L4;  Surgeon: Miguel Latham MD;  Location: [facility] PAIN MGT;  Service: Pain Management;  Laterality: Bilateral;    TRANSFORAMINAL EPIDURAL INJECTION OF STEROID Bilateral 5/19/2021    Procedure: INJECTION, STEROID, EPIDURAL, TRANSFORAMINAL APPROACH, L3-L4;  Surgeon: Miguel Latham MD;  Location: [facility] PAIN MGT;  Service: Pain Management;  Laterality: Bilateral;    TRANSFORAMINAL EPIDURAL INJECTION OF STEROID Bilateral 1/26/2022    Procedure: Injection,steroid,epidural,transforaminal approach BILATERAL  L3/4;  Surgeon: Miguel Latham MD;  Location: [facility] PAIN MGT;  Service: Pain Management;  Laterality: Bilateral;    TRANSFORAMINAL EPIDURAL INJECTION OF STEROID Bilateral 2022    Procedure: INJECTION, STEROID, EPIDURAL, TRANSFORAMINAL APPROACH, Bilateral L3-L4;  Surgeon: Miguel Latham MD;  Location: [facility] PAIN MGT;  Service: Pain Management;  Laterality: Bilateral;    TRANSFORAMINAL EPIDURAL INJECTION OF STEROID Bilateral 2022    Procedure: INJECTION, STEROID, EPIDURAL, TRANSFORAMINAL APPROACH, BILATERAL L3-L4 CONTRAST;  Surgeon: Miguel Latham MD;  Location: [facility] PAIN MGT;  Service: Pain Management;  Laterality: Bilateral;    TRANSFORAMINAL EPIDURAL INJECTION OF STEROID Right 2022    Procedure: INJECTION, STEROID, EPIDURAL, TRANSFORAMINAL APPROACH, RIGHT L3/L4 AND L4/L5 CONTRAST;  Surgeon: Miguel Latham MD;  Location: [facility] PAIN MGT;  Service: Pain Management;  Laterality: Right;    TRANSFORAMINAL EPIDURAL INJECTION OF STEROID Right 2023    Procedure: INJECTION, STEROID, EPIDURAL, TRANSFORAMINAL APPROACH RIGHT L3/4 AND L4/5 CONTRAST;  Surgeon: Miguel Latham MD;  Location: [facility] PAIN MGT;  Service: Pain Management;  Laterality: Right;    TUBAL LIGATION Bilateral            SOCIAL HISTORY:     Social History     Socioeconomic History    Marital status:    Tobacco Use    Smoking status: Never     Passive exposure: Never    Smokeless tobacco: Never   Substance and Sexual Activity    Alcohol use: Not Currently     Comment: once a year    Drug use: No    Sexual activity: Not Currently   Social History Narrative    Patient is  w/ 5 children, one  from heart disease.The patient is retired.       FAMILY HISTORY:     Family History   Problem Relation Name Age of Onset    Diabetes Mother      Stroke Mother      Glaucoma Mother      Cataracts Mother      Cancer Father          Lung    No Known Problems Sister      Stroke Brother      Hypertension Daughter Lois      Hypertension Daughter Catherine     Hypertension Daughter Kyra     No Known Problems Daughter Dyaca     Heart disease Son      Early death Son      No Known Problems Maternal Aunt      No Known Problems Maternal Uncle      No Known Problems Paternal Aunt      No Known Problems Paternal Uncle      No Known Problems Maternal Grandmother      No Known Problems Maternal Grandfather      No Known Problems Paternal Grandmother      No Known Problems Paternal Grandfather      Amblyopia Neg Hx      Blindness Neg Hx      Macular degeneration Neg Hx      Retinal detachment Neg Hx      Strabismus Neg Hx      Thyroid disease Neg Hx         REVIEW OF SYSTEMS:   Review of Systems   Constitutional: Negative.   HENT: Negative.     Eyes: Negative.    Cardiovascular:  Positive for chest pain.   Respiratory: Negative.     Endocrine: Negative.    Hematologic/Lymphatic: Negative.    Skin: Negative.    Musculoskeletal: Negative.    Gastrointestinal: Negative.    Genitourinary: Negative.    Neurological: Negative.    Psychiatric/Behavioral: Negative.     Allergic/Immunologic: Negative.        A 10 point review of systems was performed and all the pertinent positives have been mentioned. Rest of review of systems was negative.        PHYSICAL EXAM:     Vitals:    04/25/24 1113   BP: (!) 142/76   Pulse: 65   Resp: 15    Body mass index is 27.56 kg/m².  Weight: 64 kg (141 lb 1.5 oz)   Height: 5' (152.4 cm)     Physical Exam  Constitutional:       Appearance: Normal appearance. She is well-developed.   HENT:      Head: Normocephalic.   Eyes:      Pupils: Pupils are equal, round, and reactive to light.   Cardiovascular:      Rate and Rhythm: Normal rate and regular rhythm.   Pulmonary:      Effort: Pulmonary effort is normal.      Breath sounds: Normal breath sounds.   Abdominal:      General: Bowel sounds are normal.      Palpations: Abdomen is soft.      Tenderness: There is no abdominal tenderness.   Musculoskeletal:         General:  Normal range of motion.      Cervical back: Normal range of motion and neck supple.   Skin:     General: Skin is warm.   Neurological:      Mental Status: She is alert and oriented to person, place, and time.           DATA:     Laboratory:  CBC:  Recent Labs   Lab 03/07/23  0746 09/25/23  1328 03/08/24  0805   WBC 6.81 9.34 9.10   Hemoglobin 12.6 13.7 13.4   Hematocrit 40.8 42.9 41.7   Platelets 247 240 234       CHEMISTRIES:  Recent Labs   Lab 03/04/22  0840 03/07/23  0746 09/25/23  1328 03/08/24  0805   Glucose 101 113 H 106 104   Sodium 140 143 139 142   Potassium 3.6 3.4 L 4.0 3.7   BUN 14 12 16 12   Creatinine 0.8 0.8 0.9 0.8   eGFR if  >60.0  --   --   --    eGFR if non  >60.0  --   --   --    Calcium 9.4 9.4 9.7 10.1       CARDIAC BIOMARKERS:        COAGS:        LIPIDS/LFTS:  Recent Labs   Lab 03/04/22  0840 03/07/23  0746 09/25/23  1328 03/08/24  0805   Cholesterol 159 173  --  149   Triglycerides 116 111  --  119   HDL 44 37 L  --  40   LDL Cholesterol 91.8 113.8  --  85.2   Non-HDL Cholesterol 115 136  --  109   AST 17 15 20 14   ALT 20 16 22 14       Hemoglobin A1C   Date Value Ref Range Status   03/04/2022 4.9 4.0 - 5.6 % Final     Comment:     ADA Screening Guidelines:  5.7-6.4%  Consistent with prediabetes  >or=6.5%  Consistent with diabetes    High levels of fetal hemoglobin interfere with the HbA1C  assay. Heterozygous hemoglobin variants (HbS, HgC, etc)do  not significantly interfere with this assay.   However, presence of multiple variants may affect accuracy.     02/03/2017 5.0 4.5 - 6.2 % Final     Comment:     According to ADA guidelines, hemoglobin A1C <7.0% represents  optimal control in non-pregnant diabetic patients.  Different  metrics may apply to specific populations.   Standards of Medical Care in Diabetes - 2016.  For the purpose of screening for the presence of diabetes:  <5.7%     Consistent with the absence of diabetes  5.7-6.4%  Consistent with  increasing risk for diabetes   (prediabetes)  >or=6.5%  Consistent with diabetes  Currently no consensus exists for use of hemoglobin A1C  for diagnosis of diabetes for children.     08/27/2015 5.1 4.5 - 6.2 % Final       TSH        The 10-year ASCVD risk score (David JUAREZ, et al., 2019) is: 13%    Values used to calculate the score:      Age: 79 years      Sex: Female      Is Non- : Yes      Diabetic: No      Tobacco smoker: No      Systolic Blood Pressure: 142 mmHg      Is BP treated: Yes      HDL Cholesterol: 40 mg/dL      Total Cholesterol: 149 mg/dL       BNP    Lab Results   Component Value Date/Time    BNP 18 10/03/2016 03:05 PM         ASSESSMENT AND PLAN     Patient Active Problem List   Diagnosis    GERD (gastroesophageal reflux disease)    Essential hypertension    Psychophysiological insomnia    Recurrent major depressive disorder, in full remission    RLS (restless legs syndrome)    Osteoporosis    Muscle weakness    Bilateral leg pain    Left shoulder pain    Nuclear sclerosis of both eyes    Hyperopia with presbyopia of both eyes    DDD (degenerative disc disease), lumbar    Radiculopathy of thoracolumbar region    Spinal stenosis, lumbar region with neurogenic claudication    Osteoarthritis of spine    Chronic pain    Atherosclerosis of aorta    Lumbar pain    Decreased strength         ALLERGIES AND MEDICATION:   Review of patient's allergies indicates:  No Known Allergies     Medication List            Accurate as of April 25, 2024  1:31 PM. If you have any questions, ask your nurse or doctor.                CONTINUE taking these medications      alendronate 70 MG tablet  Commonly known as: FOSAMAX  TAKE 1 TABLET BY MOUTH ONE TIME PER WEEK     amLODIPine 10 MG tablet  Commonly known as: NORVASC  TAKE 1 TABLET BY MOUTH EVERY DAY     atorvastatin 40 MG tablet  Commonly known as: LIPITOR  Take 1 tablet (40 mg total) by mouth once daily.     calcium-vitamin D 500-125 mg-unit  tablet     ergocalciferol 50,000 unit Cap  Commonly known as: ERGOCALCIFEROL  TAKE 1 CAPSULE (50,000 UNITS TOTAL) BY MOUTH EVERY SUNDAY.     fluticasone propionate 50 mcg/actuation nasal spray  Commonly known as: FLONASE  2 SPRAYS (100 MCG TOTAL) BY EACH NOSTRIL ROUTE ONCE DAILY.     gabapentin 300 MG capsule  Commonly known as: NEURONTIN  Take 2 capsules (600 mg total) by mouth 2 (two) times daily.     ibuprofen 800 MG tablet  Commonly known as: ADVIL,MOTRIN  Take 1 tablet (800 mg total) by mouth 2 (two) times daily as needed for Pain.     metoprolol succinate 100 MG 24 hr tablet  Commonly known as: TOPROL-XL  TAKE 1 TABLET BY MOUTH EVERY DAY     multivitamin per tablet  Commonly known as: THERAGRAN     ondansetron 4 MG Tbdl  Commonly known as: ZOFRAN-ODT  Take 1 tablet (4 mg total) by mouth every 8 (eight) hours as needed.     pantoprazole 40 MG tablet  Commonly known as: PROTONIX  TAKE 1 TABLET BY MOUTH EVERY DAY     paroxetine 20 MG tablet  Commonly known as: PAXIL  Take 1 tablet (20 mg total) by mouth every morning.     traZODone 150 MG tablet  Commonly known as: DESYREL  TAKE 1 TABLET (150 MG TOTAL) BY MOUTH EVERY EVENING.              Orders Placed This Encounter   Procedures    Nuclear Stress - Cardiology Interpreted    Echo       Check stress test and echo.  Follow-up after testing      Thank you very much for involving me in the care of your patient.  Please do not hesitate to contact me if there are any questions.      Lopez Caraballo MD, FACC, [facility]  Interventional Cardiologist, [facility].           This note was dictated with the help of speech recognition software.  There might be un-intended errors and/or substitutions.

## 2024-04-27 DIAGNOSIS — I10 ESSENTIAL HYPERTENSION: Chronic | ICD-10-CM

## 2024-04-27 NOTE — TELEPHONE ENCOUNTER
Care Due:                  Date            Visit Type   Department     Provider  --------------------------------------------------------------------------------                                [facility] FAMILY                              PRIMARY      MED/ INTERNAL  Last Visit: 03-      CARE ([facility])   MED/ PEDS      Luisito Marin  Next Visit: None Scheduled  None         None Found                                                            Last  Test          Frequency    Reason                     Performed    Due Date  --------------------------------------------------------------------------------    Office Visit  12 months..  alendronate, amLODIPine,   03- 03-                             atorvastatin,                             ergocalciferol...........    Mg Level....  12 months..  alendronate..............  Not Found    Overdue    Phosphate...  12 months..  alendronate..............  Not Found    Overdue    [employer] Embedded Care Due Messages. Reference number: [id number].   4/27/2024 11:31:10 AM CDT

## 2024-04-29 RX ORDER — METOPROLOL SUCCINATE 100 MG/1
100 TABLET, EXTENDED RELEASE ORAL DAILY
Qty: 90 TABLET | Refills: 0 | Status: SHIPPED | OUTPATIENT
Start: 2024-04-29

## 2024-04-29 NOTE — TELEPHONE ENCOUNTER
Refill Routing Note   Medication(s) are not appropriate for processing by [facility] for the following reason(s):        Required vitals abnormal: BP (!) 142/76      [facility] action(s):  Defer   Requires appointment : Yes     Requires labs : Yes    Medication Therapy Plan:         Appointments  past 12m or future 3m with PCP    Date Provider   Last Visit   3/8/2023 Luisito Marin MD   Next Visit   Visit date not found Luisito Marin MD   ED visits in past 90 days: 0        Note composed:10:17 AM 04/29/2024

## 2024-05-10 ENCOUNTER — HOSPITAL ENCOUNTER (OUTPATIENT)
Dept: CARDIOLOGY | Facility: HOSPITAL | Age: 79
Discharge: HOME | End: 2024-05-10
Attending: INTERNAL MEDICINE
Payer: MEDICARE

## 2024-05-10 ENCOUNTER — HOSPITAL ENCOUNTER (OUTPATIENT)
Dept: RADIOLOGY | Facility: HOSPITAL | Age: 79
Discharge: HOME | End: 2024-05-10
Attending: INTERNAL MEDICINE
Payer: MEDICARE

## 2024-05-10 DIAGNOSIS — R07.9 CHEST PAIN, UNSPECIFIED TYPE: ICD-10-CM

## 2024-05-10 LAB
ASCENDING AORTA: 3.25 CM
AV INDEX (PROSTH): 0.6
AV MEAN GRADIENT: 4 MMHG
AV PEAK GRADIENT: 9 MMHG
AV REGURGITATION PRESSURE HALF TIME: 397.81 MS
AV VALVE AREA BY VELOCITY RATIO: 2.08 CM²
AV VALVE AREA: 1.86 CM²
AV VELOCITY RATIO: 0.67
CV ECHO LV RWT: 0.34 CM
CV STRESS BASE HR: 69 BPM
DIASTOLIC BLOOD PRESSURE: 64 MMHG
DOP CALC AO PEAK VEL: 1.48 M/S
DOP CALC AO VTI: 33.4 CM
DOP CALC LVOT AREA: 3.1 CM2
DOP CALC LVOT DIAMETER: 1.99 CM
DOP CALC LVOT PEAK VEL: 0.99 M/S
DOP CALC LVOT STROKE VOLUME: 62.17 CM3
DOP CALC MV VTI: 30.8 CM
DOP CALCLVOT PEAK VEL VTI: 20 CM
E WAVE DECELERATION TIME: 206.97 MSEC
E/A RATIO: 0.73
E/E' RATIO: 16.6 M/S
ECHO LV POSTERIOR WALL: 0.79 CM (ref 0.6–1.1)
FRACTIONAL SHORTENING: 35 % (ref 28–44)
INTERVENTRICULAR SEPTUM: 0.77 CM (ref 0.6–1.1)
IVC DIAMETER: 1.5 CM
IVRT: 108.47 MSEC
LA MAJOR: 4.27 CM
LA MINOR: 4.51 CM
LA WIDTH: 3.7 CM
LEFT ATRIUM SIZE: 3.49 CM
LEFT ATRIUM VOLUME: 48.15 CM3
LEFT INTERNAL DIMENSION IN SYSTOLE: 3 CM (ref 2.1–4)
LEFT VENTRICLE DIASTOLIC VOLUME: 99.79 ML
LEFT VENTRICLE SYSTOLIC VOLUME: 35 ML
LEFT VENTRICULAR INTERNAL DIMENSION IN DIASTOLE: 4.65 CM (ref 3.5–6)
LEFT VENTRICULAR MASS: 116.2 G
LV LATERAL E/E' RATIO: 16.6 M/S
LV SEPTAL E/E' RATIO: 16.6 M/S
LVOT MG: 1.79 MMHG
LVOT MV: 0.6 CM/S
MV MEAN GRADIENT: 2 MMHG
MV PEAK A VEL: 1.14 M/S
MV PEAK E VEL: 0.83 M/S
MV PEAK GRADIENT: 6 MMHG
MV STENOSIS PRESSURE HALF TIME: 60.02 MS
MV VALVE AREA BY CONTINUITY EQUATION: 2.02 CM2
MV VALVE AREA P 1/2 METHOD: 3.67 CM2
NUC STRESS EJECTION FRACTION: 74 %
OHS CV CPX 85 PERCENT MAX PREDICTED HEART RATE MALE: 121
OHS CV CPX MAX PREDICTED HEART RATE: 142
OHS CV CPX PATIENT IS FEMALE: 1
OHS CV CPX PATIENT IS MALE: 0
OHS CV CPX PEAK DIASTOLIC BLOOD PRESSURE: 58 MMHG
OHS CV CPX PEAK HEAR RATE: 94 BPM
OHS CV CPX PEAK RATE PRESSURE PRODUCT: NORMAL
OHS CV CPX PEAK SYSTOLIC BLOOD PRESSURE: 121 MMHG
OHS CV CPX PERCENT MAX PREDICTED HEART RATE ACHIEVED: 68
OHS CV CPX RATE PRESSURE PRODUCT PRESENTING: NORMAL
OHS CV RV/LV RATIO: 0.62 CM
PISA AR MAX VEL: 3.64 M/S
PISA TR MAX VEL: 2.1 M/S
PULM VEIN S/D RATIO: 1.64
PV PEAK D VEL: 0.36 M/S
PV PEAK GRADIENT: 3 MMHG
PV PEAK S VEL: 0.59 M/S
PV PEAK VELOCITY: 0.84 M/S
RA MAJOR: 4.58 CM
RA PRESSURE ESTIMATED: 3 MMHG
RA WIDTH: 2.7 CM
RIGHT VENTRICULAR END-DIASTOLIC DIMENSION: 2.88 CM
RV TB RVSP: 5 MMHG
RV TISSUE DOPPLER FREE WALL SYSTOLIC VELOCITY 1 (APICAL 4 CHAMBER VIEW): 9.84 CM/S
SINUS: 3.11 CM
STJ: 2.5 CM
SYSTOLIC BLOOD PRESSURE: 159 MMHG
TDI LATERAL: 0.05 M/S
TDI SEPTAL: 0.05 M/S
TDI: 0.05 M/S
TR MAX PG: 18 MMHG
TRICUSPID ANNULAR PLANE SYSTOLIC EXCURSION: 1.9 CM
TV REST PULMONARY ARTERY PRESSURE: 21 MMHG

## 2024-05-10 PROCEDURE — 63600175 PHARM REV CODE 636 W HCPCS: Performed by: INTERNAL MEDICINE

## 2024-05-10 PROCEDURE — 78452 HT MUSCLE IMAGE SPECT MULT: CPT | Mod: 26,,, | Performed by: INTERNAL MEDICINE

## 2024-05-10 PROCEDURE — 93306 TTE W/DOPPLER COMPLETE: CPT

## 2024-05-10 PROCEDURE — 93017 CV STRESS TEST TRACING ONLY: CPT

## 2024-05-10 PROCEDURE — 93306 TTE W/DOPPLER COMPLETE: CPT | Mod: 26,,, | Performed by: INTERNAL MEDICINE

## 2024-05-10 PROCEDURE — 78452 HT MUSCLE IMAGE SPECT MULT: CPT

## 2024-05-10 PROCEDURE — A9502 TC99M TETROFOSMIN: HCPCS | Performed by: INTERNAL MEDICINE

## 2024-05-10 PROCEDURE — 93016 CV STRESS TEST SUPVJ ONLY: CPT | Mod: ,,, | Performed by: INTERNAL MEDICINE

## 2024-05-10 PROCEDURE — 93018 CV STRESS TEST I&R ONLY: CPT | Mod: ,,, | Performed by: INTERNAL MEDICINE

## 2024-05-10 RX ORDER — REGADENOSON 0.08 MG/ML
0.4 INJECTION, SOLUTION INTRAVENOUS ONCE
Status: COMPLETED | OUTPATIENT
Start: 2024-05-10 | End: 2024-05-10

## 2024-05-10 RX ADMIN — REGADENOSON 0.4 MG: 0.08 INJECTION, SOLUTION INTRAVENOUS at 09:05

## 2024-05-10 RX ADMIN — TETROFOSMIN 10.6 MILLICURIE: 1.38 INJECTION, POWDER, LYOPHILIZED, FOR SOLUTION INTRAVENOUS at 07:05

## 2024-05-10 RX ADMIN — TETROFOSMIN 30.4 MILLICURIE: 1.38 INJECTION, POWDER, LYOPHILIZED, FOR SOLUTION INTRAVENOUS at 09:05

## 2024-05-27 RX ORDER — IBUPROFEN 800 MG/1
800 TABLET ORAL 2 TIMES DAILY PRN
Qty: 60 TABLET | Refills: 0 | Status: SHIPPED | OUTPATIENT
Start: 2024-05-27

## 2024-05-28 DIAGNOSIS — Z00.00 ENCOUNTER FOR MEDICARE ANNUAL WELLNESS EXAM: ICD-10-CM

## 2024-06-04 DIAGNOSIS — I10 ESSENTIAL HYPERTENSION: Chronic | ICD-10-CM

## 2024-06-04 DIAGNOSIS — K21.9 GASTROESOPHAGEAL REFLUX DISEASE WITHOUT ESOPHAGITIS: Chronic | ICD-10-CM

## 2024-06-04 DIAGNOSIS — E78.5 HYPERLIPIDEMIA, UNSPECIFIED HYPERLIPIDEMIA TYPE: ICD-10-CM

## 2024-06-04 RX ORDER — ATORVASTATIN CALCIUM 40 MG/1
40 TABLET, FILM COATED ORAL
Qty: 90 TABLET | Refills: 0 | Status: SHIPPED | OUTPATIENT
Start: 2024-06-04

## 2024-06-04 RX ORDER — AMLODIPINE BESYLATE 10 MG/1
10 TABLET ORAL
Qty: 90 TABLET | Refills: 0 | Status: SHIPPED | OUTPATIENT
Start: 2024-06-04

## 2024-06-04 RX ORDER — PANTOPRAZOLE SODIUM 40 MG/1
TABLET, DELAYED RELEASE ORAL
Qty: 90 TABLET | Refills: 0 | Status: SHIPPED | OUTPATIENT
Start: 2024-06-04

## 2024-06-04 NOTE — TELEPHONE ENCOUNTER
Care Due:                  Date            Visit Type   Department     Provider  --------------------------------------------------------------------------------                                [facility] FAMILY                              PRIMARY      MED/ INTERNAL  Last Visit: 03-      CARE ([facility])   MED/ PEDS      Luisito Marin  Next Visit: None Scheduled  None         None Found                                                            Last  Test          Frequency    Reason                     Performed    Due Date  --------------------------------------------------------------------------------    Vitamin D...  12 months..  alendronate,               Not Found    Overdue                             ergocalciferol...........    [employer] Embedded Care Due Messages. Reference number: [id number].   6/04/2024 12:25:46 AM CDT

## 2024-06-04 NOTE — TELEPHONE ENCOUNTER
Refill Routing Note   Medication(s) are not appropriate for processing by [facility] for the following reason(s):        Patient not seen by provider within 15 months  ED/Hospital Visit since last OV with provider  Required vitals abnormal    [facility] action(s):  Defer     Requires labs : Yes             Appointments  past 12m or future 3m with PCP    Date Provider   Last Visit   3/8/2023 Luisito Marin MD   Next Visit   Visit date not found Luisito Marin MD   ED visits in past 90 days: 0        Note composed:11:56 AM 06/04/2024

## 2024-06-10 ENCOUNTER — PATIENT MESSAGE (OUTPATIENT)
Dept: INTERNAL MEDICINE | Facility: CLINIC | Age: 79
End: 2024-06-10
Payer: MEDICARE

## 2024-07-22 DIAGNOSIS — I10 ESSENTIAL HYPERTENSION: Chronic | ICD-10-CM

## 2024-07-22 NOTE — TELEPHONE ENCOUNTER
Care Due:                  Date            Visit Type   Department     Provider  --------------------------------------------------------------------------------                                [facility] FAMILY                              PRIMARY      MED/ INTERNAL  Last Visit: 03-      CARE ([facility])   MED/ PEDS      Luisito Marin  Next Visit: None Scheduled  None         None Found                                                            Last  Test          Frequency    Reason                     Performed    Due Date  --------------------------------------------------------------------------------    Office Visit  12 months..  alendronate,               03- 03-                             ergocalciferol,                             metoprolol...............    Mg Level....  12 months..  alendronate..............  Not Found    Overdue    Phosphate...  12 months..  alendronate..............  Not Found    Overdue    [employer] Embedded Care Due Messages. Reference number: [id number].   7/22/2024 9:32:12 AM CDT

## 2024-07-23 RX ORDER — IBUPROFEN 800 MG/1
800 TABLET ORAL 2 TIMES DAILY PRN
Qty: 60 TABLET | Refills: 0 | Status: SHIPPED | OUTPATIENT
Start: 2024-07-23

## 2024-07-23 NOTE — TELEPHONE ENCOUNTER
----- Message from Zuly Jennings sent at 7/23/2024  1:00 PM CDT -----  Regarding: self  Type: RX Refill Request     Who Called:     Have you contacted your pharmacy:     Refill     RX Name and Strength:ibuprofen (ADVIL,MOTRIN) 800 MG tablet     Preferred Pharmacy with phone number:   [facility]/pharmacy [facility][id number] - [place] - [address]  [place] [address]  Phone: [phone] Fax: [phone]         Local or Mail Order:local     Would the patient rather a call back or a response via [facility]?call     Best Call Back Number: [phone]       Additional Information:     Thank you.

## 2024-07-24 RX ORDER — METOPROLOL SUCCINATE 100 MG/1
100 TABLET, EXTENDED RELEASE ORAL
Qty: 90 TABLET | Refills: 0 | Status: SHIPPED | OUTPATIENT
Start: 2024-07-24

## 2024-08-19 RX ORDER — IBUPROFEN 800 MG/1
800 TABLET ORAL 2 TIMES DAILY PRN
Qty: 60 TABLET | Refills: 0 | Status: SHIPPED | OUTPATIENT
Start: 2024-08-19

## 2024-08-22 ENCOUNTER — PATIENT MESSAGE (OUTPATIENT)
Dept: PAIN MEDICINE | Facility: OTHER | Age: 79
End: 2024-08-22
Payer: MEDICARE

## 2024-08-22 ENCOUNTER — OFFICE VISIT (OUTPATIENT)
Dept: PAIN MEDICINE | Facility: CLINIC | Age: 79
End: 2024-08-22
Payer: MEDICARE

## 2024-08-22 VITALS
BODY MASS INDEX: 27.71 KG/M2 | OXYGEN SATURATION: 100 % | WEIGHT: 141.13 LBS | SYSTOLIC BLOOD PRESSURE: 131 MMHG | RESPIRATION RATE: 18 BRPM | HEIGHT: 60 IN | TEMPERATURE: 98 F | HEART RATE: 66 BPM | DIASTOLIC BLOOD PRESSURE: 71 MMHG

## 2024-08-22 DIAGNOSIS — M54.15 RADICULOPATHY OF THORACOLUMBAR REGION: ICD-10-CM

## 2024-08-22 DIAGNOSIS — M48.062 SPINAL STENOSIS, LUMBAR REGION WITH NEUROGENIC CLAUDICATION: ICD-10-CM

## 2024-08-22 DIAGNOSIS — M54.17 LUMBOSACRAL RADICULOPATHY: Primary | ICD-10-CM

## 2024-08-22 DIAGNOSIS — M51.36 DDD (DEGENERATIVE DISC DISEASE), LUMBAR: ICD-10-CM

## 2024-08-22 DIAGNOSIS — G89.4 CHRONIC PAIN SYNDROME: ICD-10-CM

## 2024-08-22 DIAGNOSIS — M79.605 BILATERAL LEG PAIN: ICD-10-CM

## 2024-08-22 DIAGNOSIS — M47.816 LUMBAR SPONDYLOSIS: ICD-10-CM

## 2024-08-22 DIAGNOSIS — M79.604 BILATERAL LEG PAIN: ICD-10-CM

## 2024-08-22 PROCEDURE — 1160F RVW MEDS BY RX/DR IN RCRD: CPT | Mod: CPTII,S$GLB,, | Performed by: NURSE PRACTITIONER

## 2024-08-22 PROCEDURE — 1125F AMNT PAIN NOTED PAIN PRSNT: CPT | Mod: CPTII,S$GLB,, | Performed by: NURSE PRACTITIONER

## 2024-08-22 PROCEDURE — 1159F MED LIST DOCD IN RCRD: CPT | Mod: CPTII,S$GLB,, | Performed by: NURSE PRACTITIONER

## 2024-08-22 PROCEDURE — 1101F PT FALLS ASSESS-DOCD LE1/YR: CPT | Mod: CPTII,S$GLB,, | Performed by: NURSE PRACTITIONER

## 2024-08-22 PROCEDURE — 3288F FALL RISK ASSESSMENT DOCD: CPT | Mod: CPTII,S$GLB,, | Performed by: NURSE PRACTITIONER

## 2024-08-22 PROCEDURE — 99213 OFFICE O/P EST LOW 20 MIN: CPT | Mod: S$GLB,,, | Performed by: NURSE PRACTITIONER

## 2024-08-22 PROCEDURE — 99999 PR PBB SHADOW E&M-EST. PATIENT-LVL IV: CPT | Mod: PBBFAC,,, | Performed by: NURSE PRACTITIONER

## 2024-08-22 PROCEDURE — 3075F SYST BP GE 130 - 139MM HG: CPT | Mod: CPTII,S$GLB,, | Performed by: NURSE PRACTITIONER

## 2024-08-22 PROCEDURE — 3078F DIAST BP <80 MM HG: CPT | Mod: CPTII,S$GLB,, | Performed by: NURSE PRACTITIONER

## 2024-08-22 RX ORDER — GABAPENTIN 300 MG/1
600 CAPSULE ORAL 2 TIMES DAILY
Qty: 360 CAPSULE | Refills: 1 | Status: SHIPPED | OUTPATIENT
Start: 2024-08-22 | End: 2025-02-18

## 2024-08-22 NOTE — PROGRESS NOTES
Chronic patient Established Note (Follow up visit)      SUBJECTIVE:    Interval History 8/22/2024:  Diana returns today for follow up to discuss worsened back and leg pain. The pain starts across the lower back with radiation down the back of the legs. She has numbness to both legs (R>L). The pain is worse with activity. No recent falls or injury. She has had significant benefit with caudal ESIs in the past with the last being in January. She would like to repeat. Her pain today is 9/10.     Interval History 12/14/2023:  The patient presents for 3 follow up of chronic lower back pain. She continues to report radiation into the buttocks and legs. Back pain is greater than leg pain. She does have some stiffness in the morning. This improves with walking around. She also has intermittent numbness to legs. She takes Ibuprofen PRN, usually once daily. She also takes Gabapentin 600 mg BID which also helps. She says that she has been performing her home exercises. Her shoulder pain has improved with home PT regimen. Her pain today is 7/10.    Interval History 9/14/2023:  The patient is here for follow up of back pain. She is now s/p caudal SANKET with 75% relief which started to wane down at 1 week. She is still having some relief at this time. She does say that this was more helpful that more recent ESIs. She does find Ibuprofen helpful but tries not to take daily. She plans to restart her home PT exercises. She has also been having some right shoulder pain, mainly with sleeping on that side. Her pain today is 8/10.    Interval History 7/14/2023:  The patient is here for follow up of back and leg pain. The pain starts to the lower back and tailbone with radiation down the back of both legs with numbness and tingling. The pain is worse to the posterior legs but she also has pain to the front and sides of both legs. She says that the last right sided TF SANKET did not last as long as previous procedures. She has performed  home PT exercises for over 6 weeks with minimal benefit. She previously declined surgical referral for severe stenosis. Her pain today is 6/10.    Interval History 5/5/2023:  The patient returns today for follow up of back and leg pain. She has been in PT which she thinks is helping her range of motion. She is still having back pain with radiation down the right leg. There is associated numbness. She previously had benefit with SANKET but declined to repeat at last OV. She also declined surgical consult. She has benefit with Gabapentin 1200 mg daily with some benefit but it does cause some sedation. Her pain today is 6/10.    Interval History 3/23/2023:  The patient presents for follow up of chronic back pain. She is here for review up updated lumbar MRI. Her results show severe spondylitic central spinal stenosis at L3-4 and L4-5, L4-5 subarticular zones severe stenosis bilaterally and severe right-sided neural foraminal stenosis. TF ESIs have been helpful short-term. She is not interested in surgical consult at this time. No bowel or bladder incontinence. Her biggest complaint today is right sided back pain with radiation down the side of the right leg and numbness. Her pain today is 9/10.    Interval History 3/9/2023:  The patient presents today for follow up of back and leg pain. She is now s/p repeat right L3/4 and L4/5 TF SANKET on 2/8/23 with 80% benefit for about 2 weeks only. Previous did help for longer. She is reporting pain that start to right lower back with radiation into the front and back of the right leg to the anterior shin. She has numbness on the right lower leg. She feels like her pain has worsened over the past few months. Her last MRI was in 2019. She had some benefit with PT in the past and is open to repeat. Her pain today is 7/10.    Interval History 1/24/2023:  Diana is here for follow up of back and right leg pain. She is now s/p right L3/4 and L4/5 TF SANKET on 12/7/22. She reports about 80%  relief for about one month. Today, she is reporting lower back pain with radiation into the front and side of the right leg. She has numbness to the right leg intermittently without warning. She also has been having cramps to lower legs but says she thinks that she has not been drinking enough water. She completed aquatic PT which she thinks was helpful. Her pain today is 8/10.    Interval History 11/11/2022:  The patient returns today for follow up of back and leg pain. She has been in aquatic PT which she finds helpful. She attends twice weekly. Her back pain has improved somewhat because of this. She does have worsened right leg pain with walking and activity. The pain radiates down the front and side of the right leg with associated numbness. No current radiation on the left. She had benefit with ESIs in the past and wishes to repeat. Her pain today is 7/10.    Interval History 8/11/2022:  The patient is here for follow up of lower back pain. Since previous encounter, she underwent repeat L3/4 TF SANKET with 70% relief. We had originally planned for MBB but she started having more shooting pain into the legs. She feels like this was helpful. Her pain is tolerable at this time. Her pain today is 7/10.    Interval History 5/9/2022:  The patient is here for follow up of chronic back pain. She is having more back pain and stiffness in the morning. We did previously discuss lumbar MBB but she is worried about not having IV sedation. She says that she would like to further discuss the procedure today. Leg pain has been mild since previous SANKET. She does have intermittent numbness still. Back pain is greater than leg pain. Her pain today is 7/10.    Interval History 4/7/2022:  The patient is here for follow up of lower back and leg pain. Since previous encounter, she underwent bilateral L3/4 TF SANKET on 1/26/22. She reports 80% relief of pain for about 2 months. She feels like this gave her significant benefit during that  time and her pain was mild. She is now again having severe back pain with radiation into the anterior thighs. She would like to repeat the procedure. She would also like me to place another referral for aquatic PT. She stopped Mobic because she found Diclofenac more helpful. She has been taking as needed but not daily. She does find Gabapentin helpful but it sometimes makes her drowsy. Her pain today is 9/10.    Interval History 1/11/2022:  The patient returns to discuss continued lower back pain. She has not restarted aquatic PT due to increase in Covid-19 cases. She continues with sharp lower back pain that radiates into anterior thighs, bilaterally. She has associated numbness. The pain is worse with increased activity. Her pain today is 7/10.    Interval History 11/11/2021:  The patient presents today for 2 month follow up of lower back pain. Since previous encounter, she has not started aquatic PT because she is on the waiting list. She continues to report lower back pain with radiation down the left leg. We previously discussed lumbar MBB/RFA which she does not wish to pursue at this time. She states that diclofenac is not helping very much with her aching pain at this time. She denies any new weakness or symptoms at this time. Her pain today is 8/10.    Interval History 8/19/2021:  Diana returns today for follow up of chronic lower back pain. She reports that he has continued with aching pain. She has been in PT but is not finding it very helpful. She has not tried aquatic PT in the past. She is still having intermittent radiation down her legs. She has been unable to take 1200 mg daily of Gabapentin and has decreased to 600 mg daily due to sedation and dizziness with the medication. Otherwise, no new complaints today. Her pain today is 7/10.    Interval History 6/18/2021:  The patient is here for follow up of lower back pain.  She has radiation of her pain into her anterior thighs but not below the knees.   She denies numbness or tingling at this time.  Her back pain is currently greater than her leg pain.  She has a lot of stiffness when she wakes the morning states it improves when she moves.  She had limited benefit with recent repeat bilateral L3-4 transforaminal steroid injection.  Her pain today is 7/10    Interval History 5/4/2021:  The patient is here for follow up of lower back and leg pain.  Previous encounter, physical therapy was ordered.  She states that she did not start physical therapy and would like me to replace the order for her.  She does report that she is noticing more muscle fatigue and weakness particularly with activity.  She is also having more back pain with radiation into the anterior lateral thighs with the past month.  She previously had significant been hip with bilateral L3/4 transforaminal epidural steroid injection and wishes to repeat this.  She found this more helpful than previous procedures. Her pain today is 7/10.    Interval History 3/2/2021:  The patient is here for follow up of chronic pain. She is having more back pain today which she attributes to the cold front coming in. She is having radiation into the buttocks and right anterior thigh. Her back pain is her primary complaint today. She describes it as throbbing. She recently completed both Covid-19 vaccines which she tolerated well. She is now taking Gabapentin 600 mg twice daily regularly. She notices improvement in right leg numbness.  Her pain today is 7/10.    Interval History 12/1/2020:  The patient iss here for follow-up of back and leg pain.  She is now status post bilateral L3/4 transforaminal epidural steroid injection on 11/11/2020. She is reporting 100% relief for about 2 weeks and then her pain started to return.  She is still having some benefit.  Her pain is located across the lower back with radiation into front and sides of the legs to the anterior feet with associated numbness.  She feels like when she  walks sometimes her legs become weak.  This has improved slightly.  She denies any recent falls.  At her last OV, her Gabapentin was increased to 600 mg BID.  She says that she finds this helpful.  She has mild drowsiness.  She admits that she does not always take the medication and thought it was more of an as needed medication.  The patient denies any bowel or bladder incontinence or signs of saddle paresthesia.  She feels as though her pain today is worse than usual.  She rates her pain today as 10/10.    Interval history 10/15/2020:  Diana Herring presents to the clinic for a follow-up appointment for back pain. Since the last visit, Diana Herring states the pain has been worsening. Current pain intensity is 7/10. She reports that she continues to have low back pain that radiates to the bilateral hip and leg. She does report that she has noticed intermittent numbness in the right lateral and anterior thigh, that is sometimes associated with weakness in her right leg. She says that her leg gives out when this happens. She has not had any falls or other trauma. She did not get the bilateral L3/4 TFESI planned last time, and she is still taking only 300 mg bid of gabapentin.  Patient denies urinary incontinence, bowel incontinence, significant weight loss.    Initial visit:  Diana Herring presents to the clinic for the evaluation of low back pain. The pain started 2 years ago following loosing bone after a bone density and symptoms have been worsening.The pain is located in the low back area and radiates to the bilateral hip and leg.  The pain is described as aching, numbing, sharp and tight band and is rated as 8/10. The pain is rated with a score of  7/10 on the BEST day and a score of 8/10 on the WORST day.  Symptoms interfere with daily activity and sleeping. The pain is exacerbated by Sitting, Standing, Bending, Coughing/Sneezing, Walking, Morning, Lifting and Getting out of bed/chair.  The pain is  mitigated by heat, laying down and rest. She reports spending 0 hours per day reclining. The patient reports 6 hours of uninterrupted sleep per night.     Patient had L5/S1 ILESI 2 weeks ago and only gave her 1 day of relief.      Patient denies night fever/night sweats, urinary incontinence, bowel incontinence, significant weight loss and significant motor weakness.     Physical Therapy/Home Exercise: yes, was not beneficial      Pain Disability Index Review:      8/22/2024     8:48 AM 12/14/2023     8:15 AM 7/14/2023     8:42 AM   Last 3 PDI Scores   Pain Disability Index (PDI) 45 [phone]       Pain Medications:  Gabapentin 600 mg bid    Opioid Contract: no     report:  Reviewed and consistent with medication use as prescribed.    Pain Procedures:  1/31/24 Caudal SANKET- 70% relief  8/30/23 Caudal SANKET- 70% relief for 3 months  2/8/23 Right L3/4 and L4/5 TF SANKET- 80% relief for 2 weeks  12/7/22 Right L3/4 and L4/5 TF SANKET- 80% relief for one month  7/20/22 Bilateral L3/4 TF SANKET- 70% relief  4/20/22 Bilateral L3/4 TF SANKET- 75% relief of leg pain  1/26/22 Bilateral L3/4 TF SANKET  5/19/21 bilateral L3/4 TF SANKET  11/11/20 Bilateral L3/4 TF SANKET- 100% relief for 2 weeks  5/13/20 L5/S1 ILESI  10/30/19 TFESI Bilateral L3-L4  09/04/19 TFESI Bilateral L3     Physical Therapy/Home Exercise: yes    Imaging:    Narrative & Impression  EXAMINATION:  MRI LUMBAR SPINE WITHOUT CONTRAST     CLINICAL HISTORY:  Dorsalgia, unspecifiedLow back pain, symptoms persist with > 6wks conservative treatment;     TECHNIQUE:  Sagittal T1, sagittal T2, sagittal STIR, axial T1 and axial T2 weighted images of the lumbar spine obtained without contrast.     COMPARISON:  X-ray 05/29/2020 and lumbar spine radiograph 07/26/2019     FINDINGS:  Lumbar spine alignment is within normal limits. The vertebral body heights are well maintained, with no fracture.  Degenerative edema signal at opposing endplates of L4-5 and degenerative sclerosis at multiple endplates  from L2 through L5 with associated Schmorl's nodes.  Otherwise, marrow signal normal.     The conus is normal in appearance.  There are bilateral T2 hyperintense renal lesions likely related to cysts.     Bunching of cauda equina nerve roots posterior to L2 and L3 vertebral bodies.     L1-L2: Circumferential disc bulge, spondylitic spurring and mild facet arthritis.Mild central spinal stenosis.     L2-L3: Circumferential disc bulge, spondylitic spurring and facet arthritis.  Mild central spinal stenosis and left-sided foraminal stenosis.     L3-L4: Circumferential disc bulge, spondylitic spurring, severe facet arthritis and ligamentum flavum thickening produce severe central spinal stenosis and mild bilateral foraminal stenosis.     L4-L5: Circumferential disc bulge, spondylitic spurring, facet arthritis and ligamentum flavum thickening.  Bilateral facet joint effusions.  Severe central spinal stenosis and bilateral subarticular zone stenosis.  Moderate left and severe right-sided neural foraminal stenosis.     L5-S1: Circumferential disc bulge and minimal facet arthritis.  No central canal or foraminal stenosis.     Impression:     Severe spondylitic central spinal stenosis at L3-4 and L4-5.  L4-5 subarticular zones severe stenosis bilaterally and severe right-sided neural foraminal stenosis.  Additional degrees of central spinal stenosis and foraminal stenosis as above.     Bilateral renal lesions likely cysts which can be confirmed with ultrasound on a nonemergent basis.       CMP  Sodium   Date Value Ref Range Status   03/08/2024 142 136 - 145 mmol/L Final     Potassium   Date Value Ref Range Status   03/08/2024 3.7 3.5 - 5.1 mmol/L Final     Chloride   Date Value Ref Range Status   03/08/2024 105 95 - 110 mmol/L Final     CO2   Date Value Ref Range Status   03/08/2024 28 23 - 29 mmol/L Final     Glucose   Date Value Ref Range Status   03/08/2024 104 70 - 110 mg/dL Final     BUN   Date Value Ref Range Status    03/08/2024 12 8 - 23 mg/dL Final     Creatinine   Date Value Ref Range Status   03/08/2024 0.8 0.5 - 1.4 mg/dL Final     Calcium   Date Value Ref Range Status   03/08/2024 10.1 8.7 - 10.5 mg/dL Final     Total Protein   Date Value Ref Range Status   03/08/2024 8.0 6.0 - 8.4 g/dL Final     Albumin   Date Value Ref Range Status   03/08/2024 4.1 3.5 - 5.2 g/dL Final     Total Bilirubin   Date Value Ref Range Status   03/08/2024 0.8 0.1 - 1.0 mg/dL Final     Comment:     For infants and newborns, interpretation of results should be based  on gestational age, weight and in agreement with clinical  observations.    Premature Infant recommended reference ranges:  Up to 24 hours.............<8.0 mg/dL  Up to 48 hours............<12.0 mg/dL  3-5 days..................<15.0 mg/dL  6-29 days.................<15.0 mg/dL       Alkaline Phosphatase   Date Value Ref Range Status   03/08/2024 89 55 - 135 U/L Final     AST   Date Value Ref Range Status   03/08/2024 14 10 - 40 U/L Final     ALT   Date Value Ref Range Status   03/08/2024 14 10 - 44 U/L Final     Anion Gap   Date Value Ref Range Status   03/08/2024 9 8 - 16 mmol/L Final     eGFR if    Date Value Ref Range Status   03/04/2022 >60.0 >60 mL/min/1.73 m^2 Final     eGFR if non    Date Value Ref Range Status   03/04/2022 >60.0 >60 mL/min/1.73 m^2 Final     Comment:     Calculation used to obtain the estimated glomerular filtration  rate (eGFR) is the CKD-EPI equation.            Allergies: Review of patient's allergies indicates:  No Known Allergies    Current Medications:   Current Outpatient Medications   Medication Sig Dispense Refill    alendronate (FOSAMAX) 70 MG tablet TAKE 1 TABLET BY MOUTH ONE TIME PER WEEK 12 tablet 1    amLODIPine (NORVASC) 10 MG tablet TAKE 1 TABLET BY MOUTH EVERY DAY 90 tablet 0    atorvastatin (LIPITOR) 40 MG tablet TAKE 1 TABLET BY MOUTH EVERY DAY 90 tablet 0    calcium-vitamin D 500-125 mg-unit tablet Take 1  tablet by mouth once daily.      ergocalciferol (ERGOCALCIFEROL) 50,000 unit Cap TAKE 1 CAPSULE (50,000 UNITS TOTAL) BY MOUTH EVERY SUNDAY. 12 capsule 1    fluticasone propionate (FLONASE) 50 mcg/actuation nasal spray 2 SPRAYS (100 MCG TOTAL) BY EACH NOSTRIL ROUTE ONCE DAILY. 48 mL 3    ibuprofen (ADVIL,MOTRIN) 800 MG tablet TAKE 1 TABLET BY MOUTH 2 TIMES DAILY AS NEEDED. 60 tablet 0    metoprolol succinate (TOPROL-XL) 100 MG 24 hr tablet TAKE 1 TABLET BY MOUTH EVERY DAY 90 tablet 0    multivitamin (THERAGRAN) per tablet Take 1 tablet by mouth once daily.      ondansetron (ZOFRAN-ODT) 4 MG TbDL Take 1 tablet (4 mg total) by mouth every 8 (eight) hours as needed. 20 tablet 0    pantoprazole (PROTONIX) 40 MG tablet TAKE 1 TABLET BY MOUTH EVERY DAY 90 tablet 0    gabapentin (NEURONTIN) 300 MG capsule Take 2 capsules (600 mg total) by mouth 2 (two) times daily. 360 capsule 1    paroxetine (PAXIL) 20 MG tablet Take 1 tablet (20 mg total) by mouth every morning. 90 tablet 1    traZODone (DESYREL) 150 MG tablet TAKE 1 TABLET (150 MG TOTAL) BY MOUTH EVERY EVENING. 90 tablet 1     No current facility-administered medications for this visit.       REVIEW OF SYSTEMS:    GENERAL:  No weight loss, malaise or fevers.  HEENT:  Negative for frequent or significant headaches.  NECK:  Negative for lumps, goiter, pain and significant neck swelling.  RESPIRATORY:  Negative for cough, wheezing or shortness of breath.  CARDIOVASCULAR:  Negative for chest pain, leg swelling or palpitations.  GI:  Negative for abdominal discomfort, blood in stools or black stools or change in bowel habits. GERD.  MUSCULOSKELETAL:  See HPI.  SKIN:  Negative for lesions, rash, and itching.  PSYCH:  + for sleep disturbance, h/o depression  HEMATOLOGY/LYMPHOLOGY:  Negative for prolonged bleeding, bruising easily or swollen nodes.  NEURO:  + numbness. No history of headaches, syncope, paralysis, seizures or tremors.  All other reviewed and negative other than  HPI.     Past Medical History:  Past Medical History:   Diagnosis Date    Anxiety     Arthritis     Corneal abrasion s    ? eye     Depression     Full dentures     GERD (gastroesophageal reflux disease)     Hyperlipidemia     Hypertension     Nuclear sclerosis of both eyes 6/5/2017    Osteoporosis     Restless leg syndrome        Past Surgical History:  Past Surgical History:   Procedure Laterality Date    COLONOSCOPY N/A 5/14/2019    Procedure: COLONOSCOPY;  Surgeon: Nahid Cline MD;  Location: [facility] ENDO;  Service: Endoscopy;  Laterality: N/A;    EPIDURAL STEROID INJECTION N/A 5/13/2020    Procedure: LUMBAR/CAUDAL L5/S1 IL SANKET ;  Surgeon: Miguel Latham MD;  Location: [facility] PAIN MGT;  Service: Pain Management;  Laterality: N/A;  NEEDS CONSENT    EPIDURAL STEROID INJECTION N/A 8/30/2023    Procedure: INJECTION, STEROID, EPIDURAL, CAUDAL W/ CATH SOONER DATE;  Surgeon: Miguel Latham MD;  Location: [facility] PAIN MGT;  Service: Pain Management;  Laterality: N/A;    EPIDURAL STEROID INJECTION N/A 1/31/2024    Procedure: CAUDAL SANKET;  Surgeon: Miguel Latham MD;  Location: [facility] PAIN MGT;  Service: Pain Management;  Laterality: N/A;  [phone]  4 WK F/U LOLIS    HYSTERECTOMY      PARTIAL HYSTERECTOMY      TRANSFORAMINAL EPIDURAL INJECTION OF STEROID Bilateral 9/4/2019    Procedure: Injection,steroid,epidural,transforaminal approach LUMBAR TRANSFORAMINAL BILATERAL L3 TF SANKET;  Surgeon: Miguel Latham MD;  Location: [facility] PAIN MGT;  Service: Pain Management;  Laterality: Bilateral;  NEEDS CONSENT    TRANSFORAMINAL EPIDURAL INJECTION OF STEROID Bilateral 10/30/2019    Procedure: TRANSFORAMINAL SANKET BILATERAL L3-L4;  Surgeon: Miguel Latham MD;  Location: [facility] PAIN MGT;  Service: Pain Management;  Laterality: Bilateral;  NEEDS CONSENT    TRANSFORAMINAL EPIDURAL INJECTION OF STEROID Bilateral 11/11/2020    Procedure: INJECTION, STEROID, EPIDURAL, TRANSFORAMINAL APPROACH, L3-L4;  Surgeon: Miguel Latham  MD;  Location: [facility] PAIN MGT;  Service: Pain Management;  Laterality: Bilateral;    TRANSFORAMINAL EPIDURAL INJECTION OF STEROID Bilateral 5/19/2021    Procedure: INJECTION, STEROID, EPIDURAL, TRANSFORAMINAL APPROACH, L3-L4;  Surgeon: Miguel Latham MD;  Location: [facility] PAIN MGT;  Service: Pain Management;  Laterality: Bilateral;    TRANSFORAMINAL EPIDURAL INJECTION OF STEROID Bilateral 1/26/2022    Procedure: Injection,steroid,epidural,transforaminal approach BILATERAL L3/4;  Surgeon: iMguel Latham MD;  Location: [facility] PAIN MGT;  Service: Pain Management;  Laterality: Bilateral;    TRANSFORAMINAL EPIDURAL INJECTION OF STEROID Bilateral 4/20/2022    Procedure: INJECTION, STEROID, EPIDURAL, TRANSFORAMINAL APPROACH, Bilateral L3-L4;  Surgeon: Miguel Latham MD;  Location: [facility] PAIN MGT;  Service: Pain Management;  Laterality: Bilateral;    TRANSFORAMINAL EPIDURAL INJECTION OF STEROID Bilateral 7/20/2022    Procedure: INJECTION, STEROID, EPIDURAL, TRANSFORAMINAL APPROACH, BILATERAL L3-L4 CONTRAST;  Surgeon: Miguel Latham MD;  Location: [facility] PAIN MGT;  Service: Pain Management;  Laterality: Bilateral;    TRANSFORAMINAL EPIDURAL INJECTION OF STEROID Right 12/7/2022    Procedure: INJECTION, STEROID, EPIDURAL, TRANSFORAMINAL APPROACH, RIGHT L3/L4 AND L4/L5 CONTRAST;  Surgeon: Miguel Latham MD;  Location: [facility] PAIN MGT;  Service: Pain Management;  Laterality: Right;    TRANSFORAMINAL EPIDURAL INJECTION OF STEROID Right 2/8/2023    Procedure: INJECTION, STEROID, EPIDURAL, TRANSFORAMINAL APPROACH RIGHT L3/4 AND L4/5 CONTRAST;  Surgeon: Miguel Latham MD;  Location: [facility] PAIN MGT;  Service: Pain Management;  Laterality: Right;    TUBAL LIGATION Bilateral        Family History:  Family History   Problem Relation Name Age of Onset    Diabetes Mother      Stroke Mother      Glaucoma Mother      Cataracts Mother      Cancer Father          Lung    No Known Problems Sister      Stroke Brother       Hypertension Daughter Lois     Hypertension Daughter Catherine     Hypertension Daughter Kyra     No Known Problems Daughter Cherry     Heart disease Son      Early death Son      No Known Problems Maternal Aunt      No Known Problems Maternal Uncle      No Known Problems Paternal Aunt      No Known Problems Paternal Uncle      No Known Problems Maternal Grandmother      No Known Problems Maternal Grandfather      No Known Problems Paternal Grandmother      No Known Problems Paternal Grandfather      Amblyopia Neg Hx      Blindness Neg Hx      Macular degeneration Neg Hx      Retinal detachment Neg Hx      Strabismus Neg Hx      Thyroid disease Neg Hx         Social History:  Social History     Socioeconomic History    Marital status:    Tobacco Use    Smoking status: Never     Passive exposure: Never    Smokeless tobacco: Never   Substance and Sexual Activity    Alcohol use: Not Currently     Comment: once a year    Drug use: No    Sexual activity: Not Currently   Social History Narrative    Patient is  w/ 5 children, one  from heart disease.The patient is retired.       OBJECTIVE:    /71   Pulse 66   Temp 98 °F (36.7 °C)   Resp 18   Ht 5' (1.524 m)   Wt 64 kg (141 lb 1.5 oz)   SpO2 100%   BMI 27.56 kg/m²     PHYSICAL EXAMINATION:    General appearance: Well appearing, in no acute distress, alert and oriented x3.  Psych:  Mood and affect appropriate.  Skin: Skin color, texture, turgor normal, no rashes or lesions, in both upper and lower body.  Head/face:  Normocephalic, atraumatic. No palpable lymph nodes.  Back: Straight leg raising in the sitting position is positive on the right. Full range of motion with pain on flexion. Positive facet loading bilaterally.  Extremities: Peripheral joint ROM is full and pain free without obvious instability or laxity in all four extremities. No deformities, edema, or skin discoloration. Good capillary refill.  Musculoskeletal: No atrophy or  tone abnormalities are noted. 4/5 strength in right ankle with plantar and 4/5 on dorsiflexion. 5/5 strength in left ankle with plantar and 4/5 on dorsiflexion. 4/5 strength with right knee flexion and extension. 5/5 strength with left knee flexion and extension. No TTP over right shoulder or subacromial bursa. Full ROM of right shoulder without pain.  Neuro: Decreased sensation to BLE.  Gait: Antalgic- ambulates without assistance.    ASSESSMENT: 79 y.o. year old female with lower back and bilateral leg pain, consistent with     1. Lumbosacral radiculopathy  Procedure Order to Pain Management      2. Chronic pain syndrome        3. Lumbar spondylosis        4. Spinal stenosis, lumbar region with neurogenic claudication  gabapentin (NEURONTIN) 300 MG capsule      5. Radiculopathy of thoracolumbar region  gabapentin (NEURONTIN) 300 MG capsule      6. DDD (degenerative disc disease), lumbar  gabapentin (NEURONTIN) 300 MG capsule      7. Bilateral leg pain  gabapentin (NEURONTIN) 300 MG capsule          PLAN:     - Previous lumbar MRI was reviewed and discussed with the patient today.  - She had 70% relief with caudal SANKET for 6 months. She would like to repeat. Orders placed today.  - I have stressed the importance of physical activity and a home exercise plan to help with pain and improve health.  - Patient can continue with medications for now since they are providing benefits, using them appropriately, and without side effects.  - Continue Gabapentin 600 mg BID.  - Can continue Ibuprofen PRN.  - RTC 2 weeks after SANKET.    The above plan and management options were discussed at length with patient. Patient is in agreement with the above and verbalized understanding.    Christin Ventura, BRENDA  08/22/2024

## 2024-08-22 NOTE — H&P (VIEW-ONLY)
Chronic patient Established Note (Follow up visit)      SUBJECTIVE:    Interval History 8/22/2024:  Diana returns today for follow up to discuss worsened back and leg pain. The pain starts across the lower back with radiation down the back of the legs. She has numbness to both legs (R>L). The pain is worse with activity. No recent falls or injury. She has had significant benefit with caudal ESIs in the past with the last being in January. She would like to repeat. Her pain today is 9/10.     Interval History 12/14/2023:  The patient presents for 3 follow up of chronic lower back pain. She continues to report radiation into the buttocks and legs. Back pain is greater than leg pain. She does have some stiffness in the morning. This improves with walking around. She also has intermittent numbness to legs. She takes Ibuprofen PRN, usually once daily. She also takes Gabapentin 600 mg BID which also helps. She says that she has been performing her home exercises. Her shoulder pain has improved with home PT regimen. Her pain today is 7/10.    Interval History 9/14/2023:  The patient is here for follow up of back pain. She is now s/p caudal SANKET with 75% relief which started to wane down at 1 week. She is still having some relief at this time. She does say that this was more helpful that more recent ESIs. She does find Ibuprofen helpful but tries not to take daily. She plans to restart her home PT exercises. She has also been having some right shoulder pain, mainly with sleeping on that side. Her pain today is 8/10.    Interval History 7/14/2023:  The patient is here for follow up of back and leg pain. The pain starts to the lower back and tailbone with radiation down the back of both legs with numbness and tingling. The pain is worse to the posterior legs but she also has pain to the front and sides of both legs. She says that the last right sided TF SANKET did not last as long as previous procedures. She has performed  home PT exercises for over 6 weeks with minimal benefit. She previously declined surgical referral for severe stenosis. Her pain today is 6/10.    Interval History 5/5/2023:  The patient returns today for follow up of back and leg pain. She has been in PT which she thinks is helping her range of motion. She is still having back pain with radiation down the right leg. There is associated numbness. She previously had benefit with SANKET but declined to repeat at last OV. She also declined surgical consult. She has benefit with Gabapentin 1200 mg daily with some benefit but it does cause some sedation. Her pain today is 6/10.    Interval History 3/23/2023:  The patient presents for follow up of chronic back pain. She is here for review up updated lumbar MRI. Her results show severe spondylitic central spinal stenosis at L3-4 and L4-5, L4-5 subarticular zones severe stenosis bilaterally and severe right-sided neural foraminal stenosis. TF ESIs have been helpful short-term. She is not interested in surgical consult at this time. No bowel or bladder incontinence. Her biggest complaint today is right sided back pain with radiation down the side of the right leg and numbness. Her pain today is 9/10.    Interval History 3/9/2023:  The patient presents today for follow up of back and leg pain. She is now s/p repeat right L3/4 and L4/5 TF SANKET on 2/8/23 with 80% benefit for about 2 weeks only. Previous did help for longer. She is reporting pain that start to right lower back with radiation into the front and back of the right leg to the anterior shin. She has numbness on the right lower leg. She feels like her pain has worsened over the past few months. Her last MRI was in 2019. She had some benefit with PT in the past and is open to repeat. Her pain today is 7/10.    Interval History 1/24/2023:  Diana is here for follow up of back and right leg pain. She is now s/p right L3/4 and L4/5 TF SANKET on 12/7/22. She reports about 80%  relief for about one month. Today, she is reporting lower back pain with radiation into the front and side of the right leg. She has numbness to the right leg intermittently without warning. She also has been having cramps to lower legs but says she thinks that she has not been drinking enough water. She completed aquatic PT which she thinks was helpful. Her pain today is 8/10.    Interval History 11/11/2022:  The patient returns today for follow up of back and leg pain. She has been in aquatic PT which she finds helpful. She attends twice weekly. Her back pain has improved somewhat because of this. She does have worsened right leg pain with walking and activity. The pain radiates down the front and side of the right leg with associated numbness. No current radiation on the left. She had benefit with ESIs in the past and wishes to repeat. Her pain today is 7/10.    Interval History 8/11/2022:  The patient is here for follow up of lower back pain. Since previous encounter, she underwent repeat L3/4 TF SANKET with 70% relief. We had originally planned for MBB but she started having more shooting pain into the legs. She feels like this was helpful. Her pain is tolerable at this time. Her pain today is 7/10.    Interval History 5/9/2022:  The patient is here for follow up of chronic back pain. She is having more back pain and stiffness in the morning. We did previously discuss lumbar MBB but she is worried about not having IV sedation. She says that she would like to further discuss the procedure today. Leg pain has been mild since previous SANKET. She does have intermittent numbness still. Back pain is greater than leg pain. Her pain today is 7/10.    Interval History 4/7/2022:  The patient is here for follow up of lower back and leg pain. Since previous encounter, she underwent bilateral L3/4 TF SANKET on 1/26/22. She reports 80% relief of pain for about 2 months. She feels like this gave her significant benefit during that  time and her pain was mild. She is now again having severe back pain with radiation into the anterior thighs. She would like to repeat the procedure. She would also like me to place another referral for aquatic PT. She stopped Mobic because she found Diclofenac more helpful. She has been taking as needed but not daily. She does find Gabapentin helpful but it sometimes makes her drowsy. Her pain today is 9/10.    Interval History 1/11/2022:  The patient returns to discuss continued lower back pain. She has not restarted aquatic PT due to increase in Covid-19 cases. She continues with sharp lower back pain that radiates into anterior thighs, bilaterally. She has associated numbness. The pain is worse with increased activity. Her pain today is 7/10.    Interval History 11/11/2021:  The patient presents today for 2 month follow up of lower back pain. Since previous encounter, she has not started aquatic PT because she is on the waiting list. She continues to report lower back pain with radiation down the left leg. We previously discussed lumbar MBB/RFA which she does not wish to pursue at this time. She states that diclofenac is not helping very much with her aching pain at this time. She denies any new weakness or symptoms at this time. Her pain today is 8/10.    Interval History 8/19/2021:  Diana returns today for follow up of chronic lower back pain. She reports that he has continued with aching pain. She has been in PT but is not finding it very helpful. She has not tried aquatic PT in the past. She is still having intermittent radiation down her legs. She has been unable to take 1200 mg daily of Gabapentin and has decreased to 600 mg daily due to sedation and dizziness with the medication. Otherwise, no new complaints today. Her pain today is 7/10.    Interval History 6/18/2021:  The patient is here for follow up of lower back pain.  She has radiation of her pain into her anterior thighs but not below the knees.   She denies numbness or tingling at this time.  Her back pain is currently greater than her leg pain.  She has a lot of stiffness when she wakes the morning states it improves when she moves.  She had limited benefit with recent repeat bilateral L3-4 transforaminal steroid injection.  Her pain today is 7/10    Interval History 5/4/2021:  The patient is here for follow up of lower back and leg pain.  Previous encounter, physical therapy was ordered.  She states that she did not start physical therapy and would like me to replace the order for her.  She does report that she is noticing more muscle fatigue and weakness particularly with activity.  She is also having more back pain with radiation into the anterior lateral thighs with the past month.  She previously had significant been hip with bilateral L3/4 transforaminal epidural steroid injection and wishes to repeat this.  She found this more helpful than previous procedures. Her pain today is 7/10.    Interval History 3/2/2021:  The patient is here for follow up of chronic pain. She is having more back pain today which she attributes to the cold front coming in. She is having radiation into the buttocks and right anterior thigh. Her back pain is her primary complaint today. She describes it as throbbing. She recently completed both Covid-19 vaccines which she tolerated well. She is now taking Gabapentin 600 mg twice daily regularly. She notices improvement in right leg numbness.  Her pain today is 7/10.    Interval History 12/1/2020:  The patient iss here for follow-up of back and leg pain.  She is now status post bilateral L3/4 transforaminal epidural steroid injection on 11/11/2020. She is reporting 100% relief for about 2 weeks and then her pain started to return.  She is still having some benefit.  Her pain is located across the lower back with radiation into front and sides of the legs to the anterior feet with associated numbness.  She feels like when she  walks sometimes her legs become weak.  This has improved slightly.  She denies any recent falls.  At her last OV, her Gabapentin was increased to 600 mg BID.  She says that she finds this helpful.  She has mild drowsiness.  She admits that she does not always take the medication and thought it was more of an as needed medication.  The patient denies any bowel or bladder incontinence or signs of saddle paresthesia.  She feels as though her pain today is worse than usual.  She rates her pain today as 10/10.    Interval history 10/15/2020:  Diana Herring presents to the clinic for a follow-up appointment for back pain. Since the last visit, Diana Herring states the pain has been worsening. Current pain intensity is 7/10. She reports that she continues to have low back pain that radiates to the bilateral hip and leg. She does report that she has noticed intermittent numbness in the right lateral and anterior thigh, that is sometimes associated with weakness in her right leg. She says that her leg gives out when this happens. She has not had any falls or other trauma. She did not get the bilateral L3/4 TFESI planned last time, and she is still taking only 300 mg bid of gabapentin.  Patient denies urinary incontinence, bowel incontinence, significant weight loss.    Initial visit:  Diana Herring presents to the clinic for the evaluation of low back pain. The pain started 2 years ago following loosing bone after a bone density and symptoms have been worsening.The pain is located in the low back area and radiates to the bilateral hip and leg.  The pain is described as aching, numbing, sharp and tight band and is rated as 8/10. The pain is rated with a score of  7/10 on the BEST day and a score of 8/10 on the WORST day.  Symptoms interfere with daily activity and sleeping. The pain is exacerbated by Sitting, Standing, Bending, Coughing/Sneezing, Walking, Morning, Lifting and Getting out of bed/chair.  The pain is  mitigated by heat, laying down and rest. She reports spending 0 hours per day reclining. The patient reports 6 hours of uninterrupted sleep per night.     Patient had L5/S1 ILESI 2 weeks ago and only gave her 1 day of relief.      Patient denies night fever/night sweats, urinary incontinence, bowel incontinence, significant weight loss and significant motor weakness.     Physical Therapy/Home Exercise: yes, was not beneficial      Pain Disability Index Review:      8/22/2024     8:48 AM 12/14/2023     8:15 AM 7/14/2023     8:42 AM   Last 3 PDI Scores   Pain Disability Index (PDI) 45 [phone]       Pain Medications:  Gabapentin 600 mg bid    Opioid Contract: no     report:  Reviewed and consistent with medication use as prescribed.    Pain Procedures:  1/31/24 Caudal SANKET- 70% relief  8/30/23 Caudal SANKET- 70% relief for 3 months  2/8/23 Right L3/4 and L4/5 TF SANKET- 80% relief for 2 weeks  12/7/22 Right L3/4 and L4/5 TF SANKET- 80% relief for one month  7/20/22 Bilateral L3/4 TF SANKET- 70% relief  4/20/22 Bilateral L3/4 TF SANKET- 75% relief of leg pain  1/26/22 Bilateral L3/4 TF SANKET  5/19/21 bilateral L3/4 TF SANKET  11/11/20 Bilateral L3/4 TF SANKET- 100% relief for 2 weeks  5/13/20 L5/S1 ILESI  10/30/19 TFESI Bilateral L3-L4  09/04/19 TFESI Bilateral L3     Physical Therapy/Home Exercise: yes    Imaging:    Narrative & Impression  EXAMINATION:  MRI LUMBAR SPINE WITHOUT CONTRAST     CLINICAL HISTORY:  Dorsalgia, unspecifiedLow back pain, symptoms persist with > 6wks conservative treatment;     TECHNIQUE:  Sagittal T1, sagittal T2, sagittal STIR, axial T1 and axial T2 weighted images of the lumbar spine obtained without contrast.     COMPARISON:  X-ray 05/29/2020 and lumbar spine radiograph 07/26/2019     FINDINGS:  Lumbar spine alignment is within normal limits. The vertebral body heights are well maintained, with no fracture.  Degenerative edema signal at opposing endplates of L4-5 and degenerative sclerosis at multiple endplates  from L2 through L5 with associated Schmorl's nodes.  Otherwise, marrow signal normal.     The conus is normal in appearance.  There are bilateral T2 hyperintense renal lesions likely related to cysts.     Bunching of cauda equina nerve roots posterior to L2 and L3 vertebral bodies.     L1-L2: Circumferential disc bulge, spondylitic spurring and mild facet arthritis.Mild central spinal stenosis.     L2-L3: Circumferential disc bulge, spondylitic spurring and facet arthritis.  Mild central spinal stenosis and left-sided foraminal stenosis.     L3-L4: Circumferential disc bulge, spondylitic spurring, severe facet arthritis and ligamentum flavum thickening produce severe central spinal stenosis and mild bilateral foraminal stenosis.     L4-L5: Circumferential disc bulge, spondylitic spurring, facet arthritis and ligamentum flavum thickening.  Bilateral facet joint effusions.  Severe central spinal stenosis and bilateral subarticular zone stenosis.  Moderate left and severe right-sided neural foraminal stenosis.     L5-S1: Circumferential disc bulge and minimal facet arthritis.  No central canal or foraminal stenosis.     Impression:     Severe spondylitic central spinal stenosis at L3-4 and L4-5.  L4-5 subarticular zones severe stenosis bilaterally and severe right-sided neural foraminal stenosis.  Additional degrees of central spinal stenosis and foraminal stenosis as above.     Bilateral renal lesions likely cysts which can be confirmed with ultrasound on a nonemergent basis.       CMP  Sodium   Date Value Ref Range Status   03/08/2024 142 136 - 145 mmol/L Final     Potassium   Date Value Ref Range Status   03/08/2024 3.7 3.5 - 5.1 mmol/L Final     Chloride   Date Value Ref Range Status   03/08/2024 105 95 - 110 mmol/L Final     CO2   Date Value Ref Range Status   03/08/2024 28 23 - 29 mmol/L Final     Glucose   Date Value Ref Range Status   03/08/2024 104 70 - 110 mg/dL Final     BUN   Date Value Ref Range Status    03/08/2024 12 8 - 23 mg/dL Final     Creatinine   Date Value Ref Range Status   03/08/2024 0.8 0.5 - 1.4 mg/dL Final     Calcium   Date Value Ref Range Status   03/08/2024 10.1 8.7 - 10.5 mg/dL Final     Total Protein   Date Value Ref Range Status   03/08/2024 8.0 6.0 - 8.4 g/dL Final     Albumin   Date Value Ref Range Status   03/08/2024 4.1 3.5 - 5.2 g/dL Final     Total Bilirubin   Date Value Ref Range Status   03/08/2024 0.8 0.1 - 1.0 mg/dL Final     Comment:     For infants and newborns, interpretation of results should be based  on gestational age, weight and in agreement with clinical  observations.    Premature Infant recommended reference ranges:  Up to 24 hours.............<8.0 mg/dL  Up to 48 hours............<12.0 mg/dL  3-5 days..................<15.0 mg/dL  6-29 days.................<15.0 mg/dL       Alkaline Phosphatase   Date Value Ref Range Status   03/08/2024 89 55 - 135 U/L Final     AST   Date Value Ref Range Status   03/08/2024 14 10 - 40 U/L Final     ALT   Date Value Ref Range Status   03/08/2024 14 10 - 44 U/L Final     Anion Gap   Date Value Ref Range Status   03/08/2024 9 8 - 16 mmol/L Final     eGFR if    Date Value Ref Range Status   03/04/2022 >60.0 >60 mL/min/1.73 m^2 Final     eGFR if non    Date Value Ref Range Status   03/04/2022 >60.0 >60 mL/min/1.73 m^2 Final     Comment:     Calculation used to obtain the estimated glomerular filtration  rate (eGFR) is the CKD-EPI equation.            Allergies: Review of patient's allergies indicates:  No Known Allergies    Current Medications:   Current Outpatient Medications   Medication Sig Dispense Refill    alendronate (FOSAMAX) 70 MG tablet TAKE 1 TABLET BY MOUTH ONE TIME PER WEEK 12 tablet 1    amLODIPine (NORVASC) 10 MG tablet TAKE 1 TABLET BY MOUTH EVERY DAY 90 tablet 0    atorvastatin (LIPITOR) 40 MG tablet TAKE 1 TABLET BY MOUTH EVERY DAY 90 tablet 0    calcium-vitamin D 500-125 mg-unit tablet Take 1  tablet by mouth once daily.      ergocalciferol (ERGOCALCIFEROL) 50,000 unit Cap TAKE 1 CAPSULE (50,000 UNITS TOTAL) BY MOUTH EVERY SUNDAY. 12 capsule 1    fluticasone propionate (FLONASE) 50 mcg/actuation nasal spray 2 SPRAYS (100 MCG TOTAL) BY EACH NOSTRIL ROUTE ONCE DAILY. 48 mL 3    ibuprofen (ADVIL,MOTRIN) 800 MG tablet TAKE 1 TABLET BY MOUTH 2 TIMES DAILY AS NEEDED. 60 tablet 0    metoprolol succinate (TOPROL-XL) 100 MG 24 hr tablet TAKE 1 TABLET BY MOUTH EVERY DAY 90 tablet 0    multivitamin (THERAGRAN) per tablet Take 1 tablet by mouth once daily.      ondansetron (ZOFRAN-ODT) 4 MG TbDL Take 1 tablet (4 mg total) by mouth every 8 (eight) hours as needed. 20 tablet 0    pantoprazole (PROTONIX) 40 MG tablet TAKE 1 TABLET BY MOUTH EVERY DAY 90 tablet 0    gabapentin (NEURONTIN) 300 MG capsule Take 2 capsules (600 mg total) by mouth 2 (two) times daily. 360 capsule 1    paroxetine (PAXIL) 20 MG tablet Take 1 tablet (20 mg total) by mouth every morning. 90 tablet 1    traZODone (DESYREL) 150 MG tablet TAKE 1 TABLET (150 MG TOTAL) BY MOUTH EVERY EVENING. 90 tablet 1     No current facility-administered medications for this visit.       REVIEW OF SYSTEMS:    GENERAL:  No weight loss, malaise or fevers.  HEENT:  Negative for frequent or significant headaches.  NECK:  Negative for lumps, goiter, pain and significant neck swelling.  RESPIRATORY:  Negative for cough, wheezing or shortness of breath.  CARDIOVASCULAR:  Negative for chest pain, leg swelling or palpitations.  GI:  Negative for abdominal discomfort, blood in stools or black stools or change in bowel habits. GERD.  MUSCULOSKELETAL:  See HPI.  SKIN:  Negative for lesions, rash, and itching.  PSYCH:  + for sleep disturbance, h/o depression  HEMATOLOGY/LYMPHOLOGY:  Negative for prolonged bleeding, bruising easily or swollen nodes.  NEURO:  + numbness. No history of headaches, syncope, paralysis, seizures or tremors.  All other reviewed and negative other than  HPI.     Past Medical History:  Past Medical History:   Diagnosis Date    Anxiety     Arthritis     Corneal abrasion s    ? eye     Depression     Full dentures     GERD (gastroesophageal reflux disease)     Hyperlipidemia     Hypertension     Nuclear sclerosis of both eyes 6/5/2017    Osteoporosis     Restless leg syndrome        Past Surgical History:  Past Surgical History:   Procedure Laterality Date    COLONOSCOPY N/A 5/14/2019    Procedure: COLONOSCOPY;  Surgeon: Nahid Cline MD;  Location: [facility] ENDO;  Service: Endoscopy;  Laterality: N/A;    EPIDURAL STEROID INJECTION N/A 5/13/2020    Procedure: LUMBAR/CAUDAL L5/S1 IL SANKET ;  Surgeon: Miguel Latham MD;  Location: [facility] PAIN MGT;  Service: Pain Management;  Laterality: N/A;  NEEDS CONSENT    EPIDURAL STEROID INJECTION N/A 8/30/2023    Procedure: INJECTION, STEROID, EPIDURAL, CAUDAL W/ CATH SOONER DATE;  Surgeon: Miguel Latham MD;  Location: [facility] PAIN MGT;  Service: Pain Management;  Laterality: N/A;    EPIDURAL STEROID INJECTION N/A 1/31/2024    Procedure: CAUDAL SANKET;  Surgeon: Miguel Latham MD;  Location: [facility] PAIN MGT;  Service: Pain Management;  Laterality: N/A;  [phone]  4 WK F/U LOLIS    HYSTERECTOMY      PARTIAL HYSTERECTOMY      TRANSFORAMINAL EPIDURAL INJECTION OF STEROID Bilateral 9/4/2019    Procedure: Injection,steroid,epidural,transforaminal approach LUMBAR TRANSFORAMINAL BILATERAL L3 TF SANKET;  Surgeon: Miguel Latham MD;  Location: [facility] PAIN MGT;  Service: Pain Management;  Laterality: Bilateral;  NEEDS CONSENT    TRANSFORAMINAL EPIDURAL INJECTION OF STEROID Bilateral 10/30/2019    Procedure: TRANSFORAMINAL SANKET BILATERAL L3-L4;  Surgeon: Miguel Latham MD;  Location: [facility] PAIN MGT;  Service: Pain Management;  Laterality: Bilateral;  NEEDS CONSENT    TRANSFORAMINAL EPIDURAL INJECTION OF STEROID Bilateral 11/11/2020    Procedure: INJECTION, STEROID, EPIDURAL, TRANSFORAMINAL APPROACH, L3-L4;  Surgeon: Miguel Latham  MD;  Location: [facility] PAIN MGT;  Service: Pain Management;  Laterality: Bilateral;    TRANSFORAMINAL EPIDURAL INJECTION OF STEROID Bilateral 5/19/2021    Procedure: INJECTION, STEROID, EPIDURAL, TRANSFORAMINAL APPROACH, L3-L4;  Surgeon: Miguel Latham MD;  Location: [facility] PAIN MGT;  Service: Pain Management;  Laterality: Bilateral;    TRANSFORAMINAL EPIDURAL INJECTION OF STEROID Bilateral 1/26/2022    Procedure: Injection,steroid,epidural,transforaminal approach BILATERAL L3/4;  Surgeon: Miguel Latham MD;  Location: [facility] PAIN MGT;  Service: Pain Management;  Laterality: Bilateral;    TRANSFORAMINAL EPIDURAL INJECTION OF STEROID Bilateral 4/20/2022    Procedure: INJECTION, STEROID, EPIDURAL, TRANSFORAMINAL APPROACH, Bilateral L3-L4;  Surgeon: Miguel Latham MD;  Location: [facility] PAIN MGT;  Service: Pain Management;  Laterality: Bilateral;    TRANSFORAMINAL EPIDURAL INJECTION OF STEROID Bilateral 7/20/2022    Procedure: INJECTION, STEROID, EPIDURAL, TRANSFORAMINAL APPROACH, BILATERAL L3-L4 CONTRAST;  Surgeon: Miguel Latham MD;  Location: [facility] PAIN MGT;  Service: Pain Management;  Laterality: Bilateral;    TRANSFORAMINAL EPIDURAL INJECTION OF STEROID Right 12/7/2022    Procedure: INJECTION, STEROID, EPIDURAL, TRANSFORAMINAL APPROACH, RIGHT L3/L4 AND L4/L5 CONTRAST;  Surgeon: Miguel Latham MD;  Location: [facility] PAIN MGT;  Service: Pain Management;  Laterality: Right;    TRANSFORAMINAL EPIDURAL INJECTION OF STEROID Right 2/8/2023    Procedure: INJECTION, STEROID, EPIDURAL, TRANSFORAMINAL APPROACH RIGHT L3/4 AND L4/5 CONTRAST;  Surgeon: Miguel Latham MD;  Location: [facility] PAIN MGT;  Service: Pain Management;  Laterality: Right;    TUBAL LIGATION Bilateral        Family History:  Family History   Problem Relation Name Age of Onset    Diabetes Mother      Stroke Mother      Glaucoma Mother      Cataracts Mother      Cancer Father          Lung    No Known Problems Sister      Stroke Brother       Hypertension Daughter Lois     Hypertension Daughter Catherine     Hypertension Daughter Kyra     No Known Problems Daughter Cherry     Heart disease Son      Early death Son      No Known Problems Maternal Aunt      No Known Problems Maternal Uncle      No Known Problems Paternal Aunt      No Known Problems Paternal Uncle      No Known Problems Maternal Grandmother      No Known Problems Maternal Grandfather      No Known Problems Paternal Grandmother      No Known Problems Paternal Grandfather      Amblyopia Neg Hx      Blindness Neg Hx      Macular degeneration Neg Hx      Retinal detachment Neg Hx      Strabismus Neg Hx      Thyroid disease Neg Hx         Social History:  Social History     Socioeconomic History    Marital status:    Tobacco Use    Smoking status: Never     Passive exposure: Never    Smokeless tobacco: Never   Substance and Sexual Activity    Alcohol use: Not Currently     Comment: once a year    Drug use: No    Sexual activity: Not Currently   Social History Narrative    Patient is  w/ 5 children, one  from heart disease.The patient is retired.       OBJECTIVE:    /71   Pulse 66   Temp 98 °F (36.7 °C)   Resp 18   Ht 5' (1.524 m)   Wt 64 kg (141 lb 1.5 oz)   SpO2 100%   BMI 27.56 kg/m²     PHYSICAL EXAMINATION:    General appearance: Well appearing, in no acute distress, alert and oriented x3.  Psych:  Mood and affect appropriate.  Skin: Skin color, texture, turgor normal, no rashes or lesions, in both upper and lower body.  Head/face:  Normocephalic, atraumatic. No palpable lymph nodes.  Back: Straight leg raising in the sitting position is positive on the right. Full range of motion with pain on flexion. Positive facet loading bilaterally.  Extremities: Peripheral joint ROM is full and pain free without obvious instability or laxity in all four extremities. No deformities, edema, or skin discoloration. Good capillary refill.  Musculoskeletal: No atrophy or  tone abnormalities are noted. 4/5 strength in right ankle with plantar and 4/5 on dorsiflexion. 5/5 strength in left ankle with plantar and 4/5 on dorsiflexion. 4/5 strength with right knee flexion and extension. 5/5 strength with left knee flexion and extension. No TTP over right shoulder or subacromial bursa. Full ROM of right shoulder without pain.  Neuro: Decreased sensation to BLE.  Gait: Antalgic- ambulates without assistance.    ASSESSMENT: 79 y.o. year old female with lower back and bilateral leg pain, consistent with     1. Lumbosacral radiculopathy  Procedure Order to Pain Management      2. Chronic pain syndrome        3. Lumbar spondylosis        4. Spinal stenosis, lumbar region with neurogenic claudication  gabapentin (NEURONTIN) 300 MG capsule      5. Radiculopathy of thoracolumbar region  gabapentin (NEURONTIN) 300 MG capsule      6. DDD (degenerative disc disease), lumbar  gabapentin (NEURONTIN) 300 MG capsule      7. Bilateral leg pain  gabapentin (NEURONTIN) 300 MG capsule          PLAN:     - Previous lumbar MRI was reviewed and discussed with the patient today.  - She had 70% relief with caudal SANKET for 6 months. She would like to repeat. Orders placed today.  - I have stressed the importance of physical activity and a home exercise plan to help with pain and improve health.  - Patient can continue with medications for now since they are providing benefits, using them appropriately, and without side effects.  - Continue Gabapentin 600 mg BID.  - Can continue Ibuprofen PRN.  - RTC 2 weeks after SANKET.    The above plan and management options were discussed at length with patient. Patient is in agreement with the above and verbalized understanding.    Christin Ventura, BRENDA  08/22/2024

## 2024-09-01 DIAGNOSIS — E78.5 HYPERLIPIDEMIA, UNSPECIFIED HYPERLIPIDEMIA TYPE: ICD-10-CM

## 2024-09-01 DIAGNOSIS — I10 ESSENTIAL HYPERTENSION: Chronic | ICD-10-CM

## 2024-09-01 DIAGNOSIS — K21.9 GASTROESOPHAGEAL REFLUX DISEASE WITHOUT ESOPHAGITIS: Chronic | ICD-10-CM

## 2024-09-03 RX ORDER — PANTOPRAZOLE SODIUM 40 MG/1
TABLET, DELAYED RELEASE ORAL
Qty: 90 TABLET | Refills: 1 | Status: SHIPPED | OUTPATIENT
Start: 2024-09-03

## 2024-09-03 RX ORDER — ATORVASTATIN CALCIUM 40 MG/1
40 TABLET, FILM COATED ORAL
Qty: 90 TABLET | Refills: 1 | Status: SHIPPED | OUTPATIENT
Start: 2024-09-03

## 2024-09-03 RX ORDER — AMLODIPINE BESYLATE 10 MG/1
10 TABLET ORAL
Qty: 90 TABLET | Refills: 1 | Status: SHIPPED | OUTPATIENT
Start: 2024-09-03

## 2024-09-03 NOTE — TELEPHONE ENCOUNTER
Refill Routing Note   Medication(s) are not appropriate for processing by [facility] for the following reason(s):        Patient not seen by provider within 15 months  No active prescription written by provider    [facility] action(s):  Defer               Appointments  past 12m or future 3m with PCP    Date Provider   Last Visit   3/8/2023 Luisito Marin MD   Next Visit   Visit date not found Luisito Marin MD   ED visits in past 90 days: 0        Note composed:9:17 AM 09/03/2024

## 2024-09-04 ENCOUNTER — HOSPITAL ENCOUNTER (OUTPATIENT)
Facility: OTHER | Age: 79
Discharge: HOME | End: 2024-09-04
Attending: ANESTHESIOLOGY | Admitting: ANESTHESIOLOGY
Payer: MEDICARE

## 2024-09-04 VITALS
DIASTOLIC BLOOD PRESSURE: 58 MMHG | BODY MASS INDEX: 27.48 KG/M2 | SYSTOLIC BLOOD PRESSURE: 117 MMHG | WEIGHT: 140 LBS | RESPIRATION RATE: 16 BRPM | OXYGEN SATURATION: 97 % | HEART RATE: 55 BPM | TEMPERATURE: 97 F | HEIGHT: 60 IN

## 2024-09-04 DIAGNOSIS — M54.16 LUMBAR RADICULOPATHY: Primary | ICD-10-CM

## 2024-09-04 DIAGNOSIS — G89.29 CHRONIC PAIN: ICD-10-CM

## 2024-09-04 PROCEDURE — 25000003 PHARM REV CODE 250: Performed by: ANESTHESIOLOGY

## 2024-09-04 PROCEDURE — 62323 NJX INTERLAMINAR LMBR/SAC: CPT | Mod: ,,, | Performed by: ANESTHESIOLOGY

## 2024-09-04 PROCEDURE — 25000003 PHARM REV CODE 250: Performed by: STUDENT IN AN ORGANIZED HEALTH CARE EDUCATION/TRAINING PROGRAM

## 2024-09-04 PROCEDURE — 62323 NJX INTERLAMINAR LMBR/SAC: CPT | Performed by: ANESTHESIOLOGY

## 2024-09-04 PROCEDURE — 25500020 PHARM REV CODE 255: Performed by: ANESTHESIOLOGY

## 2024-09-04 PROCEDURE — 63600175 PHARM REV CODE 636 W HCPCS: Performed by: ANESTHESIOLOGY

## 2024-09-04 RX ORDER — SODIUM CHLORIDE 9 MG/ML
INJECTION, SOLUTION INTRAVENOUS CONTINUOUS
Status: DISCONTINUED | OUTPATIENT
Start: 2024-09-04 | End: 2024-09-04 | Stop reason: HOSPADM

## 2024-09-04 RX ORDER — FENTANYL CITRATE 50 UG/ML
INJECTION, SOLUTION INTRAMUSCULAR; INTRAVENOUS
Status: DISCONTINUED | OUTPATIENT
Start: 2024-09-04 | End: 2024-09-04 | Stop reason: HOSPADM

## 2024-09-04 RX ORDER — MIDAZOLAM HYDROCHLORIDE 1 MG/ML
INJECTION INTRAMUSCULAR; INTRAVENOUS
Status: DISCONTINUED | OUTPATIENT
Start: 2024-09-04 | End: 2024-09-04 | Stop reason: HOSPADM

## 2024-09-04 RX ORDER — LIDOCAINE HYDROCHLORIDE 20 MG/ML
INJECTION, SOLUTION INFILTRATION; PERINEURAL
Status: DISCONTINUED | OUTPATIENT
Start: 2024-09-04 | End: 2024-09-04 | Stop reason: HOSPADM

## 2024-09-04 RX ORDER — DEXAMETHASONE SODIUM PHOSPHATE 10 MG/ML
INJECTION INTRAMUSCULAR; INTRAVENOUS
Status: DISCONTINUED | OUTPATIENT
Start: 2024-09-04 | End: 2024-09-04 | Stop reason: HOSPADM

## 2024-09-04 RX ORDER — LIDOCAINE HYDROCHLORIDE 10 MG/ML
INJECTION, SOLUTION EPIDURAL; INFILTRATION; INTRACAUDAL; PERINEURAL
Status: DISCONTINUED | OUTPATIENT
Start: 2024-09-04 | End: 2024-09-04 | Stop reason: HOSPADM

## 2024-09-04 NOTE — DISCHARGE SUMMARY
Discharge Note  Short Stay      SUMMARY     Admit Date: 9/4/2024    Attending Physician: Miguel Latham MD    Discharge Physician: Miguel Latham MD      Discharge Date: 9/4/2024 9:15 AM    Procedure(s) (LRB):  CAUDAL SANKET (N/A)    Final Diagnosis: Lumbosacral radiculopathy [M54.17]    Disposition: Home or self care    Patient Instructions:   Current Discharge Medication List        CONTINUE these medications which have NOT CHANGED    Details   alendronate (FOSAMAX) 70 MG tablet TAKE 1 TABLET BY MOUTH ONE TIME PER WEEK  Qty: 12 tablet, Refills: 1    Associated Diagnoses: Age-related osteoporosis without current pathological fracture      amLODIPine (NORVASC) 10 MG tablet TAKE 1 TABLET BY MOUTH EVERY DAY  Qty: 90 tablet, Refills: 1    Comments: .  Associated Diagnoses: Essential hypertension      atorvastatin (LIPITOR) 40 MG tablet TAKE 1 TABLET BY MOUTH EVERY DAY  Qty: 90 tablet, Refills: 1    Associated Diagnoses: Hyperlipidemia, unspecified hyperlipidemia type      calcium-vitamin D 500-125 mg-unit tablet Take 1 tablet by mouth once daily.      ergocalciferol (ERGOCALCIFEROL) 50,000 unit Cap TAKE 1 CAPSULE (50,000 UNITS TOTAL) BY MOUTH EVERY SUNDAY.  Qty: 12 capsule, Refills: 1    Associated Diagnoses: Vitamin D deficiency      fluticasone propionate (FLONASE) 50 mcg/actuation nasal spray 2 SPRAYS (100 MCG TOTAL) BY EACH NOSTRIL ROUTE ONCE DAILY.  Qty: 48 mL, Refills: 3    Comments: NEED NEW SCRIPT  Associated Diagnoses: Allergic rhinitis, unspecified seasonality, unspecified trigger      gabapentin (NEURONTIN) 300 MG capsule Take 2 capsules (600 mg total) by mouth 2 (two) times daily.  Qty: 360 capsule, Refills: 1    Associated Diagnoses: Spinal stenosis, lumbar region with neurogenic claudication; Radiculopathy of thoracolumbar region; DDD (degenerative disc disease), lumbar; Bilateral leg pain      ibuprofen (ADVIL,MOTRIN) 800 MG tablet TAKE 1 TABLET BY MOUTH 2 TIMES DAILY AS NEEDED.  Qty: 60 tablet,  Refills: 0      metoprolol succinate (TOPROL-XL) 100 MG 24 hr tablet TAKE 1 TABLET BY MOUTH EVERY DAY  Qty: 90 tablet, Refills: 0    Comments: .  Associated Diagnoses: Essential hypertension      multivitamin (THERAGRAN) per tablet Take 1 tablet by mouth once daily.      ondansetron (ZOFRAN-ODT) 4 MG TbDL Take 1 tablet (4 mg total) by mouth every 8 (eight) hours as needed.  Qty: 20 tablet, Refills: 0      pantoprazole (PROTONIX) 40 MG tablet TAKE 1 TABLET BY MOUTH EVERY DAY  Qty: 90 tablet, Refills: 1    Associated Diagnoses: Gastroesophageal reflux disease without esophagitis      paroxetine (PAXIL) 20 MG tablet Take 1 tablet (20 mg total) by mouth every morning.  Qty: 90 tablet, Refills: 1    Associated Diagnoses: Mild recurrent major depression; Anxiety      traZODone (DESYREL) 150 MG tablet TAKE 1 TABLET (150 MG TOTAL) BY MOUTH EVERY EVENING.  Qty: 90 tablet, Refills: 1    Associated Diagnoses: Insomnia, unspecified type                 Discharge Diagnosis: Lumbosacral radiculopathy [M54.17]  Condition on Discharge: Stable with no complications to procedure   Diet on Discharge: Same as before.  Activity: as per instruction sheet.  Discharge to: Home with a responsible adult.  Follow up: 2-4 weeks       Please call my office or pager at [phone] if experienced any weakness or loss of sensation, fever > 101.5, pain uncontrolled with oral medications, persistent nausea/vomiting/or diarrhea, redness or drainage from the incisions, or any other worrisome concerns. If physician on call was not reached or could not communicate with our office for any reason please go to the nearest emergency department     Ronaldo Vasquez MD

## 2024-09-04 NOTE — DISCHARGE INSTRUCTIONS

## 2024-09-04 NOTE — OP NOTE
Caudal Epidural Steroid Injection with Catheter under Fluoroscopic Guidance    The procedure, risks, benefits, and options were discussed with the patient. There are no contraindications to the procedure. The patent expressed understanding and agreed to the procedure. Informed written consent was obtained prior to the start of the procedure and can be found in the patient's chart.    PATIENT NAME: Mrs. Diana Herring   MRN: [id number]     DATE OF PROCEDURE: 09/04/2024    PROCEDURE: Caudal Epidural Steroid Injection under Fluoroscopic Guidance    PRE-OP DIAGNOSIS: Lumbosacral radiculopathy [M54.17] Lumbar radiculopathy [M54.16]    POST-OP DIAGNOSIS: Same    PHYSICIAN: Miguel Latham MD    ASSISTANTS: Ronaldo Vasquez MD  [facility] pain fellow      MEDICATIONS INJECTED: Preservative-free Decadron 10mg with 4cc of Lidocaine 1% MPF     LOCAL ANESTHETIC INJECTED: Xylocaine 2%     SEDATION: Versed 2mg and Fentanyl 50mcg                                                                                                                                                                                     Conscious sedation ordered by M.D. Patient re-evaluation prior to administration of conscious sedation. No changes noted in patient's status from initial evaluation. The patient's vital signs were monitored by RN and patient remained hemodynamically stable throughout the procedure.    Event Time In   Sedation Start 0907   Sedation End 0912       ESTIMATED BLOOD LOSS: None    COMPLICATIONS: None    TECHNIQUE: Time-out was performed to identify the patient and procedure to be performed. With the patient laying in a prone position, the surgical area was prepped and draped in the usual sterile fashion using ChloraPrep and a fenestrated drape. The injection site was determined under fluoroscopy guidance. Skin anesthesia was achieved by injecting Lidocaine 2% over the injection site. The sacrum and sacral cornua were then approached with  a 16 gauge, 3.5 inch epidural needle that was introduced and advanced into the sacral hiatus under fluoroscopic guidance with AP, lateral and/or contralateral oblique imaging. After the needle passed through the sacrococcygeal ligament, the needle angle was lowered and the needle was advanced 1 cm. After negative aspiration of blood or CSF, and no evidence of paraesthesias, the catheter was threaded through the needle into the epidural space using live fluoroscopy, contrast dye Omnipaque (300mg/mL) was injected to confirm placement and there was no vascular runoff. Fluoroscopic imaging in the AP and lateral views revealed a clear outline of the spinal nerve with proximal spread of agent through the caudal epidural space. Then 10 mL of the medication mixture listed above was injected slowly. Displacement of the radio opaque contrast after injection of the medication confirmed that the medication went into the area of the transforaminal spaces. The needles were removed and bleeding was nil. A sterile dressing was applied. No specimens collected. The patient tolerated the procedure well.     The patient was monitored after the procedure in the recovery area. They were given post-procedure and discharge instructions to follow at home. The patient was discharged in a stable condition.    Ronaldo Vasquez MD     I reviewed and edited the fellow's note. I conducted my own interview and physical examination. I agree with the findings. I was present and supervising all critical portions of the procedure.  Miguel Latham MD

## 2024-09-17 RX ORDER — IBUPROFEN 800 MG/1
800 TABLET ORAL 2 TIMES DAILY PRN
Qty: 60 TABLET | Refills: 0 | Status: SHIPPED | OUTPATIENT
Start: 2024-09-17

## 2024-10-01 ENCOUNTER — OFFICE VISIT (OUTPATIENT)
Dept: PAIN MEDICINE | Facility: CLINIC | Age: 79
End: 2024-10-01
Payer: MEDICARE

## 2024-10-01 VITALS
OXYGEN SATURATION: 98 % | RESPIRATION RATE: 18 BRPM | HEART RATE: 68 BPM | WEIGHT: 142.63 LBS | BODY MASS INDEX: 27.86 KG/M2 | TEMPERATURE: 99 F | DIASTOLIC BLOOD PRESSURE: 71 MMHG | SYSTOLIC BLOOD PRESSURE: 153 MMHG

## 2024-10-01 DIAGNOSIS — M25.511 CHRONIC RIGHT SHOULDER PAIN: ICD-10-CM

## 2024-10-01 DIAGNOSIS — M54.16 LUMBAR RADICULOPATHY: Primary | ICD-10-CM

## 2024-10-01 DIAGNOSIS — G89.29 CHRONIC RIGHT SHOULDER PAIN: ICD-10-CM

## 2024-10-01 DIAGNOSIS — G89.4 CHRONIC PAIN SYNDROME: ICD-10-CM

## 2024-10-01 DIAGNOSIS — M47.816 LUMBAR SPONDYLOSIS: ICD-10-CM

## 2024-10-01 PROCEDURE — 1160F RVW MEDS BY RX/DR IN RCRD: CPT | Mod: CPTII,S$GLB,, | Performed by: NURSE PRACTITIONER

## 2024-10-01 PROCEDURE — 99213 OFFICE O/P EST LOW 20 MIN: CPT | Mod: S$GLB,,, | Performed by: NURSE PRACTITIONER

## 2024-10-01 PROCEDURE — 99999 PR PBB SHADOW E&M-EST. PATIENT-LVL IV: CPT | Mod: PBBFAC,,, | Performed by: NURSE PRACTITIONER

## 2024-10-01 PROCEDURE — 3078F DIAST BP <80 MM HG: CPT | Mod: CPTII,S$GLB,, | Performed by: NURSE PRACTITIONER

## 2024-10-01 PROCEDURE — 1159F MED LIST DOCD IN RCRD: CPT | Mod: CPTII,S$GLB,, | Performed by: NURSE PRACTITIONER

## 2024-10-01 PROCEDURE — 1101F PT FALLS ASSESS-DOCD LE1/YR: CPT | Mod: CPTII,S$GLB,, | Performed by: NURSE PRACTITIONER

## 2024-10-01 PROCEDURE — 3288F FALL RISK ASSESSMENT DOCD: CPT | Mod: CPTII,S$GLB,, | Performed by: NURSE PRACTITIONER

## 2024-10-01 PROCEDURE — 1125F AMNT PAIN NOTED PAIN PRSNT: CPT | Mod: CPTII,S$GLB,, | Performed by: NURSE PRACTITIONER

## 2024-10-01 PROCEDURE — 3077F SYST BP >= 140 MM HG: CPT | Mod: CPTII,S$GLB,, | Performed by: NURSE PRACTITIONER

## 2024-10-01 NOTE — PROGRESS NOTES
Chronic patient Established Note (Follow up visit)      SUBJECTIVE:    Interval History 10/1/2024:  The patient presents for follow up of chronic back pain. She is now s/p caudal SANKET. She is reporting 60% relief. She has intermittent numbness to right leg. She has been having more right shoulder pain recently, worse with overhead activities. No chest pain or SOB. She takes Gabapentin and Ibuprofen with benefit. No side effects reported. Her pain today is 8/10.    Interval History 8/22/2024:  Diana returns today for follow up to discuss worsened back and leg pain. The pain starts across the lower back with radiation down the back of the legs. She has numbness to both legs (R>L). The pain is worse with activity. No recent falls or injury. She has had significant benefit with caudal ESIs in the past with the last being in January. She would like to repeat. Her pain today is 9/10.     Interval History 12/14/2023:  The patient presents for 3 follow up of chronic lower back pain. She continues to report radiation into the buttocks and legs. Back pain is greater than leg pain. She does have some stiffness in the morning. This improves with walking around. She also has intermittent numbness to legs. She takes Ibuprofen PRN, usually once daily. She also takes Gabapentin 600 mg BID which also helps. She says that she has been performing her home exercises. Her shoulder pain has improved with home PT regimen. Her pain today is 7/10.    Interval History 9/14/2023:  The patient is here for follow up of back pain. She is now s/p caudal SANKET with 75% relief which started to wane down at 1 week. She is still having some relief at this time. She does say that this was more helpful that more recent ESIs. She does find Ibuprofen helpful but tries not to take daily. She plans to restart her home PT exercises. She has also been having some right shoulder pain, mainly with sleeping on that side. Her pain today is 8/10.    Interval  History 7/14/2023:  The patient is here for follow up of back and leg pain. The pain starts to the lower back and tailbone with radiation down the back of both legs with numbness and tingling. The pain is worse to the posterior legs but she also has pain to the front and sides of both legs. She says that the last right sided TF SANKET did not last as long as previous procedures. She has performed home PT exercises for over 6 weeks with minimal benefit. She previously declined surgical referral for severe stenosis. Her pain today is 6/10.    Interval History 5/5/2023:  The patient returns today for follow up of back and leg pain. She has been in PT which she thinks is helping her range of motion. She is still having back pain with radiation down the right leg. There is associated numbness. She previously had benefit with SANKET but declined to repeat at last OV. She also declined surgical consult. She has benefit with Gabapentin 1200 mg daily with some benefit but it does cause some sedation. Her pain today is 6/10.    Interval History 3/23/2023:  The patient presents for follow up of chronic back pain. She is here for review up updated lumbar MRI. Her results show severe spondylitic central spinal stenosis at L3-4 and L4-5, L4-5 subarticular zones severe stenosis bilaterally and severe right-sided neural foraminal stenosis. TF ESIs have been helpful short-term. She is not interested in surgical consult at this time. No bowel or bladder incontinence. Her biggest complaint today is right sided back pain with radiation down the side of the right leg and numbness. Her pain today is 9/10.    Interval History 3/9/2023:  The patient presents today for follow up of back and leg pain. She is now s/p repeat right L3/4 and L4/5 TF SANKET on 2/8/23 with 80% benefit for about 2 weeks only. Previous did help for longer. She is reporting pain that start to right lower back with radiation into the front and back of the right leg to the  anterior shin. She has numbness on the right lower leg. She feels like her pain has worsened over the past few months. Her last MRI was in 2019. She had some benefit with PT in the past and is open to repeat. Her pain today is 7/10.    Interval History 1/24/2023:  Diana is here for follow up of back and right leg pain. She is now s/p right L3/4 and L4/5 TF SANKET on 12/7/22. She reports about 80% relief for about one month. Today, she is reporting lower back pain with radiation into the front and side of the right leg. She has numbness to the right leg intermittently without warning. She also has been having cramps to lower legs but says she thinks that she has not been drinking enough water. She completed aquatic PT which she thinks was helpful. Her pain today is 8/10.    Interval History 11/11/2022:  The patient returns today for follow up of back and leg pain. She has been in aquatic PT which she finds helpful. She attends twice weekly. Her back pain has improved somewhat because of this. She does have worsened right leg pain with walking and activity. The pain radiates down the front and side of the right leg with associated numbness. No current radiation on the left. She had benefit with ESIs in the past and wishes to repeat. Her pain today is 7/10.    Interval History 8/11/2022:  The patient is here for follow up of lower back pain. Since previous encounter, she underwent repeat L3/4 TF SANKET with 70% relief. We had originally planned for MBB but she started having more shooting pain into the legs. She feels like this was helpful. Her pain is tolerable at this time. Her pain today is 7/10.    Interval History 5/9/2022:  The patient is here for follow up of chronic back pain. She is having more back pain and stiffness in the morning. We did previously discuss lumbar MBB but she is worried about not having IV sedation. She says that she would like to further discuss the procedure today. Leg pain has been mild since  previous SANKET. She does have intermittent numbness still. Back pain is greater than leg pain. Her pain today is 7/10.    Interval History 4/7/2022:  The patient is here for follow up of lower back and leg pain. Since previous encounter, she underwent bilateral L3/4 TF SANKET on 1/26/22. She reports 80% relief of pain for about 2 months. She feels like this gave her significant benefit during that time and her pain was mild. She is now again having severe back pain with radiation into the anterior thighs. She would like to repeat the procedure. She would also like me to place another referral for aquatic PT. She stopped Mobic because she found Diclofenac more helpful. She has been taking as needed but not daily. She does find Gabapentin helpful but it sometimes makes her drowsy. Her pain today is 9/10.    Interval History 1/11/2022:  The patient returns to discuss continued lower back pain. She has not restarted aquatic PT due to increase in Covid-19 cases. She continues with sharp lower back pain that radiates into anterior thighs, bilaterally. She has associated numbness. The pain is worse with increased activity. Her pain today is 7/10.    Interval History 11/11/2021:  The patient presents today for 2 month follow up of lower back pain. Since previous encounter, she has not started aquatic PT because she is on the waiting list. She continues to report lower back pain with radiation down the left leg. We previously discussed lumbar MBB/RFA which she does not wish to pursue at this time. She states that diclofenac is not helping very much with her aching pain at this time. She denies any new weakness or symptoms at this time. Her pain today is 8/10.    Interval History 8/19/2021:  Diana returns today for follow up of chronic lower back pain. She reports that he has continued with aching pain. She has been in PT but is not finding it very helpful. She has not tried aquatic PT in the past. She is still having  intermittent radiation down her legs. She has been unable to take 1200 mg daily of Gabapentin and has decreased to 600 mg daily due to sedation and dizziness with the medication. Otherwise, no new complaints today. Her pain today is 7/10.    Interval History 6/18/2021:  The patient is here for follow up of lower back pain.  She has radiation of her pain into her anterior thighs but not below the knees.  She denies numbness or tingling at this time.  Her back pain is currently greater than her leg pain.  She has a lot of stiffness when she wakes the morning states it improves when she moves.  She had limited benefit with recent repeat bilateral L3-4 transforaminal steroid injection.  Her pain today is 7/10    Interval History 5/4/2021:  The patient is here for follow up of lower back and leg pain.  Previous encounter, physical therapy was ordered.  She states that she did not start physical therapy and would like me to replace the order for her.  She does report that she is noticing more muscle fatigue and weakness particularly with activity.  She is also having more back pain with radiation into the anterior lateral thighs with the past month.  She previously had significant been hip with bilateral L3/4 transforaminal epidural steroid injection and wishes to repeat this.  She found this more helpful than previous procedures. Her pain today is 7/10.    Interval History 3/2/2021:  The patient is here for follow up of chronic pain. She is having more back pain today which she attributes to the cold front coming in. She is having radiation into the buttocks and right anterior thigh. Her back pain is her primary complaint today. She describes it as throbbing. She recently completed both Covid-19 vaccines which she tolerated well. She is now taking Gabapentin 600 mg twice daily regularly. She notices improvement in right leg numbness.  Her pain today is 7/10.    Interval History 12/1/2020:  The patient iss here for  follow-up of back and leg pain.  She is now status post bilateral L3/4 transforaminal epidural steroid injection on 11/11/2020. She is reporting 100% relief for about 2 weeks and then her pain started to return.  She is still having some benefit.  Her pain is located across the lower back with radiation into front and sides of the legs to the anterior feet with associated numbness.  She feels like when she walks sometimes her legs become weak.  This has improved slightly.  She denies any recent falls.  At her last OV, her Gabapentin was increased to 600 mg BID.  She says that she finds this helpful.  She has mild drowsiness.  She admits that she does not always take the medication and thought it was more of an as needed medication.  The patient denies any bowel or bladder incontinence or signs of saddle paresthesia.  She feels as though her pain today is worse than usual.  She rates her pain today as 10/10.    Interval history 10/15/2020:  Diana Herring presents to the clinic for a follow-up appointment for back pain. Since the last visit, Diana Herring states the pain has been worsening. Current pain intensity is 7/10. She reports that she continues to have low back pain that radiates to the bilateral hip and leg. She does report that she has noticed intermittent numbness in the right lateral and anterior thigh, that is sometimes associated with weakness in her right leg. She says that her leg gives out when this happens. She has not had any falls or other trauma. She did not get the bilateral L3/4 TFESI planned last time, and she is still taking only 300 mg bid of gabapentin.  Patient denies urinary incontinence, bowel incontinence, significant weight loss.    Initial visit:  Diana Herring presents to the clinic for the evaluation of low back pain. The pain started 2 years ago following loosing bone after a bone density and symptoms have been worsening.The pain is located in the low back area and  radiates to the bilateral hip and leg.  The pain is described as aching, numbing, sharp and tight band and is rated as 8/10. The pain is rated with a score of  7/10 on the BEST day and a score of 8/10 on the WORST day.  Symptoms interfere with daily activity and sleeping. The pain is exacerbated by Sitting, Standing, Bending, Coughing/Sneezing, Walking, Morning, Lifting and Getting out of bed/chair.  The pain is mitigated by heat, laying down and rest. She reports spending 0 hours per day reclining. The patient reports 6 hours of uninterrupted sleep per night.     Patient had L5/S1 ILESI 2 weeks ago and only gave her 1 day of relief.      Patient denies night fever/night sweats, urinary incontinence, bowel incontinence, significant weight loss and significant motor weakness.     Physical Therapy/Home Exercise: yes, was not beneficial      Pain Disability Index Review:      8/22/2024     8:48 AM 12/14/2023     8:15 AM 7/14/2023     8:42 AM   Last 3 PDI Scores   Pain Disability Index (PDI) 45 [phone]       Pain Medications:  Gabapentin 600 mg BID  Ibuprofen PRN    Opioid Contract: no     report:  Reviewed and consistent with medication use as prescribed.    Pain Procedures:  9/4/24 Caudal SANKET- 60% relief  1/31/24 Caudal SANKET- 70% relief  8/30/23 Caudal SANKET- 70% relief for 3 months  2/8/23 Right L3/4 and L4/5 TF SANKET- 80% relief for 2 weeks  12/7/22 Right L3/4 and L4/5 TF SANKET- 80% relief for one month  7/20/22 Bilateral L3/4 TF SANKET- 70% relief  4/20/22 Bilateral L3/4 TF SANKET- 75% relief of leg pain  1/26/22 Bilateral L3/4 TF SANKET  5/19/21 bilateral L3/4 TF SANKET  11/11/20 Bilateral L3/4 TF SANKET- 100% relief for 2 weeks  5/13/20 L5/S1 ILESI  10/30/19 TFESI Bilateral L3-L4  09/04/19 TFESI Bilateral L3     Physical Therapy/Home Exercise: yes    Imaging:    Narrative & Impression  EXAMINATION:  MRI LUMBAR SPINE WITHOUT CONTRAST     CLINICAL HISTORY:  Dorsalgia, unspecifiedLow back pain, symptoms persist with > 6wks conservative  treatment;     TECHNIQUE:  Sagittal T1, sagittal T2, sagittal STIR, axial T1 and axial T2 weighted images of the lumbar spine obtained without contrast.     COMPARISON:  X-ray 05/29/2020 and lumbar spine radiograph 07/26/2019     FINDINGS:  Lumbar spine alignment is within normal limits. The vertebral body heights are well maintained, with no fracture.  Degenerative edema signal at opposing endplates of L4-5 and degenerative sclerosis at multiple endplates from L2 through L5 with associated Schmorl's nodes.  Otherwise, marrow signal normal.     The conus is normal in appearance.  There are bilateral T2 hyperintense renal lesions likely related to cysts.     Bunching of cauda equina nerve roots posterior to L2 and L3 vertebral bodies.     L1-L2: Circumferential disc bulge, spondylitic spurring and mild facet arthritis.Mild central spinal stenosis.     L2-L3: Circumferential disc bulge, spondylitic spurring and facet arthritis.  Mild central spinal stenosis and left-sided foraminal stenosis.     L3-L4: Circumferential disc bulge, spondylitic spurring, severe facet arthritis and ligamentum flavum thickening produce severe central spinal stenosis and mild bilateral foraminal stenosis.     L4-L5: Circumferential disc bulge, spondylitic spurring, facet arthritis and ligamentum flavum thickening.  Bilateral facet joint effusions.  Severe central spinal stenosis and bilateral subarticular zone stenosis.  Moderate left and severe right-sided neural foraminal stenosis.     L5-S1: Circumferential disc bulge and minimal facet arthritis.  No central canal or foraminal stenosis.     Impression:     Severe spondylitic central spinal stenosis at L3-4 and L4-5.  L4-5 subarticular zones severe stenosis bilaterally and severe right-sided neural foraminal stenosis.  Additional degrees of central spinal stenosis and foraminal stenosis as above.     Bilateral renal lesions likely cysts which can be confirmed with ultrasound on a  nonemergent basis.       CMP  Sodium   Date Value Ref Range Status   03/08/2024 142 136 - 145 mmol/L Final     Potassium   Date Value Ref Range Status   03/08/2024 3.7 3.5 - 5.1 mmol/L Final     Chloride   Date Value Ref Range Status   03/08/2024 105 95 - 110 mmol/L Final     CO2   Date Value Ref Range Status   03/08/2024 28 23 - 29 mmol/L Final     Glucose   Date Value Ref Range Status   03/08/2024 104 70 - 110 mg/dL Final     BUN   Date Value Ref Range Status   03/08/2024 12 8 - 23 mg/dL Final     Creatinine   Date Value Ref Range Status   03/08/2024 0.8 0.5 - 1.4 mg/dL Final     Calcium   Date Value Ref Range Status   03/08/2024 10.1 8.7 - 10.5 mg/dL Final     Total Protein   Date Value Ref Range Status   03/08/2024 8.0 6.0 - 8.4 g/dL Final     Albumin   Date Value Ref Range Status   03/08/2024 4.1 3.5 - 5.2 g/dL Final     Total Bilirubin   Date Value Ref Range Status   03/08/2024 0.8 0.1 - 1.0 mg/dL Final     Comment:     For infants and newborns, interpretation of results should be based  on gestational age, weight and in agreement with clinical  observations.    Premature Infant recommended reference ranges:  Up to 24 hours.............<8.0 mg/dL  Up to 48 hours............<12.0 mg/dL  3-5 days..................<15.0 mg/dL  6-29 days.................<15.0 mg/dL       Alkaline Phosphatase   Date Value Ref Range Status   03/08/2024 89 55 - 135 U/L Final     AST   Date Value Ref Range Status   03/08/2024 14 10 - 40 U/L Final     ALT   Date Value Ref Range Status   03/08/2024 14 10 - 44 U/L Final     Anion Gap   Date Value Ref Range Status   03/08/2024 9 8 - 16 mmol/L Final     eGFR if    Date Value Ref Range Status   03/04/2022 >60.0 >60 mL/min/1.73 m^2 Final     eGFR if non    Date Value Ref Range Status   03/04/2022 >60.0 >60 mL/min/1.73 m^2 Final     Comment:     Calculation used to obtain the estimated glomerular filtration  rate (eGFR) is the CKD-EPI equation.             Allergies: Review of patient's allergies indicates:  No Known Allergies    Current Medications:   Current Outpatient Medications   Medication Sig Dispense Refill    alendronate (FOSAMAX) 70 MG tablet TAKE 1 TABLET BY MOUTH ONE TIME PER WEEK 12 tablet 1    amLODIPine (NORVASC) 10 MG tablet TAKE 1 TABLET BY MOUTH EVERY DAY 90 tablet 1    atorvastatin (LIPITOR) 40 MG tablet TAKE 1 TABLET BY MOUTH EVERY DAY 90 tablet 1    calcium-vitamin D 500-125 mg-unit tablet Take 1 tablet by mouth once daily.      ergocalciferol (ERGOCALCIFEROL) 50,000 unit Cap TAKE 1 CAPSULE (50,000 UNITS TOTAL) BY MOUTH EVERY SUNDAY. 12 capsule 1    fluticasone propionate (FLONASE) 50 mcg/actuation nasal spray 2 SPRAYS (100 MCG TOTAL) BY EACH NOSTRIL ROUTE ONCE DAILY. 48 mL 3    gabapentin (NEURONTIN) 300 MG capsule Take 2 capsules (600 mg total) by mouth 2 (two) times daily. 360 capsule 1    ibuprofen (ADVIL,MOTRIN) 800 MG tablet TAKE 1 TABLET BY MOUTH 2 TIMES DAILY AS NEEDED. 60 tablet 0    metoprolol succinate (TOPROL-XL) 100 MG 24 hr tablet TAKE 1 TABLET BY MOUTH EVERY DAY 90 tablet 0    multivitamin (THERAGRAN) per tablet Take 1 tablet by mouth once daily.      ondansetron (ZOFRAN-ODT) 4 MG TbDL Take 1 tablet (4 mg total) by mouth every 8 (eight) hours as needed. 20 tablet 0    pantoprazole (PROTONIX) 40 MG tablet TAKE 1 TABLET BY MOUTH EVERY DAY 90 tablet 1    paroxetine (PAXIL) 20 MG tablet Take 1 tablet (20 mg total) by mouth every morning. 90 tablet 1    traZODone (DESYREL) 150 MG tablet TAKE 1 TABLET (150 MG TOTAL) BY MOUTH EVERY EVENING. 90 tablet 1     No current facility-administered medications for this visit.       REVIEW OF SYSTEMS:    GENERAL:  No weight loss, malaise or fevers.  HEENT:  Negative for frequent or significant headaches.  NECK:  Negative for lumps, goiter, pain and significant neck swelling.  RESPIRATORY:  Negative for cough, wheezing or shortness of breath.  CARDIOVASCULAR:  Negative for chest pain, leg  swelling or palpitations.  GI:  Negative for abdominal discomfort, blood in stools or black stools or change in bowel habits. GERD.  MUSCULOSKELETAL:  See HPI.  SKIN:  Negative for lesions, rash, and itching.  PSYCH:  + for sleep disturbance, h/o depression  HEMATOLOGY/LYMPHOLOGY:  Negative for prolonged bleeding, bruising easily or swollen nodes.  NEURO:  + numbness. No history of headaches, syncope, paralysis, seizures or tremors.  All other reviewed and negative other than HPI.     Past Medical History:  Past Medical History:   Diagnosis Date    Anxiety     Arthritis     Corneal abrasion s    ? eye     Depression     Full dentures     GERD (gastroesophageal reflux disease)     Hyperlipidemia     Hypertension     Nuclear sclerosis of both eyes 6/5/2017    Osteoporosis     Restless leg syndrome        Past Surgical History:  Past Surgical History:   Procedure Laterality Date    COLONOSCOPY N/A 5/14/2019    Procedure: COLONOSCOPY;  Surgeon: Nahid Cline MD;  Location: [facility];  Service: Endoscopy;  Laterality: N/A;    EPIDURAL STEROID INJECTION N/A 5/13/2020    Procedure: LUMBAR/CAUDAL L5/S1 IL SANKET ;  Surgeon: Miguel Latham MD;  Location: [facility];  Service: Pain Management;  Laterality: N/A;  NEEDS CONSENT    EPIDURAL STEROID INJECTION N/A 8/30/2023    Procedure: INJECTION, STEROID, EPIDURAL, CAUDAL W/ CATH SOONER DATE;  Surgeon: Miguel Latham MD;  Location: [facility];  Service: Pain Management;  Laterality: N/A;    EPIDURAL STEROID INJECTION N/A 1/31/2024    Procedure: CAUDAL SANKET;  Surgeon: Miguel Latham MD;  Location: [facility];  Service: Pain Management;  Laterality: N/A;  [phone]  4 WK F/U LOLIS    EPIDURAL STEROID INJECTION N/A 9/4/2024    Procedure: CAUDAL SANKET;  Surgeon: Miguel Latham MD;  Location: [facility];  Service: Pain Management;  Laterality: N/A;  [phone]  2 WK F/U KAREN    HYSTERECTOMY      PARTIAL HYSTERECTOMY      TRANSFORAMINAL EPIDURAL  INJECTION OF STEROID Bilateral 9/4/2019    Procedure: Injection,steroid,epidural,transforaminal approach LUMBAR TRANSFORAMINAL BILATERAL L3 TF SANKET;  Surgeon: Miguel Latham MD;  Location: [facility];  Service: Pain Management;  Laterality: Bilateral;  NEEDS CONSENT    TRANSFORAMINAL EPIDURAL INJECTION OF STEROID Bilateral 10/30/2019    Procedure: TRANSFORAMINAL SANKET BILATERAL L3-L4;  Surgeon: Miguel Latham MD;  Location: [facility];  Service: Pain Management;  Laterality: Bilateral;  NEEDS CONSENT    TRANSFORAMINAL EPIDURAL INJECTION OF STEROID Bilateral 11/11/2020    Procedure: INJECTION, STEROID, EPIDURAL, TRANSFORAMINAL APPROACH, L3-L4;  Surgeon: Miguel Latham MD;  Location: [facility];  Service: Pain Management;  Laterality: Bilateral;    TRANSFORAMINAL EPIDURAL INJECTION OF STEROID Bilateral 5/19/2021    Procedure: INJECTION, STEROID, EPIDURAL, TRANSFORAMINAL APPROACH, L3-L4;  Surgeon: Miguel Latham MD;  Location: [facility];  Service: Pain Management;  Laterality: Bilateral;    TRANSFORAMINAL EPIDURAL INJECTION OF STEROID Bilateral 1/26/2022    Procedure: Injection,steroid,epidural,transforaminal approach BILATERAL L3/4;  Surgeon: Miguel Latham MD;  Location: [facility];  Service: Pain Management;  Laterality: Bilateral;    TRANSFORAMINAL EPIDURAL INJECTION OF STEROID Bilateral 4/20/2022    Procedure: INJECTION, STEROID, EPIDURAL, TRANSFORAMINAL APPROACH, Bilateral L3-L4;  Surgeon: Miguel Latham MD;  Location: [facility];  Service: Pain Management;  Laterality: Bilateral;    TRANSFORAMINAL EPIDURAL INJECTION OF STEROID Bilateral 7/20/2022    Procedure: INJECTION, STEROID, EPIDURAL, TRANSFORAMINAL APPROACH, BILATERAL L3-L4 CONTRAST;  Surgeon: Miguel Latham MD;  Location: [facility];  Service: Pain Management;  Laterality: Bilateral;    TRANSFORAMINAL EPIDURAL INJECTION OF STEROID Right 12/7/2022    Procedure: INJECTION, STEROID, EPIDURAL, TRANSFORAMINAL  APPROACH, RIGHT L3/L4 AND L4/L5 CONTRAST;  Surgeon: Miguel Latham MD;  Location: [facility];  Service: Pain Management;  Laterality: Right;    TRANSFORAMINAL EPIDURAL INJECTION OF STEROID Right 2023    Procedure: INJECTION, STEROID, EPIDURAL, TRANSFORAMINAL APPROACH RIGHT L3/4 AND L4/5 CONTRAST;  Surgeon: Miguel Latham MD;  Location: [facility];  Service: Pain Management;  Laterality: Right;    TUBAL LIGATION Bilateral        Family History:  Family History   Problem Relation Name Age of Onset    Diabetes Mother      Stroke Mother      Glaucoma Mother      Cataracts Mother      Cancer Father          Lung    No Known Problems Sister      Stroke Brother      Hypertension Daughter Lois     Hypertension Daughter Catherine     Hypertension Daughter Kyra     No Known Problems Daughter Dyaca     Heart disease Son      Early death Son      No Known Problems Maternal Aunt      No Known Problems Maternal Uncle      No Known Problems Paternal Aunt      No Known Problems Paternal Uncle      No Known Problems Maternal Grandmother      No Known Problems Maternal Grandfather      No Known Problems Paternal Grandmother      No Known Problems Paternal Grandfather      Amblyopia Neg Hx      Blindness Neg Hx      Macular degeneration Neg Hx      Retinal detachment Neg Hx      Strabismus Neg Hx      Thyroid disease Neg Hx         Social History:  Social History     Socioeconomic History    Marital status:    Tobacco Use    Smoking status: Never     Passive exposure: Never    Smokeless tobacco: Never   Substance and Sexual Activity    Alcohol use: Not Currently     Comment: once a year    Drug use: No    Sexual activity: Not Currently   Social History Narrative    Patient is  w/ 5 children, one  from heart disease.The patient is retired.       OBJECTIVE:    BP (!) 153/71   Pulse 68   Temp 98.6 °F (37 °C)   Resp 18   Wt 64.7 kg (142 lb 10.2 oz)   SpO2 98%   BMI 27.86 kg/m²     PHYSICAL  EXAMINATION:    General appearance: Well appearing, in no acute distress, alert and oriented x3.  Psych:  Mood and affect appropriate.  Skin: Skin color, texture, turgor normal, no rashes or lesions, in both upper and lower body.  Head/face:  Normocephalic, atraumatic. No palpable lymph nodes.  Back: Straight leg raising in the sitting position is positive on the right. Full range of motion with pain on flexion > extension. Positive facet loading bilaterally.  Extremities: Peripheral joint ROM is full and pain free without obvious instability or laxity in all four extremities. No deformities, edema, or skin discoloration. Good capillary refill.  Musculoskeletal: No atrophy or tone abnormalities are noted. 4/5 strength in right ankle with plantar and 4/5 on dorsiflexion. 5/5 strength in left ankle with plantar and 4/5 on dorsiflexion. 4/5 strength with right knee flexion and extension. 5/5 strength with left knee flexion and extension. No TTP over right shoulder or subacromial bursa. Full ROM of right shoulder without pain.  Neuro: No loss of sensation noted.  Gait: Antalgic- ambulates without assistance.    ASSESSMENT: 79 y.o. year old female with lower back and bilateral leg pain, consistent with     1. Lumbar radiculopathy        2. Chronic pain syndrome        3. Lumbar spondylosis        4. Chronic right shoulder pain              PLAN:     - Previous lumbar MRI was reviewed and discussed with the patient today.  - She had 60% relief with recent caudal SANKET. We discussed MBB/RFA which she declined.  - I have stressed the importance of physical activity and a home exercise plan to help with pain and improve health.  - Patient can continue with medications for now since they are providing benefits, using them appropriately, and without side effects.  - Continue Gabapentin 600 mg BID.  - Can continue Ibuprofen PRN.  - RTC in 2 months or sooner if needed.    The above plan and management options were discussed at  length with patient. Patient is in agreement with the above and verbalized understanding.    BRENDA Flowers  10/01/2024

## 2024-10-15 RX ORDER — IBUPROFEN 800 MG/1
800 TABLET ORAL 2 TIMES DAILY PRN
Qty: 60 TABLET | Refills: 0 | Status: SHIPPED | OUTPATIENT
Start: 2024-10-15

## 2024-10-27 DIAGNOSIS — I10 ESSENTIAL HYPERTENSION: Chronic | ICD-10-CM

## 2024-10-28 RX ORDER — METOPROLOL SUCCINATE 100 MG/1
100 TABLET, EXTENDED RELEASE ORAL
Qty: 90 TABLET | Refills: 1 | Status: SHIPPED | OUTPATIENT
Start: 2024-10-28

## 2024-11-11 RX ORDER — IBUPROFEN 800 MG/1
800 TABLET ORAL 2 TIMES DAILY PRN
Qty: 60 TABLET | Refills: 0 | Status: SHIPPED | OUTPATIENT
Start: 2024-11-11

## 2024-12-06 ENCOUNTER — HOSPITAL ENCOUNTER (EMERGENCY)
Facility: HOSPITAL | Age: 79
Discharge: HOME | End: 2024-12-06
Attending: EMERGENCY MEDICINE
Payer: MEDICARE

## 2024-12-06 VITALS
HEART RATE: 63 BPM | DIASTOLIC BLOOD PRESSURE: 67 MMHG | BODY MASS INDEX: 27.15 KG/M2 | WEIGHT: 139 LBS | RESPIRATION RATE: 20 BRPM | TEMPERATURE: 99 F | SYSTOLIC BLOOD PRESSURE: 152 MMHG | OXYGEN SATURATION: 96 %

## 2024-12-06 DIAGNOSIS — K63.89 COLONIC MASS: ICD-10-CM

## 2024-12-06 DIAGNOSIS — R11.2 NAUSEA AND VOMITING, UNSPECIFIED VOMITING TYPE: ICD-10-CM

## 2024-12-06 DIAGNOSIS — R91.1 LESION OF RIGHT LUNG: ICD-10-CM

## 2024-12-06 DIAGNOSIS — R10.32 LEFT LOWER QUADRANT ABDOMINAL PAIN: Primary | ICD-10-CM

## 2024-12-06 DIAGNOSIS — R19.7 DIARRHEA, UNSPECIFIED TYPE: ICD-10-CM

## 2024-12-06 LAB
ALBUMIN SERPL BCP-MCNC: 3.8 G/DL (ref 3.5–5.2)
ALP SERPL-CCNC: 76 U/L (ref 40–150)
ALT SERPL W/O P-5'-P-CCNC: 35 U/L (ref 10–44)
ANION GAP SERPL CALC-SCNC: 11 MMOL/L (ref 8–16)
AST SERPL-CCNC: 34 U/L (ref 10–40)
BASOPHILS # BLD AUTO: 0.06 K/UL (ref 0–0.2)
BASOPHILS NFR BLD: 0.7 % (ref 0–1.9)
BILIRUB SERPL-MCNC: 1.2 MG/DL (ref 0.1–1)
BILIRUB UR QL STRIP: NEGATIVE
BUN SERPL-MCNC: 13 MG/DL (ref 8–23)
CALCIUM SERPL-MCNC: 9.3 MG/DL (ref 8.7–10.5)
CHLORIDE SERPL-SCNC: 109 MMOL/L (ref 95–110)
CLARITY UR: CLEAR
CO2 SERPL-SCNC: 24 MMOL/L (ref 23–29)
COLOR UR: YELLOW
CREAT SERPL-MCNC: 0.9 MG/DL (ref 0.5–1.4)
CTP QC/QA: YES
CTP QC/QA: YES
DIFFERENTIAL METHOD BLD: ABNORMAL
EOSINOPHIL # BLD AUTO: 0.3 K/UL (ref 0–0.5)
EOSINOPHIL NFR BLD: 3.1 % (ref 0–8)
ERYTHROCYTE [DISTWIDTH] IN BLOOD BY AUTOMATED COUNT: 12.2 % (ref 11.5–14.5)
EST. GFR  (NO RACE VARIABLE): >60 ML/MIN/1.73 M^2
GLUCOSE SERPL-MCNC: 104 MG/DL (ref 70–110)
GLUCOSE UR QL STRIP: NEGATIVE
HCT VFR BLD AUTO: 39.1 % (ref 37–48.5)
HGB BLD-MCNC: 12.3 G/DL (ref 12–16)
HGB UR QL STRIP: NEGATIVE
IMM GRANULOCYTES # BLD AUTO: 0.03 K/UL (ref 0–0.04)
IMM GRANULOCYTES NFR BLD AUTO: 0.4 % (ref 0–0.5)
KETONES UR QL STRIP: NEGATIVE
LEUKOCYTE ESTERASE UR QL STRIP: NEGATIVE
LIPASE SERPL-CCNC: 60 U/L (ref 4–60)
LYMPHOCYTES # BLD AUTO: 2.7 K/UL (ref 1–4.8)
LYMPHOCYTES NFR BLD: 32.4 % (ref 18–48)
MCH RBC QN AUTO: 28.9 PG (ref 27–31)
MCHC RBC AUTO-ENTMCNC: 31.5 G/DL (ref 32–36)
MCV RBC AUTO: 92 FL (ref 82–98)
MONOCYTES # BLD AUTO: 0.7 K/UL (ref 0.3–1)
MONOCYTES NFR BLD: 8.6 % (ref 4–15)
NEUTROPHILS # BLD AUTO: 4.5 K/UL (ref 1.8–7.7)
NEUTROPHILS NFR BLD: 54.8 % (ref 38–73)
NITRITE UR QL STRIP: NEGATIVE
NRBC BLD-RTO: 0 /100 WBC
PH UR STRIP: 7 [PH] (ref 5–8)
PLATELET # BLD AUTO: 214 K/UL (ref 150–450)
PMV BLD AUTO: 10.8 FL (ref 9.2–12.9)
POC MOLECULAR INFLUENZA A AGN: NEGATIVE
POC MOLECULAR INFLUENZA B AGN: NEGATIVE
POTASSIUM SERPL-SCNC: 3 MMOL/L (ref 3.5–5.1)
PROT SERPL-MCNC: 8.4 G/DL (ref 6–8.4)
PROT UR QL STRIP: NEGATIVE
RBC # BLD AUTO: 4.26 M/UL (ref 4–5.4)
SARS-COV-2 RDRP RESP QL NAA+PROBE: NEGATIVE
SODIUM SERPL-SCNC: 144 MMOL/L (ref 136–145)
SP GR UR STRIP: >1.03 (ref 1–1.03)
URN SPEC COLLECT METH UR: ABNORMAL
UROBILINOGEN UR STRIP-ACNC: NEGATIVE EU/DL
WBC # BLD AUTO: 8.27 K/UL (ref 3.9–12.7)

## 2024-12-06 PROCEDURE — 25500020 PHARM REV CODE 255: Performed by: EMERGENCY MEDICINE

## 2024-12-06 PROCEDURE — 96374 THER/PROPH/DIAG INJ IV PUSH: CPT

## 2024-12-06 PROCEDURE — 25000003 PHARM REV CODE 250: Performed by: EMERGENCY MEDICINE

## 2024-12-06 PROCEDURE — 96361 HYDRATE IV INFUSION ADD-ON: CPT

## 2024-12-06 PROCEDURE — 99285 EMERGENCY DEPT VISIT HI MDM: CPT | Mod: 25

## 2024-12-06 PROCEDURE — 81003 URINALYSIS AUTO W/O SCOPE: CPT

## 2024-12-06 PROCEDURE — 85025 COMPLETE CBC W/AUTO DIFF WBC: CPT

## 2024-12-06 PROCEDURE — 87635 SARS-COV-2 COVID-19 AMP PRB: CPT | Performed by: EMERGENCY MEDICINE

## 2024-12-06 PROCEDURE — 63600175 PHARM REV CODE 636 W HCPCS: Performed by: EMERGENCY MEDICINE

## 2024-12-06 PROCEDURE — 83690 ASSAY OF LIPASE: CPT

## 2024-12-06 PROCEDURE — 80053 COMPREHEN METABOLIC PANEL: CPT

## 2024-12-06 PROCEDURE — 87502 INFLUENZA DNA AMP PROBE: CPT

## 2024-12-06 RX ORDER — DICYCLOMINE HYDROCHLORIDE 10 MG/1
20 CAPSULE ORAL
Status: COMPLETED | OUTPATIENT
Start: 2024-12-06 | End: 2024-12-06

## 2024-12-06 RX ORDER — ONDANSETRON 4 MG/1
4 TABLET, ORALLY DISINTEGRATING ORAL
Status: COMPLETED | OUTPATIENT
Start: 2024-12-06 | End: 2024-12-06

## 2024-12-06 RX ORDER — MORPHINE SULFATE 4 MG/ML
2 INJECTION, SOLUTION INTRAMUSCULAR; INTRAVENOUS
Status: COMPLETED | OUTPATIENT
Start: 2024-12-06 | End: 2024-12-06

## 2024-12-06 RX ORDER — ONDANSETRON 4 MG/1
4 TABLET, FILM COATED ORAL EVERY 6 HOURS PRN
Qty: 12 TABLET | Refills: 0 | Status: SHIPPED | OUTPATIENT
Start: 2024-12-06 | End: 2024-12-09

## 2024-12-06 RX ORDER — DICYCLOMINE HYDROCHLORIDE 20 MG/1
20 TABLET ORAL 2 TIMES DAILY PRN
Qty: 14 TABLET | Refills: 0 | Status: SHIPPED | OUTPATIENT
Start: 2024-12-06 | End: 2024-12-13

## 2024-12-06 RX ADMIN — IOHEXOL 75 ML: 350 INJECTION, SOLUTION INTRAVENOUS at 04:12

## 2024-12-06 RX ADMIN — MORPHINE SULFATE 2 MG: 4 INJECTION INTRAVENOUS at 04:12

## 2024-12-06 RX ADMIN — SODIUM CHLORIDE 1000 ML: 9 INJECTION, SOLUTION INTRAVENOUS at 04:12

## 2024-12-06 RX ADMIN — ONDANSETRON 4 MG: 4 TABLET, ORALLY DISINTEGRATING ORAL at 04:12

## 2024-12-06 RX ADMIN — DICYCLOMINE HYDROCHLORIDE 20 MG: 10 CAPSULE ORAL at 04:12

## 2024-12-06 NOTE — ED PROVIDER NOTES
Encounter Date: 12/6/2024       History     Chief Complaint   Patient presents with    Abdominal Pain     Generalized abd pain and diarrhea x 3 days. Denies any vomiting and fever. Denies dysuria. Reports black stool after taking pepto bismol      71-year-old female presenting with abdominal pain, left lower quadrant and suprapubic as well as diarrhea x3 days.  Patient tried Pepto-Bismol.  Notes diarrhea has somewhat improved.  Pain is also somewhat improved.  Denies fevers, chills.  Notes mild nausea.  Notes sick contacts with similar symptoms.      Review of patient's allergies indicates:  No Known Allergies  Past Medical History:   Diagnosis Date    Anxiety     Arthritis     Corneal abrasion s    ? eye     Depression     Full dentures     GERD (gastroesophageal reflux disease)     Hyperlipidemia     Hypertension     Nuclear sclerosis of both eyes 6/5/2017    Osteoporosis     Restless leg syndrome      Past Surgical History:   Procedure Laterality Date    COLONOSCOPY N/A 5/14/2019    Procedure: COLONOSCOPY;  Surgeon: Nahid Cline MD;  Location: [facility];  Service: Endoscopy;  Laterality: N/A;    EPIDURAL STEROID INJECTION N/A 5/13/2020    Procedure: LUMBAR/CAUDAL L5/S1 IL SANKET ;  Surgeon: Miguel Latham MD;  Location: [facility];  Service: Pain Management;  Laterality: N/A;  NEEDS CONSENT    EPIDURAL STEROID INJECTION N/A 8/30/2023    Procedure: INJECTION, STEROID, EPIDURAL, CAUDAL W/ CATH SOONER DATE;  Surgeon: Miguel Latham MD;  Location: [facility];  Service: Pain Management;  Laterality: N/A;    EPIDURAL STEROID INJECTION N/A 1/31/2024    Procedure: CAUDAL SANKET;  Surgeon: iMguel Latham MD;  Location: [facility];  Service: Pain Management;  Laterality: N/A;  [phone]  4 WK F/U LOLIS    EPIDURAL STEROID INJECTION N/A 9/4/2024    Procedure: CAUDAL SANKET;  Surgeon: Miguel Latham MD;  Location: [facility];  Service: Pain Management;  Laterality: N/A;  [phone]  2 WK F/U  KAREN    HYSTERECTOMY      PARTIAL HYSTERECTOMY      TRANSFORAMINAL EPIDURAL INJECTION OF STEROID Bilateral 9/4/2019    Procedure: Injection,steroid,epidural,transforaminal approach LUMBAR TRANSFORAMINAL BILATERAL L3 TF SANKET;  Surgeon: Miguel Latham MD;  Location: [facility];  Service: Pain Management;  Laterality: Bilateral;  NEEDS CONSENT    TRANSFORAMINAL EPIDURAL INJECTION OF STEROID Bilateral 10/30/2019    Procedure: TRANSFORAMINAL SANKET BILATERAL L3-L4;  Surgeon: Miguel Latham MD;  Location: [facility];  Service: Pain Management;  Laterality: Bilateral;  NEEDS CONSENT    TRANSFORAMINAL EPIDURAL INJECTION OF STEROID Bilateral 11/11/2020    Procedure: INJECTION, STEROID, EPIDURAL, TRANSFORAMINAL APPROACH, L3-L4;  Surgeon: Miguel Latham MD;  Location: [facility];  Service: Pain Management;  Laterality: Bilateral;    TRANSFORAMINAL EPIDURAL INJECTION OF STEROID Bilateral 5/19/2021    Procedure: INJECTION, STEROID, EPIDURAL, TRANSFORAMINAL APPROACH, L3-L4;  Surgeon: Miguel Latham MD;  Location: [facility];  Service: Pain Management;  Laterality: Bilateral;    TRANSFORAMINAL EPIDURAL INJECTION OF STEROID Bilateral 1/26/2022    Procedure: Injection,steroid,epidural,transforaminal approach BILATERAL L3/4;  Surgeon: Miguel Latham MD;  Location: [facility];  Service: Pain Management;  Laterality: Bilateral;    TRANSFORAMINAL EPIDURAL INJECTION OF STEROID Bilateral 4/20/2022    Procedure: INJECTION, STEROID, EPIDURAL, TRANSFORAMINAL APPROACH, Bilateral L3-L4;  Surgeon: Miguel Latham MD;  Location: [facility];  Service: Pain Management;  Laterality: Bilateral;    TRANSFORAMINAL EPIDURAL INJECTION OF STEROID Bilateral 7/20/2022    Procedure: INJECTION, STEROID, EPIDURAL, TRANSFORAMINAL APPROACH, BILATERAL L3-L4 CONTRAST;  Surgeon: Miguel Latham MD;  Location: [facility];  Service: Pain Management;  Laterality: Bilateral;    TRANSFORAMINAL EPIDURAL INJECTION OF STEROID  Right 12/7/2022    Procedure: INJECTION, STEROID, EPIDURAL, TRANSFORAMINAL APPROACH, RIGHT L3/L4 AND L4/L5 CONTRAST;  Surgeon: Miguel Latham MD;  Location: [facility];  Service: Pain Management;  Laterality: Right;    TRANSFORAMINAL EPIDURAL INJECTION OF STEROID Right 2/8/2023    Procedure: INJECTION, STEROID, EPIDURAL, TRANSFORAMINAL APPROACH RIGHT L3/4 AND L4/5 CONTRAST;  Surgeon: Miguel Latham MD;  Location: [facility];  Service: Pain Management;  Laterality: Right;    TUBAL LIGATION Bilateral      Family History   Problem Relation Name Age of Onset    Diabetes Mother      Stroke Mother      Glaucoma Mother      Cataracts Mother      Cancer Father          Lung    No Known Problems Sister      Stroke Brother      Hypertension Daughter Lois     Hypertension Daughter Catherine     Hypertension Daughter Kyra     No Known Problems Daughter Dyaca     Heart disease Son      Early death Son      No Known Problems Maternal Aunt      No Known Problems Maternal Uncle      No Known Problems Paternal Aunt      No Known Problems Paternal Uncle      No Known Problems Maternal Grandmother      No Known Problems Maternal Grandfather      No Known Problems Paternal Grandmother      No Known Problems Paternal Grandfather      Amblyopia Neg Hx      Blindness Neg Hx      Macular degeneration Neg Hx      Retinal detachment Neg Hx      Strabismus Neg Hx      Thyroid disease Neg Hx       Social History     Tobacco Use    Smoking status: Never     Passive exposure: Never    Smokeless tobacco: Never   Substance Use Topics    Alcohol use: Not Currently     Comment: once a year    Drug use: No     Review of Systems   Constitutional:  Negative for chills and fever.   Respiratory:  Negative for chest tightness.    Gastrointestinal:  Positive for abdominal pain, diarrhea and nausea. Negative for abdominal distention and blood in stool.       Physical Exam     Initial Vitals [12/06/24 1415]   BP Pulse Resp Temp SpO2    130/73 87 18 98.5 °F (36.9 °C) 98 %      MAP       --         Physical Exam    Nursing note and vitals reviewed.  Constitutional: She appears well-developed and well-nourished. She is not diaphoretic. No distress.   HENT:   Head: Normocephalic and atraumatic.   Nose: Nose normal.   Eyes: EOM are normal. Pupils are equal, round, and reactive to light. No scleral icterus.   Neck: Neck supple.   Normal range of motion.  Cardiovascular:  Normal rate, regular rhythm, normal heart sounds and intact distal pulses.     Exam reveals no gallop and no friction rub.       No murmur heard.  Pulmonary/Chest: Breath sounds normal. No stridor. No respiratory distress. She has no wheezes. She has no rhonchi. She has no rales.   Abdominal: Abdomen is soft. Bowel sounds are normal. She exhibits no distension. There is no abdominal tenderness. There is no rebound and no guarding.   Musculoskeletal:         General: No tenderness or edema. Normal range of motion.      Cervical back: Normal range of motion and neck supple.     Neurological: She is alert and oriented to person, place, and time. No cranial nerve deficit. GCS score is 15. GCS eye subscore is 4. GCS verbal subscore is 5. GCS motor subscore is 6.   Skin: Skin is warm and dry. No rash noted.   Psychiatric: She has a normal mood and affect. Her behavior is normal.         ED Course   Procedures  Labs Reviewed   CBC W/ AUTO DIFFERENTIAL - Abnormal       Result Value    WBC 8.27      RBC 4.26      Hemoglobin 12.3      Hematocrit 39.1      MCV 92      MCH 28.9      MCHC 31.5 (*)     RDW 12.2      Platelets 214      MPV 10.8      Immature Granulocytes 0.4      Gran # (ANC) 4.5      Immature Grans (Abs) 0.03      Lymph # 2.7      Mono # 0.7      Eos # 0.3      Baso # 0.06      nRBC 0      Gran % 54.8      Lymph % 32.4      Mono % 8.6      Eosinophil % 3.1      Basophil % 0.7      Differential Method Automated     COMPREHENSIVE METABOLIC PANEL - Abnormal    Sodium 144       Potassium 3.0 (*)     Chloride 109      CO2 24      Glucose 104      BUN 13      Creatinine 0.9      Calcium 9.3      Total Protein 8.4      Albumin 3.8      Total Bilirubin 1.2 (*)     Alkaline Phosphatase 76      AST 34      ALT 35      eGFR >60      Anion Gap 11     URINALYSIS, REFLEX TO URINE CULTURE - Abnormal    Specimen UA Urine, Clean Catch      Color, UA Yellow      Appearance, UA Clear      pH, UA 7.0      Specific Gravity, UA >1.030 (*)     Protein, UA Negative      Glucose, UA Negative      Ketones, UA Negative      Bilirubin (UA) Negative      Occult Blood UA Negative      Nitrite, UA Negative      Urobilinogen, UA Negative      Leukocytes, UA Negative      Narrative:     Specimen Source->Urine   LIPASE    Lipase 60     SARS-COV-2 RDRP GENE    POC Rapid COVID Negative       Acceptable Yes     POCT INFLUENZA A/B MOLECULAR    POC Molecular Influenza A Ag Negative      POC Molecular Influenza B Ag Negative       Acceptable Yes            Imaging Results               CT Chest Without Contrast (Final result)  Result time 12/06/24 18:46:37      Final result by Jenise Bateman MD (12/06/24 18:46:37)                   Impression:      Right upper pulmonary lobe lesion with irregular margins.  Neoplasia cannot be entirely excluded.  Recommend pulmonary consult for workup.    This report was flagged in Epic as abnormal.      Electronically signed by: Jenise Bateman  Date:    12/06/2024  Time:    18:46               Narrative:    EXAMINATION:  CT CHEST WITHOUT CONTRAST    CLINICAL HISTORY:  Generalized abdominal pain and diarrhea x3 days.Pneumonia, unresolved;Lung nodule, > 8mm;RUL lesion on abd/pelvis;    TECHNIQUE:  Contiguous axial 5 mm images was obtained from the lung apices through the lung bases.  No intravenous contrast was given.  Coronal and sagittal reformatted images were provided.    COMPARISON:  None.    FINDINGS:  There is 17 x 13 x 17 mm right upper lobe  pulmonary lesion with irregular margins (series 4 axial image 191 and series 602 coronal image 83).  There are mild bibasilar atelectatic changes.  There is a 5 mm ovoid density adjacent to the major fissure, which may be a perifissural lymph node or part of the normal pleural reflection.    There is no evidence of mediastinal, hilar, or axillary adenopathy.    There is no pleural or pericardial effusion.    The heart size is within limits for overall size.  Mild vascular calcifications are seen involving the thoracic aorta.    Degenerative changes are seen involving the spine and the shoulder joints.                                        CT Abdomen Pelvis With IV Contrast NO Oral Contrast (Final result)  Result time 12/06/24 17:29:52      Final result by Jenise Bateman MD (12/06/24 17:29:52)                   Impression:      Focal thickening of a short segment of the hepatic flexure.  Finding may be related to under distension, peristalsis, and or neoplasia.  Given episode of blood in stool and diarrhea, recommend further evaluation to exclude neoplasia.    Bilateral renal cysts.  Largest left renal cyst measures slightly greater than simple fluid.  As above described.  If warranted, consider nonemergent follow-up ultrasound of the kidneys.    Small amount of gallbladder sludge and or gravel-like gallstones.    Incompletely imaged density or lesion in the right lower lobe.    Trace free pelvic fluid, which is uncommon in a postmenopausal female.    This report was flagged in Epic as abnormal.      Electronically signed by: Jenise Bateman  Date:    12/06/2024  Time:    17:29               Narrative:    EXAMINATION:  CT OF ABDOMEN PELVIS WITH    CLINICAL HISTORY:  Generalized abdominal pain and diarrhea x3 days.  Black stool.  LLQ abdominal pain;Bowel obstruction suspected;    TECHNIQUE:  5 mm enhanced axial images were obtained from the lung bases through the greater trochanters.  Seventy-five mL of  Omnipaque 350 was injected.    COMPARISON:  09/25/2023    FINDINGS:  The liver, spleen, pancreas, and adrenal glands are unremarkable. The gallbladder contains small dependent hyperdensity, which may represent gravel-like gallstones and or gallbladder sludge.    Bilateral renal cysts are present.  The largest is seen in the left kidney measuring 5 x 4.8 cm measures slightly higher than simple fluid, suggesting proteinaceous or mildly hemorrhagic products.    There is no definite evidence for abdominal adenopathy or ascites.  Moderate atherosclerotic changes are present.    There is focal thickening of the a short segment of the hepatic flexure (series 2 axial image 71, series 601 coronal image 66 and series 602 sagittal image 68).  There are no pelvic masses or adenopathy.    There is trace free fluid in the pelvis.  The uterus is surgically absent.  The appendix is not inflamed.    At the lung bases, there is incompletely imaged density or lesion seen in the inferior aspect of the right upper lobe (series 2 axial image 1).  A previously described 4 mm left lower lobe pulmonary nodule is not appreciated on the current examination or is obscured by mild bibasilar compressive atelectatic changes.                                       Medications   morphine injection 2 mg (2 mg Intravenous Given 12/6/24 1616)   dicyclomine capsule 20 mg (20 mg Oral Given 12/6/24 1614)   ondansetron disintegrating tablet 4 mg (4 mg Oral Given 12/6/24 1616)   sodium chloride 0.9% bolus 1,000 mL 1,000 mL (0 mLs Intravenous Stopped 12/6/24 1752)   iohexoL (OMNIPAQUE 350) injection 75 mL (75 mLs Intravenous Given 12/6/24 1653)     Medical Decision Making                        Medical Decision Making:   Initial Assessment:   79-year-old female presenting with mild abdominal discomfort, diarrhea.  On exam she is well-appearing in no acute distress.  Vitals reassuring.  Labs reassuring without significant metabolic derangement.  CT scan shows  incidental findings including lung mass and narrowing of colon.  Discussed with patient.  Discussed need for urgent follow-up for further investigation including possible colonoscopy or biopsy.  Otherwise patient tolerating p.o., well-appearing.  Given prescription for Zofran and Bentyl.  Believe she is safe for discharge home with urgent follow-up by pulmonology and Gastroenterology and or PCP.             Clinical Impression:  Final diagnoses:  [R10.32] Left lower quadrant abdominal pain (Primary)  [R11.2] Nausea and vomiting, unspecified vomiting type  [R19.7] Diarrhea, unspecified type  [R91.1] Lesion of right lung  [K63.89] Colonic mass          ED Disposition Condition    Discharge Stable          ED Prescriptions       Medication Sig Dispense Start Date End Date Auth. Provider    dicyclomine (BENTYL) 20 mg tablet Take 1 tablet (20 mg total) by mouth 2 (two) times daily as needed (abdominal cramping). 14 tablet 12/6/2024 12/13/2024 Bobby Patel MD    ondansetron (ZOFRAN) 4 MG tablet Take 1 tablet (4 mg total) by mouth every 6 (six) hours as needed for Nausea. 12 tablet 12/6/2024 12/9/2024 Bobby Patel MD          Follow-up Information       Follow up With Specialties Details Why Contact Info    Luisito Marin MD Internal Medicine Schedule an appointment as soon as possible for a visit   [address]  [place] [id number] [phone]      [facility] Emergency Medicine  As needed, If symptoms worsen [address] [facility] [place] [address] [phone]

## 2024-12-07 NOTE — DISCHARGE INSTRUCTIONS
You were seen in the emergency department for a fever, vomiting, and diarrhea.  We gave you some medication and you felt better.  We think you're safe to go home.  Please follow-up with your primary care provider in the next day or two.  Please return for any new or worsening fever, seizures, inability to eat or drink anything, black or bloody stool, or become concerned about her child in any other way.    Your CT scans do show some incidental findings.  We are not sure what could be the cause, but it is important that you follow-up with a pulmonologist or primary care provider to make sure it is not due to something dangerous.

## 2024-12-08 DIAGNOSIS — K63.89 COLONIC MASS: Primary | ICD-10-CM

## 2024-12-09 ENCOUNTER — E-CONSULT (OUTPATIENT)
Dept: PULMONOLOGY | Facility: CLINIC | Age: 79
End: 2024-12-09
Payer: MEDICARE

## 2024-12-09 ENCOUNTER — E-CONSULT (OUTPATIENT)
Dept: GASTROENTEROLOGY | Facility: HOSPITAL | Age: 79
End: 2024-12-09
Payer: MEDICARE

## 2024-12-09 ENCOUNTER — PATIENT MESSAGE (OUTPATIENT)
Dept: INTERNAL MEDICINE | Facility: CLINIC | Age: 79
End: 2024-12-09

## 2024-12-09 ENCOUNTER — OFFICE VISIT (OUTPATIENT)
Dept: INTERNAL MEDICINE | Facility: CLINIC | Age: 79
End: 2024-12-09
Payer: MEDICARE

## 2024-12-09 ENCOUNTER — TELEPHONE (OUTPATIENT)
Dept: PULMONOLOGY | Facility: CLINIC | Age: 79
End: 2024-12-09

## 2024-12-09 VITALS
BODY MASS INDEX: 26.75 KG/M2 | DIASTOLIC BLOOD PRESSURE: 70 MMHG | HEIGHT: 60 IN | SYSTOLIC BLOOD PRESSURE: 138 MMHG | WEIGHT: 136.25 LBS | OXYGEN SATURATION: 95 % | HEART RATE: 68 BPM

## 2024-12-09 DIAGNOSIS — R91.1 LUNG NODULE: Primary | ICD-10-CM

## 2024-12-09 DIAGNOSIS — F33.42 RECURRENT MAJOR DEPRESSIVE DISORDER, IN FULL REMISSION: ICD-10-CM

## 2024-12-09 DIAGNOSIS — R93.3 ABNORMAL FINDING ON GI TRACT IMAGING: Primary | ICD-10-CM

## 2024-12-09 DIAGNOSIS — K63.89 COLONIC MASS: Primary | ICD-10-CM

## 2024-12-09 DIAGNOSIS — R93.5 ABNORMAL CT OF THE ABDOMEN: Primary | ICD-10-CM

## 2024-12-09 DIAGNOSIS — R91.8 MASS OF UPPER LOBE OF RIGHT LUNG: ICD-10-CM

## 2024-12-09 DIAGNOSIS — R19.7 DIARRHEA, UNSPECIFIED TYPE: ICD-10-CM

## 2024-12-09 PROCEDURE — 3075F SYST BP GE 130 - 139MM HG: CPT | Mod: CPTII,S$GLB,, | Performed by: INTERNAL MEDICINE

## 2024-12-09 PROCEDURE — 3078F DIAST BP <80 MM HG: CPT | Mod: CPTII,S$GLB,, | Performed by: INTERNAL MEDICINE

## 2024-12-09 PROCEDURE — 99999 PR PBB SHADOW E&M-EST. PATIENT-LVL III: CPT | Mod: PBBFAC,,, | Performed by: INTERNAL MEDICINE

## 2024-12-09 PROCEDURE — 1101F PT FALLS ASSESS-DOCD LE1/YR: CPT | Mod: CPTII,S$GLB,, | Performed by: INTERNAL MEDICINE

## 2024-12-09 PROCEDURE — 1126F AMNT PAIN NOTED NONE PRSNT: CPT | Mod: CPTII,S$GLB,, | Performed by: INTERNAL MEDICINE

## 2024-12-09 PROCEDURE — 99214 OFFICE O/P EST MOD 30 MIN: CPT | Mod: S$GLB,,, | Performed by: INTERNAL MEDICINE

## 2024-12-09 PROCEDURE — 99446 NTRPROF PH1/NTRNET/EHR 5-10: CPT | Mod: ,,, | Performed by: STUDENT IN AN ORGANIZED HEALTH CARE EDUCATION/TRAINING PROGRAM

## 2024-12-09 PROCEDURE — 1160F RVW MEDS BY RX/DR IN RCRD: CPT | Mod: CPTII,S$GLB,, | Performed by: INTERNAL MEDICINE

## 2024-12-09 PROCEDURE — 1159F MED LIST DOCD IN RCRD: CPT | Mod: CPTII,S$GLB,, | Performed by: INTERNAL MEDICINE

## 2024-12-09 PROCEDURE — 3288F FALL RISK ASSESSMENT DOCD: CPT | Mod: CPTII,S$GLB,, | Performed by: INTERNAL MEDICINE

## 2024-12-09 NOTE — TELEPHONE ENCOUNTER
----- Message from Karine Morales MD sent at 12/9/2024  2:20 PM CST -----  Regarding: E consult pricillao ross Clakr, I did an e- consult for this patient and ordered a PET scan and she should follow up with Bita or Garrett after the PET scan. Can you help get this arranged?     Thank you!  Karine Morales

## 2024-12-09 NOTE — PROGRESS NOTES
Assessment & Plan  1. Colonic mass  -     E-Consult to Gastroenterology    2. Diarrhea, unspecified type    3. Mass of upper lobe of right lung  -     E-Consult to Pulmonary    4. Recurrent major depressive disorder, in full remission  Overview:  Previously did well on Paxil.  Doing great off of this.           Assessment & Plan    IMPRESSION:  - Reviewed labs: CBC, electrolytes, kidney and liver function tests generally normal; slight hypokalemia and elevated bilirubin likely due to recent diarrhea  - Assessed CTs:  -  Chest: [id number] spot in right upper lung lobe; small area of atelectasis  -  Abdomen: Simple renal cysts, gallbladder sludge, thickened area in upper right colon  - Differential for lung spot includes possible malignancy; requires further evaluation  - Abdominal symptoms likely due to viral gastroenteritis, but thickened colon area warrants investigation  - Plan to expedite workup with e-consults to pulmonology and GI specialists    ABNORMAL LUNG FINDING:  - Explained CT findings, including lung spot.  - Initiated e-consults with pulmonology specialist.    ABNORMAL DIGESTIVE TRACT IMAGING:  - Explained CT findings, including thickened colon area.  - Initiated e-consults with GI specialists.  - Referred to GI specialist for potential colonoscopy based on specialist recommendation.    FUNCTIONAL DIARRHEA:  - Discussed possible causes of symptoms, including viral gastroenteritis and potential for more serious conditions.  - Ms. Herring to resume eating as tolerated, starting with simple foods like toast and oatmeal.  - Ms. Herring to stay hydrated.    FECAL ABNORMALITIES:  - Clarified that Pepto-Bismol can cause black stool coloration.    DIETARY COUNSELING:  - Ms. Herring to resume eating as tolerated, starting with simple foods like toast and oatmeal.  - Ms. Herring to stay hydrated.    FOLLOW-UP:  - Contact the office if condition worsens.  - Follow up when contacted with specialist  recommendations and next steps, likely by tomorrow afternoon.           Health Maintenance reviewed, vaccines deferred until she is back to her baseline.    Follow-up: Follow up pending workups.    This note was generated with the assistance of ambient listening technology. Verbal consent was obtained by the patient and accompanying visitor(s) for the recording of patient appointment to facilitate this note. I attest to having reviewed and edited the generated note for accuracy, though some syntax or spelling errors may persist. Please contact the author of this note for any clarification.      ______________________________________________________________________    Chief Complaint  Chief Complaint   Patient presents with    Follow-up     ED       Their last appointment in this dept was March 2024.      Here with her , daughter, and another family member on the phone    History of Present Illness    CHIEF COMPLAINT:  Ms. Herring presents today for abdominal pain and diarrhea.    GASTROINTESTINAL SYMPTOMS:  She reports abdominal pain and diarrhea starting Monday, persisting for about two days before visiting the ER. She experienced orange-colored stool that turned black after taking Pepto-Bismol. She is still experiencing slight pains in the lower abdomen, which she attributes to potential lack of food intake. Her appetite has been reduced since the onset of diarrhea, though it's starting to return slightly. She feels full quickly when eating and cannot consume large amounts at a time. This change in appetite is a new development since her last regular checkup in March.    WEIGHT:  She reports a weight loss of 4 to 6 lbs over the past year, with current weight at 136 lbs compared to previous weights between 140 and 142 lbs. The recent weight loss is attributed to episodes of diarrhea and decreased appetite.      ROS:  General: +weight loss, +loss of appetite  Gastrointestinal: +abdominal pain, +diarrhea,  +change in bowel habits              ROS  Review of Systems        Physical Exam  Vitals:    12/09/24 1137   BP: 138/70   BP Location: Left arm   Patient Position: Sitting   Pulse: 68   SpO2: 95%   Weight: 61.8 kg (136 lb 3.9 oz)   Height: 5' (1.524 m)    Body mass index is 26.61 kg/m².  Weight: 61.8 kg (136 lb 3.9 oz)   Height: 5' (152.4 cm)   Physical Exam  Constitutional:       General: She is not in acute distress.     Appearance: She is well-developed.   HENT:      Head: Normocephalic and atraumatic.   Eyes:      General: Lids are normal. No scleral icterus.     Conjunctiva/sclera: Conjunctivae normal.      Pupils: Pupils are equal, round, and reactive to light.   Neck:      Thyroid: No thyromegaly.      Vascular: No carotid bruit or JVD.   Cardiovascular:      Rate and Rhythm: Normal rate and regular rhythm.      Pulses: Normal pulses.      Heart sounds: Normal heart sounds. No murmur heard.     No friction rub. No S3 or S4 sounds.   Pulmonary:      Effort: Pulmonary effort is normal.      Breath sounds: Normal breath sounds. No wheezing, rhonchi or rales.   Abdominal:      General: Bowel sounds are normal.      Palpations: Abdomen is soft.      Tenderness: There is no abdominal tenderness.   Musculoskeletal:      Cervical back: Full passive range of motion without pain and neck supple.      Right lower leg: No edema.      Left lower leg: No edema.   Skin:     General: Skin is warm and dry.   Neurological:      Mental Status: She is alert and oriented to person, place, and time.   Psychiatric:         Speech: Speech normal.         Behavior: Behavior normal.         Thought Content: Thought content normal.

## 2024-12-09 NOTE — CONSULTS
[facility] GASTROENTEROLOGY  Response for E-Consult     Patient Name: Diana Herring  MRN: [id number]  Primary Care Provider: Luisito Marin MD   Requesting Provider: Luisito Marin MD  E-Consult to Gastroenterology  Consult performed by: Davin Miranda MD  Consult ordered by: Luisito Marin MD  Reason for consult: Abnormal CT Scan          Recommendation:   1) Will arrange expedited colonoscopy - please sign pended consult    An Ambulatory Referral for an Endoscopy Order will be pended to the referring provider with the following:    Procedure: Colonoscopy    Diagnosis: Abnormal finding on GI tract imaging    Procedure Timing: Within 4 weeks (Urgent)        Contingency that warrants a repeat eConsult or referral:   80yo woman who presented to the ED with diarrhea.  CT of abdomen noted for short segment thickening at hepatic flexure.  Last colonoscopy in 2019 notable for a 10mm AC adenoma.      Total time of Consultation: 10 minute    I did not speak to the requesting provider verbally about this.     *This eConsult is based on the clinical data available to me and is furnished without benefit of a physical examination. The eConsult will need to be interpreted in light of any clinical issues or changes in patient status not available to me at the time of filing this eConsults. Significant changes in patient condition or level of acuity should result in immediate formal consultation and reevaluation. Please alert me if you have further questions.    Thank you for this eConsult referral.     Davin Miranda MD  [facility] GASTROENTEROLOGY

## 2024-12-09 NOTE — TELEPHONE ENCOUNTER
Patient scheduled with Dr Farooq on 12/23/24 at 2:30pm for ebus/robotic bronchoscopy consult. PET scan scheduled on 1/13/25 at 9am at [facility] on 1st floor.  Appointments mailed.

## 2024-12-09 NOTE — CONSULTS
[facility] 9th Fl  Response for E-Consult     Patient Name: Diana Herring  MRN: [id number]  Primary Care Provider: Luisito Marin MD   Requesting Provider: Luisito Marin MD  E-Consult to Pulmonary  Consult performed by: Karine Morales MD  Consult ordered by: Luisito Marin MD          After evaluation of your eConsult clinical questions, I believe the patient should be scheduled for an office visit in our specialty due to the size of the nodule. I ordered a PET SCAN and will have our staff get her scheduled. .    Total time of Consultation: 10 minute    *This eConsult is based on the clinical data available to me and is furnished without benefit of a physician examination.  The eConsult will need to be interpreted in light of any clinical issues of changes in patient status not available to me at the time rime of filing this eConsults.  Significant changes in patient condition of level of acuity should result in a referral for in person consultation and reevaluation.  Please alert me if you have any furth questions.     Thank you for this eConsult referral.     MD [facility] - [facility] 9th Fl

## 2024-12-10 ENCOUNTER — TELEPHONE (OUTPATIENT)
Dept: ENDOSCOPY | Facility: HOSPITAL | Age: 79
End: 2024-12-10
Payer: MEDICARE

## 2024-12-10 DIAGNOSIS — Z12.11 SPECIAL SCREENING FOR MALIGNANT NEOPLASMS, COLON: Primary | ICD-10-CM

## 2024-12-10 DIAGNOSIS — R93.3 ABNORMAL FINDING ON GI TRACT IMAGING: ICD-10-CM

## 2024-12-10 RX ORDER — IBUPROFEN 800 MG/1
800 TABLET ORAL 2 TIMES DAILY PRN
Qty: 60 TABLET | Refills: 0 | Status: SHIPPED | OUTPATIENT
Start: 2024-12-10

## 2024-12-10 NOTE — TELEPHONE ENCOUNTER
Referral for procedure from  Workqueue referral (see Appts tab)      Spoke to patient to schedule procedure(s) Colonoscopy       Physician to perform procedure(s) Dr. DEBRA Hurst  Date of Procedure (s) 12/12/24  Arrival Time 7:15 AM  Time of Procedure(s) 8:15 AM   Location of Procedure(s) [address]   Type of Rx Prep sent to patient: PEG  Instructions provided to patient via Email    Patient was informed on the following information and verbalized understanding. Screening questionnaire reviewed with patient and complete. If procedure requires anesthesia, a responsible adult needs to be present to accompany the patient home, patient cannot drive after receiving anesthesia. Appointment details are tentative, especially check-in time. Patient will receive a prep-op call 7 days prior to confirm check-in time for procedure. If applicable the patient should contact their pharmacy to verify Rx for procedure prep is ready for pick-up. Patient was advised to call the scheduling department at [phone] if pharmacy states no Rx is available. Patient was advised to call the endoscopy scheduling department if any questions or concerns arise.      [facility]oscopy Scheduling Department

## 2024-12-10 NOTE — TELEPHONE ENCOUNTER
Colonoscopy Procedure Prep Instructions    Date of procedure: 12/12/24 Arrive at: 7:15 AM    Location of Department:   [facility] [place] [address]  Take the Elevators to 2nd Floor Endoscopy Procedural Area    As soon as possible:   your prep from pharmacy and over the counter DULCOLAX LAXATIVE TABLETS            On the day before your procedure   What You CAN do:   You may have clear liquids ONLY-see below for list.     Liquids That Are OK to Drink:   Water  Sports drinks (Gatorade, Power-Aid)  Coffee or tea (no cream or nondairy creamer)  Clear juices without pulp (apple, white grape)  Gelatin desserts (no fruit or toppings)  Clear soda (sprite, coke, ginger ale)  Chicken broth (until 12 midnight the night before procedure)    What You CANNOT do:   Do not EAT solid food, drink milk or anything   colored red.  Do not drink alcohol.  Do not take oral medications within 1 hour of starting   each dose of prep.  No gum chewing or candy morning of procedure                       Note:   (Please disregard the insert instructions from pharmacy).  PEG Bowel Prep is indicated for cleansing of the colon as a preparation for colonoscopy in adults.   Be sure to tell your doctor about all the medicines you take, including prescription and non-prescription medicines, vitamins, and herbal supplements. PEG Bowel Prep may affect how other medicines work.  Medication taken by mouth may not be absorbed properly when taken within 1 hour before the start of each dose of PEG Bowel Prep.    It is not uncommon to experience some abdominal cramping, nausea and/or vomiting when taking the prep. If you have nausea and/or vomiting while taking the prep, stop drinking for 20 to 30 minutes then continue.      How to take prep:    PEG Bowel Prep is a (2-day) prep.    One (1) bottle of prep are required for a complete preparation for colonoscopy. Dilute the solution concentrate as directed prior to  use. You must drink water with each dose of prep, and additional water after each dose.    DOSE 1--Day Before Colonoscopy 12/11/24     Drink at least 6 to 8 glasses of clear liquids from time you wake up until you begin your prep and then continue until bedtime to avoid dehydration.     12:00 pm (NOON) Mix your entire container of prep with lukewarm water and refrigerate. Take four (4) Dulcolax (Bisacodyl) tablets with at least 8 ounces or more of clear liquids.       6:00 pm:    You must complete Steps 1 and 2 below before going to bed:    Step 1-Drink half the liquid in the container within one (1) hour.   Step 2-Refrigerate the remaining half of the liquid for dose 2. See below when to begin this step.                       IMPORTANT: If you experience preparation-related symptoms (for example, nausea, bloating, or cramping), stop, or slow the rate of drinking the additional water until your symptoms decrease.    DOSE 2--Day of the Colonoscopy 12/12/24 at 2-3 AM.    For this dose, repeat Step 1 shown above using the remaining half of the liquid prep.   You may continue drinking water/clear liquids until   2 hours before your colonoscopy or as directed by the scheduling nurse  6:15 AM.      For information about your procedure, two (2) things to view prior to colonoscopy:  Please watch this informational video. It is important to watch this animated consent video prior to your arrival. If you haven't watched the video prior to arriving, you are required to watch it during admission which can causes delays.    Options for viewing:   Using a keyboard:  press and hold the control tab (Ctrl) and left mouse click to follow links.           Colonoscopy Instructional Video                                                                                   OR    Type link address into your web browser's address bar:  [email or web address]/[email or web address]      Educational Booklet with pictures:      Colonoscopy Prep  - Liquid      Comments:               IMPORTANT INFORMATION TO KNOW BEFORE YOUR PROCEDURE    [facility] 2nd Floor       When you arrive:  If your procedure is Monday - Friday 5am - 7pm - Please enter through the front door near [facility]. Please proceed up the first set of elevators to the 2nd floor where you will check in at the endo registration desk.     If your procedure is on a Saturday (weekend), enter through the back Outpatient entrance. Please note this entrance is diagonal from the Emergency Department entrance.              If your procedure requires the administration of anesthesia, it is necessary for a responsible adult to drive you home. (Medical Transportation, Uber, Lyft, Taxi, etc. may ONLY be used if a responsible adult is present to accompany you home.  The responsible adult CAN'T be the  of the service).      person must be available to return to pick you up within 15 minutes of being notified of discharge.       Please bring a picture ID, insurance card, & copayment      Take Medications as directed below:      If you begin taking any blood thinning medications, injectable weight loss/diabetes medications (other than insulin) , or Adipex (Phentermine) please contact the endoscopy scheduling department listed below as soon as possible.    If you are diabetic see the attached instruction sheet regarding your medication.     If you take HEART, BLOOD PRESSURE, SEIZURE, PAIN, LUNG (including inhalers/nebulizers), ANTI-REJECTION (transplant patients), or PSYCHIATRIC medications, please take at your regular times with a sip of water or as directed by the scheduling nurse.     Important contact information:    Endoscopy [phone] Hours of operation Monday-Friday 8:00-4:30pm.    Questions about insurance or financial obligations call [phone] or [phone].    If you have questions regarding the prep or need to reschedule, please  call [phone]. After hours questions requiring immediate assistance, contact [facility] nurse line at [phone] or [phone].   NOTE:     On occasion, unforeseen circumstances may cause a delay in your procedure start time. We respect your time and appreciate your patience during these circumstances.      Comments:            Are you ready for your Colonoscopy?      __ If you take blood thinners,weight loss or diabetic injectable medications, have you stopped taking them according to your doctor's instructions before your procedures?  __ Have you stopped eating solid foods and followed a clear liquid diet a full day before your procedure or followed the diet       guidelines indicated in your instructions?       REMINDER: NO BROTH AFTER MIDNIGHT THE DAY BEFORE PROCEDURE.   __ Have you completed all your prep solution? PLEASE DO NOT FOLLOW the insert/or box instructions from pharmacy)  __ Have you taken your blood pressure, heart, seizure, or other essential medications the morning of your procedure?  __ Have you planned for a ride to and from procedure?  (Medical Transportation, Uber, Lyft, Taxi, etc. may ONLY be used if a responsible adult is present to accompany you home). The responsible adult CANNOT be the  of the service.  person must be available to return and pick you up within 15 minutes of being notified of discharge.           Questions or Concerns?  Please call us!  [phone] (M-F) [phone] (Nights and weekends)    Listed below are some helpful tips:    Please bring protective cases for eyewear and hearing aids-wear comfortable clothing/shoes.  Follow prep instructions closely so you don't have to do it twice.  Bowel prep is prescription used to clean out the colon before a colonoscopy. The prep increases movement of your colon by causing you to have diarrhea (loose stools). Cleaning out stool from the colon helps your doctor to see in your colon clearly during this  procedure.   It is important to stay hydrated before, during and after bowel prep to prevent loss of fluid (dehydration). You can have water and your choice of clear liquids. Reminder: these liquids you will drink in addition to the bowel prep.     Preparing the mixture:    First, mix the prep with water.  Make the taste better by adding a sugar free drink mix (Crystal Light) can improve the taste of your prep.   Use a large bore (opening) straw. Place towards the back of mouth (throat) as tolerated.  Prepare a prep mixture that is lightly chilled, but not ice-cold. Drinking a large amount of ice-cold liquid can make you feel very ill.  Avoid drinking any RED beverages or eating popsicles with this color for the 24 hours leading up to your procedure. This color can look like blood in the colon.    Consuming the prep:    Take your time. If you feel ill, take a 15-minute break from drinking the prep mixture  Combat hunger and dehydration with clear liquids. Options like JELL-O, or popsicles will help.  Settle in with good reading material. The goal is to clean out 6 feet of colon, so you can plan on spending a good deal of time in the bathroom. Have some good reading material on-hand or an iPad ready to keep yourself entertained!  Keep yourself comfortable. We recommend applying personal hygiene wipes, Tucks pads and a soothing ointment, like A&D ointment, Desitin, or Vaseline to your bottom before starting and as needed to protect your skin.

## 2024-12-11 ENCOUNTER — ANESTHESIA EVENT (OUTPATIENT)
Dept: ENDOSCOPY | Facility: HOSPITAL | Age: 79
End: 2024-12-11
Payer: MEDICARE

## 2024-12-12 ENCOUNTER — HOSPITAL ENCOUNTER (OUTPATIENT)
Facility: HOSPITAL | Age: 79
Discharge: HOME | End: 2024-12-12
Attending: STUDENT IN AN ORGANIZED HEALTH CARE EDUCATION/TRAINING PROGRAM | Admitting: STUDENT IN AN ORGANIZED HEALTH CARE EDUCATION/TRAINING PROGRAM
Payer: MEDICARE

## 2024-12-12 ENCOUNTER — ANESTHESIA (OUTPATIENT)
Dept: ENDOSCOPY | Facility: HOSPITAL | Age: 79
End: 2024-12-12
Payer: MEDICARE

## 2024-12-12 VITALS
WEIGHT: 136 LBS | TEMPERATURE: 99 F | DIASTOLIC BLOOD PRESSURE: 65 MMHG | HEART RATE: 72 BPM | HEIGHT: 60 IN | OXYGEN SATURATION: 99 % | BODY MASS INDEX: 26.7 KG/M2 | RESPIRATION RATE: 20 BRPM | SYSTOLIC BLOOD PRESSURE: 144 MMHG

## 2024-12-12 DIAGNOSIS — Z86.0100 ENCOUNTER FOR COLONOSCOPY DUE TO HISTORY OF COLONIC POLYP: Primary | ICD-10-CM

## 2024-12-12 DIAGNOSIS — Z12.11 ENCOUNTER FOR COLONOSCOPY DUE TO HISTORY OF COLONIC POLYP: Primary | ICD-10-CM

## 2024-12-12 PROCEDURE — 88305 TISSUE EXAM BY PATHOLOGIST: CPT | Performed by: PATHOLOGY

## 2024-12-12 PROCEDURE — 25000003 PHARM REV CODE 250: Performed by: ANESTHESIOLOGY

## 2024-12-12 PROCEDURE — 63600175 PHARM REV CODE 636 W HCPCS: Performed by: STUDENT IN AN ORGANIZED HEALTH CARE EDUCATION/TRAINING PROGRAM

## 2024-12-12 PROCEDURE — 27201012 HC FORCEPS, HOT/COLD, DISP: Performed by: STUDENT IN AN ORGANIZED HEALTH CARE EDUCATION/TRAINING PROGRAM

## 2024-12-12 PROCEDURE — 45380 COLONOSCOPY AND BIOPSY: CPT | Mod: PT,,, | Performed by: STUDENT IN AN ORGANIZED HEALTH CARE EDUCATION/TRAINING PROGRAM

## 2024-12-12 PROCEDURE — 45380 COLONOSCOPY AND BIOPSY: CPT | Mod: PT | Performed by: STUDENT IN AN ORGANIZED HEALTH CARE EDUCATION/TRAINING PROGRAM

## 2024-12-12 PROCEDURE — 37000008 HC ANESTHESIA 1ST 15 MINUTES: Performed by: STUDENT IN AN ORGANIZED HEALTH CARE EDUCATION/TRAINING PROGRAM

## 2024-12-12 PROCEDURE — 37000009 HC ANESTHESIA EA ADD 15 MINS: Performed by: STUDENT IN AN ORGANIZED HEALTH CARE EDUCATION/TRAINING PROGRAM

## 2024-12-12 RX ORDER — SODIUM CHLORIDE 9 MG/ML
INJECTION, SOLUTION INTRAVENOUS CONTINUOUS
Status: DISCONTINUED | OUTPATIENT
Start: 2024-12-12 | End: 2024-12-12 | Stop reason: HOSPADM

## 2024-12-12 RX ORDER — LIDOCAINE HYDROCHLORIDE 10 MG/ML
1 INJECTION, SOLUTION EPIDURAL; INFILTRATION; INTRACAUDAL; PERINEURAL ONCE
Status: DISCONTINUED | OUTPATIENT
Start: 2024-12-12 | End: 2024-12-12 | Stop reason: HOSPADM

## 2024-12-12 RX ORDER — LIDOCAINE HYDROCHLORIDE 20 MG/ML
INJECTION, SOLUTION EPIDURAL; INFILTRATION; INTRACAUDAL; PERINEURAL
Status: DISCONTINUED
Start: 2024-12-12 | End: 2024-12-12 | Stop reason: HOSPADM

## 2024-12-12 RX ORDER — PROPOFOL 10 MG/ML
VIAL (ML) INTRAVENOUS
Status: COMPLETED
Start: 2024-12-12 | End: 2024-12-12

## 2024-12-12 RX ORDER — PROPOFOL 10 MG/ML
VIAL (ML) INTRAVENOUS
Status: DISCONTINUED | OUTPATIENT
Start: 2024-12-12 | End: 2024-12-12

## 2024-12-12 RX ORDER — LIDOCAINE HYDROCHLORIDE 20 MG/ML
INJECTION INTRAVENOUS
Status: DISCONTINUED | OUTPATIENT
Start: 2024-12-12 | End: 2024-12-12

## 2024-12-12 RX ADMIN — PROPOFOL 25 MG: 10 INJECTION, EMULSION INTRAVENOUS at 08:12

## 2024-12-12 RX ADMIN — LIDOCAINE HYDROCHLORIDE 100 MG: 20 INJECTION, SOLUTION INTRAVENOUS at 07:12

## 2024-12-12 RX ADMIN — SODIUM CHLORIDE: 0.9 INJECTION, SOLUTION INTRAVENOUS at 07:12

## 2024-12-12 RX ADMIN — PROPOFOL 50 MG: 10 INJECTION, EMULSION INTRAVENOUS at 08:12

## 2024-12-12 RX ADMIN — PROPOFOL 75 MG: 10 INJECTION, EMULSION INTRAVENOUS at 07:12

## 2024-12-12 RX ADMIN — SODIUM CHLORIDE: 0.9 INJECTION, SOLUTION INTRAVENOUS at 08:12

## 2024-12-12 NOTE — ANESTHESIA PREPROCEDURE EVALUATION
12/12/2024  Diana Herring is a 79 y.o., female.      Pre-op Assessment    I have reviewed the Patient Summary Reports.     I have reviewed the Nursing Notes.       Review of Systems  Anesthesia Hx:  No problems with previous Anesthesia             Denies Family Hx of Anesthesia complications.    Denies Personal Hx of Anesthesia complications.                    Social:  Non-Smoker       Cardiovascular:     Hypertension   Denies MI.      Denies Dysrhythmias.       hyperlipidemia    05/24 stress: negative for ischemia; EF wnl                           Pulmonary:         Pulmonary nodules               Hepatic/GI:     GERD, well controlled                Musculoskeletal:  Arthritis   RLS            Neurological:    Neuromuscular Disease,                                   Endocrine:  Endocrine Normal            Psych:  Psychiatric History anxiety                 Physical Exam  General: Well nourished, Cooperative, Alert and Oriented    Airway:  Mallampati: III   Mouth Opening: Normal  TM Distance: 4 - 6 cm  Tongue: Normal  Neck ROM: Normal ROM    Dental:  Intact    Chest/Lungs:  Normal Respiratory Rate, Clear to auscultation    Heart:  Rate: Normal  Rhythm: Regular Rhythm      Wt Readings from Last 3 Encounters:   12/09/24 61.8 kg (136 lb 3.9 oz)   12/06/24 63 kg (139 lb)   10/01/24 64.7 kg (142 lb 10.2 oz)     Temp Readings from Last 3 Encounters:   12/06/24 36.9 °C (98.5 °F) (Oral)   10/01/24 37 °C (98.6 °F)   09/04/24 36.2 °C (97.2 °F) (Oral)     BP Readings from Last 3 Encounters:   12/09/24 138/70   12/06/24 (!) 152/67   10/01/24 (!) 153/71     Pulse Readings from Last 3 Encounters:   12/09/24 68   12/06/24 63   10/01/24 68         Anesthesia Plan  Type of Anesthesia, risks & benefits discussed:    Anesthesia Type: Gen Natural Airway, MAC, Gen ETT  Intra-op Monitoring Plan: Standard ASA Monitors  Post Op  Pain Control Plan: multimodal analgesia  Induction:  IV  Informed Consent: Informed consent signed with the Patient and all parties understand the risks and agree with anesthesia plan.  All questions answered.   ASA Score: 3  Day of Surgery Review of History & Physical: H&P Update referred to the surgeon/provider.    Ready For Surgery From Anesthesia Perspective.     .

## 2024-12-12 NOTE — TRANSFER OF CARE
Anesthesia Transfer of Care Note    Patient: Diana Herring    Procedure(s) Performed: Procedure(s) (LRB):  COLONOSCOPY (N/A)    Patient location: GI    Anesthesia Type: MAC    Transport from OR: Transported from OR on room air with adequate spontaneous ventilation    Post pain: adequate analgesia    Post assessment: no apparent anesthetic complications and tolerated procedure well    Post vital signs: stable    Level of consciousness: responds to stimulation    Nausea/Vomiting: no nausea/vomiting    Complications: none    Transfer of care protocol was followed      Last vitals: Visit Vitals  BP (!) 96/51 (BP Location: Right arm)   Pulse 72   Temp 36.9 °C (98.5 °F) (Tympanic)   Resp 20   Ht 5' (1.524 m)   Wt 61.7 kg (136 lb)   SpO2 100%   Breastfeeding No   BMI 26.56 kg/m²

## 2024-12-12 NOTE — H&P
Short Stay Endoscopy History and Physical    PCP - Luisito Marin MD    Procedure - Colonoscopy  ASA - per anesthesia  Mallampati - per anesthesia  History of Anesthesia problems - no  Family history Anesthesia problems -  no   Plan of anesthesia - General    HPI:  This is a 79 y.o. female here for evaluation of : Hx of colon polyp - TA in 2019.     ROS:  Constitutional: No fevers, chills  CV: No chest pain  Pulm: No shortness of breath  GI: see HPI  Derm: No rash    Medical History:  has a past medical history of Anxiety, Arthritis, Corneal abrasion (s), Depression, Full dentures, GERD (gastroesophageal reflux disease), Hyperlipidemia, Hypertension, Nuclear sclerosis of both eyes (6/5/2017), Osteoporosis, and Restless leg syndrome.    Surgical History:  has a past surgical history that includes Partial hysterectomy; Tubal ligation (Bilateral); Hysterectomy; Colonoscopy (N/A, 5/14/2019); Transforaminal epidural injection of steroid (Bilateral, 9/4/2019); Transforaminal epidural injection of steroid (Bilateral, 10/30/2019); Epidural steroid injection (N/A, 5/13/2020); Transforaminal epidural injection of steroid (Bilateral, 11/11/2020); Transforaminal epidural injection of steroid (Bilateral, 5/19/2021); Transforaminal epidural injection of steroid (Bilateral, 1/26/2022); Transforaminal epidural injection of steroid (Bilateral, 4/20/2022); Transforaminal epidural injection of steroid (Bilateral, 7/20/2022); Transforaminal epidural injection of steroid (Right, 12/7/2022); Transforaminal epidural injection of steroid (Right, 2/8/2023); Epidural steroid injection (N/A, 8/30/2023); Epidural steroid injection (N/A, 1/31/2024); and Epidural steroid injection (N/A, 9/4/2024).    Family History: family history includes Cancer in her father; Cataracts in her mother; Diabetes in her mother; Early death in her son; Glaucoma in her mother; Heart disease in her son; Hypertension in her daughter, daughter, and daughter; No  Known Problems in her daughter, maternal aunt, maternal grandfather, maternal grandmother, maternal uncle, paternal aunt, paternal grandfather, paternal grandmother, paternal uncle, and sister; Stroke in her brother and mother.. Otherwise no colon cancer, inflammatory bowel disease, or GI malignancies.    Social History:  reports that she has never smoked. She has never been exposed to tobacco smoke. She has never used smokeless tobacco. She reports that she does not currently use alcohol. She reports that she does not use drugs.    Review of patient's allergies indicates:  No Known Allergies    Medications:   Medications Prior to Admission   Medication Sig Dispense Refill Last Dose/Taking    alendronate (FOSAMAX) 70 MG tablet TAKE 1 TABLET BY MOUTH ONE TIME PER WEEK 12 tablet 1     amLODIPine (NORVASC) 10 MG tablet TAKE 1 TABLET BY MOUTH EVERY DAY 90 tablet 1     atorvastatin (LIPITOR) 40 MG tablet TAKE 1 TABLET BY MOUTH EVERY DAY 90 tablet 1     calcium-vitamin D 500-125 mg-unit tablet Take 1 tablet by mouth once daily.       dicyclomine (BENTYL) 20 mg tablet Take 1 tablet (20 mg total) by mouth 2 (two) times daily as needed (abdominal cramping). 14 tablet 0     ergocalciferol (ERGOCALCIFEROL) 50,000 unit Cap TAKE 1 CAPSULE (50,000 UNITS TOTAL) BY MOUTH EVERY SUNDAY. 12 capsule 1     fluticasone propionate (FLONASE) 50 mcg/actuation nasal spray 2 SPRAYS (100 MCG TOTAL) BY EACH NOSTRIL ROUTE ONCE DAILY. 48 mL 3     gabapentin (NEURONTIN) 300 MG capsule Take 2 capsules (600 mg total) by mouth 2 (two) times daily. 360 capsule 1     ibuprofen (ADVIL,MOTRIN) 800 MG tablet TAKE 1 TABLET BY MOUTH 2 TIMES DAILY AS NEEDED. 60 tablet 0     metoprolol succinate (TOPROL-XL) 100 MG 24 hr tablet TAKE 1 TABLET BY MOUTH EVERY DAY 90 tablet 1     multivitamin (THERAGRAN) per tablet Take 1 tablet by mouth once daily.       ondansetron (ZOFRAN-ODT) 4 MG TbDL Take 1 tablet (4 mg total) by mouth every 8 (eight) hours as needed. 20  tablet 0     pantoprazole (PROTONIX) 40 MG tablet TAKE 1 TABLET BY MOUTH EVERY DAY 90 tablet 1     paroxetine (PAXIL) 20 MG tablet Take 1 tablet (20 mg total) by mouth every morning. 90 tablet 1     traZODone (DESYREL) 150 MG tablet TAKE 1 TABLET (150 MG TOTAL) BY MOUTH EVERY EVENING. 90 tablet 1          Physical Exam:    Vital Signs: There were no vitals filed for this visit.    General Appearance: Well appearing in no acute distress  Eyes:    No scleral icterus  ENT: lips and tongue normal  Lungs: no use of accessory muscles  Heart:  normal rate, regular rhythm  Abdomen: Soft, non-tender, non distended   Extremities: no edema  Skin: No rash      Labs:  Lab Results   Component Value Date    WBC 8.27 12/06/2024    HGB 12.3 12/06/2024    HCT 39.1 12/06/2024     12/06/2024    CHOL 149 03/08/2024    TRIG 119 03/08/2024    HDL 40 03/08/2024    ALT 35 12/06/2024    AST 34 12/06/2024     12/06/2024    K 3.0 (L) 12/06/2024     12/06/2024    CREATININE 0.9 12/06/2024    BUN 13 12/06/2024    CO2 24 12/06/2024    TSH 2.297 02/03/2017    INR 1.1 02/20/2015    HGBA1C 4.9 03/04/2022       I have explained the risks and benefits of endoscopy procedures to the patient including but not limited to bleeding, perforation, infection, and death.  The patient was asked if they understand and allowed to ask any further questions to their satisfaction.    Lawson Concepcion MD

## 2024-12-12 NOTE — ANESTHESIA POSTPROCEDURE EVALUATION
Anesthesia Post Evaluation    Patient: Diana Herring    Procedure(s) Performed: Procedure(s) (LRB):  COLONOSCOPY (N/A)    Final Anesthesia Type: general      Patient location during evaluation: GI PACU  Patient participation: Yes- Able to Participate  Level of consciousness: awake and alert  Post-procedure vital signs: reviewed and stable  Pain management: adequate  Airway patency: patent    PONV status at discharge: No PONV  Anesthetic complications: no      Cardiovascular status: hemodynamically stable  Respiratory status: unassisted and spontaneous ventilation  Hydration status: euvolemic  Follow-up not needed.              Vitals Value Taken Time   /65 12/12/24 0855   Temp 36.9 °C (98.5 °F) 12/12/24 0825   Pulse 72 12/12/24 0855   Resp 20 12/12/24 0855   SpO2 99 % 12/12/24 0855         Event Time   Out of Recovery 09:28:51         Pain/Nesha Score: Nesha Score: 10 (12/12/2024  8:55 AM)

## 2024-12-12 NOTE — [employer] PATIENT INSTRUCTIONS
Discharge Summary/Instructions after an Endoscopic Procedure  Patient Name: Diana Herring  Patient MRN: [id number]  Patient YOB: 1945 Thursday, December 12, 2024  Betty Hurst MD  Dear patient,  As a result of recent federal legislation (The Federal Cures Act), you may   receive lab or pathology results from your procedure in your [facility]   account before your physician is able to contact you. Your physician or   their representative will relay the results to you with their   recommendations at their soonest availability.  Thank you,  RESTRICTIONS:  During your procedure today, you received medications for sedation.  These   medications may affect your judgment, balance and coordination.  Therefore,   for 24 hours, you have the following restrictions:   - DO NOT drive a car, operate machinery, make legal/financial decisions,   sign important papers or drink alcohol.    ACTIVITY:  Today: no heavy lifting, straining or running due to procedural   sedation/anesthesia.  The following day: return to full activity including work.  DIET:  Eat and drink normally unless instructed otherwise.     TREATMENT FOR COMMON SIDE EFFECTS:  - Mild abdominal pain, nausea, belching, bloating or excessive gas:  rest,   eat lightly and use a heating pad.  - Sore Throat: treat with throat lozenges and/or gargle with warm salt   water.  - Because air was used during the procedure, expelling large amounts of air   from your rectum or belching is normal.  - If a bowel prep was taken, you may not have a bowel movement for 1-3 days.    This is normal.  SYMPTOMS TO WATCH FOR AND REPORT TO YOUR PHYSICIAN:  1. Abdominal pain or bloating, other than gas cramps.  2. Chest pain.  3. Back pain.  4. Signs of infection such as: chills or fever occurring within 24 hours   after the procedure.  5. Rectal bleeding, which would show as bright red, maroon, or black stools.   (A tablespoon of blood from the rectum is not serious,  especially if   hemorrhoids are present.)  6. Vomiting.  7. Weakness or dizziness.  GO DIRECTLY TO THE NEAREST EMERGENCY ROOM IF YOU HAVE ANY OF THE FOLLOWING:      Difficulty breathing              Chills and/or fever over 101 F   Persistent vomiting and/or vomiting blood   Severe abdominal pain   Severe chest pain   Black, tarry stools   Bleeding- more than one tablespoon   Any other symptom or condition that you feel may need urgent attention  Your doctor recommends these additional instructions:  If any biopsies were taken, your doctors clinic will contact you in 1 to 2   weeks with any results.  - Patient has a contact number available for emergencies.  The signs and   symptoms of potential delayed complications were discussed with the   patient.  Return to normal activities tomorrow.  Written discharge   instructions were provided to the patient.   - Discharge patient to home (with escort).   - Resume previous diet.   - Continue present medications.   - Await pathology results. If biopsies are consistent with acute   inflammation, can repeat fecal calprotectin in 1-2 months and if remains   elevated may need a repeat colonoscopy.  - Repeat colonoscopy date to be determined after pending pathology results   are reviewed.   - Return to GI clinic at appointment to be scheduled.  For questions, problems or results please call your physician - Betty Hurst MD at Work:  [phone].  [facility] can be reached at [phone]     IF A COMPLICATION OR EMERGENCY SITUATION ARISES AND YOU ARE UNABLE TO REACH   YOUR PHYSICIAN - GO DIRECTLY TO THE EMERGENCY ROOM.  MD Betty Munguia MD  12/12/2024 8:38:12 AM  This report has been verified and signed electronically.  Dear patient,  As a result of recent federal legislation (The Federal Cures Act), you may   receive lab or pathology results from your procedure in your [facility]   account before your physician  is able to contact you. Your physician or   their representative will relay the results to you with their   recommendations at their soonest availability.  Thank you,  [employer]

## 2024-12-12 NOTE — PLAN OF CARE
Procedure and recovery complete. Pt awake and alert. MD Hurst and family at bedside, procedure findings and suggestions discussed. Discharge instructions given, pt verbalized understanding of instruction. Iv was D/c'd, inner cannula intact. No distress noted. No c/o pain. Gait unsteady, unable to ambulate without assistance. Pt discharged via wheelchair accompanied by nurse.

## 2024-12-17 ENCOUNTER — TELEPHONE (OUTPATIENT)
Dept: SURGERY | Facility: CLINIC | Age: 79
End: 2024-12-17
Payer: MEDICARE

## 2024-12-17 NOTE — TELEPHONE ENCOUNTER
Spoke with patient regarding appointment scheduled in January. Explained that referral was for colonoscopy and that has already been done. Appointment is not needed (per Dr. Mathews). Appointment cancelled. Patient verbalizes understanding.

## 2024-12-17 NOTE — TELEPHONE ENCOUNTER
----- Message from ARIA Mathews MD sent at 12/17/2024  4:38 PM CST -----  Hi,   It looks like it was to get a colonscopy but since that has already been done, she does not need an appt with us.     Thank you!    Jean Claude  ----- Message -----  From: Thuy Wang RN  Sent: 12/17/2024   3:50 PM CST  To: ARIA Mathews MD    Recently had colonoscopy and waiting on path results. Does she need to see you? It says for colon CA.

## 2024-12-19 LAB
FINAL PATHOLOGIC DIAGNOSIS: NORMAL
GROSS: NORMAL
Lab: NORMAL

## 2024-12-22 NOTE — PROGRESS NOTES
Pulmonary & Critical Care Medicine   Consultation Note    Reason for Consultation:Robotic. Pulmonary nodule    HPI: 80 y/o female with HTN, HLD- PCP Dr. Marin-    Chest: [id number] spot in right upper lung lobe; small area of atelectasis  -  Abdomen: Simple renal cysts, gallbladder sludge, thickened area in upper right colon  - Differential for lung spot includes possible malignancy; requires further evaluation  - Abdominal symptoms likely due to viral gastroenteritis, but thickened colon area warrants investigation  - Plan to expedite workup with e-consults to pulmonology and GI specialists    Seen as E-consult by Dr. Morales and subsequently referred to me for evaluation of pulmonary nodule- Initial imaging triggered by ED visit- abdominal pain, left lower quadrant and suprapubic as well as diarrhea x3 days. Patient tried Pepto-Bismol. Notes diarrhea has somewhat improved. Pain is also somewhat improved. Denies fevers, chills. Notes mild nausea. Notes sick contacts with similar symptoms.     Today, she feels good. Diarrhea and stomach issues improved.   Reports cough at time of ED visit. No resolved. No Systemic features  Never tobacco.   No additional complaints.   No old chest imaging for comparison.       Additional Pulmonary History:   Occupational/Environmental Exposures:From [employer]. 4 daughter and 1 boy.   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   Exposure to Animals/Pets: None   Travel History: [place]   History of exposures to TB: All PPD in past negative. No Known exposures.   Family History of Lung Cancer: Maybe DAD   Childhood history of Lung Disease:None   OAC/Anti-PLT- None- Ibuprofen daily.   Chest suregry/Trauma- None   GLP- None   PNA history- None   Tobacco use- Never       Past Medical History:   Diagnosis Date    Anxiety     Arthritis     Corneal abrasion s    ? eye     Depression     Full dentures     GERD (gastroesophageal reflux disease)     Hyperlipidemia     Hypertension     Nuclear  sclerosis of both eyes 6/5/2017    Osteoporosis     Restless leg syndrome      Past Surgical History:   Procedure Laterality Date    COLONOSCOPY N/A 5/14/2019    Procedure: COLONOSCOPY;  Surgeon: Nahid Cline MD;  Location: [facility] ENDO;  Service: Endoscopy;  Laterality: N/A;    COLONOSCOPY N/A 12/12/2024    Procedure: COLONOSCOPY;  Surgeon: Betty Hurst MD;  Location: [facility] ENDO;  Service: Endoscopy;  Laterality: N/A;  12/10 ref by Bianca Matute NP, PEG, emailed to [email or web address]    EPIDURAL STEROID INJECTION N/A 5/13/2020    Procedure: LUMBAR/CAUDAL L5/S1 IL SANKET ;  Surgeon: Miguel Latham MD;  Location: [facility] PAIN MGT;  Service: Pain Management;  Laterality: N/A;  NEEDS CONSENT    EPIDURAL STEROID INJECTION N/A 8/30/2023    Procedure: INJECTION, STEROID, EPIDURAL, CAUDAL W/ CATH SOONER DATE;  Surgeon: Miguel Latham MD;  Location: [facility] PAIN MGT;  Service: Pain Management;  Laterality: N/A;    EPIDURAL STEROID INJECTION N/A 1/31/2024    Procedure: CAUDAL SANKET;  Surgeon: Miguel Latham MD;  Location: [facility] PAIN MGT;  Service: Pain Management;  Laterality: N/A;  [phone]  4 WK F/U LOLIS    EPIDURAL STEROID INJECTION N/A 9/4/2024    Procedure: CAUDAL SANKET;  Surgeon: Miguel Latham MD;  Location: [facility] PAIN MGT;  Service: Pain Management;  Laterality: N/A;  [phone]  2 WK F/U KAERN    HYSTERECTOMY      PARTIAL HYSTERECTOMY      TRANSFORAMINAL EPIDURAL INJECTION OF STEROID Bilateral 9/4/2019    Procedure: Injection,steroid,epidural,transforaminal approach LUMBAR TRANSFORAMINAL BILATERAL L3 TF SANKET;  Surgeon: Miguel Latham MD;  Location: [facility] PAIN MGT;  Service: Pain Management;  Laterality: Bilateral;  NEEDS CONSENT    TRANSFORAMINAL EPIDURAL INJECTION OF STEROID Bilateral 10/30/2019    Procedure: TRANSFORAMINAL SANKET BILATERAL L3-L4;  Surgeon: Miguel Latham MD;  Location: [facility] PAIN MGT;  Service: Pain Management;  Laterality: Bilateral;  NEEDS CONSENT     TRANSFORAMINAL EPIDURAL INJECTION OF STEROID Bilateral 11/11/2020    Procedure: INJECTION, STEROID, EPIDURAL, TRANSFORAMINAL APPROACH, L3-L4;  Surgeon: Miguel Latham MD;  Location: [facility] PAIN MGT;  Service: Pain Management;  Laterality: Bilateral;    TRANSFORAMINAL EPIDURAL INJECTION OF STEROID Bilateral 5/19/2021    Procedure: INJECTION, STEROID, EPIDURAL, TRANSFORAMINAL APPROACH, L3-L4;  Surgeon: Miguel Latham MD;  Location: [facility] PAIN MGT;  Service: Pain Management;  Laterality: Bilateral;    TRANSFORAMINAL EPIDURAL INJECTION OF STEROID Bilateral 1/26/2022    Procedure: Injection,steroid,epidural,transforaminal approach BILATERAL L3/4;  Surgeon: Miguel Latham MD;  Location: [facility] PAIN MGT;  Service: Pain Management;  Laterality: Bilateral;    TRANSFORAMINAL EPIDURAL INJECTION OF STEROID Bilateral 4/20/2022    Procedure: INJECTION, STEROID, EPIDURAL, TRANSFORAMINAL APPROACH, Bilateral L3-L4;  Surgeon: Miguel Latham MD;  Location: [facility] PAIN MGT;  Service: Pain Management;  Laterality: Bilateral;    TRANSFORAMINAL EPIDURAL INJECTION OF STEROID Bilateral 7/20/2022    Procedure: INJECTION, STEROID, EPIDURAL, TRANSFORAMINAL APPROACH, BILATERAL L3-L4 CONTRAST;  Surgeon: Miguel Latham MD;  Location: [facility] PAIN MGT;  Service: Pain Management;  Laterality: Bilateral;    TRANSFORAMINAL EPIDURAL INJECTION OF STEROID Right 12/7/2022    Procedure: INJECTION, STEROID, EPIDURAL, TRANSFORAMINAL APPROACH, RIGHT L3/L4 AND L4/L5 CONTRAST;  Surgeon: Miguel Latham MD;  Location: [facility] PAIN MGT;  Service: Pain Management;  Laterality: Right;    TRANSFORAMINAL EPIDURAL INJECTION OF STEROID Right 2/8/2023    Procedure: INJECTION, STEROID, EPIDURAL, TRANSFORAMINAL APPROACH RIGHT L3/4 AND L4/5 CONTRAST;  Surgeon: Miguel Latham MD;  Location: [facility] PAIN MGT;  Service: Pain Management;  Laterality: Right;    TUBAL LIGATION Bilateral      Social History:   Social History     Socioeconomic History    Marital  status:    Tobacco Use    Smoking status: Never     Passive exposure: Never    Smokeless tobacco: Never   Substance and Sexual Activity    Alcohol use: Not Currently     Comment: once a year    Drug use: No    Sexual activity: Not Currently   Social History Narrative    Patient is  w/ 5 children, one  from heart disease.The patient is retired.     Family History   Problem Relation Name Age of Onset    Diabetes Mother      Stroke Mother      Glaucoma Mother      Cataracts Mother      Cancer Father          Lung    No Known Problems Sister      Stroke Brother      Hypertension Daughter Lois     Hypertension Daughter Catherine     Hypertension Daughter Kyra     No Known Problems Daughter Dyaca     Heart disease Son      Early death Son      No Known Problems Maternal Aunt      No Known Problems Maternal Uncle      No Known Problems Paternal Aunt      No Known Problems Paternal Uncle      No Known Problems Maternal Grandmother      No Known Problems Maternal Grandfather      No Known Problems Paternal Grandmother      No Known Problems Paternal Grandfather      Amblyopia Neg Hx      Blindness Neg Hx      Macular degeneration Neg Hx      Retinal detachment Neg Hx      Strabismus Neg Hx      Thyroid disease Neg Hx       Drug Allergies:   Review of patient's allergies indicates:  No Known Allergies        Review of Systems:   A comprehensive 12-point review of systems was performed, and is negative except for those items mentioned above in the HPI section of this note.     Vital Signs:  /60 (BP Location: Left arm, Patient Position: Sitting)   Pulse 74   Ht 5' (1.524 m)   Wt 61.8 kg (136 lb 3.9 oz)   SpO2 98%   BMI 26.61 kg/m²      Physical Exam:   GEN- NAD AAOx3 Well Built, Well Appearing   HEENT- ATNC, PERRLA, EOMI, OP-Cl. No JVD, LAD or bruit noted. Trachea Midline.   CV- RRR No M/R/G  RESP- CTA-Bilateral   GI- S/NT/ND. Positive BS X 4. No HSM Noted  BACK- Spine midline. No step off,  crepitus or deformity noted. No midline TTP.   Ext- MAEW, No deformity. No edema or rashes noted.   Neuro- Strength 5/5 symmetric. CN 2-12 intact. Normal gait. Normal sensation.    Personal Review and Summary of Prior Diagnostics    Laboratory Studies: Reviewed      Latest Reference Range & Units Most Recent   WBC 3.90 - 12.70 K/uL 8.27  12/6/24 15:24   RBC 4.00 - 5.40 M/uL 4.26  12/6/24 15:24   Hemoglobin 12.0 - 16.0 g/dL 12.3  12/6/24 15:24   Hematocrit 37.0 - 48.5 % 39.1  12/6/24 15:24   MCV 82 - 98 fL 92  12/6/24 15:24   MCH 27.0 - 31.0 pg 28.9  12/6/24 15:24   MCHC 32.0 - 36.0 g/dL 31.5 (L)  12/6/24 15:24   RDW 11.5 - 14.5 % 12.2  12/6/24 15:24   Platelet Count 150 - 450 K/uL 214  12/6/24 15:24   MPV 9.2 - 12.9 fL 10.8  12/6/24 15:24   Gran % 38.0 - 73.0 % 54.8  12/6/24 15:24   Lymph % 18.0 - 48.0 % 32.4  12/6/24 15:24   Mono % 4.0 - 15.0 % 8.6  12/6/24 15:24   Eos % 0.0 - 8.0 % 3.1  12/6/24 15:24   Basophil % 0.0 - 1.9 % 0.7  12/6/24 15:24   Immature Granulocytes 0.0 - 0.5 % 0.4  12/6/24 15:24   Gran # (ANC) 1.8 - 7.7 K/uL 4.5  12/6/24 15:24   Lymph # 1.0 - 4.8 K/uL 2.7  12/6/24 15:24   Mono # 0.3 - 1.0 K/uL 0.7  12/6/24 15:24   Eos # 0.0 - 0.5 K/uL 0.3  12/6/24 15:24   Baso # 0.00 - 0.20 K/uL 0.06  12/6/24 15:24   Immature Grans (Abs) 0.00 - 0.04 K/uL 0.03  12/6/24 15:24   nRBC 0 /100 WBC 0  12/6/24 15:24   Differential Method  Automated  12/6/24 15:24   Ferritin 20.0 - 300.0 ng/mL 213  10/3/16 15:05   PT 9.0 - 12.5 sec 10.9  2/20/15 13:47   INR 0.8 - 1.2  1.1  2/20/15 13:47   D-Dimer <0.50 mg/L FEU 0.33  10/3/16 15:05   Sodium 136 - 145 mmol/L 144  12/6/24 15:24   Potassium 3.5 - 5.1 mmol/L 3.0 (L)  12/6/24 15:24   Chloride 95 - 110 mmol/L 109  12/6/24 15:24   CO2 23 - 29 mmol/L 24  12/6/24 15:24   Anion Gap 8 - 16 mmol/L 11  12/6/24 15:24   BUN 8 - 23 mg/dL 13  12/6/24 15:24   Creatinine 0.5 - 1.4 mg/dL 0.9  12/6/24 15:24   eGFR >60 mL/min/1.73 m^2 >60  12/6/24 15:24   eGFR if non  >60  mL/min/1.73 m^2 >60.0  3/4/22 08:40   eGFR if African American >60 mL/min/1.73 m^2 >60.0  3/4/22 08:40   Glucose 70 - 110 mg/dL 104  12/6/24 15:24   Calcium 8.7 - 10.5 mg/dL 9.3  12/6/24 15:24   Phosphorus Level 2.7 - 4.5 mg/dL 3.8  10/3/16 15:05   Magnesium  1.6 - 2.6 mg/dL 2.4  10/3/16 15:05   ALP 40 - 150 U/L 76  12/6/24 15:24   PROTEIN TOTAL 6.0 - 8.4 g/dL 8.4  12/6/24 15:24   Albumin 3.5 - 5.2 g/dL 3.8  12/6/24 15:24   BILIRUBIN TOTAL 0.1 - 1.0 mg/dL 1.2 (H)  12/6/24 15:24   AST 10 - 40 U/L 34  12/6/24 15:24   ALT 10 - 44 U/L 35  12/6/24 15:24   Lipase 4 - 60 U/L 60  12/6/24 15:24   Cholesterol Total 120 - 199 mg/dL 149  3/8/24 08:05   HDL 40 - 75 mg/dL 40  3/8/24 08:05   HDL/Cholesterol Ratio 20.0 - 50.0 % 26.8  3/8/24 08:05   Non-HDL Cholesterol mg/dL 109  3/8/24 08:05   Total Cholesterol/HDL Ratio 2.0 - 5.0  3.7  3/8/24 08:05   Triglycerides 30 - 150 mg/dL 119  3/8/24 08:05   LDL Cholesterol 63.0 - 159.0 mg/dL 85.2  3/8/24 08:05   BNP 0 - 99 pg/mL 18  10/3/16 15:05   CPK 20 - 180 U/L 96  10/3/16 15:05   Vitamin D 30 - 96 ng/mL 41  3/4/22 08:40   Hemoglobin A1C External 4.0 - 5.6 % 4.9  3/4/22 08:40   Estimated Avg Glucose 68 - 131 mg/dL 94  3/4/22 08:40   TSH 0.400 - 4.000 uIU/mL 2.297  2/3/17 09:18   Hepatitis C Ab  Negative  2/19/14 12:12   SARS-CoV-2 RNA, Amplification, Qual Negative  Negative  12/6/24 16:03   SARS-CoV2 (COVID-19) Qualitative PCR Not Detected  Not Detected  5/12/20 09:20   Specimen UA  Urine, Clean Catch  12/6/24 17:52   Color, UA Yellow, Straw, Jayleen  Yellow  12/6/24 17:52   Appearance, UA Clear  Clear  12/6/24 17:52   Spec Grav UA 1.005 - 1.030  >1.030 !  12/6/24 17:52   pH, UA 5.0 - 8.0  7.0  12/6/24 17:52   Protein, UA Negative  Negative  12/6/24 17:52   Glucose, UA Negative  Negative  12/6/24 17:52   Ketones, UA Negative  Negative  12/6/24 17:52   Blood, UA Negative  Negative  12/6/24 17:52   NITRITE UA Negative  Negative  12/6/24 17:52   UROBILINOGEN UA <2.0 EU/dL Negative  12/6/24  17:52   Bilirubin (UA) Negative  Negative  12/6/24 17:52   Leukocyte Esterase, UA Negative  Negative  12/6/24 17:52   RBC, UA 0 - 4 /hpf 1  9/25/23 17:25   WBC, UA 0 - 5 /hpf 6 (H)  9/25/23 17:25   Bacteria, UA None-Occ /hpf Rare  9/25/23 17:25   Squam Epithel, UA /hpf 3  9/25/23 17:25   WBC Clumps, UA None-Rare  Moderate !  6/26/18 14:31   Yeast, UA None  Rare !  6/26/18 14:31   Microscopic Comment  SEE COMMENT  9/25/23 17:25   CULTURE, URINE  Rpt  6/26/18 14:31       Microbiology Data: Reviewed     Summary of Chest Imaging Personally Reviewed:     CT Chest-   Right upper pulmonary lobe lesion with irregular margins. Neoplasia cannot be entirely excluded. Recommend pulmonary consult for workup.         2D Echo:     Left Ventricle The left ventricle is normal in size. There is normal systolic function with a visually estimated ejection fraction of 60 - 65%. Grade I diastolic dysfunction.   Right Ventricle Normal right ventricular cavity size. Systolic function is normal.   Left Atrium Left atrium is mildly dilated.   Right Atrium Normal right atrial size.   Aortic Valve There is mild aortic valve sclerosis. Aortic valve peak velocity is 1.48 m/s. Mean gradient is 4 mmHg. There is mild aortic regurgitation.   Mitral Valve The mitral valve is structurally normal. The mean pressure gradient across the mitral valve is 2 mmHg at a heart rate of  bpm.   Tricuspid Valve There is trace regurgitation.   Pulmonic Valve The pulmonic valve is structurally normal.   IVC/SVC Normal venous pressure at 3 mmHg.   Pulmonary Artery The estimated pulmonary artery systolic pressure is 21 mmHg.       PFT's: None        Assessment:     No diagnosis found.     Outpatient Encounter Medications as of 12/23/2024   Medication Sig Dispense Refill    alendronate (FOSAMAX) 70 MG tablet TAKE 1 TABLET BY MOUTH ONE TIME PER WEEK 12 tablet 1    amLODIPine (NORVASC) 10 MG tablet TAKE 1 TABLET BY MOUTH EVERY DAY 90 tablet 1    atorvastatin (LIPITOR) 40  MG tablet TAKE 1 TABLET BY MOUTH EVERY DAY 90 tablet 1    calcium-vitamin D 500-125 mg-unit tablet Take 1 tablet by mouth once daily.      [] dicyclomine (BENTYL) 20 mg tablet Take 1 tablet (20 mg total) by mouth 2 (two) times daily as needed (abdominal cramping). 14 tablet 0    ergocalciferol (ERGOCALCIFEROL) 50,000 unit Cap TAKE 1 CAPSULE (50,000 UNITS TOTAL) BY MOUTH EVERY . 12 capsule 1    fluticasone propionate (FLONASE) 50 mcg/actuation nasal spray 2 SPRAYS (100 MCG TOTAL) BY EACH NOSTRIL ROUTE ONCE DAILY. 48 mL 3    gabapentin (NEURONTIN) 300 MG capsule Take 2 capsules (600 mg total) by mouth 2 (two) times daily. 360 capsule 1    ibuprofen (ADVIL,MOTRIN) 800 MG tablet TAKE 1 TABLET BY MOUTH 2 TIMES DAILY AS NEEDED. 60 tablet 0    metoprolol succinate (TOPROL-XL) 100 MG 24 hr tablet TAKE 1 TABLET BY MOUTH EVERY DAY 90 tablet 1    multivitamin (THERAGRAN) per tablet Take 1 tablet by mouth once daily.      [] ondansetron (ZOFRAN) 4 MG tablet Take 1 tablet (4 mg total) by mouth every 6 (six) hours as needed for Nausea. 12 tablet 0    ondansetron (ZOFRAN-ODT) 4 MG TbDL Take 1 tablet (4 mg total) by mouth every 8 (eight) hours as needed. 20 tablet 0    pantoprazole (PROTONIX) 40 MG tablet TAKE 1 TABLET BY MOUTH EVERY DAY 90 tablet 1    paroxetine (PAXIL) 20 MG tablet Take 1 tablet (20 mg total) by mouth every morning. 90 tablet 1    [] polyethylene glycol (COLYTE) 240-22.72-6.72 -5.84 gram SolR Take 4,000 mLs by mouth once. for 1 dose 4000 mL 0    traZODone (DESYREL) 150 MG tablet TAKE 1 TABLET (150 MG TOTAL) BY MOUTH EVERY EVENING. 90 tablet 1    [DISCONTINUED] ibuprofen (ADVIL,MOTRIN) 800 MG tablet TAKE 1 TABLET BY MOUTH 2 TIMES DAILY AS NEEDED. 60 tablet 0     No facility-administered encounter medications on file as of 2024.     No orders of the defined types were placed in this encounter.      Plan:     Pulmonary nodule- RUL 1.7cm. Never tobacco and no old imaging for  comparison. Nodule identified in setting of acute illness when traveling back from [place].. Did have cough, now resolved.. May just be a viral infiltrate, but indolent adeno possible.    -- CT chest end of January to see if resolution.   -- Case request for robotic + EBUS on 1/31 in the event it persists placed.   -- Plan cyto + cultures   -- No OAC/Anti-PLT or GLP       Discussed procedure in detail with patient and . Risks including bleeding, PTX, respiratory failure, need for additional procedures, non-diagnostic specimen, and numerous additional potential complications up to death outlined. She verbalized understanding and all questions answered to her satisfaction. Written consent signed and on chart.     Garrett Farooq MD   [facility]

## 2024-12-23 ENCOUNTER — PATIENT MESSAGE (OUTPATIENT)
Dept: PULMONOLOGY | Facility: CLINIC | Age: 79
End: 2024-12-23

## 2024-12-23 ENCOUNTER — OFFICE VISIT (OUTPATIENT)
Dept: PULMONOLOGY | Facility: CLINIC | Age: 79
End: 2024-12-23
Payer: MEDICARE

## 2024-12-23 VITALS
SYSTOLIC BLOOD PRESSURE: 110 MMHG | DIASTOLIC BLOOD PRESSURE: 60 MMHG | HEIGHT: 60 IN | OXYGEN SATURATION: 98 % | WEIGHT: 136.25 LBS | HEART RATE: 74 BPM | BODY MASS INDEX: 26.75 KG/M2

## 2024-12-23 DIAGNOSIS — K52.9 CHRONIC INFLAMMATION OF COLON: Primary | ICD-10-CM

## 2024-12-23 DIAGNOSIS — R91.1 PULMONARY NODULE 1 CM OR GREATER IN DIAMETER: Primary | ICD-10-CM

## 2024-12-23 PROCEDURE — 1159F MED LIST DOCD IN RCRD: CPT | Mod: CPTII,S$GLB,, | Performed by: EMERGENCY MEDICINE

## 2024-12-23 PROCEDURE — 1101F PT FALLS ASSESS-DOCD LE1/YR: CPT | Mod: CPTII,S$GLB,, | Performed by: EMERGENCY MEDICINE

## 2024-12-23 PROCEDURE — 3074F SYST BP LT 130 MM HG: CPT | Mod: CPTII,S$GLB,, | Performed by: EMERGENCY MEDICINE

## 2024-12-23 PROCEDURE — 1160F RVW MEDS BY RX/DR IN RCRD: CPT | Mod: CPTII,S$GLB,, | Performed by: EMERGENCY MEDICINE

## 2024-12-23 PROCEDURE — 3288F FALL RISK ASSESSMENT DOCD: CPT | Mod: CPTII,S$GLB,, | Performed by: EMERGENCY MEDICINE

## 2024-12-23 PROCEDURE — 99999 PR PBB SHADOW E&M-EST. PATIENT-LVL IV: CPT | Mod: PBBFAC,,, | Performed by: EMERGENCY MEDICINE

## 2024-12-23 PROCEDURE — 99204 OFFICE O/P NEW MOD 45 MIN: CPT | Mod: S$GLB,,, | Performed by: EMERGENCY MEDICINE

## 2024-12-23 PROCEDURE — 3078F DIAST BP <80 MM HG: CPT | Mod: CPTII,S$GLB,, | Performed by: EMERGENCY MEDICINE

## 2024-12-30 ENCOUNTER — TELEPHONE (OUTPATIENT)
Dept: GASTROENTEROLOGY | Facility: CLINIC | Age: 79
End: 2024-12-30
Payer: MEDICARE

## 2025-01-06 RX ORDER — IBUPROFEN 800 MG/1
800 TABLET ORAL 2 TIMES DAILY PRN
Qty: 60 TABLET | Refills: 0 | OUTPATIENT
Start: 2025-01-06

## 2025-01-06 RX ORDER — IBUPROFEN 800 MG/1
800 TABLET ORAL 2 TIMES DAILY PRN
Qty: 60 TABLET | Refills: 0 | Status: SHIPPED | OUTPATIENT
Start: 2025-01-06

## 2025-01-09 ENCOUNTER — LAB VISIT (OUTPATIENT)
Dept: LAB | Facility: HOSPITAL | Age: 80
End: 2025-01-09
Attending: STUDENT IN AN ORGANIZED HEALTH CARE EDUCATION/TRAINING PROGRAM
Payer: MEDICARE

## 2025-01-09 DIAGNOSIS — K52.9 CHRONIC INFLAMMATION OF COLON: ICD-10-CM

## 2025-01-09 PROCEDURE — 83993 ASSAY FOR CALPROTECTIN FECAL: CPT | Performed by: STUDENT IN AN ORGANIZED HEALTH CARE EDUCATION/TRAINING PROGRAM

## 2025-01-13 ENCOUNTER — HOSPITAL ENCOUNTER (OUTPATIENT)
Dept: RADIOLOGY | Facility: HOSPITAL | Age: 80
Discharge: HOME | End: 2025-01-13
Attending: INTERNAL MEDICINE
Payer: MEDICARE

## 2025-01-13 DIAGNOSIS — Z00.00 ENCOUNTER FOR MEDICARE ANNUAL WELLNESS EXAM: ICD-10-CM

## 2025-01-13 DIAGNOSIS — R91.1 LUNG NODULE: ICD-10-CM

## 2025-01-13 LAB
CALPROTECTIN STL-MCNT: 352 MCG/G
POCT GLUCOSE: 102 MG/DL (ref 70–110)

## 2025-01-13 PROCEDURE — 78815 PET IMAGE W/CT SKULL-THIGH: CPT | Mod: TC

## 2025-01-13 PROCEDURE — 78815 PET IMAGE W/CT SKULL-THIGH: CPT | Mod: 26,PI,, | Performed by: NUCLEAR MEDICINE

## 2025-01-13 PROCEDURE — A9552 F18 FDG: HCPCS | Performed by: INTERNAL MEDICINE

## 2025-01-13 RX ORDER — FLUDEOXYGLUCOSE F18 500 MCI/ML
11.27 INJECTION INTRAVENOUS
Status: COMPLETED | OUTPATIENT
Start: 2025-01-13 | End: 2025-01-13

## 2025-01-13 RX ADMIN — FLUDEOXYGLUCOSE F-18 11.27 MILLICURIE: 500 INJECTION INTRAVENOUS at 09:01

## 2025-01-14 ENCOUNTER — PATIENT MESSAGE (OUTPATIENT)
Dept: GASTROENTEROLOGY | Facility: CLINIC | Age: 80
End: 2025-01-14
Payer: MEDICARE

## 2025-01-24 ENCOUNTER — HOSPITAL ENCOUNTER (OUTPATIENT)
Dept: RADIOLOGY | Facility: HOSPITAL | Age: 80
Discharge: HOME | End: 2025-01-24
Attending: EMERGENCY MEDICINE
Payer: MEDICARE

## 2025-01-24 DIAGNOSIS — R91.1 PULMONARY NODULE 1 CM OR GREATER IN DIAMETER: ICD-10-CM

## 2025-01-24 PROCEDURE — 71250 CT THORAX DX C-: CPT | Mod: 26,,, | Performed by: STUDENT IN AN ORGANIZED HEALTH CARE EDUCATION/TRAINING PROGRAM

## 2025-01-24 PROCEDURE — 71250 CT THORAX DX C-: CPT | Mod: TC

## 2025-01-29 ENCOUNTER — OFFICE VISIT (OUTPATIENT)
Dept: GASTROENTEROLOGY | Facility: CLINIC | Age: 80
End: 2025-01-29
Payer: MEDICARE

## 2025-01-29 VITALS
DIASTOLIC BLOOD PRESSURE: 74 MMHG | HEIGHT: 60 IN | HEART RATE: 72 BPM | BODY MASS INDEX: 27.38 KG/M2 | WEIGHT: 139.44 LBS | SYSTOLIC BLOOD PRESSURE: 154 MMHG

## 2025-01-29 DIAGNOSIS — K52.9 COLITIS: Primary | ICD-10-CM

## 2025-01-29 PROCEDURE — 99999 PR PBB SHADOW E&M-EST. PATIENT-LVL III: CPT | Mod: PBBFAC,,, | Performed by: STUDENT IN AN ORGANIZED HEALTH CARE EDUCATION/TRAINING PROGRAM

## 2025-01-29 PROCEDURE — 3077F SYST BP >= 140 MM HG: CPT | Mod: CPTII,S$GLB,, | Performed by: STUDENT IN AN ORGANIZED HEALTH CARE EDUCATION/TRAINING PROGRAM

## 2025-01-29 PROCEDURE — 99214 OFFICE O/P EST MOD 30 MIN: CPT | Mod: S$GLB,,, | Performed by: STUDENT IN AN ORGANIZED HEALTH CARE EDUCATION/TRAINING PROGRAM

## 2025-01-29 PROCEDURE — 1159F MED LIST DOCD IN RCRD: CPT | Mod: CPTII,S$GLB,, | Performed by: STUDENT IN AN ORGANIZED HEALTH CARE EDUCATION/TRAINING PROGRAM

## 2025-01-29 PROCEDURE — 3078F DIAST BP <80 MM HG: CPT | Mod: CPTII,S$GLB,, | Performed by: STUDENT IN AN ORGANIZED HEALTH CARE EDUCATION/TRAINING PROGRAM

## 2025-01-29 NOTE — PATIENT INSTRUCTIONS
Take fiber gummies for constipation and miralax if the gummies don't work for you  Stop taking ibuprofen, advil, aleve, aspirin, goody powder or any NSAIDs (non-steroidal anti-inflammatory drugs)  We will repeat a colonoscopy in 3 months to take a look at your colon and see if the inflammation is still there

## 2025-01-29 NOTE — PROGRESS NOTES
"                    [facility]          Inflammatory Bowel Disease New Patient Consultation Note         TODAY'S VISIT DATE:  1/29/2025    Reason for Consult:    No chief complaint on file.      PCP: Luisito Marin      Referring MD:   Camelia HOUSTON Nima who is a 80 y.o. female is being seen today at the [facility] on 01/29/2025 for colonic inflammation.    IBD History:  The patient presented to the ER in December 2024 for lower abdominal pain and diarrhea.  She underwent a CT that showed thickening in the hepatic flexure which prompted an eConsult to GI.  She underwent a colonoscopy for further evaluation 12/12/2024.  She had inflammation in the entire colon but mostly in the cecum and ascending colon with mucus in the ulceration, the remainder of the colon appeared erythematous with reduced vascularity.  Biopsies showed mild chronic active colitis in the cecum and ascending colon, with the remainder of the colon having active colitis with no evidence of chronicity.  Fecal calprotectin 1/9/2025 was elevated at 352.    History of Present Illness:  Continues to have left lower quadrant abdominal pain that is intermittent, improves after having a bowel movement.  She has chronic constipation and only has a bowel movement once every 2 weeks.  She takes milk of magnesia occasionally when she makes herself go to the bathroom.  No blood in her stool but she sees mucus surrounding her stool.  No nausea or vomiting.  She takes ibuprofen daily for many years.      IBD Details:  Dx Date: -  Disease type/distribution:-  Current Treatment: - Start Date: - Response: -  Optimized: -  Adverse reactions: -  Prior surgeries: -  CRP Elevation: Y/N -  Disease Complications: -  Extraintestinal manifestations: -  Prior treatments:  none    Previous Clinical Trials: -      Vaccinations:  No results found for: "HEPBSAB"   No results found for: "HEPBSURFABQU"   No " "results found for: "HEPAIGG"   No results found for: "VARICELLAZOS", "VARICELLAINT"     Immunization History   Administered Date(s) Administered    COVID-19, MRNA, LN-S, PF (Pfizer) (Purple Cap) 01/18/2021, 02/07/2021, 11/29/2021    COVID-19, mRNA, LNP-S, PF (Moderna) Ages 12+ 03/12/2024    COVID-19, mRNA, LNP-S, bivalent booster, PF (PFIZER OMICRON) 11/07/2022    Influenza (FLUAD) - Quadrivalent - Adjuvanted - PF *Preferred* (65+) 02/22/2022, 09/29/2022    Influenza - Quadrivalent 10/07/2014    Influenza - Quadrivalent - PF *Preferred* (6 months and older) 09/30/2013    Influenza - Trivalent - Fluzone High Dose - PF (65 years and older) 01/23/2013, 01/29/2016, 10/03/2016, 03/20/2018    Influenza Split 09/30/2013    Pneumococcal Conjugate - 13 Valent 03/31/2016    Pneumococcal Polysaccharide - 23 Valent 02/19/2014    RSVpreF (Arexvy) 03/12/2024    Zoster Recombinant 07/30/2019, 10/31/2019      Flu shot: recommended yearly   COVID vaccine/booster:  per CDC recommendations   Tetanus (Tdap):    PPSV 20:   HPV:      Meningococcal:   RSV:   Hepatitis B:     Hepatitis A:     MMR (live vaccine):       Chickenpox status/Varicella (live vaccine):    Shingrix:     Pertinent Labs:  Labs: Reviewed     Lab Results   Component Value Date    CALPROTECTIN 352.0 (H) 01/09/2025      No results found for: "HEPBSAG", "HEPBCAB"   No results found for: "TBGOLDPLUS"     Lab Results   Component Value Date    CWPXFBVR20KY 41 03/04/2022        Lab Results   Component Value Date    WBC 8.27 12/06/2024    HGB 12.3 12/06/2024    HCT 39.1 12/06/2024    MCV 92 12/06/2024     12/06/2024        Lab Results   Component Value Date    CREATININE 0.9 12/06/2024    ALBUMIN 3.8 12/06/2024    BILITOT 1.2 (H) 12/06/2024    ALKPHOS 76 12/06/2024    AST 34 12/06/2024    ALT 35 12/06/2024        Therapeutic Drug Monitoring Labs:  No results found for: "PROMETH"  No results found for: "ANSADAINIT", "INFLIXIMAB", "INFLIXINTERP"      Medical History: "   Past Medical History:   Diagnosis Date    Anxiety     Arthritis     Corneal abrasion s    ? eye     Depression     Full dentures     GERD (gastroesophageal reflux disease)     Hyperlipidemia     Hypertension     Nuclear sclerosis of both eyes 6/5/2017    Osteoporosis     Restless leg syndrome        Surgical History:  Past Surgical History:   Procedure Laterality Date    COLONOSCOPY N/A 5/14/2019    Procedure: COLONOSCOPY;  Surgeon: Nahid Cline MD;  Location: [facility] ENDO;  Service: Endoscopy;  Laterality: N/A;    COLONOSCOPY N/A 12/12/2024    Procedure: COLONOSCOPY;  Surgeon: Betty Hurst MD;  Location: [facility] ENDO;  Service: Endoscopy;  Laterality: N/A;  12/10 ref by Bianca Matute NP, PEG, emailed to [email or web address]    EPIDURAL STEROID INJECTION N/A 5/13/2020    Procedure: LUMBAR/CAUDAL L5/S1 IL SANKET ;  Surgeon: Miguel Latham MD;  Location: [facility] PAIN MGT;  Service: Pain Management;  Laterality: N/A;  NEEDS CONSENT    EPIDURAL STEROID INJECTION N/A 8/30/2023    Procedure: INJECTION, STEROID, EPIDURAL, CAUDAL W/ CATH SOONER DATE;  Surgeon: Miguel Latham MD;  Location: [facility] PAIN MGT;  Service: Pain Management;  Laterality: N/A;    EPIDURAL STEROID INJECTION N/A 1/31/2024    Procedure: CAUDAL SANKET;  Surgeon: Miguel Latham MD;  Location: [facility] PAIN MGT;  Service: Pain Management;  Laterality: N/A;  [phone]  4 WK F/U LOLIS    EPIDURAL STEROID INJECTION N/A 9/4/2024    Procedure: CAUDAL SANKET;  Surgeon: Miguel Latham MD;  Location: [facility] PAIN MGT;  Service: Pain Management;  Laterality: N/A;  [phone]  2 WK F/U KAREN    HYSTERECTOMY      PARTIAL HYSTERECTOMY      TRANSFORAMINAL EPIDURAL INJECTION OF STEROID Bilateral 9/4/2019    Procedure: Injection,steroid,epidural,transforaminal approach LUMBAR TRANSFORAMINAL BILATERAL L3 TF SANKET;  Surgeon: Miguel Latham MD;  Location: [facility] PAIN MGT;  Service: Pain Management;  Laterality: Bilateral;  NEEDS CONSENT     TRANSFORAMINAL EPIDURAL INJECTION OF STEROID Bilateral 10/30/2019    Procedure: TRANSFORAMINAL SANKET BILATERAL L3-L4;  Surgeon: Miguel Latham MD;  Location: [facility] PAIN [facility]GT;  Service: Pain Management;  Laterality: Bilateral;  NEEDS CONSENT    TRANSFORAMINAL EPIDURAL INJECTION OF STEROID Bilateral 11/11/2020    Procedure: INJECTION, STEROID, EPIDURAL, TRANSFORAMINAL APPROACH, L3-L4;  Surgeon: Miguel Latham MD;  Location: [facility] PAIN MGT;  Service: Pain Management;  Laterality: Bilateral;    TRANSFORAMINAL EPIDURAL INJECTION OF STEROID Bilateral 5/19/2021    Procedure: INJECTION, STEROID, EPIDURAL, TRANSFORAMINAL APPROACH, L3-L4;  Surgeon: Miguel Latham MD;  Location: [facility] PAIN [facility]GT;  Service: Pain Management;  Laterality: Bilateral;    TRANSFORAMINAL EPIDURAL INJECTION OF STEROID Bilateral 1/26/2022    Procedure: Injection,steroid,epidural,transforaminal approach BILATERAL L3/4;  Surgeon: Miguel Latham MD;  Location: [facility]IN [facility]GT;  Service: Pain Management;  Laterality: Bilateral;    TRANSFORAMINAL EPIDURAL INJECTION OF STEROID Bilateral 4/20/2022    Procedure: INJECTION, STEROID, EPIDURAL, TRANSFORAMINAL APPROACH, Bilateral L3-L4;  Surgeon: Miguel Latham MD;  Location: [facility]IN [facility]GT;  Service: Pain Management;  Laterality: Bilateral;    TRANSFORAMINAL EPIDURAL INJECTION OF STEROID Bilateral 7/20/2022    Procedure: INJECTION, STEROID, EPIDURAL, TRANSFORAMINAL APPROACH, BILATERAL L3-L4 CONTRAST;  Surgeon: Miguel Latham MD;  Location: [facility]IN [facility]GT;  Service: Pain Management;  Laterality: Bilateral;    TRANSFORAMINAL EPIDURAL INJECTION OF STEROID Right 12/7/2022    Procedure: INJECTION, STEROID, EPIDURAL, TRANSFORAMINAL APPROACH, RIGHT L3/L4 AND L4/L5 CONTRAST;  Surgeon: Miguel Latham MD;  Location: [facility]IN [facility]GT;  Service: Pain Management;  Laterality: Right;    TRANSFORAMINAL EPIDURAL INJECTION OF STEROID Right 2/8/2023    Procedure: INJECTION, STEROID, EPIDURAL, TRANSFORAMINAL APPROACH  RIGHT L3/4 AND L4/5 CONTRAST;  Surgeon: Miguel Latham MD;  Location: [facility];  Service: Pain Management;  Laterality: Right;    TUBAL LIGATION Bilateral        Family History:   Family History   Problem Relation Name Age of Onset    Diabetes Mother      Stroke Mother      Glaucoma Mother      Cataracts Mother      Cancer Father          Lung    No Known Problems Sister      Stroke Brother      Hypertension Daughter Lois     Hypertension Daughter Catherine     Hypertension Daughter Kyra     No Known Problems Daughter Dyaca     Heart disease Son      Early death Son      No Known Problems Maternal Aunt      No Known Problems Maternal Uncle      No Known Problems Paternal Aunt      No Known Problems Paternal Uncle      No Known Problems Maternal Grandmother      No Known Problems Maternal Grandfather      No Known Problems Paternal Grandmother      No Known Problems Paternal Grandfather      Amblyopia Neg Hx      Blindness Neg Hx      Macular degeneration Neg Hx      Retinal detachment Neg Hx      Strabismus Neg Hx      Thyroid disease Neg Hx         Social History:   Social History     Tobacco Use    Smoking status: Never     Passive exposure: Never    Smokeless tobacco: Never   Substance Use Topics    Alcohol use: Not Currently     Comment: once a year    Drug use: No       Allergies: Reviewed    Home Medications:   Medication List with Changes/Refills   Current Medications    ALENDRONATE (FOSAMAX) 70 MG TABLET    TAKE 1 TABLET BY MOUTH ONE TIME PER WEEK    AMLODIPINE (NORVASC) 10 MG TABLET    TAKE 1 TABLET BY MOUTH EVERY DAY    ATORVASTATIN (LIPITOR) 40 MG TABLET    TAKE 1 TABLET BY MOUTH EVERY DAY    CALCIUM-VITAMIN D 500-125 MG-UNIT TABLET    Take 1 tablet by mouth once daily.    ERGOCALCIFEROL (ERGOCALCIFEROL) 50,000 UNIT CAP    TAKE 1 CAPSULE (50,000 UNITS TOTAL) BY MOUTH EVERY SUNDAY.    FLUTICASONE PROPIONATE (FLONASE) 50 MCG/ACTUATION NASAL SPRAY    2 SPRAYS (100 MCG TOTAL) BY EACH NOSTRIL  ROUTE ONCE DAILY.    GABAPENTIN (NEURONTIN) 300 MG CAPSULE    Take 2 capsules (600 mg total) by mouth 2 (two) times daily.    IBUPROFEN (ADVIL,MOTRIN) 800 MG TABLET    Take 1 tablet (800 mg total) by mouth 2 (two) times daily as needed.    METOPROLOL SUCCINATE (TOPROL-XL) 100 MG 24 HR TABLET    TAKE 1 TABLET BY MOUTH EVERY DAY    MULTIVITAMIN (THERAGRAN) PER TABLET    Take 1 tablet by mouth once daily.    ONDANSETRON (ZOFRAN-ODT) 4 MG TBDL    Take 1 tablet (4 mg total) by mouth every 8 (eight) hours as needed.    PANTOPRAZOLE (PROTONIX) 40 MG TABLET    TAKE 1 TABLET BY MOUTH EVERY DAY    PAROXETINE (PAXIL) 20 MG TABLET    Take 1 tablet (20 mg total) by mouth every morning.    TRAZODONE (DESYREL) 150 MG TABLET    TAKE 1 TABLET (150 MG TOTAL) BY MOUTH EVERY EVENING.       Physical Exam:  Vital Signs:  There were no vitals taken for this visit.  There is no height or weight on file to calculate BMI.    Constitutional:  not in acute distress and well developed  HENT: Head: Normal, normocephalic, atraumatic.  Eyes: conjunctiva clear and sclera nonicteric  Musculoskeletal: no muscular tenderness noted  Skin: normal color  Neurological: alert, oriented x3  Psychiatric: mood and affect are within normal limits, pt is a good historian; no memory problems were noted    Labs: reviewed and pertinent noted above    Assessment/Plan:  Diana Herring is a 80 y.o. female with colitis. The following issues were addresssed:    # Colitis  Presented to the ER in December with abdominal pain, was found to have hepatic flexure inflammation on CT.  Subsequent colonoscopy showed inflammation and ulceration cecum and ascending colon as well as erythema and reduced vascularity in the remainder of the colon.  Biopsies showing mild chronic colitis in the right colon, and acute colitis in the remainder of the colon.    The patient's age is not typical for new onset IBD.  She reports daily ibuprofen use for a long time which could cause a  similar presentation.  Recommended stopping all NSAIDs and will repeat colonoscopy in 3 months to check for healing.  If inflammation persists, then we can make a diagnosis of IBD and start treatment.  - discontinue NSAIDs  - colonoscopy in 3 months       Follow up: pending colonoscopy    Thank you again for allowing us to participate in Diana Herring's care.      Betty Hurst MD  Department of Gastroenterology

## 2025-01-30 ENCOUNTER — TELEPHONE (OUTPATIENT)
Dept: PULMONOLOGY | Facility: CLINIC | Age: 80
End: 2025-01-30
Payer: MEDICARE

## 2025-01-30 ENCOUNTER — ANESTHESIA EVENT (OUTPATIENT)
Dept: SURGERY | Facility: HOSPITAL | Age: 80
End: 2025-01-30
Payer: MEDICARE

## 2025-01-30 NOTE — TELEPHONE ENCOUNTER
[facility] for patient's granddaughter, Gilmer to return my call. I was calling to confirm robotic bronchoscopy tomorrow with Dr Farooq. Gilmer to call back to discuss.

## 2025-01-30 NOTE — TELEPHONE ENCOUNTER
I spoke with patient's granddaughter, Gilmer to let her know arrival time to [facility] for 7am tomorrow for robotic bronchoscopy with Dr Farooq. NPO after midnight. I answered all questions. Gilmer confirmed and verbalized understanding.

## 2025-01-30 NOTE — ANESTHESIA PREPROCEDURE EVALUATION
[facility]  Anesthesia Pre-Operative Evaluation        Patient Name: Diana Herring  YOB: 1945  MRN: [id number]    SUBJECTIVE:     Pre-operative Evaluation for Procedure(s) (LRB):  ROBOTIC BRONCHOSCOPY (N/A)     01/30/2025    Diana Herring is a 80 y.o. female with a PMHx significant for GERD, HTN, chronic pain, lung nodule.     The patient now presents for the above procedure(s).    Previous Airway: None documented.    Patient Active Problem List   Diagnosis    GERD (gastroesophageal reflux disease)    Essential hypertension    Psychophysiological insomnia    Recurrent major depressive disorder, in full remission    RLS (restless legs syndrome)    Osteoporosis    Muscle weakness    Bilateral leg pain    Left shoulder pain    Nuclear sclerosis of both eyes    Hyperopia with presbyopia of both eyes    DDD (degenerative disc disease), lumbar    Radiculopathy of thoracolumbar region    Spinal stenosis, lumbar region with neurogenic claudication    Osteoarthritis of spine    Chronic pain    Atherosclerosis of aorta    Lumbar pain    Decreased strength    Lung nodule       Past Medical History:   Diagnosis Date    Anxiety     Arthritis     Corneal abrasion s    ? eye     Depression     Full dentures     GERD (gastroesophageal reflux disease)     Hyperlipidemia     Hypertension     Nuclear sclerosis of both eyes 6/5/2017    Osteoporosis     Restless leg syndrome        Review of patient's allergies indicates:  No Known Allergies    Current Outpatient Medications   Medication Instructions    alendronate (FOSAMAX) 70 MG tablet TAKE 1 TABLET BY MOUTH ONE TIME PER WEEK    amLODIPine (NORVASC) 10 mg, Oral    atorvastatin (LIPITOR) 40 mg, Oral    calcium-vitamin D 500-125 mg-unit tablet 1 tablet, Daily    ergocalciferol (ERGOCALCIFEROL) 50,000 Units, Oral, Every Sunday    fluticasone propionate (FLONASE) 100 mcg, Each Nostril, Daily    gabapentin (NEURONTIN) 600 mg, Oral, 2 times daily     metoprolol succinate (TOPROL-XL) 100 mg, Oral    multivitamin (THERAGRAN) per tablet 1 tablet, Daily    ondansetron (ZOFRAN-ODT) 4 mg, Oral, Every 8 hours PRN    pantoprazole (PROTONIX) 40 MG tablet TAKE 1 TABLET BY MOUTH EVERY DAY    paroxetine (PAXIL) 20 mg, Oral, Every morning    traZODone (DESYREL) 150 mg, Oral, Nightly       Past Surgical History:   Procedure Laterality Date    COLONOSCOPY N/A 5/14/2019    Procedure: COLONOSCOPY;  Surgeon: Nahid Cline MD;  Location: [facility] ENDO;  Service: Endoscopy;  Laterality: N/A;    COLONOSCOPY N/A 12/12/2024    Procedure: COLONOSCOPY;  Surgeon: Betty Hurst MD;  Location: [facility] ENDO;  Service: Endoscopy;  Laterality: N/A;  12/10 ref by Bianca Matute NP, PEG, emailed to [email or web address]    EPIDURAL STEROID INJECTION N/A 5/13/2020    Procedure: LUMBAR/CAUDAL L5/S1 IL SANKET ;  Surgeon: Miguel Latham MD;  Location: [facility] PAIN MGT;  Service: Pain Management;  Laterality: N/A;  NEEDS CONSENT    EPIDURAL STEROID INJECTION N/A 8/30/2023    Procedure: INJECTION, STEROID, EPIDURAL, CAUDAL W/ CATH SOONER DATE;  Surgeon: Miguel Latham MD;  Location: [facility] PAIN MGT;  Service: Pain Management;  Laterality: N/A;    EPIDURAL STEROID INJECTION N/A 1/31/2024    Procedure: CAUDAL SANKET;  Surgeon: Miguel Latham MD;  Location: [facility] PAIN MGT;  Service: Pain Management;  Laterality: N/A;  [phone]  4 WK F/U LOLIS    EPIDURAL STEROID INJECTION N/A 9/4/2024    Procedure: CAUDAL SANKET;  Surgeon: Miguel Latham MD;  Location: [facility] PAIN MGT;  Service: Pain Management;  Laterality: N/A;  [phone]  2 WK F/U KAREN    HYSTERECTOMY      PARTIAL HYSTERECTOMY      TRANSFORAMINAL EPIDURAL INJECTION OF STEROID Bilateral 9/4/2019    Procedure: Injection,steroid,epidural,transforaminal approach LUMBAR TRANSFORAMINAL BILATERAL L3 TF SANKET;  Surgeon: Miguel Latham MD;  Location: [facility] PAIN MGT;  Service: Pain Management;  Laterality: Bilateral;  NEEDS CONSENT     TRANSFORAMINAL EPIDURAL INJECTION OF STEROID Bilateral 10/30/2019    Procedure: TRANSFORAMINAL SANKET BILATERAL L3-L4;  Surgeon: Miguel Latham MD;  Location: [facility] PAIN [facility]GT;  Service: Pain Management;  Laterality: Bilateral;  NEEDS CONSENT    TRANSFORAMINAL EPIDURAL INJECTION OF STEROID Bilateral 11/11/2020    Procedure: INJECTION, STEROID, EPIDURAL, TRANSFORAMINAL APPROACH, L3-L4;  Surgeon: Miguel Latham MD;  Location: [facility] PAIN MGT;  Service: Pain Management;  Laterality: Bilateral;    TRANSFORAMINAL EPIDURAL INJECTION OF STEROID Bilateral 5/19/2021    Procedure: INJECTION, STEROID, EPIDURAL, TRANSFORAMINAL APPROACH, L3-L4;  Surgeon: Miguel Latham MD;  Location: [facility] PAIN [facility]GT;  Service: Pain Management;  Laterality: Bilateral;    TRANSFORAMINAL EPIDURAL INJECTION OF STEROID Bilateral 1/26/2022    Procedure: Injection,steroid,epidural,transforaminal approach BILATERAL L3/4;  Surgeon: Miguel Latham MD;  Location: [facility]IN [facility]GT;  Service: Pain Management;  Laterality: Bilateral;    TRANSFORAMINAL EPIDURAL INJECTION OF STEROID Bilateral 4/20/2022    Procedure: INJECTION, STEROID, EPIDURAL, TRANSFORAMINAL APPROACH, Bilateral L3-L4;  Surgeon: Miguel Latham MD;  Location: [facility]IN [facility]GT;  Service: Pain Management;  Laterality: Bilateral;    TRANSFORAMINAL EPIDURAL INJECTION OF STEROID Bilateral 7/20/2022    Procedure: INJECTION, STEROID, EPIDURAL, TRANSFORAMINAL APPROACH, BILATERAL L3-L4 CONTRAST;  Surgeon: Miguel Latham MD;  Location: [facility];  Service: Pain Management;  Laterality: Bilateral;    TRANSFORAMINAL EPIDURAL INJECTION OF STEROID Right 12/7/2022    Procedure: INJECTION, STEROID, EPIDURAL, TRANSFORAMINAL APPROACH, RIGHT L3/L4 AND L4/L5 CONTRAST;  Surgeon: Miguel Latham MD;  Location: [facility]IN [facility]GT;  Service: Pain Management;  Laterality: Right;    TRANSFORAMINAL EPIDURAL INJECTION OF STEROID Right 2/8/2023    Procedure: INJECTION, STEROID, EPIDURAL, TRANSFORAMINAL APPROACH  RIGHT L3/4 AND L4/5 CONTRAST;  Surgeon: Miguel Latham MD;  [facility];  Service: Pain Management;  Laterality: Right;    TUBAL LIGATION Bilateral        Social History     Substance and Sexual Activity   Drug Use No     Alcohol Use: Not on file     Tobacco Use: Low Risk  (1/29/2025)    Patient History     Smoking Tobacco Use: Never     Smokeless Tobacco Use: Never     Passive Exposure: Never       OBJECTIVE:     Vital Signs Range (Last 24H):  Pulse:  [72]   BP: (154)/(74)       Significant Labs    Heme Profile  Lab Results   Component Value Date    WBC 8.27 12/06/2024    HGB 12.3 12/06/2024    HCT 39.1 12/06/2024     12/06/2024       Coagulation Studies  Lab Results   Component Value Date    LABPROT 10.9 02/20/2015    INR 1.1 02/20/2015       BMP  Lab Results   Component Value Date     12/06/2024    K 3.0 (L) 12/06/2024     12/06/2024    CO2 24 12/06/2024    BUN 13 12/06/2024    CREATININE 0.9 12/06/2024    MG 2.4 10/03/2016    PHOS 3.8 10/03/2016       Liver Function Tests  Lab Results   Component Value Date    AST 34 12/06/2024    ALT 35 12/06/2024    ALKPHOS 76 12/06/2024    BILITOT 1.2 (H) 12/06/2024    PROT 8.4 12/06/2024    ALBUMIN 3.8 12/06/2024       Lipid Profile  Lab Results   Component Value Date    CHOL 149 03/08/2024    HDL 40 03/08/2024    TRIG 119 03/08/2024       Endocrine Profile  Lab Results   Component Value Date    HGBA1C 4.9 03/04/2022    TSH 2.297 02/03/2017         Cardiac Studies    EKG:   Results for orders placed or performed in visit on 03/12/24   IN OFFICE EKG 12-LEAD (to [place])    Collection Time: 03/11/24  9:27 PM   Result Value Ref Range    QRS Duration 78 ms    [facility] QTC Calculation 410 ms    Narrative    Test Reason : R07.89,    Vent. Rate : 060 BPM     Atrial Rate : 060 BPM     P-R Int : 182 ms          QRS Dur : 078 ms      QT Int : 410 ms       P-R-T Axes : 006 -21 002 degrees     QTc Int : 410 ms    Normal sinus rhythm  Minimal voltage criteria  for LVH, may be normal variant  Inferior infarct ,age undetermined  T wave abnormality, consider anterior ischemia  Abnormal ECG  When compared with ECG of 25-SEP-2023 16:28,  Inferior infarct is now Present  Confirmed by Vanessa Foster MD ([id number]) on 3/12/2024 11:41:24 AM    Referred By:  VIVI LOZA NP           Confirmed By:Vanessa Foster MD       SAI  No results found for this or any previous visit.      TTE  Results for orders placed during the hospital encounter of 05/10/24    Echo    Interpretation Summary    Left Ventricle: The left ventricle is normal in size. There is normal systolic function with a visually estimated ejection fraction of 60 - 65%. Grade I diastolic dysfunction.    Right Ventricle: Normal right ventricular cavity size. Systolic function is normal.    Left Atrium: Left atrium is mildly dilated.    Aortic Valve: There is mild aortic valve sclerosis. There is mild aortic regurgitation.    Pulmonary Artery: The estimated pulmonary artery systolic pressure is 21 mmHg.    IVC/SVC: Normal venous pressure at 3 mmHg.        Nuclear Stress Echo  Results for orders placed during the hospital encounter of 05/10/24    Nuclear Stress - Cardiology Interpreted    Interpretation Summary    Normal myocardial perfusion scan. There is no evidence of myocardial ischemia or infarction.    The gated perfusion images showed an ejection fraction of 74% post stress.    The ECG portion of the study is negative for ischemia.    The patient reported no chest pain during the stress test.    There were no arrhythmias during stress.          ASSESSMENT/PLAN:           Pre-op Assessment    I have reviewed the Patient Summary Reports.     I have reviewed the Nursing Notes.       Review of Systems  Anesthesia Hx:  No problems with previous Anesthesia             Denies Family Hx of Anesthesia complications.    Denies Personal Hx of Anesthesia complications.                    Social:  Non-Smoker       Cardiovascular:      Hypertension   Denies MI.      Denies Dysrhythmias.       hyperlipidemia    05/24 stress: negative for ischemia; EF wnl                           Pulmonary:         Pulmonary nodules               Hepatic/GI:     GERD, well controlled                Musculoskeletal:  Arthritis   RLS            Neurological:    Neuromuscular Disease,                                   Endocrine:  Endocrine Normal            Psych:  Psychiatric History anxiety                 Physical Exam  General: Well nourished, Cooperative, Alert and Oriented    Airway:  Mallampati: III   Mouth Opening: Normal  TM Distance: 4 - 6 cm  Tongue: Normal  Neck ROM: Normal ROM    Dental:  Intact        Anesthesia Plan  Type of Anesthesia, risks & benefits discussed:    Anesthesia Type: Gen ETT  Intra-op Monitoring Plan: Standard ASA Monitors  Post Op Pain Control Plan: multimodal analgesia and IV/PO Opioids PRN  Induction:  IV  Airway Plan: Direct and Video, Post-Induction  Informed Consent: Informed consent signed with the Patient and all parties understand the risks and agree with anesthesia plan.  All questions answered.   ASA Score: 3  Day of Surgery Review of History & Physical: H&P Update referred to the surgeon/provider.    Ready For Surgery From Anesthesia Perspective.     .

## 2025-01-31 ENCOUNTER — HOSPITAL ENCOUNTER (OUTPATIENT)
Facility: HOSPITAL | Age: 80
Discharge: HOME | End: 2025-01-31
Attending: EMERGENCY MEDICINE | Admitting: EMERGENCY MEDICINE
Payer: MEDICARE

## 2025-01-31 ENCOUNTER — ANESTHESIA (OUTPATIENT)
Dept: SURGERY | Facility: HOSPITAL | Age: 80
End: 2025-01-31
Payer: MEDICARE

## 2025-01-31 VITALS
HEIGHT: 60 IN | HEART RATE: 67 BPM | DIASTOLIC BLOOD PRESSURE: 70 MMHG | BODY MASS INDEX: 27.29 KG/M2 | RESPIRATION RATE: 20 BRPM | WEIGHT: 139 LBS | OXYGEN SATURATION: 96 % | SYSTOLIC BLOOD PRESSURE: 140 MMHG | TEMPERATURE: 98 F

## 2025-01-31 DIAGNOSIS — R91.1 LUNG NODULE: Primary | ICD-10-CM

## 2025-01-31 DIAGNOSIS — R91.1 PULMONARY NODULE 1 CM OR GREATER IN DIAMETER: ICD-10-CM

## 2025-01-31 LAB
GRAM STN SPEC: NORMAL
GRAM STN SPEC: NORMAL

## 2025-01-31 PROCEDURE — D9220A PRA ANESTHESIA: Mod: ,,, | Performed by: SURGERY

## 2025-01-31 PROCEDURE — 87116 MYCOBACTERIA CULTURE: CPT | Performed by: EMERGENCY MEDICINE

## 2025-01-31 PROCEDURE — 27201423 OPTIME MED/SURG SUP & DEVICES STERILE SUPPLY: Performed by: EMERGENCY MEDICINE

## 2025-01-31 PROCEDURE — 87070 CULTURE OTHR SPECIMN AEROBIC: CPT | Performed by: EMERGENCY MEDICINE

## 2025-01-31 PROCEDURE — 87015 SPECIMEN INFECT AGNT CONCNTJ: CPT | Performed by: EMERGENCY MEDICINE

## 2025-01-31 PROCEDURE — 71000044 HC DOSC ROUTINE RECOVERY FIRST HOUR: Performed by: EMERGENCY MEDICINE

## 2025-01-31 PROCEDURE — 31627 NAVIGATIONAL BRONCHOSCOPY: CPT | Mod: ,,, | Performed by: EMERGENCY MEDICINE

## 2025-01-31 PROCEDURE — 63600175 PHARM REV CODE 636 W HCPCS: Performed by: EMERGENCY MEDICINE

## 2025-01-31 PROCEDURE — 71000015 HC POSTOP RECOV 1ST HR: Performed by: EMERGENCY MEDICINE

## 2025-01-31 PROCEDURE — 87075 CULTR BACTERIA EXCEPT BLOOD: CPT | Performed by: EMERGENCY MEDICINE

## 2025-01-31 PROCEDURE — 25000003 PHARM REV CODE 250

## 2025-01-31 PROCEDURE — 88177 CYTP FNA EVAL EA ADDL: CPT | Performed by: PATHOLOGY

## 2025-01-31 PROCEDURE — 87205 SMEAR GRAM STAIN: CPT | Performed by: EMERGENCY MEDICINE

## 2025-01-31 PROCEDURE — 36000709 HC OR TIME LEV III EA ADD 15 MIN: Performed by: EMERGENCY MEDICINE

## 2025-01-31 PROCEDURE — 36000708 HC OR TIME LEV III 1ST 15 MIN: Performed by: EMERGENCY MEDICINE

## 2025-01-31 PROCEDURE — 37000009 HC ANESTHESIA EA ADD 15 MINS: Performed by: EMERGENCY MEDICINE

## 2025-01-31 PROCEDURE — 87206 SMEAR FLUORESCENT/ACID STAI: CPT | Performed by: EMERGENCY MEDICINE

## 2025-01-31 PROCEDURE — 31624 DX BRONCHOSCOPE/LAVAGE: CPT | Mod: ,,, | Performed by: EMERGENCY MEDICINE

## 2025-01-31 PROCEDURE — 63600175 PHARM REV CODE 636 W HCPCS

## 2025-01-31 PROCEDURE — 87102 FUNGUS ISOLATION CULTURE: CPT | Performed by: EMERGENCY MEDICINE

## 2025-01-31 PROCEDURE — 88172 CYTP DX EVAL FNA 1ST EA SITE: CPT | Performed by: PATHOLOGY

## 2025-01-31 PROCEDURE — 88305 TISSUE EXAM BY PATHOLOGIST: CPT | Performed by: PATHOLOGY

## 2025-01-31 PROCEDURE — 37000008 HC ANESTHESIA 1ST 15 MINUTES: Performed by: EMERGENCY MEDICINE

## 2025-01-31 RX ORDER — OXYCODONE AND ACETAMINOPHEN 5; 325 MG/1; MG/1
1 TABLET ORAL
Status: DISCONTINUED | OUTPATIENT
Start: 2025-01-31 | End: 2025-01-31 | Stop reason: HOSPADM

## 2025-01-31 RX ORDER — FENTANYL CITRATE 50 UG/ML
INJECTION, SOLUTION INTRAMUSCULAR; INTRAVENOUS
Status: DISCONTINUED | OUTPATIENT
Start: 2025-01-31 | End: 2025-01-31

## 2025-01-31 RX ORDER — ACETAMINOPHEN 500 MG
1000 TABLET ORAL
Status: COMPLETED | OUTPATIENT
Start: 2025-01-31 | End: 2025-01-31

## 2025-01-31 RX ORDER — GLUCAGON 1 MG
1 KIT INJECTION
Status: DISCONTINUED | OUTPATIENT
Start: 2025-01-31 | End: 2025-01-31 | Stop reason: HOSPADM

## 2025-01-31 RX ORDER — LIDOCAINE HYDROCHLORIDE 20 MG/ML
INJECTION INTRAVENOUS
Status: DISCONTINUED | OUTPATIENT
Start: 2025-01-31 | End: 2025-01-31

## 2025-01-31 RX ORDER — LIDOCAINE HYDROCHLORIDE 10 MG/ML
INJECTION, SOLUTION EPIDURAL; INFILTRATION; INTRACAUDAL; PERINEURAL
Status: DISCONTINUED | OUTPATIENT
Start: 2025-01-31 | End: 2025-01-31 | Stop reason: HOSPADM

## 2025-01-31 RX ORDER — PROPOFOL 10 MG/ML
VIAL (ML) INTRAVENOUS
Status: DISCONTINUED | OUTPATIENT
Start: 2025-01-31 | End: 2025-01-31

## 2025-01-31 RX ORDER — HYDROMORPHONE HYDROCHLORIDE 1 MG/ML
0.2 INJECTION, SOLUTION INTRAMUSCULAR; INTRAVENOUS; SUBCUTANEOUS EVERY 5 MIN PRN
Status: DISCONTINUED | OUTPATIENT
Start: 2025-01-31 | End: 2025-01-31 | Stop reason: HOSPADM

## 2025-01-31 RX ORDER — ONDANSETRON HYDROCHLORIDE 2 MG/ML
INJECTION, SOLUTION INTRAVENOUS
Status: DISCONTINUED | OUTPATIENT
Start: 2025-01-31 | End: 2025-01-31

## 2025-01-31 RX ORDER — MIDAZOLAM HYDROCHLORIDE 1 MG/ML
INJECTION INTRAMUSCULAR; INTRAVENOUS
Status: DISCONTINUED | OUTPATIENT
Start: 2025-01-31 | End: 2025-01-31

## 2025-01-31 RX ORDER — ACETAMINOPHEN 500 MG
1000 TABLET ORAL
Status: DISCONTINUED | OUTPATIENT
Start: 2025-01-31 | End: 2025-01-31

## 2025-01-31 RX ORDER — ROCURONIUM BROMIDE 10 MG/ML
INJECTION, SOLUTION INTRAVENOUS
Status: DISCONTINUED | OUTPATIENT
Start: 2025-01-31 | End: 2025-01-31

## 2025-01-31 RX ORDER — HALOPERIDOL 5 MG/ML
0.5 INJECTION INTRAMUSCULAR EVERY 10 MIN PRN
Status: DISCONTINUED | OUTPATIENT
Start: 2025-01-31 | End: 2025-01-31 | Stop reason: HOSPADM

## 2025-01-31 RX ADMIN — ONDANSETRON 4 MG: 2 INJECTION INTRAMUSCULAR; INTRAVENOUS at 10:01

## 2025-01-31 RX ADMIN — SODIUM CHLORIDE: 0.9 INJECTION, SOLUTION INTRAVENOUS at 09:01

## 2025-01-31 RX ADMIN — LIDOCAINE HYDROCHLORIDE 100 MG: 20 INJECTION INTRAVENOUS at 09:01

## 2025-01-31 RX ADMIN — ROCURONIUM BROMIDE 50 MG: 10 INJECTION, SOLUTION INTRAVENOUS at 09:01

## 2025-01-31 RX ADMIN — MIDAZOLAM HYDROCHLORIDE 2 MG: 2 INJECTION, SOLUTION INTRAMUSCULAR; INTRAVENOUS at 09:01

## 2025-01-31 RX ADMIN — ACETAMINOPHEN 1000 MG: 500 TABLET ORAL at 07:01

## 2025-01-31 RX ADMIN — PROPOFOL 150 MCG/KG/MIN: 10 INJECTION, EMULSION INTRAVENOUS at 09:01

## 2025-01-31 RX ADMIN — PROPOFOL 150 MG: 10 INJECTION, EMULSION INTRAVENOUS at 09:01

## 2025-01-31 RX ADMIN — FENTANYL CITRATE 100 MCG: 50 INJECTION, SOLUTION INTRAMUSCULAR; INTRAVENOUS at 09:01

## 2025-01-31 RX ADMIN — SUGAMMADEX 400 MG: 100 INJECTION, SOLUTION INTRAVENOUS at 10:01

## 2025-01-31 NOTE — ANESTHESIA PROCEDURE NOTES
Intubation    Date/Time: 1/31/2025 9:37 AM    Performed by: Mervat Maynard MD  Authorized by: Gerardo Johnson MD    Intubation:     Induction:  Intravenous    Intubated:  Postinduction    Mask Ventilation:  Easy with oral airway    Attempts:  1    Attempted By:  Resident anesthesiologist    Method of Intubation:  Direct    Blade:  Jennings 2    Laryngeal View Grade: Grade I - full view of cords      Difficult Airway Encountered?: No      Airway Device Size:  7.0    Placement Verified By:  Capnometry    Complicating Factors:  None    Findings Post-Intubation:  BS equal bilateral

## 2025-01-31 NOTE — PLAN OF CARE
Discharge orders in place. Instructions reviewed with patient. PIV discontinued. No changes to medications. Patient waiting on family to provide transport home via car.

## 2025-01-31 NOTE — H&P
H&P    I agree with the findings and assessment and plan as noted below.   No significant interval changes have occurred.    Will proceed with RUL nodule biopsy with robotic bronchoscopy and EBUS.     Katie Teixeira MD  Pulmonary Critical Care Medicine Fellow      Pulmonary & Critical Care Medicine   Consultation Note 12/23/24    Reason for Consultation:Robotic. Pulmonary nodule    HPI: 78 y/o female with HTN, HLD- PCP Dr. Marin-    Chest: [id number] spot in right upper lung lobe; small area of atelectasis  -  Abdomen: Simple renal cysts, gallbladder sludge, thickened area in upper right colon  - Differential for lung spot includes possible malignancy; requires further evaluation  - Abdominal symptoms likely due to viral gastroenteritis, but thickened colon area warrants investigation  - Plan to expedite workup with e-consults to pulmonology and GI specialists    Seen as E-consult by Dr. Morales and subsequently referred to me for evaluation of pulmonary nodule- Initial imaging triggered by ED visit- abdominal pain, left lower quadrant and suprapubic as well as diarrhea x3 days. Patient tried Pepto-Bismol. Notes diarrhea has somewhat improved. Pain is also somewhat improved. Denies fevers, chills. Notes mild nausea. Notes sick contacts with similar symptoms.     Today, she feels good. Diarrhea and stomach issues improved.   Reports cough at time of ED visit. No resolved. No Systemic features  Never tobacco.   No additional complaints.   No old chest imaging for comparison.       Additional Pulmonary History:   Occupational/Environmental Exposures:From [employer]. 4 daughter and 1 boy.   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   Exposure to Animals/Pets: None   Travel History: [place]   History of exposures to TB: All PPD in past negative. No Known exposures.   Family History of Lung Cancer: Maybe DAD   Childhood history of Lung Disease:None   OAC/Anti-PLT- None- Ibuprofen daily.   Chest suregry/Trauma-  None   GLP- None   PNA history- None   Tobacco use- Never       Past Medical History:   Diagnosis Date    Anxiety     Arthritis     Corneal abrasion s    ? eye     Depression     Full dentures     GERD (gastroesophageal reflux disease)     Hyperlipidemia     Hypertension     Nuclear sclerosis of both eyes 6/5/2017    Osteoporosis     Restless leg syndrome      Past Surgical History:   Procedure Laterality Date    COLONOSCOPY N/A 5/14/2019    Procedure: COLONOSCOPY;  Surgeon: Nahdi Cline MD;  Location: [facility] ENDO;  Service: Endoscopy;  Laterality: N/A;    COLONOSCOPY N/A 12/12/2024    Procedure: COLONOSCOPY;  Surgeon: Betty Hurst MD;  Location: [facility] ENDO;  Service: Endoscopy;  Laterality: N/A;  12/10 ref by Bianca Matute NP, PEG, [facility] [email or web address]    EPIDURAL STEROID INJECTION N/A 5/13/2020    Procedure: LUMBAR/CAUDAL L5/S1 IL SANKET ;  Surgeon: Miguel Latham MD;  Location: [facility];  Service: Pain Management;  Laterality: N/A;  NEEDS CONSENT    EPIDURAL STEROID INJECTION N/A 8/30/2023    Procedure: INJECTION, STEROID, EPIDURAL, CAUDAL W/ CATH SOONER DATE;  Surgeon: Miguel Latham MD;  Location: [facility];  Service: Pain Management;  Laterality: N/A;    EPIDURAL STEROID INJECTION N/A 1/31/2024    Procedure: CAUDAL SANKET;  Surgeon: Miguel Latham MD;  Location: [facility];  Service: Pain Management;  Laterality: N/A;  [phone]  4 WK F/U LOLIS    EPIDURAL STEROID INJECTION N/A 9/4/2024    Procedure: CAUDAL SANKET;  Surgeon: Miguel Latham MD;  Location: [facility];  Service: Pain Management;  Laterality: N/A;  [phone]  2 WK F/U KAREN    HYSTERECTOMY      PARTIAL HYSTERECTOMY      TRANSFORAMINAL EPIDURAL INJECTION OF STEROID Bilateral 9/4/2019    Procedure: Injection,steroid,epidural,transforaminal approach LUMBAR TRANSFORAMINAL BILATERAL L3 TF SANKET;  Surgeon: Miguel Latham MD;  Location: [facility];  Service: Pain Management;  Laterality:  Bilateral;  NEEDS CONSENT    TRANSFORAMINAL EPIDURAL INJECTION OF STEROID Bilateral 10/30/2019    Procedure: TRANSFORAMINAL SANKET BILATERAL L3-L4;  Surgeon: Miguel Latham MD;  Location: [facility];  Service: Pain Management;  Laterality: Bilateral;  NEEDS CONSENT    TRANSFORAMINAL EPIDURAL INJECTION OF STEROID Bilateral 11/11/2020    Procedure: INJECTION, STEROID, EPIDURAL, TRANSFORAMINAL APPROACH, L3-L4;  Surgeon: Miguel Latham MD;  Location: [facility] PAIN MGT;  Service: Pain Management;  Laterality: Bilateral;    TRANSFORAMINAL EPIDURAL INJECTION OF STEROID Bilateral 5/19/2021    Procedure: INJECTION, STEROID, EPIDURAL, TRANSFORAMINAL APPROACH, L3-L4;  Surgeon: Miguel Latham MD;  Location: [facility] PAIN MGT;  Service: Pain Management;  Laterality: Bilateral;    TRANSFORAMINAL EPIDURAL INJECTION OF STEROID Bilateral 1/26/2022    Procedure: Injection,steroid,epidural,transforaminal approach BILATERAL L3/4;  Surgeon: Miguel Latham MD;  Location: [facility] PAIN MGT;  Service: Pain Management;  Laterality: Bilateral;    TRANSFORAMINAL EPIDURAL INJECTION OF STEROID Bilateral 4/20/2022    Procedure: INJECTION, STEROID, EPIDURAL, TRANSFORAMINAL APPROACH, Bilateral L3-L4;  Surgeon: Miguel Latham MD;  Location: [facility] PAIN MGT;  Service: Pain Management;  Laterality: Bilateral;    TRANSFORAMINAL EPIDURAL INJECTION OF STEROID Bilateral 7/20/2022    Procedure: INJECTION, STEROID, EPIDURAL, TRANSFORAMINAL APPROACH, BILATERAL L3-L4 CONTRAST;  Surgeon: Miguel Latham MD;  Location: [facility] PAIN MGT;  Service: Pain Management;  Laterality: Bilateral;    TRANSFORAMINAL EPIDURAL INJECTION OF STEROID Right 12/7/2022    Procedure: INJECTION, STEROID, EPIDURAL, TRANSFORAMINAL APPROACH, RIGHT L3/L4 AND L4/L5 CONTRAST;  Surgeon: Miguel Latham MD;  Location: [facility] PAIN MGT;  Service: Pain Management;  Laterality: Right;    TRANSFORAMINAL EPIDURAL INJECTION OF STEROID Right 2/8/2023    Procedure: INJECTION, STEROID,  EPIDURAL, TRANSFORAMINAL APPROACH RIGHT L3/4 AND L4/5 CONTRAST;  Surgeon: Miguel Latham MD;  Location: [facility] PAIN T;  Service: Pain Management;  Laterality: Right;    TUBAL LIGATION Bilateral      Social History:   Social History     Socioeconomic History    Marital status:    Tobacco Use    Smoking status: Never     Passive exposure: Never    Smokeless tobacco: Never   Substance and Sexual Activity    Alcohol use: Not Currently     Comment: once a year    Drug use: No    Sexual activity: Not Currently   Social History Narrative    Patient is  w/ 5 children, one  from heart disease.The patient is retired.     Family History   Problem Relation Name Age of Onset    Diabetes Mother      Stroke Mother      Glaucoma Mother      Cataracts Mother      Cancer Father          Lung    No Known Problems Sister      Stroke Brother      Hypertension Daughter Lois     Hypertension Daughter Catherine     Hypertension Daughter Kyra     No Known Problems Daughter Dyaca     Heart disease Son      Early death Son      No Known Problems Maternal Aunt      No Known Problems Maternal Uncle      No Known Problems Paternal Aunt      No Known Problems Paternal Uncle      No Known Problems Maternal Grandmother      No Known Problems Maternal Grandfather      No Known Problems Paternal Grandmother      No Known Problems Paternal Grandfather      Amblyopia Neg Hx      Blindness Neg Hx      Macular degeneration Neg Hx      Retinal detachment Neg Hx      Strabismus Neg Hx      Thyroid disease Neg Hx       Drug Allergies:   Review of patient's allergies indicates:  No Known Allergies        Review of Systems:   A comprehensive 12-point review of systems was performed, and is negative except for those items mentioned above in the HPI section of this note.     Vital Signs:  BP (!) 190/82 (BP Location: Right arm, Patient Position: Lying)   Pulse 77   Temp 97.9 °F (36.6 °C) (Temporal)   Resp 18   Ht 5' (1.524 m)    Wt 63 kg (139 lb)   SpO2 100%   Breastfeeding No   BMI 27.15 kg/m²      Physical Exam:   GEN- NAD AAOx3 Well Built, Well Appearing   HEENT- ATNC, PERRLA, EOMI, OP-Cl. No JVD, LAD or bruit noted. Trachea Midline.   CV- RRR No M/R/G  RESP- CTA-Bilateral   GI- S/NT/ND. Positive BS X 4. No HSM Noted  BACK- Spine midline. No step off, crepitus or deformity noted. No midline TTP.   Ext- MAEW, No deformity. No edema or rashes noted.   Neuro- Strength 5/5 symmetric. CN 2-12 intact. Normal gait. Normal sensation.    Personal Review and Summary of Prior Diagnostics    Laboratory Studies: Reviewed      Latest Reference Range & Units Most Recent   WBC 3.90 - 12.70 K/uL 8.27  12/6/24 15:24   RBC 4.00 - 5.40 M/uL 4.26  12/6/24 15:24   Hemoglobin 12.0 - 16.0 g/dL 12.3  12/6/24 15:24   Hematocrit 37.0 - 48.5 % 39.1  12/6/24 15:24   MCV 82 - 98 fL 92  12/6/24 15:24   MCH 27.0 - 31.0 pg 28.9  12/6/24 15:24   MCHC 32.0 - 36.0 g/dL 31.5 (L)  12/6/24 15:24   RDW 11.5 - 14.5 % 12.2  12/6/24 15:24   Platelet Count 150 - 450 K/uL 214  12/6/24 15:24   MPV 9.2 - 12.9 fL 10.8  12/6/24 15:24   Gran % 38.0 - 73.0 % 54.8  12/6/24 15:24   Lymph % 18.0 - 48.0 % 32.4  12/6/24 15:24   Mono % 4.0 - 15.0 % 8.6  12/6/24 15:24   Eos % 0.0 - 8.0 % 3.1  12/6/24 15:24   Basophil % 0.0 - 1.9 % 0.7  12/6/24 15:24   Immature Granulocytes 0.0 - 0.5 % 0.4  12/6/24 15:24   Gran # (ANC) 1.8 - 7.7 K/uL 4.5  12/6/24 15:24   Lymph # 1.0 - 4.8 K/uL 2.7  12/6/24 15:24   Mono # 0.3 - 1.0 K/uL 0.7  12/6/24 15:24   Eos # 0.0 - 0.5 K/uL 0.3  12/6/24 15:24   Baso # 0.00 - 0.20 K/uL 0.06  12/6/24 15:24   Immature Grans (Abs) 0.00 - 0.04 K/uL 0.03  12/6/24 15:24   nRBC 0 /100 WBC 0  12/6/24 15:24   Differential Method  Automated  12/6/24 15:24   Ferritin 20.0 - 300.0 ng/mL 213  10/3/16 15:05   PT 9.0 - 12.5 sec 10.9  2/20/15 13:47   INR 0.8 - 1.2  1.1  2/20/15 13:47   D-Dimer <0.50 mg/L FEU 0.33  10/3/16 15:05   Sodium 136 - 145 mmol/L 144  12/6/24 15:24   Potassium 3.5 -  5.1 mmol/L 3.0 (L)  12/6/24 15:24   Chloride 95 - 110 mmol/L 109  12/6/24 15:24   CO2 23 - 29 mmol/L 24  12/6/24 15:24   Anion Gap 8 - 16 mmol/L 11  12/6/24 15:24   BUN 8 - 23 mg/dL 13  12/6/24 15:24   Creatinine 0.5 - 1.4 mg/dL 0.9  12/6/24 15:24   eGFR >60 mL/min/1.73 m^2 >60  12/6/24 15:24   eGFR if non African American >60 mL/min/1.73 m^2 >60.0  3/4/22 08:40   eGFR if African American >60 mL/min/1.73 m^2 >60.0  3/4/22 08:40   Glucose 70 - 110 mg/dL 104  12/6/24 15:24   Calcium 8.7 - 10.5 mg/dL 9.3  12/6/24 15:24   Phosphorus Level 2.7 - 4.5 mg/dL 3.8  10/3/16 15:05   Magnesium  1.6 - 2.6 mg/dL 2.4  10/3/16 15:05   ALP 40 - 150 U/L 76  12/6/24 15:24   PROTEIN TOTAL 6.0 - 8.4 g/dL 8.4  12/6/24 15:24   Albumin 3.5 - 5.2 g/dL 3.8  12/6/24 15:24   BILIRUBIN TOTAL 0.1 - 1.0 mg/dL 1.2 (H)  12/6/24 15:24   AST 10 - 40 U/L 34  12/6/24 15:24   ALT 10 - 44 U/L 35  12/6/24 15:24   Lipase 4 - 60 U/L 60  12/6/24 15:24   Cholesterol Total 120 - 199 mg/dL 149  3/8/24 08:05   HDL 40 - 75 mg/dL 40  3/8/24 08:05   HDL/Cholesterol Ratio 20.0 - 50.0 % 26.8  3/8/24 08:05   Non-HDL Cholesterol mg/dL 109  3/8/24 08:05   Total Cholesterol/HDL Ratio 2.0 - 5.0  3.7  3/8/24 08:05   Triglycerides 30 - 150 mg/dL 119  3/8/24 08:05   LDL Cholesterol 63.0 - 159.0 mg/dL 85.2  3/8/24 08:05   BNP 0 - 99 pg/mL 18  10/3/16 15:05   CPK 20 - 180 U/L 96  10/3/16 15:05   Vitamin D 30 - 96 ng/mL 41  3/4/22 08:40   Hemoglobin A1C External 4.0 - 5.6 % 4.9  3/4/22 08:40   Estimated Avg Glucose 68 - 131 mg/dL 94  3/4/22 08:40   TSH 0.400 - 4.000 uIU/mL 2.297  2/3/17 09:18   Hepatitis C Ab  Negative  2/19/14 12:12   SARS-CoV-2 RNA, Amplification, Qual Negative  Negative  12/6/24 16:03   SARS-CoV2 (COVID-19) Qualitative PCR Not Detected  Not Detected  5/12/20 09:20   Specimen UA  Urine, Clean Catch  12/6/24 17:52   Color, UA Yellow, Straw, Jayleen  Yellow  12/6/24 17:52   Appearance, UA Clear  Clear  12/6/24 17:52   Spec Grav UA 1.005 - 1.030  >1.030 !  12/6/24  17:52   pH, UA 5.0 - 8.0  7.0  12/6/24 17:52   Protein, UA Negative  Negative  12/6/24 17:52   Glucose, UA Negative  Negative  12/6/24 17:52   Ketones, UA Negative  Negative  12/6/24 17:52   Blood, UA Negative  Negative  12/6/24 17:52   NITRITE UA Negative  Negative  12/6/24 17:52   UROBILINOGEN UA <2.0 EU/dL Negative  12/6/24 17:52   Bilirubin (UA) Negative  Negative  12/6/24 17:52   Leukocyte Esterase, UA Negative  Negative  12/6/24 17:52   RBC, UA 0 - 4 /hpf 1  9/25/23 17:25   WBC, UA 0 - 5 /hpf 6 (H)  9/25/23 17:25   Bacteria, UA None-Occ /hpf Rare  9/25/23 17:25   Squam Epithel, UA /hpf 3  9/25/23 17:25   WBC Clumps, UA None-Rare  Moderate !  6/26/18 14:31   Yeast, UA None  Rare !  6/26/18 14:31   Microscopic Comment  SEE COMMENT  9/25/23 17:25   CULTURE, URINE  Rpt  6/26/18 14:31       Microbiology Data: Reviewed     Summary of Chest Imaging Personally Reviewed:     CT Chest-   Right upper pulmonary lobe lesion with irregular margins. Neoplasia cannot be entirely excluded. Recommend pulmonary consult for workup.         2D Echo:     Left Ventricle The left ventricle is normal in size. There is normal systolic function with a visually estimated ejection fraction of 60 - 65%. Grade I diastolic dysfunction.   Right Ventricle Normal right ventricular cavity size. Systolic function is normal.   Left Atrium Left atrium is mildly dilated.   Right Atrium Normal right atrial size.   Aortic Valve There is mild aortic valve sclerosis. Aortic valve peak velocity is 1.48 m/s. Mean gradient is 4 mmHg. There is mild aortic regurgitation.   Mitral Valve The mitral valve is structurally normal. The mean pressure gradient across the mitral valve is 2 mmHg at a heart rate of  bpm.   Tricuspid Valve There is trace regurgitation.   Pulmonic Valve The pulmonic valve is structurally normal.   IVC/SVC Normal venous pressure at 3 mmHg.   Pulmonary Artery The estimated pulmonary artery systolic pressure is 21 mmHg.       PFT's: None         Assessment:     1. Lung nodule    2. Pulmonary nodule 1 cm or greater in diameter         Outpatient Encounter Medications as of 2024   Medication Sig Dispense Refill    alendronate (FOSAMAX) 70 MG tablet TAKE 1 TABLET BY MOUTH ONE TIME PER WEEK 12 tablet 1    amLODIPine (NORVASC) 10 MG tablet TAKE 1 TABLET BY MOUTH EVERY DAY 90 tablet 1    atorvastatin (LIPITOR) 40 MG tablet TAKE 1 TABLET BY MOUTH EVERY DAY 90 tablet 1    calcium-vitamin D 500-125 mg-unit tablet Take 1 tablet by mouth once daily.      [] dicyclomine (BENTYL) 20 mg tablet Take 1 tablet (20 mg total) by mouth 2 (two) times daily as needed (abdominal cramping). 14 tablet 0    ergocalciferol (ERGOCALCIFEROL) 50,000 unit Cap TAKE 1 CAPSULE (50,000 UNITS TOTAL) BY MOUTH EVERY . 12 capsule 1    fluticasone propionate (FLONASE) 50 mcg/actuation nasal spray 2 SPRAYS (100 MCG TOTAL) BY EACH NOSTRIL ROUTE ONCE DAILY. 48 mL 3    gabapentin (NEURONTIN) 300 MG capsule Take 2 capsules (600 mg total) by mouth 2 (two) times daily. 360 capsule 1    ibuprofen (ADVIL,MOTRIN) 800 MG tablet TAKE 1 TABLET BY MOUTH 2 TIMES DAILY AS NEEDED. 60 tablet 0    metoprolol succinate (TOPROL-XL) 100 MG 24 hr tablet TAKE 1 TABLET BY MOUTH EVERY DAY 90 tablet 1    multivitamin (THERAGRAN) per tablet Take 1 tablet by mouth once daily.      [] ondansetron (ZOFRAN) 4 MG tablet Take 1 tablet (4 mg total) by mouth every 6 (six) hours as needed for Nausea. 12 tablet 0    ondansetron (ZOFRAN-ODT) 4 MG TbDL Take 1 tablet (4 mg total) by mouth every 8 (eight) hours as needed. 20 tablet 0    pantoprazole (PROTONIX) 40 MG tablet TAKE 1 TABLET BY MOUTH EVERY DAY 90 tablet 1    paroxetine (PAXIL) 20 MG tablet Take 1 tablet (20 mg total) by mouth every morning. 90 tablet 1    [] polyethylene glycol (COLYTE) 240-22.72-6.72 -5.84 gram SolR Take 4,000 mLs by mouth once. for 1 dose 4000 mL 0    traZODone (DESYREL) 150 MG tablet TAKE 1 TABLET (150 MG TOTAL) BY  MOUTH EVERY EVENING. 90 tablet 1    [DISCONTINUED] ibuprofen (ADVIL,MOTRIN) 800 MG tablet TAKE 1 TABLET BY MOUTH 2 TIMES DAILY AS NEEDED. 60 tablet 0     No facility-administered encounter medications on file as of 12/23/2024.     Orders Placed This Encounter   Procedures    SURG [place] Surgery Fluoro Usage     Standing Status:   Standing     Number of Occurrences:   1     Order Specific Question:   Reason for exam:     Answer:   Bronch     Order Specific Question:   May the Radiologist modify the order per protocol to meet the clinical needs of the patient?     Answer:   Yes     Order Specific Question:   Release to patient     Answer:   Immediate    Vital signs     Standing Status:   Standing     Number of Occurrences:   1    Verify informed consent     Standing Status:   Standing     Number of Occurrences:   1    Assess per unit routine     Standing Status:   Standing     Number of Occurrences:   1    Remove glasses and/or dentures     Standing Status:   Standing     Number of Occurrences:   1    Undress from the waist up     Standing Status:   Standing     Number of Occurrences:   1    Notify Physician     Standing Status:   Standing     Number of Occurrences:   1     Order Specific Question:   Systolic Blood Pressure SBP greater than or equal to     Answer:   180     Order Specific Question:   Systolic Blood Pressure SBP less than or equal to     Answer:   90     Order Specific Question:   Pulse greater than or equal to     Answer:   120     Order Specific Question:   Pulse less than or equal to     Answer:   50     Order Specific Question:   SPO2% less than     Answer:   90    Place sequential compression device     Standing Status:   Standing     Number of Occurrences:   1    Full code     Standing Status:   Standing     Number of Occurrences:   1    Cardiac monitoring strips     Standing Status:   Standing     Number of Occurrences:   1    Insert peripheral IV if not present     Standing Status:   Standing      Number of Occurrences:   1    Place in Outpatient     Standing Status:   Standing     Number of Occurrences:   1     Order Specific Question:   Diagnosis     Answer:   Pulmonary nodule 1 cm or greater in diameter [[id number]]     Order Specific Question:   Is the Future Attending Known?     Answer:   Yes     Order Specific Question:   Future Attending Provider     Answer:   ROSENDO FAROOQ [[id number]]     Order Specific Question:   Special Needs:     Answer:   No Special Needs [1]       Plan:     Pulmonary nodule- RUL 1.7cm. Never tobacco and no old imaging for comparison. Nodule identified in setting of acute illness when traveling back from [place].. Did have cough, now resolved.. May just be a viral infiltrate, but indolent adeno possible.    -- CT chest end of January to see if resolution.   -- Case request for robotic + EBUS on 1/31 in the event it persists placed.   -- Plan cyto + cultures   -- No OAC/Anti-PLT or GLP       Discussed procedure in detail with patient and . Risks including bleeding, PTX, respiratory failure, need for additional procedures, non-diagnostic specimen, and numerous additional potential complications up to death outlined. She verbalized understanding and all questions answered to her satisfaction. Written consent signed and on chart.     Rosendo Farooq MD   [facility]

## 2025-01-31 NOTE — ANESTHESIA POSTPROCEDURE EVALUATION
Anesthesia Post Evaluation    Patient: Diana Herring    Procedure(s) Performed: Procedure(s) (LRB):  ROBOTIC BRONCHOSCOPY (N/A)  ENDOBRONCHIAL ULTRASOUND (EBUS) (N/A)    Final Anesthesia Type: general      Patient location during evaluation: PACU  Patient participation: Yes- Able to Participate  Level of consciousness: awake and alert  Post-procedure vital signs: reviewed and stable  Pain management: adequate  Airway patency: patent  FELIPE mitigation strategies: Multimodal analgesia, Preoperative use of mandibular advancement devices or oral appliances and Intraoperative administration of CPAP, nasopharyngeal airway, or oral appliance during sedation  PONV status at discharge: No PONV  Anesthetic complications: no      Cardiovascular status: blood pressure returned to baseline, hemodynamically stable and stable  Respiratory status: unassisted and spontaneous ventilation  Hydration status: euvolemic  Follow-up not needed.              Vitals Value Taken Time   /65 01/31/25 1102   Temp 36.7 °C (98 °F) 01/31/25 1100   Pulse 73 01/31/25 1104   Resp 20 01/31/25 1100   SpO2 96 % 01/31/25 1104   Vitals shown include unfiled device data.      No case tracking events are documented in the log.      Pain/Nesha Score: Pain Rating Prior to Med Admin: 7 (1/31/2025  7:44 AM)  Nesha Score: 10 (1/31/2025 10:57 AM)

## 2025-01-31 NOTE — BRIEF OP NOTE
[facility] - [facility] (2nd Fl)  Discharge Note  Short Stay    Procedure(s) (LRB):  ROBOTIC BRONCHOSCOPY (N/A)  Endobronchial Ultrasound    Robotic RLL Nodule - BAL     EBUS Station - No lymph node large enough to sample    OUTCOME: Patient tolerated treatment/procedure well without complication and is now ready for discharge.    DISPOSITION: Home or Self Care    FINAL DIAGNOSIS:  Lung nodule    FOLLOWUP: In clinic as scheduled. Will call with results.     DISCHARGE INSTRUCTIONS:  Written and verbal discharge instructions provided.     TIME SPENT ON DISCHARGE: 25 minutes      Katie Teixeira MD  Pulmonary Critical Care Medicine Fellow

## 2025-01-31 NOTE — TRANSFER OF CARE
Anesthesia Transfer of Care Note    Patient: Diana Herring    Procedure(s) Performed: Procedure(s) (LRB):  ROBOTIC BRONCHOSCOPY (N/A)  ENDOBRONCHIAL ULTRASOUND (EBUS) (N/A)    Patient location: PACU    Anesthesia Type: general    Transport from OR: Transported from OR on 6-10 L/min O2 by face mask with adequate spontaneous ventilation    Post pain: adequate analgesia    Post assessment: no apparent anesthetic complications    Post vital signs: stable    Level of consciousness: awake    Nausea/Vomiting: no nausea/vomiting    Complications: none    Transfer of care protocol was followed      Last vitals: Visit Vitals  BP (!) 190/82 (BP Location: Right arm, Patient Position: Lying)   Pulse 77   Temp 36.6 °C (97.9 °F) (Temporal)   Resp 18   Ht 5' (1.524 m)   Wt 63 kg (139 lb)   SpO2 100%   Breastfeeding No   BMI 27.15 kg/m²

## 2025-02-03 LAB
ACID FAST MOD KINY STN SPEC: NORMAL
BACTERIA SPEC AEROBE CULT: ABNORMAL
FUNGUS SPEC CULT: NORMAL
MYCOBACTERIUM SPEC QL CULT: NORMAL

## 2025-02-04 LAB — BACTERIA SPEC ANAEROBE CULT: NORMAL

## 2025-02-05 ENCOUNTER — OFFICE VISIT (OUTPATIENT)
Dept: FAMILY MEDICINE | Facility: CLINIC | Age: 80
End: 2025-02-05
Payer: MEDICARE

## 2025-02-05 VITALS
WEIGHT: 137.38 LBS | HEART RATE: 63 BPM | OXYGEN SATURATION: 98 % | HEIGHT: 60 IN | SYSTOLIC BLOOD PRESSURE: 132 MMHG | BODY MASS INDEX: 26.97 KG/M2 | TEMPERATURE: 98 F | DIASTOLIC BLOOD PRESSURE: 70 MMHG

## 2025-02-05 DIAGNOSIS — Z23 FLU VACCINE NEED: ICD-10-CM

## 2025-02-05 DIAGNOSIS — F51.04 PSYCHOPHYSIOLOGICAL INSOMNIA: ICD-10-CM

## 2025-02-05 DIAGNOSIS — I10 ESSENTIAL HYPERTENSION: ICD-10-CM

## 2025-02-05 DIAGNOSIS — Z71.89 ADVANCED CARE PLANNING/COUNSELING DISCUSSION: ICD-10-CM

## 2025-02-05 DIAGNOSIS — M81.0 AGE-RELATED OSTEOPOROSIS WITHOUT CURRENT PATHOLOGICAL FRACTURE: Chronic | ICD-10-CM

## 2025-02-05 DIAGNOSIS — I70.0 ATHEROSCLEROSIS OF AORTA: ICD-10-CM

## 2025-02-05 DIAGNOSIS — Z00.00 ENCOUNTER FOR PREVENTIVE HEALTH EXAMINATION: Primary | ICD-10-CM

## 2025-02-05 DIAGNOSIS — Z23 NEED FOR COVID-19 VACCINE: ICD-10-CM

## 2025-02-05 PROCEDURE — 3078F DIAST BP <80 MM HG: CPT | Mod: CPTII,S$GLB,,

## 2025-02-05 PROCEDURE — 90653 IIV ADJUVANT VACCINE IM: CPT | Mod: S$GLB,,,

## 2025-02-05 PROCEDURE — 90480 ADMN SARSCOV2 VAC 1/ONLY CMP: CPT | Mod: S$GLB,,,

## 2025-02-05 PROCEDURE — 1170F FXNL STATUS ASSESSED: CPT | Mod: CPTII,S$GLB,,

## 2025-02-05 PROCEDURE — G0008 ADMIN INFLUENZA VIRUS VAC: HCPCS | Mod: S$GLB,,,

## 2025-02-05 PROCEDURE — G0439 PPPS, SUBSEQ VISIT: HCPCS | Mod: S$GLB,,,

## 2025-02-05 PROCEDURE — 91320 SARSCV2 VAC 30MCG TRS-SUC IM: CPT | Mod: S$GLB,,,

## 2025-02-05 PROCEDURE — 99999 PR PBB SHADOW E&M-EST. PATIENT-LVL V: CPT | Mod: PBBFAC,,,

## 2025-02-05 PROCEDURE — 3288F FALL RISK ASSESSMENT DOCD: CPT | Mod: CPTII,S$GLB,,

## 2025-02-05 PROCEDURE — 1160F RVW MEDS BY RX/DR IN RCRD: CPT | Mod: CPTII,S$GLB,,

## 2025-02-05 PROCEDURE — 3075F SYST BP GE 130 - 139MM HG: CPT | Mod: CPTII,S$GLB,,

## 2025-02-05 PROCEDURE — 1125F AMNT PAIN NOTED PAIN PRSNT: CPT | Mod: CPTII,S$GLB,,

## 2025-02-05 PROCEDURE — 1101F PT FALLS ASSESS-DOCD LE1/YR: CPT | Mod: CPTII,S$GLB,,

## 2025-02-05 PROCEDURE — 1159F MED LIST DOCD IN RCRD: CPT | Mod: CPTII,S$GLB,,

## 2025-02-05 RX ORDER — HYDROXYZINE PAMOATE 25 MG/1
25 CAPSULE ORAL NIGHTLY PRN
Qty: 30 CAPSULE | Refills: 0 | Status: SHIPPED | OUTPATIENT
Start: 2025-02-05 | End: 2025-03-07

## 2025-02-05 NOTE — PATIENT INSTRUCTIONS
Counseling and Referral of Other Preventative  (Italic type indicates deductible and co-insurance are waived)    Patient Name: Diana Herring  Today's Date: 2/5/2025    Health Maintenance       Date Due Completion Date    Influenza Vaccine (1) 09/01/2024 9/13/2023    Override on 12/1/2020: Declined    COVID-19 Vaccine (6 - 2024-25 season) 09/01/2024 3/12/2024    DEXA Scan 04/17/2025 4/17/2023    TETANUS VACCINE 08/31/2034 (Originally 1/4/1963) ---    Colonoscopy 12/12/2027 12/12/2024    Lipid Panel 03/08/2029 3/8/2024        No orders of the defined types were placed in this encounter.    The following information is provided to all patients.  This information is to help you find resources for any of the problems found today that may be affecting your health:                  Living healthy guide: www.[facility].[place].[facility]      Understanding Diabetes: www.diabetes.org      Eating healthy: www.cdc.gov/healthyweight      CDC home safety checklist: www.cdc.gov/steadi/patient.html      Agency on Aging: www.goea.[place].[facility]      Alcoholics anonymous (AA): www.aa.org      Physical Activity: www.[place].nih.gov/uu9uiua      Tobacco use: www.quitwithusla.org

## 2025-02-05 NOTE — PROGRESS NOTES
Administered flu and covid vaccine in right arm , patient tolerated well. VIS given , instructed to wait fifteen minutes.

## 2025-02-05 NOTE — PROGRESS NOTES
I offered to discuss advanced care planning, including how to pick a person who would make decisions for you if you were unable to make them for yourself, called a health care power of , and what kind of decisions you might make such as use of life sustaining treatments such as ventilators and tube feeding when faced with a life limiting illness recorded on a living will that they will need to know. (How you want to be cared for as you near the end of your natural life)     X Patient is interested in learning more about how to make advanced directives.  I provided them paperwork and offered to discuss this with them.  Diana Herring presented for a  Medicare AWV and comprehensive Health Risk Assessment today. The following components were reviewed and updated:    Medical history  Family History  Social history  Allergies and Current Medications  Health Risk Assessment  Health Maintenance  Care Team     ** See Completed Assessments for Annual Wellness Visit within the encounter summary.**       The following assessments were completed:  Living Situation  CAGE  Depression Screening  Timed Get Up and Go  Whisper Test  Cognitive Function Screening  Nutrition Screening  ADL Screening  PAQ Screening      Opioid documentation:      Patient      Vitals:    02/05/25 1034   BP: 132/70   Pulse: 63   Temp: 98 °F (36.7 °C)   TempSrc: Oral   SpO2: 98%   Weight: 62.3 kg (137 lb 5.6 oz)   Height: 5' (1.524 m)     Body mass index is 26.82 kg/m².  Physical Exam        Diagnoses and health risks identified today and associated recommendations/orders:    1. Encounter for preventive health examination  ***    2. Advanced care planning/counseling discussion  ***    3. Psychophysiological insomnia  ***  - hydrOXYzine pamoate (VISTARIL) 25 MG Cap; Take 1 capsule (25 mg total) by mouth nightly as needed (insomnia).  Dispense: 30 capsule; Refill: 0    4. Need for COVID-19 vaccine  ***  - COVID-19 (Pfizer) 30 mcg/0.3 mL IM vaccine  (>/= 13 yo) 0.3 mL    5. Flu vaccine need  ***  - influenza (adjuvanted) (Fluad) 45 mcg/0.5 mL IM vaccine (> or = 64 yo) 0.5 mL    6. Age-related osteoporosis without current pathological fracture  ***  - DXA Bone Density Axial Skeleton 1 or more sites; Future      Provided Diana with a 5-10 year written screening schedule and personal prevention plan. Recommendations were developed using the USPSTF age appropriate recommendations. Education, counseling, and referrals were provided as needed. After Visit Summary printed and given to patient which includes a list of additional screenings\tests needed.    No follow-ups on file.    BRENDA Haque

## 2025-02-06 NOTE — PROGRESS NOTES
Diana Herring presented for a follow-up Medicare AWV today. The following components were reviewed and updated:    Medical history  Family History  Social history  Allergies and Current Medications  Health Risk Assessment  Health Maintenance  Care Team    **See Completed Assessments for Annual Wellness visit with in the encounter summary    The following assessments were completed:  Depression Screening  Cognitive function Screening  Timed Get Up Test  Whisper Test        Opioid documentation:      Patient does not have a current opioid prescription.          Vitals:    02/05/25 1034   BP: 132/70   Pulse: 63   Temp: 98 °F (36.7 °C)   TempSrc: Oral   SpO2: 98%   Weight: 62.3 kg (137 lb 5.6 oz)   Height: 5' (1.524 m)     Body mass index is 26.82 kg/m².     Physical Exam  Vitals reviewed.   Constitutional:       Appearance: Normal appearance. She is not ill-appearing.   HENT:      Head: Normocephalic and atraumatic.   Cardiovascular:      Rate and Rhythm: Normal rate and regular rhythm.      Pulses: Normal pulses.      Heart sounds: Normal heart sounds. No murmur heard.  Pulmonary:      Effort: Pulmonary effort is normal. No respiratory distress.      Breath sounds: Normal breath sounds. No wheezing.   Musculoskeletal:         General: Normal range of motion.      Cervical back: Normal range of motion.   Skin:     General: Skin is warm and dry.      Capillary Refill: Capillary refill takes less than 2 seconds.   Neurological:      General: No focal deficit present.      Mental Status: She is alert and oriented to person, place, and time.   Psychiatric:         Mood and Affect: Mood normal.         Behavior: Behavior normal.       Diagnoses and health risks identified today and associated recommendations/orders:    1. Encounter for preventive health examination    Counseled on age appropriate medical preventative services including age appropriate cancer screenings, age appropriate eye and dental exams, over all  nutritional health, need for a consistent exercise regimen, and an over all push towards maintaining a vigorous and active lifestyle.  Counseled on age appropriate vaccines and discussed upcoming health care needs based on age/gender. Discussed good sleep hygiene and stress management.    2. Advanced care planning/counseling discussion    I offered to discuss advanced care planning, including how to pick a person who would make decisions for you if you were unable to make them for yourself, called a health care power of , and what kind of decisions you might make such as use of life sustaining treatments such as ventilators and tube feeding when faced with a life limiting illness recorded on a living will that they will need to know. (How you want to be cared for as you near the end of your natural life)     X Patient is interested in learning more about how to make advanced directives.  I provided them paperwork and offered to discuss this with them.    3. Psychophysiological insomnia    Unchanged, no longer taking Trazodone. Would like to try something mild to help her sleep. PRN Vistaril ordered.     - hydrOXYzine pamoate (VISTARIL) 25 MG Cap; Take 1 capsule (25 mg total) by mouth nightly as needed (insomnia).  Dispense: 30 capsule; Refill: 0    4. Essential hypertension    Stable on amlodipine and metoprolol, managed by PCP.     5. Atherosclerosis of aorta    Stable on Lipitor, managed by PCP.     6. Age-related osteoporosis without current pathological fracture    Stable, not currently taking Fosamax. Due for DEXA scan, will order today.     - DXA Bone Density Axial Skeleton 1 or more sites; Future    7. Need for COVID-19 vaccine    - COVID-19 (Pfizer) 30 mcg/0.3 mL IM vaccine (>/= 13 yo) 0.3 mL    8. Flu vaccine need    - influenza (adjuvanted) (Fluad) 45 mcg/0.5 mL IM vaccine (> or = 64 yo) 0.5 mL      Provided Diana with a 5-10 year written screening schedule and personal prevention plan.  Recommendations were developed using the USPSTF age appropriate recommendations. Education, counseling, and referrals were provided as needed.  After Visit Summary printed and given to patient which includes a list of additional screenings\tests needed.    Follow up if symptoms worsen or fail to improve.      BRENDA Haque

## 2025-02-11 DIAGNOSIS — F51.04 PSYCHOPHYSIOLOGICAL INSOMNIA: ICD-10-CM

## 2025-02-11 RX ORDER — IBUPROFEN 800 MG/1
800 TABLET ORAL 2 TIMES DAILY PRN
Qty: 60 TABLET | Refills: 0 | Status: SHIPPED | OUTPATIENT
Start: 2025-02-11

## 2025-02-11 RX ORDER — HYDROXYZINE PAMOATE 25 MG/1
25 CAPSULE ORAL NIGHTLY PRN
Qty: 30 CAPSULE | Refills: 0 | Status: CANCELLED | OUTPATIENT
Start: 2025-02-11 | End: 2025-03-13

## 2025-02-11 NOTE — TELEPHONE ENCOUNTER
----- Message from Dustin sent at 2/11/2025  1:32 PM CST -----  Regarding: self  Type: RX Refill Request    Who called:self  Have you contacted your pharmacy:yes  Refill or New Rx:new  RX name and strength:hydrOXYzine pamoate (VISTARIL) 25 MG Cap    Preferred pharmacy and phone number:  [facility]/pharmacy #[id number] - [place] - [address]  [address]  [place] [address]  Phone: [phone] Fax: [phone]      Ordering provider:harley juarez  Would the patient rather a call back or a response via [facility]:call  Best call back number:[phone]    Additional information:

## 2025-02-12 DIAGNOSIS — F51.04 PSYCHOPHYSIOLOGICAL INSOMNIA: Primary | ICD-10-CM

## 2025-02-12 RX ORDER — HYDROXYZINE HYDROCHLORIDE 25 MG/1
25 TABLET, FILM COATED ORAL NIGHTLY PRN
Qty: 90 TABLET | Refills: 0 | Status: SHIPPED | OUTPATIENT
Start: 2025-02-12

## 2025-02-13 ENCOUNTER — TELEPHONE (OUTPATIENT)
Dept: PULMONOLOGY | Facility: CLINIC | Age: 80
End: 2025-02-13
Payer: MEDICARE

## 2025-02-13 DIAGNOSIS — R91.1 PULMONARY NODULE: Primary | ICD-10-CM

## 2025-02-13 LAB
FINAL PATHOLOGIC DIAGNOSIS: NORMAL
Lab: NORMAL

## 2025-02-13 NOTE — TELEPHONE ENCOUNTER
----- Message from Garrett Farooq MD sent at 2/13/2025  2:22 PM CST -----  Can I see her in May with a repeat Ct chest   Orders placed.   Mike,   D

## 2025-02-13 NOTE — PROGRESS NOTES
Discussed results of BAL cyto with path as delay in release.. Negative for malignancy.   Cultures remain negative.   As stated in procedure note, nodule was unable to be identified at time of robotic bronchoscopy. No avidity on NM PET..   At this point will stick with observation. Plan for repeat CT chest in May. Clinic visit to review cultures and Ct at that time.   Discussed with patient by phone.     Garrett Farooq MD   [facility]

## 2025-02-13 NOTE — TELEPHONE ENCOUNTER
Patient scheduled for 3mth f/u on 5/26/25 at 10am with Dr Farooq with ct scan prior at 9am at imaging center. Appointment mailed.

## 2025-03-01 DIAGNOSIS — K21.9 GASTROESOPHAGEAL REFLUX DISEASE WITHOUT ESOPHAGITIS: Chronic | ICD-10-CM

## 2025-03-01 DIAGNOSIS — E78.5 HYPERLIPIDEMIA, UNSPECIFIED HYPERLIPIDEMIA TYPE: ICD-10-CM

## 2025-03-01 DIAGNOSIS — I10 ESSENTIAL HYPERTENSION: Chronic | ICD-10-CM

## 2025-03-01 RX ORDER — ATORVASTATIN CALCIUM 40 MG/1
40 TABLET, FILM COATED ORAL
Qty: 90 TABLET | Refills: 0 | Status: SHIPPED | OUTPATIENT
Start: 2025-03-01

## 2025-03-01 RX ORDER — PANTOPRAZOLE SODIUM 40 MG/1
40 TABLET, DELAYED RELEASE ORAL
Qty: 90 TABLET | Refills: 3 | Status: SHIPPED | OUTPATIENT
Start: 2025-03-01

## 2025-03-01 RX ORDER — AMLODIPINE BESYLATE 10 MG/1
10 TABLET ORAL
Qty: 90 TABLET | Refills: 3 | Status: SHIPPED | OUTPATIENT
Start: 2025-03-01

## 2025-03-01 NOTE — TELEPHONE ENCOUNTER
Care Due:                  Date            Visit Type   Department     Provider  --------------------------------------------------------------------------------                                EP -                              PRIMARY      [facility]  Last Visit: 12-      CARE ([facility])   MEDICINE       Luisito Marin  Next Visit: None Scheduled  None         None Found                                                            Last  Test          Frequency    Reason                     Performed    Due Date  --------------------------------------------------------------------------------    Lipid Panel.  12 months..  atorvastatin.............  03- 03-    Mg Level....  12 months..  alendronate..............  Not Found    Overdue    Phosphate...  12 months..  alendronate..............  Not Found    Overdue    Vitamin D...  12 months..  ergocalciferol...........  Not Found    Overdue    [employer] Embedded Care Due Messages. Reference number: [id number].   3/01/2025 7:33:20 AM CST

## 2025-03-01 NOTE — TELEPHONE ENCOUNTER
Provider Staff:  Action required for this patient    Requires labs      Please see care gap opportunities below in Care Due Message.    Thanks!  [facility]     Appointments      Date Provider   Last Visit   12/9/2024 Luisito Marin MD   Next Visit   Visit date not found Luisito Marin MD     Refill Decision Note   Diana Herring  is requesting a refill authorization.  Brief Assessment and Rationale for Refill:  Approve     Medication Therapy Plan:         Comments:     Note composed:11:39 AM 03/01/2025

## 2025-03-10 RX ORDER — IBUPROFEN 800 MG/1
800 TABLET ORAL 2 TIMES DAILY PRN
Qty: 60 TABLET | Refills: 0 | Status: SHIPPED | OUTPATIENT
Start: 2025-03-10

## 2025-04-09 RX ORDER — IBUPROFEN 800 MG/1
800 TABLET ORAL 2 TIMES DAILY PRN
Qty: 60 TABLET | Refills: 0 | Status: SHIPPED | OUTPATIENT
Start: 2025-04-09

## 2025-04-17 ENCOUNTER — HOSPITAL ENCOUNTER (OUTPATIENT)
Dept: RADIOLOGY | Facility: CLINIC | Age: 80
Discharge: HOME | End: 2025-04-17
Payer: MEDICARE

## 2025-04-17 DIAGNOSIS — M81.0 AGE-RELATED OSTEOPOROSIS WITHOUT CURRENT PATHOLOGICAL FRACTURE: Chronic | ICD-10-CM

## 2025-04-17 PROCEDURE — 77080 DXA BONE DENSITY AXIAL: CPT | Mod: TC,PO

## 2025-04-24 ENCOUNTER — RESULTS FOLLOW-UP (OUTPATIENT)
Dept: INTERNAL MEDICINE | Facility: CLINIC | Age: 80
End: 2025-04-24

## 2025-04-24 DIAGNOSIS — M81.0 AGE-RELATED OSTEOPOROSIS WITHOUT CURRENT PATHOLOGICAL FRACTURE: Chronic | ICD-10-CM

## 2025-04-24 RX ORDER — ALENDRONATE SODIUM 70 MG/1
TABLET ORAL
Qty: 12 TABLET | Refills: 1 | Status: CANCELLED | OUTPATIENT
Start: 2025-04-24

## 2025-04-24 NOTE — TELEPHONE ENCOUNTER
It would actually be preferred that we try a medication called Prolia which is an injection she would receive every 6 months. I am not sure if her insurance would cover this, but I can try to get the process started if she is interested in this option.     Thanks!  Bainca BOLES

## 2025-04-24 NOTE — TELEPHONE ENCOUNTER
Please let the patient know that her bone density continues to show osteoporosis with an elevated risk of fracture. Is she still taking the Fosamax for her bones?    Thanks!  Bianca BOLES

## 2025-04-29 DIAGNOSIS — I10 ESSENTIAL HYPERTENSION: Chronic | ICD-10-CM

## 2025-04-29 RX ORDER — METOPROLOL SUCCINATE 100 MG/1
100 TABLET, EXTENDED RELEASE ORAL
Qty: 90 TABLET | Refills: 2 | Status: SHIPPED | OUTPATIENT
Start: 2025-04-29

## 2025-04-29 NOTE — TELEPHONE ENCOUNTER
Refill Decision Note   Dianaemanuel Herring  is requesting a refill authorization.  Brief Assessment and Rationale for Refill:  Approve     Medication Therapy Plan:         Comments:     Note composed:6:25 AM 04/29/2025

## 2025-04-29 NOTE — TELEPHONE ENCOUNTER
No care due was identified.  [facility] Embedded Care Due Messages. Reference number: [id number].   4/29/2025 12:31:50 AM CDT

## 2025-05-09 ENCOUNTER — TELEPHONE (OUTPATIENT)
Dept: INTERNAL MEDICINE | Facility: CLINIC | Age: 80
End: 2025-05-09
Payer: MEDICARE

## 2025-05-09 ENCOUNTER — TELEPHONE (OUTPATIENT)
Dept: INFECTIOUS DISEASES | Facility: HOSPITAL | Age: 80
End: 2025-05-09
Payer: MEDICARE

## 2025-05-09 DIAGNOSIS — F51.04 PSYCHOPHYSIOLOGICAL INSOMNIA: ICD-10-CM

## 2025-05-09 DIAGNOSIS — M81.0 AGE-RELATED OSTEOPOROSIS WITHOUT CURRENT PATHOLOGICAL FRACTURE: Primary | ICD-10-CM

## 2025-05-09 NOTE — TELEPHONE ENCOUNTER
Received call from Pt and Pt's granddaughter about scheduling Prolia. We don't have any orders in so I let them know I would reach out to her doctor's office to place them and once she's approved by her insurance, we would call her back to schedule

## 2025-05-09 NOTE — TELEPHONE ENCOUNTER
----- Message from Nurse Mahmood sent at 5/9/2025 11:07 AM CDT -----    ----- Message -----  From: May Ly  Sent: 5/9/2025  10:38 AM CDT  To: Christophe Moran Staff    Who called:Candida is the request in detail: states she had a bone density test and received a message regarding a medication needed would like to know the status and if it was sent in Can the clinic reply by [facility]?no Would the patient rather a call back or a response via [facility]?call Best call back number:.[phone] Information:

## 2025-05-09 NOTE — TELEPHONE ENCOUNTER
Called and spoke to patient about scheduling an appointment for her Prolia. Gave patient the number to the office to schedule to get the shot in the infusion center.

## 2025-05-09 NOTE — TELEPHONE ENCOUNTER
----- Message from Margo sent at 5/9/2025 11:46 AM CDT -----  Contact: [phone] Pt  Patient is returning a phone call.Pt  Who left a message for the patient:Nurse  Does patient know what this is regarding:   Would you like a call back, or a response through your [facility] portal?:Call back  Comments:Pt just missed nurse call pt would like a call back from nurse

## 2025-05-12 RX ORDER — IBUPROFEN 800 MG/1
800 TABLET, FILM COATED ORAL 2 TIMES DAILY PRN
Qty: 60 TABLET | Refills: 0 | Status: SHIPPED | OUTPATIENT
Start: 2025-05-12

## 2025-05-12 RX ORDER — HYDROXYZINE HYDROCHLORIDE 25 MG/1
TABLET, FILM COATED ORAL
Qty: 90 TABLET | Refills: 0 | Status: SHIPPED | OUTPATIENT
Start: 2025-05-12

## 2025-05-13 ENCOUNTER — TELEPHONE (OUTPATIENT)
Dept: INTERNAL MEDICINE | Facility: CLINIC | Age: 80
End: 2025-05-13
Payer: MEDICARE

## 2025-05-13 NOTE — TELEPHONE ENCOUNTER
----- Message from Natasha sent at 5/13/2025  2:39 PM CDT -----  Contact: [phone]  Patient is returning a phone call.Who left a message for the patient: Minda Ramos MADoes patient know what this is regarding:  scheduling injection due to low bone density.Would you like a call back, or a response through your [facility] portal?:   callComments:

## 2025-05-13 NOTE — TELEPHONE ENCOUNTER
"Called pt to schedule her for an appointment to discuss treatment options.    "She failed Fosamax (progression of bone loss while on therapy).      Will see about getting her in to discuss IV therapy first.     Minda, can we see about getting this patient in with me or Bianca to discuss treatment options."  "

## 2025-05-15 ENCOUNTER — OFFICE VISIT (OUTPATIENT)
Dept: INTERNAL MEDICINE | Facility: CLINIC | Age: 80
End: 2025-05-15
Payer: MEDICARE

## 2025-05-15 ENCOUNTER — LAB VISIT (OUTPATIENT)
Dept: LAB | Facility: HOSPITAL | Age: 80
End: 2025-05-15
Payer: MEDICARE

## 2025-05-15 VITALS
OXYGEN SATURATION: 99 % | DIASTOLIC BLOOD PRESSURE: 70 MMHG | SYSTOLIC BLOOD PRESSURE: 142 MMHG | HEIGHT: 60 IN | HEART RATE: 60 BPM | WEIGHT: 138.69 LBS | BODY MASS INDEX: 27.23 KG/M2

## 2025-05-15 DIAGNOSIS — I70.0 ATHEROSCLEROSIS OF AORTA: Chronic | ICD-10-CM

## 2025-05-15 DIAGNOSIS — I10 ESSENTIAL HYPERTENSION: Chronic | ICD-10-CM

## 2025-05-15 DIAGNOSIS — K21.9 GASTROESOPHAGEAL REFLUX DISEASE, UNSPECIFIED WHETHER ESOPHAGITIS PRESENT: Chronic | ICD-10-CM

## 2025-05-15 DIAGNOSIS — M81.0 AGE-RELATED OSTEOPOROSIS WITHOUT CURRENT PATHOLOGICAL FRACTURE: Primary | Chronic | ICD-10-CM

## 2025-05-15 DIAGNOSIS — M81.0 AGE-RELATED OSTEOPOROSIS WITHOUT CURRENT PATHOLOGICAL FRACTURE: Chronic | ICD-10-CM

## 2025-05-15 LAB
25(OH)D3+25(OH)D2 SERPL-MCNC: 52 NG/ML (ref 30–96)
ALBUMIN SERPL BCP-MCNC: 4.2 G/DL (ref 3.5–5.2)
ALP SERPL-CCNC: 83 UNIT/L (ref 40–150)
ALT SERPL W/O P-5'-P-CCNC: 17 UNIT/L (ref 10–44)
ANION GAP (OHS): 9 MMOL/L (ref 8–16)
AST SERPL-CCNC: 19 UNIT/L (ref 11–45)
BILIRUB SERPL-MCNC: 0.9 MG/DL (ref 0.1–1)
BUN SERPL-MCNC: 14 MG/DL (ref 8–23)
CALCIUM SERPL-MCNC: 9.8 MG/DL (ref 8.7–10.5)
CHLORIDE SERPL-SCNC: 108 MMOL/L (ref 95–110)
CHOLEST SERPL-MCNC: 144 MG/DL (ref 120–199)
CHOLEST/HDLC SERPL: 3.1 {RATIO} (ref 2–5)
CO2 SERPL-SCNC: 28 MMOL/L (ref 23–29)
CREAT SERPL-MCNC: 0.8 MG/DL (ref 0.5–1.4)
GFR SERPLBLD CREATININE-BSD FMLA CKD-EPI: >60 ML/MIN/1.73/M2
GLUCOSE SERPL-MCNC: 96 MG/DL (ref 70–110)
HDLC SERPL-MCNC: 46 MG/DL (ref 40–75)
HDLC SERPL: 31.9 % (ref 20–50)
LDLC SERPL CALC-MCNC: 77.6 MG/DL (ref 63–159)
NONHDLC SERPL-MCNC: 98 MG/DL
POTASSIUM SERPL-SCNC: 4.4 MMOL/L (ref 3.5–5.1)
PROT SERPL-MCNC: 8.3 GM/DL (ref 6–8.4)
SODIUM SERPL-SCNC: 145 MMOL/L (ref 136–145)
TRIGL SERPL-MCNC: 102 MG/DL (ref 30–150)

## 2025-05-15 PROCEDURE — 3288F FALL RISK ASSESSMENT DOCD: CPT | Mod: CPTII,S$GLB,, | Performed by: NURSE PRACTITIONER

## 2025-05-15 PROCEDURE — 36415 COLL VENOUS BLD VENIPUNCTURE: CPT

## 2025-05-15 PROCEDURE — 1125F AMNT PAIN NOTED PAIN PRSNT: CPT | Mod: CPTII,S$GLB,, | Performed by: NURSE PRACTITIONER

## 2025-05-15 PROCEDURE — 3077F SYST BP >= 140 MM HG: CPT | Mod: CPTII,S$GLB,, | Performed by: NURSE PRACTITIONER

## 2025-05-15 PROCEDURE — 1159F MED LIST DOCD IN RCRD: CPT | Mod: CPTII,S$GLB,, | Performed by: NURSE PRACTITIONER

## 2025-05-15 PROCEDURE — 99999 PR PBB SHADOW E&M-EST. PATIENT-LVL IV: CPT | Mod: PBBFAC,,, | Performed by: NURSE PRACTITIONER

## 2025-05-15 PROCEDURE — 80061 LIPID PANEL: CPT

## 2025-05-15 PROCEDURE — 1160F RVW MEDS BY RX/DR IN RCRD: CPT | Mod: CPTII,S$GLB,, | Performed by: NURSE PRACTITIONER

## 2025-05-15 PROCEDURE — 80053 COMPREHEN METABOLIC PANEL: CPT

## 2025-05-15 PROCEDURE — 82306 VITAMIN D 25 HYDROXY: CPT

## 2025-05-15 PROCEDURE — 3078F DIAST BP <80 MM HG: CPT | Mod: CPTII,S$GLB,, | Performed by: NURSE PRACTITIONER

## 2025-05-15 PROCEDURE — 1101F PT FALLS ASSESS-DOCD LE1/YR: CPT | Mod: CPTII,S$GLB,, | Performed by: NURSE PRACTITIONER

## 2025-05-15 PROCEDURE — 99214 OFFICE O/P EST MOD 30 MIN: CPT | Mod: S$GLB,,, | Performed by: NURSE PRACTITIONER

## 2025-05-15 RX ORDER — SODIUM CHLORIDE 0.9 % (FLUSH) 0.9 %
10 SYRINGE (ML) INJECTION
OUTPATIENT
Start: 2025-05-15

## 2025-05-15 RX ORDER — ZOLEDRONIC ACID 5 MG/100ML
5 INJECTION, SOLUTION INTRAVENOUS
OUTPATIENT
Start: 2025-05-15

## 2025-05-15 RX ORDER — HEPARIN 100 UNIT/ML
500 SYRINGE INTRAVENOUS
OUTPATIENT
Start: 2025-05-15

## 2025-05-15 NOTE — PROGRESS NOTES
History of Present Illness   Diana Herring is a 80 y.o. woman with medical history as listed below who presents today to discuss treatment of osteoporosis. She had bone density scan with osteoporosis with elevated fracture risk. She has tried Fosamax in the past with significant SE. We attempted to order Prolia which was denied by her insurance. She is agreeable to treatment with Reclast.    Past Medical History:   Diagnosis Date    Anxiety     Arthritis     Corneal abrasion s    ? eye     Depression     Full dentures     GERD (gastroesophageal reflux disease)     Hyperlipidemia     Hypertension     Nuclear sclerosis of both eyes 6/5/2017    Osteoporosis     Restless leg syndrome        Past Surgical History:   Procedure Laterality Date    COLONOSCOPY N/A 5/14/2019    Procedure: COLONOSCOPY;  Surgeon: Nahid Cline MD;  Location: [facility] ENDO;  Service: Endoscopy;  Laterality: N/A;    COLONOSCOPY N/A 12/12/2024    Procedure: COLONOSCOPY;  Surgeon: Betty Hurst MD;  Location: [facility] ENDO;  Service: Endoscopy;  Laterality: N/A;  12/10 ref by Bianca Matute NP, PEG, emailed to [email or web address]    ENDOBRONCHIAL ULTRASOUND N/A 1/31/2025    Procedure: ENDOBRONCHIAL ULTRASOUND (EBUS);  Surgeon: Garrett Farooq MD;  Location: [facility] OR [address] [place] [address];  Service: Pulmonary;  Laterality: N/A;    EPIDURAL STEROID INJECTION N/A 5/13/2020    Procedure: LUMBAR/CAUDAL L5/S1 IL SANKET ;  Surgeon: Miguel Latham MD;  Location: [facility] PAIN MGT;  Service: Pain Management;  Laterality: N/A;  NEEDS CONSENT    EPIDURAL STEROID INJECTION N/A 8/30/2023    Procedure: INJECTION, STEROID, EPIDURAL, CAUDAL W/ CATH SOONER DATE;  Surgeon: Miguel Latham MD;  Location: [facility] PAIN MGT;  Service: Pain Management;  Laterality: N/A;    EPIDURAL STEROID INJECTION N/A 1/31/2024    Procedure: CAUDAL SANKET;  Surgeon: Miguel Latham MD;  Location: [facility] PAIN MGT;  Service: Pain Management;  Laterality: N/A;  [phone]  4 WK F/U  LOLIS    EPIDURAL STEROID INJECTION N/A 9/4/2024    Procedure: CAUDAL SANKET;  Surgeon: Miguel Latham MD;  Location: [facility] PAIN MGT;  Service: Pain Management;  Laterality: N/A;  [phone]  2 WK F/U KAREN    HYSTERECTOMY      PARTIAL HYSTERECTOMY      ROBOTIC BRONCHOSCOPY N/A 1/31/2025    Procedure: ROBOTIC BRONCHOSCOPY;  Surgeon: Garrett Farooq MD;  Location: [facility] OR 2ND FLR;  Service: Pulmonary;  Laterality: N/A;    TRANSFORAMINAL EPIDURAL INJECTION OF STEROID Bilateral 9/4/2019    Procedure: Injection,steroid,epidural,transforaminal approach LUMBAR TRANSFORAMINAL BILATERAL L3 TF SANKET;  Surgeon: Miguel Latham MD;  Location: [facility] PAIN MGT;  Service: Pain Management;  Laterality: Bilateral;  NEEDS CONSENT    TRANSFORAMINAL EPIDURAL INJECTION OF STEROID Bilateral 10/30/2019    Procedure: TRANSFORAMINAL SANKET BILATERAL L3-L4;  Surgeon: Miguel Latham MD;  Location: [facility] PAIN MGT;  Service: Pain Management;  Laterality: Bilateral;  NEEDS CONSENT    TRANSFORAMINAL EPIDURAL INJECTION OF STEROID Bilateral 11/11/2020    Procedure: INJECTION, STEROID, EPIDURAL, TRANSFORAMINAL APPROACH, L3-L4;  Surgeon: Miguel Latham MD;  Location: [facility] PAIN MGT;  Service: Pain Management;  Laterality: Bilateral;    TRANSFORAMINAL EPIDURAL INJECTION OF STEROID Bilateral 5/19/2021    Procedure: INJECTION, STEROID, EPIDURAL, TRANSFORAMINAL APPROACH, L3-L4;  Surgeon: Miguel Latham MD;  Location: [facility] PAIN MGT;  Service: Pain Management;  Laterality: Bilateral;    TRANSFORAMINAL EPIDURAL INJECTION OF STEROID Bilateral 1/26/2022    Procedure: Injection,steroid,epidural,transforaminal approach BILATERAL L3/4;  Surgeon: Miguel Latham MD;  Location: [facility] PAIN MGT;  Service: Pain Management;  Laterality: Bilateral;    TRANSFORAMINAL EPIDURAL INJECTION OF STEROID Bilateral 4/20/2022    Procedure: INJECTION, STEROID, EPIDURAL, TRANSFORAMINAL APPROACH, Bilateral L3-L4;  Surgeon: Miguel Latham MD;  Location: [facility]  PAIN MGT;  Service: Pain Management;  Laterality: Bilateral;    TRANSFORAMINAL EPIDURAL INJECTION OF STEROID Bilateral 2022    Procedure: INJECTION, STEROID, EPIDURAL, TRANSFORAMINAL APPROACH, BILATERAL L3-L4 CONTRAST;  Surgeon: Miguel Latham MD;  Location: [facility] PAIN MGT;  Service: Pain Management;  Laterality: Bilateral;    TRANSFORAMINAL EPIDURAL INJECTION OF STEROID Right 2022    Procedure: INJECTION, STEROID, EPIDURAL, TRANSFORAMINAL APPROACH, RIGHT L3/L4 AND L4/L5 CONTRAST;  Surgeon: Miguel Latham MD;  Location: [facility] PAIN MGT;  Service: Pain Management;  Laterality: Right;    TRANSFORAMINAL EPIDURAL INJECTION OF STEROID Right 2023    Procedure: INJECTION, STEROID, EPIDURAL, TRANSFORAMINAL APPROACH RIGHT L3/4 AND L4/5 CONTRAST;  Surgeon: Miguel Latham MD;  Location: [facility] PAIN MGT;  Service: Pain Management;  Laterality: Right;    TUBAL LIGATION Bilateral        Social History     Socioeconomic History    Marital status:    Tobacco Use    Smoking status: Never     Passive exposure: Never    Smokeless tobacco: Never   Substance and Sexual Activity    Alcohol use: Not Currently     Comment: once a year    Drug use: No    Sexual activity: Not Currently   Social History Narrative    Patient is  w/ 5 children, one  from heart disease.The patient is retired.     Social Drivers of Health     Financial Resource Strain: Medium Risk (2025)    Overall Financial Resource Strain (CARDIA)     Difficulty of Paying Living Expenses: Somewhat hard   Food Insecurity: Food Insecurity Present (2025)    Hunger Vital Sign     Worried About Running Out of Food in the Last Year: Sometimes true     Ran Out of Food in the Last Year: Sometimes true   Transportation Needs: No Transportation Needs (2025)    PRAPARE - Transportation     Lack of Transportation (Medical): No     Lack of Transportation (Non-Medical): No   Physical Activity: Inactive (2025)    Exercise Vital Sign      Days of Exercise per Week: 0 days     Minutes of Exercise per Session: 0 min   Stress: Stress Concern Present (2/5/2025)    [employer] - Occupational Stress Questionnaire     Feeling of Stress : To some extent   Housing Stability: Low Risk  (4/14/2025)    Housing Stability Vital Sign     Unable to Pay for Housing in the Last Year: No     Number of Times Moved in the Last Year: 0     Homeless in the Last Year: No       Family History   Problem Relation Name Age of Onset    Diabetes Mother      Stroke Mother      Glaucoma Mother      Cataracts Mother      Cancer Father          Lung    No Known Problems Sister      Stroke Brother      Hypertension Daughter Lois     Hypertension Daughter Catherine     Hypertension Daughter Kyra     No Known Problems Daughter Dyaca     Heart disease Son      Early death Son      No Known Problems Maternal Aunt      No Known Problems Maternal Uncle      No Known Problems Paternal Aunt      No Known Problems Paternal Uncle      No Known Problems Maternal Grandmother      No Known Problems Maternal Grandfather      No Known Problems Paternal Grandmother      No Known Problems Paternal Grandfather      Amblyopia Neg Hx      Blindness Neg Hx      Macular degeneration Neg Hx      Retinal detachment Neg Hx      Strabismus Neg Hx      Thyroid disease Neg Hx         Review of Systems  Review of Systems   Constitutional:  Negative for malaise/fatigue and weight loss.   Eyes:  Negative for blurred vision and double vision.   Respiratory:  Negative for shortness of breath.    Cardiovascular:  Negative for chest pain, palpitations, orthopnea, claudication, leg swelling and PND.   Gastrointestinal:  Negative for blood in stool and melena.   Genitourinary:  Negative for flank pain and hematuria.   Neurological:  Negative for dizziness, loss of consciousness and headaches.   Endo/Heme/Allergies:  Negative for polydipsia.       A complete review of systems was  otherwise negative.    Physical Exam  BP (!) 142/70   Pulse 60   Ht 5' (1.524 m)   Wt 62.9 kg (138 lb 10.7 oz)   SpO2 99%   BMI 27.08 kg/m²   General appearance: alert, appears stated age, cooperative, and no distress  Back: symmetric, no curvature. ROM normal. No CVA tenderness.  Lungs: clear to auscultation bilaterally  Heart: regular rate and rhythm, S1, S2 normal, no murmur, click, rub or gallop  Neurologic: Grossly normal    Assessment/Plan  1. Age-related osteoporosis without current pathological fracture  Agreeable to Reclast, ordered today.  Did not tolerate Fosamax and Prolia was denied by insurance.  Is taking calcium and vitamin D.  Check labs as below.  Overview:  Did not tolerate weekly fosamax    Orders:  -     Comprehensive Metabolic Panel; Future; Expected date: 05/15/2025  -     Vitamin D; Future; Expected date: 05/15/2025    2. Atherosclerosis of aorta  Stable. Monitor. Statin therapy.  Overview:  Stable, asymptomatic chronic condition.  Will continue to maximize risk factor reduction and adjust medication as needed.     Orders:  -     Comprehensive Metabolic Panel; Future; Expected date: 05/15/2025  -     LIPID PANEL; Future; Expected date: 05/15/2025    3. Essential hypertension  BP elevated today.  Nurse BP check in 2 weeks.  If remains elevated, would consider low dose ARB in addition to CCB.  -     Comprehensive Metabolic Panel; Future; Expected date: 05/15/2025    4. Gastroesophageal reflux disease, unspecified whether esophagitis present  Check labs as below.  PPI therapy.  -     Comprehensive Metabolic Panel; Future; Expected date: 05/15/2025    Other orders  -     zoledronic acid-mannitol & water 5 mg/100 mL infusion 5 mg  -     heparin, porcine (PF) 100 unit/mL injection flush 500 Units  -     sodium chloride 0.9% flush 10 mL  -     0.9% NaCl 100 mL flush bag    Patient has verbalized understanding and is in agreement with plan of care.    Follow up in about 2 weeks (around  5/29/2025) for nurse BP check.

## 2025-05-16 ENCOUNTER — RESULTS FOLLOW-UP (OUTPATIENT)
Dept: INTERNAL MEDICINE | Facility: CLINIC | Age: 80
End: 2025-05-16

## 2025-05-22 ENCOUNTER — DOCUMENTATION ONLY (OUTPATIENT)
Dept: INTERNAL MEDICINE | Facility: CLINIC | Age: 80
End: 2025-05-22
Payer: MEDICARE

## 2025-05-22 ENCOUNTER — TELEPHONE (OUTPATIENT)
Dept: INTERNAL MEDICINE | Facility: CLINIC | Age: 80
End: 2025-05-22
Payer: MEDICARE

## 2025-05-26 ENCOUNTER — OFFICE VISIT (OUTPATIENT)
Dept: PULMONOLOGY | Facility: CLINIC | Age: 80
End: 2025-05-26
Payer: MEDICARE

## 2025-05-26 ENCOUNTER — HOSPITAL ENCOUNTER (OUTPATIENT)
Dept: RADIOLOGY | Facility: HOSPITAL | Age: 80
Discharge: HOME | End: 2025-05-26
Attending: EMERGENCY MEDICINE
Payer: MEDICARE

## 2025-05-26 VITALS
OXYGEN SATURATION: 99 % | BODY MASS INDEX: 28.05 KG/M2 | HEART RATE: 63 BPM | WEIGHT: 142.88 LBS | DIASTOLIC BLOOD PRESSURE: 74 MMHG | SYSTOLIC BLOOD PRESSURE: 175 MMHG | HEIGHT: 60 IN

## 2025-05-26 DIAGNOSIS — R91.1 PULMONARY NODULE 1 CM OR GREATER IN DIAMETER: Primary | ICD-10-CM

## 2025-05-26 DIAGNOSIS — R91.1 PULMONARY NODULE: ICD-10-CM

## 2025-05-26 PROCEDURE — 3077F SYST BP >= 140 MM HG: CPT | Mod: CPTII,S$GLB,, | Performed by: EMERGENCY MEDICINE

## 2025-05-26 PROCEDURE — 3288F FALL RISK ASSESSMENT DOCD: CPT | Mod: CPTII,S$GLB,, | Performed by: EMERGENCY MEDICINE

## 2025-05-26 PROCEDURE — 1101F PT FALLS ASSESS-DOCD LE1/YR: CPT | Mod: CPTII,S$GLB,, | Performed by: EMERGENCY MEDICINE

## 2025-05-26 PROCEDURE — 1160F RVW MEDS BY RX/DR IN RCRD: CPT | Mod: CPTII,S$GLB,, | Performed by: EMERGENCY MEDICINE

## 2025-05-26 PROCEDURE — 3078F DIAST BP <80 MM HG: CPT | Mod: CPTII,S$GLB,, | Performed by: EMERGENCY MEDICINE

## 2025-05-26 PROCEDURE — 1159F MED LIST DOCD IN RCRD: CPT | Mod: CPTII,S$GLB,, | Performed by: EMERGENCY MEDICINE

## 2025-05-26 PROCEDURE — 99213 OFFICE O/P EST LOW 20 MIN: CPT | Mod: S$GLB,,, | Performed by: EMERGENCY MEDICINE

## 2025-05-26 PROCEDURE — 71250 CT THORAX DX C-: CPT | Mod: 26,,, | Performed by: RADIOLOGY

## 2025-05-26 PROCEDURE — 99999 PR PBB SHADOW E&M-EST. PATIENT-LVL III: CPT | Mod: PBBFAC,,, | Performed by: EMERGENCY MEDICINE

## 2025-05-26 PROCEDURE — 71250 CT THORAX DX C-: CPT | Mod: TC

## 2025-05-26 NOTE — PROGRESS NOTES
Pulmonary & Critical Care Medicine   Consultation Note     Reason for Consultation:Robotic. Pulmonary nodule     HPI: 80 y/o female with HTN, HLD- PCP Dr. Marin-    Chest: [id number] spot in right upper lung lobe; small area of atelectasis  -  Abdomen: Simple renal cysts, gallbladder sludge, thickened area in upper right colon  - Differential for lung spot includes possible malignancy; requires further evaluation  - Abdominal symptoms likely due to viral gastroenteritis, but thickened colon area warrants investigation  - Plan to expedite workup with e-consults to pulmonology and GI specialists     Seen as E-consult by Dr. Morales and subsequently referred to me for evaluation of pulmonary nodule- Initial imaging triggered by ED visit- abdominal pain, left lower quadrant and suprapubic as well as diarrhea x3 days. Patient tried Pepto-Bismol. Notes diarrhea has somewhat improved. Pain is also somewhat improved. Denies fevers, chills. Notes mild nausea. Notes sick contacts with similar symptoms.      Today, she feels good. Diarrhea and stomach issues improved.   Reports cough at time of ED visit. No resolved. No Systemic features  Never tobacco.   No additional complaints.   No old chest imaging for comparison.         Additional Pulmonary History:   Occupational/Environmental Exposures:From [employer]. 4 daughter and 1 boy.   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   Exposure to Animals/Pets: None   Travel History: [place]   History of exposures to TB: All PPD in past negative. No Known exposures.   Family History of Lung Cancer: Maybe DAD   Childhood history of Lung Disease:None   OAC/Anti-PLT- None- Ibuprofen daily.   Chest suregry/Trauma- None   GLP- None   PNA history- None   Tobacco use- Never       INTERVAL HISTORY-     -- Underwent robotic.. No nodule visualized,   -- Cyto from BAL negative. AFB/Fungal negative.   -- No avidity on NM PET- Observation continued.. Repeat CT scan obtained this AM.   -- From  respiratory standpoint no change.. No new complaints.              Past Medical History:   Diagnosis Date    Anxiety      Arthritis      Corneal abrasion s     ? eye     Depression      Full dentures      GERD (gastroesophageal reflux disease)      Hyperlipidemia      Hypertension      Nuclear sclerosis of both eyes 6/5/2017    Osteoporosis      Restless leg syndrome              Past Surgical History:   Procedure Laterality Date    COLONOSCOPY N/A 5/14/2019     Procedure: COLONOSCOPY;  Surgeon: Nahid Cline MD;  Location: [facility] ENDO;  Service: Endoscopy;  Laterality: N/A;    COLONOSCOPY N/A 12/12/2024     Procedure: COLONOSCOPY;  Surgeon: Betty Hurst MD;  Location: [facility] ENDO;  Service: Endoscopy;  Laterality: N/A;  12/10 ref by Biacna Matute NP, PEG, emailed to [email or web address]    EPIDURAL STEROID INJECTION N/A 5/13/2020     Procedure: LUMBAR/CAUDAL L5/S1 IL SANKET ;  Surgeon: Miguel Latham MD;  Location: [facility];  Service: Pain Management;  Laterality: N/A;  NEEDS CONSENT    EPIDURAL STEROID INJECTION N/A 8/30/2023     Procedure: INJECTION, STEROID, EPIDURAL, CAUDAL W/ CATH SOONER DATE;  Surgeon: Miguel Latham MD;  Location: [facility];  Service: Pain Management;  Laterality: N/A;    EPIDURAL STEROID INJECTION N/A 1/31/2024     Procedure: CAUDAL SANKET;  Surgeon: Miguel Latham MD;  Location: [facility];  Service: Pain Management;  Laterality: N/A;  [phone]  4 WK F/U LOLIS    EPIDURAL STEROID INJECTION N/A 9/4/2024     Procedure: CAUDAL SANKET;  Surgeon: Miguel Latham MD;  Location: [facility];  Service: Pain Management;  Laterality: N/A;  [phone]  2 WK F/U KAREN    HYSTERECTOMY        PARTIAL HYSTERECTOMY        TRANSFORAMINAL EPIDURAL INJECTION OF STEROID Bilateral 9/4/2019     Procedure: Injection,steroid,epidural,transforaminal approach LUMBAR TRANSFORAMINAL BILATERAL L3 TF SANKET;  Surgeon: Miguel Latham MD;  Location: [facility];  Service:  Pain Management;  Laterality: Bilateral;  NEEDS CONSENT    TRANSFORAMINAL EPIDURAL INJECTION OF STEROID Bilateral 10/30/2019     Procedure: TRANSFORAMINAL SANKET BILATERAL L3-L4;  Surgeon: Miguel Latham MD;  Location: [facility]GT;  Service: Pain Management;  Laterality: Bilateral;  NEEDS CONSENT    TRANSFORAMINAL EPIDURAL INJECTION OF STEROID Bilateral 11/11/2020     Procedure: INJECTION, STEROID, EPIDURAL, TRANSFORAMINAL APPROACH, L3-L4;  Surgeon: Miguel Latham MD;  Location: [facility]GT;  Service: Pain Management;  Laterality: Bilateral;    TRANSFORAMINAL EPIDURAL INJECTION OF STEROID Bilateral 5/19/2021     Procedure: INJECTION, STEROID, EPIDURAL, TRANSFORAMINAL APPROACH, L3-L4;  Surgeon: Miguel Latham MD;  Location: [facility]GT;  Service: Pain Management;  Laterality: Bilateral;    TRANSFORAMINAL EPIDURAL INJECTION OF STEROID Bilateral 1/26/2022     Procedure: Injection,steroid,epidural,transforaminal approach BILATERAL L3/4;  Surgeon: Miguel Latham MD;  Location: [facility]IN [facility]GT;  Service: Pain Management;  Laterality: Bilateral;    TRANSFORAMINAL EPIDURAL INJECTION OF STEROID Bilateral 4/20/2022     Procedure: INJECTION, STEROID, EPIDURAL, TRANSFORAMINAL APPROACH, Bilateral L3-L4;  Surgeon: Miguel Latham MD;  Location: [facility]GT;  Service: Pain Management;  Laterality: Bilateral;    TRANSFORAMINAL EPIDURAL INJECTION OF STEROID Bilateral 7/20/2022     Procedure: INJECTION, STEROID, EPIDURAL, TRANSFORAMINAL APPROACH, BILATERAL L3-L4 CONTRAST;  Surgeon: Miguel Latham MD;  Location: [facility];  Service: Pain Management;  Laterality: Bilateral;    TRANSFORAMINAL EPIDURAL INJECTION OF STEROID Right 12/7/2022     Procedure: INJECTION, STEROID, EPIDURAL, TRANSFORAMINAL APPROACH, RIGHT L3/L4 AND L4/L5 CONTRAST;  Surgeon: Miguel Latham MD;  Location: [facility];  Service: Pain Management;  Laterality: Right;    TRANSFORAMINAL EPIDURAL INJECTION OF STEROID Right 2/8/2023      Procedure: INJECTION, STEROID, EPIDURAL, TRANSFORAMINAL APPROACH RIGHT L3/4 AND L4/5 CONTRAST;  Surgeon: Miguel Latham MD;  Location: [facility];  Service: Pain Management;  Laterality: Right;    TUBAL LIGATION Bilateral        Social History:   Social History            Socioeconomic History    Marital status:    Tobacco Use    Smoking status: Never       Passive exposure: Never    Smokeless tobacco: Never   Substance and Sexual Activity    Alcohol use: Not Currently       Comment: once a year    Drug use: No    Sexual activity: Not Currently   Social History Narrative     Patient is  w/ 5 children, one  from heart disease.The patient is retired.             Family History   Problem Relation Name Age of Onset    Diabetes Mother        Stroke Mother        Glaucoma Mother        Cataracts Mother        Cancer Father             Lung    No Known Problems Sister        Stroke Brother        Hypertension Daughter Lois      Hypertension Daughter Catherine      Hypertension Daughter Kyra      No Known Problems Daughter Dyaca      Heart disease Son        Early death Son        No Known Problems Maternal Aunt        No Known Problems Maternal Uncle        No Known Problems Paternal Aunt        No Known Problems Paternal Uncle        No Known Problems Maternal Grandmother        No Known Problems Maternal Grandfather        No Known Problems Paternal Grandmother        No Known Problems Paternal Grandfather        Amblyopia Neg Hx        Blindness Neg Hx        Macular degeneration Neg Hx        Retinal detachment Neg Hx        Strabismus Neg Hx        Thyroid disease Neg Hx          Drug Allergies:   Review of patient's allergies indicates:  No Known Allergies       Review of Systems:   A comprehensive 12-point review of systems was performed, and is negative except for those items mentioned above in the HPI section of this note.      Vital Signs:  BP (!) 175/74 (BP Location: Right arm,  Patient Position: Sitting)   Pulse 63   Ht 5' (1.524 m)   Wt 64.8 kg (142 lb 13.7 oz)   SpO2 99%   BMI 27.90 kg/m²         Physical Exam:   GEN- NAD AAOx3 Well Built, Well Appearing   HEENT- ATNC, PERRLA, EOMI, OP-Cl. No JVD, LAD or bruit noted. Trachea Midline.   CV- RRR No M/R/G  RESP- CTA-Bilateral   GI- S/NT/ND. Positive BS X 4. No HSM Noted  BACK- Spine midline. No step off, crepitus or deformity noted. No midline TTP.   Ext- MAEW, No deformity. No edema or rashes noted.   Neuro- Strength 5/5 symmetric. CN 2-12 intact. Normal gait. Normal sensation.    Personal Review and Summary of Prior Diagnostics     Laboratory Studies: Reviewed    Latest Reference Range & Units Most Recent   WBC 3.90 - 12.70 K/uL 8.27  12/6/24 15:24   RBC 4.00 - 5.40 M/uL 4.26  12/6/24 15:24   Hemoglobin 12.0 - 16.0 g/dL 12.3  12/6/24 15:24   Hematocrit 37.0 - 48.5 % 39.1  12/6/24 15:24   MCV 82 - 98 fL 92  12/6/24 15:24   MCH 27.0 - 31.0 pg 28.9  12/6/24 15:24   MCHC 32.0 - 36.0 g/dL 31.5 (L)  12/6/24 15:24   RDW 11.5 - 14.5 % 12.2  12/6/24 15:24   Platelet Count 150 - 450 K/uL 214  12/6/24 15:24   MPV 9.2 - 12.9 fL 10.8  12/6/24 15:24   Gran % 38.0 - 73.0 % 54.8  12/6/24 15:24   Lymph % 18.0 - 48.0 % 32.4  12/6/24 15:24   Mono % 4.0 - 15.0 % 8.6  12/6/24 15:24   Eos % 0.0 - 8.0 % 3.1  12/6/24 15:24   Basophil % 0.0 - 1.9 % 0.7  12/6/24 15:24   Immature Granulocytes 0.0 - 0.5 % 0.4  12/6/24 15:24   Gran # (ANC) 1.8 - 7.7 K/uL 4.5  12/6/24 15:24   Lymph # 1.0 - 4.8 K/uL 2.7  12/6/24 15:24   Mono # 0.3 - 1.0 K/uL 0.7  12/6/24 15:24   Eos # 0.0 - 0.5 K/uL 0.3  12/6/24 15:24   Baso # 0.00 - 0.20 K/uL 0.06  12/6/24 15:24   Immature Grans (Abs) 0.00 - 0.04 K/uL 0.03  12/6/24 15:24   nRBC 0 /100 WBC 0  12/6/24 15:24   Differential Method  Automated  12/6/24 15:24   Ferritin 20.0 - 300.0 ng/mL 213  10/3/16 15:05   PT 9.0 - 12.5 sec 10.9  2/20/15 13:47   INR 0.8 - 1.2  1.1  2/20/15 13:47   D-Dimer <0.50 mg/L FEU 0.33  10/3/16 15:05   Sodium  136 - 145 mmol/L 145  5/15/25 11:34   Potassium 3.5 - 5.1 mmol/L 4.4  5/15/25 11:34   Chloride 95 - 110 mmol/L 108  5/15/25 11:34   CO2 23 - 29 mmol/L 28  5/15/25 11:34   Anion Gap 8 - 16 mmol/L 9  5/15/25 11:34   BUN 8 - 23 mg/dL 14  5/15/25 11:34   Creatinine 0.5 - 1.4 mg/dL 0.8  5/15/25 11:34   eGFR >60 mL/min/1.73/m2 >60  5/15/25 11:34   eGFR if non African American >60 mL/min/1.73 m^2 >60.0  3/4/22 08:40   eGFR if African American >60 mL/min/1.73 m^2 >60.0  3/4/22 08:40   Glucose 70 - 110 mg/dL 96  5/15/25 11:34   Calcium 8.7 - 10.5 mg/dL 9.8  5/15/25 11:34   Phosphorus Level 2.7 - 4.5 mg/dL 3.8  10/3/16 15:05   Magnesium  1.6 - 2.6 mg/dL 2.4  10/3/16 15:05   ALP 40 - 150 unit/L 83  5/15/25 11:34   PROTEIN TOTAL 6.0 - 8.4 gm/dL 8.3  5/15/25 11:34   Albumin 3.5 - 5.2 g/dL 4.2  5/15/25 11:34   BILIRUBIN TOTAL 0.1 - 1.0 mg/dL 0.9  5/15/25 11:34   AST 11 - 45 unit/L 19  5/15/25 11:34   ALT 10 - 44 unit/L 17  5/15/25 11:34   Lipase 4 - 60 U/L 60  12/6/24 15:24   Cholesterol Total 120 - 199 mg/dL 144  5/15/25 11:34   HDL 40 - 75 mg/dL 46  5/15/25 11:34   HDL/Cholesterol Ratio 20.0 - 50.0 % 31.9  5/15/25 11:34   Non-HDL Cholesterol mg/dL 98  5/15/25 11:34   Total Cholesterol/HDL Ratio 2.0 - 5.0  3.1  5/15/25 11:34   Triglycerides 30 - 150 mg/dL 102  5/15/25 11:34   LDL Cholesterol 63.0 - 159.0 mg/dL 77.6  5/15/25 11:34   BNP 0 - 99 pg/mL 18  10/3/16 15:05   CPK 20 - 180 U/L 96  10/3/16 15:05   Vitamin D 30 - 96 ng/mL 52  5/15/25 11:34   Hemoglobin A1C External 4.0 - 5.6 % 4.9  3/4/22 08:40   Estimated Avg Glucose 68 - 131 mg/dL 94  3/4/22 08:40   TSH 0.400 - 4.000 uIU/mL 2.297  2/3/17 09:18   Hepatitis C Ab  Negative  2/19/14 12:12   SARS-CoV-2 RNA, Amplification, Qual Negative  Negative  12/6/24 16:03   AFB CULTURE STAIN  No acid fast bacilli seen.  1/31/25 09:12   SARS-CoV2 (COVID-19) Qualitative PCR Not Detected  Not Detected  5/12/20 09:20   Specimen UA  Urine, Clean Catch  12/6/24 17:52   Color, UA Yellow, Straw,  Jaylene  Yellow  12/6/24 17:52   Appearance, UA Clear  Clear  12/6/24 17:52   Spec Grav UA 1.005 - 1.030  >1.030 !  12/6/24 17:52   pH, UA 5.0 - 8.0  7.0  12/6/24 17:52   Protein, UA Negative  Negative  12/6/24 17:52   Glucose, UA Negative  Negative  12/6/24 17:52   Ketones, UA Negative  Negative  12/6/24 17:52   Blood, UA Negative  Negative  12/6/24 17:52   NITRITE UA Negative  Negative  12/6/24 17:52   UROBILINOGEN UA <2.0 EU/dL Negative  12/6/24 17:52   Bilirubin (UA) Negative  Negative  12/6/24 17:52   Leukocyte Esterase, UA Negative  Negative  12/6/24 17:52   RBC, UA 0 - 4 /hpf 1  9/25/23 17:25   WBC, UA 0 - 5 /hpf 6 (H)  9/25/23 17:25   Bacteria, UA None-Occ /hpf Rare  9/25/23 17:25   Squam Epithel, UA /hpf 3  9/25/23 17:25   WBC Clumps, UA None-Rare  Moderate !  6/26/18 14:31   Yeast, UA None  Rare !  6/26/18 14:31   Microscopic Comment  SEE COMMENT  9/25/23 17:25   Calprotectin <50 mcg/g 352.0 (H)  1/9/25 14:55   AFB Culture & Smear  Rpt  1/31/25 09:12   Aerobic culture  Rpt !  1/31/25 10:12   Culture, Anaerobic  Rpt  1/31/25 10:12   Fungus culture  Rpt  1/31/25 10:12   Urine culture  Rpt  6/26/18 14:31        Microbiology Data: Reviewed     BAL Studies- AFB/Fungal negative.   Rare strep mittis- not pathogen.      Summary of Chest Imaging Personally Reviewed:      CT Chest-   Right upper pulmonary lobe lesion with irregular margins. Neoplasia cannot be entirely excluded. Recommend pulmonary consult for workup.           NM PET 1/2025  Solid, spiculated solitary pulmonary nodule in the right upper lobe without significant  increased radiotracer uptake, similar in size compared to 12/06/2024.     No hypermetabolic lesions throughout the body.    CT CHEST 5/26- Stable nodule. No new concerning findings.         2D Echo:      Left Ventricle The left ventricle is normal in size. There is normal systolic function with a visually estimated ejection fraction of 60 - 65%. Grade I diastolic dysfunction.   Right  Ventricle Normal right ventricular cavity size. Systolic function is normal.   Left Atrium Left atrium is mildly dilated.   Right Atrium Normal right atrial size.   Aortic Valve There is mild aortic valve sclerosis. Aortic valve peak velocity is 1.48 m/s. Mean gradient is 4 mmHg. There is mild aortic regurgitation.   Mitral Valve The mitral valve is structurally normal. The mean pressure gradient across the mitral valve is 2 mmHg at a heart rate of  bpm.   Tricuspid Valve There is trace regurgitation.   Pulmonic Valve The pulmonic valve is structurally normal.   IVC/SVC Normal venous pressure at 3 mmHg.   Pulmonary Artery The estimated pulmonary artery systolic pressure is 21 mmHg.         PFT's: None         Assessment:     No diagnosis found.     Encounter Medications[1]  No orders of the defined types were placed in this encounter.      Plan:     Pulmonary nodule- RUL 1.7cm.. Initially identified on CT Chest 12/2024. Never tobacco and no old imaging for comparison. Nodule identified in setting of acute illness when traveling back from [place]... Has had repeat CT in January with stability.. No NM PEt activity.. Attempted robotic, but no nodule Identified with radial probe and no biopsies. Elected for surveillance imaging.. CT today reviewed and appears completely stable..     -- Will continue to watch.. Next CT 6 months. If stable will obtain 1 additional at 1 year to complete appropriate surveillance.    -- Clinic follow up after imaging completed.     Garrett Farooq MD   [facility]        [1]   Outpatient Encounter Medications as of 5/26/2025   Medication Sig Dispense Refill    amLODIPine (NORVASC) 10 MG tablet TAKE 1 TABLET BY MOUTH EVERY DAY 90 tablet 3    atorvastatin (LIPITOR) 40 MG tablet TAKE 1 TABLET BY MOUTH EVERY DAY 90 tablet 0    calcium-vitamin D 500-125 mg-unit tablet Take 1 tablet by mouth once daily.      ergocalciferol (ERGOCALCIFEROL) 50,000 unit Cap TAKE 1 CAPSULE  (50,000 UNITS TOTAL) BY MOUTH EVERY SUNDAY. 12 capsule 1    fluticasone propionate (FLONASE) 50 mcg/actuation nasal spray 2 SPRAYS (100 MCG TOTAL) BY EACH NOSTRIL ROUTE ONCE DAILY. 48 mL 3    gabapentin (NEURONTIN) 300 MG capsule Take 2 capsules (600 mg total) by mouth 2 (two) times daily. 360 capsule 1    hydrOXYzine HCL (ATARAX) 25 MG tablet TAKE 1 TABLET BY MOUTH NIGHTLY AS NEEDED (INSOMNIA). 90 tablet 0    ibuprofen (ADVIL,MOTRIN) 800 MG tablet TAKE 1 TABLET BY MOUTH 2 TIMES DAILY AS NEEDED. 60 tablet 0    metoprolol succinate (TOPROL-XL) 100 MG 24 hr tablet TAKE 1 TABLET BY MOUTH EVERY DAY 90 tablet 2    multivitamin (THERAGRAN) per tablet Take 1 tablet by mouth once daily.      ondansetron (ZOFRAN-ODT) 4 MG TbDL Take 1 tablet (4 mg total) by mouth every 8 (eight) hours as needed. (Patient not taking: Reported on 5/15/2025) 20 tablet 0    pantoprazole (PROTONIX) 40 MG tablet TAKE 1 TABLET BY MOUTH EVERY DAY 90 tablet 3    paroxetine (PAXIL) 20 MG tablet Take 1 tablet (20 mg total) by mouth every morning. 90 tablet 1    traZODone (DESYREL) 150 MG tablet TAKE 1 TABLET (150 MG TOTAL) BY MOUTH EVERY EVENING. 90 tablet 1    [DISCONTINUED] alendronate (FOSAMAX) 70 MG tablet TAKE 1 TABLET BY MOUTH ONE TIME PER WEEK (Patient not taking: Reported on 2/5/2025) 12 tablet 1    [DISCONTINUED] denosumab (PROLIA) 60 mg/mL Syrg Inject 1 mL (60 mg total) into the skin every 6 (six) months. (Patient not taking: Reported on 5/15/2025) 2 mL 0    [DISCONTINUED] hydrOXYzine HCL (ATARAX) 25 MG tablet Take 1 tablet (25 mg total) by mouth nightly as needed (insomnia). 90 tablet 0    [DISCONTINUED] ibuprofen (ADVIL,MOTRIN) 800 MG tablet TAKE 1 TABLET BY MOUTH 2 TIMES DAILY AS NEEDED. 60 tablet 0    [DISCONTINUED] metoprolol succinate (TOPROL-XL) 100 MG 24 hr tablet TAKE 1 TABLET BY MOUTH EVERY DAY 90 tablet 1     No facility-administered encounter medications on file as of 5/26/2025.

## 2025-05-27 ENCOUNTER — TELEPHONE (OUTPATIENT)
Dept: INFECTIOUS DISEASES | Facility: HOSPITAL | Age: 80
End: 2025-05-27
Payer: MEDICARE

## 2025-05-29 ENCOUNTER — CLINICAL SUPPORT (OUTPATIENT)
Dept: INTERNAL MEDICINE | Facility: CLINIC | Age: 80
End: 2025-05-29
Payer: MEDICARE

## 2025-05-29 VITALS — SYSTOLIC BLOOD PRESSURE: 128 MMHG | DIASTOLIC BLOOD PRESSURE: 60 MMHG | OXYGEN SATURATION: 99 % | HEART RATE: 61 BPM

## 2025-05-29 DIAGNOSIS — I10 ESSENTIAL HYPERTENSION: Primary | ICD-10-CM

## 2025-05-29 DIAGNOSIS — K52.9 COLITIS: Primary | ICD-10-CM

## 2025-05-29 PROCEDURE — 99999 PR PBB SHADOW E&M-EST. PATIENT-LVL I: CPT | Mod: PBBFAC,,,

## 2025-05-29 NOTE — PROGRESS NOTES
If +serum preg:  Pt to go to ER:  R/o ectopic. Pt here for BP nurse visit, 2 pt identifiers used. Pt states she does take BP medication and did take before the visit. Pt states she has been under stress which may have contributed to her recent elevated pressure. Pt's BP was taken manually and updated in the chart

## 2025-05-30 ENCOUNTER — CLINICAL SUPPORT (OUTPATIENT)
Dept: ENDOSCOPY | Facility: HOSPITAL | Age: 80
End: 2025-05-30
Attending: STUDENT IN AN ORGANIZED HEALTH CARE EDUCATION/TRAINING PROGRAM
Payer: MEDICARE

## 2025-05-30 DIAGNOSIS — K52.9 COLITIS: Primary | ICD-10-CM

## 2025-05-30 DIAGNOSIS — K52.9 COLITIS: ICD-10-CM

## 2025-06-02 ENCOUNTER — CLINICAL SUPPORT (OUTPATIENT)
Dept: ENDOSCOPY | Facility: HOSPITAL | Age: 80
End: 2025-06-02
Attending: STUDENT IN AN ORGANIZED HEALTH CARE EDUCATION/TRAINING PROGRAM
Payer: MEDICARE

## 2025-06-02 DIAGNOSIS — K52.9 COLITIS: ICD-10-CM

## 2025-06-06 ENCOUNTER — PATIENT MESSAGE (OUTPATIENT)
Dept: PAIN MEDICINE | Facility: CLINIC | Age: 80
End: 2025-06-06

## 2025-06-06 ENCOUNTER — OFFICE VISIT (OUTPATIENT)
Dept: PAIN MEDICINE | Facility: CLINIC | Age: 80
End: 2025-06-06
Payer: MEDICARE

## 2025-06-06 VITALS
WEIGHT: 141.13 LBS | HEIGHT: 60 IN | HEART RATE: 64 BPM | OXYGEN SATURATION: 100 % | DIASTOLIC BLOOD PRESSURE: 68 MMHG | RESPIRATION RATE: 18 BRPM | SYSTOLIC BLOOD PRESSURE: 146 MMHG | BODY MASS INDEX: 27.71 KG/M2 | TEMPERATURE: 97 F

## 2025-06-06 DIAGNOSIS — M79.604 BILATERAL LEG PAIN: ICD-10-CM

## 2025-06-06 DIAGNOSIS — M48.062 SPINAL STENOSIS, LUMBAR REGION WITH NEUROGENIC CLAUDICATION: ICD-10-CM

## 2025-06-06 DIAGNOSIS — M47.816 LUMBAR SPONDYLOSIS: ICD-10-CM

## 2025-06-06 DIAGNOSIS — M54.16 LUMBAR RADICULOPATHY: Primary | ICD-10-CM

## 2025-06-06 DIAGNOSIS — M51.369 DDD (DEGENERATIVE DISC DISEASE), LUMBAR: ICD-10-CM

## 2025-06-06 DIAGNOSIS — M54.17 LUMBOSACRAL RADICULOPATHY: ICD-10-CM

## 2025-06-06 DIAGNOSIS — M79.605 BILATERAL LEG PAIN: ICD-10-CM

## 2025-06-06 DIAGNOSIS — G89.4 CHRONIC PAIN SYNDROME: ICD-10-CM

## 2025-06-06 DIAGNOSIS — M54.15 RADICULOPATHY OF THORACOLUMBAR REGION: ICD-10-CM

## 2025-06-06 PROCEDURE — 99999 PR PBB SHADOW E&M-EST. PATIENT-LVL IV: CPT | Mod: PBBFAC,,, | Performed by: NURSE PRACTITIONER

## 2025-06-06 RX ORDER — GABAPENTIN 300 MG/1
600 CAPSULE ORAL 2 TIMES DAILY
Qty: 360 CAPSULE | Refills: 1 | Status: SHIPPED | OUTPATIENT
Start: 2025-06-06 | End: 2025-12-03

## 2025-06-06 NOTE — H&P (VIEW-ONLY)
Chronic patient Established Note (Follow up visit)      SUBJECTIVE:    Interval History 6/6/2025:  The patient presents for follow-up to discuss worsening lower back pain. I last saw her in October 2024 at which time she was doing fairly well after caudal SANKET. Pain radiates down her right leg, with associated numbness and tingling. The pain has extended to the muscles in her leg, requiring application of pain cream for relief. She experienced an incident while washing dishes where her right side, including her buttock, became numb, nearly causing her to fall. She describes these symptoms as intermittent, requiring her to sit down when she feels them coming on. She has difficulty walking long distances and required assistance from a  to navigate the parking lot for this appointment. She has been attempting to manage symptoms with exercise without relief.  Of note, she visited urgent care approximately two months ago due to persistent vomiting. During this visit, a spot was identified on her lungs. Further investigation at the main campus, including an endoscopic procedure, was inconclusive. A follow-up visit two weeks ago revealed that the nodule is stable. She recently had a primary care visit for osteoporosis, where Reclast treatment was recommended. Her pain today is 8/10.    Interval History 10/1/2024:  The patient presents for follow up of chronic back pain. She is now s/p caudal SANKET. She is reporting 60% relief. She has intermittent numbness to right leg. She has been having more right shoulder pain recently, worse with overhead activities. No chest pain or SOB. She takes Gabapentin and Ibuprofen with benefit. No side effects reported. Her pain today is 8/10.    Interval History 8/22/2024:  Diana returns today for follow up to discuss worsened back and leg pain. The pain starts across the lower back with radiation down the back of the legs. She has numbness to both legs (R>L). The pain is worse  with activity. No recent falls or injury. She has had significant benefit with caudal ESIs in the past with the last being in January. She would like to repeat. Her pain today is 9/10.     Interval History 12/14/2023:  The patient presents for 3 follow up of chronic lower back pain. She continues to report radiation into the buttocks and legs. Back pain is greater than leg pain. She does have some stiffness in the morning. This improves with walking around. She also has intermittent numbness to legs. She takes Ibuprofen PRN, usually once daily. She also takes Gabapentin 600 mg BID which also helps. She says that she has been performing her home exercises. Her shoulder pain has improved with home PT regimen. Her pain today is 7/10.    Interval History 9/14/2023:  The patient is here for follow up of back pain. She is now s/p caudal SANKET with 75% relief which started to wane down at 1 week. She is still having some relief at this time. She does say that this was more helpful that more recent ESIs. She does find Ibuprofen helpful but tries not to take daily. She plans to restart her home PT exercises. She has also been having some right shoulder pain, mainly with sleeping on that side. Her pain today is 8/10.    Interval History 7/14/2023:  The patient is here for follow up of back and leg pain. The pain starts to the lower back and tailbone with radiation down the back of both legs with numbness and tingling. The pain is worse to the posterior legs but she also has pain to the front and sides of both legs. She says that the last right sided TF SANKET did not last as long as previous procedures. She has performed home PT exercises for over 6 weeks with minimal benefit. She previously declined surgical referral for severe stenosis. Her pain today is 6/10.    Interval History 5/5/2023:  The patient returns today for follow up of back and leg pain. She has been in PT which she thinks is helping her range of motion. She is  still having back pain with radiation down the right leg. There is associated numbness. She previously had benefit with SANKET but declined to repeat at last OV. She also declined surgical consult. She has benefit with Gabapentin 1200 mg daily with some benefit but it does cause some sedation. Her pain today is 6/10.    Interval History 3/23/2023:  The patient presents for follow up of chronic back pain. She is here for review up updated lumbar MRI. Her results show severe spondylitic central spinal stenosis at L3-4 and L4-5, L4-5 subarticular zones severe stenosis bilaterally and severe right-sided neural foraminal stenosis. TF ESIs have been helpful short-term. She is not interested in surgical consult at this time. No bowel or bladder incontinence. Her biggest complaint today is right sided back pain with radiation down the side of the right leg and numbness. Her pain today is 9/10.    Interval History 3/9/2023:  The patient presents today for follow up of back and leg pain. She is now s/p repeat right L3/4 and L4/5 TF SANKET on 2/8/23 with 80% benefit for about 2 weeks only. Previous did help for longer. She is reporting pain that start to right lower back with radiation into the front and back of the right leg to the anterior shin. She has numbness on the right lower leg. She feels like her pain has worsened over the past few months. Her last MRI was in 2019. She had some benefit with PT in the past and is open to repeat. Her pain today is 7/10.    Interval History 1/24/2023:  Diana is here for follow up of back and right leg pain. She is now s/p right L3/4 and L4/5 TF SANKET on 12/7/22. She reports about 80% relief for about one month. Today, she is reporting lower back pain with radiation into the front and side of the right leg. She has numbness to the right leg intermittently without warning. She also has been having cramps to lower legs but says she thinks that she has not been drinking enough water. She completed  aquatic PT which she thinks was helpful. Her pain today is 8/10.    Interval History 11/11/2022:  The patient returns today for follow up of back and leg pain. She has been in aquatic PT which she finds helpful. She attends twice weekly. Her back pain has improved somewhat because of this. She does have worsened right leg pain with walking and activity. The pain radiates down the front and side of the right leg with associated numbness. No current radiation on the left. She had benefit with ESIs in the past and wishes to repeat. Her pain today is 7/10.    Interval History 8/11/2022:  The patient is here for follow up of lower back pain. Since previous encounter, she underwent repeat L3/4 TF SANKET with 70% relief. We had originally planned for MBB but she started having more shooting pain into the legs. She feels like this was helpful. Her pain is tolerable at this time. Her pain today is 7/10.    Interval History 5/9/2022:  The patient is here for follow up of chronic back pain. She is having more back pain and stiffness in the morning. We did previously discuss lumbar MBB but she is worried about not having IV sedation. She says that she would like to further discuss the procedure today. Leg pain has been mild since previous SANKET. She does have intermittent numbness still. Back pain is greater than leg pain. Her pain today is 7/10.    Interval History 4/7/2022:  The patient is here for follow up of lower back and leg pain. Since previous encounter, she underwent bilateral L3/4 TF SANKET on 1/26/22. She reports 80% relief of pain for about 2 months. She feels like this gave her significant benefit during that time and her pain was mild. She is now again having severe back pain with radiation into the anterior thighs. She would like to repeat the procedure. She would also like me to place another referral for aquatic PT. She stopped Mobic because she found Diclofenac more helpful. She has been taking as needed but not  daily. She does find Gabapentin helpful but it sometimes makes her drowsy. Her pain today is 9/10.    Interval History 1/11/2022:  The patient returns to discuss continued lower back pain. She has not restarted aquatic PT due to increase in Covid-19 cases. She continues with sharp lower back pain that radiates into anterior thighs, bilaterally. She has associated numbness. The pain is worse with increased activity. Her pain today is 7/10.    Interval History 11/11/2021:  The patient presents today for 2 month follow up of lower back pain. Since previous encounter, she has not started aquatic PT because she is on the waiting list. She continues to report lower back pain with radiation down the left leg. We previously discussed lumbar MBB/RFA which she does not wish to pursue at this time. She states that diclofenac is not helping very much with her aching pain at this time. She denies any new weakness or symptoms at this time. Her pain today is 8/10.    Interval History 8/19/2021:  Diana returns today for follow up of chronic lower back pain. She reports that he has continued with aching pain. She has been in PT but is not finding it very helpful. She has not tried aquatic PT in the past. She is still having intermittent radiation down her legs. She has been unable to take 1200 mg daily of Gabapentin and has decreased to 600 mg daily due to sedation and dizziness with the medication. Otherwise, no new complaints today. Her pain today is 7/10.    Interval History 6/18/2021:  The patient is here for follow up of lower back pain.  She has radiation of her pain into her anterior thighs but not below the knees.  She denies numbness or tingling at this time.  Her back pain is currently greater than her leg pain.  She has a lot of stiffness when she wakes the morning states it improves when she moves.  She had limited benefit with recent repeat bilateral L3-4 transforaminal steroid injection.  Her pain today is  7/10    Interval History 5/4/2021:  The patient is here for follow up of lower back and leg pain.  Previous encounter, physical therapy was ordered.  She states that she did not start physical therapy and would like me to replace the order for her.  She does report that she is noticing more muscle fatigue and weakness particularly with activity.  She is also having more back pain with radiation into the anterior lateral thighs with the past month.  She previously had significant been hip with bilateral L3/4 transforaminal epidural steroid injection and wishes to repeat this.  She found this more helpful than previous procedures. Her pain today is 7/10.    Interval History 3/2/2021:  The patient is here for follow up of chronic pain. She is having more back pain today which she attributes to the cold front coming in. She is having radiation into the buttocks and right anterior thigh. Her back pain is her primary complaint today. She describes it as throbbing. She recently completed both Covid-19 vaccines which she tolerated well. She is now taking Gabapentin 600 mg twice daily regularly. She notices improvement in right leg numbness.  Her pain today is 7/10.    Interval History 12/1/2020:  The patient iss here for follow-up of back and leg pain.  She is now status post bilateral L3/4 transforaminal epidural steroid injection on 11/11/2020. She is reporting 100% relief for about 2 weeks and then her pain started to return.  She is still having some benefit.  Her pain is located across the lower back with radiation into front and sides of the legs to the anterior feet with associated numbness.  She feels like when she walks sometimes her legs become weak.  This has improved slightly.  She denies any recent falls.  At her last OV, her Gabapentin was increased to 600 mg BID.  She says that she finds this helpful.  She has mild drowsiness.  She admits that she does not always take the medication and thought it was more of  an as needed medication.  The patient denies any bowel or bladder incontinence or signs of saddle paresthesia.  She feels as though her pain today is worse than usual.  She rates her pain today as 10/10.    Interval history 10/15/2020:  Diana Herring presents to the clinic for a follow-up appointment for back pain. Since the last visit, Diana Herring states the pain has been worsening. Current pain intensity is 7/10. She reports that she continues to have low back pain that radiates to the bilateral hip and leg. She does report that she has noticed intermittent numbness in the right lateral and anterior thigh, that is sometimes associated with weakness in her right leg. She says that her leg gives out when this happens. She has not had any falls or other trauma. She did not get the bilateral L3/4 TFESI planned last time, and she is still taking only 300 mg bid of gabapentin.  Patient denies urinary incontinence, bowel incontinence, significant weight loss.    Initial visit:  Diana Herring presents to the clinic for the evaluation of low back pain. The pain started 2 years ago following loosing bone after a bone density and symptoms have been worsening.The pain is located in the low back area and radiates to the bilateral hip and leg.  The pain is described as aching, numbing, sharp and tight band and is rated as 8/10. The pain is rated with a score of  7/10 on the BEST day and a score of 8/10 on the WORST day.  Symptoms interfere with daily activity and sleeping. The pain is exacerbated by Sitting, Standing, Bending, Coughing/Sneezing, Walking, Morning, Lifting and Getting out of bed/chair.  The pain is mitigated by heat, laying down and rest. She reports spending 0 hours per day reclining. The patient reports 6 hours of uninterrupted sleep per night.     Patient had L5/S1 ILESI 2 weeks ago and only gave her 1 day of relief.      Patient denies night fever/night sweats, urinary incontinence, bowel  incontinence, significant weight loss and significant motor weakness.     Physical Therapy/Home Exercise: yes, was not beneficial      Pain Disability Index Review:      6/6/2025    10:00 AM 8/22/2024     8:48 AM 12/14/2023     8:15 AM   Last 3 PDI Scores   Pain Disability Index (PDI) 23 45 32       Pain Medications:  Gabapentin 600 mg BID  Ibuprofen PRN    Opioid Contract: no     report:  Reviewed and consistent with medication use as prescribed.    Pain Procedures:  9/4/24 Caudal SANKET- 60% relief for 8 months  1/31/24 Caudal SANKET- 70% relief  8/30/23 Caudal SANKET- 70% relief for 3 months  2/8/23 Right L3/4 and L4/5 TF SANKET- 80% relief for 2 weeks  12/7/22 Right L3/4 and L4/5 TF SANKET- 80% relief for one month  7/20/22 Bilateral L3/4 TF SANKET- 70% relief  4/20/22 Bilateral L3/4 TF SANKET- 75% relief of leg pain  1/26/22 Bilateral L3/4 TF SANKET  5/19/21 bilateral L3/4 TF SANKET  11/11/20 Bilateral L3/4 TF SANKET- 100% relief for 2 weeks  5/13/20 L5/S1 ILESI  10/30/19 TFESI Bilateral L3-L4  09/04/19 TFESI Bilateral L3     Physical Therapy/Home Exercise: yes    Imaging:    Narrative & Impression  EXAMINATION:  MRI LUMBAR SPINE WITHOUT CONTRAST     CLINICAL HISTORY:  Dorsalgia, unspecifiedLow back pain, symptoms persist with > 6wks conservative treatment;     TECHNIQUE:  Sagittal T1, sagittal T2, sagittal STIR, axial T1 and axial T2 weighted images of the lumbar spine obtained without contrast.     COMPARISON:  X-ray 05/29/2020 and lumbar spine radiograph 07/26/2019     FINDINGS:  Lumbar spine alignment is within normal limits. The vertebral body heights are well maintained, with no fracture.  Degenerative edema signal at opposing endplates of L4-5 and degenerative sclerosis at multiple endplates from L2 through L5 with associated Schmorl's nodes.  Otherwise, marrow signal normal.     The conus is normal in appearance.  There are bilateral T2 hyperintense renal lesions likely related to cysts.     Bunching of cauda equina nerve roots  posterior to L2 and L3 vertebral bodies.     L1-L2: Circumferential disc bulge, spondylitic spurring and mild facet arthritis.Mild central spinal stenosis.     L2-L3: Circumferential disc bulge, spondylitic spurring and facet arthritis.  Mild central spinal stenosis and left-sided foraminal stenosis.     L3-L4: Circumferential disc bulge, spondylitic spurring, severe facet arthritis and ligamentum flavum thickening produce severe central spinal stenosis and mild bilateral foraminal stenosis.     L4-L5: Circumferential disc bulge, spondylitic spurring, facet arthritis and ligamentum flavum thickening.  Bilateral facet joint effusions.  Severe central spinal stenosis and bilateral subarticular zone stenosis.  Moderate left and severe right-sided neural foraminal stenosis.     L5-S1: Circumferential disc bulge and minimal facet arthritis.  No central canal or foraminal stenosis.     Impression:     Severe spondylitic central spinal stenosis at L3-4 and L4-5.  L4-5 subarticular zones severe stenosis bilaterally and severe right-sided neural foraminal stenosis.  Additional degrees of central spinal stenosis and foraminal stenosis as above.     Bilateral renal lesions likely cysts which can be confirmed with ultrasound on a nonemergent basis.       CMP  Sodium   Date Value Ref Range Status   05/15/2025 145 136 - 145 mmol/L Final   12/06/2024 144 136 - 145 mmol/L Final     Potassium   Date Value Ref Range Status   05/15/2025 4.4 3.5 - 5.1 mmol/L Final   12/06/2024 3.0 (L) 3.5 - 5.1 mmol/L Final     Chloride   Date Value Ref Range Status   05/15/2025 108 95 - 110 mmol/L Final   12/06/2024 109 95 - 110 mmol/L Final     CO2   Date Value Ref Range Status   05/15/2025 28 23 - 29 mmol/L Final   12/06/2024 24 23 - 29 mmol/L Final     Glucose   Date Value Ref Range Status   05/15/2025 96 70 - 110 mg/dL Final   12/06/2024 104 70 - 110 mg/dL Final     BUN   Date Value Ref Range Status   05/15/2025 14 8 - 23 mg/dL Final      Creatinine   Date Value Ref Range Status   05/15/2025 0.8 0.5 - 1.4 mg/dL Final     Calcium   Date Value Ref Range Status   05/15/2025 9.8 8.7 - 10.5 mg/dL Final   12/06/2024 9.3 8.7 - 10.5 mg/dL Final     Protein Total   Date Value Ref Range Status   05/15/2025 8.3 6.0 - 8.4 gm/dL Final     Total Protein   Date Value Ref Range Status   12/06/2024 8.4 6.0 - 8.4 g/dL Final     Albumin   Date Value Ref Range Status   05/15/2025 4.2 3.5 - 5.2 g/dL Final   12/06/2024 3.8 3.5 - 5.2 g/dL Final     Total Bilirubin   Date Value Ref Range Status   12/06/2024 1.2 (H) 0.1 - 1.0 mg/dL Final     Comment:     For infants and newborns, interpretation of results should be based  on gestational age, weight and in agreement with clinical  observations.    Premature Infant recommended reference ranges:  Up to 24 hours.............<8.0 mg/dL  Up to 48 hours............<12.0 mg/dL  3-5 days..................<15.0 mg/dL  6-29 days.................<15.0 mg/dL       Bilirubin Total   Date Value Ref Range Status   05/15/2025 0.9 0.1 - 1.0 mg/dL Final     Comment:     For infants and newborns, interpretation of results should be based   on gestational age, weight and in agreement with clinical   observations.    Premature Infant recommended reference ranges:   0-24 hours:  <8.0 mg/dL   24-48 hours: <12.0 mg/dL   3-5 days:    <15.0 mg/dL   6-29 days:   <15.0 mg/dL     Alkaline Phosphatase   Date Value Ref Range Status   12/06/2024 76 40 - 150 U/L Final     ALP   Date Value Ref Range Status   05/15/2025 83 40 - 150 unit/L Final     AST   Date Value Ref Range Status   05/15/2025 19 11 - 45 unit/L Final   12/06/2024 34 10 - 40 U/L Final     ALT   Date Value Ref Range Status   05/15/2025 17 10 - 44 unit/L Final   12/06/2024 35 10 - 44 U/L Final     Anion Gap   Date Value Ref Range Status   05/15/2025 9 8 - 16 mmol/L Final     eGFR if    Date Value Ref Range Status   03/04/2022 >60.0 >60 mL/min/1.73 m^2 Final     eGFR if non     Date Value Ref Range Status   03/04/2022 >60.0 >60 mL/min/1.73 m^2 Final     Comment:     Calculation used to obtain the estimated glomerular filtration  rate (eGFR) is the CKD-EPI equation.            Allergies: Review of patient's allergies indicates:  No Known Allergies    Current Medications:   Current Outpatient Medications   Medication Sig Dispense Refill    amLODIPine (NORVASC) 10 MG tablet TAKE 1 TABLET BY MOUTH EVERY DAY 90 tablet 3    atorvastatin (LIPITOR) 40 MG tablet TAKE 1 TABLET BY MOUTH EVERY DAY 90 tablet 0    calcium-vitamin D 500-125 mg-unit tablet Take 1 tablet by mouth once daily.      ergocalciferol (ERGOCALCIFEROL) 50,000 unit Cap TAKE 1 CAPSULE (50,000 UNITS TOTAL) BY MOUTH EVERY SUNDAY. 12 capsule 1    fluticasone propionate (FLONASE) 50 mcg/actuation nasal spray 2 SPRAYS (100 MCG TOTAL) BY EACH NOSTRIL ROUTE ONCE DAILY. 48 mL 3    gabapentin (NEURONTIN) 300 MG capsule Take 2 capsules (600 mg total) by mouth 2 (two) times daily. 360 capsule 1    hydrOXYzine HCL (ATARAX) 25 MG tablet TAKE 1 TABLET BY MOUTH NIGHTLY AS NEEDED (INSOMNIA). 90 tablet 0    ibuprofen (ADVIL,MOTRIN) 800 MG tablet TAKE 1 TABLET BY MOUTH 2 TIMES DAILY AS NEEDED. 60 tablet 0    metoprolol succinate (TOPROL-XL) 100 MG 24 hr tablet TAKE 1 TABLET BY MOUTH EVERY DAY 90 tablet 2    multivitamin (THERAGRAN) per tablet Take 1 tablet by mouth once daily.      pantoprazole (PROTONIX) 40 MG tablet TAKE 1 TABLET BY MOUTH EVERY DAY 90 tablet 3    ondansetron (ZOFRAN-ODT) 4 MG TbDL Take 1 tablet (4 mg total) by mouth every 8 (eight) hours as needed. (Patient not taking: Reported on 6/6/2025) 20 tablet 0    paroxetine (PAXIL) 20 MG tablet Take 1 tablet (20 mg total) by mouth every morning. (Patient not taking: Reported on 6/6/2025) 90 tablet 1    traZODone (DESYREL) 150 MG tablet TAKE 1 TABLET (150 MG TOTAL) BY MOUTH EVERY EVENING. (Patient not taking: Reported on 6/6/2025) 90 tablet 1     No current  facility-administered medications for this visit.       REVIEW OF SYSTEMS:    GENERAL:  No weight loss, malaise or fevers.  HEENT:  Negative for frequent or significant headaches.  NECK:  Negative for lumps, goiter, pain and significant neck swelling.  RESPIRATORY:  Negative for cough, wheezing or shortness of breath.  CARDIOVASCULAR:  Negative for chest pain, leg swelling or palpitations.  GI:  Negative for abdominal discomfort, blood in stools or black stools or change in bowel habits. GERD.  MUSCULOSKELETAL:  See HPI.  SKIN:  Negative for lesions, rash, and itching.  PSYCH:  + for sleep disturbance, h/o depression  HEMATOLOGY/LYMPHOLOGY:  Negative for prolonged bleeding, bruising easily or swollen nodes.  NEURO:  + numbness. No history of headaches, syncope, paralysis, seizures or tremors.  All other reviewed and negative other than HPI.     Past Medical History:  Past Medical History:   Diagnosis Date    Anxiety     Arthritis     Corneal abrasion s    ? eye     Depression     Full dentures     GERD (gastroesophageal reflux disease)     Hyperlipidemia     Hypertension     Nuclear sclerosis of both eyes 6/5/2017    Osteoporosis     Restless leg syndrome        Past Surgical History:  Past Surgical History:   Procedure Laterality Date    COLONOSCOPY N/A 5/14/2019    Procedure: COLONOSCOPY;  Surgeon: Nahid Cline MD;  Location: [facility];  Service: Endoscopy;  Laterality: N/A;    COLONOSCOPY N/A 12/12/2024    Procedure: COLONOSCOPY;  Surgeon: Betty Hurst MD;  Location: [facility];  Service: Endoscopy;  Laterality: N/A;  12/10 ref by Bianca Matute NP, PEG, emailed to [email or web address]-    ENDOBRONCHIAL ULTRASOUND N/A 1/31/2025    Procedure: ENDOBRONCHIAL ULTRASOUND (EBUS);  Surgeon: Garrett Farooq MD;  Location: [facility] OR [address] [place] [address];  Service: Pulmonary;  Laterality: N/A;    EPIDURAL STEROID INJECTION N/A 5/13/2020    Procedure: LUMBAR/CAUDAL L5/S1 IL SANKET ;  Surgeon: Miguel Latham MD;   Location: [facility];  Service: Pain Management;  Laterality: N/A;  NEEDS CONSENT    EPIDURAL STEROID INJECTION N/A 8/30/2023    Procedure: INJECTION, STEROID, EPIDURAL, CAUDAL W/ CATH SOONER DATE;  Surgeon: Miguel Latham MD;  Location: [facility];  Service: Pain Management;  Laterality: N/A;    EPIDURAL STEROID INJECTION N/A 1/31/2024    Procedure: CAUDAL SANKET;  Surgeon: Miguel Latham MD;  Location: [facility];  Service: Pain Management;  Laterality: N/A;  [phone]  4 WK F/U LOLIS    EPIDURAL STEROID INJECTION N/A 9/4/2024    Procedure: CAUDAL SANKET;  Surgeon: Miguel Latham MD;  Location: [facility];  Service: Pain Management;  Laterality: N/A;  [phone]  2 WK F/U KAREN    HYSTERECTOMY      PARTIAL HYSTERECTOMY      ROBOTIC BRONCHOSCOPY N/A 1/31/2025    Procedure: ROBOTIC BRONCHOSCOPY;  Surgeon: Garrett Farooq MD;  Location: [facility] OR [address] [place] [address];  Service: Pulmonary;  Laterality: N/A;    TRANSFORAMINAL EPIDURAL INJECTION OF STEROID Bilateral 9/4/2019    Procedure: Injection,steroid,epidural,transforaminal approach LUMBAR TRANSFORAMINAL BILATERAL L3 TF SANKET;  Surgeon: Miguel Latham MD;  Location: [facility];  Service: Pain Management;  Laterality: Bilateral;  NEEDS CONSENT    TRANSFORAMINAL EPIDURAL INJECTION OF STEROID Bilateral 10/30/2019    Procedure: TRANSFORAMINAL SANKET BILATERAL L3-L4;  Surgeon: Miguel Latham MD;  Location: [facility];  Service: Pain Management;  Laterality: Bilateral;  NEEDS CONSENT    TRANSFORAMINAL EPIDURAL INJECTION OF STEROID Bilateral 11/11/2020    Procedure: INJECTION, STEROID, EPIDURAL, TRANSFORAMINAL APPROACH, L3-L4;  Surgeon: Miguel Latham MD;  Location: [facility];  Service: Pain Management;  Laterality: Bilateral;    TRANSFORAMINAL EPIDURAL INJECTION OF STEROID Bilateral 5/19/2021    Procedure: INJECTION, STEROID, EPIDURAL, TRANSFORAMINAL APPROACH, L3-L4;  Surgeon: Miguel Latham MD;  Location: [facility];  Service:  Pain Management;  Laterality: Bilateral;    TRANSFORAMINAL EPIDURAL INJECTION OF STEROID Bilateral 1/26/2022    Procedure: Injection,steroid,epidural,transforaminal approach BILATERAL L3/4;  Surgeon: Miguel Latham MD;  Location: [facility];  Service: Pain Management;  Laterality: Bilateral;    TRANSFORAMINAL EPIDURAL INJECTION OF STEROID Bilateral 4/20/2022    Procedure: INJECTION, STEROID, EPIDURAL, TRANSFORAMINAL APPROACH, Bilateral L3-L4;  Surgeon: Miguel Latham MD;  Location: [facility];  Service: Pain Management;  Laterality: Bilateral;    TRANSFORAMINAL EPIDURAL INJECTION OF STEROID Bilateral 7/20/2022    Procedure: INJECTION, STEROID, EPIDURAL, TRANSFORAMINAL APPROACH, BILATERAL L3-L4 CONTRAST;  Surgeon: Miguel Latham MD;  Location: [facility];  Service: Pain Management;  Laterality: Bilateral;    TRANSFORAMINAL EPIDURAL INJECTION OF STEROID Right 12/7/2022    Procedure: INJECTION, STEROID, EPIDURAL, TRANSFORAMINAL APPROACH, RIGHT L3/L4 AND L4/L5 CONTRAST;  Surgeon: Miguel Latham MD;  Location: [facility];  Service: Pain Management;  Laterality: Right;    TRANSFORAMINAL EPIDURAL INJECTION OF STEROID Right 2/8/2023    Procedure: INJECTION, STEROID, EPIDURAL, TRANSFORAMINAL APPROACH RIGHT L3/4 AND L4/5 CONTRAST;  Surgeon: Miguel Latham MD;  Location: [facility];  Service: Pain Management;  Laterality: Right;    TUBAL LIGATION Bilateral        Family History:  Family History   Problem Relation Name Age of Onset    Diabetes Mother      Stroke Mother      Glaucoma Mother      Cataracts Mother      Cancer Father          Lung    No Known Problems Sister      Stroke Brother      Hypertension Daughter Lois     Hypertension Daughter Catherine     Hypertension Daughter Kyra     No Known Problems Daughter Dyaca     Heart disease Son      Early death Son      No Known Problems Maternal Aunt      No Known Problems Maternal Uncle      No Known Problems Paternal Aunt      No Known  Problems Paternal Uncle      No Known Problems Maternal Grandmother      No Known Problems Maternal Grandfather      No Known Problems Paternal Grandmother      No Known Problems Paternal Grandfather      Amblyopia Neg Hx      Blindness Neg Hx      Macular degeneration Neg Hx      Retinal detachment Neg Hx      Strabismus Neg Hx      Thyroid disease Neg Hx         Social History:  Social History     Socioeconomic History    Marital status:    Tobacco Use    Smoking status: Never     Passive exposure: Never    Smokeless tobacco: Never   Substance and Sexual Activity    Alcohol use: Not Currently     Comment: once a year    Drug use: No    Sexual activity: Not Currently   Social History Narrative    Patient is  w/ 5 children, one  from heart disease.The patient is retired.     Social Drivers of Health     Financial Resource Strain: Medium Risk (2025)    Overall Financial Resource Strain (CARDIA)     Difficulty of Paying Living Expenses: Somewhat hard   Food Insecurity: Food Insecurity Present (2025)    Hunger Vital Sign     Worried About Running Out of Food in the Last Year: Sometimes true     Ran Out of Food in the Last Year: Sometimes true   Transportation Needs: No Transportation Needs (2025)    PRAPARE - Transportation     Lack of Transportation (Medical): No     Lack of Transportation (Non-Medical): No   Physical Activity: Inactive (2025)    Exercise Vital Sign     Days of Exercise per Week: 0 days     Minutes of Exercise per Session: 0 min   Stress: Stress Concern Present (2025)    [employer] - Occupational Stress Questionnaire     Feeling of Stress : To some extent   Housing Stability: Low Risk  (2025)    Housing Stability Vital Sign     Unable to Pay for Housing in the Last Year: No     Number of Times Moved in the Last Year: 0     Homeless in the Last Year: No       OBJECTIVE:    BP (!) 146/68 (BP Location: Right arm, Patient  Position: Sitting)   Pulse 64   Temp 97 °F (36.1 °C) (Oral)   Resp 18   Ht 5' (1.524 m)   Wt 64 kg (141 lb 1.5 oz)   SpO2 100%   BMI 27.56 kg/m²     PHYSICAL EXAMINATION:    General appearance: Well appearing, in no acute distress, alert and oriented x3.  Psych:  Mood and affect appropriate.  Skin: Skin color, texture, turgor normal, no rashes or lesions, in both upper and lower body.  Head/face:  Normocephalic, atraumatic. No palpable lymph nodes.  Back: Straight leg raising in the sitting position is positive on the right. Full range of motion with pain on flexion and extension. Positive facet loading bilaterally.  Extremities: Peripheral joint ROM is full and pain free without obvious instability or laxity in all four extremities. No deformities, edema, or skin discoloration. Good capillary refill.  Musculoskeletal: No atrophy or tone abnormalities are noted. 4/5 strength in right ankle with plantar and 4/5 on dorsiflexion. 5/5 strength in left ankle with plantar and 4/5 on dorsiflexion. 4/5 strength with right knee flexion and extension. 5/5 strength with left knee flexion and extension. No TTP over right shoulder or subacromial bursa. Full ROM of right shoulder without pain.  Neuro: No loss of sensation noted.  Gait: Antalgic- ambulates without assistance.    ASSESSMENT: 80 y.o. year old female with lower back and leg pain, consistent with     1. Lumbar radiculopathy  Procedure Order to Pain Management      2. Chronic pain syndrome        3. Lumbar spondylosis        4. Lumbosacral radiculopathy        5. Spinal stenosis, lumbar region with neurogenic claudication  gabapentin (NEURONTIN) 300 MG capsule      6. Radiculopathy of thoracolumbar region  gabapentin (NEURONTIN) 300 MG capsule      7. DDD (degenerative disc disease), lumbar  gabapentin (NEURONTIN) 300 MG capsule      8. Bilateral leg pain  gabapentin (NEURONTIN) 300 MG capsule          PLAN:     - Previous lumbar MRI was reviewed and discussed  with the patient today.  - Orders placed for repeat caudal SANKET. She had 60% relief with previous caudal SANKET for 8 months. She has completed over 6 weeks of PT exercises in the past 3 months.  - I have stressed the importance of physical activity and a home exercise plan to help with pain and improve health.  - Patient can continue with medications for now since they are providing benefits, using them appropriately, and without side effects.  - Continue Gabapentin 600 mg BID. Refilled today.  - RTC 2 weeks after SANKET.    The above plan and management options were discussed at length with patient. Patient is in agreement with the above and verbalized understanding.    Christin Ventura, BRENDA  06/06/2025

## 2025-06-07 DIAGNOSIS — E78.5 HYPERLIPIDEMIA, UNSPECIFIED HYPERLIPIDEMIA TYPE: ICD-10-CM

## 2025-06-07 NOTE — TELEPHONE ENCOUNTER
No care due was identified.  [facility] Embedded Care Due Messages. Reference number: [id number].   6/07/2025 7:12:13 AM CDT

## 2025-06-08 DIAGNOSIS — M54.16 LUMBAR RADICULOPATHY: Primary | ICD-10-CM

## 2025-06-08 RX ORDER — ATORVASTATIN CALCIUM 40 MG/1
40 TABLET, FILM COATED ORAL
Qty: 90 TABLET | Refills: 1 | Status: SHIPPED | OUTPATIENT
Start: 2025-06-08

## 2025-06-08 NOTE — TELEPHONE ENCOUNTER
Refill Decision Note   Dianaemanuel Herring  is requesting a refill authorization.  Brief Assessment and Rationale for Refill:  Approve     Medication Therapy Plan:         Comments:     Note composed:9:07 AM 06/08/2025

## 2025-06-09 RX ORDER — IBUPROFEN 800 MG/1
800 TABLET, FILM COATED ORAL 2 TIMES DAILY PRN
Qty: 60 TABLET | Refills: 0 | Status: SHIPPED | OUTPATIENT
Start: 2025-06-09

## 2025-06-23 ENCOUNTER — HOSPITAL ENCOUNTER (OUTPATIENT)
Facility: OTHER | Age: 80
Discharge: HOME | End: 2025-06-23
Attending: ANESTHESIOLOGY | Admitting: ANESTHESIOLOGY
Payer: MEDICARE

## 2025-06-23 VITALS
SYSTOLIC BLOOD PRESSURE: 136 MMHG | TEMPERATURE: 98 F | HEIGHT: 60 IN | RESPIRATION RATE: 18 BRPM | WEIGHT: 130 LBS | BODY MASS INDEX: 25.52 KG/M2 | DIASTOLIC BLOOD PRESSURE: 60 MMHG | OXYGEN SATURATION: 98 % | HEART RATE: 59 BPM

## 2025-06-23 DIAGNOSIS — M54.17 LUMBOSACRAL RADICULOPATHY: Primary | ICD-10-CM

## 2025-06-23 DIAGNOSIS — G89.29 CHRONIC PAIN: ICD-10-CM

## 2025-06-23 PROCEDURE — 63600175 PHARM REV CODE 636 W HCPCS: Performed by: ANESTHESIOLOGY

## 2025-06-23 PROCEDURE — 25000003 PHARM REV CODE 250: Performed by: ANESTHESIOLOGY

## 2025-06-23 PROCEDURE — A4216 STERILE WATER/SALINE, 10 ML: HCPCS | Performed by: ANESTHESIOLOGY

## 2025-06-23 PROCEDURE — 62323 NJX INTERLAMINAR LMBR/SAC: CPT | Performed by: ANESTHESIOLOGY

## 2025-06-23 PROCEDURE — 25500020 PHARM REV CODE 255: Performed by: ANESTHESIOLOGY

## 2025-06-23 PROCEDURE — 62323 NJX INTERLAMINAR LMBR/SAC: CPT | Mod: ,,, | Performed by: ANESTHESIOLOGY

## 2025-06-23 RX ORDER — LIDOCAINE HYDROCHLORIDE 10 MG/ML
INJECTION, SOLUTION EPIDURAL; INFILTRATION; INTRACAUDAL; PERINEURAL
Status: DISCONTINUED | OUTPATIENT
Start: 2025-06-23 | End: 2025-06-23 | Stop reason: HOSPADM

## 2025-06-23 RX ORDER — SODIUM CHLORIDE 9 MG/ML
INJECTION, SOLUTION INTRAVENOUS CONTINUOUS
Status: DISCONTINUED | OUTPATIENT
Start: 2025-06-23 | End: 2025-06-23 | Stop reason: HOSPADM

## 2025-06-23 RX ORDER — LIDOCAINE HYDROCHLORIDE 20 MG/ML
INJECTION, SOLUTION INFILTRATION; PERINEURAL
Status: DISCONTINUED | OUTPATIENT
Start: 2025-06-23 | End: 2025-06-23 | Stop reason: HOSPADM

## 2025-06-23 RX ORDER — FENTANYL CITRATE 50 UG/ML
INJECTION, SOLUTION INTRAMUSCULAR; INTRAVENOUS
Status: DISCONTINUED | OUTPATIENT
Start: 2025-06-23 | End: 2025-06-23 | Stop reason: HOSPADM

## 2025-06-23 RX ORDER — SODIUM CHLORIDE 9 MG/ML
INJECTION, SOLUTION INTRAMUSCULAR; INTRAVENOUS; SUBCUTANEOUS
Status: DISCONTINUED | OUTPATIENT
Start: 2025-06-23 | End: 2025-06-23 | Stop reason: HOSPADM

## 2025-06-23 RX ORDER — MIDAZOLAM HYDROCHLORIDE 5 MG/ML
INJECTION INTRAMUSCULAR; INTRAVENOUS
Status: DISCONTINUED | OUTPATIENT
Start: 2025-06-23 | End: 2025-06-23 | Stop reason: HOSPADM

## 2025-06-23 RX ORDER — DEXAMETHASONE SODIUM PHOSPHATE 10 MG/ML
INJECTION, SOLUTION INTRA-ARTICULAR; INTRALESIONAL; INTRAMUSCULAR; INTRAVENOUS; SOFT TISSUE
Status: DISCONTINUED | OUTPATIENT
Start: 2025-06-23 | End: 2025-06-23 | Stop reason: HOSPADM

## 2025-06-23 NOTE — OP NOTE
Caudal Epidural Steroid Injection with Catheter under Fluoroscopic Guidance    The procedure, risks, benefits, and options were discussed with the patient. There are no contraindications to the procedure. The patent expressed understanding and agreed to the procedure. Informed written consent was obtained prior to the start of the procedure and can be found in the patient's chart.    PATIENT NAME: Mrs. Diana Herring   MRN: [id number]     DATE OF PROCEDURE: 06/23/2025    PROCEDURE: Caudal Epidural Steroid Injection under Fluoroscopic Guidance    PRE-OP DIAGNOSIS: Lumbar radiculopathy [M54.16] Lumbar radiculopathy [M54.16]    POST-OP DIAGNOSIS: Same    PHYSICIAN: Miguel Latham MD    ASSISTANTS: Ronaldo Vasquez MD  [facility] pain fellow     MEDICATIONS INJECTED: Preservative-free Decadron 10mg with 4cc of Lidocaine 1% MPF     LOCAL ANESTHETIC INJECTED: Xylocaine 2%     SEDATION: Versed 2mg and Fentanyl 50mcg                                                                                                                                                                                     Conscious sedation ordered by M.D. Patient re-evaluation prior to administration of conscious sedation. No changes noted in patient's status from initial evaluation. The patient's vital signs were monitored by RN and patient remained hemodynamically stable throughout the procedure.    Event Time In   Sedation Start 1151   Sedation End 1201       ESTIMATED BLOOD LOSS: None    COMPLICATIONS: None    TECHNIQUE: Time-out was performed to identify the patient and procedure to be performed. With the patient laying in a prone position, the surgical area was prepped and draped in the usual sterile fashion using ChloraPrep and a fenestrated drape. The injection site was determined under fluoroscopy guidance. Skin anesthesia was achieved by injecting Lidocaine 2% over the injection site. The sacrum and sacral cornua were then approached with a 16  gauge, 3.5 inch epidural needle that was introduced and advanced into the sacral hiatus under fluoroscopic guidance with AP, lateral and/or contralateral oblique imaging. After the needle passed through the sacrococcygeal ligament, the needle angle was lowered and the needle was advanced 1 cm. After negative aspiration of blood or CSF, and no evidence of paraesthesias, the catheter was threaded through the needle into the epidural space using live fluoroscopy, contrast dye Omnipaque (300mg/mL) was injected to confirm placement and there was no vascular runoff. Fluoroscopic imaging in the AP and lateral views revealed a clear outline of the spinal nerve with proximal spread of agent through the caudal epidural space. Then 10 mL of the medication mixture listed above was injected slowly. Displacement of the radio opaque contrast after injection of the medication confirmed that the medication went into the area of the transforaminal spaces. The needles were removed and bleeding was nil. A sterile dressing was applied. No specimens collected. The patient tolerated the procedure well.     The patient was monitored after the procedure in the recovery area. They were given post-procedure and discharge instructions to follow at home. The patient was discharged in a stable condition.    Gaurang Mckeon MD     I reviewed and edited the fellow's note. I conducted my own interview and physical examination. I agree with the findings. I was present and supervising all critical portions of the procedure.   Miguel Latham MD

## 2025-06-23 NOTE — DISCHARGE INSTRUCTIONS

## 2025-06-23 NOTE — DISCHARGE SUMMARY
Discharge Note  Short Stay      SUMMARY     Admit Date: 6/23/2025    Attending Physician: Gaurang Mckeon      Discharge Physician: Gaurang Mckeon      Discharge Date: 6/23/2025 11:45 AM    Procedure(s) (LRB):  CAUDAL SANKET (N/A)    Final Diagnosis: Lumbar radiculopathy [M54.16]    Disposition: Home or self care    Patient Instructions:   Current Discharge Medication List        CONTINUE these medications which have NOT CHANGED    Details   amLODIPine (NORVASC) 10 MG tablet TAKE 1 TABLET BY MOUTH EVERY DAY  Qty: 90 tablet, Refills: 3    Comments: .  Associated Diagnoses: Essential hypertension      atorvastatin (LIPITOR) 40 MG tablet TAKE 1 TABLET BY MOUTH EVERY DAY  Qty: 90 tablet, Refills: 1    Associated Diagnoses: Hyperlipidemia, unspecified hyperlipidemia type      calcium-vitamin D 500-125 mg-unit tablet Take 1 tablet by mouth once daily.      ergocalciferol (ERGOCALCIFEROL) 50,000 unit Cap TAKE 1 CAPSULE (50,000 UNITS TOTAL) BY MOUTH EVERY SUNDAY.  Qty: 12 capsule, Refills: 1    Associated Diagnoses: Vitamin D deficiency      fluticasone propionate (FLONASE) 50 mcg/actuation nasal spray 2 SPRAYS (100 MCG TOTAL) BY EACH NOSTRIL ROUTE ONCE DAILY.  Qty: 48 mL, Refills: 3    Comments: NEED NEW SCRIPT  Associated Diagnoses: Allergic rhinitis, unspecified seasonality, unspecified trigger      gabapentin (NEURONTIN) 300 MG capsule Take 2 capsules (600 mg total) by mouth 2 (two) times daily.  Qty: 360 capsule, Refills: 1    Associated Diagnoses: Spinal stenosis, lumbar region with neurogenic claudication; Radiculopathy of thoracolumbar region; DDD (degenerative disc disease), lumbar; Bilateral leg pain      hydrOXYzine HCL (ATARAX) 25 MG tablet TAKE 1 TABLET BY MOUTH NIGHTLY AS NEEDED (INSOMNIA).  Qty: 90 tablet, Refills: 0    Associated Diagnoses: Psychophysiological insomnia      ibuprofen (ADVIL,MOTRIN) 800 MG tablet TAKE 1 TABLET BY MOUTH 2 TIMES DAILY AS NEEDED.  Qty: 60 tablet, Refills: 0    Associated  Diagnoses: Lumbar radiculopathy      metoprolol succinate (TOPROL-XL) 100 MG 24 hr tablet TAKE 1 TABLET BY MOUTH EVERY DAY  Qty: 90 tablet, Refills: 2    Comments: .  Associated Diagnoses: Essential hypertension      multivitamin (THERAGRAN) per tablet Take 1 tablet by mouth once daily.      ondansetron (ZOFRAN-ODT) 4 MG TbDL Take 1 tablet (4 mg total) by mouth every 8 (eight) hours as needed.  Qty: 20 tablet, Refills: 0      pantoprazole (PROTONIX) 40 MG tablet TAKE 1 TABLET BY MOUTH EVERY DAY  Qty: 90 tablet, Refills: 3    Associated Diagnoses: Gastroesophageal reflux disease without esophagitis      paroxetine (PAXIL) 20 MG tablet Take 1 tablet (20 mg total) by mouth every morning.  Qty: 90 tablet, Refills: 1    Associated Diagnoses: Mild recurrent major depression; Anxiety      traZODone (DESYREL) 150 MG tablet TAKE 1 TABLET (150 MG TOTAL) BY MOUTH EVERY EVENING.  Qty: 90 tablet, Refills: 1    Associated Diagnoses: Insomnia, unspecified type                 Discharge Diagnosis: Lumbar radiculopathy [M54.16]  Condition on Discharge: Stable with no complications to procedure   Diet on Discharge: Same as before.  Activity: as per instruction sheet.  Discharge to: Home with a responsible adult.  Follow up: 2-4 weeks       Please call my office or pager at [phone] if experienced any weakness or loss of sensation, fever > 101.5, pain uncontrolled with oral medications, persistent nausea/vomiting/or diarrhea, redness or drainage from the incisions, or any other worrisome concerns. If physician on call was not reached or could not communicate with our office for any reason please go to the nearest emergency department

## 2025-06-24 ENCOUNTER — TELEPHONE (OUTPATIENT)
Dept: ENDOSCOPY | Facility: HOSPITAL | Age: 80
End: 2025-06-24
Payer: MEDICARE

## 2025-06-24 NOTE — TELEPHONE ENCOUNTER
"Contacted the patient to schedule an endoscopy procedure(s) Colonoscopy . The patient did not answer the call and left a voice message requesting a call back.         From: Betty Hurst MD  Sent: 1/29/2025   2:45 PM CST  To: [facility]  Subject: colonoscopy in 3 months                          Procedure: Colonoscopy    Diagnosis: colitis    Procedure Timing: in 3 months (sometime in May 2025)    *If within 4 weeks selected, please larisa as high priority*    *If greater than 12 weeks, please select "5-12 weeks" and delay sending until 3 months prior to requested date*    Location:     Additional Scheduling Information: No scheduling concerns    Prep Specifications:Standard prep    Is the patient taking a GLP-1 Agonist:no    Have you attached a patient to this message: yes     "

## 2025-07-02 ENCOUNTER — TELEPHONE (OUTPATIENT)
Dept: ENDOSCOPY | Facility: HOSPITAL | Age: 80
End: 2025-07-02
Payer: MEDICARE

## 2025-07-02 VITALS — BODY MASS INDEX: 25.52 KG/M2 | WEIGHT: 130 LBS | HEIGHT: 60 IN

## 2025-07-02 DIAGNOSIS — K52.9 COLITIS: ICD-10-CM

## 2025-07-02 DIAGNOSIS — Z12.11 COLON CANCER SCREENING: Primary | ICD-10-CM

## 2025-07-02 RX ORDER — SODIUM, POTASSIUM,MAG SULFATES 17.5-3.13G
1 SOLUTION, RECONSTITUTED, ORAL ORAL DAILY
Qty: 1 KIT | Refills: 0 | Status: SHIPPED | OUTPATIENT
Start: 2025-07-02 | End: 2025-07-04

## 2025-07-02 NOTE — TELEPHONE ENCOUNTER
"From: Betty Hurst MD  Sent: 1/29/2025   2:45 PM CST  To: [facility]  Subject: colonoscopy in 3 months                          Procedure: Colonoscopy    Diagnosis: colitis    Procedure Timing: in 3 months (sometime in May 2025)    *If within 4 weeks selected, please larisa as high priority*    *If greater than 12 weeks, please select "5-12 weeks" and delay sending until 3 months prior to requested date*    Location:     Additional Scheduling Information: No scheduling concerns    Prep Specifications:Standard prep    Is the patient taking a GLP-1 Agonist:no    Have you attached a patient to this message: yes   "

## 2025-07-02 NOTE — TELEPHONE ENCOUNTER
Patient is scheduled for a Colonoscopy on 8/14/25 with Dr. DEBRA Hurst  Referral for procedure from [facility]

## 2025-07-08 DIAGNOSIS — M54.16 LUMBAR RADICULOPATHY: Primary | ICD-10-CM

## 2025-07-08 RX ORDER — IBUPROFEN 800 MG/1
800 TABLET, FILM COATED ORAL 2 TIMES DAILY PRN
Qty: 60 TABLET | Refills: 0 | Status: SHIPPED | OUTPATIENT
Start: 2025-07-08

## 2025-07-10 ENCOUNTER — INFUSION (OUTPATIENT)
Dept: INFECTIOUS DISEASES | Facility: HOSPITAL | Age: 80
End: 2025-07-10
Payer: MEDICARE

## 2025-07-10 VITALS
HEIGHT: 60 IN | SYSTOLIC BLOOD PRESSURE: 172 MMHG | BODY MASS INDEX: 28.26 KG/M2 | DIASTOLIC BLOOD PRESSURE: 72 MMHG | WEIGHT: 143.94 LBS | OXYGEN SATURATION: 98 % | RESPIRATION RATE: 18 BRPM | TEMPERATURE: 98 F | HEART RATE: 61 BPM

## 2025-07-10 DIAGNOSIS — M81.0 AGE-RELATED OSTEOPOROSIS WITHOUT CURRENT PATHOLOGICAL FRACTURE: Primary | ICD-10-CM

## 2025-07-10 PROCEDURE — 63600175 PHARM REV CODE 636 W HCPCS: Performed by: NURSE PRACTITIONER

## 2025-07-10 PROCEDURE — 96365 THER/PROPH/DIAG IV INF INIT: CPT

## 2025-07-10 RX ORDER — HEPARIN 100 UNIT/ML
500 SYRINGE INTRAVENOUS
OUTPATIENT
Start: 2025-07-10

## 2025-07-10 RX ORDER — SODIUM CHLORIDE 0.9 % (FLUSH) 0.9 %
10 SYRINGE (ML) INJECTION
Status: DISCONTINUED | OUTPATIENT
Start: 2025-07-10 | End: 2025-07-10 | Stop reason: HOSPADM

## 2025-07-10 RX ORDER — ZOLEDRONIC ACID 5 MG/100ML
5 INJECTION, SOLUTION INTRAVENOUS
OUTPATIENT
Start: 2025-07-10

## 2025-07-10 RX ORDER — SODIUM CHLORIDE 0.9 % (FLUSH) 0.9 %
10 SYRINGE (ML) INJECTION
OUTPATIENT
Start: 2025-07-10

## 2025-07-10 RX ORDER — ZOLEDRONIC ACID 5 MG/100ML
5 INJECTION, SOLUTION INTRAVENOUS
Status: COMPLETED | OUTPATIENT
Start: 2025-07-10 | End: 2025-07-10

## 2025-07-10 RX ADMIN — ZOLEDRONIC ACID 5 MG: 5 INJECTION, SOLUTION INTRAVENOUS at 01:07

## 2025-07-10 NOTE — PROGRESS NOTES
Patient here for Reclast infusion.  Limited head-to-toe assessment due to privacy issues and visit reason though the opportunity was given for patient to express any concerns

## 2025-07-14 ENCOUNTER — OFFICE VISIT (OUTPATIENT)
Dept: PAIN MEDICINE | Facility: CLINIC | Age: 80
End: 2025-07-14
Payer: MEDICARE

## 2025-07-14 VITALS
HEART RATE: 62 BPM | OXYGEN SATURATION: 99 % | SYSTOLIC BLOOD PRESSURE: 122 MMHG | WEIGHT: 146.81 LBS | DIASTOLIC BLOOD PRESSURE: 69 MMHG | BODY MASS INDEX: 28.82 KG/M2 | TEMPERATURE: 98 F | HEIGHT: 60 IN

## 2025-07-14 DIAGNOSIS — M54.16 LUMBAR RADICULOPATHY: Primary | ICD-10-CM

## 2025-07-14 DIAGNOSIS — G89.4 CHRONIC PAIN SYNDROME: ICD-10-CM

## 2025-07-14 PROCEDURE — 3078F DIAST BP <80 MM HG: CPT | Mod: CPTII,S$GLB,, | Performed by: NURSE PRACTITIONER

## 2025-07-14 PROCEDURE — 3288F FALL RISK ASSESSMENT DOCD: CPT | Mod: CPTII,S$GLB,, | Performed by: NURSE PRACTITIONER

## 2025-07-14 PROCEDURE — 1160F RVW MEDS BY RX/DR IN RCRD: CPT | Mod: CPTII,S$GLB,, | Performed by: NURSE PRACTITIONER

## 2025-07-14 PROCEDURE — 1101F PT FALLS ASSESS-DOCD LE1/YR: CPT | Mod: CPTII,S$GLB,, | Performed by: NURSE PRACTITIONER

## 2025-07-14 PROCEDURE — 3074F SYST BP LT 130 MM HG: CPT | Mod: CPTII,S$GLB,, | Performed by: NURSE PRACTITIONER

## 2025-07-14 PROCEDURE — 99999 PR PBB SHADOW E&M-EST. PATIENT-LVL IV: CPT | Mod: PBBFAC,,, | Performed by: NURSE PRACTITIONER

## 2025-07-14 PROCEDURE — 1159F MED LIST DOCD IN RCRD: CPT | Mod: CPTII,S$GLB,, | Performed by: NURSE PRACTITIONER

## 2025-07-14 PROCEDURE — 1125F AMNT PAIN NOTED PAIN PRSNT: CPT | Mod: CPTII,S$GLB,, | Performed by: NURSE PRACTITIONER

## 2025-07-14 PROCEDURE — 99214 OFFICE O/P EST MOD 30 MIN: CPT | Mod: S$GLB,,, | Performed by: NURSE PRACTITIONER

## 2025-07-14 NOTE — PROGRESS NOTES
Chronic patient Established Note (Follow up visit)      SUBJECTIVE:    Interval History 7/14/2025:  The patient presents for follow-up of lower back and right leg pain. She is s/p caudal SANKET with about 50% relief initially. However, previous caudal SANKET last year provided more benefit. She reports lower back pain radiating down her right anterolateral leg. It worsens with laying down and bending. No current pain on the right. She takes Gabapentin and Ibuprofen for pain which provides some benefit. She continues with daily PT exercises. Her pain today is 9/10.    Interval History 6/6/2025:  The patient presents for follow-up to discuss worsening lower back pain. I last saw her in October 2024 at which time she was doing fairly well after caudal SANKET. Pain radiates down her right leg, with associated numbness and tingling. The pain has extended to the muscles in her leg, requiring application of pain cream for relief. She experienced an incident while washing dishes where her right side, including her buttock, became numb, nearly causing her to fall. She describes these symptoms as intermittent, requiring her to sit down when she feels them coming on. She has difficulty walking long distances and required assistance from a  to navigate the parking lot for this appointment. She has been attempting to manage symptoms with exercise without relief.  Of note, she visited urgent care approximately two months ago due to persistent vomiting. During this visit, a spot was identified on her lungs. Further investigation at the main campus, including an endoscopic procedure, was inconclusive. A follow-up visit two weeks ago revealed that the nodule is stable. She recently had a primary care visit for osteoporosis, where Reclast treatment was recommended. Her pain today is 8/10.    Interval History 10/1/2024:  The patient presents for follow up of chronic back pain. She is now s/p caudal SANKET. She is reporting 60%  relief. She has intermittent numbness to right leg. She has been having more right shoulder pain recently, worse with overhead activities. No chest pain or SOB. She takes Gabapentin and Ibuprofen with benefit. No side effects reported. Her pain today is 8/10.    Interval History 8/22/2024:  Diana returns today for follow up to discuss worsened back and leg pain. The pain starts across the lower back with radiation down the back of the legs. She has numbness to both legs (R>L). The pain is worse with activity. No recent falls or injury. She has had significant benefit with caudal ESIs in the past with the last being in January. She would like to repeat. Her pain today is 9/10.     Interval History 12/14/2023:  The patient presents for 3 follow up of chronic lower back pain. She continues to report radiation into the buttocks and legs. Back pain is greater than leg pain. She does have some stiffness in the morning. This improves with walking around. She also has intermittent numbness to legs. She takes Ibuprofen PRN, usually once daily. She also takes Gabapentin 600 mg BID which also helps. She says that she has been performing her home exercises. Her shoulder pain has improved with home PT regimen. Her pain today is 7/10.    Interval History 9/14/2023:  The patient is here for follow up of back pain. She is now s/p caudal SANKET with 75% relief which started to wane down at 1 week. She is still having some relief at this time. She does say that this was more helpful that more recent ESIs. She does find Ibuprofen helpful but tries not to take daily. She plans to restart her home PT exercises. She has also been having some right shoulder pain, mainly with sleeping on that side. Her pain today is 8/10.    Interval History 7/14/2023:  The patient is here for follow up of back and leg pain. The pain starts to the lower back and tailbone with radiation down the back of both legs with numbness and tingling. The pain is worse  to the posterior legs but she also has pain to the front and sides of both legs. She says that the last right sided TF SANKET did not last as long as previous procedures. She has performed home PT exercises for over 6 weeks with minimal benefit. She previously declined surgical referral for severe stenosis. Her pain today is 6/10.    Interval History 5/5/2023:  The patient returns today for follow up of back and leg pain. She has been in PT which she thinks is helping her range of motion. She is still having back pain with radiation down the right leg. There is associated numbness. She previously had benefit with SANKET but declined to repeat at last OV. She also declined surgical consult. She has benefit with Gabapentin 1200 mg daily with some benefit but it does cause some sedation. Her pain today is 6/10.    Interval History 3/23/2023:  The patient presents for follow up of chronic back pain. She is here for review up updated lumbar MRI. Her results show severe spondylitic central spinal stenosis at L3-4 and L4-5, L4-5 subarticular zones severe stenosis bilaterally and severe right-sided neural foraminal stenosis. TF ESIs have been helpful short-term. She is not interested in surgical consult at this time. No bowel or bladder incontinence. Her biggest complaint today is right sided back pain with radiation down the side of the right leg and numbness. Her pain today is 9/10.    Interval History 3/9/2023:  The patient presents today for follow up of back and leg pain. She is now s/p repeat right L3/4 and L4/5 TF SANKET on 2/8/23 with 80% benefit for about 2 weeks only. Previous did help for longer. She is reporting pain that start to right lower back with radiation into the front and back of the right leg to the anterior shin. She has numbness on the right lower leg. She feels like her pain has worsened over the past few months. Her last MRI was in 2019. She had some benefit with PT in the past and is open to repeat. Her  pain today is 7/10.    Interval History 1/24/2023:  Diana is here for follow up of back and right leg pain. She is now s/p right L3/4 and L4/5 TF SANKET on 12/7/22. She reports about 80% relief for about one month. Today, she is reporting lower back pain with radiation into the front and side of the right leg. She has numbness to the right leg intermittently without warning. She also has been having cramps to lower legs but says she thinks that she has not been drinking enough water. She completed aquatic PT which she thinks was helpful. Her pain today is 8/10.    Interval History 11/11/2022:  The patient returns today for follow up of back and leg pain. She has been in aquatic PT which she finds helpful. She attends twice weekly. Her back pain has improved somewhat because of this. She does have worsened right leg pain with walking and activity. The pain radiates down the front and side of the right leg with associated numbness. No current radiation on the left. She had benefit with ESIs in the past and wishes to repeat. Her pain today is 7/10.    Interval History 8/11/2022:  The patient is here for follow up of lower back pain. Since previous encounter, she underwent repeat L3/4 TF SANKET with 70% relief. We had originally planned for MBB but she started having more shooting pain into the legs. She feels like this was helpful. Her pain is tolerable at this time. Her pain today is 7/10.    Interval History 5/9/2022:  The patient is here for follow up of chronic back pain. She is having more back pain and stiffness in the morning. We did previously discuss lumbar MBB but she is worried about not having IV sedation. She says that she would like to further discuss the procedure today. Leg pain has been mild since previous SANKET. She does have intermittent numbness still. Back pain is greater than leg pain. Her pain today is 7/10.    Interval History 4/7/2022:  The patient is here for follow up of lower back and leg pain.  Since previous encounter, she underwent bilateral L3/4 TF SANKET on 1/26/22. She reports 80% relief of pain for about 2 months. She feels like this gave her significant benefit during that time and her pain was mild. She is now again having severe back pain with radiation into the anterior thighs. She would like to repeat the procedure. She would also like me to place another referral for aquatic PT. She stopped Mobic because she found Diclofenac more helpful. She has been taking as needed but not daily. She does find Gabapentin helpful but it sometimes makes her drowsy. Her pain today is 9/10.    Interval History 1/11/2022:  The patient returns to discuss continued lower back pain. She has not restarted aquatic PT due to increase in Covid-19 cases. She continues with sharp lower back pain that radiates into anterior thighs, bilaterally. She has associated numbness. The pain is worse with increased activity. Her pain today is 7/10.    Interval History 11/11/2021:  The patient presents today for 2 month follow up of lower back pain. Since previous encounter, she has not started aquatic PT because she is on the waiting list. She continues to report lower back pain with radiation down the left leg. We previously discussed lumbar MBB/RFA which she does not wish to pursue at this time. She states that diclofenac is not helping very much with her aching pain at this time. She denies any new weakness or symptoms at this time. Her pain today is 8/10.    Interval History 8/19/2021:  Diana returns today for follow up of chronic lower back pain. She reports that he has continued with aching pain. She has been in PT but is not finding it very helpful. She has not tried aquatic PT in the past. She is still having intermittent radiation down her legs. She has been unable to take 1200 mg daily of Gabapentin and has decreased to 600 mg daily due to sedation and dizziness with the medication. Otherwise, no new complaints today. Her  pain today is 7/10.    Interval History 6/18/2021:  The patient is here for follow up of lower back pain.  She has radiation of her pain into her anterior thighs but not below the knees.  She denies numbness or tingling at this time.  Her back pain is currently greater than her leg pain.  She has a lot of stiffness when she wakes the morning states it improves when she moves.  She had limited benefit with recent repeat bilateral L3-4 transforaminal steroid injection.  Her pain today is 7/10    Interval History 5/4/2021:  The patient is here for follow up of lower back and leg pain.  Previous encounter, physical therapy was ordered.  She states that she did not start physical therapy and would like me to replace the order for her.  She does report that she is noticing more muscle fatigue and weakness particularly with activity.  She is also having more back pain with radiation into the anterior lateral thighs with the past month.  She previously had significant been hip with bilateral L3/4 transforaminal epidural steroid injection and wishes to repeat this.  She found this more helpful than previous procedures. Her pain today is 7/10.    Interval History 3/2/2021:  The patient is here for follow up of chronic pain. She is having more back pain today which she attributes to the cold front coming in. She is having radiation into the buttocks and right anterior thigh. Her back pain is her primary complaint today. She describes it as throbbing. She recently completed both Covid-19 vaccines which she tolerated well. She is now taking Gabapentin 600 mg twice daily regularly. She notices improvement in right leg numbness.  Her pain today is 7/10.    Interval History 12/1/2020:  The patient iss here for follow-up of back and leg pain.  She is now status post bilateral L3/4 transforaminal epidural steroid injection on 11/11/2020. She is reporting 100% relief for about 2 weeks and then her pain started to return.  She is still  having some benefit.  Her pain is located across the lower back with radiation into front and sides of the legs to the anterior feet with associated numbness.  She feels like when she walks sometimes her legs become weak.  This has improved slightly.  She denies any recent falls.  At her last OV, her Gabapentin was increased to 600 mg BID.  She says that she finds this helpful.  She has mild drowsiness.  She admits that she does not always take the medication and thought it was more of an as needed medication.  The patient denies any bowel or bladder incontinence or signs of saddle paresthesia.  She feels as though her pain today is worse than usual.  She rates her pain today as 10/10.    Interval history 10/15/2020:  Diana Herring presents to the clinic for a follow-up appointment for back pain. Since the last visit, Diana Herring states the pain has been worsening. Current pain intensity is 7/10. She reports that she continues to have low back pain that radiates to the bilateral hip and leg. She does report that she has noticed intermittent numbness in the right lateral and anterior thigh, that is sometimes associated with weakness in her right leg. She says that her leg gives out when this happens. She has not had any falls or other trauma. She did not get the bilateral L3/4 TFESI planned last time, and she is still taking only 300 mg bid of gabapentin.  Patient denies urinary incontinence, bowel incontinence, significant weight loss.    Initial visit:  Diana Herring presents to the clinic for the evaluation of low back pain. The pain started 2 years ago following loosing bone after a bone density and symptoms have been worsening.The pain is located in the low back area and radiates to the bilateral hip and leg.  The pain is described as aching, numbing, sharp and tight band and is rated as 8/10. The pain is rated with a score of  7/10 on the BEST day and a score of 8/10 on the WORST day.  Symptoms  interfere with daily activity and sleeping. The pain is exacerbated by Sitting, Standing, Bending, Coughing/Sneezing, Walking, Morning, Lifting and Getting out of bed/chair.  The pain is mitigated by heat, laying down and rest. She reports spending 0 hours per day reclining. The patient reports 6 hours of uninterrupted sleep per night.     Patient had L5/S1 ILESI 2 weeks ago and only gave her 1 day of relief.      Patient denies night fever/night sweats, urinary incontinence, bowel incontinence, significant weight loss and significant motor weakness.     Physical Therapy/Home Exercise: yes, was not beneficial      Pain Disability Index Review:      7/14/2025     2:45 PM 7/14/2025     2:44 PM 6/6/2025    10:00 AM   Last 3 PDI Scores   Pain Disability Index (PDI) 18 9 23       Pain Medications:  Gabapentin 600 mg BID  Ibuprofen PRN    Opioid Contract: no     report:  Reviewed and consistent with medication use as prescribed.    Pain Procedures:  6/23/25 50% relief for 3 weeks  9/4/24 Caudal SANKET- 60% relief for 8 months  1/31/24 Caudal SANKET- 70% relief  8/30/23 Caudal SANKET- 70% relief for 3 months  2/8/23 Right L3/4 and L4/5 TF SANKET- 80% relief for 2 weeks  12/7/22 Right L3/4 and L4/5 TF SANKET- 80% relief for one month  7/20/22 Bilateral L3/4 TF SANKET- 70% relief  4/20/22 Bilateral L3/4 TF SANKET- 75% relief of leg pain  1/26/22 Bilateral L3/4 TF SANKET  5/19/21 bilateral L3/4 TF SANKET  11/11/20 Bilateral L3/4 TF SANKET- 100% relief for 2 weeks  5/13/20 L5/S1 ILESI  10/30/19 TFESI Bilateral L3-L4  09/04/19 TFESI Bilateral L3     Physical Therapy/Home Exercise: yes    Imaging:    Narrative & Impression  EXAMINATION:  MRI LUMBAR SPINE WITHOUT CONTRAST     CLINICAL HISTORY:  Dorsalgia, unspecifiedLow back pain, symptoms persist with > 6wks conservative treatment;     TECHNIQUE:  Sagittal T1, sagittal T2, sagittal STIR, axial T1 and axial T2 weighted images of the lumbar spine obtained without contrast.     COMPARISON:  X-ray 05/29/2020  and lumbar spine radiograph 07/26/2019     FINDINGS:  Lumbar spine alignment is within normal limits. The vertebral body heights are well maintained, with no fracture.  Degenerative edema signal at opposing endplates of L4-5 and degenerative sclerosis at multiple endplates from L2 through L5 with associated Schmorl's nodes.  Otherwise, marrow signal normal.     The conus is normal in appearance.  There are bilateral T2 hyperintense renal lesions likely related to cysts.     Bunching of cauda equina nerve roots posterior to L2 and L3 vertebral bodies.     L1-L2: Circumferential disc bulge, spondylitic spurring and mild facet arthritis.Mild central spinal stenosis.     L2-L3: Circumferential disc bulge, spondylitic spurring and facet arthritis.  Mild central spinal stenosis and left-sided foraminal stenosis.     L3-L4: Circumferential disc bulge, spondylitic spurring, severe facet arthritis and ligamentum flavum thickening produce severe central spinal stenosis and mild bilateral foraminal stenosis.     L4-L5: Circumferential disc bulge, spondylitic spurring, facet arthritis and ligamentum flavum thickening.  Bilateral facet joint effusions.  Severe central spinal stenosis and bilateral subarticular zone stenosis.  Moderate left and severe right-sided neural foraminal stenosis.     L5-S1: Circumferential disc bulge and minimal facet arthritis.  No central canal or foraminal stenosis.     Impression:     Severe spondylitic central spinal stenosis at L3-4 and L4-5.  L4-5 subarticular zones severe stenosis bilaterally and severe right-sided neural foraminal stenosis.  Additional degrees of central spinal stenosis and foraminal stenosis as above.     Bilateral renal lesions likely cysts which can be confirmed with ultrasound on a nonemergent basis.       CMP  Sodium   Date Value Ref Range Status   05/15/2025 145 136 - 145 mmol/L Final   12/06/2024 144 136 - 145 mmol/L Final     Potassium   Date Value Ref Range Status    05/15/2025 4.4 3.5 - 5.1 mmol/L Final   12/06/2024 3.0 (L) 3.5 - 5.1 mmol/L Final     Chloride   Date Value Ref Range Status   05/15/2025 108 95 - 110 mmol/L Final   12/06/2024 109 95 - 110 mmol/L Final     CO2   Date Value Ref Range Status   05/15/2025 28 23 - 29 mmol/L Final   12/06/2024 24 23 - 29 mmol/L Final     Glucose   Date Value Ref Range Status   05/15/2025 96 70 - 110 mg/dL Final   12/06/2024 104 70 - 110 mg/dL Final     BUN   Date Value Ref Range Status   05/15/2025 14 8 - 23 mg/dL Final     Creatinine   Date Value Ref Range Status   05/15/2025 0.8 0.5 - 1.4 mg/dL Final     Calcium   Date Value Ref Range Status   05/15/2025 9.8 8.7 - 10.5 mg/dL Final   12/06/2024 9.3 8.7 - 10.5 mg/dL Final     Protein Total   Date Value Ref Range Status   05/15/2025 8.3 6.0 - 8.4 gm/dL Final     Total Protein   Date Value Ref Range Status   12/06/2024 8.4 6.0 - 8.4 g/dL Final     Albumin   Date Value Ref Range Status   05/15/2025 4.2 3.5 - 5.2 g/dL Final   12/06/2024 3.8 3.5 - 5.2 g/dL Final     Total Bilirubin   Date Value Ref Range Status   12/06/2024 1.2 (H) 0.1 - 1.0 mg/dL Final     Comment:     For infants and newborns, interpretation of results should be based  on gestational age, weight and in agreement with clinical  observations.    Premature Infant recommended reference ranges:  Up to 24 hours.............<8.0 mg/dL  Up to 48 hours............<12.0 mg/dL  3-5 days..................<15.0 mg/dL  6-29 days.................<15.0 mg/dL       Bilirubin Total   Date Value Ref Range Status   05/15/2025 0.9 0.1 - 1.0 mg/dL Final     Comment:     For infants and newborns, interpretation of results should be based   on gestational age, weight and in agreement with clinical   observations.    Premature Infant recommended reference ranges:   0-24 hours:  <8.0 mg/dL   24-48 hours: <12.0 mg/dL   3-5 days:    <15.0 mg/dL   6-29 days:   <15.0 mg/dL     Alkaline Phosphatase   Date Value Ref Range Status   12/06/2024 76 40 -  150 U/L Final     ALP   Date Value Ref Range Status   05/15/2025 83 40 - 150 unit/L Final     AST   Date Value Ref Range Status   05/15/2025 19 11 - 45 unit/L Final   12/06/2024 34 10 - 40 U/L Final     ALT   Date Value Ref Range Status   05/15/2025 17 10 - 44 unit/L Final   12/06/2024 35 10 - 44 U/L Final     Anion Gap   Date Value Ref Range Status   05/15/2025 9 8 - 16 mmol/L Final     eGFR if    Date Value Ref Range Status   03/04/2022 >60.0 >60 mL/min/1.73 m^2 Final     eGFR if non    Date Value Ref Range Status   03/04/2022 >60.0 >60 mL/min/1.73 m^2 Final     Comment:     Calculation used to obtain the estimated glomerular filtration  rate (eGFR) is the CKD-EPI equation.            Allergies: Review of patient's allergies indicates:  No Known Allergies    Current Medications:   Current Outpatient Medications   Medication Sig Dispense Refill    amLODIPine (NORVASC) 10 MG tablet TAKE 1 TABLET BY MOUTH EVERY DAY 90 tablet 3    atorvastatin (LIPITOR) 40 MG tablet TAKE 1 TABLET BY MOUTH EVERY DAY 90 tablet 1    calcium-vitamin D 500-125 mg-unit tablet Take 1 tablet by mouth once daily.      ergocalciferol (ERGOCALCIFEROL) 50,000 unit Cap TAKE 1 CAPSULE (50,000 UNITS TOTAL) BY MOUTH EVERY SUNDAY. 12 capsule 1    fluticasone propionate (FLONASE) 50 mcg/actuation nasal spray 2 SPRAYS (100 MCG TOTAL) BY EACH NOSTRIL ROUTE ONCE DAILY. 48 mL 3    gabapentin (NEURONTIN) 300 MG capsule Take 2 capsules (600 mg total) by mouth 2 (two) times daily. 360 capsule 1    hydrOXYzine HCL (ATARAX) 25 MG tablet TAKE 1 TABLET BY MOUTH NIGHTLY AS NEEDED (INSOMNIA). 90 tablet 0    ibuprofen (ADVIL,MOTRIN) 800 MG tablet TAKE 1 TABLET BY MOUTH 2 TIMES DAILY AS NEEDED. 60 tablet 0    metoprolol succinate (TOPROL-XL) 100 MG 24 hr tablet TAKE 1 TABLET BY MOUTH EVERY DAY 90 tablet 2    multivitamin (THERAGRAN) per tablet Take 1 tablet by mouth once daily.      pantoprazole (PROTONIX) 40 MG tablet TAKE 1 TABLET BY  MOUTH EVERY DAY 90 tablet 3    ondansetron (ZOFRAN-ODT) 4 MG TbDL Take 1 tablet (4 mg total) by mouth every 8 (eight) hours as needed. (Patient not taking: Reported on 7/14/2025) 20 tablet 0    paroxetine (PAXIL) 20 MG tablet Take 1 tablet (20 mg total) by mouth every morning. (Patient not taking: Reported on 6/6/2025) 90 tablet 1    traZODone (DESYREL) 150 MG tablet TAKE 1 TABLET (150 MG TOTAL) BY MOUTH EVERY EVENING. (Patient not taking: Reported on 6/6/2025) 90 tablet 1     No current facility-administered medications for this visit.       REVIEW OF SYSTEMS:    GENERAL:  No weight loss, malaise or fevers.  HEENT:  Negative for frequent or significant headaches.  NECK:  Negative for lumps, goiter, pain and significant neck swelling.  RESPIRATORY:  Negative for cough, wheezing or shortness of breath.  CARDIOVASCULAR:  Negative for chest pain, leg swelling or palpitations.  GI:  Negative for abdominal discomfort, blood in stools or black stools or change in bowel habits. GERD.  MUSCULOSKELETAL:  See HPI.  SKIN:  Negative for lesions, rash, and itching.  PSYCH:  + for sleep disturbance, h/o depression  HEMATOLOGY/LYMPHOLOGY:  Negative for prolonged bleeding, bruising easily or swollen nodes.  NEURO:  + numbness. No history of headaches, syncope, paralysis, seizures or tremors.  All other reviewed and negative other than HPI.     Past Medical History:  Past Medical History:   Diagnosis Date    Anxiety     Arthritis     Corneal abrasion s    ? eye     Depression     Full dentures     GERD (gastroesophageal reflux disease)     Hyperlipidemia     Hypertension     Nuclear sclerosis of both eyes 6/5/2017    Osteoporosis     Restless leg syndrome        Past Surgical History:  Past Surgical History:   Procedure Laterality Date    COLONOSCOPY N/A 5/14/2019    Procedure: COLONOSCOPY;  Surgeon: Nahid Cline MD;  Location: [facility];  Service: Endoscopy;  Laterality: N/A;    COLONOSCOPY N/A 12/12/2024    Procedure:  COLONOSCOPY;  Surgeon: Betty Hurst MD;  Location: [facility] ENDO;  Service: Endoscopy;  Laterality: N/A;  12/10 ref by Bianca Matute NP, PEG, emailed to [email or web address]    ENDOBRONCHIAL ULTRASOUND N/A 1/31/2025    Procedure: ENDOBRONCHIAL ULTRASOUND (EBUS);  Surgeon: Garrett Farooq MD;  Location: [facility] OR 2ND FLR;  Service: Pulmonary;  Laterality: N/A;    EPIDURAL STEROID INJECTION N/A 5/13/2020    Procedure: LUMBAR/CAUDAL L5/S1 IL SANKET ;  Surgeon: Miguel Latham MD;  [facility] [facility];  Service: Pain Management;  Laterality: N/A;  NEEDS CONSENT    EPIDURAL STEROID INJECTION N/A 8/30/2023    Procedure: INJECTION, STEROID, EPIDURAL, CAUDAL W/ CATH SOONER DATE;  Surgeon: Miguel Latham MD;  Location: [facility];  Service: Pain Management;  Laterality: N/A;    EPIDURAL STEROID INJECTION N/A 1/31/2024    Procedure: CAUDAL SANKET;  Surgeon: Miguel Latham MD;  Location: [facility];  Service: Pain Management;  Laterality: N/A;  [phone]  4 WK F/U LOLIS    EPIDURAL STEROID INJECTION N/A 9/4/2024    Procedure: CAUDAL SANKET;  Surgeon: Miguel Latham MD;  Location: [facility];  Service: Pain Management;  Laterality: N/A;  [phone]  2 WK F/U KAREN    HYSTERECTOMY      INJECTION, SPINE, LUMBOSACRAL, TRANSFORAMINAL APPROACH N/A 6/23/2025    Procedure: CAUDAL SANKET;  Surgeon: Miguel Latham MD;  Location: [facility];  Service: Pain Management;  Laterality: N/A;  2 WK F/U KAREN    PARTIAL HYSTERECTOMY      ROBOTIC BRONCHOSCOPY N/A 1/31/2025    Procedure: ROBOTIC BRONCHOSCOPY;  Surgeon: Garrett Farooq MD;  Location: [facility] 2ND FLR;  Service: Pulmonary;  Laterality: N/A;    TRANSFORAMINAL EPIDURAL INJECTION OF STEROID Bilateral 9/4/2019    Procedure: Injection,steroid,epidural,transforaminal approach LUMBAR TRANSFORAMINAL BILATERAL L3 TF SANKET;  Surgeon: Miguel Latham MD;  Location: [facility];  Service: Pain Management;  Laterality: Bilateral;  NEEDS  CONSENT    TRANSFORAMINAL EPIDURAL INJECTION OF STEROID Bilateral 10/30/2019    Procedure: TRANSFORAMINAL SANKET BILATERAL L3-L4;  Surgeon: Miguel Latham MD;  Location: [facility];  Service: Pain Management;  Laterality: Bilateral;  NEEDS CONSENT    TRANSFORAMINAL EPIDURAL INJECTION OF STEROID Bilateral 11/11/2020    Procedure: INJECTION, STEROID, EPIDURAL, TRANSFORAMINAL APPROACH, L3-L4;  Surgeon: Miguel Latham MD;  Location: [facility];  Service: Pain Management;  Laterality: Bilateral;    TRANSFORAMINAL EPIDURAL INJECTION OF STEROID Bilateral 5/19/2021    Procedure: INJECTION, STEROID, EPIDURAL, TRANSFORAMINAL APPROACH, L3-L4;  Surgeon: Miguel Latham MD;  Location: [facility];  Service: Pain Management;  Laterality: Bilateral;    TRANSFORAMINAL EPIDURAL INJECTION OF STEROID Bilateral 1/26/2022    Procedure: Injection,steroid,epidural,transforaminal approach BILATERAL L3/4;  Surgeon: Miguel Latham MD;  Location: [facility];  Service: Pain Management;  Laterality: Bilateral;    TRANSFORAMINAL EPIDURAL INJECTION OF STEROID Bilateral 4/20/2022    Procedure: INJECTION, STEROID, EPIDURAL, TRANSFORAMINAL APPROACH, Bilateral L3-L4;  Surgeon: Miguel Latham MD;  Location: [facility];  Service: Pain Management;  Laterality: Bilateral;    TRANSFORAMINAL EPIDURAL INJECTION OF STEROID Bilateral 7/20/2022    Procedure: INJECTION, STEROID, EPIDURAL, TRANSFORAMINAL APPROACH, BILATERAL L3-L4 CONTRAST;  Surgeon: Miguel Latham MD;  Location: [facility];  Service: Pain Management;  Laterality: Bilateral;    TRANSFORAMINAL EPIDURAL INJECTION OF STEROID Right 12/7/2022    Procedure: INJECTION, STEROID, EPIDURAL, TRANSFORAMINAL APPROACH, RIGHT L3/L4 AND L4/L5 CONTRAST;  Surgeon: Miguel Latham MD;  Location: [facility];  Service: Pain Management;  Laterality: Right;    TRANSFORAMINAL EPIDURAL INJECTION OF STEROID Right 2/8/2023    Procedure: INJECTION, STEROID, EPIDURAL, TRANSFORAMINAL  APPROACH RIGHT L3/4 AND L4/5 CONTRAST;  Surgeon: Miguel Latham MD;  Location: [facility];  Service: Pain Management;  Laterality: Right;    TUBAL LIGATION Bilateral        Family History:  Family History   Problem Relation Name Age of Onset    Diabetes Mother      Stroke Mother      Glaucoma Mother      Cataracts Mother      Cancer Father          Lung    No Known Problems Sister      Stroke Brother      Hypertension Daughter Lois     Hypertension Daughter Catherine     Hypertension Daughter Kyra     No Known Problems Daughter Dyaca     Heart disease Son      Early death Son      No Known Problems Maternal Aunt      No Known Problems Maternal Uncle      No Known Problems Paternal Aunt      No Known Problems Paternal Uncle      No Known Problems Maternal Grandmother      No Known Problems Maternal Grandfather      No Known Problems Paternal Grandmother      No Known Problems Paternal Grandfather      Amblyopia Neg Hx      Blindness Neg Hx      Macular degeneration Neg Hx      Retinal detachment Neg Hx      Strabismus Neg Hx      Thyroid disease Neg Hx         Social History:  Social History     Socioeconomic History    Marital status:    Tobacco Use    Smoking status: Never     Passive exposure: Never    Smokeless tobacco: Never   Substance and Sexual Activity    Alcohol use: Not Currently     Comment: once a year    Drug use: No    Sexual activity: Not Currently   Social History Narrative    Patient is  w/ 5 children, one  from heart disease.The patient is retired.     Social Drivers of Health     Financial Resource Strain: Medium Risk (2025)    Overall Financial Resource Strain (CARDIA)     Difficulty of Paying Living Expenses: Somewhat hard   Food Insecurity: Food Insecurity Present (2025)    Hunger Vital Sign     Worried About Running Out of Food in the Last Year: Sometimes true     Ran Out of Food in the Last Year: Sometimes true   Transportation Needs: No Transportation  Needs (2/5/2025)    PRAPARE - Transportation     Lack of Transportation (Medical): No     Lack of Transportation (Non-Medical): No   Physical Activity: Inactive (4/14/2025)    Exercise Vital Sign     Days of Exercise per Week: 0 days     Minutes of Exercise per Session: 0 min   Stress: Stress Concern Present (2/5/2025)    [employer] - Occupational Stress Questionnaire     Feeling of Stress : To some extent   Housing Stability: Low Risk  (4/14/2025)    Housing Stability Vital Sign     Unable to Pay for Housing in the Last Year: No     Number of Times Moved in the Last Year: 0     Homeless in the Last Year: No       OBJECTIVE:    /69 (BP Location: Right arm, Patient Position: Sitting)   Pulse 62   Temp 97.9 °F (36.6 °C) (Oral)   Ht 5' (1.524 m)   Wt 66.6 kg (146 lb 13.2 oz)   SpO2 99%   BMI 28.68 kg/m²     PHYSICAL EXAMINATION:    General appearance: Well appearing, in no acute distress, alert and oriented x3.  Psych:  Mood and affect appropriate.  Skin: Skin color, texture, turgor normal, no rashes or lesions, in both upper and lower body.  Head/face:  Normocephalic, atraumatic. No palpable lymph nodes.  Back: Straight leg raising in the sitting position is positive on the right. Full range of motion with pain on flexion and extension. Positive facet loading bilaterally, R>L.  Extremities: Peripheral joint ROM is full and pain free without obvious instability or laxity in all four extremities. No deformities, edema, or skin discoloration. Good capillary refill.  Musculoskeletal: No atrophy or tone abnormalities are noted. 4/5 strength in right ankle with plantar and 4/5 on dorsiflexion. 5/5 strength in left ankle with plantar and 4/5 on dorsiflexion. 4/5 strength with right knee flexion and extension. 5/5 strength with left knee flexion and extension. No TTP over right shoulder or subacromial bursa. Full ROM of right shoulder without pain.  Neuro: Decreased sensation to  RLE.  Gait: Antalgic- ambulates without assistance.    ASSESSMENT: 80 y.o. year old female with lower back and leg pain, consistent with     1. Lumbar radiculopathy  Procedure Order to Pain Management      2. Chronic pain syndrome              PLAN:     - Previous lumbar MRI was reviewed and discussed with the patient today.  - Orders placed for right L3/4 and L4/5 TF SANKET. She had about 50% relief for 3 weeks with recent caudal SANKET. Current symptoms are consistent with MRI in a right L3/4 and L4/5 distribution. She has completed over 6 weeks of PT exercises in the past 3 months.  - I have stressed the importance of physical activity and a home exercise plan to help with pain and improve health.  - Patient can continue with medications for now since they are providing benefits, using them appropriately, and without side effects.  - Continue Gabapentin 600 mg BID.   - RTC 2 weeks after SANKET.    The above plan and management options were discussed at length with patient. Patient is in agreement with the above and verbalized understanding.    Christin Ventura, [facility]  07/14/2025          This note was generated with the assistance of ambient listening technology. Verbal consent was obtained by the patient and accompanying visitor(s) for the recording of patient appointment to facilitate this note. I attest to having reviewed and edited the generated note for accuracy, though some syntax or spelling errors may persist. Please contact the author of this note for any clarification.

## 2025-07-15 ENCOUNTER — PATIENT MESSAGE (OUTPATIENT)
Dept: PAIN MEDICINE | Facility: CLINIC | Age: 80
End: 2025-07-15
Payer: MEDICARE

## 2025-08-09 DIAGNOSIS — F51.04 PSYCHOPHYSIOLOGICAL INSOMNIA: ICD-10-CM

## 2025-08-11 ENCOUNTER — TELEPHONE (OUTPATIENT)
Dept: ENDOSCOPY | Facility: HOSPITAL | Age: 80
End: 2025-08-11
Payer: MEDICARE

## 2025-08-11 RX ORDER — HYDROXYZINE HYDROCHLORIDE 25 MG/1
TABLET, FILM COATED ORAL
Qty: 90 TABLET | Refills: 0 | Status: SHIPPED | OUTPATIENT
Start: 2025-08-11

## 2025-08-13 ENCOUNTER — ANESTHESIA EVENT (OUTPATIENT)
Dept: ENDOSCOPY | Facility: HOSPITAL | Age: 80
End: 2025-08-13
Payer: MEDICARE

## 2025-08-13 RX ORDER — SODIUM CHLORIDE 9 MG/ML
INJECTION, SOLUTION INTRAVENOUS CONTINUOUS
OUTPATIENT
Start: 2025-08-13

## 2025-08-13 RX ORDER — LIDOCAINE HYDROCHLORIDE 10 MG/ML
1 INJECTION, SOLUTION EPIDURAL; INFILTRATION; INTRACAUDAL; PERINEURAL ONCE
OUTPATIENT
Start: 2025-08-13 | End: 2025-08-13

## 2025-08-14 ENCOUNTER — ANESTHESIA (OUTPATIENT)
Dept: ENDOSCOPY | Facility: HOSPITAL | Age: 80
End: 2025-08-14
Payer: MEDICARE

## 2025-08-14 ENCOUNTER — HOSPITAL ENCOUNTER (OUTPATIENT)
Facility: HOSPITAL | Age: 80
Discharge: HOME | End: 2025-08-14
Attending: STUDENT IN AN ORGANIZED HEALTH CARE EDUCATION/TRAINING PROGRAM | Admitting: STUDENT IN AN ORGANIZED HEALTH CARE EDUCATION/TRAINING PROGRAM
Payer: MEDICARE

## 2025-08-14 VITALS
DIASTOLIC BLOOD PRESSURE: 66 MMHG | RESPIRATION RATE: 18 BRPM | OXYGEN SATURATION: 100 % | TEMPERATURE: 98 F | SYSTOLIC BLOOD PRESSURE: 139 MMHG | HEART RATE: 60 BPM

## 2025-08-14 DIAGNOSIS — K52.9 COLITIS: ICD-10-CM

## 2025-08-14 PROCEDURE — 45380 COLONOSCOPY AND BIOPSY: CPT | Performed by: STUDENT IN AN ORGANIZED HEALTH CARE EDUCATION/TRAINING PROGRAM

## 2025-08-14 PROCEDURE — 63600175 PHARM REV CODE 636 W HCPCS: Performed by: NURSE ANESTHETIST, CERTIFIED REGISTERED

## 2025-08-14 PROCEDURE — 25000003 PHARM REV CODE 250: Performed by: NURSE ANESTHETIST, CERTIFIED REGISTERED

## 2025-08-14 PROCEDURE — 88305 TISSUE EXAM BY PATHOLOGIST: CPT | Mod: TC | Performed by: STUDENT IN AN ORGANIZED HEALTH CARE EDUCATION/TRAINING PROGRAM

## 2025-08-14 PROCEDURE — 27201012 HC FORCEPS, HOT/COLD, DISP: Performed by: STUDENT IN AN ORGANIZED HEALTH CARE EDUCATION/TRAINING PROGRAM

## 2025-08-14 PROCEDURE — 37000008 HC ANESTHESIA 1ST 15 MINUTES: Performed by: STUDENT IN AN ORGANIZED HEALTH CARE EDUCATION/TRAINING PROGRAM

## 2025-08-14 PROCEDURE — 45380 COLONOSCOPY AND BIOPSY: CPT | Mod: GC,,, | Performed by: STUDENT IN AN ORGANIZED HEALTH CARE EDUCATION/TRAINING PROGRAM

## 2025-08-14 PROCEDURE — 37000009 HC ANESTHESIA EA ADD 15 MINS: Performed by: STUDENT IN AN ORGANIZED HEALTH CARE EDUCATION/TRAINING PROGRAM

## 2025-08-14 RX ORDER — LIDOCAINE HYDROCHLORIDE 20 MG/ML
INJECTION, SOLUTION EPIDURAL; INFILTRATION; INTRACAUDAL; PERINEURAL
Status: DISCONTINUED
Start: 2025-08-14 | End: 2025-08-14 | Stop reason: HOSPADM

## 2025-08-14 RX ORDER — LIDOCAINE HYDROCHLORIDE 20 MG/ML
INJECTION INTRAVENOUS
Status: DISCONTINUED | OUTPATIENT
Start: 2025-08-14 | End: 2025-08-14

## 2025-08-14 RX ORDER — PROPOFOL 10 MG/ML
VIAL (ML) INTRAVENOUS CONTINUOUS PRN
Status: DISCONTINUED | OUTPATIENT
Start: 2025-08-14 | End: 2025-08-14

## 2025-08-14 RX ORDER — PROPOFOL 10 MG/ML
VIAL (ML) INTRAVENOUS
Status: DISCONTINUED
Start: 2025-08-14 | End: 2025-08-14 | Stop reason: HOSPADM

## 2025-08-14 RX ADMIN — SODIUM CHLORIDE: 0.9 INJECTION, SOLUTION INTRAVENOUS at 09:08

## 2025-08-14 RX ADMIN — PROPOFOL 150 MCG/KG/MIN: 10 INJECTION, EMULSION INTRAVENOUS at 09:08

## 2025-08-14 RX ADMIN — LIDOCAINE HYDROCHLORIDE 100 MG: 20 INJECTION, SOLUTION INTRAVENOUS at 09:08

## 2025-08-18 LAB
ESTROGEN SERPL-MCNC: NORMAL PG/ML
INSULIN SERPL-ACNC: NORMAL U[IU]/ML
LAB AP CLINICAL INFORMATION: NORMAL
LAB AP GROSS DESCRIPTION: NORMAL
LAB AP PERFORMING LOCATION(S): NORMAL
LAB AP REPORT FOOTNOTES: NORMAL
T3RU NFR SERPL: NORMAL %

## (undated) DEVICE — CONNECTOR SWIVEL

## (undated) DEVICE — NDL FLTR 5MCRN BLNT TIP 18GX1

## (undated) DEVICE — SPONGE COTTON TRAY 4X4IN

## (undated) DEVICE — NDL ASPIRATING VIZISHOT 20-40M

## (undated) DEVICE — SYR SLIP TIP 20CC

## (undated) DEVICE — DRESSING TRANS 6X8 TEGADERM

## (undated) DEVICE — CONTAINER SPECIMEN OR STER 4OZ

## (undated) DEVICE — DRESSING LEUKOPLAST FLEX 1X3IN

## (undated) DEVICE — ADAPTER SWIVEL

## (undated) DEVICE — DRAPE THREE-QTR REINF 53X77IN

## (undated) DEVICE — SYR 10CC LUER LOCK

## (undated) DEVICE — PENCIL GOLF STERILE

## (undated) DEVICE — ADAPTER VISION PROBE & SUCTION

## (undated) DEVICE — BANDAGE ADHESIVE

## (undated) DEVICE — SYS LABEL CORRECT MED

## (undated) DEVICE — KIT ANTIFOG W/SPONG & FLUID

## (undated) DEVICE — NDL HYPO REG 25G X 1 1/2

## (undated) DEVICE — GOWN POLY REINF BRTH SLV XL

## (undated) DEVICE — NDL FLEXISION BIOPSY 21G

## (undated) DEVICE — PACK ECLIPSE SET-UP W/O DRAPE

## (undated) DEVICE — BAG ION VISION PROBE

## (undated) DEVICE — TUBING SUC UNIV W/CONN 12FT

## (undated) DEVICE — BOWL STERILE LARGE 32OZ

## (undated) DEVICE — NDL VIZISHOT 2 FLEX 22G